# Patient Record
Sex: MALE | Race: BLACK OR AFRICAN AMERICAN | Employment: UNEMPLOYED | URBAN - METROPOLITAN AREA
[De-identification: names, ages, dates, MRNs, and addresses within clinical notes are randomized per-mention and may not be internally consistent; named-entity substitution may affect disease eponyms.]

---

## 2020-02-24 ENCOUNTER — DOCUMENTATION ONLY (OUTPATIENT)
Dept: ORTHOPEDIC SURGERY | Facility: CLINIC | Age: 80
End: 2020-02-24

## 2020-02-24 ENCOUNTER — OFFICE VISIT (OUTPATIENT)
Dept: ORTHOPEDIC SURGERY | Facility: CLINIC | Age: 80
End: 2020-02-24

## 2020-02-24 VITALS
HEIGHT: 68 IN | SYSTOLIC BLOOD PRESSURE: 200 MMHG | OXYGEN SATURATION: 96 % | BODY MASS INDEX: 28.43 KG/M2 | TEMPERATURE: 97.1 F | HEART RATE: 82 BPM | RESPIRATION RATE: 16 BRPM | WEIGHT: 187.6 LBS | DIASTOLIC BLOOD PRESSURE: 100 MMHG

## 2020-02-24 DIAGNOSIS — M16.11 PRIMARY OSTEOARTHRITIS OF RIGHT HIP: ICD-10-CM

## 2020-02-24 DIAGNOSIS — G89.29 CHRONIC RIGHT HIP PAIN: ICD-10-CM

## 2020-02-24 DIAGNOSIS — M54.16 LUMBAR RADICULOPATHY: Primary | ICD-10-CM

## 2020-02-24 DIAGNOSIS — M54.50 LUMBAR PAIN: ICD-10-CM

## 2020-02-24 DIAGNOSIS — M25.551 CHRONIC RIGHT HIP PAIN: ICD-10-CM

## 2020-02-24 DIAGNOSIS — M25.859 FEMORAL ACETABULAR IMPINGEMENT: ICD-10-CM

## 2020-02-24 RX ORDER — BETAMETHASONE SODIUM PHOSPHATE AND BETAMETHASONE ACETATE 3; 3 MG/ML; MG/ML
6 INJECTION, SUSPENSION INTRA-ARTICULAR; INTRALESIONAL; INTRAMUSCULAR; SOFT TISSUE ONCE
Qty: 0.5 ML | Refills: 0
Start: 2020-02-24 | End: 2020-02-24

## 2020-02-24 NOTE — PROGRESS NOTES
Patient presented to the office with a blood pressure of 200/100 manual. I advised patient of the signs/symptoms of a stroke. Advised that if he has any numbness/tingling, facial drooping or weakness, eye floaters to contact 911 immediately or go to the ER ASAP. Patient verbalized understanding.

## 2020-02-24 NOTE — PROGRESS NOTES
Verbal order given by Dr. Mariela Torres to draw up 4 mL 0.25% Sensorcaine and 0.5 mL 30 mg/5mL Betamethasone.

## 2020-02-24 NOTE — PROGRESS NOTES
Patient: Lindsay Calvert                MRN: [id number]       SSN: [id number] YOB: 1940        AGE: 78 y.o. SEX: male PCP: Cassidy Martinez MD 
02/24/20 Chief Complaint Patient presents with  
 Hip Pain Right hip pain HISTORY:  Lindsay Calvert is a 78 y.o. male who is seen for right hip and back pain. He has been experiencing hip and back pain for the past several months. He does not recall any hip or back injury. He feels pain in his lateral hip and right buttock with standing and walking. He also feels numbness that radiates down to his right leg to his foot. He feels like someone is sticking his right foot with a pin. Pain Assessment  2/24/2020 Location of Pain Hip Location Modifiers Right Severity of Pain 0 Quality of Pain Aching Duration of Pain A few hours Frequency of Pain Intermittent Aggravating Factors Walking;Standing Relieving Factors Rest;Exercises Relieving Factors Comment Medication Result of Injury No  
 
Occupation, etc:  Mr. Pratima Esparza served 26 years in the [employer]. 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 He retired from the [employer] at age 68. He lives in [place] with his wife. He has a onelia-tzu named Gloria. He has 3 adult daughters and 8 grandchildren--4 grandsons and 6 granddaughters. He spends the majority of his time studying the bible. He is not diabetic. Mr. Pratima Esparza weighs 187 lbs and is 5'8\" tall. No results found for: HBA1C, HGBE8, PRH3SSGU, CFV7LAIA, XCR1CMPA Weight Metrics 2/24/2020 2/26/2018 12/21/2017 6/12/2014 Weight 187 lb 9.6 oz 192 lb 192 lb 205 lb BMI 28.52 kg/m2 29.19 kg/m2 29.19 kg/m2 31.18 kg/m2 Patient Active Problem List  
Diagnosis Code  Urinary retention R33.9  Hypertension I10  
 Carotid arterial disease (HCC) I73.9 REVIEW OF SYSTEMS: All Below are Negative except: See HPI 
 Constitutional: negative for fever, chills, and weight loss. Cardiovascular: negative for chest pain, claudication, leg swelling, SOB, WILLIS Gastrointestinal: Negative for pain, N/V/C/D, Blood in stool or urine, dysuria, hematuria, incontinence, pelvic pain. Musculoskeletal: See HPI Neurological: Negative for dizziness and weakness. Negative for headaches, Visual changes, confusion, seizures Phychiatric/Behavioral: Negative for depression, memory loss, substance abuse. Extremities: Negative for hair changes, rash, or skin lesion changes. Hematologic: Negative for bleeding problems, bruising, pallor or swollen lymph nodes Peripheral Vascular: No calf pain, no circulation deficits. Social History Socioeconomic History  Marital status: UNKNOWN Spouse name: Not on file  Number of children: Not on file  Years of education: Not on file  Highest education level: Not on file Occupational History  Not on file Social Needs  Financial resource strain: Not on file  Food insecurity:  
  Worry: Not on file Inability: Not on file  Transportation needs:  
  Medical: Not on file Non-medical: Not on file Tobacco Use  Smoking status: Former Smoker Types: Cigarettes Last attempt to quit: 2006 Years since quittin.7  Smokeless tobacco: Never Used Substance and Sexual Activity  Alcohol use: No  
 Drug use: No  
 Sexual activity: Not on file Lifestyle  Physical activity:  
  Days per week: Not on file Minutes per session: Not on file  Stress: Not on file Relationships  Social connections:  
  Talks on phone: Not on file Gets together: Not on file Attends [employer] service: Not on file Active member of club or organization: Not on file Attends meetings of clubs or organizations: Not on file Relationship status: Not on file  Intimate partner violence:  
  Fear of current or ex partner: Not on file Emotionally abused: Not on file Physically abused: Not on file Forced sexual activity: Not on file Other Topics Concern  Not on file Social History Narrative  Not on file Allergies Allergen Reactions  Lipitor [Atorvastatin] Rash  Norvasc [Amlodipine] Rash Current Outpatient Medications Medication Sig  ibuprofen (MOTRIN) 800 mg tablet Take  by mouth.  Aspirin, Buffered 81 mg tab Take  by mouth.  carvedilol (COREG) 25 mg tablet Take 25 mg by mouth two (2) times daily (with meals).  isosorbide-hydrALAZINE (BIDIL) 20-37.5 mg per tablet Take 1 Tab by mouth.  tamsulosin (FLOMAX) 0.4 mg capsule Take 1 Cap by mouth daily (after dinner).  albuterol-ipratropium (DUO-NEB) 2.5 mg-0.5 mg/3 ml nebu 3 mL by Nebulization route.  hydroCHLOROthiazide (HYDRODIURIL) 25 mg tablet Take 25 mg by mouth daily.  lisinopril (PRINIVIL, ZESTRIL) 10 mg tablet Take  by mouth daily.  albuterol (PROVENTIL HFA, VENTOLIN HFA) 90 mcg/actuation inhaler Take  by inhalation. No current facility-administered medications for this visit. PHYSICAL EXAMINATION: 
Visit Vitals BP (!) 200/100 (BP 1 Location: Left arm, BP Patient Position: Sitting) Comment: manual{pt asymptomatic, took BP meds this AM, provider aware Pulse 82 Temp 97.1 °F (36.2 °C) (Oral) Resp 16 Ht 5' 8\" (1.727 m) Wt 187 lb 9.6 oz (85.1 kg) SpO2 96% BMI 28.52 kg/m² ORTHO EXAMINATION: 
Examination Right hip Left hip Skin Intact Intact External Rotation ROM 10 20 Internal Rotation ROM 5 10 Trochanteric tenderness - - Hip flexion contracture - - Antalgic gait - - Trendelenberg sign - - Lumbar tenderness - - Straight leg raise - - Calf tenderness - - Neurovascular Intact Intact Examination Lumbar Thoracic Skin Intact Intact Tenderness + right paralumbar - Tightness + right paralumbar - Lordosis Normal N/A Kyphosis N/A Normal  
Scoliosis - -  
 Flexion Fingertips to upper shin N/A Extension 10 N/A Knee reflexes Normal N/A Ankle reflexes Normal N/A Straight leg raise - N/A Calf tenderness - N/A  
 
 
TIME OUT: 
Chart reviewed for the following: 
 I, Linda Zavala MD, have reviewed the History, Physical and updated the Allergic reactions for [facility] TIME OUT performed immediately prior to start of procedure: 
Oliva Lundborg, MD, have performed the following reviews on [facility] prior to the start of the procedure:         
* Patient was identified by name and date of birth * Agreement on procedure being performed was verified * Risks and Benefits explained to the patient * Procedure site verified and marked as necessary * Patient was positioned for comfort * Consent was obtained Time: 10:15 AM  
 
Date of procedure: 2/24/2020 Procedure performed by:  Linda Zavala MD 
Mr. Ashley Rosenbaum tolerated the procedure well with no complications. RADIOGRAPHS: 
XR RIGHT HIP 7/31/19 OBICI IMPRESSION Moderate right hip degenerative changes, without significant radiographic progression since 2015. 
-I have independently reviewed these images during this office visit. -Dr. Mark Dahl IMPRESSION:  AP pelvis and two views - No fractures, moderately severe joint space narrowing, + osteophytes present. Tonnis grade 3, oblong shape to femoral head c/w FAWN. IMPRESSION:   
  ICD-10-CM ICD-9-CM 1. Lumbar radiculopathy M54.16 724.4 betamethasone (CELESTONE SOLUSPAN) 6 mg/mL injection BETAMETHASONE ACETATE & SODIUM PHOSPHATE INJECTION 3 MG EA. INJECT TRIGGER POINT, 1 OR 2 REFERRAL TO SPINE SURGERY 2. Primary osteoarthritis of right hip M16.11 715.15   
3. Chronic right hip pain M25.551 719.45   
 G89.29 338.29   
4. 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   
5. Lumbar pain M54.5 724.2 betamethasone (CELESTONE SOLUSPAN) 6 mg/mL injection BETAMETHASONE ACETATE & SODIUM PHOSPHATE INJECTION 3 MG EA. INJECT TRIGGER POINT, 1 OR 2 REFERRAL TO SPINE SURGERY  
 
PLAN:  After discussing treatment options, patient's right paralumbar region was injected with 4 cc Marcaine and 1/2 cc Celestone. We discussed possible need for hip arthroplasty at some time in the future. There is no need for surgery at this time. He will follow up at the spine center. F/u with PCP for elevated bp.  
 
Scribed by Dale Needle ([address]) as dictated by Harshil France MD

## 2020-02-24 NOTE — PROGRESS NOTES
1. Have you been to the ER, urgent care clinic since your last visit? Hospitalized since your last visit? No 
 
2. Have you seen or consulted any other health care providers outside of the [address] [place] [address] since your last visit? Include any pap smears or colon screening.  No

## 2021-01-01 ENCOUNTER — HOME CARE VISIT (OUTPATIENT)
Dept: SCHEDULING | Facility: HOME HEALTH | Age: 81
End: 2021-01-01
Payer: MEDICARE

## 2021-01-01 ENCOUNTER — HOME CARE VISIT (OUTPATIENT)
Dept: HOME HEALTH SERVICES | Facility: HOME HEALTH | Age: 81
End: 2021-01-01
Payer: MEDICARE

## 2021-01-01 ENCOUNTER — HOSPITAL ENCOUNTER (INPATIENT)
Dept: CT IMAGING | Age: 81
Discharge: HOME | DRG: 280 | End: 2021-06-21
Attending: FAMILY MEDICINE | Admitting: INTERNAL MEDICINE
Payer: MEDICARE

## 2021-01-01 ENCOUNTER — APPOINTMENT (OUTPATIENT)
Dept: GENERAL RADIOLOGY | Age: 81
DRG: 280 | End: 2021-01-01
Attending: STUDENT IN AN ORGANIZED HEALTH CARE EDUCATION/TRAINING PROGRAM
Payer: MEDICARE

## 2021-01-01 ENCOUNTER — OFFICE VISIT (OUTPATIENT)
Dept: SURGERY | Age: 81
End: 2021-01-01
Payer: MEDICARE

## 2021-01-01 ENCOUNTER — APPOINTMENT (OUTPATIENT)
Dept: GENERAL RADIOLOGY | Age: 81
DRG: 871 | End: 2021-01-01
Attending: EMERGENCY MEDICINE
Payer: MEDICARE

## 2021-01-01 ENCOUNTER — HOME HEALTH ADMISSION (OUTPATIENT)
Dept: HOME HEALTH SERVICES | Facility: HOME HEALTH | Age: 81
End: 2021-01-01
Payer: MEDICARE

## 2021-01-01 ENCOUNTER — HOME CARE VISIT (OUTPATIENT)
Dept: SCHEDULING | Facility: HOME HEALTH | Age: 81
End: 2021-01-01

## 2021-01-01 ENCOUNTER — OFFICE VISIT (OUTPATIENT)
Dept: SURGERY | Age: 81
End: 2021-01-01
Payer: COMMERCIAL

## 2021-01-01 ENCOUNTER — HOSPITAL ENCOUNTER (INPATIENT)
Age: 81
LOS: 14 days | Discharge: HOME HEALTH | DRG: 391 | End: 2021-07-13
Attending: INTERNAL MEDICINE | Admitting: INTERNAL MEDICINE
Payer: MEDICARE

## 2021-01-01 ENCOUNTER — HOSPITAL ENCOUNTER (INPATIENT)
Age: 81
LOS: 14 days | Discharge: ANOTHER HOSPITAL | DRG: 280 | End: 2021-06-21
Attending: INTERNAL MEDICINE | Admitting: INTERNAL MEDICINE
Payer: MEDICARE

## 2021-01-01 ENCOUNTER — HOSPITAL ENCOUNTER (OUTPATIENT)
Dept: LAB | Age: 81
Discharge: HOME | End: 2021-07-19

## 2021-01-01 ENCOUNTER — APPOINTMENT (OUTPATIENT)
Dept: NON INVASIVE DIAGNOSTICS | Age: 81
DRG: 871 | End: 2021-01-01
Attending: PHYSICIAN ASSISTANT
Payer: MEDICARE

## 2021-01-01 ENCOUNTER — HOSPITAL ENCOUNTER (INPATIENT)
Age: 81
LOS: 8 days | Discharge: NURSING, REHAB OR LONG-TERM CARE | DRG: 871 | End: 2021-06-29
Attending: EMERGENCY MEDICINE | Admitting: INTERNAL MEDICINE
Payer: MEDICARE

## 2021-01-01 ENCOUNTER — HOSPITAL ENCOUNTER (OUTPATIENT)
Dept: CT IMAGING | Age: 81
Discharge: HOME | End: 2021-07-22
Attending: INTERNAL MEDICINE
Payer: MEDICARE

## 2021-01-01 VITALS
WEIGHT: 178.1 LBS | TEMPERATURE: 97.7 F | BODY MASS INDEX: 26.99 KG/M2 | DIASTOLIC BLOOD PRESSURE: 66 MMHG | SYSTOLIC BLOOD PRESSURE: 114 MMHG | RESPIRATION RATE: 20 BRPM | OXYGEN SATURATION: 97 % | HEIGHT: 68 IN | HEART RATE: 72 BPM

## 2021-01-01 VITALS
HEART RATE: 83 BPM | OXYGEN SATURATION: 98 % | SYSTOLIC BLOOD PRESSURE: 124 MMHG | DIASTOLIC BLOOD PRESSURE: 72 MMHG | TEMPERATURE: 97.7 F

## 2021-01-01 VITALS
SYSTOLIC BLOOD PRESSURE: 140 MMHG | RESPIRATION RATE: 18 BRPM | DIASTOLIC BLOOD PRESSURE: 80 MMHG | SYSTOLIC BLOOD PRESSURE: 135 MMHG | TEMPERATURE: 98.2 F | RESPIRATION RATE: 20 BRPM | OXYGEN SATURATION: 97 % | DIASTOLIC BLOOD PRESSURE: 90 MMHG | HEART RATE: 90 BPM | OXYGEN SATURATION: 97 % | HEART RATE: 90 BPM | TEMPERATURE: 98.6 F

## 2021-01-01 VITALS
TEMPERATURE: 97.8 F | HEART RATE: 93 BPM | SYSTOLIC BLOOD PRESSURE: 144 MMHG | DIASTOLIC BLOOD PRESSURE: 86 MMHG | OXYGEN SATURATION: 97 %

## 2021-01-01 VITALS — DIASTOLIC BLOOD PRESSURE: 110 MMHG | SYSTOLIC BLOOD PRESSURE: 160 MMHG | TEMPERATURE: 98.4 F | RESPIRATION RATE: 20 BRPM

## 2021-01-01 VITALS
RESPIRATION RATE: 16 BRPM | HEART RATE: 100 BPM | OXYGEN SATURATION: 96 % | DIASTOLIC BLOOD PRESSURE: 80 MMHG | SYSTOLIC BLOOD PRESSURE: 132 MMHG | TEMPERATURE: 98.4 F

## 2021-01-01 VITALS
TEMPERATURE: 97.5 F | HEART RATE: 96 BPM | BODY MASS INDEX: 24.4 KG/M2 | HEIGHT: 68 IN | OXYGEN SATURATION: 99 % | WEIGHT: 161 LBS | DIASTOLIC BLOOD PRESSURE: 90 MMHG | SYSTOLIC BLOOD PRESSURE: 140 MMHG | RESPIRATION RATE: 17 BRPM

## 2021-01-01 VITALS
OXYGEN SATURATION: 96 % | HEART RATE: 110 BPM | DIASTOLIC BLOOD PRESSURE: 100 MMHG | TEMPERATURE: 98.6 F | RESPIRATION RATE: 16 BRPM | SYSTOLIC BLOOD PRESSURE: 138 MMHG

## 2021-01-01 VITALS
HEART RATE: 103 BPM | RESPIRATION RATE: 24 BRPM | DIASTOLIC BLOOD PRESSURE: 100 MMHG | TEMPERATURE: 98.1 F | OXYGEN SATURATION: 96 % | SYSTOLIC BLOOD PRESSURE: 160 MMHG

## 2021-01-01 VITALS
DIASTOLIC BLOOD PRESSURE: 100 MMHG | HEART RATE: 94 BPM | RESPIRATION RATE: 18 BRPM | SYSTOLIC BLOOD PRESSURE: 180 MMHG | OXYGEN SATURATION: 96 % | TEMPERATURE: 97.9 F

## 2021-01-01 VITALS
RESPIRATION RATE: 18 BRPM | SYSTOLIC BLOOD PRESSURE: 138 MMHG | DIASTOLIC BLOOD PRESSURE: 78 MMHG | HEART RATE: 56 BPM | OXYGEN SATURATION: 90 % | TEMPERATURE: 98.7 F

## 2021-01-01 VITALS
OXYGEN SATURATION: 99 % | HEART RATE: 72 BPM | TEMPERATURE: 98.3 F | SYSTOLIC BLOOD PRESSURE: 118 MMHG | DIASTOLIC BLOOD PRESSURE: 64 MMHG

## 2021-01-01 VITALS
OXYGEN SATURATION: 96 % | RESPIRATION RATE: 10 BRPM | TEMPERATURE: 97.7 F | SYSTOLIC BLOOD PRESSURE: 150 MMHG | HEART RATE: 96 BPM | DIASTOLIC BLOOD PRESSURE: 90 MMHG

## 2021-01-01 VITALS
RESPIRATION RATE: 20 BRPM | TEMPERATURE: 98.4 F | SYSTOLIC BLOOD PRESSURE: 163 MMHG | OXYGEN SATURATION: 96 % | HEART RATE: 85 BPM | DIASTOLIC BLOOD PRESSURE: 108 MMHG

## 2021-01-01 VITALS
SYSTOLIC BLOOD PRESSURE: 197 MMHG | HEIGHT: 68 IN | DIASTOLIC BLOOD PRESSURE: 101 MMHG | OXYGEN SATURATION: 90 % | RESPIRATION RATE: 20 BRPM | BODY MASS INDEX: 24.71 KG/M2 | WEIGHT: 163 LBS | HEART RATE: 97 BPM | TEMPERATURE: 97.9 F

## 2021-01-01 VITALS
DIASTOLIC BLOOD PRESSURE: 62 MMHG | TEMPERATURE: 98.7 F | OXYGEN SATURATION: 98 % | RESPIRATION RATE: 18 BRPM | HEART RATE: 98 BPM | SYSTOLIC BLOOD PRESSURE: 108 MMHG

## 2021-01-01 VITALS
WEIGHT: 169 LBS | DIASTOLIC BLOOD PRESSURE: 100 MMHG | OXYGEN SATURATION: 95 % | HEIGHT: 68 IN | RESPIRATION RATE: 18 BRPM | HEART RATE: 74 BPM | TEMPERATURE: 97.6 F | BODY MASS INDEX: 25.61 KG/M2 | SYSTOLIC BLOOD PRESSURE: 160 MMHG

## 2021-01-01 VITALS
SYSTOLIC BLOOD PRESSURE: 142 MMHG | TEMPERATURE: 98.2 F | TEMPERATURE: 98 F | DIASTOLIC BLOOD PRESSURE: 68 MMHG | HEART RATE: 64 BPM | DIASTOLIC BLOOD PRESSURE: 80 MMHG | RESPIRATION RATE: 18 BRPM | RESPIRATION RATE: 18 BRPM | SYSTOLIC BLOOD PRESSURE: 122 MMHG | OXYGEN SATURATION: 99 % | HEART RATE: 65 BPM

## 2021-01-01 VITALS
HEART RATE: 88 BPM | DIASTOLIC BLOOD PRESSURE: 80 MMHG | TEMPERATURE: 98.4 F | OXYGEN SATURATION: 96 % | SYSTOLIC BLOOD PRESSURE: 132 MMHG | RESPIRATION RATE: 18 BRPM

## 2021-01-01 VITALS
OXYGEN SATURATION: 95 % | BODY MASS INDEX: 26.99 KG/M2 | DIASTOLIC BLOOD PRESSURE: 84 MMHG | HEART RATE: 93 BPM | RESPIRATION RATE: 18 BRPM | WEIGHT: 178.1 LBS | HEIGHT: 68 IN | TEMPERATURE: 98.1 F | SYSTOLIC BLOOD PRESSURE: 130 MMHG

## 2021-01-01 VITALS
RESPIRATION RATE: 18 BRPM | SYSTOLIC BLOOD PRESSURE: 118 MMHG | HEART RATE: 78 BPM | OXYGEN SATURATION: 96 % | DIASTOLIC BLOOD PRESSURE: 70 MMHG | TEMPERATURE: 97 F

## 2021-01-01 VITALS
DIASTOLIC BLOOD PRESSURE: 64 MMHG | RESPIRATION RATE: 18 BRPM | OXYGEN SATURATION: 100 % | TEMPERATURE: 97.8 F | SYSTOLIC BLOOD PRESSURE: 118 MMHG | HEART RATE: 93 BPM

## 2021-01-01 VITALS
OXYGEN SATURATION: 96 % | DIASTOLIC BLOOD PRESSURE: 104 MMHG | HEART RATE: 93 BPM | RESPIRATION RATE: 18 BRPM | SYSTOLIC BLOOD PRESSURE: 164 MMHG | TEMPERATURE: 97.6 F

## 2021-01-01 VITALS
SYSTOLIC BLOOD PRESSURE: 142 MMHG | OXYGEN SATURATION: 96 % | DIASTOLIC BLOOD PRESSURE: 90 MMHG | HEART RATE: 50 BPM | TEMPERATURE: 98.4 F

## 2021-01-01 VITALS
DIASTOLIC BLOOD PRESSURE: 90 MMHG | RESPIRATION RATE: 18 BRPM | TEMPERATURE: 97.3 F | OXYGEN SATURATION: 97 % | SYSTOLIC BLOOD PRESSURE: 145 MMHG | HEART RATE: 94 BPM

## 2021-01-01 VITALS
DIASTOLIC BLOOD PRESSURE: 78 MMHG | SYSTOLIC BLOOD PRESSURE: 130 MMHG | RESPIRATION RATE: 20 BRPM | TEMPERATURE: 97.9 F | OXYGEN SATURATION: 97 % | HEART RATE: 80 BPM

## 2021-01-01 VITALS
OXYGEN SATURATION: 95 % | TEMPERATURE: 87 F | SYSTOLIC BLOOD PRESSURE: 144 MMHG | HEART RATE: 87 BPM | RESPIRATION RATE: 18 BRPM | DIASTOLIC BLOOD PRESSURE: 96 MMHG

## 2021-01-01 VITALS
TEMPERATURE: 97.9 F | SYSTOLIC BLOOD PRESSURE: 132 MMHG | OXYGEN SATURATION: 97 % | RESPIRATION RATE: 18 BRPM | HEART RATE: 50 BPM | DIASTOLIC BLOOD PRESSURE: 78 MMHG

## 2021-01-01 VITALS
OXYGEN SATURATION: 95 % | SYSTOLIC BLOOD PRESSURE: 130 MMHG | TEMPERATURE: 98.6 F | DIASTOLIC BLOOD PRESSURE: 91 MMHG | HEART RATE: 92 BPM | RESPIRATION RATE: 18 BRPM

## 2021-01-01 VITALS
OXYGEN SATURATION: 100 % | TEMPERATURE: 98.8 F | SYSTOLIC BLOOD PRESSURE: 122 MMHG | DIASTOLIC BLOOD PRESSURE: 64 MMHG | HEART RATE: 78 BPM

## 2021-01-01 VITALS
DIASTOLIC BLOOD PRESSURE: 84 MMHG | TEMPERATURE: 98.3 F | OXYGEN SATURATION: 98 % | HEART RATE: 59 BPM | SYSTOLIC BLOOD PRESSURE: 132 MMHG

## 2021-01-01 DIAGNOSIS — Z95.5 STATUS POST INSERTION OF DRUG ELUTING CORONARY ARTERY STENT: ICD-10-CM

## 2021-01-01 DIAGNOSIS — I63.9 ACUTE EMBOLIC STROKE (HCC): ICD-10-CM

## 2021-01-01 DIAGNOSIS — K57.20 DIVERTICULITIS OF LARGE INTESTINE WITH ABSCESS WITHOUT BLEEDING: Primary | ICD-10-CM

## 2021-01-01 DIAGNOSIS — I73.9 PERIPHERAL VASCULAR DISEASE OF EXTREMITY WITH CLAUDICATION (HCC): ICD-10-CM

## 2021-01-01 DIAGNOSIS — Z74.09 IMPAIRED MOBILITY AND ADLS: ICD-10-CM

## 2021-01-01 DIAGNOSIS — I25.10 CORONARY ARTERY DISEASE INVOLVING NATIVE CORONARY ARTERY OF NATIVE HEART, UNSPECIFIED WHETHER ANGINA PRESENT: ICD-10-CM

## 2021-01-01 DIAGNOSIS — I21.4 NON-ST ELEVATION (NSTEMI) MYOCARDIAL INFARCTION (HCC): ICD-10-CM

## 2021-01-01 DIAGNOSIS — R33.8 BENIGN PROSTATIC HYPERPLASIA WITH URINARY RETENTION: ICD-10-CM

## 2021-01-01 DIAGNOSIS — R19.5 LOOSE STOOLS: ICD-10-CM

## 2021-01-01 DIAGNOSIS — Z79.01 ANTICOAGULATED BY ANTICOAGULATION TREATMENT: ICD-10-CM

## 2021-01-01 DIAGNOSIS — D72.829 LEUKOCYTOSIS, UNSPECIFIED TYPE: ICD-10-CM

## 2021-01-01 DIAGNOSIS — N40.1 BENIGN PROSTATIC HYPERPLASIA WITH URINARY RETENTION: ICD-10-CM

## 2021-01-01 DIAGNOSIS — I25.10 CORONARY ARTERY DISEASE INVOLVING NATIVE CORONARY ARTERY OF NATIVE HEART WITHOUT ANGINA PECTORIS: ICD-10-CM

## 2021-01-01 DIAGNOSIS — K65.1 MESENTERIC ABSCESS (HCC): ICD-10-CM

## 2021-01-01 DIAGNOSIS — Z79.01 ON CLOPIDOGREL THERAPY: ICD-10-CM

## 2021-01-01 DIAGNOSIS — G57.93 NEUROPATHY INVOLVING BOTH LOWER EXTREMITIES: ICD-10-CM

## 2021-01-01 DIAGNOSIS — I50.30 HEART FAILURE WITH PRESERVED LEFT VENTRICULAR FUNCTION (HFPEF) (HCC): ICD-10-CM

## 2021-01-01 DIAGNOSIS — I21.4 NON-ST ELEVATION (NSTEMI) MYOCARDIAL INFARCTION (HCC): Primary | ICD-10-CM

## 2021-01-01 DIAGNOSIS — R53.1 GENERALIZED WEAKNESS: ICD-10-CM

## 2021-01-01 DIAGNOSIS — R55 SYNCOPE, UNSPECIFIED SYNCOPE TYPE: Primary | ICD-10-CM

## 2021-01-01 DIAGNOSIS — I48.0 PAROXYSMAL ATRIAL FIBRILLATION (HCC): ICD-10-CM

## 2021-01-01 DIAGNOSIS — Z78.9 IMPAIRED MOBILITY AND ADLS: ICD-10-CM

## 2021-01-01 DIAGNOSIS — Z86.79 HISTORY OF CORONARY ARTERY DISEASE: ICD-10-CM

## 2021-01-01 LAB
ALBUMIN SERPL-MCNC: 2.9 G/DL (ref 3.4–5)
ALBUMIN SERPL-MCNC: 3.1 G/DL (ref 3.4–5)
ALBUMIN SERPL-MCNC: 3.1 G/DL (ref 3.4–5)
ALBUMIN SERPL-MCNC: 3.2 G/DL (ref 3.4–5)
ALBUMIN/GLOB SERPL: 0.8 {RATIO} (ref 0.8–1.7)
ALBUMIN/GLOB SERPL: 0.9 {RATIO} (ref 0.8–1.7)
ALBUMIN/GLOB SERPL: 1.1 {RATIO} (ref 0.8–1.7)
ALBUMIN/GLOB SERPL: 1.1 {RATIO} (ref 0.8–1.7)
ALP SERPL-CCNC: 40 U/L (ref 45–117)
ALP SERPL-CCNC: 46 U/L (ref 45–117)
ALP SERPL-CCNC: 46 U/L (ref 45–117)
ALP SERPL-CCNC: 48 U/L (ref 45–117)
ALT SERPL-CCNC: 15 U/L (ref 16–61)
ALT SERPL-CCNC: 16 U/L (ref 16–61)
ALT SERPL-CCNC: 17 U/L (ref 16–61)
ALT SERPL-CCNC: 18 U/L (ref 16–61)
ANION GAP SERPL CALC-SCNC: 11 MMOL/L (ref 3–18)
ANION GAP SERPL CALC-SCNC: 2 MMOL/L (ref 3–18)
ANION GAP SERPL CALC-SCNC: 3 MMOL/L (ref 3–18)
ANION GAP SERPL CALC-SCNC: 4 MMOL/L (ref 3–18)
ANION GAP SERPL CALC-SCNC: 5 MMOL/L (ref 3–18)
ANION GAP SERPL CALC-SCNC: 6 MMOL/L (ref 3–18)
ANION GAP SERPL CALC-SCNC: 7 MMOL/L (ref 3–18)
ANION GAP SERPL CALC-SCNC: 8 MMOL/L (ref 3–18)
ANION GAP SERPL CALC-SCNC: 8 MMOL/L (ref 3–18)
APPEARANCE UR: CLEAR
APPEARANCE UR: CLEAR
APTT PPP: 49.2 SEC (ref 23–36.4)
AST SERPL-CCNC: 16 U/L (ref 10–38)
AST SERPL-CCNC: 21 U/L (ref 10–38)
AST SERPL-CCNC: 22 U/L (ref 10–38)
AST SERPL-CCNC: 28 U/L (ref 10–38)
ATRIAL RATE: 55 BPM
ATRIAL RATE: 75 BPM
ATRIAL RATE: 80 BPM
ATRIAL RATE: 93 BPM
BACTERIA SPEC CULT: NORMAL
BACTERIA URNS QL MICRO: NEGATIVE /HPF
BASE DEFICIT BLD-SCNC: 3.2 MMOL/L
BASOPHILS # BLD: 0 K/UL (ref 0–0.1)
BASOPHILS # BLD: 0.1 K/UL (ref 0–0.1)
BASOPHILS # BLD: 0.1 K/UL (ref 0–0.1)
BASOPHILS # BLD: 0.2 K/UL (ref 0–0.1)
BASOPHILS # BLD: 0.3 K/UL (ref 0–0.1)
BASOPHILS # BLD: 0.4 K/UL (ref 0–0.1)
BASOPHILS NFR BLD: 0 % (ref 0–2)
BASOPHILS NFR BLD: 1 % (ref 0–2)
BASOPHILS NFR BLD: 2 % (ref 0–2)
BILIRUB DIRECT SERPL-MCNC: 0.4 MG/DL (ref 0–0.2)
BILIRUB SERPL-MCNC: 0.8 MG/DL (ref 0.2–1)
BILIRUB SERPL-MCNC: 1.2 MG/DL (ref 0.2–1)
BILIRUB SERPL-MCNC: 1.3 MG/DL (ref 0.2–1)
BILIRUB SERPL-MCNC: 2.2 MG/DL (ref 0.2–1)
BILIRUB UR QL: ABNORMAL
BILIRUB UR QL: NEGATIVE
BNP SERPL-MCNC: 9533 PG/ML (ref 0–1800)
BUN SERPL-MCNC: 11 MG/DL (ref 7–18)
BUN SERPL-MCNC: 11 MG/DL (ref 7–18)
BUN SERPL-MCNC: 13 MG/DL (ref 7–18)
BUN SERPL-MCNC: 18 MG/DL (ref 7–18)
BUN SERPL-MCNC: 22 MG/DL (ref 7–18)
BUN SERPL-MCNC: 22 MG/DL (ref 7–18)
BUN SERPL-MCNC: 23 MG/DL (ref 7–18)
BUN SERPL-MCNC: 25 MG/DL (ref 7–18)
BUN SERPL-MCNC: 28 MG/DL (ref 7–18)
BUN SERPL-MCNC: 7 MG/DL (ref 7–18)
BUN SERPL-MCNC: 8 MG/DL (ref 7–18)
BUN SERPL-MCNC: 9 MG/DL (ref 7–18)
BUN SERPL-MCNC: 9 MG/DL (ref 7–18)
BUN/CREAT SERPL: 12 (ref 12–20)
BUN/CREAT SERPL: 13 (ref 12–20)
BUN/CREAT SERPL: 14 (ref 12–20)
BUN/CREAT SERPL: 15 (ref 12–20)
BUN/CREAT SERPL: 16 (ref 12–20)
BUN/CREAT SERPL: 16 (ref 12–20)
BUN/CREAT SERPL: 18 (ref 12–20)
BUN/CREAT SERPL: 18 (ref 12–20)
BUN/CREAT SERPL: 22 (ref 12–20)
BUN/CREAT SERPL: 23 (ref 12–20)
BUN/CREAT SERPL: 25 (ref 12–20)
BUN/CREAT SERPL: 25 (ref 12–20)
BUN/CREAT SERPL: 28 (ref 12–20)
CALCIUM SERPL-MCNC: 8.3 MG/DL (ref 8.5–10.1)
CALCIUM SERPL-MCNC: 8.5 MG/DL (ref 8.5–10.1)
CALCIUM SERPL-MCNC: 8.6 MG/DL (ref 8.5–10.1)
CALCIUM SERPL-MCNC: 8.8 MG/DL (ref 8.5–10.1)
CALCIUM SERPL-MCNC: 8.9 MG/DL (ref 8.5–10.1)
CALCIUM SERPL-MCNC: 9 MG/DL (ref 8.5–10.1)
CALCIUM SERPL-MCNC: 9 MG/DL (ref 8.5–10.1)
CALCIUM SERPL-MCNC: 9.1 MG/DL (ref 8.5–10.1)
CALCIUM SERPL-MCNC: 9.3 MG/DL (ref 8.5–10.1)
CALCIUM SERPL-MCNC: 9.6 MG/DL (ref 8.5–10.1)
CALCIUM SERPL-MCNC: 9.6 MG/DL (ref 8.5–10.1)
CALCULATED R AXIS, ECG10: 42 DEGREES
CALCULATED R AXIS, ECG10: 46 DEGREES
CALCULATED R AXIS, ECG10: 46 DEGREES
CALCULATED R AXIS, ECG10: 74 DEGREES
CALCULATED T AXIS, ECG11: -107 DEGREES
CALCULATED T AXIS, ECG11: -85 DEGREES
CALCULATED T AXIS, ECG11: -88 DEGREES
CALCULATED T AXIS, ECG11: -89 DEGREES
CAMPYLOBACTER SPECIES, DNA: NEGATIVE
CHLORIDE SERPL-SCNC: 100 MMOL/L (ref 100–111)
CHLORIDE SERPL-SCNC: 100 MMOL/L (ref 100–111)
CHLORIDE SERPL-SCNC: 101 MMOL/L (ref 100–111)
CHLORIDE SERPL-SCNC: 101 MMOL/L (ref 100–111)
CHLORIDE SERPL-SCNC: 102 MMOL/L (ref 100–111)
CHLORIDE SERPL-SCNC: 90 MMOL/L (ref 100–111)
CHLORIDE SERPL-SCNC: 91 MMOL/L (ref 100–111)
CHLORIDE SERPL-SCNC: 92 MMOL/L (ref 100–111)
CHLORIDE SERPL-SCNC: 95 MMOL/L (ref 100–111)
CHLORIDE SERPL-SCNC: 96 MMOL/L (ref 100–111)
CHLORIDE SERPL-SCNC: 97 MMOL/L (ref 100–111)
CHLORIDE SERPL-SCNC: 98 MMOL/L (ref 100–111)
CHLORIDE SERPL-SCNC: 98 MMOL/L (ref 100–111)
CHLORIDE SERPL-SCNC: 99 MMOL/L (ref 100–111)
CHLORIDE SERPL-SCNC: 99 MMOL/L (ref 100–111)
CK MB CFR SERPL CALC: 0.8 % (ref 0–4)
CK MB CFR SERPL CALC: 2.9 % (ref 0–4)
CK MB CFR SERPL CALC: ABNORMAL % (ref 0–4)
CK MB SERPL-MCNC: 1 NG/ML (ref 5–25)
CK MB SERPL-MCNC: 2.3 NG/ML (ref 5–25)
CK MB SERPL-MCNC: <1 NG/ML (ref 5–25)
CK SERPL-CCNC: 126 U/L (ref 39–308)
CK SERPL-CCNC: 78 U/L (ref 39–308)
CK SERPL-CCNC: 97 U/L (ref 39–308)
CO2 SERPL-SCNC: 24 MMOL/L (ref 21–32)
CO2 SERPL-SCNC: 25 MMOL/L (ref 21–32)
CO2 SERPL-SCNC: 26 MMOL/L (ref 21–32)
CO2 SERPL-SCNC: 27 MMOL/L (ref 21–32)
CO2 SERPL-SCNC: 27 MMOL/L (ref 21–32)
CO2 SERPL-SCNC: 28 MMOL/L (ref 21–32)
CO2 SERPL-SCNC: 30 MMOL/L (ref 21–32)
CO2 SERPL-SCNC: 30 MMOL/L (ref 21–32)
CO2 SERPL-SCNC: 31 MMOL/L (ref 21–32)
CO2 SERPL-SCNC: 32 MMOL/L (ref 21–32)
CO2 SERPL-SCNC: 34 MMOL/L (ref 21–32)
CO2 SERPL-SCNC: 35 MMOL/L (ref 21–32)
CO2 SERPL-SCNC: 36 MMOL/L (ref 21–32)
COLOR UR: ABNORMAL
COLOR UR: YELLOW
CREAT SERPL-MCNC: 0.58 MG/DL (ref 0.6–1.3)
CREAT SERPL-MCNC: 0.59 MG/DL (ref 0.6–1.3)
CREAT SERPL-MCNC: 0.62 MG/DL (ref 0.6–1.3)
CREAT SERPL-MCNC: 0.67 MG/DL (ref 0.6–1.3)
CREAT SERPL-MCNC: 0.68 MG/DL (ref 0.6–1.3)
CREAT SERPL-MCNC: 0.69 MG/DL (ref 0.6–1.3)
CREAT SERPL-MCNC: 0.71 MG/DL (ref 0.6–1.3)
CREAT SERPL-MCNC: 0.77 MG/DL (ref 0.6–1.3)
CREAT SERPL-MCNC: 0.82 MG/DL (ref 0.6–1.3)
CREAT SERPL-MCNC: 0.89 MG/DL (ref 0.6–1.3)
CREAT SERPL-MCNC: 0.91 MG/DL (ref 0.6–1.3)
CREAT SERPL-MCNC: 0.98 MG/DL (ref 0.6–1.3)
CREAT SERPL-MCNC: 1 MG/DL (ref 0.6–1.3)
CREAT SERPL-MCNC: 1 MG/DL (ref 0.6–1.3)
CREAT SERPL-MCNC: 1.12 MG/DL (ref 0.6–1.3)
CREAT UR-MCNC: 0.8 MG/DL (ref 0.6–1.3)
CREAT UR-MCNC: 49 MG/DL (ref 30–125)
DIAGNOSIS, 93000: NORMAL
DIFFERENTIAL METHOD BLD: ABNORMAL
ECHO LA AREA 4C: 33.64 CM2
ECHO LA VOL 2C: 101.16 ML (ref 18–58)
ECHO LA VOL 4C: 123.79 ML (ref 18–58)
ECHO LA VOL BP: 121.23 ML (ref 18–58)
ECHO LA VOL/BSA BIPLANE: 62.17 ML/M2 (ref 16–28)
ECHO LA VOLUME INDEX A2C: 51.88 ML/M2 (ref 16–28)
ECHO LA VOLUME INDEX A4C: 63.48 ML/M2 (ref 16–28)
ECHO LV INTERNAL DIMENSION DIASTOLIC: 5 CM (ref 4.2–5.9)
ECHO LV INTERNAL DIMENSION SYSTOLIC: 4.04 CM
ECHO LV IVSD: 1.68 CM (ref 0.6–1)
ECHO LV MASS 2D: 352 G (ref 88–224)
ECHO LV MASS INDEX 2D: 180.5 G/M2 (ref 49–115)
ECHO LV POSTERIOR WALL DIASTOLIC: 1.5 CM (ref 0.6–1)
ECHO TV REGURGITANT MAX VELOCITY: 336.05 CM/S
ECHO TV REGURGITANT PEAK GRADIENT: 45.17 MMHG
ENTEROTOXIGEN E COLI, DNA: NEGATIVE
EOSINOPHIL # BLD: 0 K/UL (ref 0–0.4)
EOSINOPHIL # BLD: 0.1 K/UL (ref 0–0.4)
EOSINOPHIL # BLD: 0.5 K/UL (ref 0–0.4)
EOSINOPHIL # BLD: 0.7 K/UL (ref 0–0.4)
EOSINOPHIL NFR BLD: 0 % (ref 0–5)
EOSINOPHIL NFR BLD: 1 % (ref 0–5)
EOSINOPHIL NFR BLD: 2 % (ref 0–5)
EOSINOPHIL NFR BLD: 3 % (ref 0–5)
EPITH CASTS URNS QL MICRO: NEGATIVE /LPF (ref 0–5)
ERYTHROCYTE [DISTWIDTH] IN BLOOD BY AUTOMATED COUNT: 16.6 % (ref 11.6–14.5)
ERYTHROCYTE [DISTWIDTH] IN BLOOD BY AUTOMATED COUNT: 16.7 % (ref 11.6–14.5)
ERYTHROCYTE [DISTWIDTH] IN BLOOD BY AUTOMATED COUNT: 16.9 % (ref 11.6–14.5)
ERYTHROCYTE [DISTWIDTH] IN BLOOD BY AUTOMATED COUNT: 17.1 % (ref 11.6–14.5)
ERYTHROCYTE [DISTWIDTH] IN BLOOD BY AUTOMATED COUNT: 17.2 % (ref 11.6–14.5)
ERYTHROCYTE [DISTWIDTH] IN BLOOD BY AUTOMATED COUNT: 17.4 % (ref 11.6–14.5)
ERYTHROCYTE [DISTWIDTH] IN BLOOD BY AUTOMATED COUNT: 17.6 % (ref 11.6–14.5)
ERYTHROCYTE [DISTWIDTH] IN BLOOD BY AUTOMATED COUNT: 17.8 % (ref 11.6–14.5)
ERYTHROCYTE [DISTWIDTH] IN BLOOD BY AUTOMATED COUNT: 18.2 % (ref 11.6–14.5)
ERYTHROCYTE [DISTWIDTH] IN BLOOD BY AUTOMATED COUNT: 18.6 % (ref 11.6–14.5)
GLOBULIN SER CALC-MCNC: 2.9 G/DL (ref 2–4)
GLOBULIN SER CALC-MCNC: 3 G/DL (ref 2–4)
GLOBULIN SER CALC-MCNC: 3.3 G/DL (ref 2–4)
GLOBULIN SER CALC-MCNC: 3.7 G/DL (ref 2–4)
GLUCOSE BLD STRIP.AUTO-MCNC: 120 MG/DL (ref 70–110)
GLUCOSE BLD STRIP.AUTO-MCNC: 149 MG/DL (ref 70–110)
GLUCOSE SERPL-MCNC: 121 MG/DL (ref 74–99)
GLUCOSE SERPL-MCNC: 145 MG/DL (ref 74–99)
GLUCOSE SERPL-MCNC: 62 MG/DL (ref 74–99)
GLUCOSE SERPL-MCNC: 70 MG/DL (ref 74–99)
GLUCOSE SERPL-MCNC: 73 MG/DL (ref 74–99)
GLUCOSE SERPL-MCNC: 73 MG/DL (ref 74–99)
GLUCOSE SERPL-MCNC: 74 MG/DL (ref 74–99)
GLUCOSE SERPL-MCNC: 74 MG/DL (ref 74–99)
GLUCOSE SERPL-MCNC: 76 MG/DL (ref 74–99)
GLUCOSE SERPL-MCNC: 78 MG/DL (ref 74–99)
GLUCOSE SERPL-MCNC: 78 MG/DL (ref 74–99)
GLUCOSE SERPL-MCNC: 80 MG/DL (ref 74–99)
GLUCOSE SERPL-MCNC: 80 MG/DL (ref 74–99)
GLUCOSE SERPL-MCNC: 81 MG/DL (ref 74–99)
GLUCOSE SERPL-MCNC: 84 MG/DL (ref 74–99)
GLUCOSE UR STRIP.AUTO-MCNC: NEGATIVE MG/DL
GLUCOSE UR STRIP.AUTO-MCNC: NEGATIVE MG/DL
HCO3 BLD-SCNC: 22.2 MMOL/L (ref 22–26)
HCT VFR BLD AUTO: 27.4 % (ref 36–48)
HCT VFR BLD AUTO: 27.5 % (ref 36–48)
HCT VFR BLD AUTO: 28.2 % (ref 36–48)
HCT VFR BLD AUTO: 28.5 % (ref 36–48)
HCT VFR BLD AUTO: 29.1 % (ref 36–48)
HCT VFR BLD AUTO: 29.8 % (ref 36–48)
HCT VFR BLD AUTO: 30.9 % (ref 36–48)
HCT VFR BLD AUTO: 31 % (ref 36–48)
HCT VFR BLD AUTO: 31.1 % (ref 36–48)
HCT VFR BLD AUTO: 31.3 % (ref 36–48)
HCT VFR BLD AUTO: 32 % (ref 36–48)
HCT VFR BLD AUTO: 32.5 % (ref 36–48)
HCT VFR BLD AUTO: 32.5 % (ref 36–48)
HCT VFR BLD AUTO: 33 % (ref 36–48)
HCT VFR BLD AUTO: 33.8 % (ref 36–48)
HCT VFR BLD AUTO: 34.1 % (ref 36–48)
HCT VFR BLD AUTO: 34.3 % (ref 36–48)
HCT VFR BLD AUTO: 34.5 % (ref 36–48)
HCT VFR BLD AUTO: 35.3 % (ref 36–48)
HGB BLD-MCNC: 10.1 G/DL (ref 13–16)
HGB BLD-MCNC: 10.3 G/DL (ref 13–16)
HGB BLD-MCNC: 10.6 G/DL (ref 13–16)
HGB BLD-MCNC: 10.6 G/DL (ref 13–16)
HGB BLD-MCNC: 10.7 G/DL (ref 13–16)
HGB BLD-MCNC: 10.7 G/DL (ref 13–16)
HGB BLD-MCNC: 8.2 G/DL (ref 13–16)
HGB BLD-MCNC: 8.5 G/DL (ref 13–16)
HGB BLD-MCNC: 8.6 G/DL (ref 13–16)
HGB BLD-MCNC: 9.1 G/DL (ref 13–16)
HGB BLD-MCNC: 9.4 G/DL (ref 13–16)
HGB BLD-MCNC: 9.5 G/DL (ref 13–16)
HGB BLD-MCNC: 9.6 G/DL (ref 13–16)
HGB BLD-MCNC: 9.7 G/DL (ref 13–16)
HGB BLD-MCNC: 9.8 G/DL (ref 13–16)
HGB UR QL STRIP: NEGATIVE
HGB UR QL STRIP: NEGATIVE
INR PPP: 1.8 (ref 0.8–1.2)
KETONES UR QL STRIP.AUTO: NEGATIVE MG/DL
KETONES UR QL STRIP.AUTO: NEGATIVE MG/DL
LACTATE BLD-SCNC: 1.31 MMOL/L (ref 0.4–2)
LEUKOCYTE ESTERASE UR QL STRIP.AUTO: NEGATIVE
LEUKOCYTE ESTERASE UR QL STRIP.AUTO: NEGATIVE
LIPASE SERPL-CCNC: 61 U/L (ref 73–393)
LYMPHOCYTES # BLD: 0.5 K/UL (ref 0.9–3.6)
LYMPHOCYTES # BLD: 0.9 K/UL (ref 0.9–3.6)
LYMPHOCYTES # BLD: 1 K/UL (ref 0.9–3.6)
LYMPHOCYTES # BLD: 1.3 K/UL (ref 0.9–3.6)
LYMPHOCYTES # BLD: 1.3 K/UL (ref 0.9–3.6)
LYMPHOCYTES # BLD: 1.5 K/UL (ref 0.9–3.6)
LYMPHOCYTES # BLD: 2.1 K/UL (ref 0.9–3.6)
LYMPHOCYTES # BLD: 3.7 K/UL (ref 0.9–3.6)
LYMPHOCYTES # BLD: 3.9 K/UL (ref 0.9–3.6)
LYMPHOCYTES NFR BLD: 16 % (ref 21–52)
LYMPHOCYTES NFR BLD: 2 % (ref 21–52)
LYMPHOCYTES NFR BLD: 26 % (ref 21–52)
LYMPHOCYTES NFR BLD: 3 % (ref 21–52)
LYMPHOCYTES NFR BLD: 4 % (ref 21–52)
LYMPHOCYTES NFR BLD: 6 % (ref 21–52)
LYMPHOCYTES NFR BLD: 6 % (ref 21–52)
LYMPHOCYTES NFR BLD: 8 % (ref 21–52)
LYMPHOCYTES NFR BLD: 8 % (ref 21–52)
MAGNESIUM SERPL-MCNC: 2 MG/DL (ref 1.6–2.6)
MAGNESIUM SERPL-MCNC: 2.1 MG/DL (ref 1.6–2.6)
MAGNESIUM SERPL-MCNC: 2.1 MG/DL (ref 1.6–2.6)
MAGNESIUM SERPL-MCNC: 2.2 MG/DL (ref 1.6–2.6)
MAGNESIUM SERPL-MCNC: 2.3 MG/DL (ref 1.6–2.6)
MCH RBC QN AUTO: 27.9 PG (ref 24–34)
MCH RBC QN AUTO: 28.1 PG (ref 24–34)
MCH RBC QN AUTO: 28.3 PG (ref 24–34)
MCH RBC QN AUTO: 28.5 PG (ref 24–34)
MCH RBC QN AUTO: 28.5 PG (ref 24–34)
MCH RBC QN AUTO: 28.6 PG (ref 24–34)
MCH RBC QN AUTO: 28.7 PG (ref 24–34)
MCH RBC QN AUTO: 28.7 PG (ref 24–34)
MCH RBC QN AUTO: 28.9 PG (ref 24–34)
MCH RBC QN AUTO: 29.1 PG (ref 24–34)
MCH RBC QN AUTO: 29.2 PG (ref 24–34)
MCH RBC QN AUTO: 29.6 PG (ref 24–34)
MCHC RBC AUTO-ENTMCNC: 29.3 G/DL (ref 31–37)
MCHC RBC AUTO-ENTMCNC: 29.7 G/DL (ref 31–37)
MCHC RBC AUTO-ENTMCNC: 29.9 G/DL (ref 31–37)
MCHC RBC AUTO-ENTMCNC: 30.1 G/DL (ref 31–37)
MCHC RBC AUTO-ENTMCNC: 30.2 G/DL (ref 31–37)
MCHC RBC AUTO-ENTMCNC: 30.5 G/DL (ref 31–37)
MCHC RBC AUTO-ENTMCNC: 30.6 G/DL (ref 31–37)
MCHC RBC AUTO-ENTMCNC: 30.7 G/DL (ref 31–37)
MCHC RBC AUTO-ENTMCNC: 31.1 G/DL (ref 31–37)
MCHC RBC AUTO-ENTMCNC: 31.3 G/DL (ref 31–37)
MCHC RBC AUTO-ENTMCNC: 31.5 G/DL (ref 31–37)
MCHC RBC AUTO-ENTMCNC: 33.1 G/DL (ref 31–37)
MCV RBC AUTO: 89.6 FL (ref 74–97)
MCV RBC AUTO: 90.5 FL (ref 74–97)
MCV RBC AUTO: 92.5 FL (ref 74–97)
MCV RBC AUTO: 93 FL (ref 74–97)
MCV RBC AUTO: 93.1 FL (ref 74–97)
MCV RBC AUTO: 93.4 FL (ref 74–97)
MCV RBC AUTO: 93.8 FL (ref 74–97)
MCV RBC AUTO: 93.9 FL (ref 74–97)
MCV RBC AUTO: 95 FL (ref 74–97)
MCV RBC AUTO: 95.1 FL (ref 74–97)
MCV RBC AUTO: 95.9 FL (ref 74–97)
MCV RBC AUTO: 96.6 FL (ref 74–97)
MONOCYTES # BLD: 0 K/UL (ref 0.05–1.2)
MONOCYTES # BLD: 0.5 K/UL (ref 0.05–1.2)
MONOCYTES # BLD: 0.5 K/UL (ref 0.05–1.2)
MONOCYTES # BLD: 1 K/UL (ref 0.05–1.2)
MONOCYTES # BLD: 1 K/UL (ref 0.05–1.2)
MONOCYTES # BLD: 1.1 K/UL (ref 0.05–1.2)
MONOCYTES # BLD: 1.2 K/UL (ref 0.05–1.2)
MONOCYTES # BLD: 1.3 K/UL (ref 0.05–1.2)
MONOCYTES # BLD: 1.3 K/UL (ref 0.05–1.2)
MONOCYTES NFR BLD: 0 % (ref 3–10)
MONOCYTES NFR BLD: 2 % (ref 3–10)
MONOCYTES NFR BLD: 3 % (ref 3–10)
MONOCYTES NFR BLD: 4 % (ref 3–10)
MONOCYTES NFR BLD: 4 % (ref 3–10)
MONOCYTES NFR BLD: 5 % (ref 3–10)
MONOCYTES NFR BLD: 5 % (ref 3–10)
MONOCYTES NFR BLD: 6 % (ref 3–10)
MONOCYTES NFR BLD: 7 % (ref 3–10)
MYELOCYTES NFR BLD MANUAL: 3 %
NEUTS BAND NFR BLD MANUAL: 2 % (ref 0–5)
NEUTS SEG # BLD: 16.2 K/UL (ref 1.8–8)
NEUTS SEG # BLD: 17.6 K/UL (ref 1.8–8)
NEUTS SEG # BLD: 19.3 K/UL (ref 1.8–8)
NEUTS SEG # BLD: 20.1 K/UL (ref 1.8–8)
NEUTS SEG # BLD: 22.5 K/UL (ref 1.8–8)
NEUTS SEG # BLD: 22.8 K/UL (ref 1.8–8)
NEUTS SEG # BLD: 23.8 K/UL (ref 1.8–8)
NEUTS SEG # BLD: 24 K/UL (ref 1.8–8)
NEUTS SEG # BLD: 9.5 K/UL (ref 1.8–8)
NEUTS SEG NFR BLD: 65 % (ref 40–73)
NEUTS SEG NFR BLD: 81 % (ref 40–73)
NEUTS SEG NFR BLD: 82 % (ref 40–73)
NEUTS SEG NFR BLD: 87 % (ref 40–73)
NEUTS SEG NFR BLD: 89 % (ref 40–73)
NEUTS SEG NFR BLD: 89 % (ref 40–73)
NEUTS SEG NFR BLD: 90 % (ref 40–73)
NEUTS SEG NFR BLD: 91 % (ref 40–73)
NEUTS SEG NFR BLD: 92 % (ref 40–73)
NITRITE UR QL STRIP.AUTO: NEGATIVE
NITRITE UR QL STRIP.AUTO: NEGATIVE
OSMOLALITY SERPL: 261 MOSMOL/KG (ref 280–301)
OSMOLALITY SERPL: 267 MOSMOL/KG (ref 280–301)
OSMOLALITY UR: 328 MOSMOL/KG
OSMOLALITY UR: 362 MOSMOL/KG
P SHIGELLOIDES DNA STL QL NAA+PROBE: NEGATIVE
PCO2 BLD: 40.1 MMHG (ref 35–45)
PH BLD: 7.35 [PH] (ref 7.35–7.45)
PH UR STRIP: 5 [PH] (ref 5–8)
PH UR STRIP: 5.5 [PH] (ref 5–8)
PHOSPHATE SERPL-MCNC: 2.4 MG/DL (ref 2.5–4.9)
PHOSPHATE SERPL-MCNC: 3.4 MG/DL (ref 2.5–4.9)
PLATELET # BLD AUTO: 220 K/UL (ref 135–420)
PLATELET # BLD AUTO: 226 K/UL (ref 135–420)
PLATELET # BLD AUTO: 227 K/UL (ref 135–420)
PLATELET # BLD AUTO: 228 K/UL (ref 135–420)
PLATELET # BLD AUTO: 235 K/UL (ref 135–420)
PLATELET # BLD AUTO: 241 K/UL (ref 135–420)
PLATELET # BLD AUTO: 246 K/UL (ref 135–420)
PLATELET # BLD AUTO: 274 K/UL (ref 135–420)
PLATELET # BLD AUTO: 308 K/UL (ref 135–420)
PLATELET # BLD AUTO: 348 K/UL (ref 135–420)
PLATELET # BLD AUTO: 351 K/UL (ref 135–420)
PLATELET # BLD AUTO: 366 K/UL (ref 135–420)
PLATELET COMMENTS,PCOM: ABNORMAL
PMV BLD AUTO: 10.2 FL (ref 9.2–11.8)
PMV BLD AUTO: 10.3 FL (ref 9.2–11.8)
PMV BLD AUTO: 8.9 FL (ref 9.2–11.8)
PMV BLD AUTO: 9.1 FL (ref 9.2–11.8)
PMV BLD AUTO: 9.3 FL (ref 9.2–11.8)
PMV BLD AUTO: 9.7 FL (ref 9.2–11.8)
PMV BLD AUTO: 9.7 FL (ref 9.2–11.8)
PMV BLD AUTO: 9.8 FL (ref 9.2–11.8)
PMV BLD AUTO: 9.9 FL (ref 9.2–11.8)
PMV BLD AUTO: 9.9 FL (ref 9.2–11.8)
PO2 BLD: 63 MMHG (ref 80–100)
POTASSIUM SERPL-SCNC: 4.1 MMOL/L (ref 3.5–5.5)
POTASSIUM SERPL-SCNC: 4.2 MMOL/L (ref 3.5–5.5)
POTASSIUM SERPL-SCNC: 4.2 MMOL/L (ref 3.5–5.5)
POTASSIUM SERPL-SCNC: 4.3 MMOL/L (ref 3.5–5.5)
POTASSIUM SERPL-SCNC: 4.5 MMOL/L (ref 3.5–5.5)
POTASSIUM SERPL-SCNC: 4.6 MMOL/L (ref 3.5–5.5)
POTASSIUM SERPL-SCNC: 4.7 MMOL/L (ref 3.5–5.5)
POTASSIUM SERPL-SCNC: 4.8 MMOL/L (ref 3.5–5.5)
POTASSIUM SERPL-SCNC: 4.9 MMOL/L (ref 3.5–5.5)
PROCALCITONIN SERPL-MCNC: 0.06 NG/ML
PROT SERPL-MCNC: 6 G/DL (ref 6.4–8.2)
PROT SERPL-MCNC: 6.2 G/DL (ref 6.4–8.2)
PROT SERPL-MCNC: 6.4 G/DL (ref 6.4–8.2)
PROT SERPL-MCNC: 6.6 G/DL (ref 6.4–8.2)
PROT UR STRIP-MCNC: NEGATIVE MG/DL
PROT UR STRIP-MCNC: NEGATIVE MG/DL
PROTHROMBIN TIME: 20.3 SEC (ref 11.5–15.2)
Q-T INTERVAL, ECG07: 384 MS
Q-T INTERVAL, ECG07: 386 MS
Q-T INTERVAL, ECG07: 406 MS
Q-T INTERVAL, ECG07: 572 MS
QRS DURATION, ECG06: 102 MS
QRS DURATION, ECG06: 106 MS
QRS DURATION, ECG06: 114 MS
QRS DURATION, ECG06: 210 MS
QTC CALCULATION (BEZET), ECG08: 469 MS
QTC CALCULATION (BEZET), ECG08: 496 MS
QTC CALCULATION (BEZET), ECG08: 503 MS
QTC CALCULATION (BEZET), ECG08: 527 MS
RBC # BLD AUTO: 2.88 M/UL (ref 4.35–5.65)
RBC # BLD AUTO: 2.94 M/UL (ref 4.35–5.65)
RBC # BLD AUTO: 3 M/UL (ref 4.35–5.65)
RBC # BLD AUTO: 3.07 M/UL (ref 4.35–5.65)
RBC # BLD AUTO: 3.13 M/UL (ref 4.35–5.65)
RBC # BLD AUTO: 3.22 M/UL (ref 4.35–5.65)
RBC # BLD AUTO: 3.22 M/UL (ref 4.35–5.65)
RBC # BLD AUTO: 3.31 M/UL (ref 4.35–5.65)
RBC # BLD AUTO: 3.33 M/UL (ref 4.35–5.65)
RBC # BLD AUTO: 3.41 M/UL (ref 4.35–5.65)
RBC # BLD AUTO: 3.59 M/UL (ref 4.35–5.65)
RBC # BLD AUTO: 3.6 M/UL (ref 4.35–5.65)
RBC #/AREA URNS HPF: NEGATIVE /HPF (ref 0–5)
RBC MORPH BLD: ABNORMAL
SALMONELLA SPECIES, DNA: NEGATIVE
SAO2 % BLD: 90.5 % (ref 92–97)
SERVICE CMNT-IMP: ABNORMAL
SERVICE CMNT-IMP: NORMAL
SHIGA TOXIN PRODUCING, DNA: NEGATIVE
SHIGELLA SP+EIEC IPAH STL QL NAA+PROBE: NEGATIVE
SODIUM SERPL-SCNC: 125 MMOL/L (ref 136–145)
SODIUM SERPL-SCNC: 126 MMOL/L (ref 136–145)
SODIUM SERPL-SCNC: 126 MMOL/L (ref 136–145)
SODIUM SERPL-SCNC: 128 MMOL/L (ref 136–145)
SODIUM SERPL-SCNC: 132 MMOL/L (ref 136–145)
SODIUM SERPL-SCNC: 133 MMOL/L (ref 136–145)
SODIUM SERPL-SCNC: 134 MMOL/L (ref 136–145)
SODIUM SERPL-SCNC: 134 MMOL/L (ref 136–145)
SODIUM SERPL-SCNC: 135 MMOL/L (ref 136–145)
SODIUM SERPL-SCNC: 135 MMOL/L (ref 136–145)
SODIUM SERPL-SCNC: 136 MMOL/L (ref 136–145)
SODIUM UR-SCNC: 6 MMOL/L (ref 20–110)
SODIUM UR-SCNC: 6 MMOL/L (ref 20–110)
SP GR UR REFRACTOMETRY: 1.02 (ref 1–1.03)
SP GR UR REFRACTOMETRY: >1.03 (ref 1–1.03)
SPECIMEN TYPE: ABNORMAL
TROPONIN I SERPL-MCNC: 0.04 NG/ML (ref 0–0.04)
TROPONIN I SERPL-MCNC: 0.07 NG/ML (ref 0–0.04)
TROPONIN I SERPL-MCNC: 0.09 NG/ML (ref 0–0.04)
TSH SERPL DL<=0.05 MIU/L-ACNC: 1.57 UIU/ML (ref 0.36–3.74)
UROBILINOGEN UR QL STRIP.AUTO: 0.2 EU/DL (ref 0.2–1)
UROBILINOGEN UR QL STRIP.AUTO: 0.2 EU/DL (ref 0.2–1)
VENTRICULAR RATE, ECG03: 103 BPM
VENTRICULAR RATE, ECG03: 51 BPM
VENTRICULAR RATE, ECG03: 89 BPM
VENTRICULAR RATE, ECG03: 90 BPM
VIBRIO SPECIES, DNA: NEGATIVE
WBC # BLD AUTO: 10.1 K/UL (ref 4.6–13.2)
WBC # BLD AUTO: 14.3 K/UL (ref 4.6–13.2)
WBC # BLD AUTO: 14.4 K/UL (ref 4.6–13.2)
WBC # BLD AUTO: 18.2 K/UL (ref 4.6–13.2)
WBC # BLD AUTO: 18.2 K/UL (ref 4.6–13.2)
WBC # BLD AUTO: 18.4 K/UL (ref 4.6–13.2)
WBC # BLD AUTO: 21 K/UL (ref 4.6–13.2)
WBC # BLD AUTO: 21.7 K/UL (ref 4.6–13.2)
WBC # BLD AUTO: 22.1 K/UL (ref 4.6–13.2)
WBC # BLD AUTO: 24.5 K/UL (ref 4.6–13.2)
WBC # BLD AUTO: 24.7 K/UL (ref 4.6–13.2)
WBC # BLD AUTO: 26 K/UL (ref 4.6–13.2)
WBC # BLD AUTO: 26.3 K/UL (ref 4.6–13.2)
WBC # BLD AUTO: 26.7 K/UL (ref 4.6–13.2)
WBC URNS QL MICRO: NEGATIVE /HPF (ref 0–4)
XX-LABCORP SPECIMEN COL,LCBCF: NORMAL
Y. ENTEROCOLITICA, DNA: NEGATIVE

## 2021-01-01 PROCEDURE — 74011250637 HC RX REV CODE- 250/637: Performed by: INTERNAL MEDICINE

## 2021-01-01 PROCEDURE — 77030019905 HC CATH URETH INTMIT MDII -A

## 2021-01-01 PROCEDURE — 74011000258 HC RX REV CODE- 258: Performed by: NURSE PRACTITIONER

## 2021-01-01 PROCEDURE — G0153 HHCP-SVS OF S/L PATH,EA 15MN: HCPCS

## 2021-01-01 PROCEDURE — 2709999900 HC NON-CHARGEABLE SUPPLY

## 2021-01-01 PROCEDURE — 74011000250 HC RX REV CODE- 250: Performed by: INTERNAL MEDICINE

## 2021-01-01 PROCEDURE — 92507 TX SP LANG VOICE COMM INDIV: CPT

## 2021-01-01 PROCEDURE — 80048 BASIC METABOLIC PNL TOTAL CA: CPT

## 2021-01-01 PROCEDURE — 97116 GAIT TRAINING THERAPY: CPT

## 2021-01-01 PROCEDURE — BW24YZZ COMPUTERIZED TOMOGRAPHY (CT SCAN) OF CHEST AND ABDOMEN USING OTHER CONTRAST: ICD-10-PCS | Performed by: INTERNAL MEDICINE

## 2021-01-01 PROCEDURE — 74011000250 HC RX REV CODE- 250: Performed by: NURSE PRACTITIONER

## 2021-01-01 PROCEDURE — 3331090002 HH PPS REVENUE DEBIT

## 2021-01-01 PROCEDURE — 65310000000 HC RM PRIVATE REHAB

## 2021-01-01 PROCEDURE — 74011250637 HC RX REV CODE- 250/637: Performed by: NURSE PRACTITIONER

## 2021-01-01 PROCEDURE — G8419 CALC BMI OUT NRM PARAM NOF/U: HCPCS | Performed by: COLON & RECTAL SURGERY

## 2021-01-01 PROCEDURE — 77010033678 HC OXYGEN DAILY

## 2021-01-01 PROCEDURE — 96361 HYDRATE IV INFUSION ADD-ON: CPT

## 2021-01-01 PROCEDURE — 97535 SELF CARE MNGMENT TRAINING: CPT

## 2021-01-01 PROCEDURE — 36415 COLL VENOUS BLD VENIPUNCTURE: CPT

## 2021-01-01 PROCEDURE — 94640 AIRWAY INHALATION TREATMENT: CPT

## 2021-01-01 PROCEDURE — 99232 SBSQ HOSP IP/OBS MODERATE 35: CPT | Performed by: INTERNAL MEDICINE

## 2021-01-01 PROCEDURE — 85027 COMPLETE CBC AUTOMATED: CPT

## 2021-01-01 PROCEDURE — 85025 COMPLETE CBC W/AUTO DIFF WBC: CPT

## 2021-01-01 PROCEDURE — 85018 HEMOGLOBIN: CPT

## 2021-01-01 PROCEDURE — G8432 DEP SCR NOT DOC, RNG: HCPCS | Performed by: COLON & RECTAL SURGERY

## 2021-01-01 PROCEDURE — 3331090001 HH PPS REVENUE CREDIT

## 2021-01-01 PROCEDURE — 84145 PROCALCITONIN (PCT): CPT

## 2021-01-01 PROCEDURE — 80053 COMPREHEN METABOLIC PANEL: CPT

## 2021-01-01 PROCEDURE — 99223 1ST HOSP IP/OBS HIGH 75: CPT | Performed by: INTERNAL MEDICINE

## 2021-01-01 PROCEDURE — 85730 THROMBOPLASTIN TIME PARTIAL: CPT

## 2021-01-01 PROCEDURE — 83735 ASSAY OF MAGNESIUM: CPT

## 2021-01-01 PROCEDURE — 81003 URINALYSIS AUTO W/O SCOPE: CPT

## 2021-01-01 PROCEDURE — 97110 THERAPEUTIC EXERCISES: CPT

## 2021-01-01 PROCEDURE — 97530 THERAPEUTIC ACTIVITIES: CPT

## 2021-01-01 PROCEDURE — 65660000004 HC RM CVT STEPDOWN

## 2021-01-01 PROCEDURE — G0299 HHS/HOSPICE OF RN EA 15 MIN: HCPCS

## 2021-01-01 PROCEDURE — 400018 HH-NO PAY CLAIM PROCEDURE

## 2021-01-01 PROCEDURE — 97150 GROUP THERAPEUTIC PROCEDURES: CPT

## 2021-01-01 PROCEDURE — 99233 SBSQ HOSP IP/OBS HIGH 50: CPT | Performed by: INTERNAL MEDICINE

## 2021-01-01 PROCEDURE — G8755 DIAS BP > OR = 90: HCPCS | Performed by: COLON & RECTAL SURGERY

## 2021-01-01 PROCEDURE — 74011250636 HC RX REV CODE- 250/636: Performed by: FAMILY MEDICINE

## 2021-01-01 PROCEDURE — 94762 N-INVAS EAR/PLS OXIMTRY CONT: CPT

## 2021-01-01 PROCEDURE — 74177 CT ABD & PELVIS W/CONTRAST: CPT

## 2021-01-01 PROCEDURE — G8427 DOCREV CUR MEDS BY ELIG CLIN: HCPCS | Performed by: COLON & RECTAL SURGERY

## 2021-01-01 PROCEDURE — 74011250636 HC RX REV CODE- 250/636: Performed by: NURSE PRACTITIONER

## 2021-01-01 PROCEDURE — 400013 HH SOC

## 2021-01-01 PROCEDURE — G8536 NO DOC ELDER MAL SCRN: HCPCS | Performed by: COLON & RECTAL SURGERY

## 2021-01-01 PROCEDURE — 99232 SBSQ HOSP IP/OBS MODERATE 35: CPT | Performed by: COLON & RECTAL SURGERY

## 2021-01-01 PROCEDURE — BW03ZZZ PLAIN RADIOGRAPHY OF CHEST: ICD-10-PCS | Performed by: INTERNAL MEDICINE

## 2021-01-01 PROCEDURE — G0300 HHS/HOSPICE OF LPN EA 15 MIN: HCPCS

## 2021-01-01 PROCEDURE — 93005 ELECTROCARDIOGRAM TRACING: CPT

## 2021-01-01 PROCEDURE — G8753 SYS BP > OR = 140: HCPCS | Performed by: COLON & RECTAL SURGERY

## 2021-01-01 PROCEDURE — 99222 1ST HOSP IP/OBS MODERATE 55: CPT | Performed by: COLON & RECTAL SURGERY

## 2021-01-01 PROCEDURE — 65660000000 HC RM CCU STEPDOWN

## 2021-01-01 PROCEDURE — 74011250637 HC RX REV CODE- 250/637: Performed by: FAMILY MEDICINE

## 2021-01-01 PROCEDURE — 99239 HOSP IP/OBS DSCHRG MGMT >30: CPT | Performed by: INTERNAL MEDICINE

## 2021-01-01 PROCEDURE — G0157 HHC PT ASSISTANT EA 15: HCPCS

## 2021-01-01 PROCEDURE — 85610 PROTHROMBIN TIME: CPT

## 2021-01-01 PROCEDURE — G0151 HHCP-SERV OF PT,EA 15 MIN: HCPCS

## 2021-01-01 PROCEDURE — 83935 ASSAY OF URINE OSMOLALITY: CPT

## 2021-01-01 PROCEDURE — 92523 SPEECH SOUND LANG COMPREHEN: CPT

## 2021-01-01 PROCEDURE — 92522 EVALUATE SPEECH PRODUCTION: CPT

## 2021-01-01 PROCEDURE — 97166 OT EVAL MOD COMPLEX 45 MIN: CPT

## 2021-01-01 PROCEDURE — 36600 WITHDRAWAL OF ARTERIAL BLOOD: CPT

## 2021-01-01 PROCEDURE — G0152 HHCP-SERV OF OT,EA 15 MIN: HCPCS

## 2021-01-01 PROCEDURE — 74011250636 HC RX REV CODE- 250/636: Performed by: EMERGENCY MEDICINE

## 2021-01-01 PROCEDURE — 74011000636 HC RX REV CODE- 636: Performed by: INTERNAL MEDICINE

## 2021-01-01 PROCEDURE — 99213 OFFICE O/P EST LOW 20 MIN: CPT | Performed by: COLON & RECTAL SURGERY

## 2021-01-01 PROCEDURE — 74011250636 HC RX REV CODE- 250/636: Performed by: INTERNAL MEDICINE

## 2021-01-01 PROCEDURE — 99239 HOSP IP/OBS DSCHRG MGMT >30: CPT | Performed by: HOSPITALIST

## 2021-01-01 PROCEDURE — 96125 COGNITIVE TEST BY HC PRO: CPT

## 2021-01-01 PROCEDURE — 99001 SPECIMEN HANDLING PT-LAB: CPT

## 2021-01-01 PROCEDURE — 77030013140 HC MSK NEB VYRM -A

## 2021-01-01 PROCEDURE — 83930 ASSAY OF BLOOD OSMOLALITY: CPT

## 2021-01-01 PROCEDURE — 84300 ASSAY OF URINE SODIUM: CPT

## 2021-01-01 PROCEDURE — 74011000636 HC RX REV CODE- 636: Performed by: FAMILY MEDICINE

## 2021-01-01 PROCEDURE — 99285 EMERGENCY DEPT VISIT HI MDM: CPT

## 2021-01-01 PROCEDURE — 94761 N-INVAS EAR/PLS OXIMETRY MLT: CPT

## 2021-01-01 PROCEDURE — 83690 ASSAY OF LIPASE: CPT

## 2021-01-01 PROCEDURE — 85014 HEMATOCRIT: CPT

## 2021-01-01 PROCEDURE — 51798 US URINE CAPACITY MEASURE: CPT

## 2021-01-01 PROCEDURE — 97112 NEUROMUSCULAR REEDUCATION: CPT

## 2021-01-01 PROCEDURE — 84100 ASSAY OF PHOSPHORUS: CPT

## 2021-01-01 PROCEDURE — 1111F DSCHRG MED/CURRENT MED MERGE: CPT | Performed by: COLON & RECTAL SURGERY

## 2021-01-01 PROCEDURE — G8420 CALC BMI NORM PARAMETERS: HCPCS | Performed by: COLON & RECTAL SURGERY

## 2021-01-01 PROCEDURE — 84443 ASSAY THYROID STIM HORMONE: CPT

## 2021-01-01 PROCEDURE — 80076 HEPATIC FUNCTION PANEL: CPT

## 2021-01-01 PROCEDURE — 74011000250 HC RX REV CODE- 250: Performed by: FAMILY MEDICINE

## 2021-01-01 PROCEDURE — 82570 ASSAY OF URINE CREATININE: CPT

## 2021-01-01 PROCEDURE — G0155 HHCP-SVS OF CSW,EA 15 MIN: HCPCS

## 2021-01-01 PROCEDURE — 96105 ASSESSMENT OF APHASIA: CPT

## 2021-01-01 PROCEDURE — 93308 TTE F-UP OR LMTD: CPT

## 2021-01-01 PROCEDURE — 97165 OT EVAL LOW COMPLEX 30 MIN: CPT

## 2021-01-01 PROCEDURE — 97162 PT EVAL MOD COMPLEX 30 MIN: CPT

## 2021-01-01 PROCEDURE — 96360 HYDRATION IV INFUSION INIT: CPT

## 2021-01-01 PROCEDURE — 82565 ASSAY OF CREATININE: CPT

## 2021-01-01 PROCEDURE — 87040 BLOOD CULTURE FOR BACTERIA: CPT

## 2021-01-01 PROCEDURE — 87086 URINE CULTURE/COLONY COUNT: CPT

## 2021-01-01 PROCEDURE — 74011250637 HC RX REV CODE- 250/637: Performed by: PHYSICIAN ASSISTANT

## 2021-01-01 PROCEDURE — 71045 X-RAY EXAM CHEST 1 VIEW: CPT

## 2021-01-01 PROCEDURE — 85048 AUTOMATED LEUKOCYTE COUNT: CPT

## 2021-01-01 PROCEDURE — 82962 GLUCOSE BLOOD TEST: CPT

## 2021-01-01 PROCEDURE — 81001 URINALYSIS AUTO W/SCOPE: CPT

## 2021-01-01 PROCEDURE — 83605 ASSAY OF LACTIC ACID: CPT

## 2021-01-01 PROCEDURE — APPSS180 APP SPLIT SHARED TIME > 60 MINUTES: Performed by: NURSE PRACTITIONER

## 2021-01-01 PROCEDURE — 1101F PT FALLS ASSESS-DOCD LE1/YR: CPT | Performed by: COLON & RECTAL SURGERY

## 2021-01-01 PROCEDURE — P9047 ALBUMIN (HUMAN), 25%, 50ML: HCPCS | Performed by: INTERNAL MEDICINE

## 2021-01-01 PROCEDURE — 82553 CREATINE MB FRACTION: CPT

## 2021-01-01 PROCEDURE — 87506 IADNA-DNA/RNA PROBE TQ 6-11: CPT

## 2021-01-01 PROCEDURE — 99232 SBSQ HOSP IP/OBS MODERATE 35: CPT | Performed by: EMERGENCY MEDICINE

## 2021-01-01 PROCEDURE — 82803 BLOOD GASES ANY COMBINATION: CPT

## 2021-01-01 PROCEDURE — 83880 ASSAY OF NATRIURETIC PEPTIDE: CPT

## 2021-01-01 RX ORDER — METRONIDAZOLE 500 MG/100ML
500 INJECTION, SOLUTION INTRAVENOUS EVERY 8 HOURS
Status: DISCONTINUED | OUTPATIENT
Start: 2021-01-01 | End: 2021-01-01 | Stop reason: HOSPADM

## 2021-01-01 RX ORDER — SODIUM CHLORIDE 0.9 % (FLUSH) 0.9 %
5-40 SYRINGE (ML) INJECTION AS NEEDED
Status: DISCONTINUED | OUTPATIENT
Start: 2021-01-01 | End: 2021-01-01 | Stop reason: HOSPADM

## 2021-01-01 RX ORDER — LISINOPRIL 10 MG/1
20 TABLET ORAL DAILY
COMMUNITY
Start: 2021-01-01 | End: 2021-01-01

## 2021-01-01 RX ORDER — ATROPINE SULFATE 0.1 MG/ML
INJECTION INTRAVENOUS
Status: DISCONTINUED
Start: 2021-01-01 | End: 2021-01-01 | Stop reason: WASHOUT

## 2021-01-01 RX ORDER — ALBUTEROL SULFATE 0.83 MG/ML
2.5 SOLUTION RESPIRATORY (INHALATION)
Status: DISCONTINUED | OUTPATIENT
Start: 2021-01-01 | End: 2021-01-01 | Stop reason: HOSPADM

## 2021-01-01 RX ORDER — TAMSULOSIN HYDROCHLORIDE 0.4 MG/1
0.4 CAPSULE ORAL
Status: DISCONTINUED | OUTPATIENT
Start: 2021-01-01 | End: 2021-01-01 | Stop reason: HOSPADM

## 2021-01-01 RX ORDER — ROSUVASTATIN CALCIUM 10 MG/1
10 TABLET, COATED ORAL DAILY
Status: DISCONTINUED | OUTPATIENT
Start: 2021-01-01 | End: 2021-01-01 | Stop reason: HOSPADM

## 2021-01-01 RX ORDER — GABAPENTIN 300 MG/1
300 CAPSULE ORAL 3 TIMES DAILY
Status: DISCONTINUED | OUTPATIENT
Start: 2021-01-01 | End: 2021-01-01 | Stop reason: HOSPADM

## 2021-01-01 RX ORDER — CARVEDILOL 12.5 MG/1
12.5 TABLET ORAL 2 TIMES DAILY WITH MEALS
Status: DISCONTINUED | OUTPATIENT
Start: 2021-01-01 | End: 2021-01-01

## 2021-01-01 RX ORDER — ARFORMOTEROL TARTRATE 15 UG/2ML
15 SOLUTION RESPIRATORY (INHALATION)
Status: DISCONTINUED | OUTPATIENT
Start: 2021-01-01 | End: 2021-01-01

## 2021-01-01 RX ORDER — CARVEDILOL 25 MG/1
25 TABLET ORAL 2 TIMES DAILY WITH MEALS
Status: DISCONTINUED | OUTPATIENT
Start: 2021-01-01 | End: 2021-01-01 | Stop reason: HOSPADM

## 2021-01-01 RX ORDER — NITROGLYCERIN 0.4 MG/1
0.4 TABLET SUBLINGUAL AS NEEDED
Qty: 15 TABLET | Refills: 0 | Status: ON HOLD
Start: 2021-01-01 | End: 2021-01-01 | Stop reason: SDUPTHER

## 2021-01-01 RX ORDER — CEFPODOXIME PROXETIL 200 MG/1
400 TABLET, FILM COATED ORAL 2 TIMES DAILY
Qty: 84 TABLET | Refills: 0 | Status: SHIPPED | OUTPATIENT
Start: 2021-01-01 | End: 2021-01-01

## 2021-01-01 RX ORDER — FUROSEMIDE 40 MG/1
40 TABLET ORAL ONCE
Status: COMPLETED | OUTPATIENT
Start: 2021-01-01 | End: 2021-01-01

## 2021-01-01 RX ORDER — ALBUMIN HUMAN 250 G/1000ML
25 SOLUTION INTRAVENOUS EVERY 6 HOURS
Status: DISCONTINUED | OUTPATIENT
Start: 2021-01-01 | End: 2021-01-01

## 2021-01-01 RX ORDER — CARVEDILOL 3.12 MG/1
3.12 TABLET ORAL 2 TIMES DAILY WITH MEALS
Qty: 60 TABLET | Refills: 0 | Status: SHIPPED | OUTPATIENT
Start: 2021-01-01

## 2021-01-01 RX ORDER — BUDESONIDE 0.5 MG/2ML
500 INHALANT ORAL
Status: DISCONTINUED | OUTPATIENT
Start: 2021-01-01 | End: 2021-01-01

## 2021-01-01 RX ORDER — ONDANSETRON 2 MG/ML
4 INJECTION INTRAMUSCULAR; INTRAVENOUS
Status: DISCONTINUED | OUTPATIENT
Start: 2021-01-01 | End: 2021-01-01 | Stop reason: HOSPADM

## 2021-01-01 RX ORDER — IPRATROPIUM BROMIDE 0.5 MG/2.5ML
0.5 SOLUTION RESPIRATORY (INHALATION)
Status: DISCONTINUED | OUTPATIENT
Start: 2021-01-01 | End: 2021-01-01

## 2021-01-01 RX ORDER — GABAPENTIN 100 MG/1
100 CAPSULE ORAL 3 TIMES DAILY
Status: DISCONTINUED | OUTPATIENT
Start: 2021-01-01 | End: 2021-01-01 | Stop reason: HOSPADM

## 2021-01-01 RX ORDER — LOPERAMIDE HYDROCHLORIDE 2 MG/1
2 CAPSULE ORAL ONCE
Status: COMPLETED | OUTPATIENT
Start: 2021-01-01 | End: 2021-01-01

## 2021-01-01 RX ORDER — BISACODYL 5 MG
10 TABLET, DELAYED RELEASE (ENTERIC COATED) ORAL
Status: DISCONTINUED | OUTPATIENT
Start: 2021-01-01 | End: 2021-01-01 | Stop reason: HOSPADM

## 2021-01-01 RX ORDER — CEFPODOXIME PROXETIL 200 MG/1
200 TABLET, FILM COATED ORAL EVERY 12 HOURS
Qty: 18 TABLET | Refills: 0 | Status: ON HOLD
Start: 2021-01-01 | End: 2021-01-01

## 2021-01-01 RX ORDER — NITROGLYCERIN 0.4 MG/1
0.4 TABLET SUBLINGUAL
Status: ON HOLD | COMMUNITY
End: 2021-01-01 | Stop reason: CLARIF

## 2021-01-01 RX ORDER — CARVEDILOL 3.12 MG/1
3.12 TABLET ORAL 2 TIMES DAILY WITH MEALS
Status: DISCONTINUED | OUTPATIENT
Start: 2021-01-01 | End: 2021-01-01 | Stop reason: HOSPADM

## 2021-01-01 RX ORDER — ACETAMINOPHEN 325 MG/1
650 TABLET ORAL
Qty: 30 TABLET | Refills: 0 | Status: SHIPPED
Start: 2021-01-01 | End: 2021-01-01

## 2021-01-01 RX ORDER — AMOXICILLIN 250 MG
2 CAPSULE ORAL
Status: DISCONTINUED | OUTPATIENT
Start: 2021-01-01 | End: 2021-01-01 | Stop reason: ALTCHOICE

## 2021-01-01 RX ORDER — CEFPODOXIME PROXETIL 200 MG/1
200 TABLET, FILM COATED ORAL EVERY 12 HOURS
Qty: 18 TABLET | Refills: 0 | Status: SHIPPED | OUTPATIENT
Start: 2021-01-01 | End: 2021-01-01

## 2021-01-01 RX ORDER — CHOLESTYRAMINE 4 G/4.8G
4 POWDER, FOR SUSPENSION ORAL
Status: DISCONTINUED | OUTPATIENT
Start: 2021-01-01 | End: 2021-01-01

## 2021-01-01 RX ORDER — FUROSEMIDE 20 MG/1
20 TABLET ORAL DAILY
Status: ON HOLD | COMMUNITY
End: 2021-01-01 | Stop reason: CLARIF

## 2021-01-01 RX ORDER — CEFPODOXIME PROXETIL 200 MG/1
200 TABLET, FILM COATED ORAL EVERY 12 HOURS
Status: DISCONTINUED | OUTPATIENT
Start: 2021-01-01 | End: 2021-01-01 | Stop reason: HOSPADM

## 2021-01-01 RX ORDER — IBUPROFEN 400 MG/1
400 TABLET ORAL
COMMUNITY
End: 2021-01-01

## 2021-01-01 RX ORDER — ARFORMOTEROL TARTRATE 15 UG/2ML
15 SOLUTION RESPIRATORY (INHALATION) 2 TIMES DAILY
Qty: 30 VIAL | Refills: 0 | Status: ON HOLD
Start: 2021-01-01 | End: 2021-01-01

## 2021-01-01 RX ORDER — CHOLESTYRAMINE 4 G/4.8G
4 POWDER, FOR SUSPENSION ORAL
Status: DISCONTINUED | OUTPATIENT
Start: 2021-01-01 | End: 2021-01-01 | Stop reason: HOSPADM

## 2021-01-01 RX ORDER — ONDANSETRON 4 MG/1
4 TABLET, ORALLY DISINTEGRATING ORAL
Status: DISCONTINUED | OUTPATIENT
Start: 2021-01-01 | End: 2021-01-01 | Stop reason: HOSPADM

## 2021-01-01 RX ORDER — FUROSEMIDE 40 MG/1
40 TABLET ORAL
Status: DISPENSED | OUTPATIENT
Start: 2021-01-01 | End: 2021-01-01

## 2021-01-01 RX ORDER — DEXTROMETHORPHAN POLISTIREX 30 MG/5ML
30 SUSPENSION ORAL
Status: DISCONTINUED | OUTPATIENT
Start: 2021-01-01 | End: 2021-01-01

## 2021-01-01 RX ORDER — METRONIDAZOLE 500 MG/1
500 TABLET ORAL EVERY 12 HOURS
Status: DISCONTINUED | OUTPATIENT
Start: 2021-01-01 | End: 2021-01-01

## 2021-01-01 RX ORDER — VANCOMYCIN 1.75 GRAM/500 ML IN 0.9 % SODIUM CHLORIDE INTRAVENOUS
1750 ONCE
Status: COMPLETED | OUTPATIENT
Start: 2021-01-01 | End: 2021-01-01

## 2021-01-01 RX ORDER — FUROSEMIDE 20 MG/1
20 TABLET ORAL DAILY
Status: DISCONTINUED | OUTPATIENT
Start: 2021-01-01 | End: 2021-01-01 | Stop reason: HOSPADM

## 2021-01-01 RX ORDER — CLOPIDOGREL BISULFATE 75 MG/1
75 TABLET ORAL
Status: DISCONTINUED | OUTPATIENT
Start: 2021-01-01 | End: 2021-01-01 | Stop reason: HOSPADM

## 2021-01-01 RX ORDER — ACETAMINOPHEN 325 MG/1
650 TABLET ORAL
Status: DISCONTINUED | OUTPATIENT
Start: 2021-01-01 | End: 2021-01-01 | Stop reason: HOSPADM

## 2021-01-01 RX ORDER — FUROSEMIDE 20 MG/1
20 TABLET ORAL DAILY
Status: DISCONTINUED | OUTPATIENT
Start: 2021-01-01 | End: 2021-01-01

## 2021-01-01 RX ORDER — ARFORMOTEROL TARTRATE 15 UG/2ML
15 SOLUTION RESPIRATORY (INHALATION)
Status: DISCONTINUED | OUTPATIENT
Start: 2021-01-01 | End: 2021-01-01 | Stop reason: HOSPADM

## 2021-01-01 RX ORDER — HYDRALAZINE HYDROCHLORIDE AND ISOSORBIDE DINITRATE 37.5; 2 MG/1; MG/1
1 TABLET, FILM COATED ORAL 3 TIMES DAILY
COMMUNITY
End: 2021-01-01

## 2021-01-01 RX ORDER — IPRATROPIUM BROMIDE 0.5 MG/2.5ML
0.5 SOLUTION RESPIRATORY (INHALATION) 3 TIMES DAILY
Qty: 15 VIAL | Refills: 0 | Status: SHIPPED
Start: 2021-01-01 | End: 2021-01-01

## 2021-01-01 RX ORDER — FLUTICASONE FUROATE AND VILANTEROL 100; 25 UG/1; UG/1
1 POWDER RESPIRATORY (INHALATION) DAILY
COMMUNITY
Start: 2021-01-01

## 2021-01-01 RX ORDER — CARVEDILOL 6.25 MG/1
3.12 TABLET ORAL 2 TIMES DAILY WITH MEALS
Status: DISCONTINUED | OUTPATIENT
Start: 2021-01-01 | End: 2021-01-01 | Stop reason: HOSPADM

## 2021-01-01 RX ORDER — LOPERAMIDE HYDROCHLORIDE 2 MG/1
2 CAPSULE ORAL 2 TIMES DAILY
Status: DISCONTINUED | OUTPATIENT
Start: 2021-01-01 | End: 2021-01-01 | Stop reason: HOSPADM

## 2021-01-01 RX ORDER — SODIUM CHLORIDE 9 MG/ML
75 INJECTION, SOLUTION INTRAVENOUS CONTINUOUS
Qty: 1000 ML | Refills: 0 | Status: ON HOLD
Start: 2021-01-01 | End: 2021-01-01

## 2021-01-01 RX ORDER — NITROGLYCERIN 0.4 MG/1
0.4 TABLET SUBLINGUAL AS NEEDED
Status: DISCONTINUED | OUTPATIENT
Start: 2021-01-01 | End: 2021-01-01 | Stop reason: HOSPADM

## 2021-01-01 RX ORDER — LOPERAMIDE HYDROCHLORIDE 2 MG/1
2 CAPSULE ORAL 4 TIMES DAILY
Status: DISCONTINUED | OUTPATIENT
Start: 2021-01-01 | End: 2021-01-01

## 2021-01-01 RX ORDER — BISACODYL 5 MG
10 TABLET, DELAYED RELEASE (ENTERIC COATED) ORAL
Qty: 14 TABLET | Refills: 0 | Status: SHIPPED | OUTPATIENT
Start: 2021-01-01

## 2021-01-01 RX ORDER — LOPERAMIDE HYDROCHLORIDE 2 MG/1
4 CAPSULE ORAL 4 TIMES DAILY
Status: DISCONTINUED | OUTPATIENT
Start: 2021-01-01 | End: 2021-01-01

## 2021-01-01 RX ORDER — METRONIDAZOLE 500 MG/100ML
500 INJECTION, SOLUTION INTRAVENOUS EVERY 8 HOURS
Qty: 100 ML | Refills: 0 | Status: ON HOLD
Start: 2021-01-01 | End: 2021-01-01

## 2021-01-01 RX ORDER — AMOXICILLIN AND CLAVULANATE POTASSIUM 500; 125 MG/1; MG/1
1 TABLET, FILM COATED ORAL EVERY 12 HOURS
Status: DISCONTINUED | OUTPATIENT
Start: 2021-01-01 | End: 2021-01-01 | Stop reason: HOSPADM

## 2021-01-01 RX ORDER — GABAPENTIN 100 MG/1
100 CAPSULE ORAL 3 TIMES DAILY
Status: DISCONTINUED | OUTPATIENT
Start: 2021-01-01 | End: 2021-01-01

## 2021-01-01 RX ORDER — AMOXICILLIN AND CLAVULANATE POTASSIUM 875; 125 MG/1; MG/1
1 TABLET, FILM COATED ORAL 2 TIMES DAILY
Qty: 42 TABLET | Refills: 0 | Status: SHIPPED | OUTPATIENT
Start: 2021-01-01 | End: 2021-01-01

## 2021-01-01 RX ORDER — ROSUVASTATIN CALCIUM 10 MG/1
10 TABLET, COATED ORAL DAILY
Qty: 15 TABLET | Refills: 0 | Status: ON HOLD
Start: 2021-01-01 | End: 2021-01-01 | Stop reason: SDUPTHER

## 2021-01-01 RX ORDER — ROSUVASTATIN CALCIUM 10 MG/1
10 TABLET, COATED ORAL DAILY
Qty: 30 TABLET | Refills: 0 | Status: SHIPPED | OUTPATIENT
Start: 2021-01-01

## 2021-01-01 RX ORDER — LISINOPRIL 20 MG/1
20 TABLET ORAL DAILY
COMMUNITY
End: 2021-01-01

## 2021-01-01 RX ORDER — GABAPENTIN 100 MG/1
100 CAPSULE ORAL 2 TIMES DAILY
Status: DISCONTINUED | OUTPATIENT
Start: 2021-01-01 | End: 2021-01-01

## 2021-01-01 RX ORDER — LISINOPRIL 5 MG/1
5 TABLET ORAL DAILY
Status: DISCONTINUED | OUTPATIENT
Start: 2021-01-01 | End: 2021-01-01 | Stop reason: HOSPADM

## 2021-01-01 RX ORDER — LOPERAMIDE HYDROCHLORIDE 2 MG/1
4 CAPSULE ORAL 2 TIMES DAILY
Status: DISCONTINUED | OUTPATIENT
Start: 2021-01-01 | End: 2021-01-01 | Stop reason: HOSPADM

## 2021-01-01 RX ORDER — CLOPIDOGREL BISULFATE 75 MG/1
75 TABLET ORAL
Status: DISCONTINUED | OUTPATIENT
Start: 2021-01-01 | End: 2021-01-01

## 2021-01-01 RX ORDER — LISINOPRIL 5 MG/1
5 TABLET ORAL DAILY
Qty: 30 TABLET | Refills: 0 | Status: SHIPPED | OUTPATIENT
Start: 2021-01-01

## 2021-01-01 RX ORDER — POLYETHYLENE GLYCOL 3350 17 G/17G
17 POWDER, FOR SOLUTION ORAL DAILY
Status: DISCONTINUED | OUTPATIENT
Start: 2021-01-01 | End: 2021-01-01 | Stop reason: ALTCHOICE

## 2021-01-01 RX ORDER — GUAIFENESIN 100 MG/5ML
100 SOLUTION ORAL
Status: DISCONTINUED | OUTPATIENT
Start: 2021-01-01 | End: 2021-01-01 | Stop reason: HOSPADM

## 2021-01-01 RX ORDER — CARVEDILOL 6.25 MG/1
3.12 TABLET ORAL ONCE
Status: COMPLETED | OUTPATIENT
Start: 2021-01-01 | End: 2021-01-01

## 2021-01-01 RX ORDER — GUAIFENESIN 600 MG/1
600 TABLET, EXTENDED RELEASE ORAL DAILY
Status: DISCONTINUED | OUTPATIENT
Start: 2021-01-01 | End: 2021-01-01

## 2021-01-01 RX ORDER — BUDESONIDE 0.25 MG/2ML
250 INHALANT ORAL 2 TIMES DAILY
Status: DISCONTINUED | OUTPATIENT
Start: 2021-01-01 | End: 2021-01-01

## 2021-01-01 RX ORDER — NITROGLYCERIN 0.4 MG/1
0.4 TABLET SUBLINGUAL
Qty: 15 TABLET | Refills: 0 | Status: SHIPPED | OUTPATIENT
Start: 2021-01-01

## 2021-01-01 RX ORDER — ALBUTEROL SULFATE 0.83 MG/ML
2.5 SOLUTION RESPIRATORY (INHALATION)
Qty: 30 NEBULE | Refills: 0 | Status: SHIPPED
Start: 2021-01-01

## 2021-01-01 RX ORDER — SODIUM CHLORIDE 9 MG/ML
75 INJECTION, SOLUTION INTRAVENOUS CONTINUOUS
Status: DISCONTINUED | OUTPATIENT
Start: 2021-01-01 | End: 2021-01-01 | Stop reason: HOSPADM

## 2021-01-01 RX ORDER — GABAPENTIN 300 MG/1
300 CAPSULE ORAL 3 TIMES DAILY
Qty: 45 CAPSULE | Refills: 0 | Status: SHIPPED | OUTPATIENT
Start: 2021-01-01

## 2021-01-01 RX ORDER — THERA TABS 400 MCG
1 TAB ORAL DAILY
Status: ON HOLD | COMMUNITY
End: 2021-01-01 | Stop reason: CLARIF

## 2021-01-01 RX ORDER — SODIUM CHLORIDE 9 MG/ML
100 INJECTION, SOLUTION INTRAVENOUS CONTINUOUS
Status: DISPENSED | OUTPATIENT
Start: 2021-01-01 | End: 2021-01-01

## 2021-01-01 RX ORDER — CARVEDILOL 6.25 MG/1
6.25 TABLET ORAL 2 TIMES DAILY WITH MEALS
Status: DISCONTINUED | OUTPATIENT
Start: 2021-01-01 | End: 2021-01-01

## 2021-01-01 RX ORDER — ROSUVASTATIN CALCIUM 10 MG/1
10 TABLET, COATED ORAL
Status: ON HOLD | COMMUNITY
End: 2021-01-01 | Stop reason: CLARIF

## 2021-01-01 RX ORDER — FLUTICASONE FUROATE AND VILANTEROL TRIFENATATE 100; 25 UG/1; UG/1
1 POWDER RESPIRATORY (INHALATION) DAILY
Status: ON HOLD | COMMUNITY
End: 2021-01-01 | Stop reason: CLARIF

## 2021-01-01 RX ORDER — AMOXICILLIN 250 MG
2 CAPSULE ORAL
Status: DISCONTINUED | OUTPATIENT
Start: 2021-01-01 | End: 2021-01-01 | Stop reason: HOSPADM

## 2021-01-01 RX ORDER — GABAPENTIN 100 MG/1
100 CAPSULE ORAL 3 TIMES DAILY
Qty: 30 CAPSULE | Refills: 0 | Status: SHIPPED
Start: 2021-01-01 | End: 2021-01-01

## 2021-01-01 RX ORDER — AMOXICILLIN AND CLAVULANATE POTASSIUM 500; 125 MG/1; MG/1
1 TABLET, FILM COATED ORAL 2 TIMES DAILY WITH MEALS
Status: DISCONTINUED | OUTPATIENT
Start: 2021-01-01 | End: 2021-01-01 | Stop reason: HOSPADM

## 2021-01-01 RX ORDER — AMOXICILLIN 250 MG
2 CAPSULE ORAL
Qty: 60 TABLET | Refills: 0 | Status: SHIPPED
Start: 2021-01-01 | End: 2021-01-01

## 2021-01-01 RX ORDER — LOPERAMIDE HYDROCHLORIDE 2 MG/1
2 CAPSULE ORAL 3 TIMES DAILY
Status: DISCONTINUED | OUTPATIENT
Start: 2021-01-01 | End: 2021-01-01

## 2021-01-01 RX ORDER — ATROPINE SULFATE 1 MG/ML
1 INJECTION, SOLUTION INTRAVENOUS
Status: COMPLETED | OUTPATIENT
Start: 2021-01-01 | End: 2021-01-01

## 2021-01-01 RX ORDER — LOPERAMIDE HYDROCHLORIDE 2 MG/1
4 CAPSULE ORAL
Qty: 32 CAPSULE | Refills: 0 | Status: SHIPPED | OUTPATIENT
Start: 2021-01-01 | End: 2021-01-01

## 2021-01-01 RX ORDER — ALBUMIN HUMAN 250 G/1000ML
25 SOLUTION INTRAVENOUS ONCE
Status: COMPLETED | OUTPATIENT
Start: 2021-01-01 | End: 2021-01-01

## 2021-01-01 RX ORDER — IPRATROPIUM BROMIDE 0.5 MG/2.5ML
0.5 SOLUTION RESPIRATORY (INHALATION) 3 TIMES DAILY
Status: DISCONTINUED | OUTPATIENT
Start: 2021-01-01 | End: 2021-01-01

## 2021-01-01 RX ORDER — SENNOSIDES 8.6 MG/1
2 TABLET ORAL DAILY
Status: DISCONTINUED | OUTPATIENT
Start: 2021-01-01 | End: 2021-01-01

## 2021-01-01 RX ORDER — SODIUM CHLORIDE 0.9 % (FLUSH) 0.9 %
5-40 SYRINGE (ML) INJECTION EVERY 8 HOURS
Status: DISCONTINUED | OUTPATIENT
Start: 2021-01-01 | End: 2021-01-01 | Stop reason: HOSPADM

## 2021-01-01 RX ORDER — CARVEDILOL 3.12 MG/1
3.12 TABLET ORAL 2 TIMES DAILY WITH MEALS
Qty: 60 TABLET | Refills: 0 | Status: ON HOLD
Start: 2021-01-01 | End: 2021-01-01 | Stop reason: SDUPTHER

## 2021-01-01 RX ORDER — CHOLESTYRAMINE 4 G/4.8G
4 POWDER, FOR SUSPENSION ORAL
Qty: 15 PACKET | Refills: 0 | Status: SHIPPED
Start: 2021-01-01 | End: 2021-01-01

## 2021-01-01 RX ORDER — CLOPIDOGREL BISULFATE 75 MG/1
75 TABLET ORAL
Qty: 15 TABLET | Refills: 0 | Status: ON HOLD
Start: 2021-01-01 | End: 2021-01-01 | Stop reason: SDUPTHER

## 2021-01-01 RX ORDER — AMOXICILLIN AND CLAVULANATE POTASSIUM 500; 125 MG/1; MG/1
1 TABLET, FILM COATED ORAL EVERY 12 HOURS
Qty: 18 TABLET | Refills: 0 | Status: ON HOLD
Start: 2021-01-01 | End: 2021-01-01

## 2021-01-01 RX ORDER — IPRATROPIUM BROMIDE 0.5 MG/2.5ML
0.5 SOLUTION RESPIRATORY (INHALATION) 3 TIMES DAILY
Status: DISCONTINUED | OUTPATIENT
Start: 2021-01-01 | End: 2021-01-01 | Stop reason: HOSPADM

## 2021-01-01 RX ORDER — AMOXICILLIN AND CLAVULANATE POTASSIUM 500; 125 MG/1; MG/1
1 TABLET, FILM COATED ORAL EVERY 12 HOURS
Qty: 18 TABLET | Refills: 0 | Status: SHIPPED | OUTPATIENT
Start: 2021-01-01 | End: 2021-01-01

## 2021-01-01 RX ORDER — TAMSULOSIN HYDROCHLORIDE 0.4 MG/1
0.4 CAPSULE ORAL
Qty: 15 CAPSULE | Refills: 0 | Status: ON HOLD
Start: 2021-01-01 | End: 2021-01-01

## 2021-01-01 RX ORDER — TAMSULOSIN HYDROCHLORIDE 0.4 MG/1
0.4 CAPSULE ORAL
Qty: 30 CAPSULE | Refills: 0 | Status: SHIPPED | OUTPATIENT
Start: 2021-01-01

## 2021-01-01 RX ORDER — ACETAMINOPHEN 650 MG/1
650 SUPPOSITORY RECTAL
Status: DISCONTINUED | OUTPATIENT
Start: 2021-01-01 | End: 2021-01-01 | Stop reason: HOSPADM

## 2021-01-01 RX ORDER — GUAIFENESIN 100 MG/5ML
100 SOLUTION ORAL
Qty: 30 ML | Refills: 0 | Status: SHIPPED
Start: 2021-01-01 | End: 2021-01-01

## 2021-01-01 RX ORDER — METRONIDAZOLE 500 MG/100ML
500 INJECTION, SOLUTION INTRAVENOUS EVERY 12 HOURS
Status: DISCONTINUED | OUTPATIENT
Start: 2021-01-01 | End: 2021-01-01

## 2021-01-01 RX ORDER — CLOPIDOGREL BISULFATE 75 MG/1
75 TABLET ORAL DAILY
Status: ON HOLD | COMMUNITY
End: 2021-01-01 | Stop reason: CLARIF

## 2021-01-01 RX ORDER — ATROPINE SULFATE 1 MG/ML
0.5 INJECTION, SOLUTION INTRAVENOUS
Status: ACTIVE | OUTPATIENT
Start: 2021-01-01 | End: 2021-01-01

## 2021-01-01 RX ORDER — CLOPIDOGREL BISULFATE 75 MG/1
75 TABLET ORAL
Qty: 30 TABLET | Refills: 0 | Status: SHIPPED | OUTPATIENT
Start: 2021-01-01

## 2021-01-01 RX ADMIN — GABAPENTIN 300 MG: 300 CAPSULE ORAL at 14:54

## 2021-01-01 RX ADMIN — METRONIDAZOLE 500 MG: 500 INJECTION, SOLUTION INTRAVENOUS at 12:49

## 2021-01-01 RX ADMIN — LOPERAMIDE HYDROCHLORIDE 2 MG: 2 CAPSULE ORAL at 21:09

## 2021-01-01 RX ADMIN — ROSUVASTATIN CALCIUM 10 MG: 10 TABLET, COATED ORAL at 09:15

## 2021-01-01 RX ADMIN — Medication 10 ML: at 05:24

## 2021-01-01 RX ADMIN — ROSUVASTATIN CALCIUM 10 MG: 10 TABLET, COATED ORAL at 08:19

## 2021-01-01 RX ADMIN — ROSUVASTATIN CALCIUM 10 MG: 10 TABLET, COATED ORAL at 08:26

## 2021-01-01 RX ADMIN — ROSUVASTATIN CALCIUM 10 MG: 10 TABLET, COATED ORAL at 07:59

## 2021-01-01 RX ADMIN — ARFORMOTEROL TARTRATE 15 MCG: 15 SOLUTION RESPIRATORY (INHALATION) at 22:02

## 2021-01-01 RX ADMIN — ACETAMINOPHEN 650 MG: 325 TABLET ORAL at 17:28

## 2021-01-01 RX ADMIN — LOPERAMIDE HYDROCHLORIDE 4 MG: 2 CAPSULE ORAL at 05:56

## 2021-01-01 RX ADMIN — ROSUVASTATIN CALCIUM 10 MG: 10 TABLET, COATED ORAL at 08:58

## 2021-01-01 RX ADMIN — IPRATROPIUM BROMIDE 0.5 MG: 0.5 SOLUTION RESPIRATORY (INHALATION) at 19:23

## 2021-01-01 RX ADMIN — MULTIPLE VITAMINS W/ MINERALS TAB 1 TABLET: TAB at 08:28

## 2021-01-01 RX ADMIN — TAMSULOSIN HYDROCHLORIDE 0.4 MG: 0.4 CAPSULE ORAL at 17:20

## 2021-01-01 RX ADMIN — ARFORMOTEROL TARTRATE 15 MCG: 15 SOLUTION RESPIRATORY (INHALATION) at 07:42

## 2021-01-01 RX ADMIN — MULTIPLE VITAMINS W/ MINERALS TAB 1 TABLET: TAB at 08:10

## 2021-01-01 RX ADMIN — CEFPODOXIME PROXETIL 200 MG: 200 TABLET, FILM COATED ORAL at 20:32

## 2021-01-01 RX ADMIN — CARVEDILOL 3.12 MG: 6.25 TABLET, FILM COATED ORAL at 16:47

## 2021-01-01 RX ADMIN — MULTIPLE VITAMINS W/ MINERALS TAB 1 TABLET: TAB at 08:24

## 2021-01-01 RX ADMIN — CARVEDILOL 12.5 MG: 12.5 TABLET, FILM COATED ORAL at 12:05

## 2021-01-01 RX ADMIN — ACETAMINOPHEN 650 MG: 325 TABLET ORAL at 23:51

## 2021-01-01 RX ADMIN — LOPERAMIDE HYDROCHLORIDE 2 MG: 2 CAPSULE ORAL at 20:46

## 2021-01-01 RX ADMIN — BUDESONIDE 500 MCG: 0.5 SUSPENSION RESPIRATORY (INHALATION) at 08:02

## 2021-01-01 RX ADMIN — CARVEDILOL 3.12 MG: 6.25 TABLET, FILM COATED ORAL at 17:52

## 2021-01-01 RX ADMIN — DOCUSATE SODIUM 50MG AND SENNOSIDES 8.6MG 2 TABLET: 8.6; 5 TABLET, FILM COATED ORAL at 17:23

## 2021-01-01 RX ADMIN — ACETAMINOPHEN 650 MG: 325 TABLET ORAL at 18:46

## 2021-01-01 RX ADMIN — LOPERAMIDE HYDROCHLORIDE 2 MG: 2 CAPSULE ORAL at 20:31

## 2021-01-01 RX ADMIN — IPRATROPIUM BROMIDE 0.5 MG: 0.5 SOLUTION RESPIRATORY (INHALATION) at 20:34

## 2021-01-01 RX ADMIN — ROSUVASTATIN CALCIUM 10 MG: 10 TABLET, COATED ORAL at 10:12

## 2021-01-01 RX ADMIN — APIXABAN 5 MG: 5 TABLET, FILM COATED ORAL at 17:21

## 2021-01-01 RX ADMIN — IPRATROPIUM BROMIDE 0.5 MG: 0.5 SOLUTION RESPIRATORY (INHALATION) at 08:58

## 2021-01-01 RX ADMIN — GABAPENTIN 300 MG: 300 CAPSULE ORAL at 08:16

## 2021-01-01 RX ADMIN — IPRATROPIUM BROMIDE 0.5 MG: 0.5 SOLUTION RESPIRATORY (INHALATION) at 14:11

## 2021-01-01 RX ADMIN — ROSUVASTATIN CALCIUM 10 MG: 10 TABLET, COATED ORAL at 08:10

## 2021-01-01 RX ADMIN — BUDESONIDE 250 MCG: 0.25 SUSPENSION RESPIRATORY (INHALATION) at 08:23

## 2021-01-01 RX ADMIN — GUAIFENESIN 600 MG: 600 TABLET ORAL at 08:17

## 2021-01-01 RX ADMIN — GABAPENTIN 300 MG: 300 CAPSULE ORAL at 20:57

## 2021-01-01 RX ADMIN — CLOPIDOGREL BISULFATE 75 MG: 75 TABLET ORAL at 08:26

## 2021-01-01 RX ADMIN — DOCUSATE SODIUM 50MG AND SENNOSIDES 8.6MG 2 TABLET: 8.6; 5 TABLET, FILM COATED ORAL at 17:04

## 2021-01-01 RX ADMIN — ROSUVASTATIN CALCIUM 10 MG: 10 TABLET, COATED ORAL at 08:55

## 2021-01-01 RX ADMIN — LISINOPRIL 5 MG: 5 TABLET ORAL at 13:27

## 2021-01-01 RX ADMIN — GABAPENTIN 300 MG: 300 CAPSULE ORAL at 19:23

## 2021-01-01 RX ADMIN — CEFPODOXIME PROXETIL 200 MG: 200 TABLET, FILM COATED ORAL at 07:29

## 2021-01-01 RX ADMIN — APIXABAN 5 MG: 5 TABLET, FILM COATED ORAL at 17:14

## 2021-01-01 RX ADMIN — CHOLESTYRAMINE 4 G: 4 POWDER, FOR SUSPENSION ORAL at 12:30

## 2021-01-01 RX ADMIN — APIXABAN 5 MG: 5 TABLET, FILM COATED ORAL at 17:52

## 2021-01-01 RX ADMIN — APIXABAN 5 MG: 5 TABLET, FILM COATED ORAL at 09:39

## 2021-01-01 RX ADMIN — TAMSULOSIN HYDROCHLORIDE 0.4 MG: 0.4 CAPSULE ORAL at 17:52

## 2021-01-01 RX ADMIN — GABAPENTIN 100 MG: 100 CAPSULE ORAL at 17:27

## 2021-01-01 RX ADMIN — ARFORMOTEROL TARTRATE 15 MCG: 15 SOLUTION RESPIRATORY (INHALATION) at 20:24

## 2021-01-01 RX ADMIN — IPRATROPIUM BROMIDE 0.5 MG: 0.5 SOLUTION RESPIRATORY (INHALATION) at 19:21

## 2021-01-01 RX ADMIN — VANCOMYCIN HYDROCHLORIDE 500 MG: 1 INJECTION, POWDER, LYOPHILIZED, FOR SOLUTION INTRAVENOUS at 18:11

## 2021-01-01 RX ADMIN — LOPERAMIDE HYDROCHLORIDE 2 MG: 2 CAPSULE ORAL at 12:12

## 2021-01-01 RX ADMIN — METRONIDAZOLE 500 MG: 500 INJECTION, SOLUTION INTRAVENOUS at 03:56

## 2021-01-01 RX ADMIN — LOPERAMIDE HYDROCHLORIDE 4 MG: 2 CAPSULE ORAL at 17:00

## 2021-01-01 RX ADMIN — CLOPIDOGREL BISULFATE 75 MG: 75 TABLET ORAL at 08:54

## 2021-01-01 RX ADMIN — LOPERAMIDE HYDROCHLORIDE 2 MG: 2 CAPSULE ORAL at 20:32

## 2021-01-01 RX ADMIN — BUDESONIDE 250 MCG: 0.25 SUSPENSION RESPIRATORY (INHALATION) at 18:08

## 2021-01-01 RX ADMIN — Medication 1 CAPSULE: at 10:17

## 2021-01-01 RX ADMIN — GABAPENTIN 300 MG: 300 CAPSULE ORAL at 08:18

## 2021-01-01 RX ADMIN — Medication 1 TABLET: at 07:52

## 2021-01-01 RX ADMIN — LISINOPRIL 5 MG: 5 TABLET ORAL at 09:40

## 2021-01-01 RX ADMIN — BUDESONIDE 500 MCG: 0.5 SUSPENSION RESPIRATORY (INHALATION) at 13:18

## 2021-01-01 RX ADMIN — CARVEDILOL 3.12 MG: 6.25 TABLET, FILM COATED ORAL at 17:29

## 2021-01-01 RX ADMIN — ARFORMOTEROL TARTRATE 15 MCG: 15 SOLUTION RESPIRATORY (INHALATION) at 09:00

## 2021-01-01 RX ADMIN — CEFPODOXIME PROXETIL 200 MG: 200 TABLET, FILM COATED ORAL at 22:02

## 2021-01-01 RX ADMIN — ACETAMINOPHEN 650 MG: 325 TABLET ORAL at 08:30

## 2021-01-01 RX ADMIN — GABAPENTIN 100 MG: 100 CAPSULE ORAL at 21:45

## 2021-01-01 RX ADMIN — FUROSEMIDE 20 MG: 20 TABLET ORAL at 08:10

## 2021-01-01 RX ADMIN — CLOPIDOGREL BISULFATE 75 MG: 75 TABLET ORAL at 10:18

## 2021-01-01 RX ADMIN — CLOPIDOGREL BISULFATE 75 MG: 75 TABLET ORAL at 08:25

## 2021-01-01 RX ADMIN — GABAPENTIN 300 MG: 300 CAPSULE ORAL at 09:25

## 2021-01-01 RX ADMIN — APIXABAN 5 MG: 5 TABLET, FILM COATED ORAL at 19:45

## 2021-01-01 RX ADMIN — DOCUSATE SODIUM 50MG AND SENNOSIDES 8.6MG 2 TABLET: 8.6; 5 TABLET, FILM COATED ORAL at 21:31

## 2021-01-01 RX ADMIN — NITROGLYCERIN 2 INCH: 20 OINTMENT TOPICAL at 09:11

## 2021-01-01 RX ADMIN — FUROSEMIDE 20 MG: 20 TABLET ORAL at 09:26

## 2021-01-01 RX ADMIN — APIXABAN 5 MG: 5 TABLET, FILM COATED ORAL at 08:43

## 2021-01-01 RX ADMIN — CLOPIDOGREL BISULFATE 75 MG: 75 TABLET ORAL at 09:37

## 2021-01-01 RX ADMIN — APIXABAN 5 MG: 5 TABLET, FILM COATED ORAL at 08:09

## 2021-01-01 RX ADMIN — GABAPENTIN 100 MG: 100 CAPSULE ORAL at 17:04

## 2021-01-01 RX ADMIN — NITROGLYCERIN 2 INCH: 20 OINTMENT TOPICAL at 17:00

## 2021-01-01 RX ADMIN — TAMSULOSIN HYDROCHLORIDE 0.4 MG: 0.4 CAPSULE ORAL at 16:47

## 2021-01-01 RX ADMIN — GABAPENTIN 300 MG: 300 CAPSULE ORAL at 09:37

## 2021-01-01 RX ADMIN — Medication 10 ML: at 18:31

## 2021-01-01 RX ADMIN — CEFPODOXIME PROXETIL 200 MG: 200 TABLET, FILM COATED ORAL at 20:56

## 2021-01-01 RX ADMIN — ACETAMINOPHEN 650 MG: 325 TABLET ORAL at 22:19

## 2021-01-01 RX ADMIN — APIXABAN 5 MG: 5 TABLET, FILM COATED ORAL at 17:20

## 2021-01-01 RX ADMIN — CARVEDILOL 25 MG: 25 TABLET, FILM COATED ORAL at 08:28

## 2021-01-01 RX ADMIN — APIXABAN 5 MG: 5 TABLET, FILM COATED ORAL at 22:06

## 2021-01-01 RX ADMIN — ROSUVASTATIN CALCIUM 10 MG: 10 TABLET, COATED ORAL at 09:09

## 2021-01-01 RX ADMIN — ARFORMOTEROL TARTRATE 15 MCG: 15 SOLUTION RESPIRATORY (INHALATION) at 20:04

## 2021-01-01 RX ADMIN — APIXABAN 5 MG: 5 TABLET, FILM COATED ORAL at 08:28

## 2021-01-01 RX ADMIN — GABAPENTIN 100 MG: 100 CAPSULE ORAL at 08:09

## 2021-01-01 RX ADMIN — DOCUSATE SODIUM 50MG AND SENNOSIDES 8.6MG 2 TABLET: 8.6; 5 TABLET, FILM COATED ORAL at 17:19

## 2021-01-01 RX ADMIN — CARVEDILOL 25 MG: 25 TABLET, FILM COATED ORAL at 08:27

## 2021-01-01 RX ADMIN — CARVEDILOL 6.25 MG: 6.25 TABLET, FILM COATED ORAL at 08:23

## 2021-01-01 RX ADMIN — CLOPIDOGREL BISULFATE 75 MG: 75 TABLET ORAL at 09:13

## 2021-01-01 RX ADMIN — IPRATROPIUM BROMIDE 0.5 MG: 0.5 SOLUTION RESPIRATORY (INHALATION) at 14:59

## 2021-01-01 RX ADMIN — ROSUVASTATIN CALCIUM 10 MG: 10 TABLET, COATED ORAL at 09:44

## 2021-01-01 RX ADMIN — LOPERAMIDE HYDROCHLORIDE 2 MG: 2 CAPSULE ORAL at 16:22

## 2021-01-01 RX ADMIN — LOPERAMIDE HYDROCHLORIDE 2 MG: 2 CAPSULE ORAL at 09:11

## 2021-01-01 RX ADMIN — PIPERACILLIN AND TAZOBACTAM 3.38 G: 3; .375 INJECTION, POWDER, LYOPHILIZED, FOR SOLUTION INTRAVENOUS at 00:04

## 2021-01-01 RX ADMIN — LOPERAMIDE HYDROCHLORIDE 2 MG: 2 CAPSULE ORAL at 11:13

## 2021-01-01 RX ADMIN — CARVEDILOL 3.12 MG: 6.25 TABLET, FILM COATED ORAL at 07:58

## 2021-01-01 RX ADMIN — ACETAMINOPHEN 650 MG: 325 TABLET ORAL at 08:48

## 2021-01-01 RX ADMIN — AMOXICILLIN AND CLAVULANATE POTASSIUM 1 TABLET: 500; 125 TABLET, FILM COATED ORAL at 09:40

## 2021-01-01 RX ADMIN — ARFORMOTEROL TARTRATE 15 MCG: 15 SOLUTION RESPIRATORY (INHALATION) at 20:06

## 2021-01-01 RX ADMIN — ARFORMOTEROL TARTRATE 15 MCG: 15 SOLUTION RESPIRATORY (INHALATION) at 20:28

## 2021-01-01 RX ADMIN — FUROSEMIDE 20 MG: 20 TABLET ORAL at 08:19

## 2021-01-01 RX ADMIN — CEFPODOXIME PROXETIL 200 MG: 200 TABLET, FILM COATED ORAL at 08:34

## 2021-01-01 RX ADMIN — CEFPODOXIME PROXETIL 200 MG: 200 TABLET, FILM COATED ORAL at 07:58

## 2021-01-01 RX ADMIN — CEFPODOXIME PROXETIL 200 MG: 200 TABLET, FILM COATED ORAL at 21:56

## 2021-01-01 RX ADMIN — AMOXICILLIN AND CLAVULANATE POTASSIUM 1 TABLET: 500; 125 TABLET, FILM COATED ORAL at 13:27

## 2021-01-01 RX ADMIN — IPRATROPIUM BROMIDE 0.5 MG: 0.5 SOLUTION RESPIRATORY (INHALATION) at 20:02

## 2021-01-01 RX ADMIN — LOPERAMIDE HYDROCHLORIDE 2 MG: 2 CAPSULE ORAL at 07:29

## 2021-01-01 RX ADMIN — Medication 10 ML: at 13:32

## 2021-01-01 RX ADMIN — CLOPIDOGREL BISULFATE 75 MG: 75 TABLET ORAL at 09:10

## 2021-01-01 RX ADMIN — APIXABAN 5 MG: 5 TABLET, FILM COATED ORAL at 09:44

## 2021-01-01 RX ADMIN — ACETAMINOPHEN 650 MG: 325 TABLET ORAL at 00:25

## 2021-01-01 RX ADMIN — APIXABAN 5 MG: 5 TABLET, FILM COATED ORAL at 09:14

## 2021-01-01 RX ADMIN — Medication 10 ML: at 21:15

## 2021-01-01 RX ADMIN — BUDESONIDE 500 MCG: 0.5 SUSPENSION RESPIRATORY (INHALATION) at 09:00

## 2021-01-01 RX ADMIN — BUDESONIDE 500 MCG: 0.5 SUSPENSION RESPIRATORY (INHALATION) at 07:44

## 2021-01-01 RX ADMIN — GABAPENTIN 100 MG: 100 CAPSULE ORAL at 20:58

## 2021-01-01 RX ADMIN — METRONIDAZOLE 500 MG: 500 INJECTION, SOLUTION INTRAVENOUS at 14:08

## 2021-01-01 RX ADMIN — IPRATROPIUM BROMIDE 0.5 MG: 0.5 SOLUTION RESPIRATORY (INHALATION) at 14:47

## 2021-01-01 RX ADMIN — ARFORMOTEROL TARTRATE 15 MCG: 15 SOLUTION RESPIRATORY (INHALATION) at 21:29

## 2021-01-01 RX ADMIN — IPRATROPIUM BROMIDE 0.5 MG: 0.5 SOLUTION RESPIRATORY (INHALATION) at 14:29

## 2021-01-01 RX ADMIN — ARFORMOTEROL TARTRATE 15 MCG: 15 SOLUTION RESPIRATORY (INHALATION) at 20:56

## 2021-01-01 RX ADMIN — CARVEDILOL 3.12 MG: 6.25 TABLET, FILM COATED ORAL at 17:00

## 2021-01-01 RX ADMIN — LOPERAMIDE HYDROCHLORIDE 2 MG: 2 CAPSULE ORAL at 08:54

## 2021-01-01 RX ADMIN — Medication 10 ML: at 21:59

## 2021-01-01 RX ADMIN — TAMSULOSIN HYDROCHLORIDE 0.4 MG: 0.4 CAPSULE ORAL at 17:36

## 2021-01-01 RX ADMIN — Medication 1 TABLET: at 09:09

## 2021-01-01 RX ADMIN — TAMSULOSIN HYDROCHLORIDE 0.4 MG: 0.4 CAPSULE ORAL at 17:21

## 2021-01-01 RX ADMIN — ROSUVASTATIN CALCIUM 10 MG: 10 TABLET, COATED ORAL at 09:10

## 2021-01-01 RX ADMIN — IPRATROPIUM BROMIDE 0.5 MG: 0.5 SOLUTION RESPIRATORY (INHALATION) at 20:06

## 2021-01-01 RX ADMIN — CLOPIDOGREL BISULFATE 75 MG: 75 TABLET ORAL at 08:10

## 2021-01-01 RX ADMIN — AMOXICILLIN AND CLAVULANATE POTASSIUM 1 TABLET: 500; 125 TABLET, FILM COATED ORAL at 21:56

## 2021-01-01 RX ADMIN — MULTIPLE VITAMINS W/ MINERALS TAB 1 TABLET: TAB at 08:19

## 2021-01-01 RX ADMIN — ARFORMOTEROL TARTRATE 15 MCG: 15 SOLUTION RESPIRATORY (INHALATION) at 08:25

## 2021-01-01 RX ADMIN — APIXABAN 5 MG: 5 TABLET, FILM COATED ORAL at 22:18

## 2021-01-01 RX ADMIN — MULTIPLE VITAMINS W/ MINERALS TAB 1 TABLET: TAB at 09:44

## 2021-01-01 RX ADMIN — AMOXICILLIN AND CLAVULANATE POTASSIUM 1 TABLET: 500; 125 TABLET, FILM COATED ORAL at 16:46

## 2021-01-01 RX ADMIN — APIXABAN 5 MG: 5 TABLET, FILM COATED ORAL at 07:53

## 2021-01-01 RX ADMIN — VANCOMYCIN HYDROCHLORIDE 500 MG: 1 INJECTION, POWDER, LYOPHILIZED, FOR SOLUTION INTRAVENOUS at 14:09

## 2021-01-01 RX ADMIN — METRONIDAZOLE 500 MG: 500 TABLET ORAL at 22:06

## 2021-01-01 RX ADMIN — IPRATROPIUM BROMIDE 0.5 MG: 0.5 SOLUTION RESPIRATORY (INHALATION) at 09:06

## 2021-01-01 RX ADMIN — TAMSULOSIN HYDROCHLORIDE 0.4 MG: 0.4 CAPSULE ORAL at 17:43

## 2021-01-01 RX ADMIN — ACETAMINOPHEN 650 MG: 325 TABLET ORAL at 21:00

## 2021-01-01 RX ADMIN — ACETAMINOPHEN 650 MG: 325 TABLET ORAL at 20:15

## 2021-01-01 RX ADMIN — SODIUM CHLORIDE 75 ML/HR: 900 INJECTION, SOLUTION INTRAVENOUS at 18:34

## 2021-01-01 RX ADMIN — BUDESONIDE 500 MCG: 0.5 SUSPENSION RESPIRATORY (INHALATION) at 20:28

## 2021-01-01 RX ADMIN — APIXABAN 5 MG: 5 TABLET, FILM COATED ORAL at 17:04

## 2021-01-01 RX ADMIN — DEXTROMETHORPHAN POLISTIREX 30 MG: 30 SUSPENSION ORAL at 20:19

## 2021-01-01 RX ADMIN — IPRATROPIUM BROMIDE 0.5 MG: 0.5 SOLUTION RESPIRATORY (INHALATION) at 07:43

## 2021-01-01 RX ADMIN — LISINOPRIL 5 MG: 5 TABLET ORAL at 08:02

## 2021-01-01 RX ADMIN — APIXABAN 5 MG: 5 TABLET, FILM COATED ORAL at 18:13

## 2021-01-01 RX ADMIN — ARFORMOTEROL TARTRATE 15 MCG: 15 SOLUTION RESPIRATORY (INHALATION) at 07:44

## 2021-01-01 RX ADMIN — PIPERACILLIN AND TAZOBACTAM 3.38 G: 3; .375 INJECTION, POWDER, LYOPHILIZED, FOR SOLUTION INTRAVENOUS at 17:15

## 2021-01-01 RX ADMIN — GABAPENTIN 300 MG: 300 CAPSULE ORAL at 15:07

## 2021-01-01 RX ADMIN — APIXABAN 5 MG: 5 TABLET, FILM COATED ORAL at 21:16

## 2021-01-01 RX ADMIN — AMOXICILLIN AND CLAVULANATE POTASSIUM 1 TABLET: 500; 125 TABLET, FILM COATED ORAL at 07:28

## 2021-01-01 RX ADMIN — Medication 10 ML: at 06:46

## 2021-01-01 RX ADMIN — IPRATROPIUM BROMIDE 0.5 MG: 0.5 SOLUTION RESPIRATORY (INHALATION) at 07:55

## 2021-01-01 RX ADMIN — Medication 10 ML: at 13:15

## 2021-01-01 RX ADMIN — APIXABAN 5 MG: 5 TABLET, FILM COATED ORAL at 08:26

## 2021-01-01 RX ADMIN — LOPERAMIDE HYDROCHLORIDE 2 MG: 2 CAPSULE ORAL at 08:01

## 2021-01-01 RX ADMIN — ARFORMOTEROL TARTRATE 15 MCG: 15 SOLUTION RESPIRATORY (INHALATION) at 08:11

## 2021-01-01 RX ADMIN — APIXABAN 5 MG: 5 TABLET, FILM COATED ORAL at 17:35

## 2021-01-01 RX ADMIN — APIXABAN 5 MG: 5 TABLET, FILM COATED ORAL at 17:06

## 2021-01-01 RX ADMIN — GABAPENTIN 300 MG: 300 CAPSULE ORAL at 14:59

## 2021-01-01 RX ADMIN — AMOXICILLIN AND CLAVULANATE POTASSIUM 1 TABLET: 500; 125 TABLET, FILM COATED ORAL at 20:58

## 2021-01-01 RX ADMIN — GUAIFENESIN 600 MG: 600 TABLET ORAL at 08:55

## 2021-01-01 RX ADMIN — ACETAMINOPHEN 650 MG: 325 TABLET ORAL at 03:00

## 2021-01-01 RX ADMIN — DOCUSATE SODIUM 50MG AND SENNOSIDES 8.6MG 2 TABLET: 8.6; 5 TABLET, FILM COATED ORAL at 17:03

## 2021-01-01 RX ADMIN — ARFORMOTEROL TARTRATE 15 MCG: 15 SOLUTION RESPIRATORY (INHALATION) at 08:37

## 2021-01-01 RX ADMIN — IPRATROPIUM BROMIDE 0.5 MG: 0.5 SOLUTION RESPIRATORY (INHALATION) at 22:02

## 2021-01-01 RX ADMIN — GABAPENTIN 100 MG: 100 CAPSULE ORAL at 22:06

## 2021-01-01 RX ADMIN — BUDESONIDE 250 MCG: 0.25 SUSPENSION RESPIRATORY (INHALATION) at 08:13

## 2021-01-01 RX ADMIN — CARVEDILOL 12.5 MG: 12.5 TABLET, FILM COATED ORAL at 09:07

## 2021-01-01 RX ADMIN — IPRATROPIUM BROMIDE 0.5 MG: 0.5 SOLUTION RESPIRATORY (INHALATION) at 21:54

## 2021-01-01 RX ADMIN — ACETAMINOPHEN 650 MG: 325 TABLET ORAL at 23:54

## 2021-01-01 RX ADMIN — ARFORMOTEROL TARTRATE 15 MCG: 15 SOLUTION RESPIRATORY (INHALATION) at 09:44

## 2021-01-01 RX ADMIN — GABAPENTIN 100 MG: 100 CAPSULE ORAL at 09:16

## 2021-01-01 RX ADMIN — POLYETHYLENE GLYCOL 3350 17 G: 17 POWDER, FOR SOLUTION ORAL at 08:58

## 2021-01-01 RX ADMIN — ROSUVASTATIN CALCIUM 10 MG: 10 TABLET, COATED ORAL at 09:39

## 2021-01-01 RX ADMIN — AMOXICILLIN AND CLAVULANATE POTASSIUM 1 TABLET: 500; 125 TABLET, FILM COATED ORAL at 07:53

## 2021-01-01 RX ADMIN — ARFORMOTEROL TARTRATE 15 MCG: 15 SOLUTION RESPIRATORY (INHALATION) at 08:13

## 2021-01-01 RX ADMIN — IPRATROPIUM BROMIDE 0.5 MG: 0.5 SOLUTION RESPIRATORY (INHALATION) at 13:23

## 2021-01-01 RX ADMIN — IPRATROPIUM BROMIDE 0.5 MG: 0.5 SOLUTION RESPIRATORY (INHALATION) at 15:07

## 2021-01-01 RX ADMIN — ACETAMINOPHEN 650 MG: 325 TABLET ORAL at 18:33

## 2021-01-01 RX ADMIN — APIXABAN 5 MG: 5 TABLET, FILM COATED ORAL at 08:02

## 2021-01-01 RX ADMIN — TAMSULOSIN HYDROCHLORIDE 0.4 MG: 0.4 CAPSULE ORAL at 19:38

## 2021-01-01 RX ADMIN — METRONIDAZOLE 500 MG: 500 TABLET ORAL at 10:12

## 2021-01-01 RX ADMIN — SODIUM CHLORIDE 75 ML/HR: 900 INJECTION, SOLUTION INTRAVENOUS at 07:59

## 2021-01-01 RX ADMIN — CARVEDILOL 3.12 MG: 6.25 TABLET, FILM COATED ORAL at 18:58

## 2021-01-01 RX ADMIN — GABAPENTIN 300 MG: 300 CAPSULE ORAL at 20:05

## 2021-01-01 RX ADMIN — LOPERAMIDE HYDROCHLORIDE 4 MG: 2 CAPSULE ORAL at 12:03

## 2021-01-01 RX ADMIN — CARVEDILOL 3.12 MG: 3.12 TABLET, FILM COATED ORAL at 18:28

## 2021-01-01 RX ADMIN — APIXABAN 5 MG: 5 TABLET, FILM COATED ORAL at 08:34

## 2021-01-01 RX ADMIN — LOPERAMIDE HYDROCHLORIDE 4 MG: 2 CAPSULE ORAL at 05:53

## 2021-01-01 RX ADMIN — IPRATROPIUM BROMIDE 0.5 MG: 0.5 SOLUTION RESPIRATORY (INHALATION) at 14:37

## 2021-01-01 RX ADMIN — ARFORMOTEROL TARTRATE 15 MCG: 15 SOLUTION RESPIRATORY (INHALATION) at 09:37

## 2021-01-01 RX ADMIN — ACETAMINOPHEN 650 MG: 325 TABLET ORAL at 00:29

## 2021-01-01 RX ADMIN — PIPERACILLIN AND TAZOBACTAM 3.38 G: 3; .375 INJECTION, POWDER, LYOPHILIZED, FOR SOLUTION INTRAVENOUS at 00:00

## 2021-01-01 RX ADMIN — IPRATROPIUM BROMIDE 0.5 MG: 0.5 SOLUTION RESPIRATORY (INHALATION) at 09:00

## 2021-01-01 RX ADMIN — ARFORMOTEROL TARTRATE 15 MCG: 15 SOLUTION RESPIRATORY (INHALATION) at 07:52

## 2021-01-01 RX ADMIN — FUROSEMIDE 20 MG: 20 TABLET ORAL at 08:58

## 2021-01-01 RX ADMIN — IPRATROPIUM BROMIDE 0.5 MG: 0.5 SOLUTION RESPIRATORY (INHALATION) at 19:27

## 2021-01-01 RX ADMIN — GUAIFENESIN 600 MG: 600 TABLET ORAL at 08:18

## 2021-01-01 RX ADMIN — AMOXICILLIN AND CLAVULANATE POTASSIUM 1 TABLET: 500; 125 TABLET, FILM COATED ORAL at 19:45

## 2021-01-01 RX ADMIN — METRONIDAZOLE 500 MG: 500 TABLET ORAL at 08:43

## 2021-01-01 RX ADMIN — GABAPENTIN 300 MG: 300 CAPSULE ORAL at 22:02

## 2021-01-01 RX ADMIN — Medication 10 ML: at 05:47

## 2021-01-01 RX ADMIN — Medication 1 TABLET: at 09:40

## 2021-01-01 RX ADMIN — Medication 10 ML: at 21:32

## 2021-01-01 RX ADMIN — GABAPENTIN 100 MG: 100 CAPSULE ORAL at 08:35

## 2021-01-01 RX ADMIN — PIPERACILLIN AND TAZOBACTAM 3.38 G: 3; .375 INJECTION, POWDER, LYOPHILIZED, FOR SOLUTION INTRAVENOUS at 00:12

## 2021-01-01 RX ADMIN — Medication 1 TABLET: at 07:53

## 2021-01-01 RX ADMIN — BUDESONIDE 250 MCG: 0.25 SUSPENSION RESPIRATORY (INHALATION) at 08:37

## 2021-01-01 RX ADMIN — NITROGLYCERIN 2 INCH: 20 OINTMENT TOPICAL at 18:05

## 2021-01-01 RX ADMIN — ACETAMINOPHEN 650 MG: 325 TABLET ORAL at 04:32

## 2021-01-01 RX ADMIN — APIXABAN 5 MG: 5 TABLET, FILM COATED ORAL at 08:23

## 2021-01-01 RX ADMIN — CEFPODOXIME PROXETIL 200 MG: 200 TABLET, FILM COATED ORAL at 08:16

## 2021-01-01 RX ADMIN — LOPERAMIDE HYDROCHLORIDE 2 MG: 2 CAPSULE ORAL at 20:57

## 2021-01-01 RX ADMIN — IPRATROPIUM BROMIDE 0.5 MG: 0.5 SOLUTION RESPIRATORY (INHALATION) at 20:05

## 2021-01-01 RX ADMIN — LISINOPRIL 5 MG: 5 TABLET ORAL at 09:14

## 2021-01-01 RX ADMIN — AMOXICILLIN AND CLAVULANATE POTASSIUM 1 TABLET: 500; 125 TABLET, FILM COATED ORAL at 09:11

## 2021-01-01 RX ADMIN — ACETAMINOPHEN 650 MG: 325 TABLET ORAL at 05:06

## 2021-01-01 RX ADMIN — IPRATROPIUM BROMIDE 0.5 MG: 0.5 SOLUTION RESPIRATORY (INHALATION) at 07:23

## 2021-01-01 RX ADMIN — Medication 1 CAPSULE: at 09:00

## 2021-01-01 RX ADMIN — CEFPODOXIME PROXETIL 200 MG: 200 TABLET, FILM COATED ORAL at 20:57

## 2021-01-01 RX ADMIN — CEFPODOXIME PROXETIL 200 MG: 200 TABLET, FILM COATED ORAL at 20:46

## 2021-01-01 RX ADMIN — GABAPENTIN 100 MG: 100 CAPSULE ORAL at 20:10

## 2021-01-01 RX ADMIN — IPRATROPIUM BROMIDE 0.5 MG: 0.5 SOLUTION RESPIRATORY (INHALATION) at 13:18

## 2021-01-01 RX ADMIN — NITROGLYCERIN 2 INCH: 20 OINTMENT TOPICAL at 17:20

## 2021-01-01 RX ADMIN — IPRATROPIUM BROMIDE 0.5 MG: 0.5 SOLUTION RESPIRATORY (INHALATION) at 20:58

## 2021-01-01 RX ADMIN — TAMSULOSIN HYDROCHLORIDE 0.4 MG: 0.4 CAPSULE ORAL at 17:23

## 2021-01-01 RX ADMIN — CARVEDILOL 12.5 MG: 12.5 TABLET, FILM COATED ORAL at 17:42

## 2021-01-01 RX ADMIN — GABAPENTIN 300 MG: 300 CAPSULE ORAL at 07:57

## 2021-01-01 RX ADMIN — CLOPIDOGREL BISULFATE 75 MG: 75 TABLET ORAL at 07:58

## 2021-01-01 RX ADMIN — Medication 10 ML: at 14:30

## 2021-01-01 RX ADMIN — GABAPENTIN 300 MG: 300 CAPSULE ORAL at 20:23

## 2021-01-01 RX ADMIN — ACETAMINOPHEN 650 MG: 325 TABLET ORAL at 15:38

## 2021-01-01 RX ADMIN — CEFPODOXIME PROXETIL 200 MG: 200 TABLET, FILM COATED ORAL at 08:18

## 2021-01-01 RX ADMIN — ACETAMINOPHEN 650 MG: 325 TABLET ORAL at 23:48

## 2021-01-01 RX ADMIN — ARFORMOTEROL TARTRATE 15 MCG: 15 SOLUTION RESPIRATORY (INHALATION) at 08:18

## 2021-01-01 RX ADMIN — LOPERAMIDE HYDROCHLORIDE 2 MG: 2 CAPSULE ORAL at 10:57

## 2021-01-01 RX ADMIN — FUROSEMIDE 20 MG: 20 TABLET ORAL at 08:28

## 2021-01-01 RX ADMIN — GUAIFENESIN 600 MG: 600 TABLET ORAL at 07:52

## 2021-01-01 RX ADMIN — CLOPIDOGREL BISULFATE 75 MG: 75 TABLET ORAL at 09:17

## 2021-01-01 RX ADMIN — PIPERACILLIN AND TAZOBACTAM 3.38 G: 3; .375 INJECTION, POWDER, LYOPHILIZED, FOR SOLUTION INTRAVENOUS at 18:31

## 2021-01-01 RX ADMIN — ACETAMINOPHEN 650 MG: 325 TABLET ORAL at 09:13

## 2021-01-01 RX ADMIN — LOPERAMIDE HYDROCHLORIDE 2 MG: 2 CAPSULE ORAL at 05:57

## 2021-01-01 RX ADMIN — IPRATROPIUM BROMIDE 0.5 MG: 0.5 SOLUTION RESPIRATORY (INHALATION) at 20:55

## 2021-01-01 RX ADMIN — PIPERACILLIN AND TAZOBACTAM 3.38 G: 3; .375 INJECTION, POWDER, LYOPHILIZED, FOR SOLUTION INTRAVENOUS at 05:30

## 2021-01-01 RX ADMIN — IPRATROPIUM BROMIDE 0.5 MG: 0.5 SOLUTION RESPIRATORY (INHALATION) at 08:25

## 2021-01-01 RX ADMIN — GABAPENTIN 300 MG: 300 CAPSULE ORAL at 08:01

## 2021-01-01 RX ADMIN — IPRATROPIUM BROMIDE 0.5 MG: 0.5 SOLUTION RESPIRATORY (INHALATION) at 20:00

## 2021-01-01 RX ADMIN — ROSUVASTATIN CALCIUM 10 MG: 10 TABLET, COATED ORAL at 08:40

## 2021-01-01 RX ADMIN — CLOPIDOGREL BISULFATE 75 MG: 75 TABLET ORAL at 08:29

## 2021-01-01 RX ADMIN — FUROSEMIDE 20 MG: 20 TABLET ORAL at 08:55

## 2021-01-01 RX ADMIN — AMOXICILLIN AND CLAVULANATE POTASSIUM 1 TABLET: 500; 125 TABLET, FILM COATED ORAL at 17:01

## 2021-01-01 RX ADMIN — ARFORMOTEROL TARTRATE 15 MCG: 15 SOLUTION RESPIRATORY (INHALATION) at 19:23

## 2021-01-01 RX ADMIN — CARVEDILOL 3.12 MG: 6.25 TABLET, FILM COATED ORAL at 09:16

## 2021-01-01 RX ADMIN — NITROGLYCERIN 2 INCH: 20 OINTMENT TOPICAL at 07:53

## 2021-01-01 RX ADMIN — ARFORMOTEROL TARTRATE 15 MCG: 15 SOLUTION RESPIRATORY (INHALATION) at 13:18

## 2021-01-01 RX ADMIN — Medication 10 ML: at 22:19

## 2021-01-01 RX ADMIN — IPRATROPIUM BROMIDE 0.5 MG: 0.5 SOLUTION RESPIRATORY (INHALATION) at 13:17

## 2021-01-01 RX ADMIN — Medication 1 TABLET: at 09:10

## 2021-01-01 RX ADMIN — GABAPENTIN 300 MG: 300 CAPSULE ORAL at 09:11

## 2021-01-01 RX ADMIN — ARFORMOTEROL TARTRATE 15 MCG: 15 SOLUTION RESPIRATORY (INHALATION) at 09:12

## 2021-01-01 RX ADMIN — ARFORMOTEROL TARTRATE 15 MCG: 15 SOLUTION RESPIRATORY (INHALATION) at 19:27

## 2021-01-01 RX ADMIN — IPRATROPIUM BROMIDE 0.5 MG: 0.5 SOLUTION RESPIRATORY (INHALATION) at 20:56

## 2021-01-01 RX ADMIN — MULTIPLE VITAMINS W/ MINERALS TAB 1 TABLET: TAB at 08:09

## 2021-01-01 RX ADMIN — GABAPENTIN 300 MG: 300 CAPSULE ORAL at 20:24

## 2021-01-01 RX ADMIN — CARVEDILOL 3.12 MG: 6.25 TABLET, FILM COATED ORAL at 08:54

## 2021-01-01 RX ADMIN — ACETAMINOPHEN 650 MG: 325 TABLET ORAL at 22:58

## 2021-01-01 RX ADMIN — GABAPENTIN 300 MG: 300 CAPSULE ORAL at 20:18

## 2021-01-01 RX ADMIN — TAMSULOSIN HYDROCHLORIDE 0.4 MG: 0.4 CAPSULE ORAL at 17:14

## 2021-01-01 RX ADMIN — Medication 1 TABLET: at 08:42

## 2021-01-01 RX ADMIN — APIXABAN 5 MG: 5 TABLET, FILM COATED ORAL at 08:13

## 2021-01-01 RX ADMIN — IPRATROPIUM BROMIDE 0.5 MG: 0.5 SOLUTION RESPIRATORY (INHALATION) at 14:00

## 2021-01-01 RX ADMIN — GABAPENTIN 300 MG: 300 CAPSULE ORAL at 14:37

## 2021-01-01 RX ADMIN — CEFPODOXIME PROXETIL 200 MG: 200 TABLET, FILM COATED ORAL at 09:11

## 2021-01-01 RX ADMIN — MULTIPLE VITAMINS W/ MINERALS TAB 1 TABLET: TAB at 08:58

## 2021-01-01 RX ADMIN — APIXABAN 5 MG: 5 TABLET, FILM COATED ORAL at 09:10

## 2021-01-01 RX ADMIN — AMOXICILLIN AND CLAVULANATE POTASSIUM 1 TABLET: 500; 125 TABLET, FILM COATED ORAL at 10:17

## 2021-01-01 RX ADMIN — Medication 1 TABLET: at 08:01

## 2021-01-01 RX ADMIN — BUDESONIDE 250 MCG: 0.25 SUSPENSION RESPIRATORY (INHALATION) at 09:44

## 2021-01-01 RX ADMIN — ARFORMOTEROL TARTRATE 15 MCG: 15 SOLUTION RESPIRATORY (INHALATION) at 08:26

## 2021-01-01 RX ADMIN — CARVEDILOL 12.5 MG: 12.5 TABLET, FILM COATED ORAL at 09:26

## 2021-01-01 RX ADMIN — CEFPODOXIME PROXETIL 200 MG: 200 TABLET, FILM COATED ORAL at 20:58

## 2021-01-01 RX ADMIN — AMOXICILLIN AND CLAVULANATE POTASSIUM 1 TABLET: 500; 125 TABLET, FILM COATED ORAL at 09:38

## 2021-01-01 RX ADMIN — CEFPODOXIME PROXETIL 200 MG: 200 TABLET, FILM COATED ORAL at 22:06

## 2021-01-01 RX ADMIN — APIXABAN 5 MG: 5 TABLET, FILM COATED ORAL at 17:16

## 2021-01-01 RX ADMIN — APIXABAN 5 MG: 5 TABLET, FILM COATED ORAL at 08:18

## 2021-01-01 RX ADMIN — LOPERAMIDE HYDROCHLORIDE 2 MG: 2 CAPSULE ORAL at 14:37

## 2021-01-01 RX ADMIN — IOPAMIDOL 100 ML: 612 INJECTION, SOLUTION INTRAVENOUS at 11:57

## 2021-01-01 RX ADMIN — Medication 1 CAPSULE: at 09:24

## 2021-01-01 RX ADMIN — DOCUSATE SODIUM 50MG AND SENNOSIDES 8.6MG 2 TABLET: 8.6; 5 TABLET, FILM COATED ORAL at 17:05

## 2021-01-01 RX ADMIN — ROSUVASTATIN CALCIUM 10 MG: 10 TABLET, COATED ORAL at 08:21

## 2021-01-01 RX ADMIN — LOPERAMIDE HYDROCHLORIDE 2 MG: 2 CAPSULE ORAL at 20:56

## 2021-01-01 RX ADMIN — Medication 1 TABLET: at 08:03

## 2021-01-01 RX ADMIN — IPRATROPIUM BROMIDE 0.5 MG: 0.5 SOLUTION RESPIRATORY (INHALATION) at 08:01

## 2021-01-01 RX ADMIN — IPRATROPIUM BROMIDE 0.5 MG: 0.5 SOLUTION RESPIRATORY (INHALATION) at 13:44

## 2021-01-01 RX ADMIN — IPRATROPIUM BROMIDE 0.5 MG: 0.5 SOLUTION RESPIRATORY (INHALATION) at 13:59

## 2021-01-01 RX ADMIN — CLOPIDOGREL BISULFATE 75 MG: 75 TABLET ORAL at 09:44

## 2021-01-01 RX ADMIN — IPRATROPIUM BROMIDE 0.5 MG: 0.5 SOLUTION RESPIRATORY (INHALATION) at 07:41

## 2021-01-01 RX ADMIN — IPRATROPIUM BROMIDE 0.5 MG: 0.5 SOLUTION RESPIRATORY (INHALATION) at 13:45

## 2021-01-01 RX ADMIN — ARFORMOTEROL TARTRATE 15 MCG: 15 SOLUTION RESPIRATORY (INHALATION) at 07:50

## 2021-01-01 RX ADMIN — GABAPENTIN 100 MG: 100 CAPSULE ORAL at 20:30

## 2021-01-01 RX ADMIN — IPRATROPIUM BROMIDE 0.5 MG: 0.5 SOLUTION RESPIRATORY (INHALATION) at 20:09

## 2021-01-01 RX ADMIN — GABAPENTIN 300 MG: 300 CAPSULE ORAL at 21:56

## 2021-01-01 RX ADMIN — AMOXICILLIN AND CLAVULANATE POTASSIUM 1 TABLET: 500; 125 TABLET, FILM COATED ORAL at 07:52

## 2021-01-01 RX ADMIN — GABAPENTIN 100 MG: 100 CAPSULE ORAL at 08:03

## 2021-01-01 RX ADMIN — CARVEDILOL 6.25 MG: 6.25 TABLET, FILM COATED ORAL at 17:42

## 2021-01-01 RX ADMIN — BUDESONIDE 500 MCG: 0.5 SUSPENSION RESPIRATORY (INHALATION) at 08:37

## 2021-01-01 RX ADMIN — MULTIPLE VITAMINS W/ MINERALS TAB 1 TABLET: TAB at 09:16

## 2021-01-01 RX ADMIN — ARFORMOTEROL TARTRATE 15 MCG: 15 SOLUTION RESPIRATORY (INHALATION) at 08:01

## 2021-01-01 RX ADMIN — ACETAMINOPHEN 650 MG: 325 TABLET ORAL at 01:29

## 2021-01-01 RX ADMIN — LOPERAMIDE HYDROCHLORIDE 2 MG: 2 CAPSULE ORAL at 10:59

## 2021-01-01 RX ADMIN — ROSUVASTATIN CALCIUM 10 MG: 10 TABLET, COATED ORAL at 09:07

## 2021-01-01 RX ADMIN — ALBUTEROL SULFATE 2.5 MG: 2.5 SOLUTION RESPIRATORY (INHALATION) at 18:58

## 2021-01-01 RX ADMIN — Medication 1 TABLET: at 08:18

## 2021-01-01 RX ADMIN — CEFPODOXIME PROXETIL 200 MG: 200 TABLET, FILM COATED ORAL at 09:38

## 2021-01-01 RX ADMIN — ARFORMOTEROL TARTRATE 15 MCG: 15 SOLUTION RESPIRATORY (INHALATION) at 22:34

## 2021-01-01 RX ADMIN — NITROGLYCERIN 2 INCH: 20 OINTMENT TOPICAL at 08:21

## 2021-01-01 RX ADMIN — Medication 1 TABLET: at 10:17

## 2021-01-01 RX ADMIN — IPRATROPIUM BROMIDE 0.5 MG: 0.5 SOLUTION RESPIRATORY (INHALATION) at 08:55

## 2021-01-01 RX ADMIN — IPRATROPIUM BROMIDE 0.5 MG: 0.5 SOLUTION RESPIRATORY (INHALATION) at 09:12

## 2021-01-01 RX ADMIN — CARVEDILOL 6.25 MG: 6.25 TABLET, FILM COATED ORAL at 08:31

## 2021-01-01 RX ADMIN — AMOXICILLIN AND CLAVULANATE POTASSIUM 1 TABLET: 500; 125 TABLET, FILM COATED ORAL at 17:36

## 2021-01-01 RX ADMIN — IPRATROPIUM BROMIDE 0.5 MG: 0.5 SOLUTION RESPIRATORY (INHALATION) at 14:39

## 2021-01-01 RX ADMIN — LISINOPRIL 5 MG: 5 TABLET ORAL at 10:18

## 2021-01-01 RX ADMIN — CEFPODOXIME PROXETIL 200 MG: 200 TABLET, FILM COATED ORAL at 08:20

## 2021-01-01 RX ADMIN — CLOPIDOGREL BISULFATE 75 MG: 75 TABLET ORAL at 08:31

## 2021-01-01 RX ADMIN — FUROSEMIDE 20 MG: 20 TABLET ORAL at 08:23

## 2021-01-01 RX ADMIN — TAMSULOSIN HYDROCHLORIDE 0.4 MG: 0.4 CAPSULE ORAL at 18:05

## 2021-01-01 RX ADMIN — CEFPODOXIME PROXETIL 200 MG: 200 TABLET, FILM COATED ORAL at 08:00

## 2021-01-01 RX ADMIN — ARFORMOTEROL TARTRATE 15 MCG: 15 SOLUTION RESPIRATORY (INHALATION) at 19:59

## 2021-01-01 RX ADMIN — CEFPODOXIME PROXETIL 200 MG: 200 TABLET, FILM COATED ORAL at 08:31

## 2021-01-01 RX ADMIN — SODIUM CHLORIDE 100 ML/HR: 900 INJECTION, SOLUTION INTRAVENOUS at 13:00

## 2021-01-01 RX ADMIN — ACETAMINOPHEN 650 MG: 325 TABLET ORAL at 17:06

## 2021-01-01 RX ADMIN — CARVEDILOL 12.5 MG: 12.5 TABLET, FILM COATED ORAL at 17:02

## 2021-01-01 RX ADMIN — GUAIFENESIN 600 MG: 600 TABLET ORAL at 09:13

## 2021-01-01 RX ADMIN — IPRATROPIUM BROMIDE 0.5 MG: 0.5 SOLUTION RESPIRATORY (INHALATION) at 13:08

## 2021-01-01 RX ADMIN — MULTIPLE VITAMINS W/ MINERALS TAB 1 TABLET: TAB at 08:26

## 2021-01-01 RX ADMIN — Medication 1 TABLET: at 07:28

## 2021-01-01 RX ADMIN — CARVEDILOL 12.5 MG: 12.5 TABLET, FILM COATED ORAL at 17:51

## 2021-01-01 RX ADMIN — LISINOPRIL 5 MG: 5 TABLET ORAL at 08:54

## 2021-01-01 RX ADMIN — Medication 10 ML: at 22:10

## 2021-01-01 RX ADMIN — CARVEDILOL 12.5 MG: 12.5 TABLET, FILM COATED ORAL at 08:24

## 2021-01-01 RX ADMIN — CARVEDILOL 25 MG: 25 TABLET, FILM COATED ORAL at 17:04

## 2021-01-01 RX ADMIN — TAMSULOSIN HYDROCHLORIDE 0.4 MG: 0.4 CAPSULE ORAL at 18:06

## 2021-01-01 RX ADMIN — ARFORMOTEROL TARTRATE 15 MCG: 15 SOLUTION RESPIRATORY (INHALATION) at 08:19

## 2021-01-01 RX ADMIN — NITROGLYCERIN 2 INCH: 20 OINTMENT TOPICAL at 17:57

## 2021-01-01 RX ADMIN — GUAIFENESIN 600 MG: 600 TABLET ORAL at 08:02

## 2021-01-01 RX ADMIN — GABAPENTIN 300 MG: 300 CAPSULE ORAL at 13:44

## 2021-01-01 RX ADMIN — CARVEDILOL 3.12 MG: 6.25 TABLET, FILM COATED ORAL at 19:45

## 2021-01-01 RX ADMIN — ARFORMOTEROL TARTRATE 15 MCG: 15 SOLUTION RESPIRATORY (INHALATION) at 08:29

## 2021-01-01 RX ADMIN — APIXABAN 5 MG: 5 TABLET, FILM COATED ORAL at 17:51

## 2021-01-01 RX ADMIN — GABAPENTIN 100 MG: 100 CAPSULE ORAL at 13:32

## 2021-01-01 RX ADMIN — AMOXICILLIN AND CLAVULANATE POTASSIUM 1 TABLET: 500; 125 TABLET, FILM COATED ORAL at 16:44

## 2021-01-01 RX ADMIN — APIXABAN 5 MG: 5 TABLET, FILM COATED ORAL at 21:30

## 2021-01-01 RX ADMIN — BUDESONIDE 250 MCG: 0.25 SUSPENSION RESPIRATORY (INHALATION) at 17:51

## 2021-01-01 RX ADMIN — APIXABAN 5 MG: 5 TABLET, FILM COATED ORAL at 10:17

## 2021-01-01 RX ADMIN — ACETAMINOPHEN 650 MG: 325 TABLET ORAL at 18:58

## 2021-01-01 RX ADMIN — ACETAMINOPHEN 650 MG: 325 TABLET ORAL at 01:04

## 2021-01-01 RX ADMIN — GABAPENTIN 100 MG: 100 CAPSULE ORAL at 20:03

## 2021-01-01 RX ADMIN — SODIUM CHLORIDE 1000 ML: 900 INJECTION, SOLUTION INTRAVENOUS at 15:14

## 2021-01-01 RX ADMIN — IPRATROPIUM BROMIDE 0.5 MG: 0.5 SOLUTION RESPIRATORY (INHALATION) at 07:52

## 2021-01-01 RX ADMIN — CARVEDILOL 3.12 MG: 6.25 TABLET, FILM COATED ORAL at 09:12

## 2021-01-01 RX ADMIN — Medication 10 ML: at 22:28

## 2021-01-01 RX ADMIN — ALBUTEROL SULFATE 2.5 MG: 2.5 SOLUTION RESPIRATORY (INHALATION) at 21:56

## 2021-01-01 RX ADMIN — TAMSULOSIN HYDROCHLORIDE 0.4 MG: 0.4 CAPSULE ORAL at 17:42

## 2021-01-01 RX ADMIN — CHOLESTYRAMINE 4 G: 4 POWDER, FOR SUSPENSION ORAL at 09:15

## 2021-01-01 RX ADMIN — CARVEDILOL 12.5 MG: 12.5 TABLET, FILM COATED ORAL at 09:44

## 2021-01-01 RX ADMIN — GABAPENTIN 100 MG: 100 CAPSULE ORAL at 08:23

## 2021-01-01 RX ADMIN — AMOXICILLIN AND CLAVULANATE POTASSIUM 1 TABLET: 500; 125 TABLET, FILM COATED ORAL at 08:01

## 2021-01-01 RX ADMIN — CLOPIDOGREL BISULFATE 75 MG: 75 TABLET ORAL at 08:13

## 2021-01-01 RX ADMIN — CEFPODOXIME PROXETIL 200 MG: 200 TABLET, FILM COATED ORAL at 09:12

## 2021-01-01 RX ADMIN — CARVEDILOL 3.12 MG: 6.25 TABLET, FILM COATED ORAL at 08:20

## 2021-01-01 RX ADMIN — CLOPIDOGREL BISULFATE 75 MG: 75 TABLET ORAL at 08:41

## 2021-01-01 RX ADMIN — ARFORMOTEROL TARTRATE 15 MCG: 15 SOLUTION RESPIRATORY (INHALATION) at 20:05

## 2021-01-01 RX ADMIN — POLYETHYLENE GLYCOL 3350 17 G: 17 POWDER, FOR SOLUTION ORAL at 08:55

## 2021-01-01 RX ADMIN — AMOXICILLIN AND CLAVULANATE POTASSIUM 1 TABLET: 500; 125 TABLET, FILM COATED ORAL at 17:21

## 2021-01-01 RX ADMIN — APIXABAN 5 MG: 5 TABLET, FILM COATED ORAL at 09:12

## 2021-01-01 RX ADMIN — ROSUVASTATIN CALCIUM 10 MG: 10 TABLET, COATED ORAL at 08:02

## 2021-01-01 RX ADMIN — CEFPODOXIME PROXETIL 200 MG: 200 TABLET, FILM COATED ORAL at 22:05

## 2021-01-01 RX ADMIN — Medication 1 TABLET: at 09:13

## 2021-01-01 RX ADMIN — CLOPIDOGREL BISULFATE 75 MG: 75 TABLET ORAL at 08:59

## 2021-01-01 RX ADMIN — TAMSULOSIN HYDROCHLORIDE 0.4 MG: 0.4 CAPSULE ORAL at 18:13

## 2021-01-01 RX ADMIN — ARFORMOTEROL TARTRATE 15 MCG: 15 SOLUTION RESPIRATORY (INHALATION) at 07:55

## 2021-01-01 RX ADMIN — DEXTROMETHORPHAN POLISTIREX 30 MG: 30 SUSPENSION ORAL at 20:56

## 2021-01-01 RX ADMIN — PIPERACILLIN AND TAZOBACTAM 3.38 G: 3; .375 INJECTION, POWDER, LYOPHILIZED, FOR SOLUTION INTRAVENOUS at 13:01

## 2021-01-01 RX ADMIN — CARVEDILOL 3.12 MG: 6.25 TABLET, FILM COATED ORAL at 16:56

## 2021-01-01 RX ADMIN — APIXABAN 5 MG: 5 TABLET, FILM COATED ORAL at 09:11

## 2021-01-01 RX ADMIN — IPRATROPIUM BROMIDE 0.5 MG: 0.5 SOLUTION RESPIRATORY (INHALATION) at 20:10

## 2021-01-01 RX ADMIN — GABAPENTIN 100 MG: 100 CAPSULE ORAL at 08:28

## 2021-01-01 RX ADMIN — TAMSULOSIN HYDROCHLORIDE 0.4 MG: 0.4 CAPSULE ORAL at 17:27

## 2021-01-01 RX ADMIN — CEFPODOXIME PROXETIL 200 MG: 200 TABLET, FILM COATED ORAL at 20:59

## 2021-01-01 RX ADMIN — LOPERAMIDE HYDROCHLORIDE 2 MG: 2 CAPSULE ORAL at 10:45

## 2021-01-01 RX ADMIN — ACETAMINOPHEN 650 MG: 325 TABLET ORAL at 09:25

## 2021-01-01 RX ADMIN — Medication 1 TABLET: at 08:23

## 2021-01-01 RX ADMIN — CEFPODOXIME PROXETIL 200 MG: 200 TABLET, FILM COATED ORAL at 08:54

## 2021-01-01 RX ADMIN — TAMSULOSIN HYDROCHLORIDE 0.4 MG: 0.4 CAPSULE ORAL at 17:04

## 2021-01-01 RX ADMIN — FUROSEMIDE 20 MG: 20 TABLET ORAL at 08:37

## 2021-01-01 RX ADMIN — APIXABAN 5 MG: 5 TABLET, FILM COATED ORAL at 20:59

## 2021-01-01 RX ADMIN — ARFORMOTEROL TARTRATE 15 MCG: 15 SOLUTION RESPIRATORY (INHALATION) at 08:10

## 2021-01-01 RX ADMIN — FUROSEMIDE 20 MG: 20 TABLET ORAL at 09:07

## 2021-01-01 RX ADMIN — MULTIPLE VITAMINS W/ MINERALS TAB 1 TABLET: TAB at 08:55

## 2021-01-01 RX ADMIN — Medication 1 TABLET: at 08:20

## 2021-01-01 RX ADMIN — Medication 1 TABLET: at 09:12

## 2021-01-01 RX ADMIN — GUAIFENESIN 600 MG: 600 TABLET ORAL at 07:28

## 2021-01-01 RX ADMIN — METRONIDAZOLE 500 MG: 500 TABLET ORAL at 08:31

## 2021-01-01 RX ADMIN — CARVEDILOL 3.12 MG: 6.25 TABLET, FILM COATED ORAL at 09:40

## 2021-01-01 RX ADMIN — CLOPIDOGREL BISULFATE 75 MG: 75 TABLET ORAL at 08:20

## 2021-01-01 RX ADMIN — APIXABAN 5 MG: 5 TABLET, FILM COATED ORAL at 17:03

## 2021-01-01 RX ADMIN — IPRATROPIUM BROMIDE 0.5 MG: 0.5 SOLUTION RESPIRATORY (INHALATION) at 19:45

## 2021-01-01 RX ADMIN — ARFORMOTEROL TARTRATE 15 MCG: 15 SOLUTION RESPIRATORY (INHALATION) at 22:41

## 2021-01-01 RX ADMIN — CLOPIDOGREL BISULFATE 75 MG: 75 TABLET ORAL at 07:52

## 2021-01-01 RX ADMIN — CARVEDILOL 12.5 MG: 12.5 TABLET, FILM COATED ORAL at 17:14

## 2021-01-01 RX ADMIN — LISINOPRIL 5 MG: 5 TABLET ORAL at 08:21

## 2021-01-01 RX ADMIN — IPRATROPIUM BROMIDE 0.5 MG: 0.5 SOLUTION RESPIRATORY (INHALATION) at 14:36

## 2021-01-01 RX ADMIN — IPRATROPIUM BROMIDE 0.5 MG: 0.5 SOLUTION RESPIRATORY (INHALATION) at 20:03

## 2021-01-01 RX ADMIN — METRONIDAZOLE 500 MG: 500 INJECTION, SOLUTION INTRAVENOUS at 21:02

## 2021-01-01 RX ADMIN — APIXABAN 5 MG: 5 TABLET, FILM COATED ORAL at 22:52

## 2021-01-01 RX ADMIN — IPRATROPIUM BROMIDE 0.5 MG: 0.5 SOLUTION RESPIRATORY (INHALATION) at 20:23

## 2021-01-01 RX ADMIN — CARVEDILOL 3.12 MG: 3.12 TABLET, FILM COATED ORAL at 08:34

## 2021-01-01 RX ADMIN — ROSUVASTATIN CALCIUM 10 MG: 10 TABLET, COATED ORAL at 09:11

## 2021-01-01 RX ADMIN — ACETAMINOPHEN 650 MG: 325 TABLET ORAL at 23:07

## 2021-01-01 RX ADMIN — DOCUSATE SODIUM 50MG AND SENNOSIDES 8.6MG 2 TABLET: 8.6; 5 TABLET, FILM COATED ORAL at 17:01

## 2021-01-01 RX ADMIN — GABAPENTIN 300 MG: 300 CAPSULE ORAL at 08:21

## 2021-01-01 RX ADMIN — NITROGLYCERIN 2 INCH: 20 OINTMENT TOPICAL at 10:02

## 2021-01-01 RX ADMIN — IPRATROPIUM BROMIDE 0.5 MG: 0.5 SOLUTION RESPIRATORY (INHALATION) at 07:51

## 2021-01-01 RX ADMIN — DEXTROMETHORPHAN POLISTIREX 30 MG: 30 SUSPENSION ORAL at 20:46

## 2021-01-01 RX ADMIN — ROSUVASTATIN CALCIUM 10 MG: 10 TABLET, COATED ORAL at 09:17

## 2021-01-01 RX ADMIN — IPRATROPIUM BROMIDE 0.5 MG: 0.5 SOLUTION RESPIRATORY (INHALATION) at 08:26

## 2021-01-01 RX ADMIN — ARFORMOTEROL TARTRATE 15 MCG: 15 SOLUTION RESPIRATORY (INHALATION) at 20:46

## 2021-01-01 RX ADMIN — APIXABAN 5 MG: 5 TABLET, FILM COATED ORAL at 08:03

## 2021-01-01 RX ADMIN — ARFORMOTEROL TARTRATE 15 MCG: 15 SOLUTION RESPIRATORY (INHALATION) at 20:00

## 2021-01-01 RX ADMIN — TAMSULOSIN HYDROCHLORIDE 0.4 MG: 0.4 CAPSULE ORAL at 16:56

## 2021-01-01 RX ADMIN — PIPERACILLIN AND TAZOBACTAM 3.38 G: 3; .375 INJECTION, POWDER, LYOPHILIZED, FOR SOLUTION INTRAVENOUS at 05:51

## 2021-01-01 RX ADMIN — DEXTROMETHORPHAN POLISTIREX 30 MG: 30 SUSPENSION ORAL at 20:15

## 2021-01-01 RX ADMIN — APIXABAN 5 MG: 5 TABLET, FILM COATED ORAL at 08:55

## 2021-01-01 RX ADMIN — TAMSULOSIN HYDROCHLORIDE 0.4 MG: 0.4 CAPSULE ORAL at 17:00

## 2021-01-01 RX ADMIN — APIXABAN 5 MG: 5 TABLET, FILM COATED ORAL at 17:23

## 2021-01-01 RX ADMIN — TAMSULOSIN HYDROCHLORIDE 0.4 MG: 0.4 CAPSULE ORAL at 17:16

## 2021-01-01 RX ADMIN — Medication 1 CAPSULE: at 09:17

## 2021-01-01 RX ADMIN — APIXABAN 5 MG: 5 TABLET, FILM COATED ORAL at 08:58

## 2021-01-01 RX ADMIN — GABAPENTIN 100 MG: 100 CAPSULE ORAL at 17:43

## 2021-01-01 RX ADMIN — CEFPODOXIME PROXETIL 200 MG: 200 TABLET, FILM COATED ORAL at 10:17

## 2021-01-01 RX ADMIN — APIXABAN 5 MG: 5 TABLET, FILM COATED ORAL at 07:29

## 2021-01-01 RX ADMIN — GABAPENTIN 100 MG: 100 CAPSULE ORAL at 09:08

## 2021-01-01 RX ADMIN — ATROPINE SULFATE 1 MG: 1 INJECTION, SOLUTION INTRAMUSCULAR; INTRAVENOUS; SUBCUTANEOUS at 16:11

## 2021-01-01 RX ADMIN — PIPERACILLIN AND TAZOBACTAM 3.38 G: 3; .375 INJECTION, POWDER, LYOPHILIZED, FOR SOLUTION INTRAVENOUS at 11:43

## 2021-01-01 RX ADMIN — AMOXICILLIN AND CLAVULANATE POTASSIUM 1 TABLET: 500; 125 TABLET, FILM COATED ORAL at 17:20

## 2021-01-01 RX ADMIN — GABAPENTIN 100 MG: 100 CAPSULE ORAL at 08:40

## 2021-01-01 RX ADMIN — IPRATROPIUM BROMIDE 0.5 MG: 0.5 SOLUTION RESPIRATORY (INHALATION) at 20:30

## 2021-01-01 RX ADMIN — BUDESONIDE 500 MCG: 0.5 SUSPENSION RESPIRATORY (INHALATION) at 19:21

## 2021-01-01 RX ADMIN — IPRATROPIUM BROMIDE 0.5 MG: 0.5 SOLUTION RESPIRATORY (INHALATION) at 08:00

## 2021-01-01 RX ADMIN — LOPERAMIDE HYDROCHLORIDE 2 MG: 2 CAPSULE ORAL at 12:37

## 2021-01-01 RX ADMIN — IPRATROPIUM BROMIDE 0.5 MG: 0.5 SOLUTION RESPIRATORY (INHALATION) at 08:18

## 2021-01-01 RX ADMIN — APIXABAN 5 MG: 5 TABLET, FILM COATED ORAL at 16:47

## 2021-01-01 RX ADMIN — BUDESONIDE 500 MCG: 0.5 SUSPENSION RESPIRATORY (INHALATION) at 19:38

## 2021-01-01 RX ADMIN — ARFORMOTEROL TARTRATE 15 MCG: 15 SOLUTION RESPIRATORY (INHALATION) at 08:02

## 2021-01-01 RX ADMIN — ACETAMINOPHEN 650 MG: 325 TABLET ORAL at 21:32

## 2021-01-01 RX ADMIN — LOPERAMIDE HYDROCHLORIDE 4 MG: 2 CAPSULE ORAL at 17:20

## 2021-01-01 RX ADMIN — TAMSULOSIN HYDROCHLORIDE 0.4 MG: 0.4 CAPSULE ORAL at 18:27

## 2021-01-01 RX ADMIN — GABAPENTIN 100 MG: 100 CAPSULE ORAL at 13:14

## 2021-01-01 RX ADMIN — LOPERAMIDE HYDROCHLORIDE 4 MG: 2 CAPSULE ORAL at 05:59

## 2021-01-01 RX ADMIN — CEFPODOXIME PROXETIL 200 MG: 200 TABLET, FILM COATED ORAL at 21:30

## 2021-01-01 RX ADMIN — Medication 1 TABLET: at 08:54

## 2021-01-01 RX ADMIN — LISINOPRIL 5 MG: 5 TABLET ORAL at 09:12

## 2021-01-01 RX ADMIN — APIXABAN 5 MG: 5 TABLET, FILM COATED ORAL at 08:10

## 2021-01-01 RX ADMIN — APIXABAN 5 MG: 5 TABLET, FILM COATED ORAL at 17:05

## 2021-01-01 RX ADMIN — CLOPIDOGREL BISULFATE 75 MG: 75 TABLET ORAL at 08:15

## 2021-01-01 RX ADMIN — ACETAMINOPHEN 650 MG: 325 TABLET ORAL at 16:12

## 2021-01-01 RX ADMIN — LISINOPRIL 5 MG: 5 TABLET ORAL at 08:17

## 2021-01-01 RX ADMIN — NITROGLYCERIN 2 INCH: 20 OINTMENT TOPICAL at 09:40

## 2021-01-01 RX ADMIN — GABAPENTIN 100 MG: 100 CAPSULE ORAL at 13:27

## 2021-01-01 RX ADMIN — GABAPENTIN 100 MG: 100 CAPSULE ORAL at 08:26

## 2021-01-01 RX ADMIN — Medication 1 CAPSULE: at 08:32

## 2021-01-01 RX ADMIN — APIXABAN 5 MG: 5 TABLET, FILM COATED ORAL at 08:54

## 2021-01-01 RX ADMIN — LOPERAMIDE HYDROCHLORIDE 2 MG: 2 CAPSULE ORAL at 14:57

## 2021-01-01 RX ADMIN — PIPERACILLIN AND TAZOBACTAM 3.38 G: 3; .375 INJECTION, POWDER, LYOPHILIZED, FOR SOLUTION INTRAVENOUS at 18:32

## 2021-01-01 RX ADMIN — GUAIFENESIN 600 MG: 600 TABLET ORAL at 09:11

## 2021-01-01 RX ADMIN — TAMSULOSIN HYDROCHLORIDE 0.4 MG: 0.4 CAPSULE ORAL at 17:05

## 2021-01-01 RX ADMIN — CARVEDILOL 12.5 MG: 12.5 TABLET, FILM COATED ORAL at 08:13

## 2021-01-01 RX ADMIN — TAMSULOSIN HYDROCHLORIDE 0.4 MG: 0.4 CAPSULE ORAL at 19:45

## 2021-01-01 RX ADMIN — DEXTROMETHORPHAN POLISTIREX 30 MG: 30 SUSPENSION ORAL at 21:00

## 2021-01-01 RX ADMIN — Medication 10 ML: at 05:54

## 2021-01-01 RX ADMIN — AMOXICILLIN AND CLAVULANATE POTASSIUM 1 TABLET: 500; 125 TABLET, FILM COATED ORAL at 08:18

## 2021-01-01 RX ADMIN — APIXABAN 5 MG: 5 TABLET, FILM COATED ORAL at 17:42

## 2021-01-01 RX ADMIN — METRONIDAZOLE 500 MG: 500 TABLET ORAL at 21:31

## 2021-01-01 RX ADMIN — DEXTROMETHORPHAN POLISTIREX 30 MG: 30 SUSPENSION ORAL at 20:24

## 2021-01-01 RX ADMIN — ROSUVASTATIN CALCIUM 10 MG: 10 TABLET, COATED ORAL at 08:18

## 2021-01-01 RX ADMIN — AMOXICILLIN AND CLAVULANATE POTASSIUM 1 TABLET: 500; 125 TABLET, FILM COATED ORAL at 17:29

## 2021-01-01 RX ADMIN — CLOPIDOGREL BISULFATE 75 MG: 75 TABLET ORAL at 08:18

## 2021-01-01 RX ADMIN — CEFPODOXIME PROXETIL 200 MG: 200 TABLET, FILM COATED ORAL at 20:15

## 2021-01-01 RX ADMIN — LISINOPRIL 5 MG: 5 TABLET ORAL at 09:38

## 2021-01-01 RX ADMIN — GABAPENTIN 100 MG: 100 CAPSULE ORAL at 14:00

## 2021-01-01 RX ADMIN — ROSUVASTATIN CALCIUM 10 MG: 10 TABLET, COATED ORAL at 07:28

## 2021-01-01 RX ADMIN — DEXTROMETHORPHAN POLISTIREX 30 MG: 30 SUSPENSION ORAL at 21:03

## 2021-01-01 RX ADMIN — POLYETHYLENE GLYCOL 3350 17 G: 17 POWDER, FOR SOLUTION ORAL at 08:23

## 2021-01-01 RX ADMIN — ROSUVASTATIN CALCIUM 10 MG: 10 TABLET, COATED ORAL at 08:22

## 2021-01-01 RX ADMIN — ACETAMINOPHEN 650 MG: 325 TABLET ORAL at 03:49

## 2021-01-01 RX ADMIN — IPRATROPIUM BROMIDE 0.5 MG: 0.5 SOLUTION RESPIRATORY (INHALATION) at 09:13

## 2021-01-01 RX ADMIN — Medication 10 ML: at 18:27

## 2021-01-01 RX ADMIN — GABAPENTIN 300 MG: 300 CAPSULE ORAL at 19:45

## 2021-01-01 RX ADMIN — CLOPIDOGREL BISULFATE 75 MG: 75 TABLET ORAL at 09:40

## 2021-01-01 RX ADMIN — TAMSULOSIN HYDROCHLORIDE 0.4 MG: 0.4 CAPSULE ORAL at 17:51

## 2021-01-01 RX ADMIN — CEFPODOXIME PROXETIL 200 MG: 200 TABLET, FILM COATED ORAL at 07:52

## 2021-01-01 RX ADMIN — CEFPODOXIME PROXETIL 200 MG: 200 TABLET, FILM COATED ORAL at 20:24

## 2021-01-01 RX ADMIN — ROSUVASTATIN CALCIUM 10 MG: 10 TABLET, COATED ORAL at 07:52

## 2021-01-01 RX ADMIN — CLOPIDOGREL BISULFATE 75 MG: 75 TABLET ORAL at 08:55

## 2021-01-01 RX ADMIN — APIXABAN 5 MG: 5 TABLET, FILM COATED ORAL at 17:36

## 2021-01-01 RX ADMIN — CLOPIDOGREL BISULFATE 75 MG: 75 TABLET ORAL at 07:41

## 2021-01-01 RX ADMIN — NITROGLYCERIN 2 INCH: 20 OINTMENT TOPICAL at 17:08

## 2021-01-01 RX ADMIN — PIPERACILLIN AND TAZOBACTAM 3.38 G: 3; .375 INJECTION, POWDER, LYOPHILIZED, FOR SOLUTION INTRAVENOUS at 11:54

## 2021-01-01 RX ADMIN — APIXABAN 5 MG: 5 TABLET, FILM COATED ORAL at 09:07

## 2021-01-01 RX ADMIN — APIXABAN 5 MG: 5 TABLET, FILM COATED ORAL at 08:00

## 2021-01-01 RX ADMIN — METRONIDAZOLE 500 MG: 500 TABLET ORAL at 13:09

## 2021-01-01 RX ADMIN — CARVEDILOL 3.12 MG: 6.25 TABLET, FILM COATED ORAL at 08:15

## 2021-01-01 RX ADMIN — AMOXICILLIN AND CLAVULANATE POTASSIUM 1 TABLET: 500; 125 TABLET, FILM COATED ORAL at 18:06

## 2021-01-01 RX ADMIN — GABAPENTIN 300 MG: 300 CAPSULE ORAL at 14:57

## 2021-01-01 RX ADMIN — IPRATROPIUM BROMIDE 0.5 MG: 0.5 SOLUTION RESPIRATORY (INHALATION) at 07:29

## 2021-01-01 RX ADMIN — LISINOPRIL 5 MG: 5 TABLET ORAL at 07:53

## 2021-01-01 RX ADMIN — ARFORMOTEROL TARTRATE 15 MCG: 15 SOLUTION RESPIRATORY (INHALATION) at 20:07

## 2021-01-01 RX ADMIN — GABAPENTIN 100 MG: 100 CAPSULE ORAL at 07:41

## 2021-01-01 RX ADMIN — CARVEDILOL 6.25 MG: 6.25 TABLET, FILM COATED ORAL at 17:14

## 2021-01-01 RX ADMIN — LISINOPRIL 5 MG: 5 TABLET ORAL at 09:11

## 2021-01-01 RX ADMIN — ARFORMOTEROL TARTRATE 15 MCG: 15 SOLUTION RESPIRATORY (INHALATION) at 19:38

## 2021-01-01 RX ADMIN — ARFORMOTEROL TARTRATE 15 MCG: 15 SOLUTION RESPIRATORY (INHALATION) at 07:29

## 2021-01-01 RX ADMIN — CARVEDILOL 3.12 MG: 6.25 TABLET, FILM COATED ORAL at 17:20

## 2021-01-01 RX ADMIN — APIXABAN 5 MG: 5 TABLET, FILM COATED ORAL at 07:58

## 2021-01-01 RX ADMIN — POLYETHYLENE GLYCOL 3350 17 G: 17 POWDER, FOR SOLUTION ORAL at 08:41

## 2021-01-01 RX ADMIN — Medication 10 ML: at 13:16

## 2021-01-01 RX ADMIN — DEXTROMETHORPHAN POLISTIREX 30 MG: 30 SUSPENSION ORAL at 20:57

## 2021-01-01 RX ADMIN — CARVEDILOL 3.12 MG: 3.12 TABLET, FILM COATED ORAL at 16:05

## 2021-01-01 RX ADMIN — APIXABAN 5 MG: 5 TABLET, FILM COATED ORAL at 09:16

## 2021-01-01 RX ADMIN — LOPERAMIDE HYDROCHLORIDE 2 MG: 2 CAPSULE ORAL at 17:52

## 2021-01-01 RX ADMIN — TAMSULOSIN HYDROCHLORIDE 0.4 MG: 0.4 CAPSULE ORAL at 17:03

## 2021-01-01 RX ADMIN — BUDESONIDE 250 MCG: 0.25 SUSPENSION RESPIRATORY (INHALATION) at 22:52

## 2021-01-01 RX ADMIN — GABAPENTIN 100 MG: 100 CAPSULE ORAL at 09:09

## 2021-01-01 RX ADMIN — TAMSULOSIN HYDROCHLORIDE 0.4 MG: 0.4 CAPSULE ORAL at 17:29

## 2021-01-01 RX ADMIN — BUDESONIDE 250 MCG: 0.25 SUSPENSION RESPIRATORY (INHALATION) at 17:15

## 2021-01-01 RX ADMIN — ARFORMOTEROL TARTRATE 15 MCG: 15 SOLUTION RESPIRATORY (INHALATION) at 20:20

## 2021-01-01 RX ADMIN — BUDESONIDE 500 MCG: 0.5 SUSPENSION RESPIRATORY (INHALATION) at 20:56

## 2021-01-01 RX ADMIN — IPRATROPIUM BROMIDE 0.5 MG: 0.5 SOLUTION RESPIRATORY (INHALATION) at 09:37

## 2021-01-01 RX ADMIN — CARVEDILOL 6.25 MG: 6.25 TABLET, FILM COATED ORAL at 08:03

## 2021-01-01 RX ADMIN — CLOPIDOGREL BISULFATE 75 MG: 75 TABLET ORAL at 08:37

## 2021-01-01 RX ADMIN — ACETAMINOPHEN 650 MG: 325 TABLET ORAL at 20:59

## 2021-01-01 RX ADMIN — IPRATROPIUM BROMIDE 0.5 MG: 0.5 SOLUTION RESPIRATORY (INHALATION) at 14:43

## 2021-01-01 RX ADMIN — ACETAMINOPHEN 650 MG: 325 TABLET ORAL at 21:56

## 2021-01-01 RX ADMIN — CEFPODOXIME PROXETIL 200 MG: 200 TABLET, FILM COATED ORAL at 09:40

## 2021-01-01 RX ADMIN — GABAPENTIN 300 MG: 300 CAPSULE ORAL at 10:17

## 2021-01-01 RX ADMIN — Medication 1 TABLET: at 09:38

## 2021-01-01 RX ADMIN — APIXABAN 5 MG: 5 TABLET, FILM COATED ORAL at 09:26

## 2021-01-01 RX ADMIN — ROSUVASTATIN CALCIUM 10 MG: 10 TABLET, COATED ORAL at 08:23

## 2021-01-01 RX ADMIN — GABAPENTIN 300 MG: 300 CAPSULE ORAL at 13:43

## 2021-01-01 RX ADMIN — NITROGLYCERIN 2 INCH: 20 OINTMENT TOPICAL at 19:45

## 2021-01-01 RX ADMIN — ACETAMINOPHEN 650 MG: 325 TABLET ORAL at 02:53

## 2021-01-01 RX ADMIN — ARFORMOTEROL TARTRATE 15 MCG: 15 SOLUTION RESPIRATORY (INHALATION) at 08:55

## 2021-01-01 RX ADMIN — GABAPENTIN 100 MG: 100 CAPSULE ORAL at 22:19

## 2021-01-01 RX ADMIN — CARVEDILOL 12.5 MG: 12.5 TABLET, FILM COATED ORAL at 08:37

## 2021-01-01 RX ADMIN — IPRATROPIUM BROMIDE 0.5 MG: 0.5 SOLUTION RESPIRATORY (INHALATION) at 14:54

## 2021-01-01 RX ADMIN — CARVEDILOL 12.5 MG: 12.5 TABLET, FILM COATED ORAL at 08:57

## 2021-01-01 RX ADMIN — ACETAMINOPHEN 650 MG: 325 TABLET ORAL at 08:44

## 2021-01-01 RX ADMIN — CARVEDILOL 12.5 MG: 12.5 TABLET, FILM COATED ORAL at 17:36

## 2021-01-01 RX ADMIN — IPRATROPIUM BROMIDE 0.5 MG: 0.5 SOLUTION RESPIRATORY (INHALATION) at 20:24

## 2021-01-01 RX ADMIN — CARVEDILOL 3.12 MG: 6.25 TABLET, FILM COATED ORAL at 07:52

## 2021-01-01 RX ADMIN — CARVEDILOL 12.5 MG: 12.5 TABLET, FILM COATED ORAL at 08:10

## 2021-01-01 RX ADMIN — BUDESONIDE 500 MCG: 0.5 SUSPENSION RESPIRATORY (INHALATION) at 20:20

## 2021-01-01 RX ADMIN — SODIUM CHLORIDE 500 ML: 900 INJECTION, SOLUTION INTRAVENOUS at 15:11

## 2021-01-01 RX ADMIN — WATER 1 G: 1 INJECTION INTRAMUSCULAR; INTRAVENOUS; SUBCUTANEOUS at 12:36

## 2021-01-01 RX ADMIN — APIXABAN 5 MG: 5 TABLET, FILM COATED ORAL at 08:30

## 2021-01-01 RX ADMIN — GABAPENTIN 300 MG: 300 CAPSULE ORAL at 13:16

## 2021-01-01 RX ADMIN — CARVEDILOL 3.12 MG: 3.12 TABLET, FILM COATED ORAL at 08:43

## 2021-01-01 RX ADMIN — IPRATROPIUM BROMIDE 0.5 MG: 0.5 SOLUTION RESPIRATORY (INHALATION) at 20:04

## 2021-01-01 RX ADMIN — ARFORMOTEROL TARTRATE 15 MCG: 15 SOLUTION RESPIRATORY (INHALATION) at 09:26

## 2021-01-01 RX ADMIN — APIXABAN 5 MG: 5 TABLET, FILM COATED ORAL at 17:08

## 2021-01-01 RX ADMIN — CLOPIDOGREL BISULFATE 75 MG: 75 TABLET ORAL at 08:23

## 2021-01-01 RX ADMIN — IPRATROPIUM BROMIDE 0.5 MG: 0.5 SOLUTION RESPIRATORY (INHALATION) at 08:19

## 2021-01-01 RX ADMIN — ARFORMOTEROL TARTRATE 15 MCG: 15 SOLUTION RESPIRATORY (INHALATION) at 20:03

## 2021-01-01 RX ADMIN — Medication 1 CAPSULE: at 08:57

## 2021-01-01 RX ADMIN — ARFORMOTEROL TARTRATE 15 MCG: 15 SOLUTION RESPIRATORY (INHALATION) at 20:10

## 2021-01-01 RX ADMIN — CEFPODOXIME PROXETIL 200 MG: 200 TABLET, FILM COATED ORAL at 20:18

## 2021-01-01 RX ADMIN — APIXABAN 5 MG: 5 TABLET, FILM COATED ORAL at 08:17

## 2021-01-01 RX ADMIN — IPRATROPIUM BROMIDE 0.5 MG: 0.5 SOLUTION RESPIRATORY (INHALATION) at 20:20

## 2021-01-01 RX ADMIN — AMOXICILLIN AND CLAVULANATE POTASSIUM 1 TABLET: 500; 125 TABLET, FILM COATED ORAL at 17:16

## 2021-01-01 RX ADMIN — GABAPENTIN 300 MG: 300 CAPSULE ORAL at 20:00

## 2021-01-01 RX ADMIN — IPRATROPIUM BROMIDE 0.5 MG: 0.5 SOLUTION RESPIRATORY (INHALATION) at 14:58

## 2021-01-01 RX ADMIN — CARVEDILOL 3.12 MG: 6.25 TABLET, FILM COATED ORAL at 17:21

## 2021-01-01 RX ADMIN — LISINOPRIL 5 MG: 5 TABLET ORAL at 07:28

## 2021-01-01 RX ADMIN — CARVEDILOL 3.12 MG: 6.25 TABLET, FILM COATED ORAL at 11:14

## 2021-01-01 RX ADMIN — FUROSEMIDE 40 MG: 40 TABLET ORAL at 06:10

## 2021-01-01 RX ADMIN — GABAPENTIN 300 MG: 300 CAPSULE ORAL at 19:27

## 2021-01-01 RX ADMIN — CARVEDILOL 3.12 MG: 6.25 TABLET, FILM COATED ORAL at 17:36

## 2021-01-01 RX ADMIN — GABAPENTIN 100 MG: 100 CAPSULE ORAL at 20:04

## 2021-01-01 RX ADMIN — GABAPENTIN 300 MG: 300 CAPSULE ORAL at 13:59

## 2021-01-01 RX ADMIN — CEFPODOXIME PROXETIL 200 MG: 200 TABLET, FILM COATED ORAL at 13:10

## 2021-01-01 RX ADMIN — ARFORMOTEROL TARTRATE 15 MCG: 15 SOLUTION RESPIRATORY (INHALATION) at 08:58

## 2021-01-01 RX ADMIN — GABAPENTIN 300 MG: 300 CAPSULE ORAL at 14:47

## 2021-01-01 RX ADMIN — APIXABAN 5 MG: 5 TABLET, FILM COATED ORAL at 17:29

## 2021-01-01 RX ADMIN — GABAPENTIN 100 MG: 100 CAPSULE ORAL at 21:16

## 2021-01-01 RX ADMIN — ACETAMINOPHEN 650 MG: 325 TABLET ORAL at 09:44

## 2021-01-01 RX ADMIN — APIXABAN 5 MG: 5 TABLET, FILM COATED ORAL at 21:57

## 2021-01-01 RX ADMIN — AMOXICILLIN AND CLAVULANATE POTASSIUM 1 TABLET: 500; 125 TABLET, FILM COATED ORAL at 08:20

## 2021-01-01 RX ADMIN — MULTIPLE VITAMINS W/ MINERALS TAB 1 TABLET: TAB at 08:37

## 2021-01-01 RX ADMIN — ARFORMOTEROL TARTRATE 15 MCG: 15 SOLUTION RESPIRATORY (INHALATION) at 19:21

## 2021-01-01 RX ADMIN — TAMSULOSIN HYDROCHLORIDE 0.4 MG: 0.4 CAPSULE ORAL at 17:02

## 2021-01-01 RX ADMIN — ACETAMINOPHEN 650 MG: 325 TABLET ORAL at 07:22

## 2021-01-01 RX ADMIN — TAMSULOSIN HYDROCHLORIDE 0.4 MG: 0.4 CAPSULE ORAL at 17:41

## 2021-01-01 RX ADMIN — Medication 1 TABLET: at 10:12

## 2021-01-01 RX ADMIN — ROSUVASTATIN CALCIUM 10 MG: 10 TABLET, COATED ORAL at 08:03

## 2021-01-01 RX ADMIN — ARFORMOTEROL TARTRATE 15 MCG: 15 SOLUTION RESPIRATORY (INHALATION) at 09:17

## 2021-01-01 RX ADMIN — IPRATROPIUM BROMIDE 0.5 MG: 0.5 SOLUTION RESPIRATORY (INHALATION) at 08:10

## 2021-01-01 RX ADMIN — CARVEDILOL 3.12 MG: 6.25 TABLET, FILM COATED ORAL at 16:44

## 2021-01-01 RX ADMIN — APIXABAN 5 MG: 5 TABLET, FILM COATED ORAL at 08:37

## 2021-01-01 RX ADMIN — Medication 1 TABLET: at 08:17

## 2021-01-01 RX ADMIN — ACETAMINOPHEN 650 MG: 325 TABLET ORAL at 02:49

## 2021-01-01 RX ADMIN — GABAPENTIN 300 MG: 300 CAPSULE ORAL at 07:28

## 2021-01-01 RX ADMIN — GABAPENTIN 300 MG: 300 CAPSULE ORAL at 09:39

## 2021-01-01 RX ADMIN — AMOXICILLIN AND CLAVULANATE POTASSIUM 1 TABLET: 500; 125 TABLET, FILM COATED ORAL at 16:56

## 2021-01-01 RX ADMIN — CARVEDILOL 25 MG: 25 TABLET, FILM COATED ORAL at 08:10

## 2021-01-01 RX ADMIN — ARFORMOTEROL TARTRATE 15 MCG: 15 SOLUTION RESPIRATORY (INHALATION) at 19:45

## 2021-01-01 RX ADMIN — Medication 10 ML: at 07:03

## 2021-01-01 RX ADMIN — DOCUSATE SODIUM 50MG AND SENNOSIDES 8.6MG 2 TABLET: 8.6; 5 TABLET, FILM COATED ORAL at 17:36

## 2021-01-01 RX ADMIN — LOPERAMIDE HYDROCHLORIDE 4 MG: 2 CAPSULE ORAL at 19:45

## 2021-01-01 RX ADMIN — FUROSEMIDE 20 MG: 20 TABLET ORAL at 09:44

## 2021-01-01 RX ADMIN — ACETAMINOPHEN 650 MG: 325 TABLET ORAL at 19:39

## 2021-01-01 RX ADMIN — PIPERACILLIN AND TAZOBACTAM 3.38 G: 3; .375 INJECTION, POWDER, LYOPHILIZED, FOR SOLUTION INTRAVENOUS at 05:44

## 2021-01-01 RX ADMIN — CARVEDILOL 25 MG: 25 TABLET, FILM COATED ORAL at 16:50

## 2021-01-01 RX ADMIN — ACETAMINOPHEN 650 MG: 325 TABLET ORAL at 04:33

## 2021-01-01 RX ADMIN — CLOPIDOGREL BISULFATE 75 MG: 75 TABLET ORAL at 09:26

## 2021-01-01 RX ADMIN — CLOPIDOGREL BISULFATE 75 MG: 75 TABLET ORAL at 09:07

## 2021-01-01 RX ADMIN — CARVEDILOL 25 MG: 25 TABLET, FILM COATED ORAL at 17:35

## 2021-01-01 RX ADMIN — CARVEDILOL 3.12 MG: 6.25 TABLET, FILM COATED ORAL at 09:38

## 2021-01-01 RX ADMIN — LOPERAMIDE HYDROCHLORIDE 2 MG: 2 CAPSULE ORAL at 06:32

## 2021-01-01 RX ADMIN — IPRATROPIUM BROMIDE 0.5 MG: 0.5 SOLUTION RESPIRATORY (INHALATION) at 08:29

## 2021-01-01 RX ADMIN — GABAPENTIN 100 MG: 100 CAPSULE ORAL at 15:23

## 2021-01-01 RX ADMIN — ACETAMINOPHEN 650 MG: 325 TABLET ORAL at 03:45

## 2021-01-01 RX ADMIN — LOPERAMIDE HYDROCHLORIDE 4 MG: 2 CAPSULE ORAL at 00:00

## 2021-01-01 RX ADMIN — ARFORMOTEROL TARTRATE 15 MCG: 15 SOLUTION RESPIRATORY (INHALATION) at 08:16

## 2021-01-01 RX ADMIN — ARFORMOTEROL TARTRATE 15 MCG: 15 SOLUTION RESPIRATORY (INHALATION) at 20:58

## 2021-01-01 RX ADMIN — GABAPENTIN 100 MG: 100 CAPSULE ORAL at 08:55

## 2021-01-01 RX ADMIN — ACETAMINOPHEN 650 MG: 325 TABLET ORAL at 06:13

## 2021-01-01 RX ADMIN — APIXABAN 5 MG: 5 TABLET, FILM COATED ORAL at 17:00

## 2021-01-01 RX ADMIN — ARFORMOTEROL TARTRATE 15 MCG: 15 SOLUTION RESPIRATORY (INHALATION) at 07:22

## 2021-01-01 RX ADMIN — CARVEDILOL 25 MG: 25 TABLET, FILM COATED ORAL at 17:21

## 2021-01-01 RX ADMIN — CLOPIDOGREL BISULFATE 75 MG: 75 TABLET ORAL at 08:34

## 2021-01-01 RX ADMIN — ARFORMOTEROL TARTRATE 15 MCG: 15 SOLUTION RESPIRATORY (INHALATION) at 07:51

## 2021-01-01 RX ADMIN — VANCOMYCIN HYDROCHLORIDE 500 MG: 1 INJECTION, POWDER, LYOPHILIZED, FOR SOLUTION INTRAVENOUS at 06:08

## 2021-01-01 RX ADMIN — APIXABAN 5 MG: 5 TABLET, FILM COATED ORAL at 18:05

## 2021-01-01 RX ADMIN — DOCUSATE SODIUM 50MG AND SENNOSIDES 8.6MG 2 TABLET: 8.6; 5 TABLET, FILM COATED ORAL at 18:13

## 2021-01-01 RX ADMIN — CHOLESTYRAMINE 4 G: 4 POWDER, FOR SUSPENSION ORAL at 20:10

## 2021-01-01 RX ADMIN — GABAPENTIN 300 MG: 300 CAPSULE ORAL at 13:23

## 2021-01-01 RX ADMIN — GABAPENTIN 100 MG: 100 CAPSULE ORAL at 14:29

## 2021-01-01 RX ADMIN — CLOPIDOGREL BISULFATE 75 MG: 75 TABLET ORAL at 09:09

## 2021-01-01 RX ADMIN — ARFORMOTEROL TARTRATE 15 MCG: 15 SOLUTION RESPIRATORY (INHALATION) at 08:23

## 2021-01-01 RX ADMIN — CLOPIDOGREL BISULFATE 75 MG: 75 TABLET ORAL at 08:01

## 2021-01-01 RX ADMIN — NITROGLYCERIN 2 INCH: 20 OINTMENT TOPICAL at 07:57

## 2021-01-01 RX ADMIN — AMOXICILLIN AND CLAVULANATE POTASSIUM 1 TABLET: 500; 125 TABLET, FILM COATED ORAL at 17:52

## 2021-01-01 RX ADMIN — GABAPENTIN 300 MG: 300 CAPSULE ORAL at 14:43

## 2021-01-01 RX ADMIN — AMOXICILLIN AND CLAVULANATE POTASSIUM 1 TABLET: 500; 125 TABLET, FILM COATED ORAL at 07:58

## 2021-01-01 RX ADMIN — Medication 1 TABLET: at 08:31

## 2021-01-01 RX ADMIN — CARVEDILOL 12.5 MG: 12.5 TABLET, FILM COATED ORAL at 18:08

## 2021-01-01 RX ADMIN — ALBUMIN (HUMAN) 25 G: 0.25 INJECTION, SOLUTION INTRAVENOUS at 13:05

## 2021-01-01 RX ADMIN — IPRATROPIUM BROMIDE 0.5 MG: 0.5 SOLUTION RESPIRATORY (INHALATION) at 08:37

## 2021-01-01 RX ADMIN — VANCOMYCIN HYDROCHLORIDE 500 MG: 1 INJECTION, POWDER, LYOPHILIZED, FOR SOLUTION INTRAVENOUS at 00:42

## 2021-01-01 RX ADMIN — CARVEDILOL 3.12 MG: 3.12 TABLET, FILM COATED ORAL at 10:17

## 2021-01-01 RX ADMIN — ACETAMINOPHEN 650 MG: 325 TABLET ORAL at 20:57

## 2021-01-01 RX ADMIN — ACETAMINOPHEN 650 MG: 325 TABLET ORAL at 18:23

## 2021-01-01 RX ADMIN — CEFPODOXIME PROXETIL 200 MG: 200 TABLET, FILM COATED ORAL at 08:40

## 2021-01-01 RX ADMIN — TAMSULOSIN HYDROCHLORIDE 0.4 MG: 0.4 CAPSULE ORAL at 18:28

## 2021-01-01 RX ADMIN — AMOXICILLIN AND CLAVULANATE POTASSIUM 1 TABLET: 500; 125 TABLET, FILM COATED ORAL at 09:12

## 2021-01-01 RX ADMIN — CLOPIDOGREL BISULFATE 75 MG: 75 TABLET ORAL at 08:03

## 2021-01-01 RX ADMIN — GUAIFENESIN 100 MG: 200 SOLUTION ORAL at 23:04

## 2021-01-01 RX ADMIN — TAMSULOSIN HYDROCHLORIDE 0.4 MG: 0.4 CAPSULE ORAL at 17:06

## 2021-01-01 RX ADMIN — ROSUVASTATIN CALCIUM 10 MG: 10 TABLET, COATED ORAL at 08:28

## 2021-01-01 RX ADMIN — ROSUVASTATIN CALCIUM 10 MG: 10 TABLET, COATED ORAL at 07:58

## 2021-01-01 RX ADMIN — ARFORMOTEROL TARTRATE 15 MCG: 15 SOLUTION RESPIRATORY (INHALATION) at 20:30

## 2021-01-01 RX ADMIN — APIXABAN 5 MG: 5 TABLET, FILM COATED ORAL at 09:38

## 2021-01-01 RX ADMIN — DOCUSATE SODIUM 50MG AND SENNOSIDES 8.6MG 2 TABLET: 8.6; 5 TABLET, FILM COATED ORAL at 17:43

## 2021-01-01 RX ADMIN — ACETAMINOPHEN 650 MG: 325 TABLET ORAL at 22:30

## 2021-01-01 RX ADMIN — GABAPENTIN 100 MG: 100 CAPSULE ORAL at 17:05

## 2021-01-01 RX ADMIN — BUDESONIDE 500 MCG: 0.5 SUSPENSION RESPIRATORY (INHALATION) at 08:29

## 2021-01-01 RX ADMIN — ACETAMINOPHEN 650 MG: 325 TABLET ORAL at 02:30

## 2021-01-01 RX ADMIN — MULTIPLE VITAMINS W/ MINERALS TAB 1 TABLET: TAB at 09:06

## 2021-01-01 RX ADMIN — IPRATROPIUM BROMIDE 0.5 MG: 0.5 SOLUTION RESPIRATORY (INHALATION) at 20:13

## 2021-01-01 RX ADMIN — GABAPENTIN 300 MG: 300 CAPSULE ORAL at 20:46

## 2021-01-01 RX ADMIN — ARFORMOTEROL TARTRATE 15 MCG: 15 SOLUTION RESPIRATORY (INHALATION) at 09:06

## 2021-01-01 RX ADMIN — ARFORMOTEROL TARTRATE 15 MCG: 15 SOLUTION RESPIRATORY (INHALATION) at 22:52

## 2021-01-01 RX ADMIN — LISINOPRIL 5 MG: 5 TABLET ORAL at 08:18

## 2021-01-01 RX ADMIN — VANCOMYCIN HYDROCHLORIDE 1750 MG: 10 INJECTION, POWDER, LYOPHILIZED, FOR SOLUTION INTRAVENOUS at 17:39

## 2021-01-01 RX ADMIN — MULTIPLE VITAMINS W/ MINERALS TAB 1 TABLET: TAB at 09:26

## 2021-01-01 RX ADMIN — CLOPIDOGREL BISULFATE 75 MG: 75 TABLET ORAL at 09:11

## 2021-01-01 RX ADMIN — Medication 10 ML: at 14:00

## 2021-01-01 RX ADMIN — Medication 10 ML: at 16:06

## 2021-01-01 RX ADMIN — ACETAMINOPHEN 650 MG: 325 TABLET ORAL at 22:35

## 2021-01-01 RX ADMIN — FUROSEMIDE 40 MG: 40 TABLET ORAL at 06:32

## 2021-01-01 RX ADMIN — LOPERAMIDE HYDROCHLORIDE 2 MG: 2 CAPSULE ORAL at 18:06

## 2021-01-01 RX ADMIN — CARVEDILOL 3.12 MG: 6.25 TABLET, FILM COATED ORAL at 08:19

## 2021-01-01 RX ADMIN — ACETAMINOPHEN 650 MG: 325 TABLET ORAL at 17:48

## 2021-01-01 RX ADMIN — CARVEDILOL 3.12 MG: 6.25 TABLET, FILM COATED ORAL at 18:06

## 2021-01-01 RX ADMIN — FUROSEMIDE 40 MG: 40 TABLET ORAL at 13:59

## 2021-01-01 RX ADMIN — IPRATROPIUM BROMIDE 0.5 MG: 0.5 SOLUTION RESPIRATORY (INHALATION) at 13:27

## 2021-01-01 RX ADMIN — AMOXICILLIN AND CLAVULANATE POTASSIUM 1 TABLET: 500; 125 TABLET, FILM COATED ORAL at 08:54

## 2021-01-01 RX ADMIN — Medication 10 ML: at 21:17

## 2021-01-01 RX ADMIN — CARVEDILOL 12.5 MG: 12.5 TABLET, FILM COATED ORAL at 17:03

## 2021-01-01 RX ADMIN — LOPERAMIDE HYDROCHLORIDE 4 MG: 2 CAPSULE ORAL at 06:04

## 2021-01-01 RX ADMIN — GABAPENTIN 300 MG: 300 CAPSULE ORAL at 20:56

## 2021-01-01 RX ADMIN — IOPAMIDOL 100 ML: 612 INJECTION, SOLUTION INTRAVENOUS at 16:35

## 2021-01-01 RX ADMIN — ROSUVASTATIN CALCIUM 10 MG: 10 TABLET, COATED ORAL at 09:26

## 2021-01-01 RX ADMIN — Medication 10 ML: at 21:45

## 2021-01-01 RX ADMIN — ACETAMINOPHEN 650 MG: 325 TABLET ORAL at 08:25

## 2021-01-01 RX ADMIN — CEFPODOXIME PROXETIL 200 MG: 200 TABLET, FILM COATED ORAL at 08:01

## 2021-01-01 RX ADMIN — CEFPODOXIME PROXETIL 200 MG: 200 TABLET, FILM COATED ORAL at 21:00

## 2021-01-01 RX ADMIN — CLOPIDOGREL BISULFATE 75 MG: 75 TABLET ORAL at 07:28

## 2021-01-01 RX ADMIN — CARVEDILOL 3.12 MG: 6.25 TABLET, FILM COATED ORAL at 08:02

## 2021-01-01 RX ADMIN — CARVEDILOL 25 MG: 25 TABLET, FILM COATED ORAL at 09:15

## 2021-01-01 RX ADMIN — AMOXICILLIN AND CLAVULANATE POTASSIUM 1 TABLET: 500; 125 TABLET, FILM COATED ORAL at 18:05

## 2021-01-01 RX ADMIN — ARFORMOTEROL TARTRATE 15 MCG: 15 SOLUTION RESPIRATORY (INHALATION) at 09:14

## 2021-01-01 RX ADMIN — IPRATROPIUM BROMIDE 0.5 MG: 0.5 SOLUTION RESPIRATORY (INHALATION) at 08:11

## 2021-01-01 RX ADMIN — GABAPENTIN 100 MG: 100 CAPSULE ORAL at 21:17

## 2021-01-01 RX ADMIN — TAMSULOSIN HYDROCHLORIDE 0.4 MG: 0.4 CAPSULE ORAL at 17:55

## 2021-01-01 RX ADMIN — PIPERACILLIN AND TAZOBACTAM 3.38 G: 3; .375 INJECTION, POWDER, LYOPHILIZED, FOR SOLUTION INTRAVENOUS at 05:54

## 2021-01-01 RX ADMIN — ACETAMINOPHEN 650 MG: 325 TABLET ORAL at 02:58

## 2021-01-01 RX ADMIN — BUDESONIDE 250 MCG: 0.25 SUSPENSION RESPIRATORY (INHALATION) at 17:02

## 2021-01-01 RX ADMIN — ROSUVASTATIN CALCIUM 10 MG: 10 TABLET, COATED ORAL at 08:30

## 2021-01-01 RX ADMIN — PIPERACILLIN AND TAZOBACTAM 3.38 G: 3; .375 INJECTION, POWDER, LYOPHILIZED, FOR SOLUTION INTRAVENOUS at 00:15

## 2021-01-01 RX ADMIN — APIXABAN 5 MG: 5 TABLET, FILM COATED ORAL at 18:06

## 2021-01-01 RX ADMIN — LISINOPRIL 5 MG: 5 TABLET ORAL at 07:57

## 2021-01-01 RX ADMIN — GABAPENTIN 300 MG: 300 CAPSULE ORAL at 08:55

## 2021-01-01 RX ADMIN — ACETAMINOPHEN 650 MG: 325 TABLET ORAL at 07:40

## 2021-01-01 RX ADMIN — ROSUVASTATIN CALCIUM 10 MG: 10 TABLET, COATED ORAL at 09:13

## 2021-01-01 RX ADMIN — ARFORMOTEROL TARTRATE 15 MCG: 15 SOLUTION RESPIRATORY (INHALATION) at 20:02

## 2021-01-01 RX ADMIN — IPRATROPIUM BROMIDE 0.5 MG: 0.5 SOLUTION RESPIRATORY (INHALATION) at 08:16

## 2021-01-01 RX ADMIN — LOPERAMIDE HYDROCHLORIDE 2 MG: 2 CAPSULE ORAL at 21:00

## 2021-01-01 RX ADMIN — CARVEDILOL 3.12 MG: 6.25 TABLET, FILM COATED ORAL at 17:16

## 2021-01-01 RX ADMIN — IPRATROPIUM BROMIDE 0.5 MG: 0.5 SOLUTION RESPIRATORY (INHALATION) at 14:51

## 2021-01-01 RX ADMIN — Medication 1 CAPSULE: at 08:23

## 2021-01-01 RX ADMIN — IPRATROPIUM BROMIDE 0.5 MG: 0.5 SOLUTION RESPIRATORY (INHALATION) at 13:22

## 2021-01-01 RX ADMIN — IPRATROPIUM BROMIDE 0.5 MG: 0.5 SOLUTION RESPIRATORY (INHALATION) at 09:18

## 2021-01-01 RX ADMIN — GABAPENTIN 100 MG: 100 CAPSULE ORAL at 08:30

## 2021-01-01 RX ADMIN — GABAPENTIN 300 MG: 300 CAPSULE ORAL at 07:53

## 2021-01-01 RX ADMIN — ACETAMINOPHEN 650 MG: 325 TABLET ORAL at 19:47

## 2021-01-01 RX ADMIN — GABAPENTIN 300 MG: 300 CAPSULE ORAL at 07:52

## 2021-01-01 RX ADMIN — WATER 1 G: 1 INJECTION INTRAMUSCULAR; INTRAVENOUS; SUBCUTANEOUS at 14:07

## 2021-01-01 RX ADMIN — ARFORMOTEROL TARTRATE 15 MCG: 15 SOLUTION RESPIRATORY (INHALATION) at 20:23

## 2021-01-01 RX ADMIN — APIXABAN 5 MG: 5 TABLET, FILM COATED ORAL at 17:02

## 2021-01-01 RX ADMIN — BUDESONIDE 250 MCG: 0.25 SUSPENSION RESPIRATORY (INHALATION) at 09:26

## 2021-01-01 RX ADMIN — ROSUVASTATIN CALCIUM 10 MG: 10 TABLET, COATED ORAL at 08:17

## 2021-01-01 RX ADMIN — CEFPODOXIME PROXETIL 200 MG: 200 TABLET, FILM COATED ORAL at 20:31

## 2021-01-01 RX ADMIN — Medication 10 ML: at 05:57

## 2021-01-01 RX ADMIN — GABAPENTIN 300 MG: 300 CAPSULE ORAL at 21:00

## 2021-01-01 RX ADMIN — TAMSULOSIN HYDROCHLORIDE 0.4 MG: 0.4 CAPSULE ORAL at 16:44

## 2021-01-01 RX ADMIN — ROSUVASTATIN CALCIUM 10 MG: 10 TABLET, COATED ORAL at 10:17

## 2021-01-01 RX ADMIN — FUROSEMIDE 20 MG: 20 TABLET ORAL at 08:26

## 2021-01-01 RX ADMIN — Medication 10 ML: at 05:53

## 2021-01-01 RX ADMIN — CARVEDILOL 12.5 MG: 12.5 TABLET, FILM COATED ORAL at 08:59

## 2021-01-01 RX ADMIN — TAMSULOSIN HYDROCHLORIDE 0.4 MG: 0.4 CAPSULE ORAL at 17:19

## 2021-01-01 RX ADMIN — APIXABAN 5 MG: 5 TABLET, FILM COATED ORAL at 08:20

## 2021-01-01 RX ADMIN — LOPERAMIDE HYDROCHLORIDE 2 MG: 2 CAPSULE ORAL at 15:18

## 2021-01-01 RX ADMIN — SODIUM CHLORIDE 75 ML/HR: 900 INJECTION, SOLUTION INTRAVENOUS at 11:59

## 2021-01-01 RX ADMIN — IPRATROPIUM BROMIDE 0.5 MG: 0.5 SOLUTION RESPIRATORY (INHALATION) at 13:16

## 2021-01-01 RX ADMIN — Medication 1 TABLET: at 07:58

## 2021-01-01 RX ADMIN — ROSUVASTATIN CALCIUM 10 MG: 10 TABLET, COATED ORAL at 08:37

## 2021-01-01 RX ADMIN — CARVEDILOL 12.5 MG: 12.5 TABLET, FILM COATED ORAL at 17:06

## 2021-01-01 RX ADMIN — AMOXICILLIN AND CLAVULANATE POTASSIUM 1 TABLET: 500; 125 TABLET, FILM COATED ORAL at 08:16

## 2021-01-01 RX ADMIN — GABAPENTIN 100 MG: 100 CAPSULE ORAL at 21:30

## 2021-01-01 RX ADMIN — LOPERAMIDE HYDROCHLORIDE 4 MG: 2 CAPSULE ORAL at 16:57

## 2021-01-01 RX ADMIN — PIPERACILLIN AND TAZOBACTAM 3.38 G: 3; .375 INJECTION, POWDER, LYOPHILIZED, FOR SOLUTION INTRAVENOUS at 17:41

## 2021-01-01 RX ADMIN — CARVEDILOL 3.12 MG: 6.25 TABLET, FILM COATED ORAL at 07:28

## 2021-01-01 RX ADMIN — ARFORMOTEROL TARTRATE 15 MCG: 15 SOLUTION RESPIRATORY (INHALATION) at 20:09

## 2021-01-01 RX ADMIN — Medication 1 CAPSULE: at 09:09

## 2021-01-01 RX ADMIN — FUROSEMIDE 40 MG: 40 TABLET ORAL at 06:35

## 2021-01-01 RX ADMIN — LOPERAMIDE HYDROCHLORIDE 2 MG: 2 CAPSULE ORAL at 17:21

## 2021-01-01 RX ADMIN — LOPERAMIDE HYDROCHLORIDE 4 MG: 2 CAPSULE ORAL at 06:10

## 2021-01-01 RX ADMIN — Medication 1 TABLET: at 09:11

## 2021-06-07 PROBLEM — I21.4 NON-ST ELEVATION (NSTEMI) MYOCARDIAL INFARCTION (HCC): Status: ACTIVE | Noted: 2021-01-01

## 2021-06-07 PROBLEM — Z79.01 ON CLOPIDOGREL THERAPY: Status: ACTIVE | Noted: 2021-01-01

## 2021-06-07 PROBLEM — I48.0 PAROXYSMAL ATRIAL FIBRILLATION (HCC): Status: ACTIVE | Noted: 2021-01-01

## 2021-06-07 PROBLEM — I50.33 ACUTE ON CHRONIC DIASTOLIC HEART FAILURE (HCC): Status: ACTIVE | Noted: 2021-01-01

## 2021-06-07 PROBLEM — Z79.01 ANTICOAGULATED BY ANTICOAGULATION TREATMENT: Status: ACTIVE | Noted: 2021-01-01

## 2021-06-07 PROBLEM — Z87.898 HISTORY OF GROSS HEMATURIA: Status: ACTIVE | Noted: 2021-01-01

## 2021-06-08 PROBLEM — D72.829 LEUKOCYTOSIS: Status: ACTIVE | Noted: 2021-01-01

## 2021-06-08 PROBLEM — I63.9 ACUTE EMBOLIC STROKE (HCC): Status: ACTIVE | Noted: 2021-01-01

## 2021-06-08 NOTE — ROUTINE PROCESS
Received patient from [facility] accompanied by medical transport to room 191. Patient is resting comfortably on the stretcher and is in no acute distress. Assist to bed. Alert and oriented x 4. Oriented to the room and unit routine. Verbalizes understanding.

## 2021-06-08 NOTE — CONSULTS
[facility] PSYCHOLOGICAL SCREENING    Assessment Initiated:  June 8, 2021    Rehab Diagnosis:  NSTEMI    Pertinent Physical/Psychiatric History:     Patient Active Problem List   Diagnosis Code    Urinary retention R33.9    Essential hypertension I10    Non-ST elevation (NSTEMI) myocardial infarction ([facility] [address] I21.4    Acute on chronic heart failure with preserved ejection fraction (HFpEF) ([facility]) I50.33    Paroxysmal atrial fibrillation ([facility]) I48.0    Anticoagulated by anticoagulation treatment Z79.01    Mixed hyperlipidemia E78.2    Benign prostatic hyperplasia with urinary retention N40.1, R33.8    Malignant neoplasm of lower lobe of right lung ([facility]) C34.31    Heart failure with preserved left ventricular function (HFpEF) ([facility]) I50.30    Coronary artery disease involving native coronary artery of native heart I25.10    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    Chronic obstructive pulmonary disease (COPD) ([facility]) J44.9    History of carotid stenosis Z86.79    History of renal artery stenosis Z86.79    On clopidogrel therapy Z79.01    History of gross hematuria B71.539    Acute embolic stroke ([facility]) A64.0    Leukocytosis D72.829       Patient denies history of mental health services and is not currently requiring psychotropic medication for mood nor behavior stability. He denies any substance use nor dependence, and apparently stopped tobacco use in approximately 2006. OBJECTIVE  GENERAL OBSERVATIONS  Willingness to participate in program: [x] good   [] fair [] indifferent [] poor    General Appearance:  Patient observed casually and comfortably dressed and appropriately groomed, sitting on EOB and in mild distress with discomfort from prolonged sitting. He wears a lengthy beard. Sensory Impairments:  Patient presents with satisfactory auditory reception and comprehension and responds to all inquiry with intelligibility. He is able to voluntarily move all extremities.     Orthodoxy Affiliation: Unknown    Admission Assessment  Discharge Status   [x] alert  [] lethargic  [] difficult to arouse  [] fluctuating  [] other: Level of Consciousness [] alert  [] lethargic  [] difficult to arouse  [] fluctuating  [] other:   [x] person  [x] place  [x] time  [x] situation Oriented [] person  [] place  [] time  [] situation   [x] within normal limits  [] impaired       [] mild        [] moderate        [] severe Attention [] within normal limits  [] impaired       [] mild        [] moderate        [] severe   [x] within normal limits  [x] impaired       [x] mild        [] moderate        [] severe Memory [] within normal limits  [] impaired       [] mild        [] moderate        [] severe   [x] appropriate to situation  [] depressed  [] anxious  [] angry   [] fearful  [] emotionally labile  [x] other: Patient appears calm and composed, neither restless nor agitated Mood [] appropriate to situation  [] depressed  [] anxious  [] angry   [] fearful  [] emotionally labile  [] other:   [x] appropriate  [] flat  [] inappropriate to content of speech Affect [] appropriate  [] flat  [] inappropriate to content of speech   [x] appropriate  [] aggressive/agitated  [] withdrawn  [] inappropriate  [x] other: Importantly, patient recognized need to ask for assist with standing and movement in bedroom Behavior [] appropriate  [] aggressive/agitated  [] withdrawn  [] inappropriate  [] other:   [] good  [x] limited  [] denial  [] none Insight Into Illness [] good  [] limited  [] denial  [] none   [x] intact  [x] impaired       [x] mild        [] moderate        [] severe       Describe: Patient may benefit from cues and prompts with novel and/or complex recall of information Cognition [] intact  [] impaired       [] mild        [] moderate        [] severe       Describe:    [x] coping  [] demonstrates poor adjustment  [] undetermined       As evidenced by: He insists that he is motivated to improve and hopeful for himself in recovery Patient Adjustment to Disability [] coping  [] demonstrates poor adjustment  [] undetermined       As evidenced by:    [] coping  [] demonstrates poor adjustment  [x] undetermined      As evidenced by: Not available but reports having immediate family support Family Adjustment to Disability [] coping  [] demonstrates poor adjustment  [] undetermined      As evidenced by:      ASSESSMENT  Clinical Impression:  Patient is a very pleasant and cooperative, 80year old, , retired from several careers (including twenty six years in 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 He resides with spouse in [place] in two level residence with three steps to enter and seven steps to second floor for sleep, having half bath on first floor of residence. Patient reports having three daughters who reside locally and all supportive of their parents. Patient understands that he has had a cardiac event and insists that he is entirely motivated to participate in his rehabilitation and hopeful for himself in recovery. In fact, he insists that he was independent prior to hospitalization, including operating automobile, cutting lawn, etc., and is therefore desirous of continuing as he as is able, but respecting any restrictions that might be imposed. Further education about safety and pace will be helpful for him. Emotionally, he presents as calm and composed and not in acute distress. No lability is observed. He denies history of mental health services. He is not requiring psychotropic medication for mood nor behavior stability. On the other hand, patient seems mildly stressed about the sudden onset of ill health and the forced changes/dependency for him, at this time.   He will certainly benefit from further education to best understand all parameters of recovery in order to identify realistic goals for himself, and thereby minimize frustration in recovery. Cognitively, patient is generally alert and oriented. He has good auditory reception and comprehension. He will benefit from cues and prompts for maximum problem solving and recall with novel and/or complex instruction. Patient Strengths:  Alert, oriented, pleasant, cooperative, motivated to improve and hopeful for self in recovery    Patient Preferences:  Patient expects to discharge to home with spouse    Rehab Potential:  Excellent    Educational Needs: Under each heading list the specific items in which the patient or family will need education/training.  Example: hip precautions, use of walker, ADL equipment, neglect, judgment, adjustment, etc.     Special considerations or accommodations for teaching:  [x] Yes     [] No     [] NA  If Yes, explain: Safety and pace in recovery post STEMI Discharge Status    Completed Demonstrated/ Verbalized Understanding    Yes No Yes No   Info regarding disability:  [] [] [] []   Adjustment:  [] [] [] []   Cognition:  [] [] [] []   Other: [] [] [] []   Other: [] [] [] []   If education not completed, explain: [] [] [] []     PLAN  Problem: Limited insight about all recovery post STEMI  Long Term Goal: Increase all insight  Intervention: Patient education  At Discharge  LTG Achieved: [] Yes [] No If not achieved, explain:    Problem: Safety and pace in recovery  Long Term Goal: Understand and accept all recommendations for same  Intervention: Patient education and reinforcement  At Discharge  LTG Achieved: [] Yes [] No If not achieved, explain:    Problem: Situational stress with illness and forced dependency  Long Term Goal: Minimize stress with illness and accept dependency in recovery  Intervention: Patient education and support   At Discharge  LTG Achieved: [] Yes [] No If not achieved, explain:    Problem: Variable problem solving and recall with novel and/or complex instruction  Long Term Goal: Maximize all recall and problem solving in therapy effort  Intervention: Cues, prompts, redirection and reinforcement  At Discharge  LTG Achieved: [] Yes [] No If not achieved, explain:    Problem:   Long Term Goal:   Intervention:   At Discharge  LTG Achieved: [] Yes [] No If not achieved, explain:    Eddie Cardoza, [facility]  6/8/2021 11:03 AM    DISCHARGE STATUS    Clinical Impressions:      Follow-up Services Recommended Purpose                 Eddie Cardoza, PHD  Discharge Date/Time:

## 2021-06-08 NOTE — PROGRESS NOTES
Problem: Neurolinguistics Impaired (Adult)  Goal: *Speech Goal: (INSERT TEXT)  Description: Long term goals  Patient will:  1. Be oriented x 3 and recall events of the day, supervision. 2. Recall 3 words after 5 minutes, supervision. 3. Follow complex instructions with 80-90% accuracy. 4. Read 1-2 sentence passages and respond appropriately to questions or instructions, 90% accuracy. 5. Perform functional problem solving/reasoning tasks with 90% accuracy. Short term goals (by 6/15/21)  Patient will:  1. Be oriented x 3 and recall events of the day, min assist.  2. Recall 3 words after 5 minutes, min assist.  3. Follow complex instructions with 70% accuracy. 4. Read 1 sentence passages and respond appropriately to questions or instructions, 60-70% accuracy. 5. Perform functional problem solving/reasoning tasks with 70% accuracy. Note:   Speech LAnguage Pathology evaluation    Patient: Ja Sun (1 y.o. male)  Date: 6/8/2021  Primary Diagnosis: Non-ST elevation (NSTEMI) myocardial infarction [facility]) [I21.4]        Precautions:   Fall  Time in: 0930 1000  Time Out:  [id number]    SUBJECTIVE:   Patient stated I need to focus more when I think.     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, brainstem and cerebellum) without residual deficit    Anticoagulated by anticoagulation treatment 5/19/2021    On Apixaban    Benign prostatic hyperplasia with urinary retention     Chronic obstructive pulmonary disease (COPD) ([address]     Coronary artery disease involving native coronary artery of native heart     Essential hypertension     Heart failure with preserved left ventricular function (HFpEF) ([address]     2D echocardiogram (5/18/2021) showed EF 50%; concentric LV hypertrophy with a severely thickened septal wall and mildly thickened posterior wall; left atrial chamber is severely enlarged; right atrial chamber volume is moderately enlarged; no mass, shunts, or thrombi.      History of carotid stenosis     History of gross hematuria 5/26/2021    Attributed to [place] trauma while patient was altered    History of renal artery stenosis     Leukocytosis 5/17/2021    Attributed to reactive leukocytosis due to NSTEMI    Malignant neoplasm of lower lobe of right lung (HCC)     Non-ST elevation (NSTEMI) myocardial infarction ([facility] [address] 5/17/2021    On clopidogrel therapy 5/19/2021    Paroxysmal atrial fibrillation ([facility] [address] 5/17/2021    Urinary retention      Past Surgical History:   Procedure Laterality Date    HX CAROTID ENDARTERECTOMY Left 06/07/2012    HX CAROTID ENDARTERECTOMY Right 05/28/2014    Dr. Viv Rutledge    HX COLONOSCOPY      [address] GRAFT  2001    [facility]    HX LOBECTOMY Right     Middle lobe and lower lobe    HX RENAL ARTERY STENT Bilateral 2011    HX TONSILLECTOMY      IR BRONCHOSCOPY  11/25/2015    Dr. Linda Morrow     Prior Level of Function/Home Situation: Home with support of his wife  Home Situation  Home Environment: (P) Private residence  # Steps to Enter: 3  Rails to Enter: (P) No  One/Two Story Residence: (P) Two story  # of Interior Steps: (P) 7  Interior Rails: (P) Both  Living Alone: (P) Yes  Support Systems: Spouse/Significant Other/Partner  Patient Expects to be Discharged to[de-identified] (P) Other (comment) (home)  Current DME Used/Available at Home: None  Tub or Shower Type: (P) Tub/Shower combination (grab bars)  Mental Status:  Neurologic State: Alert  Orientation Level: Disoriented to time, Oriented to person, Oriented to place, Oriented to situation  Cognition: Decreased command following, Impaired decision making, Memory loss  Perception: Appears intact  Perseveration: No perseveration noted  Safety/Judgement: Fall prevention  Motor Speech:  Oral-Motor Structure/Motor Speech  Face: No impairment  Labial: No impairment  Dentition: Natural  Oral Hygiene: WFL  Lingual: No impairment  Velum: No impairment  Mandible: No impairment  Intelligibility: No impairment  Intonation: WFL  Rate: WFL  Prosody: WFL  Overall Impairment Severity: None  Language Comprehension and Expression:  Auditory Comprehension  Auditory Impairment: Yes  Hearing Aid: None  Response to Complex Yes/No Questions (%) : 75 %  Three-Step Basic Commands (%): 100 %  Complex Commands (%): 35 %  Interfering Components: Attention to detail; Working memory  Effective Techniques: Increased volume;Repetition; Slowed speech;Stressing words  Cueing type: Verbal  Cueing amount: Min-mod  Verbal Expression  Primary Mode of Expression: Verbal  Initiation: No impairment  Automatic Speech Task: No impairment  Repetition: Impaired (Poor digit sequential memory.)  Sentence Repetition (%): 50 %  Repetition cueing type: Repetition  Repetition cueing amount: Minimal  Naming: No impairment  Sentence Formulation: No impairment  Conversation: No impairment  Interfering Components: Other (comment) (working memory)  Overall Impairment: Mild  Cueing type: Verbal  Cueing amount: Minimal  Reading Comprehension  Visual Impairment: No impairment  Pre-Morbid Reading Status: Literate  Scanning/Tracking : No impairment  Words : Yes  Sentence: Impaired  Followed Written Documentation (%): 50  Cues : Moderate  Paragraph : Impaired  Oral Reading: No impairment  Interfering Components: Attention to detail;Processing time; Working memory  Effective Techniques: [employer]; Tactile/visual cues  Overall Impairment Severity: Moderate  Written Expression  Pre-Morbid Dominant Hand: Right  Pre-Morbid Writing Skills: WFL  Legibility : Decreased legibility;Uses dominant hand  Dictation : No impairment  Formulation: No impairment  Interfering Components: Attention to detail;Sputial organization  Overall Impairment Severity: Minimal  Neuro-Linguistics:  Verbal Reasoning Tasks: No Impairment  Verbal Problem Solving: Impaired  Verbal Organization: No Impairment  Thought Organization: Verbose  Mathematical:  (DNT)  Memory: Impaired (mild)  Attention : No Impairement  Pragmatics:  Pragmatics Impairment: No impairment  Voice: Within functional limits for the patient's age and gender. Pain:  Pain Scale 1: Numeric (0 - 10)  Pain Intensity 1: 0     After treatment:   []              Patient left in no apparent distress sitting up in chair  [x]              Patient left in no apparent distress in bed  [x]              Call bell left within reach  [x]              Nursing notified  []              Caregiver present  []              Bed alarm activated    ASSESSMENT:   Based on the objective data described below, the patient presents with mild-moderate receptive language deficits and mild cognitive deficits following multi-focal embolic CVAs. Patient will benefit from skilled intervention to address the above impairments. Patients rehabilitation potential is considered to be Good  Factors which may influence rehabilitation potential include:   []              None noted  [x]              Mental ability/status  [x]              Medical condition  [x]              Home/family situation and support systems  [x]              Safety awareness  []              Pain tolerance/management  []              Other:     PLAN:   Recommendations and Planned Interventions:  SLP will follow daily to address language processing (aural and written) and cognition per the above plan of care. Frequency/Duration: Patient will be followed by speech-language pathology 1-2 times per day/4-7 days per week to address goals. Discharge Recommendations: Outpatient    Estimated LOS: 2-3 weeks    COMMUNICATION/EDUCATION:   [x]  Patient/family have participated as able in goal setting and plan of care. [x]  Patient/family agree to work toward stated goals and plan of care.   []  Patient understands intent and goals of therapy, but is neutral about his/her participation. []  Patient is unable to participate in goal setting and plan of care.     Thank you for this referral.  Tesha Ashley, SLP  Time Calculation:  60 Minutes (2 sessions)

## 2021-06-08 NOTE — PROGRESS NOTES
conducted an initial consultation and Spiritual Assessment for [facility], who is a 80 y.o.,male. Patients Primary Language is: [place]. According to the patients EMR Adventism Affiliation is: Other. The reason the Patient came to the hospital is:   Patient Active Problem List    Diagnosis Date Noted    Mixed hyperlipidemia     Benign prostatic hyperplasia with urinary retention     Malignant neoplasm of lower lobe of right lung ([address]     Heart failure with preserved left ventricular function (HFpEF) ([address]     Coronary artery disease involving native coronary artery of native heart     Chronic obstructive pulmonary disease (COPD) ([address]     History of carotid stenosis     History of renal artery stenosis     History of gross hematuria 31/16/9929    Acute embolic stroke ([address] 12/45/7082    Anticoagulated by anticoagulation treatment 05/19/2021    On clopidogrel therapy 05/19/2021    Non-ST elevation (NSTEMI) myocardial infarction ([address] 05/17/2021    Acute on chronic heart failure with preserved ejection fraction (HFpEF) ([address] 05/17/2021    Paroxysmal atrial fibrillation ([address] 05/17/2021    Leukocytosis 05/17/2021    Urinary retention     Essential hypertension     History of coronary artery bypass graft 2001        The  provided the following Interventions:  Initiated a relationship of care and support with patient in room 191 of the rehab unit. Listened empathically as patient shared his story and hopes for a quick speedy recovery here. Patient was asked about having an advance directive form and his reply was that he did not have one and that his wife would be able to make decisions for him if needed. Provided information about Spiritual Care Services. Offered prayer and assurance of continued prayers on patients behalf.        The following outcomes were achieved:  Patient shared limited information about his medical narrative and spiritual journey/beliefs. Patient processed feeling about current hospitalization. Patient expressed gratitude for pastoral care visit. Assessment:  Patient does not have any Buddhism/cultural needs that will affect patients preferences in health care. There are no further spiritual or Buddhism issues which require Spiritual Care Services interventions at this time. Plan:  Chaplains will continue to follow and will provide pastoral care on an as needed/requested basis    . Derrick Ca   Spiritual Care   [phone]

## 2021-06-08 NOTE — PROGRESS NOTES
OCCUPATIONAL THERAPY EVALUATION    Patient: Christiane Ross 80 y.o. Date: 2021  Primary Diagnosis: Non-ST elevation (NSTEMI) myocardial infarction 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, brainstem and cerebellum) without residual deficit    Anticoagulated by anticoagulation treatment 2021    On Apixaban    Benign prostatic hyperplasia with urinary retention     Chronic obstructive pulmonary disease (COPD) ([address]     Coronary artery disease involving native coronary artery of native heart     Essential hypertension     Heart failure with preserved left ventricular function (HFpEF) ([address]     2D echocardiogram (2021) showed EF 50%; concentric LV hypertrophy with a severely thickened septal wall and mildly thickened posterior wall; left atrial chamber is severely enlarged; right atrial chamber volume is moderately enlarged; no mass, shunts, or thrombi.  History of carotid stenosis     History of gross hematuria 2021    Attributed to [place] trauma while patient was altered    History of renal artery stenosis     Leukocytosis 2021    Attributed to reactive leukocytosis due to NSTEMI    Malignant neoplasm of lower lobe of right lung (HCC)     Non-ST elevation (NSTEMI) myocardial infarction ([address] 2021    On clopidogrel therapy 2021    Paroxysmal atrial fibrillation ([address] 2021    Urinary retention      Precautions: fall    Time In: 1130  Time Out[de-identified] 1230    Pain:  Pt reports 0/10 pain or discomfort prior to treatment. Pt reports 0/10 pain or discomfort post treatment. SUBJECTIVE:   Patient stated I want to get back to what I was doing.     OBJECTIVE DATA SUMMARY:       [x]  Right hand dominant   []  Left hand dominant    Therapy Diagnosis:   Difficulty with ADLs  [x]     Difficulty with functional transfers  [x]     Difficulty with ambulation  [x]     Difficulty with IADLs  [x]       Problem List:    Decreased strength B UE  [x]     Decreased strength trunk/core  []     Decreased AROM   []     Decreased endurance  [x]     Decreased balance sitting  []     Decreased balance standing  [x]     Pain   []     Decreased PROM  []       Functional Limitations:   Decreased independence with ADL  [x]     Decreased independence with functional transfers  [x]     Decreased independence with ambulation  [x]     Decreased independence with IADL  [x]       Previous Functional Level:  I with ADLs    Home Environment: Home Situation  Home Environment: Private residence  # Steps to Enter: 3  Rails to Enter: No  One/Two Story Residence: Two story  # of Interior Steps: 7  Interior Rails: Both  Living Alone: No  Support Systems: Spouse/Significant Other/Partner  Patient Expects to be Discharged to Cor[de-identified]ration  Current DME Used/Available at Home: None  Tub or Shower Type: (P) Tub/Shower combination (grab bars)    Barriers to Learning/Limitations: yes;  none  Compensate with: visual, verbal, tactile, kinesthetic cues/model    Outcome Measures:      MMT Initial Assessment   Right Upper Extremity  Left Upper Extremity    UE AROM WFL ROM; MMT 4/5 grossly WFL ROM; MMT 4/5 grossly    [facility] WFL   0/5 No palpable muscle contraction  1/5 Palpable muscle contraction, no joint movement  2-/5 Less than full range of motion in gravity eliminated position  2/5 Able to complete full range of motion in gravity eliminated position  2+/5 Able to initiate movement against gravity  3-/5 More than half but not full range of motion against gravity  3/5 Able to complete full range of motion against gravity  3+/5 Completes full range of motion against gravity with minimal resistance  4-/5 Completes full range of motion against gravity with minimal resistance  4/5 Completes full range of motion against gravity with moderate resistance  5/5 Completes full range of motion against gravity with maximum resistance    Sensation: Appears Intact  Coordination: Appears Intact    FIM SCORES Initial Assessment   Bladder - level of assist 5   Bladder - accident frequency score 6   Bowel - level of assist 6   Bowel - accident frequency score 6   Please see [facility] Interdisciplinary Eval: Coordination/Balance Section for details regarding FIM score description.     COGNITION/PERCEPTION Initial Assessment   Reading Status (P) Literate   Writing Status (P) WFL   Arousal/Alertness     Orientation Level (P) Oriented X4   Visual Fields     Praxis     Body Scheme       COMPREHENSION MODE Initial Assessment   Primary Mode of Comprehension Auditory   Hearing Aide (P) None   Corrective Lenses (P) Glasses (reading glasses)   Score 6     EXPRESSION Initial Assessment   Primary Mode of Expression Verbal   Score 6   Comments       SOCIAL INTERACTION/PRAGMATICS Initial Assessment   Score 5   Comments       PROBLEM SOLVING Initial Assessment   Score 4   Comments       MEMORY Initial Assessment   Score 5   Comments       EATING Initial Assessment   Functional Level     Comments       GROOMING Initial Assessment   Functional Level (P) 5 (setup at sink)    Oral Hygiene FIM:not assessed   Tasks completed by patient (P) Washed face   Comments       BATHING Initial Assessment   Functional Level (P) 5   Body parts patient bathed (P) Abdomen, Arm, left, Arm, right, Buttocks, Chest, Lower leg and foot, left, Lower leg and foot, right, Carly area, Thigh, left, Thigh, right   Comments       TUB/SHOWER TRANSFER INDEPENDENCE Initial Assessment   Score  0 not assessed   Comments       UPPER BODY DRESSING/UNDRESSING Initial Assessment   Functional Level (P) 5   Items applied/Steps completed     Comments         LOWER BODY DRESSING/UNDRESSING Initial Assessment   Functional Level (P) 4    Sock and/or Shoe Management FIM:4   Items applied/Steps completed (P) Underpants (3 steps)   Comments       TOILETING Initial Assessment   Functional Level 4   Comments       TOILET TRANSFER INDEPENDENCE Initial Assessment   Transfer score (P) 0   Comments       INSTRUMENTAL ADL Initial Assessment (PLOF)   Meal preparation (P) Independent   Homemaking (P) Independent   Medicine Management (P) Independent   Financial Management (P) Independent        ASSESSMENT :  Based on the objective data described above, the patient presents with decreased BUE strength, endurance, and balance impacting I in ADLs. Pt would benefit from skilled occupational therapy in order to improve independent functional mobility/ADLs,/IADLs within the home. Interventions may include range of motion (AROM, PROM B UE), motor function (B UE/ strengthening/coordination), activity tolerance (vitals, oxygen saturation levels), balance training, ADL/IADL training and functional transfer training. Please see IRC; Interdisciplinary Eval, Care Plan, and Patient Education for further information regarding occupational therapy examination and plan of care. PLAN :  Recommendations and Planned Interventions:  []               Self Care Training                   [x]        Therapeutic Activities  [x]               Functional Mobility Training    []        Cognitive Retraining  [x]               Therapeutic Exercises            [x]        Endurance Activities  [x]               Balance Training                     []        Neuromuscular Re-Education  []               Visual/Perceptual Training      [x]   Home Safety Training  [x]               Patient Education                    [x]        Family Training/Education  []               Other (comment):    Frequency/Duration: Patient will be followed by occupational therapy 1-2 times per day/4-7 days per week to address goals.   Discharge Recommendations: Home Health with asst   Further Equipment Recommendations for Discharge: bedside commode, transfer bench and N/A     Please refer to the flow sheet for vital signs taken during this treatment. After treatment:   [] Patient left in no apparent distress sitting up in chair  [x] Patient left in no apparent distress in bed  [] Call bell left within reach  [] Nursing notified  [] Caregiver present  [] Bed alarm activated    COMMUNICATION/EDUCATION:   [] Home safety education was provided and the patient/caregiver indicated understanding. [x] Patient/family have participated as able in goal setting and plan of care. [] Patient/family agree to work toward stated goals and plan of care. [] Patient understands intent and goals of therapy, but is neutral about his/her participation. [] Patient is unable to participate in goal setting and plan of care. Order received from MD for occupational therapy services and chart reviewed. Pt to be seen 5 times per week for 3 hours of total therapy per day for 2 weeks.   Thank you for the referral.    Thank you for this referral.  Ruslan Kingston, OT

## 2021-06-08 NOTE — H&P
[facility] PHYSICAL REHABILITATION  [address] [place] [address], [address]     INPATIENT REHABILITATION  HISTORY AND PHYSICAL    Name: Jennifer Lr CSN: [id number]   Age: 80 y.o. MRN: [id number]   Sex: male Admit Date: 6/7/2021     PCP: Dr. Edgardo Jorge      Primary Rehabilitation Impairment Category (MODESTO): Cardiac    Impairment Group Label: Cardiac    Etiologic Diagnosis:   1. Non-ST elevation (NSTEMI) myocardial infarction  2. Coronary artery disease; History of CABG  3. S/P PCI to proximal SVG with 3.5 x 12 mm Juan J drug-eluting stent; PCI to mid SVG with 3.5 x 34 mm [place] drug-eluting stent; PCI to distal SVG with 3.5 x 15 mm Juan J drug-eluting stent; Balloon angioplasty to distal SVG anastomosis (5/18/2021 - Dr. Mame Parrish)       Subjective:     Patient seen and examined. History of the Present Illness: The patient is a 77-year-old Black male with multiple medical comorbidities who was admitted to [facility] on 5/18/2021 due to NSTEMI. On 5/17/2021, the patient was brought to the [facility] Emergency Department for further evaluation of shortness of breath and elevated blood pressure. The patient was seen and examined by Emergency Medicine LAMBERT Oswald / Dr. Omar Wynne). Excerpt (History) from the ED Provider Note by LAMBERT Oswald:  Prieto Killian is a 80 y.o. male with hx of diastolic CHF, CAD s/p CABG at [facility] approx 20 years ago, HTN, hx of lung cancer s/p pneumonectomy who presents to the ED c/o one week of bilateral lower leg swelling with new onset shortness of breath this morning. Patient notes that his legs have gotten progressively more swollen over the last week, however became concerned when he woke up this morning feeling short of breath. He describes his shortness of breath is constant, worse with exertion. He also notes that he has some chest tightness with exertion as well.  There is no pleuritic component, denies any chest pain at rest. Note fever/chills, URI symptoms, cough, nausea/vomiting, abdominal pain, back/flank pain. \"    WBC count (5/17/2021) = 18.6    The patient was admitted under the service of the [address] [place] [address] (Dr. Jean Bryson). Excerpt (History of the Present Illness) from the H&P by Dr. Jean Bryson:  Maryann Donis is a 80y.o. year old male with significant past medical history of coronary artery disease s/p CABG more than 20 years ago, carotid artery stenosis s/p endarterectomy, hypertension, and hyperlipidemia with allergy to statin presented due to decreased exercise tolerance, lower extremity edema and chest pressure. Mr. Ravinder Leon states he was in his usual state of health until the last 2-3 months ago when he noticed his ability to do typical housework was becoming increasingly challenging requiring more breaks to complete. He has also noticed new onset lower extremity edema making it difficult to put his shoes on. He describes chest pressure with exertion including trimming hedges, showering that feels like he is being squeezed and resolves with rest.     In the emergency department proBNP elevated to 4,629 with priors typically < 1000, troponin elevated to 253, leukocytosis 18.6, normal hemoglobin, hematocrit, D-dimer elevated at 1.26, chemistry generally unremarkable with modest hyperglycemia at 122, serum creatinine 1.1 with a GFR calculated greater than 60. Due to elevated D-dimer a CTA of the chest was performed without pulmonary embolus.      EKG 5/17/2021 --personal review -- no P waves, irregular rhythm, rate less than 100 bpm, modest ST segment changes, LVH    CTA chest 5/17/2021 --no acute pulmonary embolus, cardiomegaly with interstitial edema, small bilateral pleural effusions, previous right middle and lower lobectomy with emphysematous changes    Chest x-ray 5/17/2021 --cardiomegaly and postsurgical changes of prior lower lobectomy but otherwise no acute cardiopulmonary process    Last Echo 5/5/2014 -- moderate concentric LVH, proximal septal thickening, EF 70%, moderate diastolic dysfunction, mild mitral regurgitation. The plan to admit Mr. Lydia Hinton with further evaluation including echocardiogram, repeat blood work was discussed with him, and he is in agreement with this plan. \"    WBC count (5/18/2021) = 21.4    Lipid profile (5/18/2021) showed TG 83, , HDL 50, LDL 93    Cardiology consult (Dr. Bebo Porter) was called on 5/18/2021 for evaluation and comanagement of NSTEMI. 2D echocardiogram (5/18/2021) showed EF 50%; concentric LV hypertrophy with a severely thickened septal wall and mildly thickened posterior wall; left atrial chamber is severely enlarged; right atrial chamber volume is moderately enlarged; no mass, shunts, or thrombi. Cardiac catheterizaion (5/18/2021 - Dr. Bebo Porter) showed:  1. Coronaries: Multivessel CAD with patent LIMA to LAD with severe sequential stenosis of the SVG to LCX. 2. Left ventricle: Mildly elevated LVEDP of 15mmHg   3. No aortic stenosis     Dr. Dalton Marquez recommended to transfer the patient to the [facility] for consideration of revascularization of the SVG to LCX.     -    On 5/18/2021, the patient was discharged from [facility], transferred to the [facility] and was subsequently admitted under the service of [facility] (Dr. Toby Escudero). S/P PCI to proximal SVG with 3.5 x 12 mm [place] drug-eluting stent; PCI to mid SVG with 3.5 x 34 mm Juan J drug-eluting stent; PCI to distal SVG with 3.5 x 15 mm Juan J drug-eluting stent; Balloon angioplasty to distal SVG anastomosis (5/18/2021 - Dr. Toby Escudero)    The patient tolerated the procedure well with no intraoperative complications. WBC count (5/19/2021) = 22.4    On 5/19/2021, patient was started on Apixaban and Clopidogrel.  Aspirin was discontinued. [address] [place] [address] Hospitalist Division consult (Dr. Herbert Leiva) was called on 5/20/2021 for evaluation and comanagement. Excerpt (HPI) from the Consult Note by Dr. Herbert Leiva:  \"80year-old male who had initially presented to [facility] on May 17 with ongoing decompensated congestive heart failure with evidence of non-ST elevation myocardial infarction following which he was subjected to cardiac catheterization revealing obstructive disease in SVG to RCA following which she was transferred to the [facility] on [facility] 18th and was subjected to percutaneous coronary intervention which is completed successfully. Patient was about to be discharged from the hospital today but due to difficulties contacting his family, declining by various home health agencies he had to be kept in the hospital and hospital medicine was consulted to comanage difficulty facing disposition of this patient and and him requiring supplemental oxygen. He is also complaining of postural lightheadedness requiring de-escalation in his antihypertensive therapy. Interestingly patient does not remember the reason why is in the hospital, he was not aware of the fact that he has had a heart attack and he has had cardiac catheterizations done to open up the blocked grafted blood vessels from his prior CABG. He reports that he is very functional at home without any difficulty breathing on exertion or at rest and is not aware of the fact he was having chest pain prior to hospitalization. He currently denies difficulty breathing, has no headache, has no difficulty breathing, denies nausea vomiting or abdominal pain. \"    WBC count (5/21/2021) = 27.3  WBC count (5/22/2021) = 24.9    CT scan of the chest/abdomen/pelvis without contrast (5/22/2021) showed:  1. Mild infiltrate in the superior segment of the right lower lobe with small right pleural effusion. No consolidation. 2. Delayed renal clearance, chronic renal disease suspected. Additionally, areas of cortical cysts with poor perfusion.  Pyelonephritis not excluded. No hydronephrosis. Correlation with urinalysis? 3. Diverticulosis, with potential diverticulitis in the left lower quadrant, junction of descending and sigmoid colon. However, evaluation is very limited due to severe motion artifacts. 4. Focal mucosal thickening in the proximal sigmoid. Further evaluation with barium enema or colonoscopy as indicated. WBC count (5/23/2021) = 30.2    Chest x-ray (5/23/2021) showed:  1. Stable moderate cardiomegaly. 2. Similar small right pleural effusion. Retroperitoneal ultrasound (5/23/2021) showed:  1.  Some limitation due to patient body habitus and bowel gas. No evidence of renal mass or renal calculi. 2. Small right renal cyst.   3. Debris versus possible blood products in the bladder. 4. Right pleural effusion     CT scan of the head (5/23/2021) showed:  1. No acute findings. 2. Chronic microvascular disease. MRI of the head (5/25/2021) showed:  1. Numerous acute embolic strokes in the bilateral cerebral hemispheres, brainstem and cerebellum. 2. Moderate global atrophy with chronic microvascular ischemic white matter disease. Neurology consult (Dr. Gus Obrien) was called on 5/25/2021 for evaluation and comanagement of stroke. PVL of carotid arteries (5/26/2021) showed:  1. Patent endarterectomies with mild plaque (<50%) present in the bilateral internal carotid arteries that is not associated with a hemodynamically significant stenosis. 2. Known occluded right vertebral artery.     3. Antegrade flow with a normal hemodynamic profile was present in the left vertebral artery. 4. When compared to the previous exam performed on 10/22/2020, there is no significant change. Gastroenterology consult (Dr. Stacey Tavera) was called on 5/26/2021 for evaluation and comanagement of melena.     Urology consult (Dr. Kristin Read) was called on 5/26/2021 for evaluation and comanagement of gross hematuria. On 5/30/2021, patient's neurological status reportedly back at his baseline.     WBC count (6/2/2021) = 25.4  WBC count (6/3/2021) = 22.5  WBC count (6/4/2021) = 23.4  WBC count (6/5/2021) = 21.7  WBC count (6/6/2021) = 24.1  WBC count (6/7/2021) = 20.0    Excerpt (Problems Managed During Hospitalization) from the Discharge Summary by Dr. Atnonio Paz:  \"Hypotension   -no fever  -WBCs are coming down, stable hemoglobin no signs of bleeding  -continue reduced Coreg; discontinue Lisinopril and imdur   -recommend Lasix as needed for increased edema      Acute kidney injury  -Cr 1.3 > 1.0 after IVF, baseline cr 0.8  -initially had post-obstructive uropathy, acute urinary retention which is now resolved  -etiology this time likely due to hypotension  -continue decreased Coreg , dc lisinopril       Acute Myocardial Infarction, NSTEMI, Coronary Artery Disease  - s/p PCI 5/18  - on plavix, BB, eliquis (no asa to avoid triple therapy), statin  - remote h/o CABG  -Low dose lasix       Acute hypoxic/hypercapneic respiratory failure - resolved  - in the setting of underlying severe COPD & s/p right middle and lower lobectomy  - O2 sat goal is 89-93%  - continue LABA/ICS  - NIV for hypercapnea associated with lethargy but he is a chronic CO2 retainer  - scheduled duonebs  - Wean O2 to RA      Leukocytosis, at this point suspect this is reactive, coming down, no additional work up at this moment   - urine cx NG, bcx NG, c diff NEG  - negative procal, normal lactate, no bands  - CT c/a/p 5/22 without abscess, infiltrate  - dc'ed empiric antibiotics  - peripheral smear reviewed  - was discussed with Dr. Iker Munoz, agrees this degree of leukocytosis can be seen post AMI     Ischemic encephalopathy due to Acute CVA - back to baseline for last 24-48 hrs   - reportedly with mild dementia at baseline  - MRI head: Numerous acute embolic strokes in the bilateral cerebral hemispheres, brainstem and cerebellum  - etiology: cardioembolic. PCI 5/18. With symptom onset 5/20 (at that time he was on eliquis) . suspected plaque mobilization  - Neurology assistance appreciated      Acute urinary retention, resolved    - matamoros placed 5/22, now d/nayeli  - started flomax  - hematuria due to self inflicted matamoros trauma   - urology consulted, assistance appreciated  - renal US w/o hydronephrosis     Newly diagnosed atrial fibrillation  - BB  - eliquis was on hold for hematuria/GI bleeding, OK to resume per Urology/GI     Melena  - GI consulted 5/26  - OK'ed patient for plavix/eliquis     History of lung cancer (squamous cell cancer)  - s/p right middle and lower lobe lobectomy     Peripheral vascular disease  - h/o bilateral carotid endarterectomies  - plavix/ statin per above     History of renal artery stenosis  - prior bilateral stenting 2011     Severe Protein Calorie malnutrition  -per RD note 5/25: Etiology: Acute illness or injury; % Intake: < 50% of need > 5 days - Severe; minimal intakes since admit, mostly 0-25% of meals, often refusing d/t poor appetite; Functional findings: Decline in functional status as per PT/OT/SLP assessments; Edema   -continue cardiac diet; Ensure Enlive TID, MVI      Acute blood loss anemia  - due to melena and hematuria, both mild  - h/h down but very stable  - transfuse if hb< 8     Deconditioned  - PT/OT recommends IPR  - plan discharge to [facility] IPR\"     Excerpt [facility] HOSPITAL Course) from the Discharge Summary by Dr. Rajni Bradshaw:  \"80 year old male with history of CAD was transferred to [facility] from [facility] under cardiology service for PCI needs in the setting of NSTEMI. Patient underwent PCI (JENNIE) on 5/18. During the course of this he was also found to be in atrial fibrillation and [address] was initiated. On 5/20, patient became confused and he was found to have rising WBC count, prompting transfer to hospital medicine service for further work up.  Despite extensive work up, there is no infectious source found for his leukocytosis and it is suspected to be reactive in the setting of acute MI. As far as his encephalopathy however, he was found to have significant hypercapnia (known COPD), which resolved with use of NIV, as well as acute embolic CVA in bilateral hemispheres seen on MRI head (ordered 5/2, completed 5/25). Neurology was consulted on 5/25 for assistance and recommendations. It is felt that etiology of CVA is cardio embolic in the setting of plaque mobilization/ PCI, based on timing of symptoms and course of events. Hospital course has been further complicated by patient developing urinary retention (neurogenic due to CVA vs worsening of prior known BPH) requiring matamoros catheter placement on 5/22. He then caused matamoros catheter trauma in his confused state, with resultant hematuria. Urology consulted on 5/26 due to nursing concerns for ongoing hematuria but it has resolved. Additionally, on 5/25 he developed frequent tarry black stools which are occult blood positive. GI consulted on 5/26 as well, cleared patient for APT/AC. \"    The patient had remained hemodynamically stable but due to the above events, the patient was noted to be generally weak and with impaired mobility and ADLs. Patient was felt to be a good candidate for acute inpatient rehabilitation. Upon evaluation by Physical Therapy and Occupational Therapy, the patient was recommended for acute inpatient rehabilitation. The patient was discharged and was subsequently admitted to the [facility] for Physical Rehabilitation for intensive rehabilitation to help recover strength, function and mobility.       Past Medical History:  Past Medical History:   Diagnosis Date    Acute embolic stroke ([facility] [address] 7/79/8567    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, brainstem and cerebellum) without residual deficit    Anticoagulated by anticoagulation treatment 5/19/2021    On Apixaban    Benign prostatic hyperplasia with urinary retention     Chronic obstructive pulmonary disease (COPD) (HCC)     Coronary artery disease involving native coronary artery of native heart     Essential hypertension     Heart failure with preserved left ventricular function (HFpEF) ([address]     2D echocardiogram (5/18/2021) showed EF 50%; concentric LV hypertrophy with a severely thickened septal wall and mildly thickened posterior wall; left atrial chamber is severely enlarged; right atrial chamber volume is moderately enlarged; no mass, shunts, or thrombi.  History of carotid stenosis     History of gross hematuria 5/26/2021    Attributed to Taveras trauma while patient was altered    History of renal artery stenosis     Leukocytosis 5/17/2021    Attributed to reactive leukocytosis due to NSTEMI    Malignant neoplasm of lower lobe of right lung (HCC)     Non-ST elevation (NSTEMI) myocardial infarction ([address] 5/17/2021    On clopidogrel therapy 5/19/2021    Paroxysmal atrial fibrillation ([address] 5/17/2021    Urinary retention        Past Surgical History:  Past Surgical History:   Procedure Laterality Date    HX CAROTID ENDARTERECTOMY Left 06/07/2012    HX CAROTID ENDARTERECTOMY Right 05/28/2014    Dr. Tavares Kent GRAFT  2001    [facility]    HX LOBECTOMY Right     Middle lobe and lower lobe    HX RENAL ARTERY STENT Bilateral 2011    HX TONSILLECTOMY      IR BRONCHOSCOPY  11/25/2015    Dr. Lucy Webb       Allergies: Allergies   Allergen Reactions    Lipitor [Atorvastatin] Rash    Norvasc [Amlodipine] Rash       Social History: The patient is , lives with his wife in a 2-story house with no steps to enter in [place], [place]. He quit smoking cigarettes and drinking alcoholic beverages ~16 years ago. He denied any illicit drug use.  He retired for the [employer] 23 years ago then worked at the [employer]. He retired from the [employer] in 2019. Family History: Both parents are . Family History   Problem Relation Age of Onset    Hypertension Mother     Cancer Mother        Home Medications (from the Consult Note dated 2021 prepared by Dr. Elgin Holly):  Home Medication List - Marked as Reviewed on 10/22/20 1526   Medication Sig   Carvedilol (COREG) 25 mg PO TABS Take 25 mg by Mouth Twice Daily. isosorbid-hydralazine (BIDIL) 20-37.5 mg PO TABS Take 1 Tab by Mouth 3 Times Daily. lisinopril (PRINIVIL) 20 mg PO TABS lisinopril 20 mg tablet       Home Medications ( [x] Verbally confirmed with patient    [] From medication bottles brought by patient     [] From list provided by patient):  1. Carvedilol 25 mg PO BID with meals  2. Isosorbide dinitrate-Hydralazine 20-37.5 1 tab PO TID  3. Lisinopril 20 mg PO once daily  4. Ibuprofen 400 mg PO q 6 hr PRN for pain      Transfer Medications (from the Discharge Summary prepared by Dr. Deb Terry):  Prior to Admission Medications   Prescriptions Last Dose Informant Patient Reported? Taking? albuterol (PROVENTIL HFA, VENTOLIN HFA) 90 mcg/actuation inhaler 2021 at Unknown time  Yes Yes   Sig: Take  by inhalation. apixaban (ELIQUIS) 5 mg tablet 2021 at 0900  Yes Yes   Sig: Take 5 mg by mouth two (2) times a day. carvediloL (Coreg) 12.5 mg tablet 2021 at 0900  Yes Yes   Sig: Take 12.5 mg by mouth two (2) times daily (with meals). clopidogreL (Plavix) 75 mg tab 2021 at 0900  Yes Yes   Sig: Take 75 mg by mouth daily. fluticasone furoate-vilanteroL (Breo Ellipta) 100-25 mcg/dose inhaler   Yes Yes   Sig: Take 1 Puff by inhalation daily. furosemide (Lasix) 20 mg tablet 2021 at 0900  Yes Yes   Sig: Take 20 mg by mouth daily. nitroglycerin (Nitrostat) 0.4 mg SL tablet   Yes Yes   Si.4 mg by SubLINGual route every five (5) minutes as needed for Chest Pain. Up to 3 doses.    rosuvastatin (Crestor) 10 mg tablet 6/6/2021 at 2000  Yes Yes   Sig: Take 10 mg by mouth nightly. tamsulosin (FLOMAX) 0.4 mg capsule 6/6/2021 at 1820  No Yes   Sig: Take 1 Cap by mouth daily (after dinner). therapeutic multivitamin (THERAGRAN) tablet 6/7/2021 at 0900  Yes Yes   Sig: Take 1 Tablet by mouth daily. Facility-Administered Medications: None        Review Of Systems:   CONSTITUTIONAL: No weight loss. EYES: No blurred vision and no eye discharge. ENT: No nasal discharge. No ear pain. CARDIOVASCULAR: As above. RESPIRATORY: No cough, no hemoptysis. GI: No vomiting, no diarrhea   : No urinary frequency and no dysuria. MUSCULOSKELETAL: No muscle pains. SKIN: No rashes. NEURO: No dizziness, no numbness. ENDOCRINE: No polyphagia and no polydipsia. HEMATOLOGY: No bleeding tendencies. Objective:     Vital Signs:  Patient Vitals for the past 24 hrs:   BP Temp Pulse Resp SpO2 Height Weight   06/08/21 1116 (!) 139/90  (!) 106  95 %     06/08/21 1113 (!) 150/84  81  98 %     06/08/21 0811 (!) 158/92 97.7 °F (36.5 °C) 87 18 96 %     06/07/21 2146 (!) 157/92 97.9 °F (36.6 °C) 81 18 99 %     06/07/21 2130      5' 8\" (1.727 m) 73.9 kg (163 lb)        Body mass index is 24.78 kg/m². Physical Examination:  GENERAL SURVEY: Patient is awake, alert, oriented x 3, sitting comfortably on the chair, not in acute respiratory distress.   HEENT: pink palpebral conjunctivae, anicteric sclerae, no nasoaural discharge, moist oral mucosa  NECK: supple, no jugular venous distention, no palpable lymph nodes  CHEST/LUNGS: symmetrical chest expansion, good air entry, clear breath sounds  HEART: adynamic precordium, good S1 S2, no S3, irregularly irregular rhythm, no murmurs  ABDOMEN: flat, bowel sounds appreciated, soft, non-tender  EXTREMITIES: pink nailbeds, (+) bipedal edema, full and equal pulses, no calf tenderness   NEUROLOGICAL EXAM: The patient is awake, alert and oriented x3, able to answer questions fairly appropriately, able to follow 1 and 2 step commands. Able to tell time from the wall clock. Cranial nerves II-XII are grossly intact. No gross sensory deficit. Motor strength is 4 to 4+/5 on all 4 extremities.       Current Medications:  Current Facility-Administered Medications   Medication Dose Route Frequency    clopidogreL (PLAVIX) tablet 75 mg  75 mg Oral DAILY WITH BREAKFAST    acetaminophen (TYLENOL) tablet 650 mg  650 mg Oral Q4H PRN    bisacodyL (DULCOLAX) tablet 10 mg  10 mg Oral Q48H PRN    albuterol (PROVENTIL VENTOLIN) nebulizer solution 2.5 mg  2.5 mg Nebulization Q4H PRN    apixaban (ELIQUIS) tablet 5 mg  5 mg Oral BID    arformoteroL (BROVANA) neb solution 15 mcg  15 mcg Nebulization BID RT    budesonide (PULMICORT) 250 mcg/2ml nebulizer susp  250 mcg Nebulization BID    furosemide (LASIX) tablet 20 mg  20 mg Oral DAILY    nitroglycerin (NITROSTAT) tablet 0.4 mg  0.4 mg SubLINGual PRN    rosuvastatin (CRESTOR) tablet 10 mg  10 mg Oral DAILY    tamsulosin (FLOMAX) capsule 0.4 mg  0.4 mg Oral PCD    multivitamin, tx-iron-ca-min (THERA-M w/ IRON) tablet 1 Tablet  1 Tablet Oral DAILY    carvediloL (COREG) tablet 12.5 mg  12.5 mg Oral BID WITH MEALS       Functional Assessment:     Occupational Therapy   Post-Admission Evaluation   Eating      Grooming  Functional Level: (P) 5 (setup at sink)   Upper Body Dressing  Functional Level: (P) 5   Lower Body Dressing  Functional Level: (P) 4   Toileting  Functional Level: (P) 0        Physical Therapy   Post-Admission Evaluation   Gait  Amount of Assistance: 3 (Moderate assistance)  Distance (ft): 48 Feet (ft) (48 ft without AD as per PLOF, additional 12 ft w/ RW)  Assistive Device:  (no AD initially, then RW)   Bed/Mat Mobility  Rolling Right : 5 (Supervision)  Rolling Left : 5 (Supervision)  Supine to Sit : 5 (Stand-by assistance)  Sit to Supine :  (SBA)   Bed/Mat Mobility  Rolling Right : 5 (Supervision)  Rolling Left : 5 (Supervision)  Supine to Sit : 5 (Stand-by assistance)  Sit to Supine :  (SBA)   Bed/Mat Mobility  Rolling Right : 5 (Supervision)  Rolling Left : 5 (Supervision)  Supine to Sit : 5 (Stand-by assistance)  Sit to Supine :  (SBA)   Transfers  Transfer Type: Other  Other: stand step without AD  Transfer Assistance : 4 (Minimal assistance)  Sit to Stand Assistance: Minimal assistance   Toilet Transfers  Toilet Transfer Score: (P) 0     Speech and Language Pathology  Post-Admission Evaluation     Comprehension (Native Language)  Primary Mode of Comprehension: (P) Auditory  Score: 6     Expression (Native Language)  Primary Mode of Expression: (P) Verbal  Score: 6     Social Interaction/Pragmatics  Score: 5     Problem Solving  Score: 4     Memory  Score: 5       Legend:   7 - Independent   6 - Modified Independent   5 - Standby Assistance / Supervision / Set-up   4 - Minimum Assistance / Contact Guard Assistance   3 - Moderate Assistance   2 - Maximum Assistance   1 - Total Assistance / Dependent       Labs on Admission:  Recent Results (from the past 24 hour(s))   CBC WITH AUTOMATED DIFF    Collection Time: 06/08/21  5:05 AM   Result Value Ref Range    WBC 14.4 (H) 4.6 - 13.2 K/uL    RBC 3.59 (L) 4.35 - 5.65 M/uL    HGB 10.1 (L) 13.0 - 16.0 g/dL    HCT 32.5 (L) 36.0 - 48.0 %    MCV 90.5 74.0 - 97.0 FL    MCH 28.1 24.0 - 34.0 PG    MCHC 31.1 31.0 - 37.0 g/dL    RDW 16.7 (H) 11.6 - 14.5 %    PLATELET 595 449 - 259 K/uL    MPV 10.2 9.2 - 11.8 FL    NEUTROPHILS 65 40 - 73 %    LYMPHOCYTES 26 21 - 52 %    MONOCYTES 7 3 - 10 %    EOSINOPHILS 1 0 - 5 %    BASOPHILS 1 0 - 2 %    ABS. NEUTROPHILS 9.5 (H) 1.8 - 8.0 K/UL    ABS. LYMPHOCYTES 3.7 (H) 0.9 - 3.6 K/UL    ABS. MONOCYTES 1.0 0.05 - 1.2 K/UL    ABS. EOSINOPHILS 0.1 0.0 - 0.4 K/UL    ABS.  BASOPHILS 0.1 0.0 - 0.1 K/UL    DF MANUAL      PLATELET COMMENTS DECREASED PLATELETS      RBC COMMENTS        VÍCTOR CELLS  FEW  OVALOCYTES  FEW  POLYCHROMASIA  1+     MAGNESIUM Collection Time: 06/08/21  5:05 AM   Result Value Ref Range    Magnesium 2.3 1.6 - 2.6 mg/dL   METABOLIC PANEL, BASIC    Collection Time: 06/08/21  5:05 AM   Result Value Ref Range    Sodium 133 (L) 136 - 145 mmol/L    Potassium 4.5 3.5 - 5.5 mmol/L    Chloride 96 (L) 100 - 111 mmol/L    CO2 35 (H) 21 - 32 mmol/L    Anion gap 2 (L) 3.0 - 18 mmol/L    Glucose 80 74 - 99 mg/dL    BUN 22 (H) 7.0 - 18 MG/DL    Creatinine 1.00 0.6 - 1.3 MG/DL    BUN/Creatinine ratio 22 (H) 12 - 20      GFR est AA >60 >60 ml/min/1.73m2    GFR est non-AA >60 >60 ml/min/1.73m2    Calcium 9.6 8.5 - 10.1 MG/DL       Estimated Glomerular Filtration Rate:  On admission, estimated GFR based on a Creatinine of 1.00 mg/dl:   Using CKD-EPI = 81.4 mL/min/1.73m2   Using MDRD = 92.2 mL/min/1.73m2      Assessment:     Primary Rehabilitation Diagnosis  1. Impaired Mobility and ADLs  2. Non-ST elevation (NSTEMI) myocardial infarction  3. Coronary artery disease; History of CABG  4. S/P PCI to proximal SVG with 3.5 x 12 mm Juan J drug-eluting stent; PCI to mid SVG with 3.5 x 34 mm Juan J drug-eluting stent; PCI to distal SVG with 3.5 x 15 mm Juan J drug-eluting stent;  Balloon angioplasty to distal SVG anastomosis (5/18/2021 - Dr. Anuradha Bangura)     Comorbidities  Patient Active Problem List   Diagnosis Code    Urinary retention R33.9    Essential hypertension I10    Non-ST elevation (NSTEMI) myocardial infarction ([facility] [address] I21.4    Acute on chronic heart failure with preserved ejection fraction (HFpEF) ([facility]) I50.33    Paroxysmal atrial fibrillation (HCC) I48.0    Anticoagulated by anticoagulation treatment Z79.01    Mixed hyperlipidemia E78.2    Benign prostatic hyperplasia with urinary retention N40.1, R33.8    Malignant neoplasm of lower lobe of right lung (HCC) C34.31    Heart failure with preserved left ventricular function (HFpEF) ([facility]) I50.30    Coronary artery disease involving native coronary artery of native heart I25.10    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    Chronic obstructive pulmonary disease (COPD) ([facility]) J44.9    History of carotid stenosis Z86.79    History of renal artery stenosis Z86.79    On clopidogrel therapy Z79.01    History of gross hematuria I78.561    Acute embolic stroke ([facility]) X09.3    Leukocytosis D72.829       Willingness to participate in the program: Good      Rehabilitation Potential: Good      Plan:     1. Medical Issues being followed closely:    [x]  Fall and safety precautions     []  Wound Care     [x]  Bowel and Bladder Function     [x]  Fluid Electrolyte and Nutrition Balance     [x]  Pain Control      2. Issues that 24 hour rehabilitation nursing is following:    [x]  Fall and safety precautions     []  Wound Care     [x]  Bowel and Bladder Function     [x]  Fluid Electrolyte and Nutrition Balance     [x]  Pain Control      [x]  Assistance with and education on in-room safety with transfers to and from the bed, wheelchair, toilet and shower. 3. Acute rehabilitation plan of care:    [x]  Patient to be evaluated and treated by:           [x]  Physical Therapy           [x]  Occupational Therapy           [x]  Speech Therapy     []  Hold Rehab until further notice     5. Medications:    [x]  MAR Reviewed     [x]  Continue Present Medications     6. DVT Prophylaxis:      []  Enoxaparin     []  Unfractionated Heparin     []  Warfarin     [x]  NOAC     []  MARTHA Stockings     []  Sequential Compression Device     []  None     7. Code status    [x]  Full code     []  Partial code     []  Do not intubate     []  Do not resuscitate     8. Rehabilitation program and expectations from patient, as well as medical issues discussed with the patient. MEDICAL PLAN:  > Non-ST elevation (NSTEMI) myocardial infarction; Coronary artery disease;  History of CABG; S/P PCI to proximal SVG with 3.5 x 12 mm [place] drug-eluting stent; PCI to mid SVG with 3.5 x 34 mm Juan J drug-eluting stent; PCI to distal SVG with 3.5 x 15 mm [place] drug-eluting stent;  Balloon angioplasty to distal SVG anastomosis (5/18/2021 - Dr. Marily Quiñones)    > Increase Carvedilol from 12.5 mg to 25 mg PO BID with meals (8AM, 5PM)   > Clopidogrel 75 mg PO once daily with breakfast   > Rosuvastatin 75 mg PO once daily   > Nitroglycerin 0.4 mg SL PRN for chest pain    > Acute on chronic heart failure with preserved ejection fraction (HFpEF)   > 2D echocardiogram (5/18/2021) showed EF 50%; concentric LV hypertrophy with a severely thickened septal wall and mildly thickened posterior wall; left atrial chamber is severely enlarged; right atrial chamber volume is moderately enlarged; no mass, shunts, or thrombi.    > Increase Carvedilol from 12.5 mg to 25 mg PO BID with meals (8AM, 5PM)   > Furosemide 20 mg PO once daily (9AM)    > 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, brainstem and cerebellum) without residual deficit   > Clopidogrel 75 mg PO once daily with breakfast   > Rosuvastatin 75 mg PO once daily    > Benign prostatic hyperplasia with urinary retention   > Tamsulosin 0.4 mg PO once daily after dinner    > Chronic obstructive pulmonary disease (COPD)   > Arformoterol nebulization BID   > Budesonide nebulization BID   > Albuterol nebulization q 4 hr PRN for shortness of breath/wheezing    > Constipation   > Polyethylene glycol 17 grams in 8 oz water PO once daily   > Pericolace 2 tabs PO once daily after dinner     > Essential hypertension   > 2D echocardiogram (5/18/2021) showed EF 50%; concentric LV hypertrophy with a severely thickened septal wall and mildly thickened posterior wall; left atrial chamber is severely enlarged; right atrial chamber volume is moderately enlarged; no mass, shunts, or thrombi.    > Increase Carvedilol from 12.5 mg to 25 mg PO BID with meals (8AM, 5PM)   > Furosemide 20 mg PO once daily (9AM)    > Leukocytosis, reactive, attributed to NSTEMI   > Labs at [facility]:    > WBC count (5/17/2021) = 18.6    > WBC count (5/18/2021) = 21.4    > WBC count (5/19/2021) = 22.4    > WBC count (5/21/2021) = 27.3    > WBC count (5/22/2021) = 24.9    > WBC count (5/23/2021) = 30.2    > . . .     > WBC count (6/2/2021) = 25.4    > WBC count (6/3/2021) = 22.5    > WBC count (6/4/2021) = 23.4    > WBC count (6/5/2021) = 21.7    > WBC count (6/6/2021) = 24.1    > WBC count (6/7/2021) = 20.0    > Work up done was negative for a source of infection   > WBC count (6/8/2021, on admission to the ARU) = 14.4   > No documented fever since admissiion    > Mixed hyperlipidemia   > Lipid profile (5/18/2021) showed TG 83, , HDL 50, LDL 93   > Rosuvastatin 75 mg PO once daily    > Paroxysmal atrial fibrillation, anticoagulated on Apixaban   > Apixaban 5 mg PO BID   > Increase Carvedilol from 12.5 mg to 25 mg PO BID with meals (8AM, 5PM)    > COVID-19 ruled out by laboratory testing   > SARS-CoV-2 ([place] m2000, [facility]) (collected 5/26/2021, resulted 5/28/2021): Not detected    > Analgesia   > Acetaminophen 650 mg PO q 4 hr PRN for pain     > Diet:   > Specifications: Low fat/Low cholesterol/High fiber/No added salt   > Solids (consistency): Regular    > Liquids (consistency): Thin    > Fluid restriction: None      PRECAUTIONS:   1. Safety/fall precautions. 2. Deep venous thrombosis precautions. 3. Aspiration precautions. POTENTIAL BARRIERS TO DISCHARGE: Risk for falls. Personal Protective Equipment was used while interacting with patient including: goggles and KN95 face mask. Patient was using a surgical mask. Total clinical care time is 75 minutes, including review of chart including all labs, radiology, past medical history, and discussion with patient. Greater than 50% of my time was spent in coordination of care and counseling.       Signed:    Adelaida Conde MD    June 8, 2021

## 2021-06-08 NOTE — PROGRESS NOTES
[x] Psychology  [] Social Work [] Recreational Therapy    INTERVENTION  UNITS/TIME OF SERVICE   Assessment  June 8, 2021   Supportive Counseling    Orientation    Discharge Planning    Resource Linkage              Progress/Current Status    Patient seen for Psychological Evaluation as requested on admit to ARU by Dr. Vi Mcgovern. Patient found sitting calmly on EOB and immediately asking for assist with repositioning after prolonged sitting. He understands his medical circumstance requiring acute rehabilitation and insists that he is motivated to improve and hopeful to regain his independence. In fact, he insisted that he has been entirely independent prior to this hospitalization. Patient is denying history of mental health services as well as current, acute feelings of distress. He presents as calm and composed and well focused on his recovery. Patient will be monitored for any emotional and/or behavioral difficulties that might arise on ARU and be encouraged to persevere in his therapy effort. Education about safety and pace post cardiac event is important, as well.     Stanley Roche, PHD 6/8/2021 11:00 AM

## 2021-06-08 NOTE — PROGRESS NOTES
SW received a referral. SW reviewed chart. SEAMUS met with pt at bedside. SW introduced self and discussed role. SW engaged pt in the initial SW assessment. Pt resides in a [address] [facility] with no steps to enter the home. Pt resides with his spouse Ana Maria Zavala [phone]. She has been noted to be a good point of contact for family education. Pt prior to admission was mobile and independednt with ADL's. He would walk his small dog Gloria. Pt was not open to any services. He has no hx of rehab. According to him, he is 40% service connected. His PCP is Debra Be with [place], in [address] [place] [address]. His pharmacy is [facility] in [place], and utilizes [facility] with [employer]. SW educated pt about the dcp process, need to communicate with his , team conference meetings at bedside, and family education meetings. With permission, SEAMUS contacted pt spouse Ana Maria Zavala at the [phone]. Family education meeting was attempted to be arranged. SW left message on voicemail. SEAMUS will continue to follow.       Jeannie Villegas MSW, LCSW, CCM  Doctoral Candidate, Doctor of Social Work

## 2021-06-08 NOTE — PROGRESS NOTES
Problem: Mobility Impaired (Adult and Pediatric)  Goal: *Therapy Goal (Edit Goal, Insert Text)  Description: Physical Therapy Short Term Goals  Initiated 6/8/2021 and to be accomplished within 7 day(s) on 6/15/2021  1. Patient will move from supine to sit and sit to supine , scoot up and down, and roll side to side in bed with modified independence. 2.  Patient will transfer from bed to chair and chair to bed with standby assistance using the least restrictive device. 3.  Patient will perform sit to stand with supervision/set-up. 4.  Patient will ambulate with CGA for 150 feet with the least restrictive device. 5.  Patient will ascend/descend 3 stairs with no handrail(s) with moderate assistance using AD as appropriate. Physical Therapy Long Term Goals  Initiated 6/8/2021 and to be accomplished within 14 day(s) on 6/22/2021  1. Patient will move from supine to sit and sit to supine , scoot up and down, and roll side to side in bed with independence. 2.  Patient will transfer from bed to chair and chair to bed with modified independence using the least restrictive device. 3.  Patient will perform sit to stand with modified independence. 4.  Patient will ambulate with modified independence for 150 feet with the least restrictive device. 5.  Patient will ascend/descend 3 stairs with no handrail(s) with minimal assistance/contact guard assist and appropriate AD for ease of safe entry/exit of home. 6.  Patient will ascend/descend 7 stairs with one railing with supervision for safety getting to 2nd floor bedroom. Outcome: Progressing Towards Goal   PHYSICAL THERAPY EVALUATION    Patient: Jennifer Billy (56 y.o. male)  Date: 6/8/2021  Diagnosis: Non-ST elevation (NSTEMI) myocardial infarction [facility]) [I21.4] Non-ST elevation (NSTEMI) myocardial infarction [facility])  Precautions: (P) Fall  Chart, physical therapy assessment, plan of care and goals were reviewed.     Time in:1030  Time out:1130    Patient seen for: Balance activities;Gait training;Patient education;Transfer training; Therapeutic exercise    Pain:  Patient denied pain during session. Patient identified with name and : yes    SUBJECTIVE:     Patient stated \"Ok, I used a walker at the other hospital. Patient agreeable to treatment. Reporting that he was told that he had a heart attack which led to his hospitalization. Patient's Goal for Physical Therapy: To better be able to walk    OBJECTIVE DATA SUMMARY:     Past Medical History:   Diagnosis Date    Acute embolic stroke ([facility] [address]     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, brainstem and cerebellum) without residual deficit    Anticoagulated by anticoagulation treatment 2021    On Apixaban    Benign prostatic hyperplasia with urinary retention     Chronic obstructive pulmonary disease (COPD) ([address]     Coronary artery disease involving native coronary artery of native heart     Essential hypertension     Heart failure with preserved left ventricular function (HFpEF) ([address]     2D echocardiogram (2021) showed EF 50%; concentric LV hypertrophy with a severely thickened septal wall and mildly thickened posterior wall; left atrial chamber is severely enlarged; right atrial chamber volume is moderately enlarged; no mass, shunts, or thrombi.      History of carotid stenosis     History of gross hematuria 2021    Attributed to [place] trauma while patient was altered    History of renal artery stenosis     Leukocytosis 2021    Attributed to reactive leukocytosis due to NSTEMI    Malignant neoplasm of lower lobe of right lung (HCC)     Non-ST elevation (NSTEMI) myocardial infarction ([address] 2021    On clopidogrel therapy 2021    Paroxysmal atrial fibrillation ([address] 2021    Urinary retention      Past Surgical History:   Procedure Laterality Date    HX CAROTID ENDARTERECTOMY Left 2012    HX CAROTID ENDARTERECTOMY Right 05/28/2014    Dr. Sridevi Salter    HX COLONOSCOPY       [address] [facility]    HX LOBECTOMY Right     Middle lobe and lower lobe    HX RENAL ARTERY STENT Bilateral 2011    HX TONSILLECTOMY      IR BRONCHOSCOPY  11/25/2015    Dr. Luz Brooks       Problem List:    Decreased strength B LE  [x]     Decreased strength trunk/core  [x]     Decreased AROM   [x]     Decreased PROM  [x]    Decreased endurance  [x]     Decreased balance sitting  []     Decreased balance standing  [x]     Pain   []     Slow ambulation velocity  [x]    Decreased coordination  [x]    Decreased safety awareness  [x]      Functional Limitations:   Decreased independence with bed mobility  [x]     Decreased independence with functional transfers  [x]     Decreased independence with ambulation  [x]     Decreased independence with stair negotiation  [x]       Previous Functional Level: Patient reporting independent prior to hospitalization without use of AD. Stated he was driving, walking without AD at home and in community. Lives with his wife.      Home Environment: Home Environment: Private residence  # Steps to Enter: 3  Rails to Enter: No  One/Two Story Residence: Two story  # of Interior Steps: 7  Interior Rails: Both  Living Alone: No  Support Systems: Spouse/Significant Other/Partner  Patient Expects to be Discharged toVF Cor[de-identified]ration  Current DME Used/Available at Home: None  Tub or Shower Type: Tub/Shower combination (grab bars)    Barriers to Learning/Limitations: yes;  physical  Compensate with: visual, verbal, tactile, kinesthetic cues/model         Outcome Measures: See functional scores     MMT Initial Assessment   Right Lower Extremity Left Lower Extremity   Hip Flexion 4- 4   Knee Extension 4+ 5   Knee Flexion 4+ 5   Ankle Dorsiflexion 4+ 4+   0/5 No palpable muscle contraction  1/5 Palpable muscle contraction, no joint movement  2-/5 Less than full range of motion in gravity eliminated position  2/5 Able to complete full range of motion in gravity eliminated position  2+/5 Able to initiate movement against gravity  3-/5 More than half but not full range of motion against gravity  3/5 Able to complete full range of motion against gravity  3+/5 Completes full range of motion against gravity with minimal resistance  4-/5 Completes full range of motion against gravity with minimal-moderate resistance  4/5 Completes full range of motion against gravity with moderate resistance  4+/5 Completes full range of motion against gravity with moderate-maximum resistance  5/5 Completes full range of motion against gravity with maximum resistance        GROSS ASSESSMENT Initial Assessment 6/8/2021   AROM Generally decreased, functional   Strength Generally decreased, functional   Coordination Generally decreased, functional (limited by weakness for heel to shin)   Tone Normal   Sensation Impaired (numbness in toes and dorsal surfaces bilat feet)   PROM Generally decreased, functional       POSTURE Initial Assessment 6/8/2021   Posture (WDL) Exceptions to [facility]   Posture Assessment Forward head;Rounded shoulders       BALANCE Initial Assessment 6/8/2021    Sitting - Static: Good (unsupported)  Sitting - Dynamic: Good (unsupported); Occassional;Fair (occasional)  Standing - Static: Fair  Standing - Dynamic : Impaired       BED/CHAIR/WHEELCHAIR TRANSFERS Initial Assessment 6/8/2021   Rolling Right 5 (Supervision)   Rolling Left 5 (Supervision)   Supine to Sit 5 (Stand-by assistance)   Sit to Stand Minimal assistance   Sit to Supine  (SBA)   Transfer Assist Score 4 (Minimal assistance)   Transfer Type Other: stand step without AD as per PLOF   Comments  All bed mobility performed with head of bed flat and without railings. Performing transfers stand step without AD, also performing with RW with CGA.     Car Transfer Moderate assistance (stand step without AD); needing more steadying support as patient attempting to step into car transfer simulator. Car Type car transfer simulator       WHEELCHAIR MOBILITY/MANAGEMENT Initial Assessment 6/8/2021   Able to Propel 40 feet (primarily using bilat UE to propel, occasionally LE)   Assist Level 1 (mod/max A for steering and propulsion)   Curbs/ramps assistance required 0 (Not tested)   Wheelchair set up assistance required 2 (Maximal assistance)   Wheelchair management Manages left brake, Manages right brake (needing cues and assistance)   Comments W/C mobility with cues for how to propel and steer properly, patient needing significant assistance. WALKING INDEPENDENCE Initial Assessment 6/8/2021   Assistive device  (no AD initially, then RW)   Ambulation assistance - level surface 3 (Moderate assistance)   Distance 48 Feet (ft) (48 ft without AD as per PLOF, additional 12 ft w/ RW)   Comments  Mod A without AD initially, impaired foot clearance and path deviations occasionally needing increased steadying support. Provided RW and improved ability to steady himself. Ambulation assistance - unlevel surface  (N/A)       GAIT Initial Assessment 6/8/2021   Gait Description (WDL) Exceptions to WDL   Gait Abnormalities Decreased step clearance;Trunk sway increased;Trendelenburg; Step to gait (short step length bilaterally)       STEPS/STAIRS Initial Assessment 6/8/2021   Steps/Stairs ambulated 2   Rail Use Both   Assistance Level 3 (Moderate assistance)   Comments Mod A for support, needing seated rest after negotiating two stairs. Curbs/Ramps  (NT)       ASSESSMENT :  Based on the objective data described above, the patient presents with decreased strength, endurance, balance leading to difficulty performing safe and independent functional mobility. Patient will benefit from skilled intervention to address the above impairments.   Patient's rehabilitation potential is considered to be Good  Factors which may influence rehabilitation potential include:   []         None noted  []         Mental ability/status  [x]         Medical condition  [x]         Home/family situation and support systems  [x]         Safety awareness  []         Pain tolerance/management  []         Other:      PLAN :  See STG and LTG    Order received from MD for physical therapy services and chart reviewed. Pt to be seen 5 times per week for 3 hours of total therapy per day for 2 weeks. Thank you for the referral.    Pt would benefit from skilled physical therapy in order to improve independent functional mobility within the home. Interventions may include range of motion (AROM, PROM B LE/trunk), motor function (B LE/trunk strengthening/coordination), activity tolerance (vitals, oxygen saturation levels), bed mobility training, balance activities, gait training (progressive ambulation program), wheelchair management and functional transfer training. Discharge Recommendations: Home Health vs outpatient PT  Further Equipment Recommendations for Discharge: TBD pending progress, currently using RW for safety       Activity Tolerance:  Fair, increased report of shortness of breath with standing and activity, increased dizziness with prolonged standing. Please refer to the flowsheet for vital signs taken during this treatment. After treatment:   [] Patient left in no apparent distress in bed  [x] Patient left in no apparent distress sitting up in chair  [] Patient left in no apparent distress in w/c mobilizing under own power  [] Patient left in no apparent distress dining area  [] Patient left in no apparent distress mobilizing under own power  [x] Call bell left within reach  [x] Nursing notified  [] Caregiver present  [] Bed alarm activated   [x] Chair alarm activated. COMMUNICATION/EDUCATION:   [x]         Fall prevention education was provided and the patient/caregiver indicated understanding.   [x]         Patient/family have participated as able in goal setting and plan of care. [x]         Patient/family agree to work toward stated goals and plan of care. []         Patient understands intent and goals of therapy, but is neutral about his/her participation. []         Patient is unable to participate in goal setting and plan of care.     Thank you for this referral.  Kobe Hernandez, PT  6/8/2021

## 2021-06-08 NOTE — REHAB NOTE
SHIFT CHANGE NOTE FOR [place]    Bedside and Verbal shift change report given to Dom Perkins (oncoming nurse) by Ketan Solomon (offgoing nurse). Report included the following information SBAR, Kardex, MAR and Recent Results.     Situation:   Code Status: Full Code   Reason for Admission: [address] Day: 1   Problem List:   Hospital Problems  Date Reviewed: 6/8/2021        Codes Class Noted POA    Mixed hyperlipidemia (Chronic) ICD-10-CM: D10.4  ICD-9-CM: 272.2  Unknown Yes        Acute embolic stroke ([facility] LKS-38-JE: I63.9  ICD-9-CM: 434.11  5/23/2021 Yes    Overview Signed 6/8/2021 12:37 AM by Amrit Winston MD     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, brainstem and cerebellum) without residual deficit             Anticoagulated by anticoagulation treatment ICD-10-CM: Z79.01  ICD-9-CM: V58.61  5/19/2021 Yes    Overview Signed 6/7/2021 10:48 PM by Amrit Winston MD     On Apixaban             On clopidogrel therapy ICD-10-CM: Z79.01  ICD-9-CM: V58.61  5/19/2021 Yes        * (Principal) Non-ST elevation (NSTEMI) myocardial infarction [facility]) ICD-10-CM: I21.4  ICD-9-CM: 410.70  5/17/2021 Yes        Acute on chronic heart failure with preserved ejection fraction (HFpEF) ([facility] [place] ICD-10-CM: I50.33  ICD-9-CM: 428.23  5/17/2021 Yes        Paroxysmal atrial fibrillation (HCC) ICD-10-CM: I48.0  ICD-9-CM: 427.31  5/17/2021 Yes        Leukocytosis ICD-10-CM: Y69.199  ICD-9-CM: 288.60  5/17/2021 Yes    Overview Signed 6/8/2021 12:42 AM by Amrit Winston MD     Attributed to reactive leukocytosis due to NSTEMI             Essential hypertension (Chronic) ICD-10-CM: I10  ICD-9-CM: 401.9  Unknown Yes              Background:   Past Medical History:   Past Medical History:   Diagnosis Date    Acute embolic stroke ([facility] [place] 4/37/9475    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, brainstem and cerebellum) without residual deficit    Anticoagulated by anticoagulation treatment 5/19/2021    On Apixaban    Benign prostatic hyperplasia with urinary retention     Chronic obstructive pulmonary disease (COPD) ([facility])     Coronary artery disease involving native coronary artery of native heart     Essential hypertension     Heart failure with preserved left ventricular function (HFpEF) ([facility])     2D echocardiogram (5/18/2021) showed EF 50%; concentric LV hypertrophy with a severely thickened septal wall and mildly thickened posterior wall; left atrial chamber is severely enlarged; right atrial chamber volume is moderately enlarged; no mass, shunts, or thrombi.      History of carotid stenosis     History of gross hematuria 5/26/2021    Attributed to Taveras trauma while patient was altered    History of renal artery stenosis     Leukocytosis 5/17/2021    Attributed to reactive leukocytosis due to NSTEMI    Malignant neoplasm of lower lobe of right lung ([facility])     Non-ST elevation (NSTEMI) myocardial infarction ([facility] 5/17/2021    On clopidogrel therapy 5/19/2021    Paroxysmal atrial fibrillation ([facility] 5/17/2021    Urinary retention       Patient taking anticoagulants Eliquis   Patient has a defibrillator: no    Assessment:   Changes in Assessment throughout shift: none     Patient has central line: no Reasons if yes: na  Insertion date:na Last dressing date:na   Patient has Taveras Cath: no Reasons if yes: na  Insertion date:na     Last Vitals:     Vitals:    06/08/21 0811 06/08/21 1113 06/08/21 1116 06/08/21 1655   BP: (!) 158/92 (!) 150/84 (!) 139/90 132/83   Pulse: 87 81 (!) 106 93   Resp: 18   16   Temp: 97.7 °F (36.5 °C)   98.8 °F (37.1 °C)   SpO2: 96% 98% 95% 96%   Weight:       Height:            PAIN    Pain Assessment    Pain Intensity 1: 0 (06/08/21 1619) Pain Intensity 1: 2 (12/29/14 1105)    Pain Location 1: Foot Pain Location 1: Abdomen    Pain Intervention(s) 1: Medication (see MAR) Pain Intervention(s) 1: Medication (see MAR)  Patient Stated Pain Goal: 0 Patient Stated Pain Goal: 0  o Intervention effective: no c/o pain  o Other actions taken for pain: na     Skin Assessment  Skin color    Condition/Temperature    Integrity    Turgor    Weekly Pressure Ulcer Documentation  Pressure  Injury Documentation: No Pressure Injury Noted-Pressure Ulcer Prevention Initiated  Wound Prevention & Protection Methods  Orientation of wound Orientation of Wound Prevention: Posterior  Location of Prevention Location of Wound Prevention: Buttocks, Sacrum/Coccyx  Dressing Present Dressing Present : No  Dressing Status    Wound Offloading Wound Offloading (Prevention Methods): Bed, pressure reduction mattress     INTAKE/OUPUT  Date 06/07/21 0700 - 06/08/21 0659 06/08/21 0700 - 06/09/21 0659   Shift 4921-8097 2759-5582 24 Hour Total 2496-7396 4797-3643 24 Hour Total   INTAKE   P.O.    750  750     P. O.    750  750   Shift Total(mL/kg)    750(10.1)  750(10.1)   OUTPUT   Urine  550(0.6) 550(0.3)        Urine Voided  550 550        Urine Occurrence(s)  2 x 2 x 3 x  3 x   Stool           Stool Occurrence(s)  0 x 0 x 1 x  1 x   Shift Total(mL/kg)  550(7.4) 550(7.4)      NET  -550 -550 750  750   Weight (kg)  73.9 73.9 73.9 73.9 73.9       Recommendations:  1. Patient needs and requests:emptying the urinal ,blanket    2. Diet: Cardiac Regular    3. Pending tests/procedures: labs    4. Functional Level/Equipment: assist x 2 / wheelchair    5. Estimated Discharge Date: TBD Posted on Whiteboard in Patients Room: yes    HEALS Safety Check    A safety check occurred in the patient's room between off going nurse and oncoming nurse listed above.     The safety check included the below items  Area Items   H  High Alert Medications - Verify all high alert medication drips (heparin, PCA, etc.)   E  Equipment - Suction is set up for ALL patients (with laila)  - Red plugs utilized for all equipment (IV pumps, etc.)  - WOWs wiped down at end of shift.  - Room stocked with oxygen, suction, and other unit-specific supplies   A  Alarms - Bed alarm is set for fall risk patients  - Ensure chair alarm is in place and activated if patient is up in a chair   L  Lines - Check IV for any infiltration  - Taveras bag is empty if patient has a Taveras   - Tubing and IV bags are labeled   S  Safety   - Room is clean, patient is clean, and equipment is clean. - Hallways are clear from equipment besides carts. - Fall bracelet on for fall risk patients  - Ensure room is clear and free of clutter  - Suction is set up for ALL patients (with yanker)  - Hallways are clear from equipment besides carts.    - Isolation precautions followed, supplies available outside room, sign posted

## 2021-06-09 NOTE — PROGRESS NOTES
Problem: Mobility Impaired (Adult and Pediatric)  Goal: *Therapy Goal (Edit Goal, Insert Text)  Description: Physical Therapy Short Term Goals  Initiated 6/8/2021 and to be accomplished within 7 day(s) on 6/15/2021  1. Patient will move from supine to sit and sit to supine , scoot up and down, and roll side to side in bed with modified independence. 2.  Patient will transfer from bed to chair and chair to bed with standby assistance using the least restrictive device. 3.  Patient will perform sit to stand with supervision/set-up. 4.  Patient will ambulate with CGA for 150 feet with the least restrictive device. 5.  Patient will ascend/descend 3 stairs with no handrail(s) with moderate assistance using AD as appropriate. Physical Therapy Long Term Goals  Initiated 6/8/2021 and to be accomplished within 14 day(s) on 6/22/2021  1. Patient will move from supine to sit and sit to supine , scoot up and down, and roll side to side in bed with independence. 2.  Patient will transfer from bed to chair and chair to bed with modified independence using the least restrictive device. 3.  Patient will perform sit to stand with modified independence. 4.  Patient will ambulate with modified independence for 150 feet with the least restrictive device. 5.  Patient will ascend/descend 3 stairs with no handrail(s) with minimal assistance/contact guard assist and appropriate AD for ease of safe entry/exit of home. 6.  Patient will ascend/descend 7 stairs with one railing with supervision for safety getting to 2nd floor bedroom. Outcome: Progressing Towards Goal   PHYSICAL THERAPY TREATMENT    Patient: Asuncion Machado (52 y.o. male)  Date: 6/9/2021  Diagnosis: Non-ST elevation (NSTEMI) myocardial infarction [facility]) [I21.4] Non-ST elevation (NSTEMI) myocardial infarction [facility])  Precautions: Fall  Chart, physical therapy assessment, plan of care and goals were reviewed.     Time In:1332  Time Out:1430    Patient seen for: Wheelchair mobility;Transfer training;Gait training; Therapeutic exercise    Pain:  Pt pain was reported as no c/o pre-treatment. Pt pain was reported as no c/o post-treatment. Intervention: NA     Patient identified with name and : yes     SUBJECTIVE:      Pt reports \"doing ok\"    OBJECTIVE DATA SUMMARY:    Objective:     BED/MAT MOBILITY Daily Assessment     Supine to Sit : 5 (Supervision)  Pt presents supine in bed with HOB elevated and performed supine to sit with Supervision. TRANSFERS Daily Assessment     Transfer Type: Other  Other: stand step txfr without AD  Transfer Assistance : 4 (Contact guard assistance)  Sit to Stand Assistance: Contact guard assistance  Pt performed sit to stand from bed and w/c with CGA and v/c for proper hand placement for push off arm rests. Pt performed stand step txfr bed to w/c with CGA and pt using arm rests for UE support. GAIT Daily Assessment    Gait Description (WDL)      Gait Abnormalities Decreased step clearance    Assistive device Walker, rolling;Gait belt    Ambulation assistance - level surface 4 (Contact guard assistance)    Distance 150 Feet (ft) (and 80ft)    Ambulation assistance- uneven surface      Comments Pt ambulated 80ft and 150ft with RW and CGA, w/c follow for second trial. Pt demo'd decreased B step clearance and required v/c for erect posture and remaining within base of RW. Pt ambulated 56ft back to room at end of session with RW and CGA. STEPS/STAIRS Daily Assessment     Steps/Stairs ambulated 4 (6\" steps)    Assistance Required 4 (Minimal assistance)    Rail Use Both    Comments  Pt negotiated up and down 4 6\" steps with BHR and min assist with v/c for proper sequencing and to prevent pulling up on HR.      Curbs/Ramps  NT        BALANCE Daily Assessment     Sitting - Static: Good (unsupported)  Sitting - Dynamic: Good (unsupported)  Standing - Static: Fair  Standing - Dynamic : Impaired WHEELCHAIR MOBILITY Daily Assessment     Able to Propel (ft): 56 feet  Functional Level:  (supervision)  Curbs/Ramps Assist Required (FIM Score): 0 (Not tested)  Wheelchair Setup Assist Required : 5 (Supervision/setup)  Wheelchair Management: Manages left brake;Manages right brake  Pt propelled w/c from room to gym with BLE and BUKANG with supervision. THERAPEUTIC EXERCISES Daily Assessment       Standing marching x15 and heel raises x15        ASSESSMENT:  Pt demo'd improved balance and tolerance with gait and stair negotiation. Progression toward goals:  []      Improving appropriately and progressing toward goals  [x]      Improving slowly and progressing toward goals  []      Not making progress toward goals and plan of care will be adjusted      PLAN:  Patient continues to benefit from skilled intervention to address the above impairments. Continue treatment per established plan of care. Discharge Recommendations:  Home Health  Further Equipment Recommendations for Discharge:  rolling walker      Estimated Discharge Date: 6/22/2021    Activity Tolerance:   fair  Please refer to the flowsheet for vital signs taken during this treatment.     After treatment:   [x] Patient left in no apparent distress in bed  [] Patient left in no apparent distress sitting up in chair  [] Patient left in no apparent distress in w/c mobilizing under own power  [] Patient left in no apparent distress dining area  [] Patient left in no apparent distress mobilizing under own power  [x] Call bell left within reach  [] Nursing notified  [] Caregiver present  [x] Bed alarm activated   [] Chair alarm activated      Petar Medley PTA  6/9/2021

## 2021-06-09 NOTE — PROGRESS NOTES
Problem: Self Care Deficits Care Plan (Adult)  Goal: *Therapy Goal (Edit Goal, Insert Text)  Description: Occupational Therapy Goals   Long Term Goals  Initiated 2021 and to be accomplished within 2 week(s) 2021  1. Pt will perform self-feeding with Alisa. 2. Pt will perform grooming with Alisa. 3. Pt will perform UB bathing with Alisa  4. Pt will perform LB bathing with Alisa. 5. Pt will perform tub/shower transfer with Alisa. 6. Pt will perform UB dressing with Alisa. 7. Pt will perform LB dressing with Alisa. 8. Pt will perform toileting task with Alisa. 9. Pt will perform toilet transfer with Alisa. 10.  Pt will perform an IADL task with good safety and Alisa. Short Term Goals   Initiated 2021 and to be accomplished within 7 day(s) 6/15/2021  1. Pt will perform self-feeding with S   2. Pt will perform grooming with S.  3. Pt will perform UB bathing with S.  4. Pt will perform LB bathing with S  5. Pt will perform tub/shower transfer with S.  6. Pt will perform UB dressing with S.  7. Pt will perform LB dressing with S  8. Pt will perform toileting task with S.  9. Pt will perform toilet transfer with S.           Outcome: Progressing Towards Goal    Occupational Therapy TREATMENT    Patient: Mabel Herron   80 y.o. Patient identified with name and : Yes    Date: 2021    First Tx Session  Time In: 12  Time Out[de-identified] 6100    Diagnosis: Non-ST elevation (NSTEMI) myocardial infarction [facility]) [I21.4]   Precautions: Fall  Chart, occupational therapy assessment, plan of care, and goals were reviewed. Pain:  Pt reports 0/10 pain or discomfort prior to treatment. Pt reports 0/10 pain or discomfort post treatment. Intervention Provided: NA      SUBJECTIVE:   Patient stated  They gave me some mirilax this morning and now I'm feeling a little woozy and light headed.     OBJECTIVE DATA SUMMARY:     THERAPEUTIC ACTIVITY Daily Assessment    Clothespin tree in stance with varied resistive clips - SBA for sit to stand. Stood 4 minutes to don clips on tree with no rest break - SBA. HR inc to 111; provided rest break. Stood to [employer] with SBA for 2 minutes. THERAPEUTIC EXERCISE Daily Assessment         IADL Daily Assessment         NMRE Daily Assessment         FEEDING/EATING Daily Assessment          GROOMING Daily Assessment     Teeth brushing - 5, SBA in stance at the sink - utilized w/c to get to sink, then stood to complete teeth brushing with w/c behind for safety - stood for 4 minutes without break     UPPER BODY BATHING Daily Assessment          LOWER BODY BATHING Daily Assessment          TOILETING Daily Assessment    Toileting  Toileting Assistance (FIM Score): 5 (Supervision)     UPPER BODY DRESSING Daily Assessment          LOWER BODY DRESSING Daily Assessment          MOBILITY/TRANSFERS Daily Assessment    Functional Transfers  Toilet Transfer : Squat pivot transfer  Amount of Assistance Required: 4 (Contact guard assistance) (CGA for bed>BSC; SBA BSC>w/c)       ASSESSMENT:  Pt is making steady progress towards goal attainment. Pt c/o lightheadedness/dizziness upon arrival. BP reading 125/71, HR 80, SPO2 96%. Pt requested to perform activities in bedroom today d/t receiving mirilax prior to therapy. Pt completed functional transfer with CGA from bed to [facility], performing pericare with Supv and then transferred from [facility] to w/c with SBA. Pt continues to demonstrate decreased activity tolerance when performing self-care tasks and will continue to benefit from skilled OT    Progression toward goals:  [x]          Improving appropriately and progressing toward goals  []          Improving slowly and progressing toward goals  []          Not making progress toward goals and plan of care will be adjusted     PLAN:  Patient continues to benefit from skilled intervention to address the above impairments. Continue treatment per established plan of care.   Discharge Recommendations:  Home Health  Further Equipment Recommendations for Discharge:  bedside commode and transfer bench     Activity Tolerance:  Fair      Estimated LOS: 2 weeks - 6/22/2021    Please refer to the flowsheet for vital signs taken during this treatment. After treatment:   []  Patient left in no apparent distress sitting up in chair   [x]  Patient left in no apparent distress in bed  [x]  Call bell left within reach  [x]  Nursing notified  []  Caregiver present  []  Bed alarm activated    COMMUNICATION/EDUCATION:   [x] Home safety education was provided and the patient/caregiver indicated understanding. [] Patient/family have participated as able in goal setting and plan of care. [] Patient/family agree to work toward stated goals and plan of care. [] Patient understands intent and goals of therapy, but is neutral about his/her participation. [] Patient is unable to participate in goal setting and plan of care.       Marcy Villalobos, CLIFFORD, OTR/L

## 2021-06-09 NOTE — REHAB NOTE
SHIFT CHANGE NOTE FOR [place]    Bedside and Verbal shift change report given to Facundo Fernandez (oncoming nurse) by Regla Robertson (offgoing nurse). Report included the following information SBAR, Kardex, MAR and Recent Results.     Situation:   Code Status: Full Code   Reason for Admission: [address] Day: 2   Problem List:   Hospital Problems  Date Reviewed: 6/9/2021        Codes Class Noted POA    Mixed hyperlipidemia (Chronic) ICD-10-CM: V07.8  ICD-9-CM: 272.2  Unknown Yes        Acute embolic stroke ([facility] [id number]: I63.9  ICD-9-CM: 434.11  5/23/2021 Yes    Overview Signed 6/8/2021 12:37 AM by Leatha Reynolds MD     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, brainstem and cerebellum) without residual deficit             Anticoagulated by anticoagulation treatment ICD-10-CM: Z79.01  ICD-9-CM: V58.61  5/19/2021 Yes    Overview Signed 6/7/2021 10:48 PM by Leatha Reynolds MD     On Apixaban             On clopidogrel therapy ICD-10-CM: Z79.01  ICD-9-CM: V58.61  5/19/2021 Yes        * (Principal) Non-ST elevation (NSTEMI) myocardial infarction [facility]) ICD-10-CM: I21.4  ICD-9-CM: 410.70  5/17/2021 Yes        Acute on chronic heart failure with preserved ejection fraction (HFpEF) ([facility] ICD-10-CM: I50.33  ICD-9-CM: 428.23  5/17/2021 Yes        Paroxysmal atrial fibrillation (HCC) ICD-10-CM: I48.0  ICD-9-CM: 427.31  5/17/2021 Yes        Leukocytosis ICD-10-CM: M83.037  ICD-9-CM: 288.60  5/17/2021 Yes    Overview Signed 6/8/2021 12:42 AM by Leatha Reynolds MD     Attributed to reactive leukocytosis due to NSTEMI             Essential hypertension (Chronic) ICD-10-CM: I10  ICD-9-CM: 401.9  Unknown Yes              Background:   Past Medical History:   Past Medical History:   Diagnosis Date    Acute embolic stroke ([facility] 2/14/5444    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, brainstem and cerebellum) without residual deficit    Anticoagulated by anticoagulation treatment 5/19/2021    On Apixaban    Benign prostatic hyperplasia with urinary retention     Chronic obstructive pulmonary disease (COPD) ([facility])     Coronary artery disease involving native coronary artery of native heart     Essential hypertension     Heart failure with preserved left ventricular function (HFpEF) ([facility])     2D echocardiogram (5/18/2021) showed EF 50%; concentric LV hypertrophy with a severely thickened septal wall and mildly thickened posterior wall; left atrial chamber is severely enlarged; right atrial chamber volume is moderately enlarged; no mass, shunts, or thrombi.      History of carotid stenosis     History of gross hematuria 5/26/2021    Attributed to Taveras trauma while patient was altered    History of renal artery stenosis     Leukocytosis 5/17/2021    Attributed to reactive leukocytosis due to NSTEMI    Malignant neoplasm of lower lobe of right lung ([facility])     Non-ST elevation (NSTEMI) myocardial infarction ([facility] 5/17/2021    On clopidogrel therapy 5/19/2021    Paroxysmal atrial fibrillation ([facility] 5/17/2021    Urinary retention       Patient taking anticoagulants Eliquis   Patient has a defibrillator: no    Assessment:   Changes in Assessment throughout shift: none     Patient has central line: no Reasons if yes: na  Insertion date:na Last dressing date:na   Patient has Taveras Cath: no Reasons if yes: na  Insertion date:na     Last Vitals:     Vitals:    06/08/21 2233 06/09/21 0739 06/09/21 1631 06/09/21 1635   BP: 131/81 132/80 114/68    Pulse: 85 77 89    Resp: 18 16 17    Temp: 98 °F (36.7 °C) 97.1 °F (36.2 °C) 98.8 °F (37.1 °C)    SpO2: 100% 98% 97%    Weight:       Height:    5' 8\" (1.727 m)        PAIN    Pain Assessment    Pain Intensity 1: 0 (06/09/21 1803) Pain Intensity 1: 2 (12/29/14 1105)    Pain Location 1: Foot Pain Location 1: Abdomen    Pain Intervention(s) 1: Medication (see MAR) Pain Intervention(s) 1: Medication (see MAR)  Patient Stated Pain Goal: 0 Patient Stated Pain Goal: 0  o Intervention effective: no c/o pain  o Other actions taken for pain: na     Skin Assessment  Skin color    Condition/Temperature    Integrity    Turgor    Weekly Pressure Ulcer Documentation  Pressure  Injury Documentation: No Pressure Injury Noted-Pressure Ulcer Prevention Initiated  Wound Prevention & Protection Methods  Orientation of wound Orientation of Wound Prevention: Posterior  Location of Prevention Location of Wound Prevention: Buttocks, Sacrum/Coccyx  Dressing Present Dressing Present : No  Dressing Status    Wound Offloading Wound Offloading (Prevention Methods): Bed, pressure redistribution/air     INTAKE/OUPUT  Date 06/08/21 1900 - 06/09/21 0659 06/09/21 0700 - 06/10/21 0659   Shift 9134-1506 24 Hour Total 3171-4940 1875-1245 24 Hour Total   INTAKE   P.O.  750 400  400     P. O.  750 400  400   Shift Total(mL/kg)  750(10.1) 400(5.4)  400(5.4)   OUTPUT   Urine(mL/kg/hr) 400 400        Urine Voided 400 400        Urine Occurrence(s) 3 x 6 x 3 x  3 x   Stool          Stool Occurrence(s) 0 x 1 x 2 x  2 x   Shift Total(mL/kg) 400(5.4) 400(5.4)      NET -400 350 400  400   Weight (kg) 73.9 73.9 73.9 73.9 73.9       Recommendations:  1. Patient needs and requests:emptying the urinal ,pain med    2. Diet: Cardiac Regular    3. Pending tests/procedures: none    4. Functional Level/Equipment: assist x 2 / wheelchair    5. Estimated Discharge Date: TBD Posted on Whiteboard in Patients Room: yes    HEALS Safety Check    A safety check occurred in the patient's room between off going nurse and oncoming nurse listed above.     The safety check included the below items  Area Items   H  High Alert Medications - Verify all high alert medication drips (heparin, PCA, etc.)   E  Equipment - Suction is set up for ALL patients (with laila)  - Red plugs utilized for all equipment (IV pumps, etc.)  - WOWs wiped down at end of shift.  - Room stocked with oxygen, suction, and other unit-specific supplies   A  Alarms - Bed alarm is set for fall risk patients  - Ensure chair alarm is in place and activated if patient is up in a chair   L  Lines - Check IV for any infiltration  - Taveras bag is empty if patient has a Taveras   - Tubing and IV bags are labeled   S  Safety   - Room is clean, patient is clean, and equipment is clean. - Hallways are clear from equipment besides carts. - Fall bracelet on for fall risk patients  - Ensure room is clear and free of clutter  - Suction is set up for ALL patients (with yanker)  - Hallways are clear from equipment besides carts.    - Isolation precautions followed, supplies available outside room, sign posted

## 2021-06-09 NOTE — REHAB NOTE
[facility] PHYSICAL REHABILITATION  [address] [place] [address]  OVERALL PLAN OF CARE    Name: Ector Castaneda CSN: [id number]   Age: 80 y.o. MRN: [id number]   Sex: male Admit Date: 6/7/2021     Primary Rehabilitation Diagnosis  1. Impaired Mobility and ADLs  2. Non-ST elevation (NSTEMI) myocardial infarction  3. Coronary artery disease; History of CABG  4. S/P PCI to proximal SVG with 3.5 x 12 mm [place] drug-eluting stent; PCI to mid SVG with 3.5 x 34 mm Juan J drug-eluting stent; PCI to distal SVG with 3.5 x 15 mm Juan J drug-eluting stent;  Balloon angioplasty to distal SVG anastomosis (5/18/2021 - Dr. Fabricio Hidalgo)     Comorbidities  Patient Active Problem List   Diagnosis Code    Urinary retention R33.9    Essential hypertension I10    Non-ST elevation (NSTEMI) myocardial infarction ([facility] I21.4    Acute on chronic heart failure with preserved ejection fraction (HFpEF) ([facility]) I50.33    Paroxysmal atrial fibrillation ([facility]) I48.0    Anticoagulated by anticoagulation treatment Z79.01    Mixed hyperlipidemia E78.2    Benign prostatic hyperplasia with urinary retention N40.1, R33.8    Malignant neoplasm of lower lobe of right lung ([facility]) C34.31    Heart failure with preserved left ventricular function (HFpEF) ([facility]) I50.30    Coronary artery disease involving native coronary artery of native heart I25.10    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    Chronic obstructive pulmonary disease (COPD) ([facility]) J44.9    History of carotid stenosis Z86.79    History of renal artery stenosis Z86.79    On clopidogrel therapy Z79.01    History of gross hematuria N21.108    Acute embolic stroke ([facility]) N77.9    Leukocytosis D72.829         ANTICIPATED INTERVENTIONS THAT SUPPORT THE MEDICAL NECESSITY OF THIS ADMISSION:    I. Physical Therapy              A. Intensity: 1 hour per day              B. Frequency: 5 times per week              C. Duration: 2 weeks D. Long Term Goals:    1. Patient will move from supine to sit and sit to supine , scoot up and down, and roll side to side in bed with independence. 2.  Patient will transfer from bed to chair and chair to bed with modified independence using the least restrictive device. 3.  Patient will perform sit to stand with modified independence. 4.  Patient will ambulate with modified independence for 150 feet with the least restrictive device. 5.  Patient will ascend/descend 3 stairs with no handrail(s) with minimal assistance/contact guard assist and appropriate AD for ease of safe entry/exit of home. 6.  Patient will ascend/descend 7 stairs with one railing with supervision for safety getting to 2nd floor bedroom. E. Interventions: Interventions may include range of motion (AROM, PROM B LE/trunk), motor function (B LE/trunk strengthening/coordination), activity tolerance (vitals, oxygen saturation levels), bed mobility training, balance activities, gait training (progressive ambulation program), wheelchair management and functional transfer training. II. Occupational Therapy              A. Intensity: 1 hour per day              B. Frequency: 5 times per week              C. Duration: 2 weeks              D. Long Term Goals:    1. Pt will perform self-feeding with Alisa. 2. Pt will perform grooming with Alisa. 3. Pt will perform UB bathing with Alisa    4. Pt will perform LB bathing with Alisa. 5. Pt will perform tub/shower transfer with Alisa. 6. Pt will perform UB dressing with Alisa. 7. Pt will perform LB dressing with Alisa. 8. Pt will perform toileting task with Alisa. 9. Pt will perform toilet transfer with Alisa.     10.  Pt will perform an IADL task with good safety and Alisa.    E. Interventions: Interventions may include range of motion (AROM, PROM B UE), motor function (B UE/ strengthening/coordination), activity tolerance (vitals, oxygen saturation levels), balance training, ADL/IADL training and functional transfer training. III. Speech Therapy              A. Intensity: 1-2 times per day              B. Frequency: 4-7 times per week              C. Duration: 2-3 weeks              D. Long Term Goals:    1. Be oriented x 3 and recall events of the day, supervision. 2. Recall 3 words after 5 minutes, supervision. 3. Follow complex instructions with 80-90% accuracy. 4. Read 1-2 sentence passages and respond appropriately to questions or instructions, 90% accuracy. 5. Perform functional problem solving/reasoning tasks with 90% accuracy. E. Interventions: SLP will follow daily to address language processing (aural and written) and cognition per the above plan of care. PHYSICIAN'S ASSESSMENT OF FINDINGS:    Are the established goals sufficient for achieving the optimal level of function? [x]  Yes      []  No    What changes would you recommend to the goals as written? None      Are the interventions noted sufficient for achieving the optimal level of function? [x]  Yes      []  No    What changes would you recommend to the interventions noted? If therapy staff is unable to provide 3 hr of total therapy per day in 5 days due to medical issues or decreased patient tolerance, may modify treatment schedule to 15 hr/week.       Estimated length of stay: 2 weeks      Medical rehabilitation prognosis:    []  Excellent     [x]  Good     []  Fair     []  Guarded       Discharge Destination:     [x]  Home     []  [address]     []  [facility]     []  [facility]     []  [facility]     []  Other:       Signed:    Denys Lentz MD    June 9, 2021

## 2021-06-09 NOTE — REHAB NOTE
SHIFT CHANGE NOTE FOR [place]    Bedside and Verbal shift change report given to Franny Foster (oncoming nurse) by Eddie Casanova (offgoing nurse). Report included the following information SBAR, Kardex, MAR and Recent Results.     Situation:   Code Status: Full Code   Reason for Admission: [address] Day: 2   Problem List:   Hospital Problems  Date Reviewed: 6/8/2021        Codes Class Noted POA    Mixed hyperlipidemia (Chronic) ICD-10-CM: G63.4  ICD-9-CM: 272.2  Unknown Yes        Acute embolic stroke ([facility] IHJ-01-NZ: I63.9  ICD-9-CM: 434.11  5/23/2021 Yes    Overview Signed 6/8/2021 12:37 AM by Bianka Jones MD     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, brainstem and cerebellum) without residual deficit             Anticoagulated by anticoagulation treatment ICD-10-CM: Z79.01  ICD-9-CM: V58.61  5/19/2021 Yes    Overview Signed 6/7/2021 10:48 PM by Bianka Jones MD     On Apixaban             On clopidogrel therapy ICD-10-CM: Z79.01  ICD-9-CM: V58.61  5/19/2021 Yes        * (Principal) Non-ST elevation (NSTEMI) myocardial infarction [facility]) ICD-10-CM: I21.4  ICD-9-CM: 410.70  5/17/2021 Yes        Acute on chronic heart failure with preserved ejection fraction (HFpEF) ([facility] ICD-10-CM: I50.33  ICD-9-CM: 428.23  5/17/2021 Yes        Paroxysmal atrial fibrillation (HCC) ICD-10-CM: I48.0  ICD-9-CM: 427.31  5/17/2021 Yes        Leukocytosis ICD-10-CM: H64.827  ICD-9-CM: 288.60  5/17/2021 Yes    Overview Signed 6/8/2021 12:42 AM by Bianka Jones MD     Attributed to reactive leukocytosis due to NSTEMI             Essential hypertension (Chronic) ICD-10-CM: I10  ICD-9-CM: 401.9  Unknown Yes              Background:   Past Medical History:   Past Medical History:   Diagnosis Date    Acute embolic stroke ([facility] 5/40/0575    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, brainstem and cerebellum) without residual deficit    Anticoagulated by anticoagulation treatment 5/19/2021    On Apixaban    Benign prostatic hyperplasia with urinary retention     [facility] pulmonary disease [facility]D) ([facility]     Coronary artery disease involving native coronary artery of native heart     Essential hypertension     Heart failure with preserved left ventricular function (HFpEF) ([facility])     2D echocardiogram (5/18/2021) showed EF 50%; concentric LV hypertrophy with a severely thickened septal wall and mildly thickened posterior wall; left atrial chamber is severely enlarged; right atrial chamber volume is moderately enlarged; no mass, shunts, or thrombi.      History of carotid stenosis     History of gross hematuria 5/26/2021    Attributed to Taveras trauma while patient was altered    History of renal artery stenosis     Leukocytosis 5/17/2021    Attributed to reactive leukocytosis due to NSTEMI    Malignant neoplasm of lower lobe of right lung ([facility])     Non-ST elevation (NSTEMI) myocardial infarction ([facility] 5/17/2021    On clopidogrel therapy 5/19/2021    Paroxysmal atrial fibrillation ([facility] 5/17/2021    Urinary retention       Patient taking anticoagulants Eliquis   Patient has a defibrillator: no    Assessment:   Changes in Assessment throughout shift: none     Patient has central line: no Reasons if yes: na  Insertion date:na Last dressing date:na   Patient has Taveras Cath: no Reasons if yes: na  Insertion date:na     Last Vitals:     Vitals:    06/08/21 1113 06/08/21 1116 06/08/21 1655 06/08/21 2233   BP: (!) 150/84 (!) 139/90 132/83 131/81   Pulse: 81 (!) 106 93 85   Resp:   16 18   Temp:   98.8 °F (37.1 °C) 98 °F (36.7 °C)   SpO2: 98% 95% 96% 100%   Weight:       Height:            PAIN    Pain Assessment    Pain Intensity 1: 0 (06/09/21 0600) Pain Intensity 1: 2 (12/29/14 1105)    Pain Location 1: Foot Pain Location 1: Abdomen    Pain Intervention(s) 1: Medication (see MAR) Pain Intervention(s) 1: Medication (see MAR)  Patient Stated Pain Goal: 0 Patient Stated Pain Goal: 0  o Intervention effective: no c/o pain  o Other actions taken for pain: na     Skin Assessment  Skin color    Condition/Temperature    Integrity    Turgor    Weekly Pressure Ulcer Documentation  Pressure  Injury Documentation: No Pressure Injury Noted-Pressure Ulcer Prevention Initiated  Wound Prevention & Protection Methods  Orientation of wound Orientation of Wound Prevention: Posterior  Location of Prevention Location of Wound Prevention: Buttocks, Sacrum/Coccyx  Dressing Present Dressing Present : No  Dressing Status    Wound Offloading Wound Offloading (Prevention Methods): Bed, pressure redistribution/air     INTAKE/OUPUT  Date 06/08/21 0700 - 06/09/21 0659 06/09/21 0700 - 06/10/21 0659   Shift 6735-6193 8812-8419 24 Hour Total 1572-8563 0861-2778 24 Hour Total   INTAKE   P.O. 750  750        P. O. 750  750      Shift Total(mL/kg) 750(10.1)  750(10.1)      OUTPUT   Urine(mL/kg/hr)  400 400        Urine Voided  400 400        Urine Occurrence(s) 3 x 3 x 6 x      Stool           Stool Occurrence(s) 1 x 0 x 1 x      Shift Total(mL/kg)  400(5.4) 400(5.4)       -400 350      Weight (kg) 73.9 73.9 73.9 73.9 73.9 73.9       Recommendations:  1. Patient needs and requests:emptying the urinal ,pain med    2. Diet: Cardiac Regular    3. Pending tests/procedures: none    4. Functional Level/Equipment: assist x 2 / wheelchair    5. Estimated Discharge Date: TBD Posted on Whiteboard in Patients Room: yes    HEALS Safety Check    A safety check occurred in the patient's room between off going nurse and oncoming nurse listed above.     The safety check included the below items  Area Items   H  High Alert Medications - Verify all high alert medication drips (heparin, PCA, etc.)   E  Equipment - Suction is set up for ALL patients (with jeremiasker)  - Red plugs utilized for all equipment (IV pumps, etc.)  - WOWs wiped down at end of shift.  - Room stocked with oxygen, suction, and other unit-specific supplies   A  Alarms - Bed alarm is set for fall risk patients  - Ensure chair alarm is in place and activated if patient is up in a chair   L  Lines - Check IV for any infiltration  - Taveras bag is empty if patient has a Taveras   - Tubing and IV bags are labeled   S  Safety   - Room is clean, patient is clean, and equipment is clean. - Hallways are clear from equipment besides carts. - Fall bracelet on for fall risk patients  - Ensure room is clear and free of clutter  - Suction is set up for ALL patients (with yanker)  - Hallways are clear from equipment besides carts.    - Isolation precautions followed, supplies available outside room, sign posted

## 2021-06-09 NOTE — REHAB NOTE
SHIFT CHANGE NOTE FOR [place]    Bedside and Verbal shift change report given to Lossie Nageotte (oncoming nurse) by Alcario Fabry LPN (offgoing nurse). Report included the following information SBAR, Kardex, MAR and Recent Results.     Situation:   Code Status: Full Code   Reason for Admission: [address] Day: 1   Problem List:   Hospital Problems  Date Reviewed: 6/8/2021        Codes Class Noted POA    Mixed hyperlipidemia (Chronic) ICD-10-CM: T41.2  ICD-9-CM: 272.2  Unknown Yes        Acute embolic stroke ([facility] [address] LSS-48-MP: I63.9  ICD-9-CM: 434.11  5/23/2021 Yes    Overview Signed 6/8/2021 12:37 AM by Haile George MD     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, brainstem and cerebellum) without residual deficit             Anticoagulated by anticoagulation treatment ICD-10-CM: Z79.01  ICD-9-CM: V58.61  5/19/2021 Yes    Overview Signed 6/7/2021 10:48 PM by Haile George MD     On Apixaban             On clopidogrel therapy ICD-10-CM: Z79.01  ICD-9-CM: V58.61  5/19/2021 Yes        * (Principal) Non-ST elevation (NSTEMI) myocardial infarction [facility]) ICD-10-CM: I21.4  ICD-9-CM: 410.70  5/17/2021 Yes        Acute on chronic heart failure with preserved ejection fraction (HFpEF) ([facility] [address] ICD-10-CM: I50.33  ICD-9-CM: 428.23  5/17/2021 Yes        Paroxysmal atrial fibrillation (HCC) ICD-10-CM: I48.0  ICD-9-CM: 427.31  5/17/2021 Yes        Leukocytosis ICD-10-CM: V17.902  ICD-9-CM: 288.60  5/17/2021 Yes    Overview Signed 6/8/2021 12:42 AM by Haile George MD     Attributed to reactive leukocytosis due to NSTEMI             Essential hypertension (Chronic) ICD-10-CM: I10  ICD-9-CM: 401.9  Unknown Yes              Background:   Past Medical History:   Past Medical History:   Diagnosis Date    Acute embolic stroke ([facility] [address] 0/04/0345    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, brainstem and cerebellum) without residual deficit    Anticoagulated by anticoagulation treatment 5/19/2021    On Apixaban    Benign prostatic hyperplasia with urinary retention     Chronic obstructive pulmonary disease (COPD) ([facility])     Coronary artery disease involving native coronary artery of native heart     Essential hypertension     Heart failure with preserved left ventricular function (HFpEF) ([facility])     2D echocardiogram (5/18/2021) showed EF 50%; concentric LV hypertrophy with a severely thickened septal wall and mildly thickened posterior wall; left atrial chamber is severely enlarged; right atrial chamber volume is moderately enlarged; no mass, shunts, or thrombi.      History of carotid stenosis     History of gross hematuria 5/26/2021    Attributed to [place] trauma while patient was altered    History of renal artery stenosis     Leukocytosis 5/17/2021    Attributed to reactive leukocytosis due to NSTEMI    Malignant neoplasm of lower lobe of right lung ([facility])     Non-ST elevation (NSTEMI) myocardial infarction ([facility] [address] 5/17/2021    On clopidogrel therapy 5/19/2021    Paroxysmal atrial fibrillation ([facility] [address] 5/17/2021    Urinary retention       Patient taking anticoagulants Eliquis   Patient has a defibrillator: no    Assessment:   Changes in Assessment throughout shift: none     Patient has central line: no Reasons if yes: na  Insertion date:na Last dressing date:na   Patient has Taveras Cath: no Reasons if yes: na  Insertion date:na     Last Vitals:     Vitals:    06/08/21 1113 06/08/21 1116 06/08/21 1655 06/08/21 2233   BP: (!) 150/84 (!) 139/90 132/83 131/81   Pulse: 81 (!) 106 93 85   Resp:   16 18   Temp:   98.8 °F (37.1 °C) 98 °F (36.7 °C)   SpO2: 98% 95% 96% 100%   Weight:       Height:            PAIN    Pain Assessment    Pain Intensity 1: 0 (06/08/21 2000) Pain Intensity 1: 2 (12/29/14 1105)    Pain Location 1: Foot Pain Location 1: Abdomen    Pain Intervention(s) 1: Medication (see MAR) Pain Intervention(s) 1: Medication (see MAR)  Patient Stated Pain Goal: 0 Patient Stated Pain Goal: 0  o Intervention effective: no c/o pain  o Other actions taken for pain: na     Skin Assessment  Skin color    Condition/Temperature    Integrity    Turgor    Weekly Pressure Ulcer Documentation  Pressure  Injury Documentation: No Pressure Injury Noted-Pressure Ulcer Prevention Initiated  Wound Prevention & Protection Methods  Orientation of wound Orientation of Wound Prevention: Posterior  Location of Prevention Location of Wound Prevention: Buttocks, Sacrum/Coccyx  Dressing Present Dressing Present : No  Dressing Status    Wound Offloading Wound Offloading (Prevention Methods): Bed, pressure redistribution/air     INTAKE/OUPUT  Date 06/07/21 1900 - 06/08/21 0659 06/08/21 0700 - 06/09/21 0659   Shift 7352-0014 24 Hour Total 4234-0978 8488-4896 24 Hour Total   INTAKE   P.O.   750  750     P. O.   750  750   Shift Total(mL/kg)   750(10.1)  750(10.1)   OUTPUT   Urine(mL/kg/hr) 550 550        Urine Voided 550 550        Urine Occurrence(s) 2 x 2 x 3 x 1 x 4 x   Stool          Stool Occurrence(s) 0 x 0 x 1 x 0 x 1 x   Shift Total(mL/kg) 550(7.4) 550(7.4)      NET -550 -550 750  750   Weight (kg) 73.9 73.9 73.9 73.9 73.9       Recommendations:  1. Patient needs and requests:emptying the urinal ,blanket    2. Diet: Cardiac Regular    3. Pending tests/procedures: labs    4. Functional Level/Equipment: assist x 2 / wheelchair    5. Estimated Discharge Date: TBD Posted on Whiteboard in Patients Room: yes    HEALS Safety Check    A safety check occurred in the patient's room between off going nurse and oncoming nurse listed above.     The safety check included the below items  Area Items   H  High Alert Medications - Verify all high alert medication drips (heparin, PCA, etc.)   E  Equipment - Suction is set up for ALL patients (with yanker)  - Red plugs utilized for all equipment (IV pumps, etc.)  - WOWs wiped down at end of shift.  - Room stocked with oxygen, suction, and other unit-specific supplies   A  Alarms - Bed alarm is set for fall risk patients  - Ensure chair alarm is in place and activated if patient is up in a chair   L  Lines - Check IV for any infiltration  - Taveras bag is empty if patient has a Taveras   - Tubing and IV bags are labeled   S  Safety   - Room is clean, patient is clean, and equipment is clean. - Hallways are clear from equipment besides carts. - Fall bracelet on for fall risk patients  - Ensure room is clear and free of clutter  - Suction is set up for ALL patients (with laila)  - Hallways are clear from equipment besides carts.    - Isolation precautions followed, supplies available outside room, sign posted

## 2021-06-09 NOTE — PROGRESS NOTES
Problem: Falls - Risk of  Goal: *Absence of Falls  Description: Document Maurizio Carrera Fall Risk and appropriate interventions in the flowsheet. Outcome: Progressing Towards Goal  Note: Fall Risk Interventions:  Mobility Interventions: Bed/chair exit alarm    Mentation Interventions: Adequate sleep, hydration, pain control    Medication Interventions: Bed/chair exit alarm    Elimination Interventions: Call light in reach    History of Falls Interventions: Bed/chair exit alarm         Problem: Patient Education: Go to Patient Education Activity  Goal: Patient/Family Education  Outcome: Progressing Towards Goal     Problem: Pressure Injury - Risk of  Goal: *Prevention of pressure injury  Description: Document Nicko Scale and appropriate interventions in the flowsheet. Outcome: Progressing Towards Goal  Note: Pressure Injury Interventions:             Activity Interventions: PT/OT evaluation    Mobility Interventions: PT/OT evaluation    Nutrition Interventions: Document food/fluid/supplement intake    Friction and Shear Interventions: HOB 30 degrees or less                Problem: Patient Education: Go to Patient Education Activity  Goal: Patient/Family Education  Outcome: Progressing Towards Goal     Problem: Pain  Goal: *Control of Pain  Outcome: Progressing Towards Goal     Problem: Patient Education: Go to Patient Education Activity  Goal: Patient/Family Education  Outcome: Progressing Towards Goal     Problem: Inpatient Rehab (Adult)  Goal: *LTG: Avoids injury/falls 100% of time related to deficits  Outcome: Progressing Towards Goal  Goal: *LTG: Avoids infection 100% of time related to deficits  Outcome: Progressing Towards Goal  Goal: *LTG: Verbalize understanding of diagnosis and risk factors for recurring stroke  Outcome: Progressing Towards Goal  Goal: *LTG: Absence of DVT during hospitalization  Outcome: Progressing Towards Goal  Goal: *LTG: Maintains Skin Integrity With No Evidence of Pressure Injury 100% of Time  Outcome: Progressing Towards Goal  Goal: Interventions  Outcome: Progressing Towards Goal     Problem: Patient Education: Go to Patient Education Activity  Goal: Patient/Family Education  Outcome: Progressing Towards Goal     Problem: Patient Education: Go to Patient Education Activity  Goal: Patient/Family Education  Outcome: Progressing Towards Goal     Problem: Patient Education: Go to Patient Education Activity  Goal: Patient/Family Education  Outcome: Progressing Towards Goal     Problem: Patient Education: Go to Patient Education Activity  Goal: Patient/Family Education  Outcome: Progressing Towards Goal

## 2021-06-09 NOTE — PROGRESS NOTES
Problem: Neurolinguistics Impaired (Adult)  Goal: *Speech Goal: (INSERT TEXT)  Description: Long term goals  Patient will:  1. Be oriented x 3 and recall events of the day, supervision. 2. Recall 3 words after 5 minutes, supervision. 3. Follow complex instructions with 80-90% accuracy. 4. Read 1-2 sentence passages and respond appropriately to questions or instructions, 90% accuracy. 5. Perform functional problem solving/reasoning tasks with 90% accuracy. Short term goals (by 6/15/21)  Patient will:  1. Be oriented x 3 and recall events of the day, min assist.  2. Recall 3 words after 5 minutes, min assist.  3. Follow complex instructions with 70% accuracy. 4. Read 1 sentence passages and respond appropriately to questions or instructions, 60-70% accuracy. 5. Perform functional problem solving/reasoning tasks with 70% accuracy. Note:   Speech language pathology treatment    Patient: Christiane Ross (83 y.o. male)  Date: 2021  Diagnosis: Non-ST elevation (NSTEMI) myocardial infarction [facility]) [I21.4] Non-ST elevation (NSTEMI) myocardial infarction [facility])  Time in: 1130  Time Out: 1200  SUBJECTIVE:   Patient stated I have one problem I can't solve. .. my phone won't [employer]. OBJECTIVE:   Mental Status:  Mr. Kaity Montalvo was alert and oriented. Treatment & Interventions: The patient was seen for a 30 minute speech session. The following treatment tasks were presented:  Neuro-Linguistics:   Orientation:     Supervision  Recent memory:    Supervision  Recall 3 words: Moderate assistance  Read and respond (single sentences): 100%  Problem solvin% independently, 100% given minimal assistance  Following 3 step instructions:   100%      Response & Tolerance to Activities:  Mr. Kaity Montalvo was cooperative and responded to activities well.     Pain:  Pain Scale 1: Numeric (0 - 10)  Pain Orientation 1: Left;Right  Pain Description 1: Aching  After treatment:   [x]       Patient left in no apparent distress sitting up in chair  []       Patient left in no apparent distress in bed  [x]       Call bell left within reach  []       Nursing notified  []       Caregiver present  []       Bed alarm activated    ASSESSMENT:   Progression toward goals:  []       Improving appropriately and progressing toward goals  [x]       Improving slowly and progressing toward goals  []       Not making progress toward goals and plan of care will be adjusted    PLAN:   Patient continues to benefit from skilled intervention to address the above impairments. Continue treatment per established plan of care. Discharge Recommendations:   To Be Determined    Estimated LOS: Three weeks    [address] Pathology Student  Time Calculation: 30 mins

## 2021-06-09 NOTE — PROGRESS NOTES
[address]  [address] [place] [address] [address]     INPATIENT REHABILITATION  DAILY PROGRESS NOTE     Date: 6/9/2021    Name: Lawyer Valera Age / Sex: 80 y.o. / male   CSN: [id number] MRN: [id number]   Admit Date: 6/7/2021 Length of Stay: 2 days     Primary Rehab Diagnosis: Impaired Mobility and ADLs secondary to:  1. Non-ST elevation (NSTEMI) myocardial infarction  2. Coronary artery disease; History of CABG  3. S/P PCI to proximal SVG with 3.5 x 12 mm [place] drug-eluting stent; PCI to mid SVG with 3.5 x 34 mm [place] drug-eluting stent; PCI to distal SVG with 3.5 x 15 mm [place] drug-eluting stent; Balloon angioplasty to distal SVG anastomosis (5/18/2021 - Dr. Gloria Pierce)       Subjective:     Patient seen and examined. Blood pressure controlled. Patient's Complaint:   No significant medical complaints    Pain Control: stable, mild-to-moderate joint symptoms intermittently, reasonably well controlled by current meds      Objective:     Vital Signs:  Patient Vitals for the past 24 hrs:   BP Temp Pulse Resp SpO2   06/09/21 0739 132/80 97.1 °F (36.2 °C) 77 16 98 %   06/08/21 2233 131/81 98 °F (36.7 °C) 85 18 100 %   06/08/21 1655 132/83 98.8 °F (37.1 °C) 93 16 96 %        Physical Examination:  GENERAL SURVEY: Patient is awake, alert, oriented x 3, sitting comfortably on the chair, not in acute respiratory distress.   HEENT: pink palpebral conjunctivae, anicteric sclerae, no nasoaural discharge, moist oral mucosa  NECK: supple, no jugular venous distention, no palpable lymph nodes  CHEST/LUNGS: symmetrical chest expansion, good air entry, clear breath sounds  HEART: adynamic precordium, good S1 S2, no S3, irregularly irregular rhythm, no murmurs  ABDOMEN: flat, bowel sounds appreciated, soft, non-tender  EXTREMITIES: pink nailbeds, (+) bipedal edema, full and equal pulses, no calf tenderness   NEUROLOGICAL EXAM: The patient is awake, alert and oriented x3, able to answer questions fairly appropriately, able to follow 1 and 2 step commands. Able to tell time from the wall clock. Cranial nerves II-XII are grossly intact. No gross sensory deficit. Motor strength is 4 to 4+/5 on all 4 extremities. Current Medications:  Current Facility-Administered Medications   Medication Dose Route Frequency    clopidogreL (PLAVIX) tablet 75 mg  75 mg Oral DAILY WITH BREAKFAST    senna-docusate (PERICOLACE) 8.6-50 mg per tablet 2 Tablet  2 Tablet Oral PCD    polyethylene glycol (MIRALAX) packet 17 g  17 g Oral DAILY    acetaminophen (TYLENOL) tablet 650 mg  650 mg Oral Q4H PRN    bisacodyL (DULCOLAX) tablet 10 mg  10 mg Oral Q48H PRN    albuterol (PROVENTIL VENTOLIN) nebulizer solution 2.5 mg  2.5 mg Nebulization Q4H PRN    apixaban (ELIQUIS) tablet 5 mg  5 mg Oral BID    arformoteroL (BROVANA) neb solution 15 mcg  15 mcg Nebulization BID RT    budesonide (PULMICORT) 250 mcg/2ml nebulizer susp  250 mcg Nebulization BID    furosemide (LASIX) tablet 20 mg  20 mg Oral DAILY    nitroglycerin (NITROSTAT) tablet 0.4 mg  0.4 mg SubLINGual PRN    rosuvastatin (CRESTOR) tablet 10 mg  10 mg Oral DAILY    tamsulosin (FLOMAX) capsule 0.4 mg  0.4 mg Oral PCD    multivitamin, tx-iron-ca-min (THERA-M w/ IRON) tablet 1 Tablet  1 Tablet Oral DAILY    carvediloL (COREG) tablet 12.5 mg  12.5 mg Oral BID WITH MEALS       Allergies:   Allergies   Allergen Reactions    Lipitor [Atorvastatin] Rash    Norvasc [Amlodipine] Rash       Functional Progress:    PHYSICAL THERAPY    ON ADMISSION MOST RECENT   Wheelchair Mobility/Management  Able to Propel (ft): 40 feet (primarily using bilat UE to propel, occasionally LE)  Functional Level: 1 (mod/max A for steering and propulsion)  Curbs/Ramps Assist Required (FIM Score): 0 (Not tested)  Wheelchair Setup Assist Required : 2 (Maximal assistance)  Wheelchair Management: Manages left brake, Manages right brake (needing cues and assistance) Wheelchair Mobility/Management  Able to Propel (ft): 40 feet (primarily using bilat UE to propel, occasionally LE)  Functional Level: 1 (mod/max A for steering and propulsion)  Curbs/Ramps Assist Required (FIM Score): 0 (Not tested)  Wheelchair Setup Assist Required : 2 (Maximal assistance)  Wheelchair Management: Manages left brake, Manages right brake (needing cues and assistance)     Gait  Amount of Assistance: 3 (Moderate assistance)  Distance (ft): 48 Feet (ft) (48 ft without AD as per PLOF, additional 12 ft w/ RW)  Assistive Device:  (no AD initially, then RW) Gait  Amount of Assistance: 3 (Moderate assistance)  Distance (ft): 48 Feet (ft) (50 ft without AD as per PLOF, additional 12 ft w/ RW)  Assistive Device:  (no AD initially, then RW)     Balance-Sitting/Standing  Sitting - Static: Good (unsupported)  Sitting - Dynamic: Good (unsupported), Occassional, Fair (occasional)  Standing - Static: Fair  Standing - Dynamic : Impaired Balance-Sitting/Standing  Sitting - Static: Good (unsupported)  Sitting - Dynamic: Good (unsupported), Occassional, Fair (occasional)  Standing - Static: Fair  Standing - Dynamic : Impaired     Bed/Mat Mobility  Rolling Right : 5 (Supervision)  Rolling Left : 5 (Supervision)  Supine to Sit : 5 (Stand-by assistance)  Sit to Supine :  (SBA) Bed/Mat Mobility  Rolling Right : 5 (Supervision)  Rolling Left : 5 (Supervision)  Supine to Sit : 5 (Stand-by assistance)  Sit to Supine :  (SBA)     Transfers  Transfer Type: Other  Other: stand step without AD  Transfer Assistance : 4 (Minimal assistance)  Sit to Stand Assistance: Minimal assistance  Car Transfers: Moderate assistance (stand step without AD)  Car Type: car transfer simulator Transfers  Transfer Type: Other  Other: stand step without AD  Transfer Assistance : 4 (Minimal assistance)  Sit to Stand Assistance: Minimal assistance  Car Transfers:  Moderate assistance (stand step without AD)  Car Type: car transfer simulator     Steps or Stairs  Steps/Stairs Ambulated (#): 2  Level of Assist : 3 (Moderate assistance)  Rail Use: Both Steps or Stairs  Steps/Stairs Ambulated (#): 2  Level of Assist : 3 (Moderate assistance)  Rail Use: Both         Lab/Data Review:  No results found for this or any previous visit (from the past 24 hour(s)). Assessment:     Primary Rehabilitation Diagnosis  1. Impaired Mobility and ADLs  2. Non-ST elevation (NSTEMI) myocardial infarction  3. Coronary artery disease; History of CABG  4. S/P PCI to proximal SVG with 3.5 x 12 mm [place] drug-eluting stent; PCI to mid SVG with 3.5 x 34 mm Juan J drug-eluting stent; PCI to distal SVG with 3.5 x 15 mm [place] J drug-eluting stent; Balloon angioplasty to distal SVG anastomosis (5/18/2021 - Dr. Precious Heath)     Comorbidities  Patient Active Problem List   Diagnosis Code    Urinary retention R33.9    Essential hypertension I10    Non-ST elevation (NSTEMI) myocardial infarction ([facility] [address] I21.4    Acute on chronic heart failure with preserved ejection fraction (HFpEF) ([facility]) I50.33    Paroxysmal atrial fibrillation ([facility]) I48.0    Anticoagulated by anticoagulation treatment Z79.01    Mixed hyperlipidemia E78.2    Benign prostatic hyperplasia with urinary retention N40.1, R33.8    Malignant neoplasm of lower lobe of right lung ([facility]) C34.31    Heart failure with preserved left ventricular function (HFpEF) ([facility]) I50.30    Coronary artery disease involving native coronary artery of native heart I25.10    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    Chronic obstructive pulmonary disease (COPD) ([facility]) J44.9    History of carotid stenosis Z86.79    History of renal artery stenosis Z86.79    On clopidogrel therapy Z79.01    History of gross hematuria J22.296    Acute embolic stroke ([facility]) C14.0    Leukocytosis D72.829       Plan:     1. Justification for continued stay: Good progression towards established rehabilitation goals.     2. Medical Issues being followed closely:    [x] Fall and safety precautions     []  Wound Care     [x]  Bowel and Bladder Function     [x]  Fluid Electrolyte and Nutrition Balance     [x]  Pain Control      3. Issues that 24 hour rehabilitation nursing is following:    [x]  Fall and safety precautions     []  Wound Care     [x]  Bowel and Bladder Function     [x]  Fluid Electrolyte and Nutrition Balance     [x]  Pain Control      [x]  Assistance with and education on in-room safety with transfers to and from the bed, wheelchair, toilet and shower. 4. Acute rehabilitation plan of care:    [x]  Continue current care and rehab. [x]  Physical Therapy           [x]  Occupational Therapy           [x]  Speech Therapy     []  Hold Rehab until further notice     5. Medications:    [x]  MAR Reviewed     [x]  Continue Present Medications     6. DVT Prophylaxis:      []  Enoxaparin     []  Unfractionated Heparin     []  Warfarin     [x]  NOAC     []  MARTHA Stockings     []  Sequential Compression Device     []  None     7. Code status    [x]  Full code     []  Partial code     []  Do not intubate     []  Do not resuscitate     8. Orders:   > Non-ST elevation (NSTEMI) myocardial infarction; Coronary artery disease; History of CABG; S/P PCI to proximal SVG with 3.5 x 12 mm [place] drug-eluting stent; PCI to mid SVG with 3.5 x 34 mm [place] drug-eluting stent; PCI to distal SVG with 3.5 x 15 mm [place] J drug-eluting stent;  Balloon angioplasty to distal SVG anastomosis (5/18/2021 - Dr. Cristiano Ferrell)    > On 6/8/2021, increased Carvedilol from 12.5 mg to 25 mg PO BID with meals (8AM, 5PM)   > Continue:    > Carvedilol 25 mg PO BID with meals (8AM, 5PM)    > Clopidogrel 75 mg PO once daily with breakfast    > Rosuvastatin 75 mg PO once daily    > Nitroglycerin 0.4 mg SL PRN for chest pain    > Acute on chronic heart failure with preserved ejection fraction (HFpEF)   > 2D echocardiogram (5/18/2021) showed EF 50%; concentric LV hypertrophy with a severely thickened septal wall and mildly thickened posterior wall; left atrial chamber is severely enlarged; right atrial chamber volume is moderately enlarged; no mass, shunts, or thrombi.    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, brainstem and cerebellum) without residual deficit   > Continue:    > Clopidogrel 75 mg PO once daily with breakfast    > Rosuvastatin 75 mg PO once daily    > Benign prostatic hyperplasia with urinary retention   > Tamsulosin 0.4 mg PO once daily after dinner    > Chronic obstructive pulmonary disease (COPD)   > Continue:    > Arformoterol nebulization BID    > Budesonide nebulization BID    > Albuterol nebulization q 4 hr PRN for shortness of breath/wheezing    > Constipation   > Continue:    > Polyethylene glycol 17 grams in 8 oz water PO once daily    > Pericolace 2 tabs PO once daily after dinner     > Essential hypertension   > 2D echocardiogram (5/18/2021) showed EF 50%; concentric LV hypertrophy with a severely thickened septal wall and mildly thickened posterior wall; left atrial chamber is severely enlarged; right atrial chamber volume is moderately enlarged; no mass, shunts, or thrombi.    > On 6/8/2021, increased Carvedilol from 12.5 mg to 25 mg PO BID with meals (8AM, 5PM)   > Continue:    > Carvedilol 25 mg PO BID with meals (8AM, 5PM)    > Furosemide 20 mg PO once daily (9AM)    > Leukocytosis, reactive, attributed to NSTEMI   > Labs at [facility]:    > WBC count (5/17/2021) = 18.6    > WBC count (5/18/2021) = 21.4    > WBC count (5/19/2021) = 22.4    > WBC count (5/21/2021) = 27.3    > WBC count (5/22/2021) = 24.9    > WBC count (5/23/2021) = 30.2    > .  . .     > WBC count (6/2/2021) = 25.4    > WBC count (6/3/2021) = 22.5    > WBC count (6/4/2021) = 23.4    > WBC count (6/5/2021) = 21.7    > WBC count (6/6/2021) = 24.1    > WBC count (6/7/2021) = 20.0    > Work up done was negative for a source of infection   > WBC count (6/8/2021, on admission to the ARU) = 14.4   > No documented fever since admissiion    > Mixed hyperlipidemia   > Lipid profile (5/18/2021) showed TG 83, , HDL 50, LDL 93   > Continue Rosuvastatin 75 mg PO once daily    > Paroxysmal atrial fibrillation, anticoagulated on Apixaban   > On 6/8/2021, increased Carvedilol from 12.5 mg to 25 mg PO BID with meals (8AM, 5PM)   > Continue:    > Apixaban 5 mg PO BID    > Carvedilol 25 mg PO BID with meals (8AM, 5PM)    > COVID-19 ruled out by laboratory testing   > SARS-CoV-2 ([employer] m2000, [facility]) (collected 5/26/2021, resulted 5/28/2021): Not detected    > Analgesia   > Continue Acetaminophen 650 mg PO q 4 hr PRN for pain     > Diet:   > Specifications: Low fat/Low cholesterol/High fiber/No added salt   > Solids (consistency): Regular    > Liquids (consistency): Thin    > Fluid restriction: None      9. Personal Protective Equipment was used while interacting with patient including: goggles and KN95 face mask. Patient was using a surgical mask. 10. Patient's progress in rehabilitation and medical issues discussed with the patient. All questions answered to the best of my ability. Care plan discussed with patient and nurse. 11. Total clinical care time is 30 minutes, including review of chart including all labs, radiology, past medical history, and discussion with patient. Greater than 50% of my time was spent in coordination of care and counseling.       Signed:    Leila Cobian MD    June 9, 2021

## 2021-06-09 NOTE — INTERDISCIPLINARY ROUNDS
[facility] PHYSICAL REHABILITATION  [address] [place] [address], [address]    INPATIENT REHABILITATION  PRE-TEAM CONFERENCE SUMMARY     Date of Conference: 6/10/2021    Patient Information:        Name: Ector Castaneda Age / Sex: 80 y.o. / male   CSN: [id number] MRN: [id number]   Admit Date: 6/7/2021 Length of Stay: 2 days     Primary Rehabilitation Diagnosis  1. Impaired Mobility and ADLs  2. Non-ST elevation (NSTEMI) myocardial infarction  3. Coronary artery disease; History of CABG  4. S/P PCI to proximal SVG with 3.5 x 12 mm [place] drug-eluting stent; PCI to mid SVG with 3.5 x 34 mm [place] drug-eluting stent; PCI to distal SVG with 3.5 x 15 mm [place] drug-eluting stent;  Balloon angioplasty to distal SVG anastomosis (5/18/2021 - Dr. Fabricio Hidalgo)     Comorbidities  Patient Active Problem List   Diagnosis Code    Urinary retention R33.9    Essential hypertension I10    Non-ST elevation (NSTEMI) myocardial infarction ([facility] [address] I21.4    Acute on chronic heart failure with preserved ejection fraction (HFpEF) ([facility]) I50.33    Paroxysmal atrial fibrillation ([facility]) I48.0    Anticoagulated by anticoagulation treatment Z79.01    Mixed hyperlipidemia E78.2    Benign prostatic hyperplasia with urinary retention N40.1, R33.8    Malignant neoplasm of lower lobe of right lung ([facility]) C34.31    Heart failure with preserved left ventricular function (HFpEF) ([facility]) I50.30    Coronary artery disease involving native coronary artery of native heart I25.10    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    Chronic obstructive pulmonary disease (COPD) ([facility]) J44.9    History of carotid stenosis Z86.79    History of renal artery stenosis Z86.79    On clopidogrel therapy Z79.01    History of gross hematuria Z18.070    Acute embolic stroke ([facility]) A60.0    Leukocytosis D72.829          Therapy:     FIM SCORES Initial Assessment Weekly Progress Assessment 6/9/2021   Eating    6   Swallowing     Grooming 5 (setup at sink)  5-6   Bathing 5     5   Upper Body Dressing Functional Level: 5  5   Lower Body Dressing Functional Level: 4  Items Applied/Steps Completed: Underpants (3 steps)  4   Toileting Functional Level: 4 Toileting Assistance (FIM Score): 5 (Supervision)   Bladder 1 0   Bowel  0 0   Toilet Transfer [place] Toilet Transfer Score: 0 4 (Contact guard assistance) (CGA for bed>BSC; SBA BSC>w/c)   Tub/Shower Transfer [place] Tub or Shower Type: Tub/Shower combination (grab bars)  NT - per observation of other transfers, CGA/SBA with TTB      Comprehension Primary Mode of Comprehension: Auditory  Score: 6        Expression Primary Mode of Expression: Verbal  Score: 6        Social Interaction Score: 5     Problem Solving Score: 4     Memory Score: 5 4     FIM SCORES Initial Assessment Weekly Progress Assessment 6/9/2021   Bed/Chair/Wheelchair Transfers Transfer Type: Other  Other: stand step without AD  Transfer Assistance : 4 (Minimal assistance)  Sit to Stand Assistance: Minimal assistance  Car Transfers: Moderate assistance (stand step without AD)  Car Type: car transfer simulator Transfer Type:  Other  Other: stand step txfr without AD  Transfer Assistance : 4 (Contact guard assistance)  Sit to Stand Assistance: Contact guard assistance   Bed Mobility Rolling Right 5 (Supervision)   Rolling Left 5 (Supervision)   Supine to Sit 5 (Stand-by assistance)   Sit to Stand Minimal assistance   Sit to Supine  (SBA)    Rolling Right    NT   Rolling Left    NT   Supine to Sit   5 (Supervision)   Sit to Stand   Contact guard assistance   Sit to Supine    NT      Locomotion (W/C) Able to Propel (ft): 40 feet (primarily using bilat UE to propel, occasionally LE)  Functional Level: 1 (mod/max A for steering and propulsion)  Curbs/Ramps Assist Required (FIM Score): 0 (Not tested)  Wheelchair Setup Assist Required : 2 (Maximal assistance)  Wheelchair Management: Manages left brake, Manages right brake (needing cues and assistance) Function  (supervision)  Setup Assistance  5 (Supervision/setup)      Locomotion (W/C distance)   56 feet   Locomotion (Walk) 3 (Moderate assistance) 4 (Contact guard assistance)  Walker, rolling;Gait belt   Locomotion (Walk dist.) 48 Feet (ft) (48 ft without AD as per PLOF, additional 12 ft w/ RW) 150 Feet (ft) (and 80ft)   Steps/Stairs Steps/Stairs Ambulated (#): 2  Level of Assist : 3 (Moderate assistance)  Rail Use: Both  4 6\" steps with BHR and min assist         Nursing:     Neuro:   AAA&O x  4         Respiratory:   [x] WNL   [] O2 LPM:   Other:  Peripheral Vascular:   [] TEDS present   [] Edema present ____ Grade   Cardiac:   [x] WNL   [] Other  Genitourinary:   [x] continent   [] incontinent   [] mat[place]oros  Abdominal _______ LBM  GI: ______Cardiac_ Diet _Thin_____ Liquids _____ tube feeds  Musculoskeletal: ____ ROM Transfers _wheelchair____ Assistive Device Used  _min___ Level of Assistance  Skin Integumentary:   [x] Intact   [] Not Intact   __________Preventative Measures  Details______________________________________________________________  Pain: [x] Controlled   [] Not Controlled   Pain Meds:   [] Scheduled   [] PRN        Registered Dietitian / Nutrition:   No data found. Pt reported good appetite and meal intake PTA and since admission. Stated he usually eats small meals; currently eating 25% of his meals, which he states satisfies him. Likes the food. Encouraged increased meal intake, but pt stated he does not want to feel full and throw up. Agreeable to nutrition supplement; declined Ensure Enlive, but agreed to trying [employer]. Supplements:          [] Yes   [x] No      Amount of supplement consumed:  Not applicable       Intake/Output Summary (Last 24 hours) at 6/9/2021 1814  Last data filed at 6/9/2021 1104  Gross per 24 hour   Intake 650 ml   Output 400 ml   Net 250 ml                                Last bowel movement: 6/7      Interdisciplinary Team Goals:     1.  Discipline Physical Therapy    Goal  Pt will perform stand step txfr with RW and SBA. Barrier  decreased activity tolerance, decreased balance    Intervention  txfr training, gait training, bed mobility, stair training, therapeutic exercises    Goal written by:   HANK Sampson     2. Discipline  Occupational Therapy    Goal  To improve activity tolerance for functional participation in self-care skills. Barrier Decreased activity tolerance, dec participation in self-care skills, dec UE ROM/strength    Intervention  Education, therex, theract, ADL tasks, IADL tasks    Goal written by: CLIFFORD [place], OTR/L     3. Discipline  Speech Therapy    Goal  Patient will perform functional problem solving tasks with 70-80% accuracy. Barrier  Decreased cognition    Intervention  Cognitive retraining    Goal written by:  Sendy Fischer CCC-SLP     4. Discipline  Nursing    Goal Prevent falls    Barrier  Gen weakness    Intervention  Personal items in reach call light, water, bed in low position, non skid socks, yellow braclet    Goal written by:  Mimi Malin RN     5. Discipline  Clinical Psychology    Goal      Barrier      Intervention      Goal written by:       6. Discipline  Nutrition / Dietetics    Goal  - PO nutrition intake will meet >75% of patient estimated nutritional needs within the next 7 days. Barrier  early satiety    Intervention  - Add supplement: Magic Cup TID  - Encourage/ monitor po intake of meals and supplements. Monitor diet tolerance. Goal written by:  Amirah Jolly RD       Disposition / Discharge Planning:      Follow-up services:  [x] Physical Therapy             [] Occupational Therapy       [x] Speech Therapy           [] Skilled Nursing      [] Medical Social Worker   [] Aide        [] Outpatient      [] vs   [x] Home Health  [] vs       [] to progress to outpatient       [] with 24-hour supervision       [] with 24-hour assistance   [] Skilled Nursing Facility   DME recommendations:  RW   Estimated discharge date: 6/22/2021   Discharge Location:  [] Home  [] versus    [] [facility]    [] [address]   [] Other:           Electronic Signatures:      Signature Date Signed   Physical Therapist    HANK Noe PT, DPT  6/9/2021  6/10/2021   Occupational Therapist   Ricci Vincent OTD, OTR/L 6/9/2021   Speech Therapist   Jay Sosa, [address]  6/9/21   Recreational Therapist         Nursing    Shweta Dennison, KULDIP  6/10/21   Dietitian    Parul Kam, [address]  6/9/2021   Clinical Psychologist         Physician    Catrina Glez MD   6/9/2021        JOSIE Pimentel  6/9/2021     Opportunity to share with family/caregiver[] YES [] NO    Relationship to patient____________________________________________________      The above information has been reviewed with the patient in a language that they can understand. Opportunity for comments and questions has been provided and a signed attestation has been scanned into the \"media tab\" of the EMR.       Patient Signature: ______________________________________________________    Date Signed: __________________________________________________________

## 2021-06-10 NOTE — INTERDISCIPLINARY ROUNDS
[facility] PHYSICAL REHABILITATION  [address] [place] [address], [address]    INPATIENT REHABILITATION  TEAM CONFERENCE SUMMARY     Date of Conference: 6/10/2021    Patient Information:        Name: Jodi Martin Age / Sex: 80 y.o. / male   CSN: [id number] MRN: [id number]   Admit Date: 6/7/2021 Length of Stay: 3 days     Primary Rehabilitation Diagnosis  1. Impaired Mobility and ADLs  2. Non-ST elevation (NSTEMI) myocardial infarction  3. Coronary artery disease; History of CABG  4. S/P PCI to proximal SVG with 3.5 x 12 mm Juan J drug-eluting stent; PCI to mid SVG with 3.5 x 34 mm Juan J drug-eluting stent; PCI to distal SVG with 3.5 x 15 mm Peculiar drug-eluting stent;  Balloon angioplasty to distal SVG anastomosis (5/18/2021 - Dr. Precious Heath)     Comorbidities  Patient Active Problem List   Diagnosis Code    Urinary retention R33.9    Essential hypertension I10    Non-ST elevation (NSTEMI) myocardial infarction ([facility] [address] I21.4    Acute on chronic heart failure with preserved ejection fraction (HFpEF) ([facility]) I50.33    Paroxysmal atrial fibrillation ([facility]) I48.0    Anticoagulated by anticoagulation treatment Z79.01    Mixed hyperlipidemia E78.2    Benign prostatic hyperplasia with urinary retention N40.1, R33.8    Malignant neoplasm of lower lobe of right lung ([facility]) C34.31    Heart failure with preserved left ventricular function (HFpEF) ([facility]) I50.30    Coronary artery disease involving native coronary artery of native heart I25.10    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    Chronic obstructive pulmonary disease (COPD) ([facility]) J44.9    History of carotid stenosis Z86.79    History of renal artery stenosis Z86.79    On clopidogrel therapy Z79.01    History of gross hematuria N80.020    Acute embolic stroke ([facility]) V96.8    Leukocytosis D72.829          Therapy:     FIM SCORES Initial Assessment Weekly Progress Assessment 6/10/2021   Eating    6   Swallowing     Grooming 5 (setup at sink) Grooming Assistance : 6 (Modified independent)  Comments:  (washed hands at sink seated in w/c)5-6   Bathing 5     5   Upper Body Dressing Functional Level: 5  5   Lower Body Dressing Functional Level: 4  Items Applied/Steps Completed: Underpants (3 steps)  4   Toileting Functional Level: 4 Toileting Assistance (FIM Score): 5 (Supervision)   Bladder 1 0   Bowel  0 0   Toilet Transfer Towner Toilet Transfer Score: 0 5 (stand-by assistance)   Tub/Shower Transfer Towner Tub or Shower Type: Tub/Shower combination (grab bars)  NT - per observation of other transfers, CGA/SBA with TTB      Comprehension Primary Mode of Comprehension: Auditory  Score: 6 Auditory  6   Expression Primary Mode of Expression: Verbal  Score: 6 Verbal  6   Social Interaction Score: 5 5   Problem Solving Score: 4 4   Memory Score: 5 4     FIM SCORES Initial Assessment Weekly Progress Assessment 6/10/2021   Bed/Chair/Wheelchair Transfers Transfer Type: Other  Other: stand step without AD  Transfer Assistance : 4 (Minimal assistance)  Sit to Stand Assistance: Minimal assistance  Car Transfers:  Moderate assistance (stand step without AD)  Car Type: car transfer simulator Sit to Stand Assistance: Contact guard assistance   Bed Mobility Rolling Right 5 (Supervision)   Rolling Left 5 (Supervision)   Supine to Sit 5 (Stand-by assistance)   Sit to Stand Minimal assistance   Sit to Supine  (SBA)    Rolling Right    NT   Rolling Left    NT   Supine to Sit       Sit to Stand   Contact guard assistance   Sit to Supine    NT      Locomotion (W/C) Able to Propel (ft): 40 feet (primarily using bilat UE to propel, occasionally LE)  Functional Level: 1 (mod/max A for steering and propulsion)  Curbs/Ramps Assist Required (FIM Score): 0 (Not tested)  Wheelchair Setup Assist Required : 2 (Maximal assistance)  Wheelchair Management: Manages left brake, Manages right brake (needing cues and assistance) Function  (supervision)  Setup Assistance  5 (Supervision/setup)      Locomotion (W/C distance)   56 feet   Locomotion (Walk) 3 (Moderate assistance) 4 (Minimal assistance)  Gait belt   Locomotion (Walk dist.) 48 Feet (ft) (48 ft without AD as per PLOF, additional 12 ft w/ RW) 150 Feet (ft) (and 150ft with RW and CGA)   Steps/Stairs Steps/Stairs Ambulated (#): 2  Level of Assist : 3 (Moderate assistance)  Rail Use: Both  4 6\" steps with BHR and min assist         Nursing:     Neuro:   AAA&O x            Respiratory:   [] WNL   [] O2 LPM:   Other:  Peripheral Vascular:   [] TEDS present   [] Edema present ____ Grade   Cardiac:   [] WNL   [] Other  Genitourinary:   [] continent   [] incontinent   [] matamoros  Abdominal _______ LBM  GI: _______ Diet ______ Liquids _____ tube feeds  Musculoskeletal: ____ ROM Transfers _____ Assistive Device Used  ____ Level of Assistance  Skin Integumentary:   [] Intact   [] Not Intact   __________Preventative Measures  Details______________________________________________________________  Pain: [] Controlled   [] Not Controlled   Pain Meds:   [] Scheduled   [] PRN        Registered Dietitian / Nutrition:   No data found. Pt reported good appetite and meal intake PTA and since admission. Stated he usually eats small meals; currently eating 25% of his meals, which he states satisfies him. Likes the food. Encouraged increased meal intake, but pt stated he does not want to feel full and throw up. Agreeable to nutrition supplement; declined Ensure Enlive, but agreed to trying [employer]. Supplements:          [] Yes   [x] No      Amount of supplement consumed:  Not applicable     No intake or output data in the 24 hours ending 06/10/21 1434                             Last bowel movement: 6/7      Interdisciplinary Team Goals:     1. Discipline  Physical Therapy    Goal  Pt will perform stand step txfr with RW and SBA.      Barrier  decreased activity tolerance, decreased balance    Intervention  txfr training, gait training, bed mobility, stair training, therapeutic exercises    Goal written by:   HANK Linder     2. Discipline  Occupational Therapy    Goal  To improve activity tolerance for functional participation in self-care skills. Barrier Decreased activity tolerance, dec participation in self-care skills, dec UE ROM/strength    Intervention  Education, therex, theract, ADL tasks, IADL tasks    Goal written by: Lyndon Calderon OTD, OTR/L     3. Discipline  Speech Therapy    Goal  Patient will perform functional problem solving tasks with 70-80% accuracy. Barrier  Decreased cognition    Intervention  Cognitive retraining    Goal written by:  Maya Coates, CCC-SLP     4. Discipline  Nursing    Goal      Barrier      Intervention      Goal written by:       5. Discipline  Clinical Psychology    Goal      Barrier      Intervention      Goal written by:       6. Discipline  Nutrition / Dietetics    Goal  - PO nutrition intake will meet >75% of patient estimated nutritional needs within the next 7 days. Barrier  early satiety    Intervention  - Add supplement: Magic Cup TID  - Encourage/ monitor po intake of meals and supplements. Monitor diet tolerance. Goal written by:  Emma Wong RD       Disposition / Discharge Planning:      Follow-up services:  [x] Physical Therapy             [] Occupational Therapy       [x] Speech Therapy           [] Skilled Nursing      [] Medical Social Worker   [] Aide        [] Outpatient      [] vs   [x] Home Health  [] vs       [] to progress to outpatient       [] with 24-hour supervision       [] with 24-hour assistance   [] Raudel HONG recommendations:  RW   Estimated discharge date: 6/22/2021   Discharge Location:  [x] Home  [] versus    [] Raudel Gary    [] [address]   [] Other:           Interdisciplinary team rounds were held this PM with the following team members:       Name   Physical Therapist    Charlene Handley PT, DPT   Occupational Therapist    Marcus Salas MS OTR/L   Recreational Therapist    Yeni Spivey, CTRS   Nursing    Christina Tai, RN   Physician    Babar Joshi MD        Jodi Martin, MSW       Signed:  Babar Joshi MD    Rand 10, 2021

## 2021-06-10 NOTE — PROGRESS NOTES
Problem: Mobility Impaired (Adult and Pediatric)  Goal: *Therapy Goal (Edit Goal, Insert Text)  Description: Physical Therapy Short Term Goals  Initiated 6/8/2021 and to be accomplished within 7 day(s) on 6/15/2021  1. Patient will move from supine to sit and sit to supine , scoot up and down, and roll side to side in bed with modified independence. 2.  Patient will transfer from bed to chair and chair to bed with standby assistance using the least restrictive device. 3.  Patient will perform sit to stand with supervision/set-up. 4.  Patient will ambulate with CGA for 150 feet with the least restrictive device. 5.  Patient will ascend/descend 3 stairs with no handrail(s) with moderate assistance using AD as appropriate. Physical Therapy Long Term Goals  Initiated 6/8/2021 and to be accomplished within 14 day(s) on 6/22/2021  1. Patient will move from supine to sit and sit to supine , scoot up and down, and roll side to side in bed with independence. 2.  Patient will transfer from bed to chair and chair to bed with modified independence using the least restrictive device. 3.  Patient will perform sit to stand with modified independence. 4.  Patient will ambulate with modified independence for 150 feet with the least restrictive device. 5.  Patient will ascend/descend 3 stairs with no handrail(s) with minimal assistance/contact guard assist and appropriate AD for ease of safe entry/exit of home. 6.  Patient will ascend/descend 7 stairs with one railing with supervision for safety getting to 2nd floor bedroom. Outcome: Progressing Towards Goal   PHYSICAL THERAPY TREATMENT    Patient: Yazmin Clarke (52 y.o. male)  Date: 6/10/2021  Diagnosis: Non-ST elevation (NSTEMI) myocardial infarction [facility]) [I21.4] Non-ST elevation (NSTEMI) myocardial infarction [facility])  Precautions: Fall  Chart, physical therapy assessment, plan of care and goals were reviewed.     Time [id number]  Time [id number]    Patient seen for: Wheelchair mobility;Transfer training;Gait training;Balance activities (stair training)    Pain:  Pt pain was reported as 6/10 pre-treatment. Pt pain was reported as 0/10 post-treatment. Intervention: had tylenol before PT    Patient identified with name and : yes     SUBJECTIVE:      Pt reports right hip pain at start of PT but reports it subsiding by end of session. OBJECTIVE DATA SUMMARY:    Objective:     TRANSFERS Daily Assessment     Sit to Stand Assistance: Contact guard assistance  Pt performed sit to stand from w/c pushing up with BUE on arm rests. Pt required v/c for proper hand placement and backing up completely to w/c for stand to sit and when approaching w/c. GAIT Daily Assessment    Gait Description (WDL)      Gait Abnormalities Decreased step clearance    Assistive device Gait belt    Ambulation assistance - level surface 4 (Minimal assistance)    Distance 150 Feet (ft) (and 150ft with RW and CGA)    Ambulation assistance- uneven surface      Comments Pt ambulated 150ft with RW and CGA with gait belt and v/c for erect posture and remaining within base of RW. Pt ambulated 150ft without AD with min assist for balance, decreased B step lengths and decreased B foot clearance.          STEPS/STAIRS Daily Assessment     Steps/Stairs ambulated 6 (6\" steps)    Assistance Required 4 (Contact guard assistance)    Rail Use Both    Comments   Pt negotiated up and down 6 6\" steps with BHR and CGA for balance with step through pattern     Curbs/Ramps  NT        BALANCE Daily Assessment     Sitting - Static: Good (unsupported)  Sitting - Dynamic: Good (unsupported)  Standing - Static: Fair  Standing - Dynamic : Impaired        WHEELCHAIR MOBILITY Daily Assessment     Able to Propel (ft): 56 feet  Functional Level:  (supervision)  Curbs/Ramps Assist Required (FIM Score): 0 (Not tested)  Wheelchair Setup Assist Required : 5 (Supervision/setup)  Wheelchair Management: Manages left brake;Manages right brake   Pt propelled w/c from room to gym with BLE with supervision and increased time        THERAPEUTIC EXERCISES Daily Assessment       standing marching x15        ASSESSMENT:  Pt demo'd improved tolerance, decreased fatigue with gait and stair training. Pt ambulated without AD, requiring increased assistance for balance and v/c for erect posture. Progression toward goals:  []      Improving appropriately and progressing toward goals  [x]      Improving slowly and progressing toward goals  []      Not making progress toward goals and plan of care will be adjusted      PLAN:  Patient continues to benefit from skilled intervention to address the above impairments. Continue treatment per established plan of care. Discharge Recommendations:  Home Health  Further Equipment Recommendations for Discharge:  rolling walker      Estimated Discharge Date: 6/22/2021    Activity Tolerance:   Fair+  Please refer to the flowsheet for vital signs taken during this treatment.     After treatment:   [] Patient left in no apparent distress in bed  [x] Patient left in no apparent distress sitting up in chair  [] Patient left in no apparent distress in w/c mobilizing under own power  [] Patient left in no apparent distress dining area  [] Patient left in no apparent distress mobilizing under own power  [x] Call bell left within reach  [] Nursing notified  [] Caregiver present  [] Bed alarm activated   [x] Chair alarm activated      Cecily Mcmullen PTA  6/10/2021

## 2021-06-10 NOTE — PROGRESS NOTES
Problem: Neurolinguistics Impaired (Adult)  Goal: *Speech Goal: (INSERT TEXT)  Description: Long term goals  Patient will:  1. Be oriented x 3 and recall events of the day, supervision. 2. Recall 3 words after 5 minutes, supervision. 3. Follow complex instructions with 80-90% accuracy. 4. Read 1-2 sentence passages and respond appropriately to questions or instructions, 90% accuracy. 5. Perform functional problem solving/reasoning tasks with 90% accuracy. Short term goals (by 6/15/21)  Patient will:  1. Be oriented x 3 and recall events of the day, min assist.  2. Recall 3 words after 5 minutes, min assist.  3. Follow complex instructions with 70% accuracy. 4. Read 1 sentence passages and respond appropriately to questions or instructions, 60-70% accuracy. 5. Perform functional problem solving/reasoning tasks with 70% accuracy. Outcome: Progressing Towards Goal    Speech language pathology treatment    Patient: Richar Cowan (59 y.o. male)  Date: 6/10/2021  Diagnosis: Non-ST elevation (NSTEMI) myocardial infarction [facility]) [I21.4] Non-ST elevation (NSTEMI) myocardial infarction [facility])       Time In: 4:40  Time Out:  5:10     ASSESSMENT:  1) Pt educated on comps strategies to utilize to enhance delayed recall, ie: WRAP  Picture Retention: Given Matti for set up/ application of comp strategies, given a 5 minute delay pt was able recall information with 70% acc CHUCHO and increasing to 90% acc given min cues/ redirection. 2) Abstract reasoning (a) providing x3 reasons for various circumstance, ie moving or being late: 100% acc CHUCHO with extended time for processing. (b) Provide 2 similarities/differences of common objects without visual representation: 83% acc CHUCHO and increasing to 92% acc when provided with min-mod cues. 3) 1-2 step verbal problem solving: given x2 reps 67% acc CHUCHO and increasing to 83% acc given min-mod cues/redirection.       Progression toward goals:  []       Improving appropriately and progressing toward goals  [x]       Improving slowly and progressing toward goals  []       Not making progress toward goals and plan of care will be adjusted     PLAN:  Patient continues to benefit from skilled intervention to address the above impairments. Continue treatment per established plan of care. Discharge Recommendations: HH vs OP     SUBJECTIVE:   Patient stated This is pretty interesting. OBJECTIVE:   Mental Status:  Neurologic State: Alert  Orientation Level: Oriented X4  Cognition: Follows commands  Perception: Appears intact  Perseveration: No perseveration noted  Safety/Judgement: Fall prevention  Treatment & Interventions: Motor Speech:       Language Comprehension and Expression:     Verbal Expression     Neuro-Linguistics:       Language Co      Voice:     Response & Tolerance to Activities: Pt very receptive and cooperative. Pain:  Pre-Treatment:    0  Post-Treatment:  0    After treatment:   []       Patient assisted to gym for next session. []       Patient left in no apparent distress sitting up in chair. [x]       Patient left in no apparent distress in bed. [x]       Call bell left within reach. []       Nursing notified. []       Caregiver present. []       Bed alarm activated.     Estimated LOS: 2-3 weeks  Discharge recommendations: HH vs OP    Ney Mills, SLP   MA, CCC-SLP  Speech-Language Pathologist'    Time Calculation: 30 mins

## 2021-06-10 NOTE — PROGRESS NOTES
[address]  [address] [place] [address], [address]     INPATIENT REHABILITATION  DAILY PROGRESS NOTE     Date: 6/10/2021    Name: Yong Castro Age / Sex: 80 y.o. / male   CSN: [id number] MRN: [id number]   Admit Date: 6/7/2021 Length of Stay: 3 days     Primary Rehab Diagnosis: Impaired Mobility and ADLs secondary to:  1. Non-ST elevation (NSTEMI) myocardial infarction  2. Coronary artery disease; History of CABG  3. S/P PCI to proximal SVG with 3.5 x 12 mm [place] drug-eluting stent; PCI to mid SVG with 3.5 x 34 mm [place] drug-eluting stent; PCI to distal SVG with 3.5 x 15 mm [place] drug-eluting stent; Balloon angioplasty to distal SVG anastomosis (5/18/2021 - Dr. Adis Harris)       Subjective:     Patient seen and examined. Blood pressure controlled. Team conference was held at bedside this PM.     Patient's Complaint:   No significant medical complaints    Pain Control: stable, mild-to-moderate joint symptoms intermittently, reasonably well controlled by current meds      Objective:     Vital Signs:  Patient Vitals for the past 24 hrs:   BP Temp Pulse Resp SpO2 Height   06/10/21 0819 138/89 97.1 °F (36.2 °C) 80 18 97 %    06/09/21 2111 129/76 97.4 °F (36.3 °C) 74 18 97 %    06/09/21 1635      5' 8\" (1.727 m)   06/09/21 1631 114/68 98.8 °F (37.1 °C) 89 17 97 %         Physical Examination:  GENERAL SURVEY: Patient is awake, alert, oriented x 3, sitting comfortably on the chair, not in acute respiratory distress.   HEENT: pink palpebral conjunctivae, anicteric sclerae, no nasoaural discharge, moist oral mucosa  NECK: supple, no jugular venous distention, no palpable lymph nodes  CHEST/LUNGS: symmetrical chest expansion, good air entry, clear breath sounds  HEART: adynamic precordium, good S1 S2, no S3, irregularly irregular rhythm, no murmurs  ABDOMEN: flat, bowel sounds appreciated, soft, non-tender  EXTREMITIES: pink nailbeds, (+) bipedal edema, full and equal pulses, no calf tenderness   NEUROLOGICAL EXAM: The patient is awake, alert and oriented x3, able to answer questions fairly appropriately, able to follow 1 and 2 step commands. Able to tell time from the wall clock. Cranial nerves II-XII are grossly intact. No gross sensory deficit. Motor strength is 4 to 4+/5 on all 4 extremities. Current Medications:  Current Facility-Administered Medications   Medication Dose Route Frequency    clopidogreL (PLAVIX) tablet 75 mg  75 mg Oral DAILY WITH BREAKFAST    senna-docusate (PERICOLACE) 8.6-50 mg per tablet 2 Tablet  2 Tablet Oral PCD    polyethylene glycol (MIRALAX) packet 17 g  17 g Oral DAILY    acetaminophen (TYLENOL) tablet 650 mg  650 mg Oral Q4H PRN    bisacodyL (DULCOLAX) tablet 10 mg  10 mg Oral Q48H PRN    albuterol (PROVENTIL VENTOLIN) nebulizer solution 2.5 mg  2.5 mg Nebulization Q4H PRN    apixaban (ELIQUIS) tablet 5 mg  5 mg Oral BID    arformoteroL (BROVANA) neb solution 15 mcg  15 mcg Nebulization BID RT    budesonide (PULMICORT) 250 mcg/2ml nebulizer susp  250 mcg Nebulization BID    furosemide (LASIX) tablet 20 mg  20 mg Oral DAILY    nitroglycerin (NITROSTAT) tablet 0.4 mg  0.4 mg SubLINGual PRN    rosuvastatin (CRESTOR) tablet 10 mg  10 mg Oral DAILY    tamsulosin (FLOMAX) capsule 0.4 mg  0.4 mg Oral PCD    multivitamin, tx-iron-ca-min (THERA-M w/ IRON) tablet 1 Tablet  1 Tablet Oral DAILY    carvediloL (COREG) tablet 12.5 mg  12.5 mg Oral BID WITH MEALS       Allergies:   Allergies   Allergen Reactions    Lipitor [Atorvastatin] Rash    Norvasc [Amlodipine] Rash       Functional Progress:    OCCUPATIONAL THERAPY    ON ADMISSION MOST RECENT   Eating      Eating        Grooming  Functional Level: 5 (setup at sink)   Grooming  Functional Level: 5 (setup at sink)     Bathing  Functional Level: 5   Bathing  Functional Level: 5     Upper Body Dressing  Functional Level: 5   Upper Body Dressing  Functional Level: 5     Lower Body Dressing  Functional Level: 4   Lower Body Dressing  Functional Level: 4     Toileting  Functional Level: 4   Toileting  Functional Level: 4     Toilet Transfers  Toilet Transfer Score: 0   Toilet Transfers  Toilet Transfer Score: 0     Tub /Shower Transfers      Tub/Shower Transfers          Legend:   7 - Independent   6 - Modified Independent   5 - Standby Assistance / Supervision / Set-up   4 - Minimum Assistance / Contact Guard Assistance   3 - Moderate Assistance   2 - Maximum Assistance   1 - Total Assistance / Dependent       Lab/Data Review:  Recent Results (from the past 24 hour(s))   HGB & HCT    Collection Time: 06/10/21  5:05 AM   Result Value Ref Range    HGB 10.6 (L) 13.0 - 16.0 g/dL    HCT 34.5 (L) 36.0 - 48.0 %       Assessment:     Primary Rehabilitation Diagnosis  1. Impaired Mobility and ADLs  2. Non-ST elevation (NSTEMI) myocardial infarction  3. Coronary artery disease; History of CABG  4. S/P PCI to proximal SVG with 3.5 x 12 mm Juan J drug-eluting stent; PCI to mid SVG with 3.5 x 34 mm Juan J drug-eluting stent; PCI to distal SVG with 3.5 x 15 mm [place] drug-eluting stent;  Balloon angioplasty to distal SVG anastomosis (5/18/2021 - Dr. Maribel Gardner)     Comorbidities  Patient Active Problem List   Diagnosis Code    Urinary retention R33.9    Essential hypertension I10    Non-ST elevation (NSTEMI) myocardial infarction ([facility] I21.4    Acute on chronic heart failure with preserved ejection fraction (HFpEF) ([facility]) I50.33    Paroxysmal atrial fibrillation ([facility]) I48.0    Anticoagulated by anticoagulation treatment Z79.01    Mixed hyperlipidemia E78.2    Benign prostatic hyperplasia with urinary retention N40.1, R33.8    Malignant neoplasm of lower lobe of right lung ([facility]) C34.31    Heart failure with preserved left ventricular function (HFpEF) ([facility]) I50.30    Coronary artery disease involving native coronary artery of native heart I25.10    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    Chronic obstructive pulmonary disease (COPD) ([facility]) J44.9    History of carotid stenosis Z86.79    History of renal artery stenosis Z86.79    On clopidogrel therapy Z79.01    History of gross hematuria K28.753    Acute embolic stroke ([facility]) Z31.6    Leukocytosis D72.829       Plan:     1. Justification for continued stay: Good progression towards established rehabilitation goals. 2. Medical Issues being followed closely:    [x]  Fall and safety precautions     []  Wound Care     [x]  Bowel and Bladder Function     [x]  Fluid Electrolyte and Nutrition Balance     [x]  Pain Control      3. Issues that 24 hour rehabilitation nursing is following:    [x]  Fall and safety precautions     []  Wound Care     [x]  Bowel and Bladder Function     [x]  Fluid Electrolyte and Nutrition Balance     [x]  Pain Control      [x]  Assistance with and education on in-room safety with transfers to and from the bed, wheelchair, toilet and shower. 4. Acute rehabilitation plan of care:    [x]  Continue current care and rehab. [x]  Physical Therapy           [x]  Occupational Therapy           [x]  Speech Therapy     []  Hold Rehab until further notice     5. Medications:    [x]  MAR Reviewed     [x]  Continue Present Medications     6. DVT Prophylaxis:      []  Enoxaparin     []  Unfractionated Heparin     []  Warfarin     [x]  NOAC     []  MARTHA Stockings     []  Sequential Compression Device     []  None     7. Code status    [x]  Full code     []  Partial code     []  Do not intubate     []  Do not resuscitate     8. Orders:   > Non-ST elevation (NSTEMI) myocardial infarction; Coronary artery disease; History of CABG; S/P PCI to proximal SVG with 3.5 x 12 mm Juan J drug-eluting stent; PCI to mid SVG with 3.5 x 34 mm [place] drug-eluting stent; PCI to distal SVG with 3.5 x 15 mm Juan J drug-eluting stent;  Balloon angioplasty to distal SVG anastomosis (5/18/2021 - Dr. Anuradha Bangura)    > On 6/8/2021, increased Carvedilol from 12.5 mg to 25 mg PO BID with meals (8AM, 5PM)   > Continue:    > Carvedilol 25 mg PO BID with meals (8AM, 5PM)    > Clopidogrel 75 mg PO once daily with breakfast    > Rosuvastatin 75 mg PO once daily    > Nitroglycerin 0.4 mg SL PRN for chest pain    > Acute on chronic heart failure with preserved ejection fraction (HFpEF)   > 2D echocardiogram (5/18/2021) showed EF 50%; concentric LV hypertrophy with a severely thickened septal wall and mildly thickened posterior wall; left atrial chamber is severely enlarged; right atrial chamber volume is moderately enlarged; no mass, shunts, or thrombi.    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, brainstem and cerebellum) without residual deficit   > Continue:    > Clopidogrel 75 mg PO once daily with breakfast    > Rosuvastatin 75 mg PO once daily    > Benign prostatic hyperplasia with urinary retention   > Continue Tamsulosin 0.4 mg PO once daily after dinner    > Chronic obstructive pulmonary disease (COPD)   > Continue:    > Arformoterol nebulization BID    > Budesonide nebulization BID    > Albuterol nebulization q 4 hr PRN for shortness of breath/wheezing    > Constipation   > Continue:    > Polyethylene glycol 17 grams in 8 oz water PO once daily    > Pericolace 2 tabs PO once daily after dinner     > Essential hypertension   > 2D echocardiogram (5/18/2021) showed EF 50%; concentric LV hypertrophy with a severely thickened septal wall and mildly thickened posterior wall; left atrial chamber is severely enlarged; right atrial chamber volume is moderately enlarged; no mass, shunts, or thrombi.    > On 6/8/2021, increased Carvedilol from 12.5 mg to 25 mg PO BID with meals (8AM, 5PM)   > Continue:    > Carvedilol 25 mg PO BID with meals (8AM, 5PM)    > Furosemide 20 mg PO once daily (9AM)    > Leukocytosis, reactive, attributed to NSTEMI   > Labs at [place]:    > WBC count (5/17/2021) = 18.6    > WBC count (5/18/2021) = 21.4    > WBC count (5/19/2021) = 22.4    > WBC count (5/21/2021) = 27.3    > WBC count (5/22/2021) = 24.9    > WBC count (5/23/2021) = 30.2    > . . .     > WBC count (6/2/2021) = 25.4    > WBC count (6/3/2021) = 22.5    > WBC count (6/4/2021) = 23.4    > WBC count (6/5/2021) = 21.7    > WBC count (6/6/2021) = 24.1    > WBC count (6/7/2021) = 20.0    > Work up done was negative for a source of infection   > WBC count (6/8/2021, on admission to the ARU) = 14.4   > No documented fever since admissiion    > Mixed hyperlipidemia   > Lipid profile (5/18/2021) showed TG 83, , HDL 50, LDL 93   > Continue Rosuvastatin 75 mg PO once daily    > Paroxysmal atrial fibrillation, anticoagulated on Apixaban   > On 6/8/2021, increased Carvedilol from 12.5 mg to 25 mg PO BID with meals (8AM, 5PM)   > Continue:    > Apixaban 5 mg PO BID    > Carvedilol 25 mg PO BID with meals (8AM, 5PM)    > COVID-19 ruled out by laboratory testing   > SARS-CoV-2 ([facility], [facility]) (collected 5/26/2021, resulted 5/28/2021): Not detected    > Analgesia   > Continue Acetaminophen 650 mg PO q 4 hr PRN for pain     > Diet:   > Specifications: Low fat/Low cholesterol/High fiber/No added salt   > Solids (consistency): Regular    > Liquids (consistency): Thin    > Fluid restriction: None      9. Personal Protective Equipment was used while interacting with patient including: goggles and KN95 face mask. Patient was using a surgical mask. 10. Patient's progress in rehabilitation and medical issues discussed with the patient. All questions answered to the best of my ability. Care plan discussed with patient and nurse.     11. Total clinical care time is 30 minutes, including review of chart including all labs, radiology, past medical history, and discussion with patient. Greater than 50% of my time was spent in coordination of care and counseling.       Signed:    Lizbeth Hernandes MD    Rand 10, 2021

## 2021-06-10 NOTE — REHAB NOTE
[facility]    Bedside and Verbal shift change report given to ROLDAN Palafox (oncoming nurse) by Eron Wright (offgoing nurse). Report included the following information SBAR, Kardex, MAR and Recent Results.     Situation:   Code Status: Full Code   Reason for Admission: [address] Day: 3   Problem List:   Hospital Problems  Date Reviewed: 6/9/2021        Codes Class Noted POA    Mixed hyperlipidemia (Chronic) ICD-10-CM: Y77.4  ICD-9-CM: 272.2  Unknown Yes        Acute embolic stroke ([facility] AFP-82-VI: I63.9  ICD-9-CM: 434.11  5/23/2021 Yes    Overview Signed 6/8/2021 12:37 AM by Mariama Nicole MD     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, brainstem and cerebellum) without residual deficit             Anticoagulated by anticoagulation treatment ICD-10-CM: Z79.01  ICD-9-CM: V58.61  5/19/2021 Yes    Overview Signed 6/7/2021 10:48 PM by Mariama Nicole MD     On Apixaban             On clopidogrel therapy ICD-10-CM: Z79.01  ICD-9-CM: V58.61  5/19/2021 Yes        * (Principal) Non-ST elevation (NSTEMI) myocardial infarction [facility]) ICD-10-CM: I21.4  ICD-9-CM: 410.70  5/17/2021 Yes        Acute on chronic heart failure with preserved ejection fraction (HFpEF) ([facility] ICD-10-CM: I50.33  ICD-9-CM: 428.23  5/17/2021 Yes        Paroxysmal atrial fibrillation ([facility]) ICD-10-CM: I48.0  ICD-9-CM: 427.31  5/17/2021 Yes        Leukocytosis ICD-10-CM: D45.210  ICD-9-CM: 288.60  5/17/2021 Yes    Overview Signed 6/8/2021 12:42 AM by Mariama Nicole MD     Attributed to reactive leukocytosis due to NSTEMI             Essential hypertension (Chronic) ICD-10-CM: I10  ICD-9-CM: 401.9  Unknown Yes              Background:   Past Medical History:   Past Medical History:   Diagnosis Date    Acute embolic stroke ([facility] 7/77/8478    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, brainstem and cerebellum) without residual deficit    Anticoagulated by anticoagulation treatment 5/19/2021    On Apixaban    Benign prostatic hyperplasia with urinary retention     [facility]D) [facility]     Coronary artery disease involving native coronary artery of native heart     Essential hypertension     Heart failure with preserved left ventricular function (HFpEF) ([facility])     2D echocardiogram (5/18/2021) showed EF 50%; concentric LV hypertrophy with a severely thickened septal wall and mildly thickened posterior wall; left atrial chamber is severely enlarged; right atrial chamber volume is moderately enlarged; no mass, shunts, or thrombi.      History of carotid stenosis     History of gross hematuria 5/26/2021    Attributed to [place] trauma while patient was altered    History of renal artery stenosis     Leukocytosis 5/17/2021    Attributed to reactive leukocytosis due to NSTEMI    Malignant neoplasm of lower lobe of right lung ([facility])     Non-ST elevation (NSTEMI) myocardial infarction ([facility] 5/17/2021    On clopidogrel therapy 5/19/2021    Paroxysmal atrial fibrillation ([facility] 5/17/2021    Urinary retention       Patient taking anticoagulants Eliquis   Patient has a defibrillator: no    Assessment:   Changes in Assessment throughout shift: none     Patient has central line: no Reasons if yes: na  Insertion date:na Last dressing date:na   Patient has [place] Cath: no Reasons if yes: na  Insertion date:na     Last Vitals:     Vitals:    06/09/21 0739 06/09/21 1631 06/09/21 1635 06/09/21 2111   BP: 132/80 114/68  129/76   Pulse: 77 89  74   Resp: 16 17  18   Temp: 97.1 °F (36.2 °C) 98.8 °F (37.1 °C)  97.4 °F (36.3 °C)   SpO2: 98% 97%  97%   Weight:       Height:   5' 8\" (1.727 m)         PAIN    Pain Assessment    Pain Intensity 1: 0 (06/10/21 0400) Pain Intensity 1: 2 (12/29/14 1105)    Pain Location 1: Foot Pain Location 1: Abdomen    Pain Intervention(s) 1: Medication (see MAR) Pain Intervention(s) 1: Medication (see MAR)  Patient Stated Pain Goal: 0 Patient Stated Pain Goal: 0  o Intervention effective: no c/o pain  o Other actions taken for pain: na     Skin Assessment  Skin color    Condition/Temperature    Integrity    Turgor    Weekly Pressure Ulcer Documentation  Pressure  Injury Documentation: No Pressure Injury Noted-Pressure Ulcer Prevention Initiated  Wound Prevention & Protection Methods  Orientation of wound Orientation of Wound Prevention: Posterior  Location of Prevention Location of Wound Prevention: Buttocks, Sacrum/Coccyx  Dressing Present Dressing Present : No  Dressing Status    Wound Offloading Wound Offloading (Prevention Methods): Bed, pressure redistribution/air     INTAKE/OUPUT  Date 06/09/21 0700 - 06/10/21 0659 06/10/21 0700 - 06/11/21 0659   Shift [id number] 1900-0659 24 Hour Total 5946-3934 0973-5077 24 Hour Total   INTAKE   P.O. 400  400        P. O. 400  400      Shift Total(mL/kg) 400(5.4)  400(5.4)      OUTPUT   Urine(mL/kg/hr)           Urine Occurrence(s) 3 x 2 x 5 x      Stool           Stool Occurrence(s) 2 x 2 x 4 x      Shift Total(mL/kg)           400      Weight (kg) 73.9 73.9 73.9 73.9 73.9 73.9       Recommendations:  1. Patient needs and requests:emptying the urinal ,pain med,assist to the bsc    2. Diet: Cardiac Regular    3. Pending tests/procedures: none    4. Functional Level/Equipment: assist x 2 / wheelchair    5. Estimated Discharge Date: TBD Posted on Whiteboard in Patients Room: yes    HEALS Safety Check    A safety check occurred in the patient's room between off going nurse and oncoming nurse listed above.     The safety check included the below items  Area Items   H  High Alert Medications - Verify all high alert medication drips (heparin, PCA, etc.)   E  Equipment - Suction is set up for ALL patients (with yanker)  - Red plugs utilized for all equipment (IV pumps, etc.)  - WOWs wiped down at end of shift.  - Room stocked with oxygen, suction, and other unit-specific supplies   A  Alarms - Bed alarm is set for fall risk patients  - Ensure chair alarm is in place and activated if patient is up in a chair   L  Lines - Check IV for any infiltration  - Taveras bag is empty if patient has a Taveras   - Tubing and IV bags are labeled   S  Safety   - Room is clean, patient is clean, and equipment is clean. - Hallways are clear from equipment besides carts. - Fall bracelet on for fall risk patients  - Ensure room is clear and free of clutter  - Suction is set up for ALL patients (with laila)  - Hallways are clear from equipment besides carts.    - Isolation precautions followed, supplies available outside room, sign posted

## 2021-06-10 NOTE — PROGRESS NOTES
Problem: Self Care Deficits Care Plan (Adult)  Goal: *Therapy Goal (Edit Goal, Insert Text)  Description: Occupational Therapy Goals   Long Term Goals  Initiated 2021 and to be accomplished within 2 week(s) 2021  1. Pt will perform self-feeding with Alisa. 2. Pt will perform grooming with Alisa. 3. Pt will perform UB bathing with Alisa  4. Pt will perform LB bathing with Alisa. 5. Pt will perform tub/shower transfer with Alisa. 6. Pt will perform UB dressing with Alisa. 7. Pt will perform LB dressing with Alisa. 8. Pt will perform toileting task with Alisa. 9. Pt will perform toilet transfer with Alisa. 10.  Pt will perform an IADL task with good safety and Alisa. Short Term Goals   Initiated 2021 and to be accomplished within 7 day(s) 6/15/2021  1. Pt will perform self-feeding with S   2. Pt will perform grooming with S.  3. Pt will perform UB bathing with S.  4. Pt will perform LB bathing with S  5. Pt will perform tub/shower transfer with S.  6. Pt will perform UB dressing with S.  7. Pt will perform LB dressing with S  8. Pt will perform toileting task with S.  9. Pt will perform toilet transfer with S.           Outcome: Progressing Towards Goal     Occupational Therapy TREATMENT    Patient: Yong Castro   80 y.o. Patient identified with name and : Yes    Date: 6/10/2021    First Tx Session  Time In: 1105  Time Out[de-identified] 2239    Diagnosis: Non-ST elevation (NSTEMI) myocardial infarction [facility]) [I21.4]   Precautions: Fall  Chart, occupational therapy assessment, plan of care, and goals were reviewed. Pain:  Pt reports 0/10 pain or discomfort prior to treatment. Pt reports 0/10 pain or discomfort post treatment. Intervention Provided: NA      SUBJECTIVE:   Patient stated  I really appreciate your help today, made me really work.     OBJECTIVE DATA SUMMARY:     THERAPEUTIC ACTIVITY Daily Assessment    Bean bag toss x5 trials in stance with RW for support and w/c with behind with SBA for transfers and throwing to challenge activity tolerance, dynamic standing balance, and core stability - pt having to cross body to throw bags - rest breaks between. [place] in stance with RW for support and w/c behind - utilized racket for extension of reach to challenge balance and activity tolerance - completed x2 trials for 5 minutes each - min c/o fatigue; provided rest between trials. THERAPEUTIC EXERCISE Daily Assessment         IADL Daily Assessment         NMRE Daily Assessment         FEEDING/EATING Daily Assessment          GROOMING Daily Assessment    Grooming  Grooming Assistance : 6 (Modified independent)  Comments:  (washed hands at sink seated in w/c)     UPPER BODY BATHING Daily Assessment          LOWER BODY BATHING Daily Assessment          TOILETING Daily Assessment    Toileting  Toileting Assistance (FIM Score): 5 (Supervision) on regular commode with assistance of w/c and grab bars for safe transfer      UPPER BODY DRESSING Daily Assessment          LOWER BODY DRESSING Daily Assessment          MOBILITY/TRANSFERS Daily Assessment    Functional Transfers  Toilet Transfer : Squat pivot transfer  Amount of Assistance Required: 5 (stand-by assistance)  Adaptive Equipment: Wheelchair;Grab bars       ASSESSMENT:  Pt continues to make steady progress towards goal attainment. Focus of treatment today on dynamic standing balance, activity tolerance, and core stability to improve participation in self-care tasks. Pt tolerated standing activities with increased time from previous session. Pt continues to need verbal cuing for     Progression toward goals:  [x]          Improving appropriately and progressing toward goals  []          Improving slowly and progressing toward goals  []          Not making progress toward goals and plan of care will be adjusted     PLAN:  Patient continues to benefit from skilled intervention to address the above impairments.   Continue treatment per established plan of care. Discharge Recommendations:  Home Health  Further Equipment Recommendations for Discharge:  bedside commode and transfer bench     Activity Tolerance:  Fair +      Estimated LOS: 6/22/2021    Please refer to the flowsheet for vital signs taken during this treatment. After treatment:   []  Patient left in no apparent distress sitting up in chair   [x]  Patient left in no apparent distress in bed  [x]  Call bell left within reach  []  Nursing notified  []  Caregiver present  []  Bed alarm activated    COMMUNICATION/EDUCATION:   [x] Home safety education was provided and the patient/caregiver indicated understanding. [] Patient/family have participated as able in goal setting and plan of care. [] Patient/family agree to work toward stated goals and plan of care. [] Patient understands intent and goals of therapy, but is neutral about his/her participation. [] Patient is unable to participate in goal setting and plan of care.       Tanya Sharif, OTD, OTR/L

## 2021-06-10 NOTE — REHAB NOTE
[facility]    Bedside and Verbal shift change report given to Jany López RN (oncoming nurse) by Jayashree Mitchell LPN (offgoing nurse). Report included the following information SBAR, Kardex, MAR and Recent Results.     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, brainstem and cerebellum) without residual deficit             Anticoagulated by anticoagulation treatment ICD-10-CM: Z79.01  ICD-9-CM: V58.61  5/19/2021 Yes    Overview Signed 6/7/2021 10:48 PM by Jarad Berger MD     On Apixaban             On clopidogrel therapy ICD-10-CM: Z79.01  ICD-9-CM: V58.61  5/19/2021 Yes        * (Principal) Non-ST elevation (NSTEMI) myocardial infarction [facility]) ICD-10-CM: I21.4  ICD-9-CM: 410.70  5/17/2021 Yes        Acute on chronic heart failure with preserved ejection fraction (HFpEF) ([facility] ICD-10-CM: I50.33  ICD-9-CM: 428.23  5/17/2021 Yes        Paroxysmal atrial fibrillation (HCC) ICD-10-CM: I48.0  ICD-9-CM: 427.31  5/17/2021 Yes        Leukocytosis ICD-10-CM: D17.608  ICD-9-CM: 288.60  5/17/2021 Yes    Overview Signed 6/8/2021 12:42 AM by Jarad Berger MD     Attributed to reactive leukocytosis due to NSTEMI             Essential hypertension (Chronic) ICD-10-CM: I10  ICD-9-CM: 401.9  Unknown Yes              Background:   Past Medical History:   Past Medical History:   Diagnosis Date    Acute embolic stroke ([address] 1/35/7525    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, brainstem and cerebellum) without residual deficit    Anticoagulated by anticoagulation treatment 5/19/2021    On Apixaban    Benign prostatic hyperplasia with urinary retention     Chronic obstructive pulmonary disease (COPD) ([facility]     Coronary artery disease involving native coronary artery of native heart     Essential hypertension     Heart failure with preserved left ventricular function (HFpEF) ([facility])     2D echocardiogram (5/18/2021) showed EF 50%; concentric LV hypertrophy with a severely thickened septal wall and mildly thickened posterior wall; left atrial chamber is severely enlarged; right atrial chamber volume is moderately enlarged; no mass, shunts, or thrombi.      History of carotid stenosis     History of gross hematuria 5/26/2021    Attributed to Taveras trauma while patient was altered    History of renal artery stenosis     Leukocytosis 5/17/2021    Attributed to reactive leukocytosis due to NSTEMI    Malignant neoplasm of lower lobe of right lung ([facility])     Non-ST elevation (NSTEMI) myocardial infarction ([facility] 5/17/2021    On clopidogrel therapy 5/19/2021    Paroxysmal atrial fibrillation ([facility] 5/17/2021    Urinary retention       Patient taking anticoagulants Eliquis   Patient has a defibrillator: no    Assessment:   Changes in Assessment throughout shift: none     Patient has central line: no Reasons if yes: na  Insertion date:na Last dressing date:na   Patient has Taveras Cath: no Reasons if yes: na  Insertion date:na     Last Vitals:     Vitals:    06/09/21 1635 06/09/21 2111 06/10/21 0819 06/10/21 1558   BP:  129/76 138/89 135/86   Pulse:  74 80 80   Resp:  18 18 17   Temp:  97.4 °F (36.3 °C) 97.1 °F (36.2 °C) 97.5 °F (36.4 °C)   SpO2:  97% 97% 97%   Weight:       Height: 5' 8\" (1.727 m)           PAIN    Pain Assessment    Pain Intensity 1: 0 (06/10/21 1656) Pain Intensity 1: 2 (12/29/14 1105)    Pain Location 1: Hip Pain Location 1: Abdomen    Pain Intervention(s) 1: Medication (see MAR) Pain Intervention(s) 1: Medication (see MAR)  Patient Stated Pain Goal: 0 Patient Stated Pain Goal: 0  o Intervention effective: no c/o pain  o Other actions taken for pain: na     Skin Assessment  Skin color    Condition/Temperature    Integrity    Turgor    Weekly Pressure Ulcer Documentation  Pressure  Injury Documentation: No Pressure Injury Noted-Pressure Ulcer Prevention Initiated  Wound Prevention & Protection Methods  Orientation of wound Orientation of Wound Prevention: Posterior  Location of Prevention Location of Wound Prevention: Buttocks, Sacrum/Coccyx  Dressing Present Dressing Present : No  Dressing Status    Wound Offloading Wound Offloading (Prevention Methods): Bed, pressure redistribution/air     INTAKE/OUPUT  Date 06/09/21 0700 - 06/10/21 0659 06/10/21 0700 - 06/11/21 0659   Shift 0700-1859 1900-0659 24 Hour Total 0700-1859 1900-0659 24 Hour Total   INTAKE   P.O. 400  400        P. O. 400  400      Shift Total(mL/kg) 400(5.4)  400(5.4)      OUTPUT   Urine(mL/kg/hr)           Urine Occurrence(s) 3 x 2 x 5 x 1 x  1 x   Stool           Stool Occurrence(s) 2 x 2 x 4 x 1 x  1 x   Shift Total(mL/kg)           400      Weight (kg) 73.9 73.9 73.9 73.9 73.9 73.9       Recommendations:  1. Patient needs and requests:emptying the urinal ,pain med,assist to the bsc    2. Diet: Cardiac Regular    3. Pending tests/procedures: none    4. Functional Level/Equipment: assist x 2 / wheelchair    5. Estimated Discharge Date: TBD Posted on Whiteboard in Patients Room: yes    HEALS Safety Check    A safety check occurred in the patient's room between off going nurse and oncoming nurse listed above.     The safety check included the below items  Area Items   H  High Alert Medications - Verify all high alert medication drips (heparin, PCA, etc.)   E  Equipment - Suction is set up for ALL patients (with yanker)  - Red plugs utilized for all equipment (IV pumps, etc.)  - WOWs wiped down at end of shift.  - Room stocked with oxygen, suction, and other unit-specific supplies   A  Alarms - Bed alarm is set for fall risk patients  - Ensure chair alarm is in place and activated if patient is up in a chair   L  Lines - Check IV for any infiltration  - Taveras bag is empty if patient has a Taveras   - Tubing and IV bags are labeled   S  Safety   - Room is clean, patient is clean, and equipment is clean. - Hallways are clear from equipment besides carts. - Fall bracelet on for fall risk patients  - Ensure room is clear and free of clutter  - Suction is set up for ALL patients (with laila)  - Hallways are clear from equipment besides carts.    - Isolation precautions followed, supplies available outside room, sign posted

## 2021-06-10 NOTE — PROGRESS NOTES
Problem: Falls - Risk of  Goal: *Absence of Falls  Description: Document Jarrett Mcmahon Fall Risk and appropriate interventions in the flowsheet. Outcome: Progressing Towards Goal  Note: Fall Risk Interventions:  Mobility Interventions: Bed/chair exit alarm, Patient to call before getting OOB    Mentation Interventions: Adequate sleep, hydration, pain control    Medication Interventions: Bed/chair exit alarm, Patient to call before getting OOB    Elimination Interventions: Bed/chair exit alarm, Call light in reach, Patient to call for help with toileting needs    History of Falls Interventions: Bed/chair exit alarm         Problem: Patient Education: Go to Patient Education Activity  Goal: Patient/Family Education  Outcome: Progressing Towards Goal     Problem: Pressure Injury - Risk of  Goal: *Prevention of pressure injury  Description: Document Nicko Scale and appropriate interventions in the flowsheet. Outcome: Progressing Towards Goal  Note: Pressure Injury Interventions:             Activity Interventions: Increase time out of bed, Pressure redistribution bed/mattress(bed type)    Mobility Interventions: Pressure redistribution bed/mattress (bed type)    Nutrition Interventions: Document food/fluid/supplement intake    Friction and Shear Interventions: HOB 30 degrees or less                Problem: Patient Education: Go to Patient Education Activity  Goal: Patient/Family Education  Outcome: Progressing Towards Goal     Problem: Pain  Goal: *Control of Pain  Outcome: Progressing Towards Goal     Problem: Patient Education: Go to Patient Education Activity  Goal: Patient/Family Education  Outcome: Progressing Towards Goal     Problem: Inpatient Rehab (Adult)  Goal: *LTG: Avoids injury/falls 100% of time related to deficits  Outcome: Progressing Towards Goal  Goal: *LTG: Avoids infection 100% of time related to deficits  Outcome: Progressing Towards Goal  Goal: *LTG: Verbalize understanding of diagnosis and risk factors for recurring stroke  Outcome: Progressing Towards Goal  Goal: *LTG: Absence of DVT during hospitalization  Outcome: Progressing Towards Goal  Goal: *LTG: Maintains Skin Integrity With No Evidence of Pressure Injury 100% of Time  Outcome: Progressing Towards Goal  Goal: Interventions  Outcome: Progressing Towards Goal     Problem: Patient Education: Go to Patient Education Activity  Goal: Patient/Family Education  Outcome: Progressing Towards Goal     Problem: Patient Education: Go to Patient Education Activity  Goal: Patient/Family Education  Outcome: Progressing Towards Goal     Problem: Patient Education: Go to Patient Education Activity  Goal: Patient/Family Education  Outcome: Progressing Towards Goal     Problem: Patient Education: Go to Patient Education Activity  Goal: Patient/Family Education  Outcome: Progressing Towards Goal     Problem: Nutrition Deficit  Goal: *Optimize nutritional status  Outcome: Progressing Towards Goal

## 2021-06-11 NOTE — PROGRESS NOTES
Problem: Falls - Risk of  Goal: *Absence of Falls  Description: Document Veena Moore Fall Risk and appropriate interventions in the flowsheet. 6/11/2021 1017 by Cong Zapata LPN  Outcome: Progressing Towards Goal  Note: Fall Risk Interventions:  Mobility Interventions: Bed/chair exit alarm, Patient to call before getting OOB    Mentation Interventions: Adequate sleep, hydration, pain control    Medication Interventions: Bed/chair exit alarm, Patient to call before getting OOB    Elimination Interventions: Bed/chair exit alarm, Call light in reach, Patient to call for help with toileting needs    History of Falls Interventions: Bed/chair exit alarm      6/11/2021 1016 by Cong Zapata LPN  Outcome: Progressing Towards Goal  Note: Fall Risk Interventions:  Mobility Interventions: Bed/chair exit alarm, Patient to call before getting OOB    Mentation Interventions: Adequate sleep, hydration, pain control    Medication Interventions: Bed/chair exit alarm, Patient to call before getting OOB    Elimination Interventions: Bed/chair exit alarm, Call light in reach, Patient to call for help with toileting needs    History of Falls Interventions: Bed/chair exit alarm         Problem: Patient Education: Go to Patient Education Activity  Goal: Patient/Family Education  6/11/2021 1017 by Cong Zapata LPN  Outcome: Progressing Towards Goal  6/11/2021 1016 by Cong Zapata LPN  Outcome: Progressing Towards Goal     Problem: Pressure Injury - Risk of  Goal: *Prevention of pressure injury  Description: Document Nicko Scale and appropriate interventions in the flowsheet. 6/11/2021 1017 by Cong Zapata LPN  Outcome: Progressing Towards Goal  Note: Pressure Injury Interventions:             Activity Interventions: Increase time out of bed, Pressure redistribution bed/mattress(bed type)    Mobility Interventions: Pressure redistribution bed/mattress (bed type)    Nutrition Interventions: Document food/fluid/supplement intake    Friction and Shear Interventions: HOB 30 degrees or less             6/11/2021 1016 by Melisa Hudson LPN  Outcome: Progressing Towards Goal  Note: Pressure Injury Interventions:             Activity Interventions: Increase time out of bed, Pressure redistribution bed/mattress(bed type)    Mobility Interventions: Pressure redistribution bed/mattress (bed type)    Nutrition Interventions: Document food/fluid/supplement intake    Friction and Shear Interventions: HOB 30 degrees or less                Problem: Patient Education: Go to Patient Education Activity  Goal: Patient/Family Education  6/11/2021 1017 by Melisa Hudson LPN  Outcome: Progressing Towards Goal  6/11/2021 1016 by Melisa Hudson LPN  Outcome: Progressing Towards Goal     Problem: Pain  Goal: *Control of Pain  6/11/2021 1017 by Melisa Hudson LPN  Outcome: Progressing Towards Goal  6/11/2021 1016 by Melisa Hudson LPN  Outcome: Progressing Towards Goal     Problem: Patient Education: Go to Patient Education Activity  Goal: Patient/Family Education  6/11/2021 1017 by Melisa Hudson LPN  Outcome: Progressing Towards Goal  6/11/2021 1016 by Melisa Hudson LPN  Outcome: Progressing Towards Goal     Problem: Inpatient Rehab (Adult)  Goal: *LTG: Avoids injury/falls 100% of time related to deficits  6/11/2021 1017 by Melisa Hudson LPN  Outcome: Progressing Towards Goal  6/11/2021 1016 by Melisa Hudson LPN  Outcome: Progressing Towards Goal  Goal: *LTG: Avoids infection 100% of time related to deficits  6/11/2021 1017 by Melisa Hudson LPN  Outcome: Progressing Towards Goal  6/11/2021 1016 by Melisa Hudson LPN  Outcome: Progressing Towards Goal  Goal: *LTG: Verbalize understanding of diagnosis and risk factors for recurring stroke  6/11/2021 1017 by Melisa Hudson LPN  Outcome: Progressing Towards Goal  6/11/2021 1016 by Melisa Hudson LPN  Outcome: Progressing Towards Goal  Goal: *LTG: Absence of DVT during hospitalization  6/11/2021 1017 by Ketty Smith LPN  Outcome: Progressing Towards Goal  6/11/2021 1016 by Ketty Smith LPN  Outcome: Progressing Towards Goal  Goal: *LTG: Maintains Skin Integrity With No Evidence of Pressure Injury 100% of Time  6/11/2021 1017 by Ketty Smith LPN  Outcome: Progressing Towards Goal  6/11/2021 1016 by Ketty Smith LPN  Outcome: Progressing Towards Goal  Goal: Interventions  6/11/2021 1017 by Ketty Smith LPN  Outcome: Progressing Towards Goal  6/11/2021 1016 by Ketty Smith LPN  Outcome: Progressing Towards Goal     Problem: Patient Education: Go to Patient Education Activity  Goal: Patient/Family Education  6/11/2021 1017 by Ketty Smith LPN  Outcome: Progressing Towards Goal  6/11/2021 1016 by Ketty Smith LPN  Outcome: Progressing Towards Goal     Problem: Patient Education: Go to Patient Education Activity  Goal: Patient/Family Education  6/11/2021 1017 by Ketty Smith LPN  Outcome: Progressing Towards Goal  6/11/2021 1016 by Ketty Smith LPN  Outcome: Progressing Towards Goal     Problem: Patient Education: Go to Patient Education Activity  Goal: Patient/Family Education  6/11/2021 1017 by Ketty Smith LPN  Outcome: Progressing Towards Goal  6/11/2021 1016 by Ketty Smith LPN  Outcome: Progressing Towards Goal     Problem: Patient Education: Go to Patient Education Activity  Goal: Patient/Family Education  6/11/2021 1017 by Ketty Smith LPN  Outcome: Progressing Towards Goal  6/11/2021 1016 by Ketty Smith LPN  Outcome: Progressing Towards Goal     Problem: Nutrition Deficit  Goal: *Optimize nutritional status  6/11/2021 1017 by Ketty Smith LPN  Outcome: Progressing Towards Goal  6/11/2021 1016 by Ketty Smith LPN  Outcome: Progressing Towards Goal

## 2021-06-11 NOTE — PROGRESS NOTES
[facility] PHYSICAL REHABILITATION  [address] [place] [address], [address]     INPATIENT REHABILITATION  DAILY PROGRESS NOTE     Date: 6/11/2021    Name: Pasquale Medina Age / Sex: 80 y.o. / male   CSN: [id number] MRN: [id number]   Admit Date: 6/7/2021 Length of Stay: 4 days     Primary Rehab Diagnosis: Impaired Mobility and ADLs secondary to:  1. Non-ST elevation (NSTEMI) myocardial infarction  2. Coronary artery disease; History of CABG  3. S/P PCI to proximal SVG with 3.5 x 12 mm Juan J drug-eluting stent; PCI to mid SVG with 3.5 x 34 mm [place] drug-eluting stent; PCI to distal SVG with 3.5 x 15 mm [place] drug-eluting stent; Balloon angioplasty to distal SVG anastomosis (5/18/2021 - Dr. Inocencio Perry)       Subjective:     Patient seen and examined. Blood pressure controlled. Patient's Complaint:   No significant medical complaints    Pain Control: stable, mild-to-moderate joint symptoms intermittently, reasonably well controlled by current meds      Objective:     Vital Signs:  Patient Vitals for the past 24 hrs:   BP Temp Pulse Resp SpO2   06/11/21 0737 (!) 143/86 97.1 °F (36.2 °C) 84 18 97 %   06/10/21 2100 (!) 142/77 98.3 °F (36.8 °C) 82 18 98 %   06/10/21 1558 135/86 97.5 °F (36.4 °C) 80 17 97 %        Physical Examination:  GENERAL SURVEY: Patient is awake, alert, oriented x 3, sitting comfortably on the chair, not in acute respiratory distress.   HEENT: pink palpebral conjunctivae, anicteric sclerae, no nasoaural discharge, moist oral mucosa  NECK: supple, no jugular venous distention, no palpable lymph nodes  CHEST/LUNGS: symmetrical chest expansion, good air entry, clear breath sounds  HEART: adynamic precordium, good S1 S2, no S3, irregularly irregular rhythm, no murmurs  ABDOMEN: flat, bowel sounds appreciated, soft, non-tender  EXTREMITIES: pink nailbeds, (+) bipedal edema, full and equal pulses, no calf tenderness   NEUROLOGICAL EXAM: The patient is awake, alert and oriented x3, able to answer questions fairly appropriately, able to follow 1 and 2 step commands. Able to tell time from the wall clock. Cranial nerves II-XII are grossly intact. No gross sensory deficit. Motor strength is 4 to 4+/5 on all 4 extremities. Current Medications:  Current Facility-Administered Medications   Medication Dose Route Frequency    clopidogreL (PLAVIX) tablet 75 mg  75 mg Oral DAILY WITH BREAKFAST    senna-docusate (PERICOLACE) 8.6-50 mg per tablet 2 Tablet  2 Tablet Oral PCD    polyethylene glycol (MIRALAX) packet 17 g  17 g Oral DAILY    acetaminophen (TYLENOL) tablet 650 mg  650 mg Oral Q4H PRN    bisacodyL (DULCOLAX) tablet 10 mg  10 mg Oral Q48H PRN    albuterol (PROVENTIL VENTOLIN) nebulizer solution 2.5 mg  2.5 mg Nebulization Q4H PRN    apixaban (ELIQUIS) tablet 5 mg  5 mg Oral BID    arformoteroL (BROVANA) neb solution 15 mcg  15 mcg Nebulization BID RT    budesonide (PULMICORT) 250 mcg/2ml nebulizer susp  250 mcg Nebulization BID    furosemide (LASIX) tablet 20 mg  20 mg Oral DAILY    nitroglycerin (NITROSTAT) tablet 0.4 mg  0.4 mg SubLINGual PRN    rosuvastatin (CRESTOR) tablet 10 mg  10 mg Oral DAILY    tamsulosin (FLOMAX) capsule 0.4 mg  0.4 mg Oral PCD    multivitamin, tx-iron-ca-min (THERA-M w/ IRON) tablet 1 Tablet  1 Tablet Oral DAILY    carvediloL (COREG) tablet 12.5 mg  12.5 mg Oral BID WITH MEALS       Allergies: Allergies   Allergen Reactions    Lipitor [Atorvastatin] Rash    Norvasc [Amlodipine] Rash       Lab/Data Review:  No results found for this or any previous visit (from the past 24 hour(s)). Assessment:     Primary Rehabilitation Diagnosis  1. Impaired Mobility and ADLs  2. Non-ST elevation (NSTEMI) myocardial infarction  3. Coronary artery disease; History of CABG  4.  S/P PCI to proximal SVG with 3.5 x 12 mm Juan J drug-eluting stent; PCI to mid SVG with 3.5 x 34 mm Juan J drug-eluting stent; PCI to distal SVG with 3.5 x 15 mm [place] drug-eluting stent; Balloon angioplasty to distal SVG anastomosis (5/18/2021 - Dr. Sirisha Hill)     Comorbidities  Patient Active Problem List   Diagnosis Code    Urinary retention R33.9    Essential hypertension I10    Non-ST elevation (NSTEMI) myocardial infarction ([facility] [facility] I21.4    Acute on chronic heart failure with preserved ejection fraction (HFpEF) ([facility]) I50.33    Paroxysmal atrial fibrillation ([facility]) I48.0    Anticoagulated by anticoagulation treatment Z79.01    Mixed hyperlipidemia E78.2    Benign prostatic hyperplasia with urinary retention N40.1, R33.8    Malignant neoplasm of lower lobe of right lung ([facility]) C34.31    Heart failure with preserved left ventricular function (HFpEF) ([facility]) I50.30    Coronary artery disease involving native coronary artery of native heart I25.10    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    Chronic obstructive pulmonary disease (COPD) ([facility]) J44.9    History of carotid stenosis Z86.79    History of renal artery stenosis Z86.79    On clopidogrel therapy Z79.01    History of gross hematuria J11.155    Acute embolic stroke ([facility]) Q30.3    Leukocytosis D72.829       Plan:     1. Justification for continued stay: Good progression towards established rehabilitation goals. 2. Medical Issues being followed closely:    [x]  Fall and safety precautions     []  Wound Care     [x]  Bowel and Bladder Function     [x]  Fluid Electrolyte and Nutrition Balance     [x]  Pain Control      3. Issues that 24 hour rehabilitation nursing is following:    [x]  Fall and safety precautions     []  Wound Care     [x]  Bowel and Bladder Function     [x]  Fluid Electrolyte and Nutrition Balance     [x]  Pain Control      [x]  Assistance with and education on in-room safety with transfers to and from the bed, wheelchair, toilet and shower. 4. Acute rehabilitation plan of care:    [x]  Continue current care and rehab.            [x]  Physical Therapy           [x]  Occupational Therapy [x]  Speech Therapy     []  Hold Rehab until further notice     5. Medications:    [x]  MAR Reviewed     [x]  Continue Present Medications     6. DVT Prophylaxis:      []  Enoxaparin     []  Unfractionated Heparin     []  Warfarin     [x]  NOAC     []  MARTHA Stockings     []  Sequential Compression Device     []  None     7. Code status    [x]  Full code     []  Partial code     []  Do not intubate     []  Do not resuscitate     8. Orders:   > Non-ST elevation (NSTEMI) myocardial infarction; Coronary artery disease; History of CABG; S/P PCI to proximal SVG with 3.5 x 12 mm Juan J drug-eluting stent; PCI to mid SVG with 3.5 x 34 mm [place] drug-eluting stent; PCI to distal SVG with 3.5 x 15 mm Juan J drug-eluting stent;  Balloon angioplasty to distal SVG anastomosis (5/18/2021 - Dr. Fabricio Hidalgo)    > On 6/8/2021, increased Carvedilol from 12.5 mg to 25 mg PO BID with meals (8AM, 5PM)   > Continue:    > Carvedilol 25 mg PO BID with meals (8AM, 5PM)    > Clopidogrel 75 mg PO once daily with breakfast    > Rosuvastatin 75 mg PO once daily    > Nitroglycerin 0.4 mg SL PRN for chest pain    > Acute on chronic heart failure with preserved ejection fraction (HFpEF)   > 2D echocardiogram (5/18/2021) showed EF 50%; concentric LV hypertrophy with a severely thickened septal wall and mildly thickened posterior wall; left atrial chamber is severely enlarged; right atrial chamber volume is moderately enlarged; no mass, shunts, or thrombi.    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, brainstem and cerebellum) without residual deficit   > Continue:    > Clopidogrel 75 mg PO once daily with breakfast    > Rosuvastatin 75 mg PO once daily    > Benign prostatic hyperplasia with urinary retention   > Continue Tamsulosin 0.4 mg PO once daily after dinner    > Chronic obstructive pulmonary disease (COPD)   > Continue:    > Arformoterol nebulization BID    > Budesonide nebulization BID    > Albuterol nebulization q 4 hr PRN for shortness of breath/wheezing    > Constipation   > Continue:    > Polyethylene glycol 17 grams in 8 oz water PO once daily    > Pericolace 2 tabs PO once daily after dinner     > Essential hypertension   > 2D echocardiogram (5/18/2021) showed EF 50%; concentric LV hypertrophy with a severely thickened septal wall and mildly thickened posterior wall; left atrial chamber is severely enlarged; right atrial chamber volume is moderately enlarged; no mass, shunts, or thrombi.    > On 6/8/2021, increased Carvedilol from 12.5 mg to 25 mg PO BID with meals (8AM, 5PM)   > Continue:    > Carvedilol 25 mg PO BID with meals (8AM, 5PM)    > Furosemide 20 mg PO once daily (9AM)    > Leukocytosis, reactive, attributed to NSTEMI   > Labs at [facility]:    > WBC count (5/17/2021) = 18.6    > WBC count (5/18/2021) = 21.4    > WBC count (5/19/2021) = 22.4    > WBC count (5/21/2021) = 27.3    > WBC count (5/22/2021) = 24.9    > WBC count (5/23/2021) = 30.2    > .  . .     > WBC count (6/2/2021) = 25.4    > WBC count (6/3/2021) = 22.5    > WBC count (6/4/2021) = 23.4    > WBC count (6/5/2021) = 21.7    > WBC count (6/6/2021) = 24.1    > WBC count (6/7/2021) = 20.0    > Work up done was negative for a source of infection   > WBC count (6/8/2021, on admission to the ARU) = 14.4   > No documented fever since admissiion    > Mixed hyperlipidemia   > Lipid profile (5/18/2021) showed TG 83, , HDL 50, LDL 93   > Continue Rosuvastatin 75 mg PO once daily    > Paroxysmal atrial fibrillation, anticoagulated on Apixaban   > On 6/8/2021, increased Carvedilol from 12.5 mg to 25 mg PO BID with meals (8AM, 5PM)   > Continue:    > Apixaban 5 mg PO BID    > Carvedilol 25 mg PO BID with meals (8AM, 5PM)    > COVID-19 ruled out by laboratory testing   > SARS-CoV-2 ([place] m2000, [facility]) (collected 5/26/2021, resulted 5/28/2021): Not detected    > Analgesia   > Continue Acetaminophen 650 mg PO q 4 hr PRN for pain     > Diet:   > Specifications: Low fat/Low cholesterol/High fiber/No added salt   > Solids (consistency): Regular    > Liquids (consistency): Thin    > Fluid restriction: None      9. Personal Protective Equipment was used while interacting with patient including: goggles and KN95 face mask. Patient was using a surgical mask. 10. Patient's progress in rehabilitation and medical issues discussed with the patient. All questions answered to the best of my ability. Care plan discussed with patient and nurse. 11. Total clinical care time is 30 minutes, including review of chart including all labs, radiology, past medical history, and discussion with patient. Greater than 50% of my time was spent in coordination of care and counseling.       Signed:    Babita Green MD    June 11, 2021

## 2021-06-11 NOTE — REHAB NOTE
[facility]    Bedside and Verbal shift change report given to Vivi CHRISTIANSEN (oncoming nurse) by  Juana MONTILLA (offgoing nurse). Report included the following information SBAR, Kardex, MAR and Recent Results.     Situation:   Code Status: Full Code   Reason for Admission: [address] Day: 4   Problem List:   Hospital Problems  Date Reviewed: 6/11/2021        Codes Class Noted POA    Mixed hyperlipidemia (Chronic) ICD-10-CM: N91.1  ICD-9-CM: 272.2  Unknown Yes        Acute embolic stroke ([facility] IBS-95-FY: I63.9  ICD-9-CM: 434.11  5/23/2021 Yes    Overview Signed 6/8/2021 12:37 AM by Jayant Capone MD     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, brainstem and cerebellum) without residual deficit             Anticoagulated by anticoagulation treatment ICD-10-CM: Z79.01  ICD-9-CM: V58.61  5/19/2021 Yes    Overview Signed 6/7/2021 10:48 PM by Jayant Capone MD     On Apixaban             On clopidogrel therapy ICD-10-CM: Z79.01  ICD-9-CM: V58.61  5/19/2021 Yes        * (Principal) Non-ST elevation (NSTEMI) myocardial infarction [facility]) ICD-10-CM: I21.4  ICD-9-CM: 410.70  5/17/2021 Yes        Acute on chronic heart failure with preserved ejection fraction (HFpEF) ([facility] ICD-10-CM: I50.33  ICD-9-CM: 428.23  5/17/2021 Yes        Paroxysmal atrial fibrillation ([facility]) ICD-10-CM: I48.0  ICD-9-CM: 427.31  5/17/2021 Yes        Leukocytosis ICD-10-CM: V37.524  ICD-9-CM: 288.60  5/17/2021 Yes    Overview Signed 6/8/2021 12:42 AM by Jayant Capone MD     Attributed to reactive leukocytosis due to NSTEMI             Essential hypertension (Chronic) ICD-10-CM: I10  ICD-9-CM: 401.9  Unknown Yes              Background:   Past Medical History:   Past Medical History:   Diagnosis Date    Acute embolic stroke ([facility] 7/19/8870    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, brainstem and cerebellum) without residual deficit    Anticoagulated by anticoagulation treatment 5/19/2021    On Apixaban    Benign prostatic hyperplasia with urinary retention     Chronic obstructive pulmonary disease (COPD) ([facility])     Coronary artery disease involving native coronary artery of native heart     Essential hypertension     Heart failure with preserved left ventricular function (HFpEF) ([facility])     2D echocardiogram (5/18/2021) showed EF 50%; concentric LV hypertrophy with a severely thickened septal wall and mildly thickened posterior wall; left atrial chamber is severely enlarged; right atrial chamber volume is moderately enlarged; no mass, shunts, or thrombi.      History of carotid stenosis     History of gross hematuria 5/26/2021    Attributed to Taveras trauma while patient was altered    History of renal artery stenosis     Leukocytosis 5/17/2021    Attributed to reactive leukocytosis due to NSTEMI    Malignant neoplasm of lower lobe of right lung ([facility])     Non-ST elevation (NSTEMI) myocardial infarction ([facility] [address] 5/17/2021    On clopidogrel therapy 5/19/2021    Paroxysmal atrial fibrillation ([facility] [address] 5/17/2021    Urinary retention       Patient taking anticoagulants Eliquis   Patient has a defibrillator: no    Assessment:   Changes in Assessment throughout shift: none     Patient has central line: no Reasons if yes: na  Insertion date:na Last dressing date:na   Patient has Taveras Cath: no Reasons if yes: na  Insertion date:na     Last Vitals:     Vitals:    06/10/21 1558 06/10/21 2100 06/11/21 0737 06/11/21 1600   BP: 135/86 (!) 142/77 (!) 143/86 139/87   Pulse: 80 82 84 84   Resp: 17 18 18 17   Temp: 97.5 °F (36.4 °C) 98.3 °F (36.8 °C) 97.1 °F (36.2 °C) 97.6 °F (36.4 °C)   SpO2: 97% 98% 97% 96%   Weight:       Height:            PAIN    Pain Assessment    Pain Intensity 1: 0 (06/11/21 1600) Pain Intensity 1: 2 (12/29/14 1105)    Pain Location 1: Foot Pain Location 1: Abdomen    Pain Intervention(s) 1: Medication (see MAR) Pain Intervention(s) 1: Medication (see MAR)  Patient Stated Pain Goal: 0 Patient Stated Pain Goal: 0  o Intervention effective: no c/o pain  o Other actions taken for pain: na     Skin Assessment  Skin color    Condition/Temperature    Integrity    Turgor    Weekly Pressure Ulcer Documentation  Pressure  Injury Documentation: No Pressure Injury Noted-Pressure Ulcer Prevention Initiated  Wound Prevention & Protection Methods  Orientation of wound Orientation of Wound Prevention: Posterior  Location of Prevention Location of Wound Prevention: Buttocks, Sacrum/Coccyx  Dressing Present Dressing Present : No  Dressing Status    Wound Offloading Wound Offloading (Prevention Methods): Bed, pressure redistribution/air     INTAKE/OUPUT  Date 06/10/21 0700 - 06/11/21 0659 06/11/21 0700 - 06/12/21 0659   Shift 4538-8967 3907-2110 24 Hour Total 1580-9956 2949-7240 24 Hour Total   INTAKE   Shift Total(mL/kg)         OUTPUT   Urine(mL/kg/hr)  350(0.4) 350(0.2)        Urine Voided  350 350        Urine Occurrence(s) 1 x 1 x 2 x 2 x  2 x   Stool           Stool Occurrence(s) 1 x 0 x 1 x 1 x  1 x   Shift Total(mL/kg)  350(4.7) 350(4.7)      NET  -350 -350      Weight (kg) 73.9 73.9 73.9 73.9 73.9 73.9       Recommendations:  1. Patient needs and requests:emptying the urinal ,pain med,assist to the bsc    2. Diet: Cardiac Regular    3. Pending tests/procedures: none    4. Functional Level/Equipment: assist x 2 / wheelchair    5. Estimated Discharge Date: TBD Posted on Whiteboard in Patients Room: yes    HEALS Safety Check    A safety check occurred in the patient's room between off going nurse and oncoming nurse listed above.     The safety check included the below items  Area Items   H  High Alert Medications - Verify all high alert medication drips (heparin, PCA, etc.)   E  Equipment - Suction is set up for ALL patients (with yanker)  - Red plugs utilized for all equipment (IV pumps, etc.)  - WOWs wiped down at end of shift.  - Room stocked with oxygen, suction, and other unit-specific supplies   A  Alarms - Bed alarm is set for fall risk patients  - Ensure chair alarm is in place and activated if patient is up in a chair   L  Lines - Check IV for any infiltration  - Taveras bag is empty if patient has a Taveras   - Tubing and IV bags are labeled   S  Safety   - Room is clean, patient is clean, and equipment is clean. - Hallways are clear from equipment besides carts. - Fall bracelet on for fall risk patients  - Ensure room is clear and free of clutter  - Suction is set up for ALL patients (with jeremiasker)  - Hallways are clear from equipment besides carts.    - Isolation precautions followed, supplies available outside room, sign posted

## 2021-06-11 NOTE — PROGRESS NOTES
Problem: Mobility Impaired (Adult and Pediatric)  Goal: *Therapy Goal (Edit Goal, Insert Text)  Description: Physical Therapy Short Term Goals  Initiated 6/8/2021 and to be accomplished within 7 day(s) on 6/15/2021  1. Patient will move from supine to sit and sit to supine , scoot up and down, and roll side to side in bed with modified independence. 2.  Patient will transfer from bed to chair and chair to bed with standby assistance using the least restrictive device. 3.  Patient will perform sit to stand with supervision/set-up. 4.  Patient will ambulate with CGA for 150 feet with the least restrictive device. 5.  Patient will ascend/descend 3 stairs with no handrail(s) with moderate assistance using AD as appropriate. Physical Therapy Long Term Goals  Initiated 6/8/2021 and to be accomplished within 14 day(s) on 6/22/2021  1. Patient will move from supine to sit and sit to supine , scoot up and down, and roll side to side in bed with independence. 2.  Patient will transfer from bed to chair and chair to bed with modified independence using the least restrictive device. 3.  Patient will perform sit to stand with modified independence. 4.  Patient will ambulate with modified independence for 150 feet with the least restrictive device. 5.  Patient will ascend/descend 3 stairs with no handrail(s) with minimal assistance/contact guard assist and appropriate AD for ease of safe entry/exit of home. 6.  Patient will ascend/descend 7 stairs with one railing with supervision for safety getting to 2nd floor bedroom. Outcome: Progressing Towards Goal   PHYSICAL THERAPY TREATMENT    Patient: Julio Darden (80 y.o. male)  Date: 6/11/2021  Diagnosis: Non-ST elevation (NSTEMI) myocardial infarction [facility]) [I21.4] Non-ST elevation (NSTEMI) myocardial infarction [facility])  Precautions: Fall  Chart, physical therapy assessment, plan of care and goals were reviewed.     Time In:1045  Time DGZ:1034    Patient seen for: Gait training;Transfer training; Wheelchair mobility (stair training)  Time in: 1335  Time out: 1405  Pt seen for: balance activities    Pain:  Pt pain was reported as no c/o pre-treatment. Pt pain was reported as no c/o post-treatment. Intervention: NA     Patient identified with name and : yes     SUBJECTIVE:      Pt reports having shortness of breath for a long time and gets winded easily. OBJECTIVE DATA SUMMARY:    Objective:     BED/MAT MOBILITY Daily Assessment     Supine to Sit : 5 (Supervision)  Pt presents supine in bed at start of PM session and performed supine to sit with supervision. TRANSFERS Daily Assessment     Transfer Type: Other  Other: stand step txfr without AD  Transfer Assistance : 4 (Contact guard assistance)  Sit to Stand Assistance: Stand-by assistance  Pt performed sit to stand from w/c with SBA and pushing up from B arm rests. Pt performed stand step txfr bed to w/c without AD with CGA for balance. GAIT Daily Assessment    Gait Description (WDL)      Gait Abnormalities Decreased step clearance    Assistive device Walker, rolling    Ambulation assistance - level surface 5 (Stand-by assistance)    Distance 150 Feet (ft) (x2 trials)    Ambulation assistance- uneven surface      Comments Pt ambulated 150ft with RW and SBA with 1 v/c for remaining within RW. Pt maintains flexed posture and posterior pelvic tilt. STEPS/STAIRS Daily Assessment     Steps/Stairs ambulated 6 (6\" steps)    Assistance Required 5 (Stand-by assistance)    Rail Use Both    Comments  Pt negotiated up and down 6 6\" steps with BHR and SBA, performing step to pattern, leading with right LE to ascend and left LE to descend.      Curbs/Ramps  NT        BALANCE Daily Assessment     Sitting - Static: Good (unsupported)  Sitting - Dynamic: Good (unsupported)  Standing - Static: Fair  Standing - Dynamic : Impaired        WHEELCHAIR MOBILITY Daily Assessment     Able to Propel (ft): 56 feet  Functional Level:  (supervision)  Curbs/Ramps Assist Required (FIM Score): 0 (Not tested)  Wheelchair Setup Assist Required : 5 (Supervision/setup)  Wheelchair Management: Manages left brake;Manages right brake  Pt propelled w/c to and from gym with BUE and supervision with v/c for navigation. THERAPEUTIC EXERCISES Daily Assessment       Standing marching x15      Neuro Re-Education:  Pt performed following in static standing:  Eyes closed x20 sec with CGA  Narrow SUKHJINDER x1min with CGA  Tandem stance BLE with min assist for balance x30 sec each    ASSESSMENT:  Pt with flexed posture and posterior pelvic tilt, demo's lack of trunk rotation with gait. Pt also demo's decreased righting reaction with trunk for balance righting. Pt required min assist for 1 LOB when back up to w/c. Progression toward goals:  []      Improving appropriately and progressing toward goals  [x]      Improving slowly and progressing toward goals  []      Not making progress toward goals and plan of care will be adjusted      PLAN:  Patient continues to benefit from skilled intervention to address the above impairments. Continue treatment per established plan of care. Discharge Recommendations:  Home Health  Further Equipment Recommendations for Discharge:  rolling walker      Estimated Discharge Date: 6/22/2021    Activity Tolerance:   fair  Please refer to the flowsheet for vital signs taken during this treatment.     After treatment:   [] Patient left in no apparent distress in bed  [x] Patient left in no apparent distress sitting up in chair  [] Patient left in no apparent distress in w/c mobilizing under own power  [] Patient left in no apparent distress dining area  [] Patient left in no apparent distress mobilizing under own power  [x] Call bell left within reach  [] Nursing notified  [] Caregiver present  [] Bed alarm activated   [x] Chair alarm activated      Jeremías Hall PTA  6/11/2021

## 2021-06-11 NOTE — PROGRESS NOTES
Problem: Neurolinguistics Impaired (Adult)  Goal: *Speech Goal: (INSERT TEXT)  Description: Long term goals  Patient will:  1. Be oriented x 3 and recall events of the day, supervision. 2. Recall 3 words after 5 minutes, supervision. 3. Follow complex instructions with 80-90% accuracy. 4. Read 1-2 sentence passages and respond appropriately to questions or instructions, 90% accuracy. 5. Perform functional problem solving/reasoning tasks with 90% accuracy. Short term goals (by 6/15/21)  Patient will:  1. Be oriented x 3 and recall events of the day, min assist.  2. Recall 3 words after 5 minutes, min assist.  3. Follow complex instructions with 70% accuracy. 4. Read 1 sentence passages and respond appropriately to questions or instructions, 60-70% accuracy. 5. Perform functional problem solving/reasoning tasks with 70% accuracy. Note:   Speech language pathology treatment    Patient: Molly Bell (5 y.o. male)  Date: 6/11/2021  Diagnosis: Non-ST elevation (NSTEMI) myocardial infarction [facility]) [I21.4] Non-ST elevation (NSTEMI) myocardial infarction [facility])  Time in: 1135  Time Out: 1200  SUBJECTIVE:   Patient stated I like to read my bible. OBJECTIVE:   Mental Status:  Mr. Hector Hannon was alert and oriented. Treatment & Interventions: The patient was seen for a 25 minute session due to the patient getting back late from PT. The following treatment tasks were presented:  Neuro-Linguistics:   Orientation:   Minimal assistance  Recent memory:  Supervision  Recall 3 words:  Supervision  Complex instructions:  70%  Determining causes:  80%    Response & Tolerance to Activities:  Mr. Hector Hannon was cooperative and pleasant. He tolerated activities well. Pain:  Pain Scale 1: Numeric (0 - 10)  Pain Orientation 1: Left;Right  Pain Description 1: Aching; Sharp  After treatment:   [x]       Patient left in no apparent distress sitting up in chair  []       Patient left in no apparent distress in bed  [x]       Call bell left within reach  []       Nursing notified  []       Caregiver present  []       Bed alarm activated    ASSESSMENT:   Progression toward goals:  [x]       Improving appropriately and progressing toward goals  []       Improving slowly and progressing toward goals  []       Not making progress toward goals and plan of care will be adjusted    PLAN:   Patient continues to benefit from skilled intervention to address the above impairments. Continue treatment per established plan of care. Discharge Recommendations:   To Be Determined    Estimated LOS: Three weeks    [address] Pathology Student  Time Calculation: 25 mins

## 2021-06-11 NOTE — PROGRESS NOTES
Problem: Self Care Deficits Care Plan (Adult)  Goal: *Therapy Goal (Edit Goal, Insert Text)  Description: Occupational Therapy Goals   Long Term Goals  Initiated 2021 and to be accomplished within 2 week(s) 2021  1. Pt will perform self-feeding with Alisa. 2. Pt will perform grooming with Alisa. 3. Pt will perform UB bathing with Alisa  4. Pt will perform LB bathing with Alisa. 5. Pt will perform tub/shower transfer with Alisa. 6. Pt will perform UB dressing with Alisa. 7. Pt will perform LB dressing with Alisa. 8. Pt will perform toileting task with Alisa. 9. Pt will perform toilet transfer with Alisa. 10.  Pt will perform an IADL task with good safety and Alisa. Short Term Goals   Initiated 2021 and to be accomplished within 7 day(s) 6/15/2021  1. Pt will perform self-feeding with S   2. Pt will perform grooming with S.  3. Pt will perform UB bathing with S.  4. Pt will perform LB bathing with S  5. Pt will perform tub/shower transfer with S.  6. Pt will perform UB dressing with S.  7. Pt will perform LB dressing with S  8. Pt will perform toileting task with S.  9. Pt will perform toilet transfer with S.          Outcome: Progressing Towards Goal     Occupational Therapy TREATMENT    Patient: Haley Wade   80 y.o. Patient identified with name and : Yes    Date: 2021    First Tx Session  Time In: 80  Time Out[de-identified] 1021    Second Tx Session  Time In: 1210  Time Out[de-identified] 4434    Diagnosis: Non-ST elevation (NSTEMI) myocardial infarction [facility]) [I21.4]   Precautions: Fall  Chart, occupational therapy assessment, plan of care, and goals were reviewed. Pain:  Pt reports 0/10 pain or discomfort prior to treatment. Pt reports 0/10 pain or discomfort post treatment. Intervention Provided: NA      SUBJECTIVE:   Patient stated  This will be my first shower in a long time.     OBJECTIVE DATA SUMMARY:     THERAPEUTIC ACTIVITY Daily Assessment    Kitchen scavenger hunt with cones placed in varied locations/heights/appliances throughout kitchen in order to simulate getting items in/out of cabinets, drawers, etc.    Completed x2 with RW with supervision - First round: 12 cones with vc for safety d/t excessive leaning/reaching. Second round: 6 cones with safety improvements and managing walker with closer proximity. THERAPEUTIC EXERCISE Daily Assessment         IADL Daily Assessment         NMRE Daily Assessment         FEEDING/EATING Daily Assessment          GROOMING Daily Assessment    Grooming - brushing teeth; hand washing  Grooming Assistance : 5 (Supervision) - 6 (Mod I)  Comments: performed brushing teeth standing with RW for support; hand washing second session with mod I seated in w/c     UPPER BODY BATHING Daily Assessment    Upper Body Bathing  Bathing Assistance, Upper: 5 (Stand-by assistance)  Position Performed:  (seated in showerchair )  Adaptive Equipment: Grab bar; Shower chair     LOWER BODY BATHING Daily Assessment    Lower Body Bathing  Bathing Assistance, Lower : 4 (Contact guard assistance) (CGA in stance; supv while seated)  Position Performed: Seated in chair;Standing  Adaptive Equipment: Grab bar     TOILETING Daily Assessment          UPPER BODY DRESSING Daily Assessment    Upper Body Dressing   Dressing Assistance : 5 (Supervision)     LOWER BODY DRESSING Daily Assessment    Lower Body Dressing   Dressing Assistance : 5 (Stand-by assistance)  Leg Crossed Method Used: Yes  Position Performed: Seated in chair;Standing  Comments:  (seated in chair to pull to knees; standing to pull up)  Donned pants with button/zipper, socks, slip on shoes     MOBILITY/TRANSFERS Daily Assessment    Functional Transfers  Tub or Shower Type: Shower  Amount of Assistance Required: 5 (Stand-by assistance)  Adaptive Equipment: Wheelchair; Shower chair with back    Consistent verbal cues required for hand placement when sitting (on bed, in w/c).     Propelled w/c (long-way back from gym to room)       ASSESSMENT:  Pt continues to make good progress towards goal attainment. Pt completed bathing task in shower with walker, shower chair, and grab bars for AE assistance and SBA/CGA when standing for pericare. Provided education regarding use of shower chair at home as well for energy conservation. Pt demonstrated understanding. Pt completed dressing tasks with SBA for UB and CGA for LB when in stance d/t safety while in shower. Pt entered/exited bathroom utilizing walker with SBA. Stood at sink with supv to perform teeth brushing. Second session, pt tolerated functional kitchen task to simulate taking out/putting away items in kitchen. Provided pt education regarding safety d/t excessive reaching for items. Pt demonstrated understanding with great improvements. Continue with OT to address goals. Progression toward goals:  [x]          Improving appropriately and progressing toward goals  []          Improving slowly and progressing toward goals  []          Not making progress toward goals and plan of care will be adjusted     PLAN:  Patient continues to benefit from skilled intervention to address the above impairments. Continue treatment per established plan of care. Discharge Recommendations:  Home Health  Further Equipment Recommendations for Discharge:  shower chair     Activity Tolerance:  Fair +    Estimated LOS: 6/22/2021    Please refer to the flowsheet for vital signs taken during this treatment. After treatment:   []  Patient left in no apparent distress sitting up in chair   [x]  Patient left in no apparent distress in bed  [x]  Call bell left within reach  [x]  Nursing notified  []  Caregiver present  []  Bed alarm activated    COMMUNICATION/EDUCATION:   [x] Home safety education was provided and the patient/caregiver indicated understanding. [] Patient/family have participated as able in goal setting and plan of care.   [] Patient/family agree to work toward stated goals and plan of care. [] Patient understands intent and goals of therapy, but is neutral about his/her participation. [] Patient is unable to participate in goal setting and plan of care.       Lyndon Calderon, CLIFFORD, OTR/L

## 2021-06-11 NOTE — REHAB NOTE
SHIFT CHANGE NOTE FOR [place]    Bedside and Verbal shift change report given to ROLDAN Palafox (oncoming nurse) by Xander Segovia (offgoing nurse). Report included the following information SBAR, Kardex, MAR and Recent Results.     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, brainstem and cerebellum) without residual deficit             Anticoagulated by anticoagulation treatment ICD-10-CM: Z79.01  ICD-9-CM: V58.61  5/19/2021 Yes    Overview Signed 6/7/2021 10:48 PM by Jayant Capone MD     On Apixaban             On clopidogrel therapy ICD-10-CM: Z79.01  ICD-9-CM: V58.61  5/19/2021 Yes        * (Principal) Non-ST elevation (NSTEMI) myocardial infarction [facility]) ICD-10-CM: I21.4  ICD-9-CM: 410.70  5/17/2021 Yes        Acute on chronic heart failure with preserved ejection fraction (HFpEF) ([facility] ICD-10-CM: I50.33  ICD-9-CM: 428.23  5/17/2021 Yes        Paroxysmal atrial fibrillation (H[facility]) ICD-10-CM: I48.0  ICD-9-CM: 427.31  5/17/2021 Yes        Leukocytosis ICD-10-CM: X70.253  ICD-9-CM: 288.60  5/17/2021 Yes    Overview Signed 6/8/2021 12:42 AM by Jayant Capone MD     Attributed to reactive leukocytosis due to NSTEMI             Essential hypertension (Chronic) ICD-10-CM: I10  ICD-9-CM: 401.9  Unknown Yes              Background:   Past Medical History:   Past Medical History:   Diagnosis Date    Acute embolic stroke ([facility] 6/47/8712    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, brainstem and cerebellum) without residual deficit    Anticoagulated by anticoagulation treatment 5/19/2021    On Apixaban    Benign prostatic hyperplasia with urinary retention     Chronic obstructive pulmonary disease (COPD) ([facility])     Coronary artery disease involving native coronary artery of native heart     Essential hypertension     Heart failure with preserved left ventricular function (HFpEF) ([facility])     2D echocardiogram (5/18/2021) showed EF 50%; concentric LV hypertrophy with a severely thickened septal wall and mildly thickened posterior wall; left atrial chamber is severely enlarged; right atrial chamber volume is moderately enlarged; no mass, shunts, or thrombi.      History of carotid stenosis     History of gross hematuria 5/26/2021    Attributed to Taveras trauma while patient was altered    History of renal artery stenosis     Leukocytosis 5/17/2021    Attributed to reactive leukocytosis due to NSTEMI    Malignant neoplasm of lower lobe of right lung ([facility])     Non-ST elevation (NSTEMI) myocardial infarction ([facility] 5/17/2021    On clopidogrel therapy 5/19/2021    Paroxysmal atrial fibrillation ([facility] 5/17/2021    Urinary retention       Patient taking anticoagulants Eliquis   Patient has a defibrillator: no    Assessment:   Changes in Assessment throughout shift: none     Patient has central line: no Reasons if yes: na  Insertion date:na Last dressing date:na   Patient has Taveras Cath: no Reasons if yes: na  Insertion date:na     Last Vitals:     Vitals:    06/09/21 2111 06/10/21 0819 06/10/21 1558 06/10/21 2100   BP: 129/76 138/89 135/86 (!) 142/77   Pulse: 74 80 80 82   Resp: 18 18 17 18   Temp: 97.4 °F (36.3 °C) 97.1 °F (36.2 °C) 97.5 °F (36.4 °C) 98.3 °F (36.8 °C)   SpO2: 97% 97% 97% 98%   Weight:       Height:            PAIN    Pain Assessment    Pain Intensity 1: 0 (06/11/21 0400) Pain Intensity 1: 2 (12/29/14 1105)    Pain Location 1: Foot Pain Location 1: Abdomen    Pain Intervention(s) 1: Medication (see MAR) Pain Intervention(s) 1: Medication (see MAR)  Patient Stated Pain Goal: 0 Patient Stated Pain Goal: 0  o Intervention effective: no c/o pain  o Other actions taken for pain: na     Skin Assessment  Skin color    Condition/Temperature    Integrity    Turgor    Weekly Pressure Ulcer Documentation  Pressure  Injury Documentation: No Pressure Injury Noted-Pressure Ulcer Prevention Initiated  Wound Prevention & Protection Methods  Orientation of wound Orientation of Wound Prevention: Posterior  Location of Prevention Location of Wound Prevention: Buttocks, Sacrum/Coccyx  Dressing Present Dressing Present : No  Dressing Status    Wound Offloading Wound Offloading (Prevention Methods): Bed, pressure redistribution/air     INTAKE/OUPUT  Date 06/10/21 0700 - 06/11/21 0659 06/11/21 0700 - 06/12/21 0659   Shift 3126-7734 2198-2215 24 Hour Total 0021-8785 9650-4270 24 Hour Total   INTAKE   Shift Total(mL/kg)         OUTPUT   Urine(mL/kg/hr)  350(0.4) 350(0.2)        Urine Voided  350 350        Urine Occurrence(s) 1 x 1 x 2 x      Stool           Stool Occurrence(s) 1 x 0 x 1 x      Shift Total(mL/kg)  350(4.7) 350(4.7)      NET  -350 -350      Weight (kg) 73.9 73.9 73.9 73.9 73.9 73.9       Recommendations:  1. Patient needs and requests:emptying the urinal ,pain med,assist to the bsc    2. Diet: Cardiac Regular    3. Pending tests/procedures: none    4. Functional Level/Equipment: assist x 2 / wheelchair    5. Estimated Discharge Date: TBD Posted on Whiteboard in Patients Room: yes    HEALS Safety Check    A safety check occurred in the patient's room between off going nurse and oncoming nurse listed above.     The safety check included the below items  Area Items   H  High Alert Medications - Verify all high alert medication drips (heparin, PCA, etc.)   E  Equipment - Suction is set up for ALL patients (with yanker)  - Red plugs utilized for all equipment (IV pumps, etc.)  - WOWs wiped down at end of shift.  - Room stocked with oxygen, suction, and other unit-specific supplies   A  Alarms - Bed alarm is set for fall risk patients  - Ensure chair alarm is in place and activated if patient is up in a chair   L  Lines - Check IV for any infiltration  - Taveras bag is empty if patient has a Taveras   - Tubing and IV bags are labeled   S  Safety   - Room is clean, patient is clean, and equipment is clean. - Hallways are clear from equipment besides carts. - Fall bracelet on for fall risk patients  - Ensure room is clear and free of clutter  - Suction is set up for ALL patients (with jeremiasker)  - Hallways are clear from equipment besides carts.    - Isolation precautions followed, supplies available outside room, sign posted

## 2021-06-11 NOTE — PROGRESS NOTES
Problem: Falls - Risk of  Goal: *Absence of Falls  Description: Document [place] Fall Risk and appropriate interventions in the flowsheet. Outcome: Progressing Towards Goal  Note: Fall Risk Interventions:  Mobility Interventions: Bed/chair exit alarm, Patient to call before getting OOB    Mentation Interventions: Adequate sleep, hydration, pain control    Medication Interventions: Bed/chair exit alarm, Patient to call before getting OOB    Elimination Interventions: Bed/chair exit alarm, Call light in reach, Patient to call for help with toileting needs    History of Falls Interventions: Bed/chair exit alarm         Problem: Patient Education: Go to Patient Education Activity  Goal: Patient/Family Education  Outcome: Progressing Towards Goal     Problem: Pressure Injury - Risk of  Goal: *Prevention of pressure injury  Description: Document Nicko Scale and appropriate interventions in the flowsheet. Outcome: Progressing Towards Goal  Note: Pressure Injury Interventions:             Activity Interventions: Increase time out of bed, Pressure redistribution bed/mattress(bed type)    Mobility Interventions: Pressure redistribution bed/mattress (bed type)    Nutrition Interventions: Document food/fluid/supplement intake    Friction and Shear Interventions: HOB 30 degrees or less                Problem: Patient Education: Go to Patient Education Activity  Goal: Patient/Family Education  Outcome: Progressing Towards Goal     Problem: Pain  Goal: *Control of Pain  Outcome: Progressing Towards Goal     Problem: Patient Education: Go to Patient Education Activity  Goal: Patient/Family Education  Outcome: Progressing Towards Goal     Problem: Inpatient Rehab (Adult)  Goal: *LTG: Avoids injury/falls 100% of time related to deficits  Outcome: Progressing Towards Goal  Goal: *LTG: Avoids infection 100% of time related to deficits  Outcome: Progressing Towards Goal  Goal: *LTG: Verbalize understanding of diagnosis and risk factors for recurring stroke  Outcome: Progressing Towards Goal  Goal: *LTG: Absence of DVT during hospitalization  Outcome: Progressing Towards Goal  Goal: *LTG: Maintains Skin Integrity With No Evidence of Pressure Injury 100% of Time  Outcome: Progressing Towards Goal  Goal: Interventions  Outcome: Progressing Towards Goal     Problem: Patient Education: Go to Patient Education Activity  Goal: Patient/Family Education  Outcome: Progressing Towards Goal     Problem: Patient Education: Go to Patient Education Activity  Goal: Patient/Family Education  Outcome: Progressing Towards Goal     Problem: Patient Education: Go to Patient Education Activity  Goal: Patient/Family Education  Outcome: Progressing Towards Goal     Problem: Patient Education: Go to Patient Education Activity  Goal: Patient/Family Education  Outcome: Progressing Towards Goal     Problem: Nutrition Deficit  Goal: *Optimize nutritional status  Outcome: Progressing Towards Goal

## 2021-06-12 NOTE — PROGRESS NOTES
Problem: Mobility Impaired (Adult and Pediatric)  Goal: *Therapy Goal (Edit Goal, Insert Text)  Description: Physical Therapy Short Term Goals  Initiated 6/8/2021 and to be accomplished within 7 day(s) on 6/15/2021  1. Patient will move from supine to sit and sit to supine , scoot up and down, and roll side to side in bed with modified independence. 2.  Patient will transfer from bed to chair and chair to bed with standby assistance using the least restrictive device. 3.  Patient will perform sit to stand with supervision/set-up. 4.  Patient will ambulate with CGA for 150 feet with the least restrictive device. 5.  Patient will ascend/descend 3 stairs with no handrail(s) with moderate assistance using AD as appropriate. Physical Therapy Long Term Goals  Initiated 6/8/2021 and to be accomplished within 14 day(s) on 6/22/2021  1. Patient will move from supine to sit and sit to supine , scoot up and down, and roll side to side in bed with independence. 2.  Patient will transfer from bed to chair and chair to bed with modified independence using the least restrictive device. 3.  Patient will perform sit to stand with modified independence. 4.  Patient will ambulate with modified independence for 150 feet with the least restrictive device. 5.  Patient will ascend/descend 3 stairs with no handrail(s) with minimal assistance/contact guard assist and appropriate AD for ease of safe entry/exit of home. 6.  Patient will ascend/descend 7 stairs with one railing with supervision for safety getting to 2nd floor bedroom. Outcome: Progressing Towards Goal    PHYSICAL THERAPY TREATMENT    Patient: Malcolm Jose (3 y.o. male)  Date: 6/12/2021  Diagnosis: Non-ST elevation (NSTEMI) myocardial infarction [facility]) [I21.4] Non-ST elevation (NSTEMI) myocardial infarction [facility])  Precautions: Fall  Chart, physical therapy assessment, plan of care and goals were reviewed.     Time In:1102  Time [facility]:9468    Patient seen for: Balance activities;Gait training;Patient education;Transfer training    Pain:  Pt reported no c/o pain with treatment session. Intervention: N/A    Patient identified with name and : yes    SUBJECTIVE:      Pt reports feeling \"a little woozy,\" at start of treatment session. Pt's vital signs assessed and pt's BP was 143/84 mmHg. Pt was monitored throughout treatment session reporting his, \"woozy,\" feeling did not worsen or change with activity. Pt participated well in skilled PT intervention. OBJECTIVE DATA SUMMARY:    Objective:     TRANSFERS Daily Assessment     Transfer Type: Other  Other: stand step without AD  Transfer Assistance : 4 (Contact guard assistance)  Sit to Stand Assistance: Stand-by assistance   Pt requires CGA for balance and safety with stand step transfers without AD as pt reaches out into environment for support requiring verbal cuing and encouragement for upright posture and to utilize PT's hand for balance as needed. Pt requires SBA for safety with sit <> stand transfers to/from RW due to inconsistency with safe hand placement. Pt performed 10 repetitions sit <> stand w/c <> RW with SBA and intermittent verbal reminders for slow controlled descent with stand to sit. Pt maintained safe hand placement within blocked practice but after gait trials does not consistently reach back with UEs for arm rests to control stand to sit. GAIT Daily Assessment    Gait Description (WDL) Exceptions to WDL    Gait Abnormalities Decreased step clearance (kyphotic, forward flexed posture)    Assistive device Walker, rolling    Ambulation assistance - level surface 5 (Stand-by assistance)    Distance 300 Feet (ft)    Ambulation assistance- uneven surface  NT    Comments Pt ambulates with forward flexed posture with downward gaze and decreased B foot clearance requiring verbal cuing for forward gaze and upright posture.     Pt also participated in gait training x 150 feet without AD. Pt required CGA for safety and balance with one episode of mild left knee buckling after ambulating 120 feed requiring CGA/steadying assistance to correct. STEPS/STAIRS Daily Assessment     Steps/Stairs ambulated 12 (6\")    Assistance Required 5 (Stand-by assistance)    Rail Use Bilateral    Comments   Pt negotiates stairs with step to step pattern requiring intermittent verbal cues for safe foot placement on step. Curbs/Ramps  NT        BALANCE Daily Assessment     Sitting - Static: Good (unsupported)  Sitting - Dynamic: Good (unsupported)  Standing - Static: Fair  Standing - Dynamic : Impaired        THERAPEUTIC EXERCISES Daily Assessment     Standing LE Strength Training:  Pt performed the following exercises in front of a mirror for visual feedback with verbal cues to utilize RW only with fingertip support for balance as opposed to weight bearing through UEs. 3 sets of 10 repetitions: Alternate Hip Flexion  Right and Left Hip Abduction  Right and Left Hip Extension with maximal verbal cuing for quality and pattern with limited hip extension AROM noted due to trunk flexion      ASSESSMENT:  Pt is progressing with functional mobility progressing to negotiate a full flight of steps and participating in gait training without an AD this treatment session. However, pt will still benefit from use of RW for safety secondary to functional weakness with ambulation. Progression toward goals:  []      Improving appropriately and progressing toward goals  [x]      Improving slowly and progressing toward goals  []      Not making progress toward goals and plan of care will be adjusted      PLAN:  Patient continues to benefit from skilled intervention to address the above impairments. Continue treatment per established plan of care. Emphasize functional strengthening and repetition of safe and efficient movement patterns to promote carryover.   Discharge Recommendations:  Home Health Physical Therapy to Progress to Outpatient Physical Therapy  Further Equipment Recommendations for Discharge:  rolling walker and N/A      Estimated Discharge Date: 6/22/2021    Activity Tolerance:   Fair - pt requires seated rest breaks throughout. Please refer to the flowsheet for vital signs taken during this treatment. After treatment:   [x] Patient left in no apparent distress seated EOB with call bell and needs in reach.   [] Patient left in no apparent distress sitting up in chair  [] Patient left in no apparent distress in w/c mobilizing under own power  [] Patient left in no apparent distress dining area  [] Patient left in no apparent distress mobilizing under own power  [x] Call bell left within reach  [] Nursing notified  [] Caregiver present  [x] Bed alarm activated   [] Chair alarm activated      Michelle Holbrook PT, DPT  6/12/2021

## 2021-06-12 NOTE — REHAB NOTE
[facility]    Bedside and Verbal shift change report given to Raimundo Girard RN (oncoming nurse) by  Doris Lane LPN (offgoing nurse). Report included the following information SBAR, Kardex, MAR and Recent Results.     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 ICD-10-CM: I50.33  ICD-9-CM: 428.23  5/17/2021 Yes        Paroxysmal atrial fibrillation (HCC) ICD-10-CM: I48.0  ICD-9-CM: 427.31  5/17/2021 Yes        Leukocytosis ICD-10-CM: U41.687  ICD-9-CM: 288.60  5/17/2021 Yes    Overview Signed 6/8/2021 12:42 AM by Desmond Gonzalez MD     Attributed to reactive leukocytosis due to NSTEMI             Essential hypertension (Chronic) ICD-10-CM: I10  ICD-9-CM: 401.9  Unknown Yes              Background:   Past Medical History:   Past Medical History:   Diagnosis Date    Acute embolic stroke ([facility] 4/69/4102    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, brainstem and cerebellum) without residual deficit    Anticoagulated by anticoagulation treatment 5/19/2021    On Apixaban    Benign prostatic hyperplasia with urinary retention     [facility] pulmonary disease [facility]COPD) ([facility])     Coronary artery disease involving native coronary artery of native heart     Essential hypertension     Heart failure with preserved left ventricular function (HFpEF) ([facility])     2D echocardiogram (5/18/2021) showed EF 50%; concentric LV hypertrophy with a severely thickened septal wall and mildly thickened posterior wall; left atrial chamber is severely enlarged; right atrial chamber volume is moderately enlarged; no mass, shunts, or thrombi.      History of carotid stenosis     History of gross hematuria 5/26/2021    Attributed to Taveras trauma while patient was altered    History of renal artery stenosis     Leukocytosis 5/17/2021    Attributed to reactive leukocytosis due to NSTEMI    Malignant neoplasm of lower lobe of right lung ([facility])     Non-ST elevation (NSTEMI) myocardial infarction ([facility] 5/17/2021    On clopidogrel therapy 5/19/2021    Paroxysmal atrial fibrillation ([facility] 5/17/2021    Urinary retention       Patient taking anticoagulants Eliquis   Patient has a defibrillator: no    Assessment:   Changes in Assessment throughout shift: none     Patient has central line: no Reasons if yes: na  Insertion date:na Last dressing date:na   Patient has Taveras Cath: no Reasons if yes: na  Insertion date:na     Last Vitals:     Vitals:    06/10/21 2100 06/11/21 0737 06/11/21 1600 06/11/21 2120   BP: (!) 142/77 (!) 143/86 139/87 116/74   Pulse: 82 84 84 71   Resp: 18 18 17 18   Temp: 98.3 °F (36.8 °C) 97.1 °F (36.2 °C) 97.6 °F (36.4 °C) 98 °F (36.7 °C)   SpO2: 98% 97% 96% 98%   Weight:       Height:            PAIN    Pain Assessment    Pain Intensity 1: 0 (06/12/21 0416) Pain Intensity 1: 2 (12/29/14 1105)    Pain Location 1: Foot Pain Location 1: Abdomen    Pain Intervention(s) 1: Medication (see MAR) Pain Intervention(s) 1: Medication (see MAR)  Patient Stated Pain Goal: 0 Patient Stated Pain Goal: 0  o Intervention effective: no c/o pain  o Other actions taken for pain: na     Skin Assessment  Skin color    Condition/Temperature    Integrity    Turgor    Weekly Pressure Ulcer Documentation  Pressure  Injury Documentation: No Pressure Injury Noted-Pressure Ulcer Prevention Initiated  Wound Prevention & Protection Methods  Orientation of wound Orientation of Wound Prevention: Posterior  Location of Prevention Location of Wound Prevention: Sacrum/Coccyx  Dressing Present Dressing Present : No  Dressing Status    Wound Offloading Wound Offloading (Prevention Methods): Bed, pressure reduction mattress, Pillows, Wheelchair     INTAKE/OUPUT  Date 06/11/21 0700 - 06/12/21 0659 06/12/21 0700 - 06/13/21 0659   Shift 1535-3417 6095-9219 24 Hour Total 0126-9271 4463-5491 24 Hour Total   INTAKE   Shift Total(mL/kg)         OUTPUT   Urine(mL/kg/hr)  550(0.6) 550(0.3)        Urine Voided  550 550        Urine Occurrence(s) 2 x 4 x 6 x      Emesis/NG output  0 0        Emesis  0 0        Emesis Occurrence(s)  0 x 0 x      Stool  1 1        Stool Occurrence(s) 1 x 2 x 3 x        Stool  1 1      Shift Total(mL/kg)  551(7.5) 551(7.5)      NET  -551 -551      Weight (kg) 73.9 73.9 73.9 73.9 73.9 73.9       Recommendations:  1. Patient needs and requests:emptying the urinal ,pain med,assist to the bsc    2. Diet: Cardiac Regular    3. Pending tests/procedures: none    4. Functional Level/Equipment: assist x 2 / wheelchair    5. Estimated Discharge Date: TBD Posted on Whiteboard in Patients Room: yes    HEALS Safety Check    A safety check occurred in the patient's room between off going nurse and oncoming nurse listed above.     The safety check included the below items  Area Items   H  High Alert Medications - Verify all high alert medication drips (heparin, PCA, etc.)   E  Equipment - Suction is set up for ALL patients (with laila)  - Red plugs utilized for all equipment (IV pumps, etc.)  - WOWs wiped down at end of shift.  - Room stocked with oxygen, suction, and other unit-specific supplies   A  Alarms - Bed alarm is set for fall risk patients  - Ensure chair alarm is in place and activated if patient is up in a chair   L  Lines - Check IV for any infiltration  - Taveras bag is empty if patient has a Taveras   - Tubing and IV bags are labeled   S  Safety   - Room is clean, patient is clean, and equipment is clean. - Hallways are clear from equipment besides carts. - Fall bracelet on for fall risk patients  - Ensure room is clear and free of clutter  - Suction is set up for ALL patients (with laila)  - Hallways are clear from equipment besides carts.    - Isolation precautions followed, supplies available outside room, sign posted

## 2021-06-12 NOTE — REHAB NOTE
SHIFT CHANGE NOTE FOR [facility]    Bedside and Verbal shift change report given to Allyson Wood RN (oncoming nurse) by  [facility] ROLDAN (offgoing nurse). Report included the following information SBAR, Kardex, MAR and Recent Results.     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, brainstem and cerebellum) without residual deficit             Anticoagulated by anticoagulation treatment ICD-10-CM: Z79.01  ICD-9-CM: V58.61  5/19/2021 Yes    Overview Signed 6/7/2021 10:48 PM by Edwin Eduardo MD     On Apixaban             On clopidogrel therapy ICD-10-CM: Z79.01  ICD-9-CM: V58.61  5/19/2021 Yes        * (Principal) Non-ST elevation (NSTEMI) myocardial infarction [facility]) ICD-10-CM: I21.4  ICD-9-CM: 410.70  5/17/2021 Yes        Acute on chronic heart failure with preserved ejection fraction (HFpEF) ([facility] ICD-10-CM: I50.33  ICD-9-CM: 428.23  5/17/2021 Yes        Paroxysmal atrial fibrillation (HCC) ICD-10-CM: I48.0  ICD-9-CM: 427.31  5/17/2021 Yes        Leukocytosis ICD-10-CM: A09.443  ICD-9-CM: 288.60  5/17/2021 Yes    Overview Signed 6/8/2021 12:42 AM by Edwin Eduardo MD     Attributed to reactive leukocytosis due to NSTEMI             Essential hypertension (Chronic) ICD-10-CM: I10  ICD-9-CM: 401.9  Unknown Yes              Background:   Past Medical History:   Past Medical History:   Diagnosis Date    Acute embolic stroke ([facility] 7/25/5646    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, brainstem and cerebellum) without residual deficit    Anticoagulated by anticoagulation treatment 5/19/2021    On Apixaban    Benign prostatic hyperplasia with urinary retention     Chronic obstructive pulmonary disease (COPD) ([facility])     Coronary artery disease involving native coronary artery of native heart     Essential hypertension     Heart failure with preserved left ventricular function (HFpEF) ([facility])     2D echocardiogram (5/18/2021) showed EF 50%; concentric LV hypertrophy with a severely thickened septal wall and mildly thickened posterior wall; left atrial chamber is severely enlarged; right atrial chamber volume is moderately enlarged; no mass, shunts, or thrombi.      History of carotid stenosis     History of gross hematuria 5/26/2021    Attributed to Taveras trauma while patient was altered    History of renal artery stenosis     Leukocytosis 5/17/2021    Attributed to reactive leukocytosis due to NSTEMI    Malignant neoplasm of lower lobe of right lung ([facility])     Non-ST elevation (NSTEMI) myocardial infarction ([facility] 5/17/2021    On clopidogrel therapy 5/19/2021    Paroxysmal atrial fibrillation ([facility] 5/17/2021    Urinary retention       Patient taking anticoagulants Eliquis   Patient has a defibrillator: no    Assessment:   Changes in Assessment throughout shift: none     Patient has central line: no Reasons if yes: na  Insertion date:na Last dressing date:na   Patient has Taveras Cath: no Reasons if yes: na  Insertion date:na     Last Vitals:     Vitals:    06/11/21 2120 06/12/21 0736 06/12/21 1117 06/12/21 1521   BP: 116/74 120/81 (!) 143/84 129/78   Pulse: 71 79  89   Resp: 18 16  19   Temp: 98 °F (36.7 °C) 98.8 °F (37.1 °C)  98.6 °F (37 °C)   SpO2: 98% 96%  96%   Weight:       Height:            PAIN    Pain Assessment    Pain Intensity 1: 7 (06/12/21 1833) Pain Intensity 1: 2 (12/29/14 1105)    Pain Location 1: Hip Pain Location 1: Abdomen    Pain Intervention(s) 1: Medication (see MAR) Pain Intervention(s) 1: Medication (see MAR)  Patient Stated Pain Goal: 0 Patient Stated Pain Goal: 0  o Intervention effective: no c/o pain  o Other actions taken for pain: na     Skin Assessment  Skin color    Condition/Temperature    Integrity    Turgor    Weekly Pressure Ulcer Documentation  Pressure  Injury Documentation: No Pressure Injury Noted-Pressure Ulcer Prevention Initiated  Wound Prevention & Protection Methods  Orientation of wound Orientation of Wound Prevention: Posterior  Location of Prevention Location of Wound Prevention: Sacrum/Coccyx  Dressing Present Dressing Present : No  Dressing Status    Wound Offloading Wound Offloading (Prevention Methods): Bed, pressure redistribution/air     INTAKE/OUPUT  Date 06/11/21 0700 - 06/12/21 0659 06/12/21 0700 - 06/13/21 0659   Shift 5160-7257 5589-6855 24 Hour Total 0269-6534 6324-2694 24 Hour Total   INTAKE   P.O.    550  550     P. O.    550  550   Shift Total(mL/kg)    550(7.4)  550(7.4)   OUTPUT   Urine(mL/kg/hr)  550(0.6) 550(0.3)        Urine Voided  550 550        Urine Occurrence(s) 2 x 4 x 6 x 4 x  4 x   Emesis/NG output  0 0        Emesis  0 0        Emesis Occurrence(s)  0 x 0 x      Stool  1 1        Stool Occurrence(s) 1 x 2 x 3 x 4 x  4 x     Stool  1 1      Shift Total(mL/kg)  551(7.5) 551(7.5)      NET  -551 -551 550  550   Weight (kg) 73.9 73.9 73.9 73.9 73.9 73.9       Recommendations:  1. Patient needs and requests:emptying the urinal ,pain med,assist to the bsc    2. Diet: Cardiac Regular    3. Pending tests/procedures: none    4. Functional Level/Equipment: assist x 2 / wheelchair    5. Estimated Discharge Date: TBD Posted on Whiteboard in Patients Room: yes    HEALS Safety Check    A safety check occurred in the patient's room between off going nurse and oncoming nurse listed above.     The safety check included the below items  Area Items   H  High Alert Medications - Verify all high alert medication drips (heparin, PCA, etc.)   E  Equipment - Suction is set up for ALL patients (with laila)  - Red plugs utilized for all equipment (IV pumps, etc.)  - WOWs wiped down at end of shift.  - Room stocked with oxygen, suction, and other unit-specific supplies   A  Alarms - Bed alarm is set for fall risk patients  - Ensure chair alarm is in place and activated if patient is up in a chair   L  Lines - Check IV for any infiltration  - Taveras bag is empty if patient has a Taveras   - Tubing and IV bags are labeled   S  Safety   - Room is clean, patient is clean, and equipment is clean. - Hallways are clear from equipment besides carts. - Fall bracelet on for fall risk patients  - Ensure room is clear and free of clutter  - Suction is set up for ALL patients (with laila)  - Hallways are clear from equipment besides carts.    - Isolation precautions followed, supplies available outside room, sign posted

## 2021-06-12 NOTE — PROGRESS NOTES
Problem: Self Care Deficits Care Plan (Adult)  Goal: *Therapy Goal (Edit Goal, Insert Text)  Description: Occupational Therapy Goals   Long Term Goals  Initiated 2021 and to be accomplished within 2 week(s) 2021  1. Pt will perform self-feeding with Alisa. 2. Pt will perform grooming with Alisa. 3. Pt will perform UB bathing with Alisa  4. Pt will perform LB bathing with Alisa. 5. Pt will perform tub/shower transfer with Alisa. 6. Pt will perform UB dressing with Alisa. 7. Pt will perform LB dressing with Alisa. 8. Pt will perform toileting task with Alisa. 9. Pt will perform toilet transfer with Alisa. 10.  Pt will perform an IADL task with good safety and Alisa. Short Term Goals   Initiated 2021 and to be accomplished within 7 day(s) 6/15/2021  1. Pt will perform self-feeding with S   2. Pt will perform grooming with S.  3. Pt will perform UB bathing with S.  4. Pt will perform LB bathing with S  5. Pt will perform tub/shower transfer with S.  6. Pt will perform UB dressing with S.  7. Pt will perform LB dressing with S  8. Pt will perform toileting task with S.  9. Pt will perform toilet transfer with S.           Outcome: Progressing Towards Goal    Occupational Therapy TREATMENT    Patient: Julio Darden   80 y.o. Patient identified with name and : Yes    Date: 2021    First Tx Session  Time In: 800  Time Out: 845  -1 unit for breathing treatment    Diagnosis: Non-ST elevation (NSTEMI) myocardial infarction [facility]) [I21.4]   Precautions: Fall  Chart, occupational therapy assessment, plan of care, and goals were reviewed. Pain:  Pt reports 0/10 pain or discomfort prior to treatment. Pt reports 0/10 pain or discomfort post treatment. Intervention Provided: N/A      SUBJECTIVE:   Patient stated My wife is going to bring me more clothes.     OBJECTIVE DATA SUMMARY:     FEEDING/EATING Daily Assessment    Feeding/Eating  Feeding/Eating Assistance: 5 (Supervision/setup)  Comments: Set up of breakfast tray     GROOMING Daily Assessment    Grooming  Grooming Assistance : 5 (Supervision)  Comments: Standing at sink with RW, brushes teeth, applies deoderant and cologne. UPPER BODY BATHING Daily Assessment    Upper Body Bathing  Bathing Assistance, Upper: 5 (Supervision)  Position Performed: Seated in chair  Adaptive Equipment: Grab bar;Tub bench  Comments: UB bathing on TTB in shower with S.     LOWER BODY BATHING Daily Assessment    Lower Body Bathing  Bathing Assistance, Lower : 5 (Stand-by assistance)  Adaptive Equipment: Grab bar  Position Performed: Seated in chair;Standing  Adaptive Equipment: Grab bar;Tub bench  Comments: Completes LB bathing seated on TTB, requires SBA for standing - patient grasps grab bar with one hand and uses dominant hand to wash buttocks, back of thighs     UPPER BODY DRESSING Daily Assessment    Upper Body Dressing   Dressing Assistance : 5 (Supervision)  Comments: UB dressing with S     LOWER BODY DRESSING Daily Assessment    Lower Body Dressing   Dressing Assistance : 5 (Stand-by assistance)  Leg Crossed Method Used: Yes  Position Performed: Seated in chair;Standing  Adaptive Equipment Used: Grab bar  Comments: SBA during standing part of LB dressing for safety     MOBILITY/TRANSFERS Daily Assessment    Functional Transfers  Tub or Shower Type: Shower  Amount of Assistance Required: 5 (Stand-by assistance)  Adaptive Equipment: Grab bars; Tub transfer bench       ASSESSMENT:  Patient motivated to participate in OT treatment. SBA with LB bathing/dressing during and after shower. S for all other ADLs. Progression toward goals:  [x]          Improving appropriately and progressing toward goals  []          Improving slowly and progressing toward goals  []          Not making progress toward goals and plan of care will be adjusted     PLAN:  Patient continues to benefit from skilled intervention to address the above impairments. Continue treatment per established plan of care. Discharge Recommendations:  Home Health  Further Equipment Recommendations for Discharge:  TBD     Activity Tolerance:  Good      Estimated LOS:per original OT POC    Please refer to the flowsheet for vital signs taken during this treatment. After treatment:   []  Patient left in no apparent distress sitting up in chair   [x]  Patient left in no apparent distress in bed  [x]  Call bell left within reach  []  Nursing notified  []  Caregiver present  []  Bed alarm activated    COMMUNICATION/EDUCATION:   [] Home safety education was provided and the patient/caregiver indicated understanding. [] Patient/family have participated as able in goal setting and plan of care. [x] Patient/family agree to work toward stated goals and plan of care. [] Patient understands intent and goals of therapy, but is neutral about his/her participation. [] Patient is unable to participate in goal setting and plan of care. Sadie Cole, OTR/L

## 2021-06-12 NOTE — REHAB NOTE
[facility]    Bedside and Verbal shift change report given to Artur Richards RN (oncoming nurse) by  Uri Newton (offgoing nurse). Report included the following information SBAR, Kardex, MAR and Recent Results.     Situation:   Code Status: Full Code   Reason for Admission: [address] Day: 5   Problem List:   Hospital Problems  Date Reviewed: 6/11/2021        Codes Class Noted POA    Mixed hyperlipidemia (Chronic) ICD-10-CM: R86.4  ICD-9-CM: 272.2  Unknown Yes        Acute embolic stroke ([facility] YDR-23-BA: I63.9  ICD-9-CM: 434.11  5/23/2021 Yes    Overview Signed 6/8/2021 12:37 AM by Leon Swift MD     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, brainstem and cerebellum) without residual deficit             Anticoagulated by anticoagulation treatment ICD-10-CM: Z79.01  ICD-9-CM: V58.61  5/19/2021 Yes    Overview Signed 6/7/2021 10:48 PM by Leon Swift MD     On Apixaban             On clopidogrel therapy ICD-10-CM: Z79.01  ICD-9-CM: V58.61  5/19/2021 Yes        * (Principal) Non-ST elevation (NSTEMI) myocardial infarction [facility]) ICD-10-CM: I21.4  ICD-9-CM: 410.70  5/17/2021 Yes        Acute on chronic heart failure with preserved ejection fraction (HFpEF) ([facility] ICD-10-CM: I50.33  ICD-9-CM: 428.23  5/17/2021 Yes        Paroxysmal atrial fibrillation (HCC) ICD-10-CM: I48.0  ICD-9-CM: 427.31  5/17/2021 Yes        Leukocytosis ICD-10-CM: W08.626  ICD-9-CM: 288.60  5/17/2021 Yes    Overview Signed 6/8/2021 12:42 AM by Leon Swift MD     Attributed to reactive leukocytosis due to NSTEMI             Essential hypertension (Chronic) ICD-10-CM: I10  ICD-9-CM: 401.9  Unknown Yes              Background:   Past Medical History:   Past Medical History:   Diagnosis Date    Acute embolic stroke ([address] 4/60/6901    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, brainstem and cerebellum) without residual deficit    Anticoagulated by anticoagulation treatment 5/19/2021    On Apixaban    Benign prostatic hyperplasia with urinary retention     Chronic obstructive pulmonary disease [facility]COPD) [facility])     Coronary artery disease involving native coronary artery of native heart     Essential hypertension     Heart failure with preserved left ventricular function (HFpEF) ([facility])     2D echocardiogram (5/18/2021) showed EF 50%; concentric LV hypertrophy with a severely thickened septal wall and mildly thickened posterior wall; left atrial chamber is severely enlarged; right atrial chamber volume is moderately enlarged; no mass, shunts, or thrombi.      History of carotid stenosis     History of gross hematuria 5/26/2021    Attributed to [place] trauma while patient was altered    History of renal artery stenosis     Leukocytosis 5/17/2021    Attributed to reactive leukocytosis due to NSTEMI    Malignant neoplasm of lower lobe of right lung ([facility])     Non-ST elevation (NSTEMI) myocardial infarction ([facility] 5/17/2021    On clopidogrel therapy 5/19/2021    Paroxysmal atrial fibrillation ([facility] 5/17/2021    Urinary retention       Patient taking anticoagulants Eliquis   Patient has a defibrillator: no    Assessment:   Changes in Assessment throughout shift: none     Patient has central line: no Reasons if yes: na  Insertion date:na Last dressing date:na   Patient has [place] Cath: no Reasons if yes: na  Insertion date:na     Last Vitals:     Vitals:    06/10/21 2100 06/11/21 0737 06/11/21 1600 06/11/21 2120   BP: (!) 142/77 (!) 143/86 139/87 116/74   Pulse: 82 84 84 71   Resp: 18 18 17 18   Temp: 98.3 °F (36.8 °C) 97.1 °F (36.2 °C) 97.6 °F (36.4 °C) 98 °F (36.7 °C)   SpO2: 98% 97% 96% 98%   Weight:       Height:            PAIN    Pain Assessment    Pain Intensity 1: 0 (06/12/21 0416) Pain Intensity 1: 2 (12/29/14 1105)    Pain Location 1: Foot Pain Location 1: Abdomen    Pain Intervention(s) 1: Medication (see MAR) Pain Intervention(s) 1: Medication (see MAR)  Patient Stated Pain Goal: 0 Patient Stated Pain Goal: 0  o Intervention effective: no c/o pain  o Other actions taken for pain: na     Skin Assessment  Skin color    Condition/Temperature    Integrity    Turgor    Weekly Pressure Ulcer Documentation  Pressure  Injury Documentation: No Pressure Injury Noted-Pressure Ulcer Prevention Initiated  Wound Prevention & Protection Methods  Orientation of wound Orientation of Wound Prevention: Posterior  Location of Prevention Location of Wound Prevention: Sacrum/Coccyx  Dressing Present Dressing Present : No  Dressing Status    Wound Offloading Wound Offloading (Prevention Methods): Bed, pressure reduction mattress, Pillows, Wheelchair     INTAKE/OUPUT  Date 06/11/21 0700 - 06/12/21 0659 06/12/21 0700 - 06/13/21 0659   Shift 8761-7916 1764-1833 24 Hour Total 3775-2768 8655-9816 24 Hour Total   INTAKE   Shift Total(mL/kg)         OUTPUT   Urine(mL/kg/hr)  550(0.6) 550(0.3)        Urine Voided  550 550        Urine Occurrence(s) 2 x 4 x 6 x      Emesis/NG output  0 0        Emesis  0 0        Emesis Occurrence(s)  0 x 0 x      Stool  1 1        Stool Occurrence(s) 1 x 2 x 3 x        Stool  1 1      Shift Total(mL/kg)  551(7.5) 551(7.5)      NET  -551 -551      Weight (kg) 73.9 73.9 73.9 73.9 73.9 73.9       Recommendations:  1. Patient needs and requests:emptying the urinal ,pain med,assist to the bsc    2. Diet: Cardiac Regular    3. Pending tests/procedures: none    4. Functional Level/Equipment: assist x 2 / wheelchair    5. Estimated Discharge Date: TBD Posted on Whiteboard in Patients Room: yes    HEALS Safety Check    A safety check occurred in the patient's room between off going nurse and oncoming nurse listed above.     The safety check included the below items  Area Items   H  High Alert Medications - Verify all high alert medication drips (heparin, PCA, etc.)   E  Equipment - Suction is set up for ALL patients (with laila)  - Red plugs utilized for all equipment (IV pumps, etc.)  - WOWs wiped down at end of shift.  - Room stocked with oxygen, suction, and other unit-specific supplies   A  Alarms - Bed alarm is set for fall risk patients  - Ensure chair alarm is in place and activated if patient is up in a chair   L  Lines - Check IV for any infiltration  - Taveras bag is empty if patient has a Taveras   - Tubing and IV bags are labeled   S  Safety   - Room is clean, patient is clean, and equipment is clean. - Hallways are clear from equipment besides carts. - Fall bracelet on for fall risk patients  - Ensure room is clear and free of clutter  - Suction is set up for ALL patients (with laila)  - Hallways are clear from equipment besides carts.    - Isolation precautions followed, supplies available outside room, sign posted

## 2021-06-12 NOTE — PROGRESS NOTES
[facility] PHYSICAL [address], [address]     INPATIENT REHABILITATION  DAILY PROGRESS NOTE     Date: 6/12/2021    Name: Leslee Bowser Age / Sex: 80 y.o. / male   CSN: [id number] MRN: [id number]   Admit Date: 6/7/2021 Length of Stay: 5 days     Primary Rehab Diagnosis: Impaired Mobility and ADLs secondary to:  1. Non-ST elevation (NSTEMI) myocardial infarction  2. Coronary artery disease; History of CABG  3. S/P PCI to proximal SVG with 3.5 x 12 mm [place] drug-eluting stent; PCI to mid SVG with 3.5 x 34 mm [place] drug-eluting stent; PCI to distal SVG with 3.5 x 15 mm Juan J drug-eluting stent; Balloon angioplasty to distal SVG anastomosis (5/18/2021 - Dr. Ct Gonzalez)       Subjective:     No new issues or problems reported. Blood pressure controlled.        Objective:     Vital Signs:  Patient Vitals for the past 24 hrs:   BP Temp Pulse Resp SpO2   06/12/21 1117 (!) 143/84       06/12/21 0736 120/81 98.8 °F (37.1 °C) 79 16 96 %   06/11/21 2120 116/74 98 °F (36.7 °C) 71 18 98 %   06/11/21 1600 139/87 97.6 °F (36.4 °C) 84 17 96 %       Current Medications:  Current Facility-Administered Medications   Medication Dose Route Frequency    clopidogreL (PLAVIX) tablet 75 mg  75 mg Oral DAILY WITH BREAKFAST    senna-docusate (PERICOLACE) 8.6-50 mg per tablet 2 Tablet  2 Tablet Oral PCD    polyethylene glycol (MIRALAX) packet 17 g  17 g Oral DAILY    acetaminophen (TYLENOL) tablet 650 mg  650 mg Oral Q4H PRN    bisacodyL (DULCOLAX) tablet 10 mg  10 mg Oral Q48H PRN    albuterol (PROVENTIL VENTOLIN) nebulizer solution 2.5 mg  2.5 mg Nebulization Q4H PRN    apixaban (ELIQUIS) tablet 5 mg  5 mg Oral BID    arformoteroL (BROVANA) neb solution 15 mcg  15 mcg Nebulization BID RT    budesonide (PULMICORT) 250 mcg/2ml nebulizer susp  250 mcg Nebulization BID    furosemide (LASIX) tablet 20 mg  20 mg Oral DAILY    nitroglycerin (NITROSTAT) tablet 0.4 mg  0.4 mg SubLINGual PRN    rosuvastatin (CRESTOR) tablet 10 mg  10 mg Oral DAILY    tamsulosin (FLOMAX) capsule 0.4 mg  0.4 mg Oral PCD    multivitamin, tx-iron-ca-min (THERA-M w/ IRON) tablet 1 Tablet  1 Tablet Oral DAILY    carvediloL (COREG) tablet 12.5 mg  12.5 mg Oral BID WITH MEALS       Allergies: Allergies   Allergen Reactions    Lipitor [Atorvastatin] Rash    Norvasc [Amlodipine] Rash       Lab/Data Review:  No results found for this or any previous visit (from the past 24 hour(s)). Assessment:     Primary Rehabilitation Diagnosis  1. Impaired Mobility and ADLs  2. Non-ST elevation (NSTEMI) myocardial infarction  3. Coronary artery disease; History of CABG  4. S/P PCI to proximal SVG with 3.5 x 12 mm [place] drug-eluting stent; PCI to mid SVG with 3.5 x 34 mm Juan J drug-eluting stent; PCI to distal SVG with 3.5 x 15 mm [place] drug-eluting stent;  Balloon angioplasty to distal SVG anastomosis (5/18/2021 - Dr. Pamela Dickerson)     Comorbidities  Patient Active Problem List   Diagnosis Code    Urinary retention R33.9    Essential hypertension I10    Non-ST elevation (NSTEMI) myocardial infarction ([facility] I21.4    Acute on chronic heart failure with preserved ejection fraction (HFpEF) ([facility]) I50.33    Paroxysmal atrial fibrillation ([facility]) I48.0    Anticoagulated by anticoagulation treatment Z79.01    Mixed hyperlipidemia E78.2    Benign prostatic hyperplasia with urinary retention N40.1, R33.8    Malignant neoplasm of lower lobe of right lung ([facility]) C34.31    Heart failure with preserved left ventricular function (HFpEF) ([facility]) I50.30    Coronary artery disease involving native coronary artery of native heart I25.10    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    Chronic obstructive pulmonary disease (COPD) ([facility]) J44.9    History of carotid stenosis Z86.79    History of renal artery stenosis Z86.79    On clopidogrel therapy Z79.01    History of gross hematuria K91.025    Acute embolic stroke ([facility] I63.9    Leukocytosis D72.829       Plan:     1. Justification for continued stay: Good progression towards established rehabilitation goals. 2. Medical Issues being followed closely:    [x]  Fall and safety precautions     []  Wound Care     [x]  Bowel and Bladder Function     [x]  Fluid Electrolyte and Nutrition Balance     [x]  Pain Control      3. Issues that 24 hour rehabilitation nursing is following:    [x]  Fall and safety precautions     []  Wound Care     [x]  Bowel and Bladder Function     [x]  Fluid Electrolyte and Nutrition Balance     [x]  Pain Control      [x]  Assistance with and education on in-room safety with transfers to and from the bed, wheelchair, toilet and shower. 4. Acute rehabilitation plan of care:    [x]  Continue current care and rehab. [x]  Physical Therapy           [x]  Occupational Therapy           [x]  Speech Therapy     []  Hold Rehab until further notice     5. Medications:    [x]  MAR Reviewed     [x]  Continue Present Medications     6. DVT Prophylaxis:      []  Enoxaparin     []  Unfractionated Heparin     []  Warfarin     [x]  NOAC     []  MARTHA Stockings     []  Sequential Compression Device     []  None     7. Code status    [x]  Full code     []  Partial code     []  Do not intubate     []  Do not resuscitate     8. Orders:   > Non-ST elevation (NSTEMI) myocardial infarction; Coronary artery disease; History of CABG; S/P PCI to proximal SVG with 3.5 x 12 mm Juan J drug-eluting stent; PCI to mid SVG with 3.5 x 34 mm Juan J drug-eluting stent; PCI to distal SVG with 3.5 x 15 mm [place] drug-eluting stent;  Balloon angioplasty to distal SVG anastomosis (5/18/2021 - Dr. Ct Gonzalez)    > On 6/8/2021, increased Carvedilol from 12.5 mg to 25 mg PO BID with meals (8AM, 5PM)   > Continue:    > Carvedilol 25 mg PO BID with meals (8AM, 5PM)    > Clopidogrel 75 mg PO once daily with breakfast    > Rosuvastatin 75 mg PO once daily    > Nitroglycerin 0.4 mg SL PRN for chest pain    > Acute on chronic heart failure with preserved ejection fraction (HFpEF)   > 2D echocardiogram (5/18/2021) showed EF 50%; concentric LV hypertrophy with a severely thickened septal wall and mildly thickened posterior wall; left atrial chamber is severely enlarged; right atrial chamber volume is moderately enlarged; no mass, shunts, or thrombi.    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, brainstem and cerebellum) without residual deficit   > Continue:    > Clopidogrel 75 mg PO once daily with breakfast    > Rosuvastatin 75 mg PO once daily    > Benign prostatic hyperplasia with urinary retention   > Continue Tamsulosin 0.4 mg PO once daily after dinner    > Chronic obstructive pulmonary disease (COPD)   > Discontinue Budesonide nebulization BID   > Start Ipratropium nebulization TID   > Continue:    > Arformoterol nebulization BID    > Albuterol nebulization q 4 hr PRN for shortness of breath/wheezing    > Constipation   > Continue:    > Polyethylene glycol 17 grams in 8 oz water PO once daily    > Pericolace 2 tabs PO once daily after dinner     > Essential hypertension   > 2D echocardiogram (5/18/2021) showed EF 50%; concentric LV hypertrophy with a severely thickened septal wall and mildly thickened posterior wall; left atrial chamber is severely enlarged; right atrial chamber volume is moderately enlarged; no mass, shunts, or thrombi.    > On 6/8/2021, increased Carvedilol from 12.5 mg to 25 mg PO BID with meals (8AM, 5PM)   > Continue:    > Carvedilol 25 mg PO BID with meals (8AM, 5PM)    > Furosemide 20 mg PO once daily (9AM)    > Leukocytosis, reactive, attributed to NSTEMI   > Labs at [facility]:    > WBC count (5/17/2021) = 18.6    > WBC count (5/18/2021) = 21.4    > WBC count (5/19/2021) = 22.4    > WBC count (5/21/2021) = 27.3    > WBC count (5/22/2021) = 24.9    > WBC count (5/23/2021) = 30.2    > . . .     > WBC count (6/2/2021) = 25.4    > WBC count (6/3/2021) = 22.5    > WBC count (6/4/2021) = 23.4    > WBC count (6/5/2021) = 21.7    > WBC count (6/6/2021) = 24.1    > WBC count (6/7/2021) = 20.0    > Work up done was negative for a source of infection   > WBC count (6/8/2021, on admission to the ARU) = 14.4   > No documented fever since admissiion    > Mixed hyperlipidemia   > Lipid profile (5/18/2021) showed TG 83, , HDL 50, LDL 93   > Continue Rosuvastatin 75 mg PO once daily    > Paroxysmal atrial fibrillation, anticoagulated on Apixaban   > On 6/8/2021, increased Carvedilol from 12.5 mg to 25 mg PO BID with meals (8AM, 5PM)   > Continue:    > Apixaban 5 mg PO BID    > Carvedilol 25 mg PO BID with meals (8AM, 5PM)    > COVID-19 ruled out by laboratory testing   > SARS-CoV-2 ([facility]2000, [facility]) (collected 5/26/2021, resulted 5/28/2021): Not detected    > Analgesia   > Continue Acetaminophen 650 mg PO q 4 hr PRN for pain     > Diet:   > Specifications: Low fat/Low cholesterol/High fiber/No added salt   > Solids (consistency): Regular    > Liquids (consistency):  Thin    > Fluid restriction: None      Signed:    Jenn Jean MD    June 12, 2021

## 2021-06-13 NOTE — PROGRESS NOTES
[address]  [address] [place] [address], [address]     INPATIENT REHABILITATION  DAILY PROGRESS NOTE     Date: 6/13/2021    Name: Malcolm Jose Age / Sex: 80 y.o. / male   CSN: [id number] MRN: [id number]   [facility] Date: 6/7/2021 Length of Stay: 6 days     Primary Rehab Diagnosis: Impaired Mobility and ADLs secondary to:  1. Non-ST elevation (NSTEMI) myocardial infarction  2. Coronary artery disease; History of CABG  3. S/P PCI to proximal SVG with 3.5 x 12 mm [place] drug-eluting stent; PCI to mid SVG with 3.5 x 34 mm [place] drug-eluting stent; PCI to distal SVG with 3.5 x 15 mm Cisco drug-eluting stent; Balloon angioplasty to distal SVG anastomosis (5/18/2021 - Dr. Alice Fitch)       Subjective:     No new issues or problems reported. Blood pressure controlled.        Objective:     Vital Signs:  Patient Vitals for the past 24 hrs:   BP Temp Pulse Resp SpO2   06/13/21 0739 134/78 97.9 °F (36.6 °C) (!) 59 16 95 %   06/12/21 2057 110/64 98.9 °F (37.2 °C) 74 18 98 %   06/12/21 1521 129/78 98.6 °F (37 °C) 89 19 96 %       Current Medications:  Current Facility-Administered Medications   Medication Dose Route Frequency    ipratropium (ATROVENT) 0.02 % nebulizer solution 0.5 mg  0.5 mg Nebulization TID    clopidogreL (PLAVIX) tablet 75 mg  75 mg Oral DAILY WITH BREAKFAST    senna-docusate (PERICOLACE) 8.6-50 mg per tablet 2 Tablet  2 Tablet Oral PCD    polyethylene glycol (MIRALAX) packet 17 g  17 g Oral DAILY    acetaminophen (TYLENOL) tablet 650 mg  650 mg Oral Q4H PRN    bisacodyL (DULCOLAX) tablet 10 mg  10 mg Oral Q48H PRN    albuterol (PROVENTIL VENTOLIN) nebulizer solution 2.5 mg  2.5 mg Nebulization Q4H PRN    apixaban (ELIQUIS) tablet 5 mg  5 mg Oral BID    arformoteroL (BROVANA) neb solution 15 mcg  15 mcg Nebulization BID RT    furosemide (LASIX) tablet 20 mg  20 mg Oral DAILY    nitroglycerin (NITROSTAT) tablet 0.4 mg  0.4 mg SubLINGual PRN    rosuvastatin (CRESTOR) tablet 10 mg  10 mg Oral DAILY    tamsulosin (FLOMAX) capsule 0.4 mg  0.4 mg Oral PCD    multivitamin, tx-iron-ca-min (THERA-M w/ IRON) tablet 1 Tablet  1 Tablet Oral DAILY    carvediloL (COREG) tablet 12.5 mg  12.5 mg Oral BID WITH MEALS       Allergies: Allergies   Allergen Reactions    Lipitor [Atorvastatin] Rash    Norvasc [Amlodipine] Rash       Lab/Data Review:  No results found for this or any previous visit (from the past 24 hour(s)). Assessment:     Primary Rehabilitation Diagnosis  1. Impaired Mobility and ADLs  2. Non-ST elevation (NSTEMI) myocardial infarction  3. Coronary artery disease; History of CABG  4. S/P PCI to proximal SVG with 3.5 x 12 mm [place] drug-eluting stent; PCI to mid SVG with 3.5 x 34 mm [place] drug-eluting stent; PCI to distal SVG with 3.5 x 15 mm [place] drug-eluting stent;  Balloon angioplasty to distal SVG anastomosis (5/18/2021 - Dr. Pamela Dickerson)     Comorbidities  Patient Active Problem List   Diagnosis Code    Urinary retention R33.9    Essential hypertension I10    Non-ST elevation (NSTEMI) myocardial infarction ([facility] I21.4    Acute on chronic heart failure with preserved ejection fraction (HFpEF) ([facility]) I50.33    Paroxysmal atrial fibrillation ([facility]) I48.0    Anticoagulated by anticoagulation treatment Z79.01    Mixed hyperlipidemia E78.2    Benign prostatic hyperplasia with urinary retention N40.1, R33.8    Malignant neoplasm of lower lobe of right lung ([facility]) C34.31    Heart failure with preserved left ventricular function (HFpEF) ([facility]) I50.30    Coronary artery disease involving native coronary artery of native heart I25.10    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    Chronic obstructive pulmonary disease (COPD) ([facility]) J44.9    History of carotid stenosis Z86.79    History of renal artery stenosis Z86.79    On clopidogrel therapy Z79.01    History of gross hematuria X73.087    Acute embolic stroke ([facility]) U91.4    Leukocytosis D72.829       Plan:     1. Justification for continued stay: Good progression towards established rehabilitation goals. 2. Medical Issues being followed closely:    [x]  Fall and safety precautions     []  Wound Care     [x]  Bowel and Bladder Function     [x]  Fluid Electrolyte and Nutrition Balance     [x]  Pain Control      3. Issues that 24 hour rehabilitation nursing is following:    [x]  Fall and safety precautions     []  Wound Care     [x]  Bowel and Bladder Function     [x]  Fluid Electrolyte and Nutrition Balance     [x]  Pain Control      [x]  Assistance with and education on in-room safety with transfers to and from the bed, wheelchair, toilet and shower. 4. Acute rehabilitation plan of care:    [x]  Continue current care and rehab. [x]  Physical Therapy           [x]  Occupational Therapy           [x]  Speech Therapy     []  Hold Rehab until further notice     5. Medications:    [x]  MAR Reviewed     [x]  Continue Present Medications     6. DVT Prophylaxis:      []  Enoxaparin     []  Unfractionated Heparin     []  Warfarin     [x]  NOAC     []  MARTHA Stockings     []  Sequential Compression Device     []  None     7. Code status    [x]  Full code     []  Partial code     []  Do not intubate     []  Do not resuscitate     8. Orders:   > Non-ST elevation (NSTEMI) myocardial infarction; Coronary artery disease; History of CABG; S/P PCI to proximal SVG with 3.5 x 12 mm Juan J drug-eluting stent; PCI to mid SVG with 3.5 x 34 mm [place] drug-eluting stent; PCI to distal SVG with 3.5 x 15 mm [place] drug-eluting stent;  Balloon angioplasty to distal SVG anastomosis (5/18/2021 - Dr. Marily Quiñones)    > On 6/8/2021, increased Carvedilol from 12.5 mg to 25 mg PO BID with meals (8AM, 5PM)   > Continue:    > Carvedilol 25 mg PO BID with meals (8AM, 5PM)    > Clopidogrel 75 mg PO once daily with breakfast    > Rosuvastatin 75 mg PO once daily    > Nitroglycerin 0.4 mg SL PRN for chest pain    > Acute on chronic heart failure with preserved ejection fraction (HFpEF)   > 2D echocardiogram (5/18/2021) showed EF 50%; concentric LV hypertrophy with a severely thickened septal wall and mildly thickened posterior wall; left atrial chamber is severely enlarged; right atrial chamber volume is moderately enlarged; no mass, shunts, or thrombi.    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, brainstem and cerebellum) without residual deficit   > Continue:    > Clopidogrel 75 mg PO once daily with breakfast    > Rosuvastatin 75 mg PO once daily    > Benign prostatic hyperplasia with urinary retention   > Continue Tamsulosin 0.4 mg PO once daily after dinner    > Chronic obstructive pulmonary disease (COPD)   > On 6/12/2021:    > Discontinue Budesonide nebulization BID    > Start Ipratropium nebulization TID   > Continue:    > Arformoterol nebulization BID    > Ipratropium nebulization TID    > Albuterol nebulization q 4 hr PRN for shortness of breath/wheezing    > Constipation   > Continue:    > Polyethylene glycol 17 grams in 8 oz water PO once daily    > Pericolace 2 tabs PO once daily after dinner     > Essential hypertension   > 2D echocardiogram (5/18/2021) showed EF 50%; concentric LV hypertrophy with a severely thickened septal wall and mildly thickened posterior wall; left atrial chamber is severely enlarged; right atrial chamber volume is moderately enlarged; no mass, shunts, or thrombi.    > On 6/8/2021, increased Carvedilol from 12.5 mg to 25 mg PO BID with meals (8AM, 5PM)   > Continue:    > Carvedilol 25 mg PO BID with meals (8AM, 5PM)    > Furosemide 20 mg PO once daily (9AM)    > Leukocytosis, reactive, attributed to NSTEMI   > Labs at [facility]:    > WBC count (5/17/2021) = 18.6    > WBC count (5/18/2021) = 21.4    > WBC count (5/19/2021) = 22.4    > WBC count (5/21/2021) = 27.3    > WBC count (5/22/2021) = 24.9    > WBC count (5/23/2021) = 30.2    > . . .     > WBC count (6/2/2021) = 25.4    > WBC count (6/3/2021) = 22.5    > WBC count (6/4/2021) = 23.4    > WBC count (6/5/2021) = 21.7    > WBC count (6/6/2021) = 24.1    > WBC count (6/7/2021) = 20.0    > Work up done was negative for a source of infection   > WBC count (6/8/2021, on admission to the ARU) = 14.4   > No documented fever since admissiion    > Mixed hyperlipidemia   > Lipid profile (5/18/2021) showed TG 83, , HDL 50, LDL 93   > Continue Rosuvastatin 75 mg PO once daily    > Paroxysmal atrial fibrillation, anticoagulated on Apixaban   > On 6/8/2021, increased Carvedilol from 12.5 mg to 25 mg PO BID with meals (8AM, 5PM)   > Continue:    > Apixaban 5 mg PO BID    > Carvedilol 25 mg PO BID with meals (8AM, 5PM)    > COVID-19 ruled out by laboratory testing   > SARS-CoV-2 ([facility] m2000, [facility]) (collected 5/26/2021, resulted 5/28/2021): Not detected    > Analgesia   > Continue Acetaminophen 650 mg PO q 4 hr PRN for pain     > Diet:   > Specifications: Low fat/Low cholesterol/High fiber/No added salt   > Solids (consistency): Regular    > Liquids (consistency):  Thin    > Fluid restriction: None      Signed:    Santi Velasquez MD    June 13, 2021

## 2021-06-13 NOTE — REHAB NOTE
SHIFT CHANGE NOTE FOR [place]    Bedside and Verbal shift change report given to Nelsy Reyes RN (oncoming nurse) by  David Redmond (offgoing nurse). Report included the following information SBAR, Kardex, MAR and Recent Results.     Situation:   Code Status: Full Code   Reason for Admission: [address] Day: 6   Problem List:   Hospital Problems  Date Reviewed: 6/12/2021        Codes Class Noted POA    Mixed hyperlipidemia (Chronic) ICD-10-CM: D82.0  ICD-9-CM: 272.2  Unknown Yes        Acute embolic stroke ([facility] [address] UCN-29-RJ: I63.9  ICD-9-CM: 434.11  5/23/2021 Yes    Overview Signed 6/8/2021 12:37 AM by Haile George MD     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, brainstem and cerebellum) without residual deficit             Anticoagulated by anticoagulation treatment ICD-10-CM: Z79.01  ICD-9-CM: V58.61  5/19/2021 Yes    Overview Signed 6/7/2021 10:48 PM by Haile George MD     On Apixaban             On clopidogrel therapy ICD-10-CM: Z79.01  ICD-9-CM: V58.61  5/19/2021 Yes        * (Principal) Non-ST elevation (NSTEMI) myocardial infarction [facility]) ICD-10-CM: I21.4  ICD-9-CM: 410.70  5/17/2021 Yes        Acute on chronic heart failure with preserved ejection fraction (HFpEF) ([facility] [address] ICD-10-CM: I50.33  ICD-9-CM: 428.23  5/17/2021 Yes        Paroxysmal atrial fibrillation (HCC) ICD-10-CM: I48.0  ICD-9-CM: 427.31  5/17/2021 Yes        Leukocytosis ICD-10-CM: D14.592  ICD-9-CM: 288.60  5/17/2021 Yes    Overview Signed 6/8/2021 12:42 AM by Haile George MD     Attributed to reactive leukocytosis due to NSTEMI             Essential hypertension (Chronic) ICD-10-CM: I10  ICD-9-CM: 401.9  Unknown Yes              Background:   Past Medical History:   Past Medical History:   Diagnosis Date    Acute embolic stroke ([facility] [address] 0/00/1760    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, brainstem and cerebellum) without residual deficit    Anticoagulated by anticoagulation treatment 5/19/2021    On Apixaban    Benign prostatic hyperplasia with urinary retention     Chronic obstructive pulmonary disease (COPD) ([facility])     Coronary artery disease involving native coronary artery of native heart     Essential hypertension     Heart failure with preserved left ventricular function (HFpEF) ([facility])     2D echocardiogram (5/18/2021) showed EF 50%; concentric LV hypertrophy with a severely thickened septal wall and mildly thickened posterior wall; left atrial chamber is severely enlarged; right atrial chamber volume is moderately enlarged; no mass, shunts, or thrombi.      History of carotid stenosis     History of gross hematuria 5/26/2021    Attributed to Taveras trauma while patient was altered    History of renal artery stenosis     Leukocytosis 5/17/2021    Attributed to reactive leukocytosis due to NSTEMI    Malignant neoplasm of lower lobe of right lung ([facility])     Non-ST elevation (NSTEMI) myocardial infarction ([facility] [address] 5/17/2021    On clopidogrel therapy 5/19/2021    Paroxysmal atrial fibrillation ([facility] [address] 5/17/2021    Urinary retention       Patient taking anticoagulants Eliquis   Patient has a defibrillator: no    Assessment:   Changes in Assessment throughout shift: none     Patient has central line: no Reasons if yes: na  Insertion date:na Last dressing date:na   Patient has Taveras Cath: no Reasons if yes: na  Insertion date:na     Last Vitals:     Vitals:    06/12/21 0736 06/12/21 1117 06/12/21 1521 06/12/21 2057   BP: 120/81 (!) 143/84 129/78 110/64   Pulse: 79  89 74   Resp: 16  19 18   Temp: 98.8 °F (37.1 °C)  98.6 °F (37 °C) 98.9 °F (37.2 °C)   SpO2: 96%  96% 98%   Weight:       Height:            PAIN    Pain Assessment    Pain Intensity 1: 0 (06/13/21 0430) Pain Intensity 1: 2 (12/29/14 1105)    Pain Location 1: Foot Pain Location 1: Abdomen    Pain Intervention(s) 1: Medication (see MAR) Pain Intervention(s) 1: Medication (see MAR)  Patient Stated Pain Goal: 0 Patient Stated Pain Goal: 0  o Intervention effective: no c/o pain  o Other actions taken for pain: na     Skin Assessment  Skin color    Condition/Temperature    Integrity    Turgor    Weekly Pressure Ulcer Documentation  Pressure  Injury Documentation: Pressure Injury Noted-See Wound LDA to Document  Wound Prevention & Protection Methods  Orientation of wound Orientation of Wound Prevention: Posterior  Location of Prevention Location of Wound Prevention: Buttocks, Sacrum/Coccyx  Dressing Present Dressing Present : No  Dressing Status    Wound Offloading Wound Offloading (Prevention Methods): Bed, pressure redistribution/air     INTAKE/OUPUT  Date 06/12/21 0700 - 06/13/21 0659 06/13/21 0700 - 06/14/21 0659   Shift 2355-1908 3581-5592 24 Hour Total 9618-3678 7409-8992 24 Hour Total   INTAKE   P.O. 550  550        P. O. 550  550      Shift Total(mL/kg) 550(7.4)  550(7.4)      OUTPUT   Urine(mL/kg/hr)           Urine Occurrence(s) 4 x 1 x 5 x      Stool           Stool Occurrence(s) 5 x 2 x 7 x      Shift Total(mL/kg)           550      Weight (kg) 73.9 73.9 73.9 73.9 73.9 73.9       Recommendations:  1. Patient needs and requests:emptying the urinal ,pain med,assist to the bsc    2. Diet: Cardiac Regular    3. Pending tests/procedures: none    4. Functional Level/Equipment: assist x 1 / wheelchair    5. Estimated Discharge Date: 6/22/21 Posted on Whiteboard in Patients Room: yes    HEALS Safety Check    A safety check occurred in the patient's room between off going nurse and oncoming nurse listed above.     The safety check included the below items  Area Items   H  High Alert Medications - Verify all high alert medication drips (heparin, PCA, etc.)   E  Equipment - Suction is set up for ALL patients (with yanker)  - Red plugs utilized for all equipment (IV pumps, etc.)  - WOWs wiped down at end of shift.  - Room stocked with oxygen, suction, and other unit-specific supplies   A  Alarms - Bed alarm is set for fall risk patients  - Ensure chair alarm is in place and activated if patient is up in a chair   L  Lines - Check IV for any infiltration  - Taveras bag is empty if patient has a Taveras   - Tubing and IV bags are labeled   S  Safety   - Room is clean, patient is clean, and equipment is clean. - Hallways are clear from equipment besides carts. - Fall bracelet on for fall risk patients  - Ensure room is clear and free of clutter  - Suction is set up for ALL patients (with yanker)  - Hallways are clear from equipment besides carts.    - Isolation precautions followed, supplies available outside room, sign posted

## 2021-06-13 NOTE — REHAB NOTE
[facility]    Bedside and Verbal shift change report given to Violet Au RN (oncoming nurse) by  Natalya Howe (offgoing nurse). Report included the following information SBAR, Kardex, MAR and Recent Results.     Situation:   Code Status: Full Code   Reason for Admission: [address] Day: 6   Problem List:   Hospital Problems  Date Reviewed: 6/13/2021        Codes Class Noted POA    Mixed hyperlipidemia (Chronic) ICD-10-CM: I81.3  ICD-9-CM: 272.2  Unknown Yes        Acute embolic stroke ([facility] SWR-57-WO: I63.9  ICD-9-CM: 434.11  5/23/2021 Yes    Overview Signed 6/8/2021 12:37 AM by Desmond Gonzalez MD     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, brainstem and cerebellum) without residual deficit             Anticoagulated by anticoagulation treatment ICD-10-CM: Z79.01  ICD-9-CM: V58.61  5/19/2021 Yes    Overview Signed 6/7/2021 10:48 PM by Desmond Gonzalez MD     On Apixaban             On clopidogrel therapy ICD-10-CM: Z79.01  ICD-9-CM: V58.61  5/19/2021 Yes        * (Principal) Non-ST elevation (NSTEMI) myocardial infarction [facility]) ICD-10-CM: I21.4  ICD-9-CM: 410.70  5/17/2021 Yes        Acute on chronic heart failure with preserved ejection fraction (HFpEF) ([facility] ICD-10-CM: I50.33  ICD-9-CM: 428.23  5/17/2021 Yes        Paroxysmal atrial fibrillation ([facility] ICD-10-CM: I48.0  ICD-9-CM: 427.31  5/17/2021 Yes        Leukocytosis ICD-10-CM: C84.625  ICD-9-CM: 288.60  5/17/2021 Yes    Overview Signed 6/8/2021 12:42 AM by Desmond Gonzalez MD     Attributed to reactive leukocytosis due to NSTEMI             Essential hypertension (Chronic) ICD-10-CM: I10  ICD-9-CM: 401.9  Unknown Yes              Background:   Past Medical History:   Past Medical History:   Diagnosis Date    Acute embolic stroke ([facility] [address] 6/13/6768    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, brainstem and cerebellum) without residual deficit    Anticoagulated by anticoagulation treatment 5/19/2021    On Apixaban    Benign prostatic hyperplasia with urinary retention     [facility] pulmonary disease (COPD) ([facility])     Coronary artery disease involving native coronary artery of native heart     Essential hypertension     Heart failure with preserved left ventricular function (HFpEF) ([facility])     2D echocardiogram (5/18/2021) showed EF 50%; concentric LV hypertrophy with a severely thickened septal wall and mildly thickened posterior wall; left atrial chamber is severely enlarged; right atrial chamber volume is moderately enlarged; no mass, shunts, or thrombi.      History of carotid stenosis     History of gross hematuria 5/26/2021    Attributed to Taveras trauma while patient was altered    History of renal artery stenosis     Leukocytosis 5/17/2021    Attributed to reactive leukocytosis due to NSTEMI    Malignant neoplasm of lower lobe of right lung ([facility])     Non-ST elevation (NSTEMI) myocardial infarction ([facility] [address] 5/17/2021    On clopidogrel therapy 5/19/2021    Paroxysmal atrial fibrillation ([facility] [address] 5/17/2021    Urinary retention       Patient taking anticoagulants Eliquis   Patient has a defibrillator: no    Assessment:   Changes in Assessment throughout shift: none     Patient has central line: no Reasons if yes: na  Insertion date:na Last dressing date:na   Patient has Taveras Cath: no Reasons if yes: na  Insertion date:na     Last Vitals:     Vitals:    06/12/21 1521 06/12/21 2057 06/13/21 0739 06/13/21 1620   BP: 129/78 110/64 134/78 (!) 156/93   Pulse: 89 74 (!) 59 87   Resp: 19 18 16 18   Temp: 98.6 °F (37 °C) 98.9 °F (37.2 °C) 97.9 °F (36.6 °C) 98.2 °F (36.8 °C)   SpO2: 96% 98% 95% 93%   Weight:       Height:            PAIN    Pain Assessment    Pain Intensity 1: 0 (06/13/21 1620) Pain Intensity 1: 2 (12/29/14 1105)    Pain Location 1: Foot Pain Location 1: Abdomen    Pain Intervention(s) 1: Medication (see MAR) Pain Intervention(s) 1: Medication (see MAR)  Patient Stated Pain Goal: 0 Patient Stated Pain Goal: 0  o Intervention effective: no c/o pain  o Other actions taken for pain: na     Skin Assessment  Skin color    Condition/Temperature    Integrity    Turgor    Weekly Pressure Ulcer Documentation  Pressure  Injury Documentation: Pressure Injury Noted-See Wound LDA to Document  Wound Prevention & Protection Methods  Orientation of wound Orientation of Wound Prevention: Posterior  Location of Prevention Location of Wound Prevention: Buttocks, Sacrum/Coccyx  Dressing Present Dressing Present : No  Dressing Status    Wound Offloading Wound Offloading (Prevention Methods): Bed, pressure reduction mattress     INTAKE/OUPUT  Date 06/12/21 0700 - 06/13/21 0659 06/13/21 0700 - 06/14/21 0659   Shift 4625-9798 6342-6373 24 Hour Total 9972-3483 0818-1733 24 Hour Total   INTAKE   P.O. 550  550 750  750     P. O. 550  550 750  750   Shift Total(mL/kg) 550(7.4)  550(7.4) 750(10.1)  750(10.1)   OUTPUT   Urine(mL/kg/hr)           Urine Occurrence(s) 4 x 1 x 5 x 3 x  3 x   Stool           Stool Occurrence(s) 5 x 2 x 7 x 1 x  1 x   Shift Total(mL/kg)           550 750  750   Weight (kg) 73.9 73.9 73.9 73.9 73.9 73.9       Recommendations:  1. Patient needs and requests:emptying the urinal ,pain med,assist to the bsc    2. Diet: Cardiac Regular    3. Pending tests/procedures: none    4. Functional Level/Equipment: assist x 1 / wheelchair    5. Estimated Discharge Date: 6/22/21 Posted on Whiteboard in Patients Room: yes    HEALS Safety Check    A safety check occurred in the patient's room between off going nurse and oncoming nurse listed above.     The safety check included the below items  Area Items   H  High Alert Medications - Verify all high alert medication drips (heparin, PCA, etc.)   E  Equipment - Suction is set up for ALL patients (with yanker)  - Red plugs utilized for all equipment (IV pumps, etc.)  - WOWs wiped down at end of shift.  - Room stocked with oxygen, suction, and other unit-specific supplies   A  Alarms - Bed alarm is set for fall risk patients  - Ensure chair alarm is in place and activated if patient is up in a chair   L  Lines - Check IV for any infiltration  - Taveras bag is empty if patient has a Taveras   - Tubing and IV bags are labeled   S  Safety   - Room is clean, patient is clean, and equipment is clean. - Hallways are clear from equipment besides carts. - Fall bracelet on for fall risk patients  - Ensure room is clear and free of clutter  - Suction is set up for ALL patients (with yanker)  - Hallways are clear from equipment besides carts.    - Isolation precautions followed, supplies available outside room, sign posted

## 2021-06-14 PROBLEM — G57.93 NEUROPATHY INVOLVING BOTH LOWER EXTREMITIES: Chronic | Status: ACTIVE | Noted: 2020-10-22

## 2021-06-14 PROBLEM — I73.9 PERIPHERAL VASCULAR DISEASE OF EXTREMITY WITH CLAUDICATION (HCC): Chronic | Status: ACTIVE | Noted: 2021-01-01

## 2021-06-14 NOTE — REHAB NOTE
[facility]    Bedside and Verbal shift change report given to Frederic Dobbs RN (oncoming nurse) by Katerine Mcrae LPN (offgoing nurse). Report included the following information SBAR, Kardex, MAR and Recent Results.     Situation:   Code Status: Full Code   Reason for Admission: [address] Day: 7   Problem List:   Hospital Problems  Date Reviewed: 6/13/2021        Codes Class Noted POA    Mixed hyperlipidemia (Chronic) ICD-10-CM: T18.9  ICD-9-CM: 272.2  Unknown Yes        Acute embolic stroke ([facility] RXP-11-KE: I63.9  ICD-9-CM: 434.11  5/23/2021 Yes    Overview Signed 6/8/2021 12:37 AM by Ryan Tomlinson MD     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, brainstem and cerebellum) without residual deficit             Anticoagulated by anticoagulation treatment ICD-10-CM: Z79.01  ICD-9-CM: V58.61  5/19/2021 Yes    Overview Signed 6/7/2021 10:48 PM by Ryan Tomlinson MD     On Apixaban             On clopidogrel therapy ICD-10-CM: Z79.01  ICD-9-CM: V58.61  5/19/2021 Yes        * (Principal) Non-ST elevation (NSTEMI) myocardial infarction [facility]) ICD-10-CM: I21.4  ICD-9-CM: 410.70  5/17/2021 Yes        Acute on chronic heart failure with preserved ejection fraction (HFpEF) ([facility] ICD-10-CM: I50.33  ICD-9-CM: 428.23  5/17/2021 Yes        Paroxysmal atrial fibrillation ([facility] ICD-10-CM: I48.0  ICD-9-CM: 427.31  5/17/2021 Yes        Leukocytosis ICD-10-CM: V61.743  ICD-9-CM: 288.60  5/17/2021 Yes    Overview Signed 6/8/2021 12:42 AM by Ryan Tomlinson MD     Attributed to reactive leukocytosis due to NSTEMI             Essential hypertension (Chronic) ICD-10-CM: I10  ICD-9-CM: 401.9  Unknown Yes              Background:   Past Medical History:   Past Medical History:   Diagnosis Date    Acute embolic stroke ([facility] 0/44/1914    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, brainstem and cerebellum) without residual deficit    Anticoagulated by anticoagulation treatment 5/19/2021    On Apixaban    Benign prostatic hyperplasia with urinary retention     [facility] obstructive pulmonary disease (COPD) ([facility])     Coronary artery disease involving native coronary artery of native heart     Essential hypertension     Heart failure with preserved left ventricular function (HFpEF) ([facility])     2D echocardiogram (5/18/2021) showed EF 50%; concentric LV hypertrophy with a severely thickened septal wall and mildly thickened posterior wall; left atrial chamber is severely enlarged; right atrial chamber volume is moderately enlarged; no mass, shunts, or thrombi.      History of carotid stenosis     History of gross hematuria 5/26/2021    Attributed to Taveras trauma while patient was altered    History of renal artery stenosis     Leukocytosis 5/17/2021    Attributed to reactive leukocytosis due to NSTEMI    Malignant neoplasm of lower lobe of right lung ([facility])     Non-ST elevation (NSTEMI) myocardial infarction ([facility] 5/17/2021    On clopidogrel therapy 5/19/2021    Paroxysmal atrial fibrillation ([facility] 5/17/2021    Urinary retention       Patient taking anticoagulants Eliquis   Patient has a defibrillator: no    Assessment:   Changes in Assessment throughout shift: none     Patient has central line: no Reasons if yes: na  Insertion date:na Last dressing date:na   Patient has Taveras Cath: no Reasons if yes: na  Insertion date:na     Last Vitals:     Vitals:    06/13/21 0739 06/13/21 1620 06/13/21 2100 06/14/21 0720   BP: 134/78 (!) 156/93 124/68 129/87   Pulse: (!) 59 87 78 (!) 102   Resp: 16 18 16 18   Temp: 97.9 °F (36.6 °C) 98.2 °F (36.8 °C) 98 °F (36.7 °C) 97.5 °F (36.4 °C)   SpO2: 95% 93% 96% 95%   Weight:       Height:            PAIN    Pain Assessment    Pain Intensity 1: 0 (06/14/21 0400) Pain Intensity 1: 2 (12/29/14 1105)    Pain Location 1: Foot Pain Location 1: Abdomen    Pain Intervention(s) 1: Medication (see MAR) Pain Intervention(s) 1: Medication (see MAR)  Patient Stated Pain Goal: 0 Patient Stated Pain Goal: 0  o Intervention effective: no c/o pain  o Other actions taken for pain: na     Skin Assessment  Skin color    Condition/Temperature    Integrity    Turgor    Weekly Pressure Ulcer Documentation  Pressure  Injury Documentation: No Pressure Injury Noted-Pressure Ulcer Prevention Initiated  Wound Prevention & Protection Methods  Orientation of wound Orientation of Wound Prevention: Posterior  Location of Prevention Location of Wound Prevention: Buttocks, Sacrum/Coccyx  Dressing Present Dressing Present : No  Dressing Status    Wound Offloading Wound Offloading (Prevention Methods): Bed, pressure redistribution/air     INTAKE/OUPUT  Date 06/13/21 0700 - 06/14/21 0659 06/14/21 0700 - 06/15/21 0659   Shift 2942-9371 2963-9245 24 Hour Total 4529-9018 8274-8431 24 Hour Total   INTAKE   P.O. 750  750        P. O. 750  750      Shift Total(mL/kg) 750(10.1)  750(10.1)      OUTPUT   Urine(mL/kg/hr)  800(0.9) 800(0.5)        Urine Voided  800 800        Urine Occurrence(s) 3 x 2 x 5 x      Stool           Stool Occurrence(s) 1 x 0 x 1 x      Shift Total(mL/kg)  800(10.8) 800(10.8)       -800 -50      Weight (kg) 73.9 73.9 73.9 73.9 73.9 73.9       Recommendations:  1. Patient needs and requests:emptying the urinal ,pain med,assist to the bsc    2. Diet: Cardiac Regular    3. Pending tests/procedures: am care    4. Functional Level/Equipment: assist x 1 / wheelchair    5. Estimated Discharge Date: 6/22/21 Posted on Whiteboard in Patients Room: yes    HEALS Safety Check    A safety check occurred in the patient's room between off going nurse and oncoming nurse listed above.     The safety check included the below items  Area Items   H  High Alert Medications - Verify all high alert medication drips (heparin, PCA, etc.)   E  Equipment - Suction is set up for ALL patients (with yanker)  - Red plugs utilized for all equipment (IV pumps, etc.)  - WOWs wiped down at end of shift.  - Room stocked with oxygen, suction, and other unit-specific supplies   A  Alarms - Bed alarm is set for fall risk patients  - Ensure chair alarm is in place and activated if patient is up in a chair   L  Lines - Check IV for any infiltration  - Taveras bag is empty if patient has a Taveras   - Tubing and IV bags are labeled   S  Safety   - Room is clean, patient is clean, and equipment is clean. - Hallways are clear from equipment besides carts. - Fall bracelet on for fall risk patients  - Ensure room is clear and free of clutter  - Suction is set up for ALL patients (with yanker)  - Hallways are clear from equipment besides carts.    - Isolation precautions followed, supplies available outside room, sign posted

## 2021-06-14 NOTE — PROGRESS NOTES
Problem: Self Care Deficits Care Plan (Adult)  Goal: *Therapy Goal (Edit Goal, Insert Text)  Description: Occupational Therapy Goals   Long Term Goals  Initiated 2021 and to be accomplished within 2 week(s) 2021  1. Pt will perform self-feeding with Alisa. 2. Pt will perform grooming with Alisa. 3. Pt will perform UB bathing with Alisa  4. Pt will perform LB bathing with Alisa. 5. Pt will perform tub/shower transfer with Alisa. 6. Pt will perform UB dressing with Alisa. 7. Pt will perform LB dressing with Alisa. 8. Pt will perform toileting task with Alisa. 9. Pt will perform toilet transfer with Alisa. 10.  Pt will perform an IADL task with good safety and Alisa. Short Term Goals   Initiated 2021 and to be accomplished within 7 day(s) 6/15/2021  1. Pt will perform self-feeding with S   2. Pt will perform grooming with S.  3. Pt will perform UB bathing with S.  4. Pt will perform LB bathing with S  5. Pt will perform tub/shower transfer with S.  6. Pt will perform UB dressing with S.  7. Pt will perform LB dressing with S  8. Pt will perform toileting task with S.  9. Pt will perform toilet transfer with S.         Occupational Therapy TREATMENT    Patient: Elisha Vargas   80 y.o. Patient identified with name and : Yes     Date: 2021    First Tx Session  Time In: 0930  Time Out[de-identified] 56    Second Tx Session  Time In: 1330  Time Out[de-identified] 1430    Diagnosis: Non-ST elevation (NSTEMI) myocardial infarction [facility]) [I21.4]   Precautions: Fall  Chart, occupational therapy assessment, plan of care, and goals were reviewed. Pain:  Pt reports 0/10 pain or discomfort prior to treatment. Pt reports 0/10 pain or discomfort post treatment. 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   Pt stood up to 10 minutes second trial and noted to have some difficulty placing pegs in proper space visually. Second Session: Pt performed reaching of cones  above/below cabinet using RW. Pt instructed in safety with RW showing fair return demonstration. THERAPEUTIC EXERCISE Daily Assessment    UB Bike up to 10 minutes to increase BUE strength and endurance for ADLs. Pt required  rest break x3. Pt reporting and PER 8/10. MOBILITY/TRANSFERS Daily Assessment     Pt performed shower transfer with instruction for proper tech. Pt showed fair return demonstration. ASSESSMENT:  Pt working towards increasing BUE strength, endurance, balance, and safety with RW  for I in ADLs. Progression toward goals:  [x]          Improving appropriately and progressing toward goals  []          Improving slowly and progressing toward goals  []          Not making progress toward goals and plan of care will be adjusted     PLAN:  Patient continues to benefit from skilled intervention to address the above impairments. Continue treatment per established plan of care. Discharge Recommendations:  Home Health with asst  Further Equipment Recommendations for Discharge:  bedside commode, transfer bench      Activity Tolerance:  Fair-      Estimated LOS:    Please refer to the flowsheet for vital signs taken during this treatment. After treatment:   []  Patient left in no apparent distress sitting up in chair   [x]  Patient left in no apparent distress in bed  []  Call bell left within reach  []  Nursing notified  []  Caregiver present  []  Bed alarm activated    COMMUNICATION/EDUCATION:   [] Home safety education was provided and the patient/caregiver indicated understanding. [x] Patient/family have participated as able in goal setting and plan of care. [] Patient/family agree to work toward stated goals and plan of care. [] Patient understands intent and goals of therapy, but is neutral about his/her participation.   [] Patient is unable to participate in goal setting and plan of care.       Alyssa Franco OT   Outcome: Progressing Towards Goal  Goal: Interventions  Outcome: Progressing Towards Goal

## 2021-06-14 NOTE — PROGRESS NOTES
Problem: Mobility Impaired (Adult and Pediatric)  Goal: *Therapy Goal (Edit Goal, Insert Text)  Description: Physical Therapy Short Term Goals  Initiated 6/8/2021 and to be accomplished within 7 day(s) on 6/15/2021  1. Patient will move from supine to sit and sit to supine , scoot up and down, and roll side to side in bed with modified independence. 2.  Patient will transfer from bed to chair and chair to bed with standby assistance using the least restrictive device. 3.  Patient will perform sit to stand with supervision/set-up. 4.  Patient will ambulate with CGA for 150 feet with the least restrictive device. 5.  Patient will ascend/descend 3 stairs with no handrail(s) with moderate assistance using AD as appropriate. Physical Therapy Long Term Goals  Initiated 6/8/2021 and to be accomplished within 14 day(s) on 6/22/2021  1. Patient will move from supine to sit and sit to supine , scoot up and down, and roll side to side in bed with independence. 2.  Patient will transfer from bed to chair and chair to bed with modified independence using the least restrictive device. 3.  Patient will perform sit to stand with modified independence. 4.  Patient will ambulate with modified independence for 150 feet with the least restrictive device. 5.  Patient will ascend/descend 3 stairs with no handrail(s) with minimal assistance/contact guard assist and appropriate AD for ease of safe entry/exit of home. 6.  Patient will ascend/descend 7 stairs with one railing with supervision for safety getting to 2nd floor bedroom. Outcome: Progressing Towards Goal   PHYSICAL THERAPY TREATMENT    Patient: Marcella Wood (31 y.o. male)  Date: 6/14/2021  Diagnosis: Non-ST elevation (NSTEMI) myocardial infarction [facility]) [I21.4] Non-ST elevation (NSTEMI) myocardial infarction [facility])  Precautions: Fall  Chart, physical therapy assessment, plan of care and goals were reviewed.     Time In:1439  Time U:5612    Patient seen for: Wheelchair mobility;Transfer training    Patient identified with name and : yes     SUBJECTIVE:      Pt c/o feeling \"woozy\" when he stood up and requested therapist check his BP. Pt's /108 and HR 92bpm. Referred to nurse Paddy Hernandez that requested pt be returned to bed. OBJECTIVE DATA SUMMARY:    Objective:     BED/MAT MOBILITY Daily Assessment     Sit to Supine : 3 (Moderate assistance)  Pt performed sit to supine on right side of bed with mod assist with BLE. TRANSFERS Daily Assessment     Transfer Type: Other  Other: stand step txfr without AD  Transfer Assistance : 4 (Contact guard assistance)  Sit to Stand Assistance: Stand-by assistance  Pt performed sit to stand from w/c with SBA and pt pushing up from B arm rests. Pt performed stand step txfr w/c to bed without AD with CGA and pt with UE support on bed. BALANCE Daily Assessment     Sitting - Static: Good (unsupported)  Sitting - Dynamic: Good (unsupported)  Standing - Static: Fair  Standing - Dynamic : Impaired        WHEELCHAIR MOBILITY Daily Assessment     Able to Propel (ft): 56 feet  Functional Level:  (supervision)  Curbs/Ramps Assist Required (FIM Score): 0 (Not tested)  Wheelchair Setup Assist Required : 6 (Modified independent)  Wheelchair Management: Manages left brake;Manages right brake  Pt propelled w/c from room to gym with BUE and supervision. ASSESSMENT:  Pt's BP high and not appropriate for PT. Pt returned to bed per nurse request.   Progression toward goals:  []      Improving appropriately and progressing toward goals  [x]      Improving slowly and progressing toward goals  []      Not making progress toward goals and plan of care will be adjusted      PLAN:  Patient continues to benefit from skilled intervention to address the above impairments. Continue treatment per established plan of care.   Discharge Recommendations:  Home Health  Further Equipment Recommendations for Discharge: rolling walker      Estimated Discharge Date: 6/22/2021    Activity Tolerance:   poor  Please refer to the flowsheet for vital signs taken during this treatment.     After treatment:   [] Patient left in no apparent distress in bed  [x] Patient left in no apparent distress sitting up in chair  [] Patient left in no apparent distress in w/c mobilizing under own power  [] Patient left in no apparent distress dining area  [] Patient left in no apparent distress mobilizing under own power  [x] Call bell left within reach  [] Nursing notified  [] Caregiver present  [x] Bed alarm activated   [] Chair alarm activated      Tanna Luna, JAYME  6/14/2021

## 2021-06-14 NOTE — PROGRESS NOTES
[address]  [address] [place] [address], [address]     INPATIENT REHABILITATION  DAILY PROGRESS NOTE     Date: 6/14/2021    Name: Fausto Villasenor Age / Sex: 80 y.o. / male   CSN: [id number] MRN: [id number]   Admit Date: 6/7/2021 Length of Stay: 7 days     Primary Rehab Diagnosis: Impaired Mobility and ADLs secondary to:  1. Non-ST elevation (NSTEMI) myocardial infarction  2. Coronary artery disease; History of CABG  3. S/P PCI to proximal SVG with 3.5 x 12 mm [place] drug-eluting stent; PCI to mid SVG with 3.5 x 34 mm [place] drug-eluting stent; PCI to distal SVG with 3.5 x 15 mm [place] drug-eluting stent; Balloon angioplasty to distal SVG anastomosis (5/18/2021 - Dr. Claudio Mccoy)       Subjective:     Patient seen and examined. Blood pressure controlled. Patient's Complaint:   Pain and numbness of toes of both feet    Pain Control: stable, mild-to-moderate joint symptoms intermittently, reasonably well controlled by current meds      Objective:     Vital Signs:  Patient Vitals for the past 24 hrs:   BP Temp Pulse Resp SpO2   06/14/21 0720 129/87 97.5 °F (36.4 °C) (!) 102 18 95 %   06/13/21 2100 124/68 98 °F (36.7 °C) 78 16 96 %   06/13/21 1620 (!) 156/93 98.2 °F (36.8 °C) 87 18 93 %        Physical Examination:  GENERAL SURVEY: Patient is awake, alert, oriented x 3, sitting comfortably on the chair, not in acute respiratory distress.   HEENT: pink palpebral conjunctivae, anicteric sclerae, no nasoaural discharge, moist oral mucosa  NECK: supple, no jugular venous distention, no palpable lymph nodes  CHEST/LUNGS: symmetrical chest expansion, good air entry, clear breath sounds  HEART: adynamic precordium, good S1 S2, no S3, irregularly irregular rhythm, no murmurs  ABDOMEN: flat, bowel sounds appreciated, soft, non-tender  EXTREMITIES: pink nailbeds, (+) bipedal edema, full and equal pulses, no calf tenderness   NEUROLOGICAL EXAM: The patient is awake, alert and oriented x3, able to answer questions fairly appropriately, able to follow 1 and 2 step commands. Able to tell time from the wall clock. Cranial nerves II-XII are grossly intact. No gross sensory deficit. Motor strength is 4 to 4+/5 on all 4 extremities. Current Medications:  Current Facility-Administered Medications   Medication Dose Route Frequency    ipratropium (ATROVENT) 0.02 % nebulizer solution 0.5 mg  0.5 mg Nebulization TID    clopidogreL (PLAVIX) tablet 75 mg  75 mg Oral DAILY WITH BREAKFAST    senna-docusate (PERICOLACE) 8.6-50 mg per tablet 2 Tablet  2 Tablet Oral PCD    polyethylene glycol (MIRALAX) packet 17 g  17 g Oral DAILY    acetaminophen (TYLENOL) tablet 650 mg  650 mg Oral Q4H PRN    bisacodyL (DULCOLAX) tablet 10 mg  10 mg Oral Q48H PRN    albuterol (PROVENTIL VENTOLIN) nebulizer solution 2.5 mg  2.5 mg Nebulization Q4H PRN    apixaban (ELIQUIS) tablet 5 mg  5 mg Oral BID    arformoteroL (BROVANA) neb solution 15 mcg  15 mcg Nebulization BID RT    furosemide (LASIX) tablet 20 mg  20 mg Oral DAILY    nitroglycerin (NITROSTAT) tablet 0.4 mg  0.4 mg SubLINGual PRN    rosuvastatin (CRESTOR) tablet 10 mg  10 mg Oral DAILY    tamsulosin (FLOMAX) capsule 0.4 mg  0.4 mg Oral PCD    multivitamin, tx-iron-ca-min (THERA-M w/ IRON) tablet 1 Tablet  1 Tablet Oral DAILY    carvediloL (COREG) tablet 12.5 mg  12.5 mg Oral BID WITH MEALS       Allergies:   Allergies   Allergen Reactions    Lipitor [Atorvastatin] Rash    Norvasc [Amlodipine] Rash       Lab/Data Review:  Recent Results (from the past 24 hour(s))   HGB & HCT    Collection Time: 06/14/21  5:20 AM   Result Value Ref Range    HGB 10.7 (L) 13.0 - 16.0 g/dL    HCT 34.3 (L) 36.0 - 48.0 %   WBC COUNT    Collection Time: 06/14/21  5:20 AM   Result Value Ref Range    WBC 10.1 4.6 - 94.1 K/uL   METABOLIC PANEL, BASIC    Collection Time: 06/14/21  5:20 AM   Result Value Ref Range    Sodium 136 136 - 145 mmol/L    Potassium 4.8 3.5 - 5.5 mmol/L    Chloride 99 (L) 100 - 111 mmol/L    CO2 32 21 - 32 mmol/L    Anion gap 5 3.0 - 18 mmol/L    Glucose 78 74 - 99 mg/dL    BUN 18 7.0 - 18 MG/DL    Creatinine 0.98 0.6 - 1.3 MG/DL    BUN/Creatinine ratio 18 12 - 20      GFR est AA >60 >60 ml/min/1.73m2    GFR est non-AA >60 >60 ml/min/1.73m2    Calcium 9.1 8.5 - 10.1 MG/DL        Assessment:     Primary Rehabilitation Diagnosis  1. Impaired Mobility and ADLs  2. Non-ST elevation (NSTEMI) myocardial infarction  3. Coronary artery disease; History of CABG  4. S/P PCI to proximal SVG with 3.5 x 12 mm [place] drug-eluting stent; PCI to mid SVG with 3.5 x 34 mm Juan J drug-eluting stent; PCI to distal SVG with 3.5 x 15 mm [place] drug-eluting stent;  Balloon angioplasty to distal SVG anastomosis (5/18/2021 - Dr. Zelda Shankar)     Comorbidities  Patient Active Problem List   Diagnosis Code    Urinary retention R33.9    Essential hypertension I10    Non-ST elevation (NSTEMI) myocardial infarction ([facility] I21.4    Acute on chronic heart failure with preserved ejection fraction (HFpEF) ([facility]) I50.33    Paroxysmal atrial fibrillation ([facility]) I48.0    Anticoagulated by anticoagulation treatment Z79.01    Mixed hyperlipidemia E78.2    Benign prostatic hyperplasia with urinary retention N40.1, R33.8    Malignant neoplasm of lower lobe of right lung ([facility]) C34.31    Heart failure with preserved left ventricular function (HFpEF) ([facility]) I50.30    Coronary artery disease involving native coronary artery of native heart I25.10    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    Chronic obstructive pulmonary disease (COPD) ([facility]) J44.9    History of carotid stenosis Z86.79    History of renal artery stenosis Z86.79    On clopidogrel therapy Z79.01    History of gross hematuria U01.564    Acute embolic stroke ([facility]) T00.8    Leukocytosis D72.829    Peripheral vascular disease of extremity with claudication ([facility]) I73.9    Neuropathy involving both lower extremities G57.93 Plan:     1. Justification for continued stay: Good progression towards established rehabilitation goals. 2. Medical Issues being followed closely:    [x]  Fall and safety precautions     []  Wound Care     [x]  Bowel and Bladder Function     [x]  Fluid Electrolyte and Nutrition Balance     [x]  Pain Control      3. Issues that 24 hour rehabilitation nursing is following:    [x]  Fall and safety precautions     []  Wound Care     [x]  Bowel and Bladder Function     [x]  Fluid Electrolyte and Nutrition Balance     [x]  Pain Control      [x]  Assistance with and education on in-room safety with transfers to and from the bed, wheelchair, toilet and shower. 4. Acute rehabilitation plan of care:    [x]  Continue current care and rehab. [x]  Physical Therapy           [x]  Occupational Therapy           [x]  Speech Therapy     []  Hold Rehab until further notice     5. Medications:    [x]  MAR Reviewed     [x]  Continue Present Medications     6. DVT Prophylaxis:      []  Enoxaparin     []  Unfractionated Heparin     []  Warfarin     [x]  NOAC     []  MARTHA Stockings     []  Sequential Compression Device     []  None     7. Code status    [x]  Full code     []  Partial code     []  Do not intubate     []  Do not resuscitate     8. Orders:   > Non-ST elevation (NSTEMI) myocardial infarction; Coronary artery disease; History of CABG; S/P PCI to proximal SVG with 3.5 x 12 mm [place] drug-eluting stent; PCI to mid SVG with 3.5 x 34 mm [place] drug-eluting stent; PCI to distal SVG with 3.5 x 15 mm [place] drug-eluting stent;  Balloon angioplasty to distal SVG anastomosis (5/18/2021 - Dr. Kimberli Cyr)    > On 6/8/2021, increased Carvedilol from 12.5 mg to 25 mg PO BID with meals (8AM, 5PM)   > Continue:    > Carvedilol 25 mg PO BID with meals (8AM, 5PM)    > Clopidogrel 75 mg PO once daily with breakfast    > Rosuvastatin 75 mg PO once daily    > Nitroglycerin 0.4 mg SL PRN for chest pain    > Acute on chronic heart failure with preserved ejection fraction (HFpEF)   > 2D echocardiogram (5/18/2021) showed EF 50%; concentric LV hypertrophy with a severely thickened septal wall and mildly thickened posterior wall; left atrial chamber is severely enlarged; right atrial chamber volume is moderately enlarged; no mass, shunts, or thrombi.    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, brainstem and cerebellum) without residual deficit   > Continue:    > Clopidogrel 75 mg PO once daily with breakfast    > Rosuvastatin 75 mg PO once daily    > Benign prostatic hyperplasia with urinary retention   > Continue Tamsulosin 0.4 mg PO once daily after dinner    > Chronic obstructive pulmonary disease (COPD)   > On 6/12/2021:    > Discontinue Budesonide nebulization BID    > Start Ipratropium nebulization TID   > Continue:    > Arformoterol nebulization BID    > Ipratropium nebulization TID    > Albuterol nebulization q 4 hr PRN for shortness of breath/wheezing    > Constipation   > Continue:    > Polyethylene glycol 17 grams in 8 oz water PO once daily    > Pericolace 2 tabs PO once daily after dinner     > Essential hypertension   > 2D echocardiogram (5/18/2021) showed EF 50%; concentric LV hypertrophy with a severely thickened septal wall and mildly thickened posterior wall; left atrial chamber is severely enlarged; right atrial chamber volume is moderately enlarged; no mass, shunts, or thrombi.    > On 6/8/2021, increased Carvedilol from 12.5 mg to 25 mg PO BID with meals (8AM, 5PM)   > Continue:    > Carvedilol 25 mg PO BID with meals (8AM, 5PM)    > Furosemide 20 mg PO once daily (9AM)    > Leukocytosis, reactive, attributed to NSTEMI   > Labs at [facility]:    > WBC count (5/17/2021) = 18.6    > WBC count (5/18/2021) = 21.4    > WBC count (5/19/2021) = 22.4    > WBC count (5/21/2021) = 27.3    > WBC count (5/22/2021) = 24.9    > WBC count (5/23/2021) = 30.2    > . . .     > WBC count (6/2/2021) = 25.4    > WBC count (6/3/2021) = 22.5    > WBC count (6/4/2021) = 23.4    > WBC count (6/5/2021) = 21.7    > WBC count (6/6/2021) = 24.1    > WBC count (6/7/2021) = 20.0    > Work up done was negative for a source of infection   > WBC count (6/8/2021, on admission to the ARU) = 14.4   > WBC count (6/14/2021) = 10.1   > No documented fever since admissiion    > Mixed hyperlipidemia   > Lipid profile (5/18/2021) showed TG 83, , HDL 50, LDL 93   > Continue Rosuvastatin 75 mg PO once daily    > Paroxysmal atrial fibrillation, anticoagulated on Apixaban   > On 6/8/2021, increased Carvedilol from 12.5 mg to 25 mg PO BID with meals (8AM, 5PM)   > Continue:    > Apixaban 5 mg PO BID    > Carvedilol 25 mg PO BID with meals (8AM, 5PM)    > Peripheral vascular disease of extremity with claudication; Neuropathy involving both lower extremities   > (+) pain and numbness of toes of both feet   > PVL arterial doppler of both lower extremities with exercise (9/12/2019) showed:    > Moderate arterial insufficiency in the right lower extremity at the level of the trifurcation at rest.     > Severe arterial insufficiency in the left lower extremity at the level of the trifurcation at rest.   > PVL arterial doppler of both lower extremities with exercise (10/22/2020) showed:    > Mild right lower extremity arterial insufficiency of indeterminate level at rest.     > Moderate left lower extremity arterial insufficiency involving the femoral-popliteal segment at rest.     > The ankle brachial indices post exercise were unreliable as described.  No evidence of inflow involvement.   > Planned to start patient on Cilostazol for the claudication symptoms but patient already on Apixaban + Clopidogrel; addition of Cilostazol may increase risk for bleeding; will hold off on adding Cilostazol for now   > As per the last Progress Note by Dr. Niko Marshall (dated 10/22/2020): \"Pt major symptom is not related to his PAD but sounds more like neuropathy\"   > Will start a trial of Gabapentin 100 mg PO BID   > Upon discharge from the ARU, the patient will need to follow up with Vascular Surgery (Dr. Niko Marshall)    > COVID-19 ruled out by laboratory testing   > SARS-CoV-2 ([address] [place] [address]) (collected 5/26/2021, resulted 5/28/2021): Not detected    > Analgesia   > Continue Acetaminophen 650 mg PO q 4 hr PRN for pain     > Diet:   > Specifications: Low fat/Low cholesterol/High fiber/No added salt   > Solids (consistency): Regular    > Liquids (consistency): Thin    > Fluid restriction: None      9. Personal Protective Equipment was used while interacting with patient including: goggles and KN95 face mask. Patient was using a surgical mask. 10. Patient's progress in rehabilitation and medical issues discussed with the patient. All questions answered to the best of my ability. Care plan discussed with patient and nurse. 11. Total clinical care time is 30 minutes, including review of chart including all labs, radiology, past medical history, and discussion with patient. Greater than 50% of my time was spent in coordination of care and counseling.       Signed:    Babita Green MD    June 14, 2021

## 2021-06-14 NOTE — PROGRESS NOTES
Problem: Neurolinguistics Impaired (Adult)  Goal: *Speech Goal: (INSERT TEXT)  Description: Long term goals  Patient will:  1. Be oriented x 3 and recall events of the day, supervision. 2. Recall 3 words after 5 minutes, supervision. 3. Follow complex instructions with 80-90% accuracy. 4. Read 1-2 sentence passages and respond appropriately to questions or instructions, 90% accuracy. 5. Perform functional problem solving/reasoning tasks with 90% accuracy. Short term goals (by 6/15/21)  Patient will:  1. Be oriented x 3 and recall events of the day, min assist.  2. Recall 3 words after 5 minutes, min assist.  3. Follow complex instructions with 70% accuracy. 4. Read 1 sentence passages and respond appropriately to questions or instructions, 60-70% accuracy. 5. Perform functional problem solving/reasoning tasks with 70% accuracy. Note:   Speech language pathology treatment    Patient: Terrence Galvan (6 y.o. male)  Date: 6/14/2021  Diagnosis: Non-ST elevation (NSTEMI) myocardial infarction [facility]) [I21.4] Non-ST elevation (NSTEMI) myocardial infarction [facility])  Time in: 1405  Time Out: 1430  SUBJECTIVE:   Patient stated Sometimes I ask people to repeat (when asked if he has problems understanding others). OBJECTIVE:   Mental Status:  Mr. Emily Arredondo was alert and oriented. Treatment & Interventions: The patient was seen for a 25 minute speech session The session was shortened by 5 minutes due to the patient being in PT late. The following treatment tasks were presented:  Neuro-Linguistics:   Orientation:    Supervision  Recent memory:   Supervision  Recall 3 words:   Supervision  Deciding factors:   90%  Following complex commands: 85%  Reading sentences:   95%  Answering questions:   100%  Drawing conclusions:   80%      Response & Tolerance to Activities:  Mr. Emily Arredondo was cooperative and tolerated activities well. Pain:  Pain Scale 1: Numeric (0 - 10)  Ne pain reported.      After treatment:   [x]       Patient left in no apparent distress sitting up in chair  []       Patient left in no apparent distress in bed  [x]       Call bell left within reach  []       Nursing notified  []       Caregiver present  []       Bed alarm activated    ASSESSMENT:   Progression toward goals:  [x]       Improving appropriately and progressing toward goals  []       Improving slowly and progressing toward goals  []       Not making progress toward goals and plan of care will be adjusted    PLAN:   Patient continues to benefit from skilled intervention to address the above impairments. Continue treatment per established plan of care. Discharge Recommendations:   To Be Determined    Estimated LOS: 2-3 weeks    [address] Pathology Student  Supervised by Alf Conway MA CCC-SLP  Time Calculation: 25 mins

## 2021-06-14 NOTE — REHAB NOTE
SHIFT CHANGE NOTE FOR [place]    Bedside and Verbal shift change report given to ROLDAN Palafox (oncoming nurse) by  Ryann Pierre (offgoing nurse). Report included the following information SBAR, Kardex, MAR and Recent Results.     Situation:   Code Status: Full Code   Reason for Admission: [address] Day: 7   Problem List:   Hospital Problems  Date Reviewed: 6/13/2021        Codes Class Noted POA    Mixed hyperlipidemia (Chronic) ICD-10-CM: E03.7  ICD-9-CM: 272.2  Unknown Yes        Acute embolic stroke ([facility] [id number]: I63.9  ICD-9-CM: 434.11  5/23/2021 Yes    Overview Signed 6/8/2021 12:37 AM by Cailin Buchanan MD     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, brainstem and cerebellum) without residual deficit             Anticoagulated by anticoagulation treatment ICD-10-CM: Z79.01  ICD-9-CM: V58.61  5/19/2021 Yes    Overview Signed 6/7/2021 10:48 PM by Cailin Buchanan MD     On Apixaban             On clopidogrel therapy ICD-10-CM: Z79.01  ICD-9-CM: V58.61  5/19/2021 Yes        * (Principal) Non-ST elevation (NSTEMI) myocardial infarction [facility]) ICD-10-CM: I21.4  ICD-9-CM: 410.70  5/17/2021 Yes        Acute on chronic heart failure with preserved ejection fraction (HFpEF) ([facility] ICD-10-CM: I50.33  ICD-9-CM: 428.23  5/17/2021 Yes        Paroxysmal atrial fibrillation ([facility] ICD-10-CM: I48.0  ICD-9-CM: 427.31  5/17/2021 Yes        Leukocytosis ICD-10-CM: S89.340  ICD-9-CM: 288.60  5/17/2021 Yes    Overview Signed 6/8/2021 12:42 AM by Cailin Buchanan MD     Attributed to reactive leukocytosis due to NSTEMI             Essential hypertension (Chronic) ICD-10-CM: I10  ICD-9-CM: 401.9  Unknown Yes              Background:   Past Medical History:   Past Medical History:   Diagnosis Date    Acute embolic stroke ([facility] 2/82/1739    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, brainstem and cerebellum) without residual deficit    Anticoagulated by anticoagulation treatment 5/19/2021    On Apixaban    Benign prostatic hyperplasia with urinary retention     Chronic obstructive pulmonary disease (COPD) ([facility])     Coronary artery disease involving native coronary artery of native heart     Essential hypertension     Heart failure with preserved left ventricular function (HFpEF) ([facility])     2D echocardiogram (5/18/2021) showed EF 50%; concentric LV hypertrophy with a severely thickened septal wall and mildly thickened posterior wall; left atrial chamber is severely enlarged; right atrial chamber volume is moderately enlarged; no mass, shunts, or thrombi.      History of carotid stenosis     History of gross hematuria 5/26/2021    Attributed to Taver[place] trauma while patient was altered    History of renal artery stenosis     Leukocytosis 5/17/2021    Attributed to reactive leukocytosis due to NSTEMI    Malignant neoplasm of lower lobe of right lung ([facility])     Non-ST elevation (NSTEMI) myocardial infarction ([facility] 5/17/2021    On clopidogrel therapy 5/19/2021    Paroxysmal atrial fibrillation ([facility] 5/17/2021    Urinary retention       Patient taking anticoagulants Eliquis   Patient has a defibrillator: no    Assessment:   Changes in Assessment throughout shift: none     Patient has central line: no Reasons if yes: na  Insertion date:na Last dressing date:na   Patient has Taveras Cath: no Reasons if yes: na  Insertion date:na     Last Vitals:     Vitals:    06/13/21 0739 06/13/21 1620 06/13/21 2100 06/14/21 0720   BP: 134/78 (!) 156/93 124/68 129/87   Pulse: (!) 59 87 78 (!) 102   Resp: 16 18 16 18   Temp: 97.9 °F (36.6 °C) 98.2 °F (36.8 °C) 98 °F (36.7 °C) 97.5 °F (36.4 °C)   SpO2: 95% 93% 96% 95%   Weight:       Height:            PAIN    Pain Assessment    Pain Intensity 1: 0 (06/14/21 0400) Pain Intensity 1: 2 (12/29/14 1105)    Pain Location 1: Foot Pain Location 1: Abdomen    Pain Intervention(s) 1: Medication (see MAR) Pain Intervention(s) 1: Medication (see MAR)  Patient Stated Pain Goal: 0 Patient Stated Pain Goal: 0  o Intervention effective: no c/o pain  o Other actions taken for pain: na     Skin Assessment  Skin color    Condition/Temperature    Integrity    Turgor    Weekly Pressure Ulcer Documentation  Pressure  Injury Documentation: No Pressure Injury Noted-Pressure Ulcer Prevention Initiated  Wound Prevention & Protection Methods  Orientation of wound Orientation of Wound Prevention: Posterior  Location of Prevention Location of Wound Prevention: Buttocks, Sacrum/Coccyx  Dressing Present Dressing Present : No  Dressing Status    Wound Offloading Wound Offloading (Prevention Methods): Bed, pressure redistribution/air     INTAKE/OUPUT  Date 06/13/21 0700 - 06/14/21 0659 06/14/21 0700 - 06/15/21 0659   Shift 7552-0515 2166-0426 24 Hour Total 4850-6116 2963-6168 24 Hour Total   INTAKE   P.O. 750  750        P. O. 750  750      Shift Total(mL/kg) 750(10.1)  750(10.1)      OUTPUT   Urine(mL/kg/hr)  800(0.9) 800(0.5)        Urine Voided  800 800        Urine Occurrence(s) 3 x 2 x 5 x      Stool           Stool Occurrence(s) 1 x 0 x 1 x      Shift Total(mL/kg)  800(10.8) 800(10.8)       -800 -50      Weight (kg) 73.9 73.9 73.9 73.9 73.9 73.9       Recommendations:  1. Patient needs and requests:emptying the urinal ,pain med,assist to the bsc    2. Diet: Cardiac Regular    3. Pending tests/procedures: am care    4. Functional Level/Equipment: assist x 1 / wheelchair    5. Estimated Discharge Date: 6/22/21 Posted on Whiteboard in Patients Room: yes    HEALS Safety Check    A safety check occurred in the patient's room between off going nurse and oncoming nurse listed above.     The safety check included the below items  Area Items   H  High Alert Medications - Verify all high alert medication drips (heparin, PCA, etc.)   E  Equipment - Suction is set up for ALL patients (with yanker)  - Red plugs utilized for all equipment (IV pumps, etc.)  - WOWs wiped down at end of shift.  - Room stocked with oxygen, suction, and other unit-specific supplies   A  Alarms - Bed alarm is set for fall risk patients  - Ensure chair alarm is in place and activated if patient is up in a chair   L  Lines - Check IV for any infiltration  - Taveras bag is empty if patient has a Taveras   - Tubing and IV bags are labeled   S  Safety   - Room is clean, patient is clean, and equipment is clean. - Hallways are clear from equipment besides carts. - Fall bracelet on for fall risk patients  - Ensure room is clear and free of clutter  - Suction is set up for ALL patients (with yanker)  - Hallways are clear from equipment besides carts.    - Isolation precautions followed, supplies available outside room, sign posted

## 2021-06-14 NOTE — PROGRESS NOTES
Problem: Falls - Risk of  Goal: *Absence of Falls  Description: Document Bruno Bains Fall Risk and appropriate interventions in the flowsheet. Outcome: Progressing Towards Goal  Note: Fall Risk Interventions:  Mobility Interventions: Bed/chair exit alarm, Patient to call before getting OOB    Mentation Interventions: Adequate sleep, hydration, pain control    Medication Interventions: Bed/chair exit alarm, Patient to call before getting OOB    Elimination Interventions: Bed/chair exit alarm, Call light in reach, Patient to call for help with toileting needs    History of Falls Interventions: Bed/chair exit alarm         Problem: Patient Education: Go to Patient Education Activity  Goal: Patient/Family Education  Outcome: Progressing Towards Goal     Problem: Pressure Injury - Risk of  Goal: *Prevention of pressure injury  Description: Document Nicko Scale and appropriate interventions in the flowsheet. Outcome: Progressing Towards Goal  Note: Pressure Injury Interventions:             Activity Interventions: Increase time out of bed, Pressure redistribution bed/mattress(bed type)    Mobility Interventions: Pressure redistribution bed/mattress (bed type)    Nutrition Interventions: Document food/fluid/supplement intake    Friction and Shear Interventions: HOB 30 degrees or less                Problem: Patient Education: Go to Patient Education Activity  Goal: Patient/Family Education  Outcome: Progressing Towards Goal     Problem: Pain  Goal: *Control of Pain  Outcome: Progressing Towards Goal     Problem: Patient Education: Go to Patient Education Activity  Goal: Patient/Family Education  Outcome: Progressing Towards Goal     Problem: Inpatient Rehab (Adult)  Goal: *LTG: Avoids injury/falls 100% of time related to deficits  Outcome: Progressing Towards Goal  Goal: *LTG: Avoids infection 100% of time related to deficits  Outcome: Progressing Towards Goal  Goal: *LTG: Verbalize understanding of diagnosis and risk factors for recurring stroke  Outcome: Progressing Towards Goal  Goal: *LTG: Absence of DVT during hospitalization  Outcome: Progressing Towards Goal  Goal: *LTG: Maintains Skin Integrity With No Evidence of Pressure Injury 100% of Time  Outcome: Progressing Towards Goal  Goal: Interventions  Outcome: Progressing Towards Goal     Problem: Patient Education: Go to Patient Education Activity  Goal: Patient/Family Education  Outcome: Progressing Towards Goal     Problem: Patient Education: Go to Patient Education Activity  Goal: Patient/Family Education  Outcome: Progressing Towards Goal     Problem: Patient Education: Go to Patient Education Activity  Goal: Patient/Family Education  Outcome: Progressing Towards Goal     Problem: Patient Education: Go to Patient Education Activity  Goal: Patient/Family Education  Outcome: Progressing Towards Goal     Problem: Nutrition Deficit  Goal: *Optimize nutritional status  Outcome: Progressing Towards Goal

## 2021-06-15 NOTE — PROGRESS NOTES
completed a follow up visit with and Spiritual assessment of patient in room 191 todayand offered Pastoral care support to the patient once again. Patient says he feels a little tired and weak today. Not really with it today.  Shared this info with therapist.Chaplains will continue to follow and will provide pastoral care on an as needed/requested basis    Chaplain Grace Farias   Board Certified [address] [place] [address]   [phone]

## 2021-06-15 NOTE — PROGRESS NOTES
SEAMUS reviewed chart. SEAMUS met with pt at bedside. Pt is pending discharge for next Tue 6/22. SEAMUS briefly discussed dcp. SEAMUS referenced [facility] as being a recommendation. SEAMUS provided a [facility] list. SEAMUS briefly dicussed DME, and educated pt on the [facility] Bedside Pharmacy opportunity at discharge. Pt was agreeable. SEAMUS will continue to follow.      René Kim MSW, LCSW, CCM  Doctoral Candidate, Doctor of Social Work

## 2021-06-15 NOTE — REHAB NOTE
SHIFT CHANGE NOTE FOR [facility]    Bedside and Verbal shift change report given to Itzel Boone RN (oncoming nurse) by Deng Moura (offgoing nurse). Report included the following information SBAR, Kardex, MAR and Recent Results.     Situation:   Code Status: Full Code   Reason for Admission: [address] Day: 8   Problem List:   Hospital Problems  Date Reviewed: 6/14/2021        Codes Class Noted POA    Peripheral vascular disease of extremity with claudication (HCC) (Chronic) ICD-10-CM: I73.9  ICD-9-CM: 443.9  6/14/2021 Yes        Mixed hyperlipidemia (Chronic) ICD-10-CM: Q38.8  ICD-9-CM: 272.2  Unknown Yes        [facility] embolic stroke 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, brainstem and cerebellum) without residual deficit             Anticoagulated by anticoagulation treatment ICD-10-CM: Z79.01  ICD-9-CM: V58.61  5/19/2021 Yes    Overview Signed 6/7/2021 10:48 PM by Desmond Gonzalez MD     On Apixaban             On clopidogrel therapy ICD-10-CM: Z79.01  ICD-9-CM: V58.61  5/19/2021 Yes        * (Principal) Non-ST elevation (NSTEMI) myocardial infarction [facility]) ICD-10-CM: I21.4  ICD-9-CM: 410.70  5/17/2021 Yes        Acute on chronic heart failure with preserved ejection fraction (HFpEF) ([facility] ICD-10-CM: I50.33  ICD-9-CM: 428.23  5/17/2021 Yes        Paroxysmal atrial fibrillation (HCC) ICD-10-CM: I48.0  ICD-9-CM: 427.31  5/17/2021 Yes        Leukocytosis ICD-10-CM: T44.444  ICD-9-CM: 288.60  5/17/2021 Yes    Overview Signed 6/8/2021 12:42 AM by Desmond Gonzalez MD     Attributed to reactive leukocytosis due to NSTEMI             Neuropathy involving both lower extremities (Chronic) ICD-10-CM: U73.90  ICD-9-CM: 356.9  10/22/2020 Yes        Essential hypertension (Chronic) ICD-10-CM: I10  ICD-9-CM: 401.9  Unknown Yes Background:   Past Medical History:   Past Medical History:   Diagnosis Date    Acute embolic stroke ([address] 9/64/5800    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, brainstem and cerebellum) without residual deficit    Anticoagulated by anticoagulation treatment 5/19/2021    On Apixaban    Benign prostatic hyperplasia with urinary retention     Chronic obstructive pulmonary disease (COPD) ([address]     Coronary artery disease involving native coronary artery of native heart     Essential hypertension     Heart failure with preserved left ventricular function (HFpEF) ([address]     2D echocardiogram (5/18/2021) showed EF 50%; concentric LV hypertrophy with a severely thickened septal wall and mildly thickened posterior wall; left atrial chamber is severely enlarged; right atrial chamber volume is moderately enlarged; no mass, shunts, or thrombi.      History of carotid stenosis     History of gross hematuria 5/26/2021    Attributed to Taveras trauma while patient was altered    History of renal artery stenosis     Leukocytosis 5/17/2021    Attributed to reactive leukocytosis due to NSTEMI    Malignant neoplasm of lower lobe of right lung (HCC)     Neuropathy involving both lower extremities 10/22/2020    Non-ST elevation (NSTEMI) myocardial infarction ([address] 5/17/2021    On clopidogrel therapy 5/19/2021    Paroxysmal atrial fibrillation ([address] 5/17/2021    Peripheral vascular disease of extremity with claudication ([address] 6/14/2021    Urinary retention       Patient taking anticoagulants Eliquis   Patient has a defibrillator: no    Assessment:   Changes in Assessment throughout shift: none     Patient has central line: no Reasons if yes: na  Insertion date:na Last dressing date:na   Patient has Taveras Cath: no Reasons if yes: na  Insertion date:na     Last Vitals:     Vitals:    06/14/21 1528 06/14/21 1600 06/14/21 1630 06/14/21 2050   BP: 123/81 (!) 180/101 (!) 180/100 136/89   Pulse: 85 76  84   Resp: 18   18   Temp: 97.2 °F (36.2 °C)   97.6 °F (36.4 °C)   SpO2: 95%   97%   Weight:       Height:            PAIN    Pain Assessment    Pain Intensity 1: 2 (06/15/21 0400) Pain Intensity 1: 2 (12/29/14 1105)    Pain Location 1: Foot Pain Location 1: Abdomen    Pain Intervention(s) 1: Medication (see MAR) Pain Intervention(s) 1: Medication (see MAR)  Patient Stated Pain Goal: 0 Patient Stated Pain Goal: 0  o Intervention effective: no c/o pain  o Other actions taken for pain: na     Skin Assessment  Skin color    Condition/Temperature    Integrity    Turgor    Weekly Pressure Ulcer Documentation  Pressure  Injury Documentation: No Pressure Injury Noted-Pressure Ulcer Prevention Initiated  Wound Prevention & Protection Methods  Orientation of wound Orientation of Wound Prevention: Posterior  Location of Prevention Location of Wound Prevention: Buttocks, Sacrum/Coccyx  Dressing Present Dressing Present : No  Dressing Status    Wound Offloading Wound Offloading (Prevention Methods): Bed, pressure redistribution/air     INTAKE/OUPUT  Date 06/14/21 0700 - 06/15/21 0659 06/15/21 0700 - 06/16/21 0659   Shift 7036-9082 9341-7274 24 Hour Total 3006-4369 6363-7886 24 Hour Total   INTAKE   Shift Total(mL/kg)         OUTPUT   Urine(mL/kg/hr)  225(0.3) 225(0.1)        Urine Voided  225 225        Urine Occurrence(s) 3 x 3 x 6 x      Stool           Stool Occurrence(s) 1 x 1 x 2 x      Shift Total(mL/kg)  225(3) 225(3)      NET  -225 -225      Weight (kg) 73.9 73.9 73.9 73.9 73.9 73.9       Recommendations:  1. Patient needs and requests:emptying the urinal ,pain med,assist to the bsc    2. Diet: Cardiac Regular    3. Pending tests/procedures: am care    4. Functional Level/Equipment: assist x 1 / wheelchair    5.  Estimated Discharge Date: 6/22/21 Posted on Whiteboard in Patients Room: yes    HEALS Safety Check    A safety check occurred in the patient's room between off going nurse and oncoming nurse listed above. The safety check included the below items  Area Items   H  High Alert Medications - Verify all high alert medication drips (heparin, PCA, etc.)   E  Equipment - Suction is set up for ALL patients (with laila)  - Red plugs utilized for all equipment (IV pumps, etc.)  - WOWs wiped down at end of shift.  - Room stocked with oxygen, suction, and other unit-specific supplies   A  Alarms - Bed alarm is set for fall risk patients  - Ensure chair alarm is in place and activated if patient is up in a chair   L  Lines - Check IV for any infiltration  - Taveras bag is empty if patient has a Taveras   - Tubing and IV bags are labeled   S  Safety   - Room is clean, patient is clean, and equipment is clean. - Hallways are clear from equipment besides carts. - Fall bracelet on for fall risk patients  - Ensure room is clear and free of clutter  - Suction is set up for ALL patients (with laila)  - Hallways are clear from equipment besides carts.    - Isolation precautions followed, supplies available outside room, sign posted

## 2021-06-15 NOTE — PROGRESS NOTES
[id number]  [address] [place] [address], [address]     INPATIENT REHABILITATION  DAILY PROGRESS NOTE     Date: 6/15/2021    Name: Fausto Villasenor Age / Sex: 80 y.o. / male   CSN: [id number] MRN: [id number]   Admit Date: 6/7/2021 Length of Stay: 8 days     Primary Rehab Diagnosis: Impaired Mobility and ADLs secondary to:  1. Non-ST elevation (NSTEMI) myocardial infarction  2. Coronary artery disease; History of CABG  3. S/P PCI to proximal SVG with 3.5 x 12 mm [place] drug-eluting stent; PCI to mid SVG with 3.5 x 34 mm [place] drug-eluting stent; PCI to distal SVG with 3.5 x 15 mm [place] drug-eluting stent; Balloon angioplasty to distal SVG anastomosis (5/18/2021 - Dr. Claudio Mccoy)       Subjective:     Patient seen and examined. Blood pressure controlled. Patient's Complaint:   No significant medical complaints    Pain Control: stable, mild-to-moderate joint symptoms intermittently, reasonably well controlled by current meds      Objective:     Vital Signs:  Patient Vitals for the past 24 hrs:   BP Temp Pulse Resp SpO2   06/15/21 0821 133/85 98.4 °F (36.9 °C) 94 16 98 %   06/15/21 0810 (!) 159/106  99  98 %   06/14/21 2050 136/89 97.6 °F (36.4 °C) 84 18 97 %   06/14/21 1630 (!) 180/100       06/14/21 1600 (!) 180/101  76     06/14/21 1528 123/81 97.2 °F (36.2 °C) 85 18 95 %        Physical Examination:  GENERAL SURVEY: Patient is awake, alert, oriented x 3, sitting comfortably on the chair, not in acute respiratory distress.   HEENT: pink palpebral conjunctivae, anicteric sclerae, no nasoaural discharge, moist oral mucosa  NECK: supple, no jugular venous distention, no palpable lymph nodes  CHEST/LUNGS: symmetrical chest expansion, good air entry, clear breath sounds  HEART: adynamic precordium, good S1 S2, no S3, irregularly irregular rhythm, no murmurs  ABDOMEN: flat, bowel sounds appreciated, soft, non-tender  EXTREMITIES: pink nailbeds, (+) bipedal edema, full and equal pulses, no calf tenderness   NEUROLOGICAL EXAM: The patient is awake, alert and oriented x3, able to answer questions fairly appropriately, able to follow 1 and 2 step commands. Able to tell time from the wall clock. Cranial nerves II-XII are grossly intact. No gross sensory deficit. Motor strength is 4 to 4+/5 on all 4 extremities. Current Medications:  Current Facility-Administered Medications   Medication Dose Route Frequency    gabapentin (NEURONTIN) capsule 100 mg  100 mg Oral BID    ipratropium (ATROVENT) 0.02 % nebulizer solution 0.5 mg  0.5 mg Nebulization TID    clopidogreL (PLAVIX) tablet 75 mg  75 mg Oral DAILY WITH BREAKFAST    senna-docusate (PERICOLACE) 8.6-50 mg per tablet 2 Tablet  2 Tablet Oral PCD    polyethylene glycol (MIRALAX) packet 17 g  17 g Oral DAILY    acetaminophen (TYLENOL) tablet 650 mg  650 mg Oral Q4H PRN    bisacodyL (DULCOLAX) tablet 10 mg  10 mg Oral Q48H PRN    albuterol (PROVENTIL VENTOLIN) nebulizer solution 2.5 mg  2.5 mg Nebulization Q4H PRN    apixaban (ELIQUIS) tablet 5 mg  5 mg Oral BID    arformoteroL (BROVANA) neb solution 15 mcg  15 mcg Nebulization BID RT    furosemide (LASIX) tablet 20 mg  20 mg Oral DAILY    nitroglycerin (NITROSTAT) tablet 0.4 mg  0.4 mg SubLINGual PRN    rosuvastatin (CRESTOR) tablet 10 mg  10 mg Oral DAILY    tamsulosin (FLOMAX) capsule 0.4 mg  0.4 mg Oral PCD    multivitamin, tx-iron-ca-min (THERA-M w/ IRON) tablet 1 Tablet  1 Tablet Oral DAILY    carvediloL (COREG) tablet 12.5 mg  12.5 mg Oral BID WITH MEALS       Allergies:   Allergies   Allergen Reactions    Lipitor [Atorvastatin] Rash    Norvasc [Amlodipine] Rash       Functional Progress:    OCCUPATIONAL THERAPY    ON ADMISSION MOST RECENT   Eating      Eating        Grooming  Functional Level: 5 (setup at sink)   Grooming  Functional Level: 5 (setup at sink)     Bathing  Functional Level: 5   Bathing  Functional Level: 5     Upper Body Dressing  Functional Level: 5   Upper Body Dressing  Functional Level: 5     Lower Body Dressing  Functional Level: 4   Lower Body Dressing  Functional Level: 4     Toileting  Functional Level: 4   Toileting  Functional Level: 4     Toilet Transfers  Toilet Transfer Score: 0   Toilet Transfers  Toilet Transfer Score: 5     Tub /Shower Transfers      Tub/Shower Transfers          Legend:   7 - Independent   6 - Modified Independent   5 - Standby Assistance / Supervision / Set-up   4 - Minimum Assistance / Contact Guard Assistance   3 - Moderate Assistance   2 - Maximum Assistance   1 - Total Assistance / Dependent       Lab/Data Review:  No results found for this or any previous visit (from the past 24 hour(s)). Assessment:     Primary Rehabilitation Diagnosis  1. Impaired Mobility and ADLs  2. Non-ST elevation (NSTEMI) myocardial infarction  3. Coronary artery disease; History of CABG  4. S/P PCI to proximal SVG with 3.5 x 12 mm Juan J drug-eluting stent; PCI to mid SVG with 3.5 x 34 mm [place] drug-eluting stent; PCI to distal SVG with 3.5 x 15 mm [place] drug-eluting stent;  Balloon angioplasty to distal SVG anastomosis (5/18/2021 - Dr. Mame Parrish)     Comorbidities  Patient Active Problem List   Diagnosis Code    Urinary retention R33.9    Essential hypertension I10    Non-ST elevation (NSTEMI) myocardial infarction ([facility] I21.4    Acute on chronic heart failure with preserved ejection fraction (HFpEF) ([facility]) I50.33    Paroxysmal atrial fibrillation ([facility]) I48.0    Anticoagulated by anticoagulation treatment Z79.01    Mixed hyperlipidemia E78.2    Benign prostatic hyperplasia with urinary retention N40.1, R33.8    Malignant neoplasm of lower lobe of right lung ([facility]) C34.31    Heart failure with preserved left ventricular function (HFpEF) ([facility]) I50.30    Coronary artery disease involving native coronary artery of native heart I25.10    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    Chronic obstructive pulmonary disease (COPD) ([facility] J44.9    History of carotid stenosis Z86.79    History of renal artery stenosis Z86.79    On clopidogrel therapy Z79.01    History of gross hematuria L36.642    Acute embolic stroke (HCC) H24.8    Leukocytosis D72.829    Peripheral vascular disease of extremity with claudication (HCC) I73.9    Neuropathy involving both lower extremities G57.93       Plan:     1. Justification for continued stay: Good progression towards established rehabilitation goals. 2. Medical Issues being followed closely:    [x]  Fall and safety precautions     []  Wound Care     [x]  Bowel and Bladder Function     [x]  Fluid Electrolyte and Nutrition Balance     [x]  Pain Control      3. Issues that 24 hour rehabilitation nursing is following:    [x]  Fall and safety precautions     []  Wound Care     [x]  Bowel and Bladder Function     [x]  Fluid Electrolyte and Nutrition Balance     [x]  Pain Control      [x]  Assistance with and education on in-room safety with transfers to and from the bed, wheelchair, toilet and shower. 4. Acute rehabilitation plan of care:    [x]  Continue current care and rehab. [x]  Physical Therapy           [x]  Occupational Therapy           [x]  Speech Therapy     []  Hold Rehab until further notice     5. Medications:    [x]  MAR Reviewed     [x]  Continue Present Medications     6. DVT Prophylaxis:      []  Enoxaparin     []  Unfractionated Heparin     []  Warfarin     [x]  NOAC     []  MARTHA Stockings     []  Sequential Compression Device     []  None     7. Code status    [x]  Full code     []  Partial code     []  Do not intubate     []  Do not resuscitate     8. Orders:   > Non-ST elevation (NSTEMI) myocardial infarction; Coronary artery disease; History of CABG; S/P PCI to proximal SVG with 3.5 x 12 mm Juan J drug-eluting stent; PCI to mid SVG with 3.5 x 34 mm [place] drug-eluting stent; PCI to distal SVG with 3.5 x 15 mm [place] drug-eluting stent;  Balloon angioplasty to distal SVG anastomosis (5/18/2021 - Dr. Emiliana Velasco)    > On 6/8/2021, increased Carvedilol from 12.5 mg to 25 mg PO BID with meals (8AM, 5PM)   > Continue:    > Carvedilol 25 mg PO BID with meals (8AM, 5PM)    > Clopidogrel 75 mg PO once daily with breakfast    > Rosuvastatin 75 mg PO once daily    > Nitroglycerin 0.4 mg SL PRN for chest pain    > Acute on chronic heart failure with preserved ejection fraction (HFpEF)   > 2D echocardiogram (5/18/2021) showed EF 50%; concentric LV hypertrophy with a severely thickened septal wall and mildly thickened posterior wall; left atrial chamber is severely enlarged; right atrial chamber volume is moderately enlarged; no mass, shunts, or thrombi.    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, brainstem and cerebellum) without residual deficit   > Continue:    > Clopidogrel 75 mg PO once daily with breakfast    > Rosuvastatin 75 mg PO once daily    > Benign prostatic hyperplasia with urinary retention   > Continue Tamsulosin 0.4 mg PO once daily after dinner    > Chronic obstructive pulmonary disease (COPD)   > On 6/12/2021:    > Discontinue Budesonide nebulization BID    > Start Ipratropium nebulization TID   > Continue:    > Arformoterol nebulization BID    > Ipratropium nebulization TID    > Albuterol nebulization q 4 hr PRN for shortness of breath/wheezing    > Constipation   > Continue:    > Polyethylene glycol 17 grams in 8 oz water PO once daily    > Pericolace 2 tabs PO once daily after dinner     > Essential hypertension   > 2D echocardiogram (5/18/2021) showed EF 50%; concentric LV hypertrophy with a severely thickened septal wall and mildly thickened posterior wall; left atrial chamber is severely enlarged; right atrial chamber volume is moderately enlarged; no mass, shunts, or thrombi.    > On 6/8/2021, increased Carvedilol from 12.5 mg to 25 mg PO BID with meals (8AM, 5PM)   > Continue:    > Carvedilol 25 mg PO BID with meals (8AM, 5PM)    > Furosemide 20 mg PO once daily (9AM)    > Leukocytosis, reactive, attributed to NSTEMI   > Labs at [facility]:    > WBC count (5/17/2021) = 18.6    > WBC count (5/18/2021) = 21.4    > WBC count (5/19/2021) = 22.4    > WBC count (5/21/2021) = 27.3    > WBC count (5/22/2021) = 24.9    > WBC count (5/23/2021) = 30.2    > . . .     > WBC count (6/2/2021) = 25.4    > WBC count (6/3/2021) = 22.5    > WBC count (6/4/2021) = 23.4    > WBC count (6/5/2021) = 21.7    > WBC count (6/6/2021) = 24.1    > WBC count (6/7/2021) = 20.0    > Work up done was negative for a source of infection   > WBC count (6/8/2021, on admission to the ARU) = 14.4   > WBC count (6/14/2021) = 10.1   > No documented fever since admissiion    > Mixed hyperlipidemia   > Lipid profile (5/18/2021) showed TG 83, , HDL 50, LDL 93   > Continue Rosuvastatin 75 mg PO once daily    > Paroxysmal atrial fibrillation, anticoagulated on Apixaban   > On 6/8/2021, increased Carvedilol from 12.5 mg to 25 mg PO BID with meals (8AM, 5PM)   > Continue:    > Apixaban 5 mg PO BID    > Carvedilol 25 mg PO BID with meals (8AM, 5PM)    > Peripheral vascular disease of extremity with claudication;  Neuropathy involving both lower extremities   > (+) pain and numbness of toes of both feet   > PVL arterial doppler of both lower extremities with exercise (9/12/2019) showed:    > Moderate arterial insufficiency in the right lower extremity at the level of the trifurcation at rest.     > Severe arterial insufficiency in the left lower extremity at the level of the trifurcation at rest.   > PVL arterial doppler of both lower extremities with exercise (10/22/2020) showed:    > Mild right lower extremity arterial insufficiency of indeterminate level at rest.     > Moderate left lower extremity arterial insufficiency involving the femoral-popliteal segment at rest.     > The ankle brachial indices post exercise were unreliable as described. No evidence of inflow involvement.   > Planned to start patient on Cilostazol for the claudication symptoms but patient already on Apixaban + Clopidogrel; addition of Cilostazol may increase risk for bleeding; will hold off on adding Cilostazol for now   > As per the last Progress Note by Dr. Carlos Prince (dated 10/22/2020): \"Pt major symptom is not related to his PAD but sounds more like neuropathy\"   > On 6/14/2021, started a trial of Gabapentin 100 mg PO BID   > Upon discharge from the ARU, the patient will need to follow up with Vascular Surgery (Dr. Carlos Prince)   > Continue Gabapentin 100 mg PO BID    > COVID-19 ruled out by laboratory testing   > SARS-CoV-2 ([place] m2000, [facility]) (collected 5/26/2021, resulted 5/28/2021): Not detected    > Analgesia   > Continue Acetaminophen 650 mg PO q 4 hr PRN for pain     > Diet:   > Specifications: Low fat/Low cholesterol/High fiber/No added salt   > Solids (consistency): Regular    > Liquids (consistency): Thin    > Fluid restriction: None      9. Personal Protective Equipment was used while interacting with patient including: goggles and KN95 face mask. Patient was using a surgical mask. 10. Patient's progress in rehabilitation and medical issues discussed with the patient. All questions answered to the best of my ability. Care plan discussed with patient and nurse. 11. Total clinical care time is 30 minutes, including review of chart including all labs, radiology, past medical history, and discussion with patient. Greater than 50% of my time was spent in coordination of care and counseling.       Signed:    Alma Lazar MD    Rand 15, 2021

## 2021-06-15 NOTE — REHAB NOTE
[facility]    Bedside and Verbal shift change report given to Violet Au RN (oncoming nurse) by Deng Moura (offgoing nurse). Report included the following information SBAR, Kardex, MAR and Recent Results.     Situation:   Code Status: Full Code   Reason for Admission: [address] Day: 8   Problem List:   Hospital Problems  Date Reviewed: 6/15/2021        Codes Class Noted POA    Peripheral vascular disease of extremity with claudication (HCC) (Chronic) ICD-10-CM: I73.9  ICD-9-CM: 443.9  6/14/2021 Yes        Mixed hyperlipidemia (Chronic) ICD-10-CM: M42.2  ICD-9-CM: 272.2  Unknown Yes        Ac[facility] embolic stroke ([facility] BGD-13-CV: I63.9  ICD-9-CM: 434.11  5/23/2021 Yes    Overview Signed 6/8/2021 12:37 AM by Desmond Gonzalez MD     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, brainstem and cerebellum) without residual deficit             Anticoagulated by anticoagulation treatment ICD-10-CM: Z79.01  ICD-9-CM: V58.61  5/19/2021 Yes    Overview Signed 6/7/2021 10:48 PM by Desmond Gonzalez MD     On Apixaban             On clopidogrel therapy ICD-10-CM: Z79.01  ICD-9-CM: V58.61  5/19/2021 Yes        * (Principal) Non-ST elevation (NSTEMI) myocardial infarction [facility]) ICD-10-CM: I21.4  ICD-9-CM: 410.70  5/17/2021 Yes        Acute on chronic heart failure with preserved ejection fraction (HFpEF) ([facility] [address] ICD-10-CM: I50.33  ICD-9-CM: 428.23  5/17/2021 Yes        Paroxysmal atrial fibrillation ([facility] ICD-10-CM: I48.0  ICD-9-CM: 427.31  5/17/2021 Yes        Leukocytosis ICD-10-CM: D10.887  ICD-9-CM: 288.60  5/17/2021 Yes    Overview Signed 6/8/2021 12:42 AM by Desmond Gonzalez MD     Attributed to reactive leukocytosis due to NSTEMI             Neuropathy involving both lower extremities (Chronic) ICD-10-CM: J98.31  ICD-9-CM: 356.9  10/22/2020 Yes        Essential hypertension (Chronic) ICD-10-CM: I10  ICD-9-CM: 401.9  Unknown Yes Background:   Past Medical History:   Past Medical History:   Diagnosis Date    Acute embolic stroke ([address] 4/39/9585    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, brainstem and cerebellum) without residual deficit    Anticoagulated by anticoagulation treatment 5/19/2021    On Apixaban    Benign prostatic hyperplasia with urinary retention     Chronic obstructive pulmonary disease (COPD) ([address]     Coronary artery disease involving native coronary artery of native heart     Essential hypertension     Heart failure with preserved left ventricular function (HFpEF) ([address]     2D echocardiogram (5/18/2021) showed EF 50%; concentric LV hypertrophy with a severely thickened septal wall and mildly thickened posterior wall; left atrial chamber is severely enlarged; right atrial chamber volume is moderately enlarged; no mass, shunts, or thrombi.      History of carotid stenosis     History of gross hematuria 5/26/2021    Attributed to [place] trauma while patient was altered    History of renal artery stenosis     Leukocytosis 5/17/2021    Attributed to reactive leukocytosis due to NSTEMI    Malignant neoplasm of lower lobe of right lung (HCC)     Neuropathy involving both lower extremities 10/22/2020    Non-ST elevation (NSTEMI) myocardial infarction ([address] 5/17/2021    On clopidogrel therapy 5/19/2021    Paroxysmal atrial fibrillation ([address] 5/17/2021    Peripheral vascular disease of extremity with claudication ([address] 6/14/2021    Urinary retention       Patient taking anticoagulants Eliquis   Patient has a defibrillator: no    Assessment:   Changes in Assessment throughout shift: none     Patient has central line: no Reasons if yes: na  Insertion date:na Last dressing date:na   Patient has [place] Cath: no Reasons if yes: na  Insertion date:na     Last Vitals:     Vitals:    06/14/21 2050 06/15/21 0810 06/15/21 0821 06/15/21 1700   BP: 136/89 (!) 159/106 133/85 (!) 132/91   Pulse: 84 99 94 89   Resp: 18  16 16   Temp: 97.6 °F (36.4 °C)  98.4 °F (36.9 °C) 97.8 °F (36.6 °C)   SpO2: 97% 98% 98% 98%   Weight:       Height:            PAIN    Pain Assessment    Pain Intensity 1: 0 (06/15/21 1818) Pain Intensity 1: 2 (12/29/14 1105)    Pain Location 1: Foot Pain Location 1: Abdomen    Pain Intervention(s) 1: Medication (see MAR) Pain Intervention(s) 1: Medication (see MAR)  Patient Stated Pain Goal: 0 Patient Stated Pain Goal: 0  o Intervention effective: no c/o pain  o Other actions taken for pain: na     Skin Assessment  Skin color    Condition/Temperature    Integrity    Turgor    Weekly Pressure Ulcer Documentation  Pressure  Injury Documentation: No Pressure Injury Noted-Pressure Ulcer Prevention Initiated  Wound Prevention & Protection Methods  Orientation of wound Orientation of Wound Prevention: Posterior  Location of Prevention Location of Wound Prevention: Buttocks, Sacrum/Coccyx  Dressing Present Dressing Present : No  Dressing Status    Wound Offloading Wound Offloading (Prevention Methods): Bed, pressure redistribution/air     INTAKE/OUPUT  Date 06/14/21 0700 - 06/15/21 0659 06/15/21 0700 - 06/16/21 0659   Shift 1746-2319 1162-7161 24 Hour Total 4317-3317 1870-0531 24 Hour Total   INTAKE   Shift Total(mL/kg)         OUTPUT   Urine(mL/kg/hr)  225(0.3) 225(0.1)        Urine Voided  225 225        Urine Occurrence(s) 3 x 3 x 6 x 1 x  1 x   Stool           Stool Occurrence(s) 1 x 1 x 2 x 1 x  1 x   Shift Total(mL/kg)  225(3) 225(3)      NET  -225 -225      Weight (kg) 73.9 73.9 73.9 73.9 73.9 73.9       Recommendations:  1. Patient needs and requests:emptying the urinal ,pain med,assist to the bsc    2. Diet: Cardiac Regular    3. Pending tests/procedures: am care    4. Functional Level/Equipment: assist x 1 / wheelchair    5.  Estimated Discharge Date: 6/22/21 Posted on Whiteboard in Patients Room: yes    HEALS Safety Check    A safety check occurred in the patient's room between off going nurse and oncoming nurse listed above. The safety check included the below items  Area Items   H  High Alert Medications - Verify all high alert medication drips (heparin, PCA, etc.)   E  Equipment - Suction is set up for ALL patients (with laila)  - Red plugs utilized for all equipment (IV pumps, etc.)  - WOWs wiped down at end of shift.  - Room stocked with oxygen, suction, and other unit-specific supplies   A  Alarms - Bed alarm is set for fall risk patients  - Ensure chair alarm is in place and activated if patient is up in a chair   L  Lines - Check IV for any infiltration  - Taveras bag is empty if patient has a Taveras   - Tubing and IV bags are labeled   S  Safety   - Room is clean, patient is clean, and equipment is clean. - Hallways are clear from equipment besides carts. - Fall bracelet on for fall risk patients  - Ensure room is clear and free of clutter  - Suction is set up for ALL patients (with laila)  - Hallways are clear from equipment besides carts.    - Isolation precautions followed, supplies available outside room, sign posted

## 2021-06-15 NOTE — PROGRESS NOTES
Problem: Self Care Deficits Care Plan (Adult)  Goal: *Therapy Goal (Edit Goal, Insert Text)  Description: Occupational Therapy Goals   Long Term Goals  Initiated 2021 and to be accomplished within 2 week(s) 2021  1. Pt will perform self-feeding with Alisa. 2. Pt will perform grooming with Alisa. 3. Pt will perform UB bathing with Alisa  4. Pt will perform LB bathing with Alisa. 5. Pt will perform tub/shower transfer with Alisa. 6. Pt will perform UB dressing with Alisa. 7. Pt will perform LB dressing with Alisa. 8. Pt will perform toileting task with Alisa. 9. Pt will perform toilet transfer with Alisa. 10.  Pt will perform an IADL task with good safety and Alisa. Short Term Goals   Initiated 2021 and to be accomplished within 7 day(s) 6/15/2021, updated 6/15/21  1. Pt will perform self-feeding with S  GM 6/15/21  2. Pt will perform grooming with S. GM 6/15/21  3. Pt will perform UB bathing with S. GM 6/15/21  4. Pt will perform LB bathing with S GM 6/15/21  5. Pt will perform tub/shower transfer with S.  6. Pt will perform UB dressing with S. GM 6/15/21  7. Pt will perform LB dressing with S GM 6/15/21  8. Pt will perform toileting task with S.   9. Pt will perform toilet transfer with S.        OT WEEKLY PROGRESS NOTE  Patient [address]   Time Spent With Patient  Time In: (P) 0730  Time Out: (P) 0900  Patient Seen For[de-identified] (P) AM;PM    Medical Diagnosis:  Non-ST elevation (NSTEMI) myocardial infarction ([facility] [address] [I21.4] Non-ST elevation (NSTEMI) myocardial infarction ([facility] [address]     No pain reported but pt BP and HR were elevated after seated ADL at sink.      Patient identified with name and :Yes  Subjective: \" I am weaker in my left arm\"         Objective: Self Care and Functional Transfers      Outcome Measures:      AROM: [facility]       COGNITION/PERCEPTION Initial Assessment Weekly Progress Assessment 6/15/2021   Premorbid Reading Status Literate     Premorbid Writing Status [facility] Arousal/Alertness       Orientation Level Oriented X4 Oriented X4   Visual Fields       Praxis       Body Scheme       COMPREHENSION MODE Initial Assessment Weekly Progress Assessment 6/15/2021   Primary Mode of Comprehension Auditory     Hearing Aide None None   Corrective Lenses (P) Glasses (reading glasses)     Score 6       EXPRESSION Initial Assessment Weekly Progress Assessment 6/15/2021   Primary Mode of Expression Verbal Verbal   Score 6 6   Comments         SOCIAL INTERACTION/ PRAGMATICS Initial Assessment Weekly Progress Assessment 6/15/2021   Score 5 5   Comments         PROBLEM SOLVING Initial Assessment Weekly Progress Assessment 6/15/2021   Score 4 4   Comments         MEMORY Initial Assessment Weekly Progress Assessment 6/15/2021   Score 5 5   Comments         EATING Initial Assessment Weekly Progress Assessment 6/15/2021   Functional Level       Comments         GROOMING Initial Assessment Weekly Progress Assessment 6/15/2021   Functional Level 5 (setup at sink) Grooming  Grooming Assistance : (P) 5 (Supervision)  Comments: (P) setup at sink   Tasks completed by patient Washed face     Comments         BATHING Initial Assessment Weekly Progress Assessment 6/15/2021   Functional Level 5    Upper Body Bathing  Bathing Assistance, Upper: (P) 5 (Supervision)  Position Performed: (P) Seated in chair (at sink)  Lower Body Bathing  Bathing Assistance, Lower : (P) 5 (Supervision) (at sink)   Body parts patient bathed Abdomen, Arm, left, Arm, right, Buttocks, Chest, Lower leg and foot, left, Lower leg and foot, right, Carly area, Thigh, left, Thigh, right   Abdomen, Arm, left, Arm, right, Buttocks, Chest, Lower leg and foot, left, Lower leg and foot, right, Carly area, Thigh, left, Thigh, right   Comments         TUB/SHOWER TRANSFER INDEPENDENCE Initial Assessment Weekly Progress Assessment 6/15/2021   Score       Comments         UPPER BODY DRESSING/UNDRESSING Initial Assessment Weekly Progress Assessment 6/15/2021   Functional Level 5 Upper Body Dressing   Dressing Assistance : (P) 5 (Supervision) (setup at sink)   Items applied/Steps completed    pullover shirt   Comments         LOWER BODY DRESSING/UNDRESSING Initial Assessment Weekly Progress Assessment 6/15/2021   Functional Level 4 Lower Body Dressing   Dressing Assistance : (P) 5 (Supervision) (setup at sink)  Position Performed: (P) Seated in chair;Standing   Items applied/Steps completed Underpants (3 steps)  underpants and scrubs   Comments         TOILETING Initial Assessment Weekly Progress Assessment 6/15/2021   Functional Level 4     Comments         TOILET TRANSFER INDEPENDENCE Initial Assessment Weekly Progress Assessment 6/15/2021   Transfer score 0     Comments            Therex: Pt completed 25 inch Arc task to increase  BUE strength for ADLs. Pt required increased time to   Complete task. ASSESSMENT:  Pt shows S with bathing/dressing at [facility] for functional transfers with 6/9 STG's met. Noted slight possibly age related cognitive deficits. Pt to continue with POC. Progression toward goals:  [x]          Improving appropriately and progressing toward goals  []          Improving slowly and progressing toward goals  []          Not making progress toward goals and plan of care will be adjusted     PLAN:  Patient continues to benefit from skilled intervention to address the above impairments. Continue treatment per established plan of care. Discharge Recommendations:  Home Health with   Further Equipment Recommendations for Discharge:  bedside commode, transfer bench      Please refer to the flow sheet for vital signs taken during this treatment.   After treatment:   []  Patient left in no apparent distress sitting up in chair  [x]  Patient left in no apparent distress in bed  []  Call bell left within reach  []  Nursing notified  []  Caregiver present  []  Bed alarm activated    COMMUNICATION/EDUCATION:   [] Home safety education was provided and the patient/caregiver indicated understanding. [x] Patient/family have participated as able in goal setting and plan of care. [] Patient/family agree to work toward stated goals and plan of care. [] Patient understands intent and goals of therapy, but is neutral about his/her participation. [] Patient is unable to participate in goal setting and plan of care. Plan of Care: Please see Care Plan for updated STG/LTGs.    Family Training:    Estimated LOS:     Alexander Waterman OT  6/15/2021      Outcome: Progressing Towards Goal  Goal: Interventions  Outcome: Progressing Towards Goal

## 2021-06-15 NOTE — PROGRESS NOTES
Problem: Mobility Impaired (Adult and Pediatric)  Goal: *Therapy Goal (Edit Goal, Insert Text)  Description: Physical Therapy Short Term Goals  Initiated 6/8/2021, re-assessed 6/15/2021 and to be accomplished within 7 day(s) on 6/22/2021  1. Patient will move from supine to sit and sit to supine , scoot up and down, and roll side to side in bed with modified independence. (ongoing for sit to supine)  2. Patient will transfer from bed to chair and chair to bed with standby assistance using the least restrictive device. (MET with RW 6/15/2021)  3. Patient will perform sit to stand with supervision/set-up.(SBA)  4. Patient will ambulate with CGA for 150 feet with the least restrictive device. (MET 6/15/2021)  5. Patient will ascend/descend 3 stairs with no handrail(s) with moderate assistance using AD as appropriate. (8 with BHR and SBA)    Physical Therapy Long Term Goals  Initiated 6/8/2021 and to be accomplished within 14 day(s) on 6/22/2021  1. Patient will move from supine to sit and sit to supine , scoot up and down, and roll side to side in bed with independence. 2.  Patient will transfer from bed to chair and chair to bed with modified independence using the least restrictive device. 3.  Patient will perform sit to stand with modified independence. 4.  Patient will ambulate with modified independence for 150 feet with the least restrictive device. 5.  Patient will ascend/descend 3 stairs with no handrail(s) with minimal assistance/contact guard assist and appropriate AD for ease of safe entry/exit of home. 6.  Patient will ascend/descend 7 stairs with one railing with supervision for safety getting to 2nd floor bedroom.       Outcome: Progressing Towards Goal   PHYSICAL THERAPY WEEKLY PROGRESS NOTE    Patient: Jodi Martin (37 y.o. male)  Date: 6/15/2021  Diagnosis: Non-ST elevation (NSTEMI) myocardial infarction [facility]) [I21.4] Non-ST elevation (NSTEMI) myocardial infarction [facility])  Precautions: Fall  Chart, physical therapy assessment, plan of care and goals were reviewed. Time in:1034  Time XHW:3532    Patient seen for: Wheelchair mobility;Transfer training;Gait training (stair training)      Pain:  Pt pain was reported as  no c/o pre-treatment. Pt pain was reported as no c/o post-treatment. Patient identified with name and : yes     SUBJECTIVE:     Pt reports feeling much better today and BP \"it was in the 130s both times they checked this morning. \"     OBJECTIVE DATA SUMMARY:       GROSS ASSESSMENT Weekly Progress Assessment 6/15/2021   AROM Generally decreased, functional   Strength Generally decreased, functional   Coordination Within functional limits   Tone Normal   Sensation Intact   PROM Generally decreased, functional       POSTURE Weekly Progress Assessment 6/15/2021   Posture (WDL) Exceptions to WDL   Posture Assessment Forward head;Rounded shoulders;Trunk flexion       BALANCE Weekly Progress Assessment 6/15/2021    Sitting - Static: Good (unsupported)  Sitting - Dynamic: Good (unsupported)  Standing - Static: Fair  Standing - Dynamic : Impaired     BED/CHAIR/WHEELCHAIR TRANSFERS Initial Assessment Weekly Progress Assessment 6/15/2021   Rolling Right 5 (Supervision)  NT   Rolling Left 5 (Supervision)  NT   Supine to Sit 5 (Stand-by assistance) 5 (Supervision)   Sit to Stand Minimal assistance Stand-by assistance   Sit to Supine  (SBA) 3 (Moderate assistance) with BLE to clear EOB   Transfer Type Other Other   Transfer Assistance Needed 4 (Minimal assistance) 5 (Stand-by assistance)   Comments   All bed mobility performed with head of bed flat and without railings.      Performing transfers stand step without AD, also performing with RW with CGA. Pt performed sit to stand from w/c with SBA and pt pushing up from B arm rests.  Pt performed sit to stand 5x from elevated mat table with B hands on distal femurs to decrease dependence on BUE with min assist for anterior and up motion. Pt performed stand step txfr without AD with CGA and with RW with SBA for safety. Car Transfer Moderate assistance (stand step without AD) needing more steadying support as patient attempting to step into car transfer simulator. Supervision Deo Banks)   Pt required v/c for proper technique safely with RW. Car Type car transfer simulator car txfr simulator       WHEELCHAIR MOBILITY/MANAGEMENT Initial Assessment Weekly Progress Assessment 6/15/2021   Able to Propel (dist) 40 feet (primarily using bilat UE to propel, occasionally LE) 56 feet   Assistance Required 1 (mod/max A for steering and propulsion) Vince   Curbs/ramps assistance required 0 (Not tested) 0 (Not tested)   Wheelchair set up assistance required 2 (Maximal assistance)  Vince   Wheelchair management Manages left brake, Manages right brake (needing cues and assistance) Manages left brake;Manages right brake   Comments W/C mobility with cues for how to propel and steer properly, patient needing significant assistance. Pt propelled w/c from room to gym with Dmitriy Dhillon. WALKING INDEPENDENCE Initial Assessment Weekly Progress Assessment 6/15/2021   Assistive device  (no AD initially, then RW) Walker, rolling   Ambulation assistance - level surface 3 (Moderate assistance) 5 (Stand-by assistance)   Distance 48 Feet (ft) (48 ft without AD as per PLOF, additional 12 ft w/ RW) 150 Feet (ft) (x2 trials)   Comments   Mod A without AD initially, impaired foot clearance and path deviations occasionally needing increased steadying support. Provided RW and improved ability to steady himself. Pt ambulated 150ft x2 trials with RW and SBA. Pt ambulated with flexed posture, posterior pelvic tilt and decreased B foot clearance.     Ambulation assistance - unlevel surfaces  (N/A)  NT       GAIT Weekly Progress Assessment 6/15/2021   Gait Description (WDL) Exceptions to WDL   Gait Abnormalities Decreased step clearance (flexed posture) STEPS/STAIRS Initial Assessment Weekly Progress Assessment 6/15/2021   Steps/Stairs ambulated 2 8 (6\" steps)   Assistance Required  mod assist 5 (Stand-by assistance)   Rail Use Both Both   Comments   Mod A for support, needing seated rest after negotiating two stairs   Pt negotiated up and down 8 6\" steps with BHR and SBA performing step to pattern, leading with right LE to ascend and descend. Curbs/Ramps  (NT)  NT       Neuro Re-Education:  Pt performed alternating BLE tap ups on 8\" step to challenge balance and wt shift with CGA/min assist for balance. ASSESSMENT:  Pt has made minimal progress and met 2/5 STGs. Pt is SBA for gait and txfrs with RW. Pt is able to negotiate 8 steps with BHR and SBA, as pt reports having HRs although goal is without HRs. Progression toward goals:  []      Improving appropriately and progressing toward goals  [x]      Improving slowly and progressing toward goals  []      Not making progress toward goals and plan of care will be adjusted     PLAN:  Patient continues to benefit from skilled intervention to address the above impairments. Continue treatment per established plan of care. Discharge Recommendations:  Home Health  Further Equipment Recommendations for Discharge:  rolling walker     Estimated Discharge Date: 6/22/2021    Activity Tolerance:   Fair+  Please refer to the flowsheet for vital signs taken during this treatment.   After treatment:   [] Patient left in no apparent distress in bed  [] Patient left in no apparent distress sitting up in chair  [x] Patient left in no apparent distress in w/c mobilizing under own power  [] Patient left in no apparent distress dining area  [] Patient left in no apparent distress mobilizing under own power  [] Call bell left within reach  [] Nursing notified  [] Caregiver present  [] Bed alarm activated   [] Chair alarm activated      Eugenie Dove, JAYME  6/15/2021

## 2021-06-15 NOTE — INTERDISCIPLINARY ROUNDS
[address]  [address] [place] [address], [address]     INPATIENT REHABILITATION  DISCHARGE RECOMMENDATION SHEET    Date: 6/15/2021     Name: Elisha Vargas Age / Sex: 80 y.o. / male   CSN: [id number] MRN: [id number]   Admit Date: 6/7/2021 Discharge Date: 6/22/21        [facility] WALKING AIDS FOLLOW-UP SERVICES      Height  []  Straight cane  [] DMV Handicap Placard    []  #14 ½ leah height  []  Forearm crutches OUTPATIENT    []  Leah height  []  Axillary crutches  []  PT    []  Standard height  []  LBQC  []  OT    []  Reclining high back  []  SBQC  []  ST       Weight  HOME HEALTH    []  Standard weight WALKERS  [x]  PT    []  Lightweight  [x]  Standard height  [x]  OT    []  High-strength lightweight  []  Small adult  [x]  ST    []  Heavy Duty   []  Tall walker  []  Nursing    []  Patient is unable to self-propel a standard weight wheelchair  []  Rolling walker with 5 single fixed wheels  []  Wound care  Location of wound:  Specify needs:      []  Anticoagulation management       Width  []  Bariatric walker  []  Diabetes management    []  Narrow (16)   []  Insulin management    []  Standard (18) ACCESSORIES  []  No insulin management    []  Wide (20)  []  Rear sure glide brakes  []  BP management    []  Other  []  10 rear wheel brake  []  CHF Telehealth       Arms  []  Tall leg extensions  []  Post-CABG care/monitoring    []  Standard  []  Other:  []  Colostomy care    []  Desk/Tray   []  Tube feeding    []  Removable BATHROOM EQUIPMENT  []  PICC line care      Foot/Leg Rests  [x]  Bedside commode  []  Taveras catheter care    []  Removable  []  Extra wide bedside commode  []  Other:     []  Elevating  []  3:1 commode WITH drop arms  []  Medical Social Worker      Other  [x]  Tub transfer bench  []  Aide    []  Brake extensions  []  Shower chair     []  Padded gloves       []  Antitippers           CUSHIONS OTHER     []  Foam cushion  []  Seat belt     []  Gel cushion  []  Gait belt     []  Ivory Goessel II  []  Transfer board - Size:     []  Roho  []  Oxygen     []  Other  []  CPM      []  [address]  []  Ramp     []  Hospital bed  []  Hip kit     []  Special mattress  []  Reacher     []  Trapeze bar       Preferred Local Pharmacy (not mail-order):  [facility]    Physical Therapy    Occupational Therapy    Speech-Language Therapy    Social Work Marcelo Coad LCSW, CCM   Nursing

## 2021-06-15 NOTE — PROGRESS NOTES
Problem: Neurolinguistics Impaired (Adult)  Goal: *Speech Goal: (INSERT TEXT)  Description: Long term goals  Patient will:  1. Be oriented x 3 and recall events of the day, supervision. 2. Recall 3 words after 5 minutes, supervision. 3. Follow complex instructions with 80-90% accuracy. 4. Read 1-2 sentence passages and respond appropriately to questions or instructions, 90% accuracy. 5. Perform functional problem solving/reasoning tasks with 90% accuracy. Short term goals (by 21)  Patient will:  1. Be oriented x 3 and recall events of the day, supervision. 2. Recall 3 words after 5 minutes, supervision. 3. Follow complex instructions with 80% accuracy. 4. Read 1-2 sentence passages and respond appropriately to questions or instructions, 80-90% accuracy. 5. Perform functional problem solving/reasoning tasks with 80-90% accuracy. Note:   Speech language pathology treatment    Patient: Elisha Vargas (4 y.o. male)  Date: 6/15/2021  Diagnosis: Non-ST elevation (NSTEMI) myocardial infarction [facility]) [I21.4] Non-ST elevation (NSTEMI) myocardial infarction [facility])  Time in: 1000  Time Out: 1030  SUBJECTIVE:   Patient stated There were a lot of memorable experiences in the [employer]. OBJECTIVE:   Mental Status:  Ms. Wilbert Marin was alert and oriented. Treatment & Interventions: The patient was seen for a 30 minute speech session. The following treatment tasks were presented:  Neuro-Linguistics:   Orientation:     Supervision  Recent memory:    Minimal assistance  Recall 3 words: Moderate assistance  Reading single sentences:   95%  Reading paragraphs:    90%  Answering questions about paragraphs: 95%  Multi-factor problem solvin%    Response & Tolerance to Activities:  Ms. Wilbert Marin was cooperative and tolerated activities well.     Pain:  Pain Scale 1: Numeric (0 - 10)  Pain Orientation 1: Left;Right  Pain Description 1: Sharp;Tingling  After treatment:   [x]       Patient left in no apparent distress sitting up in chair  []       Patient left in no apparent distress in bed  [x]       Call bell left within reach  []       Nursing notified  []       Caregiver present  []       Bed alarm activated    ASSESSMENT:   Progression toward goals:  [x]       Improving appropriately and progressing toward goals  []       Improving slowly and progressing toward goals  []       Not making progress toward goals and plan of care will be adjusted    PLAN:   Patient continues to benefit from skilled intervention to address the above impairments. Continue treatment per established plan of care. Discharge Recommendations:   To Be Determined    Estimated LOS: 2-3 weeks    Nader Clarke Speech-Language Pathology Student  Time Calculation: 30 mins

## 2021-06-16 NOTE — REHAB NOTE
SHIFT CHANGE NOTE FOR [place]    Bedside and Verbal shift change report given to KULDIP Oliveira (oncoming nurse) by Mehreen Johnston (offgoing nurse). Report included the following information SBAR, Kardex, MAR and Recent Results.     Situation:   Code Status: Full Code   Reason for Admission: [address] Day: 9   Problem List:   Hospital Problems  Date Reviewed: 6/16/2021        Codes Class Noted POA    Peripheral vascular disease of extremity with claudication (HCC) (Chronic) ICD-10-CM: I73.9  ICD-9-CM: 443.9  6/14/2021 Yes        Mixed hyperlipidemia (Chronic) ICD-10-CM: E33.3  ICD-9-CM: 272.2  Unknown Yes        Acute embolic stroke ([facility] HTJ-[id number]: I63.9  ICD-9-CM: 434.11  5/23/2021 Yes    Overview Signed 6/8/2021 12:37 AM by Coby Olivares MD     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, brainstem and cerebellum) without residual deficit             Anticoagulated by anticoagulation treatment ICD-10-CM: Z79.01  ICD-9-CM: V58.61  5/19/2021 Yes    Overview Signed 6/7/2021 10:48 PM by Coby Olivares MD     On Apixaban             On clopidogrel therapy ICD-10-CM: Z79.01  ICD-9-CM: V58.61  5/19/2021 Yes        * (Principal) Non-ST elevation (NSTEMI) myocardial infarction [facility]) ICD-10-CM: I21.4  ICD-9-CM: 410.70  5/17/2021 Yes        Acute on chronic heart failure with preserved ejection fraction (HFpEF) ([facility] ICD-10-CM: I50.33  ICD-9-CM: 428.23  5/17/2021 Yes        Paroxysmal atrial fibrillation ([facility] ICD-10-CM: I48.0  ICD-9-CM: 427.31  5/17/2021 Yes        Leukocytosis ICD-10-CM: B09.426  ICD-9-CM: 288.60  5/17/2021 Yes    Overview Signed 6/8/2021 12:42 AM by Coby Olivares MD     Attributed to reactive leukocytosis due to NSTEMI             Neuropathy involving both lower extremities (Chronic) ICD-10-CM: P53.29  ICD-9-CM: 356.9  10/22/2020 Yes        Essential hypertension (Chronic) ICD-10-CM: I10  ICD-9-CM: 401.9  Unknown Yes Background:   Past Medical History:   Past Medical History:   Diagnosis Date    Acute embolic stroke ([address] 3/89/0233    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, brainstem and cerebellum) without residual deficit    Anticoagulated by anticoagulation treatment 5/19/2021    On Apixaban    Benign prostatic hyperplasia with urinary retention     Chronic obstructive pulmonary disease (COPD) ([address]     Coronary artery disease involving native coronary artery of native heart     Essential hypertension     Heart failure with preserved left ventricular function (HFpEF) ([address]     2D echocardiogram (5/18/2021) showed EF 50%; concentric LV hypertrophy with a severely thickened septal wall and mildly thickened posterior wall; left atrial chamber is severely enlarged; right atrial chamber volume is moderately enlarged; no mass, shunts, or thrombi.      History of carotid stenosis     History of gross hematuria 5/26/2021    Attributed to Taveras trauma while patient was altered    History of renal artery stenosis     Leukocytosis 5/17/2021    Attributed to reactive leukocytosis due to NSTEMI    Malignant neoplasm of lower lobe of right lung (HCC)     Neuropathy involving both lower extremities 10/22/2020    Non-ST elevation (NSTEMI) myocardial infarction ([address] 5/17/2021    On clopidogrel therapy 5/19/2021    Paroxysmal atrial fibrillation ([address] 5/17/2021    Peripheral vascular disease of extremity with claudication ([address] 6/14/2021    Urinary retention       Patient taking anticoagulants Eliquis   Patient has a defibrillator: no    Assessment:   Changes in Assessment throughout shift: none     Patient has central line: no Reasons if yes: na  Insertion date:na Last dressing date:na   Patient has Tavears Cath: no Reasons if yes: na  Insertion date:na     Last Vitals:     Vitals:    06/15/21 1700 06/15/21 2135 06/16/21 0732 06/16/21 1606   BP: (!) 132/91 (!) 157/89 (!) 141/87 (!) 133/91   Pulse: 89 79 86 84   Resp: 16 18 16 16   Temp: 97.8 °F (36.6 °C) 97.1 °F (36.2 °C) 97 °F (36.1 °C) 97.6 °F (36.4 °C)   SpO2: 98% 98% 96% 99%   Weight:       Height:            PAIN    Pain Assessment    Pain Intensity 1: 0 (06/16/21 1606) Pain Intensity 1: 2 (12/29/14 1105)    Pain Location 1: Foot Pain Location 1: Abdomen    Pain Intervention(s) 1: Medication (see MAR) Pain Intervention(s) 1: Medication (see MAR)  Patient Stated Pain Goal: 0 Patient Stated Pain Goal: 0  o Intervention effective: no c/o pain  o Other actions taken for pain: na     Skin Assessment  Skin color    Condition/Temperature    Integrity    Turgor    Weekly Pressure Ulcer Documentation  Pressure  Injury Documentation: No Pressure Injury Noted-Pressure Ulcer Prevention Initiated  Wound Prevention & Protection Methods  Orientation of wound Orientation of Wound Prevention: Posterior  Location of Prevention Location of Wound Prevention: Buttocks, Sacrum/Coccyx  Dressing Present Dressing Present : No  Dressing Status    Wound Offloading Wound Offloading (Prevention Methods): Bed, pressure redistribution/air     INTAKE/OUPUT  Date 06/15/21 0700 - 06/16/21 0659 06/16/21 0700 - 06/17/21 0659   Shift 6702-0849 7776-9897 24 Hour Total 6680-0703 2026-6137 24 Hour Total   INTAKE   P.O.    650  650     P. O.    650  650   Shift Total(mL/kg)    650(8.8)  650(8.8)   OUTPUT   Urine(mL/kg/hr)           Urine Occurrence(s) 1 x 1 x 2 x 3 x  3 x   Stool           Stool Occurrence(s) 1 x 1 x 2 x 1 x  1 x   Shift Total(mL/kg)         NET    650  650   Weight (kg) 73.9 73.9 73.9 73.9 73.9 73.9       Recommendations:  1. Patient needs and requests:pain med,assist to the bathroom,assist with ADL'S    2. Diet: Cardiac Regular    3. Pending tests/procedures: none    4. Functional Level/Equipment: assist x 1 / wheelchair    5.  Estimated Discharge Date: 6/22/21 Posted on Whiteboard in Patients Room: yes    HEALS Safety Check    A safety check occurred in the patient's room between off going nurse and oncoming nurse listed above. The safety check included the below items  Area Items   H  High Alert Medications - Verify all high alert medication drips (heparin, PCA, etc.)   E  Equipment - Suction is set up for ALL patients (with laila)  - Red plugs utilized for all equipment (IV pumps, etc.)  - WOWs wiped down at end of shift.  - Room stocked with oxygen, suction, and other unit-specific supplies   A  Alarms - Bed alarm is set for fall risk patients  - Ensure chair alarm is in place and activated if patient is up in a chair   L  Lines - Check IV for any infiltration  - Taveras bag is empty if patient has a Taveras   - Tubing and IV bags are labeled   S  Safety   - Room is clean, patient is clean, and equipment is clean. - Hallways are clear from equipment besides carts. - Fall bracelet on for fall risk patients  - Ensure room is clear and free of clutter  - Suction is set up for ALL patients (with laila)  - Hallways are clear from equipment besides carts.    - Isolation precautions followed, supplies available outside room, sign posted

## 2021-06-16 NOTE — PROGRESS NOTES
Problem: Mobility Impaired (Adult and Pediatric)  Goal: *Therapy Goal (Edit Goal, Insert Text)  Description: Physical Therapy Short Term Goals  Initiated 6/8/2021, re-assessed 6/15/2021 and to be accomplished within 7 day(s) on 6/22/2021 - Short Term Goals progressed to Long Term Goals  1. Patient will move from supine to sit and sit to supine , scoot up and down, and roll side to side in bed with modified independence. (ongoing for sit to supine)  2. Patient will transfer from bed to chair and chair to bed with standby assistance using the least restrictive device. (MET with RW 6/15/2021)  3. Patient will perform sit to stand with supervision/set-up.(SBA)  4. Patient will ambulate with CGA for 150 feet with the least restrictive device. (MET 6/15/2021)  5. Patient will ascend/descend 3 stairs with no handrail(s) with moderate assistance using AD as appropriate. (8 with BHR and SBA)    Physical Therapy Long Term Goals  Initiated 6/8/2021, updated 6/16/2021, and to be accomplished within 14 day(s) on 6/22/2021  1. Patient will move from supine to sit and sit to supine , scoot up and down, and roll side to side in bed with independence. 2.  Patient will transfer from bed to chair and chair to bed with modified independence using the least restrictive device. 3.  Patient will perform sit to stand with modified independence. 4.  Patient will ambulate with modified independence for 150 feet with the least restrictive device. 5.  Patient will ascend/descend 3 stairs without handrail(s) with minimal assistance/contact guard assist and appropriate AD for ease of safe entry/exit of home. 6.  Patient will ascend/descend 8 stairs with one railing with supervision for safety getting to 2nd floor bedroom.       Outcome: Progressing Towards Goal     PHYSICAL THERAPY TREATMENT    Patient: Juliann Dee (9 y.o. male)  Date: 6/16/2021  Diagnosis: Non-ST elevation (NSTEMI) myocardial infarction [facility] [I21.4] Non-ST elevation (NSTEMI) myocardial infarction [facility])  Precautions: Fall Risk Precautions  Chart, physical therapy assessment, plan of care and goals were reviewed. Time In:0800  Time Out:901    Patient seen for: Balance activities;Gait training;Patient education;Transfer training; Therapeutic exercise    Pain:  Pt pain was reported as no c/o pain pre-treatment. Pt pain was reported as no c/o pain post-treatment. Intervention: N/A    Patient identified with name and : yes    SUBJECTIVE:      Pt is pleasant and cooperative throughout treatment session. Asked pt for clarification re: stairs and handrails at home. Pt reports he does have 3 steps to enter without handrails as well as a flight of steps to his second level with bilateral handrails. During stair negotiation assessment and training, pt does remark, \"but I do have handrails going up my stairs,\" requiring reminders re: purpose of attempting without handrails to mimic home entry. Pt reports, \"I'm out of breath,\" with negotiating 12 steps. requiring prolonged rest break for recovery. OBJECTIVE DATA SUMMARY:    Objective:     TRANSFERS Daily Assessment     Transfer Type: Other  Other: stand step without AD  Transfer Assistance : 4 (Contact guard assistance)  Sit to Stand Assistance: Supervision     Pt performs sit <> stand with compensatory use of B UEs with supervision requiring verbal cues for B foot placement for equal weight bearing. Pt is receptive to performing with B hands on distal third of B femurs as a strengthening exercise requiring minimal assistance for anterior and up weight shift. Pt requires CGA for safety and balance with performing functional transfers without a RW.         GAIT Daily Assessment    Gait Description (WDL) Exceptions to WDL    Gait Abnormalities Decreased step clearance (forward flexed posture, PPT)    Assistive device Gait belt    Ambulation assistance - level surface 4 (Contact guard assistance)/ Supervision with RW    Distance 340 Feet (ft) / 150 Feet     Ambulation assistance- uneven surface NT    Comments Pt ambulates with decreased B foot clearance with narrowed SUKHJINDER and forward flexed posture requiring intermittent tactile cuing for scap add and depression with ambulation with forward gaze. When pt ambulates without an AD, he requires CGA for safety and balance as pt demonstrates path deviations to the right. STEPS/STAIRS Daily Assessment     Steps/Stairs ambulated 4 with 1 hand rail +4 without handrails x 1 trial  4 without handrails + 8 with 1 hand rail x 1 trial    Assistance Required 5 (Stand-by assistance) with B handrails  4(Minimal assistance) without AD    Rail Use 1 handrail (right UE support) and no handrail    Comments Pt requires supervision to stand-by assistance for safety negotiating stairs without hand rails and CGA to minimal assistance for balance and safety with ascending/descending stairs with step to step pattern without use of hand rails as pt intermittently leans toward/reaches for hand rail for balance. Curbs/Ramps CGA/Minimal assistance 6\" step without handrails        BALANCE Daily Assessment     Sitting - Static: Good (unsupported)  Sitting - Dynamic: Good (unsupported)  Standing - Static: Fair  Standing - Dynamic : Impaired        WHEELCHAIR MOBILITY Daily Assessment     Pt propels w/c on unit with modified independence. ASSESSMENT:  Pt is progressing well with improving functional strength demonstrating ability to negotiate stairs without UE support. However, pt demonstrates decreased dynamic standing balance requiring CGA to minimal assistance negotiating stairs without UE support as well as postural dysfunctional limiting safety and independence with mobility and pt will benefit from ongoing skilled PT intervention to address impairments.   Progression toward goals:  [x]      Improving appropriately and progressing toward goals  []      Improving slowly and progressing toward goals  []      Not making progress toward goals and plan of care will be adjusted      PLAN:  Patient continues to benefit from skilled intervention to address the above impairments. Continue treatment per established plan of care. Emphasize postural re-education and functional strength and dynamic balance activities to promote increased safety and independence with mobility. Discharge Recommendations:  Home Health PT to progress to Outpatient PT  Further Equipment Recommendations for Discharge:  rolling walker and N/A      Estimated Discharge Date: 6/22/2021    Activity Tolerance:   Fair  Please refer to the flowsheet for vital signs taken during this treatment.     After treatment:   [] Patient left in no apparent distress in bed  [x] Patient left in no apparent distress sitting up in chair  [] Patient left in no apparent distress in w/c mobilizing under own power  [] Patient left in no apparent distress dining area  [] Patient left in no apparent distress mobilizing under own power  [x] Call bell left within reach  [] Nursing notified  [] Caregiver present  [] Bed alarm activated   [x] Chair alarm activated      Cheri Valladares PT, DPT  6/16/2021

## 2021-06-16 NOTE — PROGRESS NOTES
[x] Psychology  [] Social Work [] Recreational Therapy    INTERVENTION  UNITS/TIME OF SERVICE   Assessment    Supportive Counseling  Rand 15, 2021   Orientation    Discharge Planning    Resource Linkage              Progress/Current Status    Patient seen for individual support  and follow up this date on ARU and found sitting upright in wheelchair in bedroom, not in any distress and immediately responsive to me. He is aware of pending date for discharge and may be on ARU for another week; and, he is comfortable with same. He verbalizes his general satisfaction with rehabilitation effort and progress made. In fact, he denies any underlying nor acute feelings of distress about his status and need for continued therapy. He was educated about various issues related to discharge, and the importance for him to maintain dialogue with all treatment team members and to share any concerns that he may have. Overall, he is gratified that he is gaining strength and sees progress through therapy program.  Patient was encouraged to persevere in his continued therapy effort.     Cecy Martinez, THE [facility] 6/16/2021 10:55 AM

## 2021-06-16 NOTE — PROGRESS NOTES
[address]  [address] [place] [address], [address]     INPATIENT REHABILITATION  DAILY PROGRESS NOTE     Date: 6/16/2021    Name: Fausto Villasenor Age / Sex: 80 y.o. / male   CSN: [id number] MRN: [id number]   Admit Date: 6/7/2021 Length of Stay: 9 days     Primary Rehab Diagnosis: Impaired Mobility and ADLs secondary to:  1. Non-ST elevation (NSTEMI) myocardial infarction  2. Coronary artery disease; History of CABG  3. S/P PCI to proximal SVG with 3.5 x 12 mm [place] drug-eluting stent; PCI to mid SVG with 3.5 x 34 mm [place] drug-eluting stent; PCI to distal SVG with 3.5 x 15 mm [place] drug-eluting stent; Balloon angioplasty to distal SVG anastomosis (5/18/2021 - Dr. Claudio Mccoy)       Subjective:     Patient seen and examined. Blood pressure controlled. Patient's Complaint:   No significant medical complaints    Pain Control: stable, mild-to-moderate joint symptoms intermittently, reasonably well controlled by current meds      Objective:     Vital Signs:  Patient Vitals for the past 24 hrs:   BP Temp Pulse Resp SpO2   06/16/21 0732 (!) 141/87 97 °F (36.1 °C) 86 16 96 %   06/15/21 2135 (!) 157/89 97.1 °F (36.2 °C) 79 18 98 %   06/15/21 1700 (!) 132/91 97.8 °F (36.6 °C) 89 16 98 %        Physical Examination:  GENERAL SURVEY: Patient is awake, alert, oriented x 3, sitting comfortably on the chair, not in acute respiratory distress.   HEENT: pink palpebral conjunctivae, anicteric sclerae, no nasoaural discharge, moist oral mucosa  NECK: supple, no jugular venous distention, no palpable lymph nodes  CHEST/LUNGS: symmetrical chest expansion, good air entry, clear breath sounds  HEART: adynamic precordium, good S1 S2, no S3, irregularly irregular rhythm, no murmurs  ABDOMEN: flat, bowel sounds appreciated, soft, non-tender  EXTREMITIES: pink nailbeds, (+) bipedal edema, full and equal pulses, no calf tenderness   NEUROLOGICAL EXAM: The patient is awake, alert and oriented x3, able to answer questions fairly appropriately, able to follow 1 and 2 step commands. Able to tell time from the wall clock. Cranial nerves II-XII are grossly intact. No gross sensory deficit. Motor strength is 4 to 4+/5 on all 4 extremities. Current Medications:  Current Facility-Administered Medications   Medication Dose Route Frequency    gabapentin (NEURONTIN) capsule 100 mg  100 mg Oral BID    ipratropium (ATROVENT) 0.02 % nebulizer solution 0.5 mg  0.5 mg Nebulization TID    clopidogreL (PLAVIX) tablet 75 mg  75 mg Oral DAILY WITH BREAKFAST    senna-docusate (PERICOLACE) 8.6-50 mg per tablet 2 Tablet  2 Tablet Oral PCD    polyethylene glycol (MIRALAX) packet 17 g  17 g Oral DAILY    acetaminophen (TYLENOL) tablet 650 mg  650 mg Oral Q4H PRN    bisacodyL (DULCOLAX) tablet 10 mg  10 mg Oral Q48H PRN    albuterol (PROVENTIL VENTOLIN) nebulizer solution 2.5 mg  2.5 mg Nebulization Q4H PRN    apixaban (ELIQUIS) tablet 5 mg  5 mg Oral BID    arformoteroL (BROVANA) neb solution 15 mcg  15 mcg Nebulization BID RT    furosemide (LASIX) tablet 20 mg  20 mg Oral DAILY    nitroglycerin (NITROSTAT) tablet 0.4 mg  0.4 mg SubLINGual PRN    rosuvastatin (CRESTOR) tablet 10 mg  10 mg Oral DAILY    tamsulosin (FLOMAX) capsule 0.4 mg  0.4 mg Oral PCD    multivitamin, tx-iron-ca-min (THERA-M w/ IRON) tablet 1 Tablet  1 Tablet Oral DAILY    carvediloL (COREG) tablet 12.5 mg  12.5 mg Oral BID WITH MEALS       Allergies:   Allergies   Allergen Reactions    Lipitor [Atorvastatin] Rash    Norvasc [Amlodipine] Rash       Functional Progress:    PHYSICAL THERAPY    ON ADMISSION MOST RECENT   Wheelchair Mobility/Management  Able to Propel (ft): 40 feet (primarily using bilat UE to propel, occasionally LE)  Functional Level: 1 (mod/max A for steering and propulsion)  Curbs/Ramps Assist Required (FIM Score): 0 (Not tested)  Wheelchair Setup Assist Required : 2 (Maximal assistance)  Wheelchair Management: Manages left brake, Manages right brake (needing cues and assistance) Wheelchair Mobility/Management  Able to Propel (ft): 56 feet  Functional Level:  (Vince)  Curbs/Ramps Assist Required (FIM Score): 0 (Not tested)  Wheelchair Setup Assist Required : 6 (Modified independent)  Wheelchair Management: Manages left brake, Manages right brake     Gait  Amount of Assistance: 3 (Moderate assistance)  Distance (ft): 48 Feet (ft) (50 ft without AD as per PLOF, additional 12 ft w/ RW)  Assistive Device:  (no AD initially, then RW) Gait  Amount of Assistance: 4 (Contact guard assistance)  Distance (ft): 340 Feet (ft)  Assistive Device: Gait belt     Balance-Sitting/Standing  Sitting - Static: Good (unsupported)  Sitting - Dynamic: Good (unsupported), Occassional, Fair (occasional)  Standing - Static: Fair  Standing - Dynamic : Impaired Balance-Sitting/Standing  Sitting - Static: Good (unsupported)  Sitting - Dynamic: Good (unsupported)  Standing - Static: Fair  Standing - Dynamic : Impaired     Bed/Mat Mobility  Rolling Right : 5 (Supervision)  Rolling Left : 5 (Supervision)  Supine to Sit : 5 (Stand-by assistance)  Sit to Supine :  (SBA) Bed/Mat Mobility  Rolling Right : 5 (Supervision)  Rolling Left : 5 (Supervision)  Supine to Sit : 5 (Supervision)  Sit to Supine : 3 (Moderate assistance)     Transfers  Transfer Type: Other  Other: stand step without AD  Transfer Assistance : 4 (Minimal assistance)  Sit to Stand Assistance: Minimal assistance  Car Transfers: Moderate assistance (stand step without AD)  Car Type: car transfer simulator Transfers  Transfer Type:  Other  Other: stand step without AD  Transfer Assistance : 4 (Contact guard assistance)  Sit to Stand Assistance: Supervision  Car Transfers: Supervision Mimi Perez  Car Type: car txfr simulator     Steps or Stairs  Steps/Stairs Ambulated (#): 2  Level of Assist : 3 (Moderate assistance)  Rail Use: Both Steps or Stairs  Steps/Stairs Ambulated (#): 8 (6\" steps)  Level of Assist : 5 (Stand-by assistance)  Rail Use: Both         Lab/Data Review:  No results found for this or any previous visit (from the past 24 hour(s)). Assessment:     Primary Rehabilitation Diagnosis  1. Impaired Mobility and ADLs  2. Non-ST elevation (NSTEMI) myocardial infarction  3. Coronary artery disease; History of CABG  4. S/P PCI to proximal SVG with 3.5 x 12 mm [place] drug-eluting stent; PCI to mid SVG with 3.5 x 34 mm [place] drug-eluting stent; PCI to distal SVG with 3.5 x 15 mm Juan J drug-eluting stent; Balloon angioplasty to distal SVG anastomosis (5/18/2021 - Dr. Renetta Goff)     Comorbidities  Patient Active Problem List   Diagnosis Code    Urinary retention R33.9    Essential hypertension I10    Non-ST elevation (NSTEMI) myocardial infarction ([facility] [address] I21.4    Acute on chronic heart failure with preserved ejection fraction (HFpEF) ([facility]) I50.33    Paroxysmal atrial fibrillation ([facility]) I48.0    Anticoagulated by anticoagulation treatment Z79.01    Mixed hyperlipidemia E78.2    Benign prostatic hyperplasia with urinary retention N40.1, R33.8    Malignant neoplasm of lower lobe of right lung ([facility]) C34.31    Heart failure with preserved left ventricular function (HFpEF) ([facility]) I50.30    Coronary artery disease involving native coronary artery of native heart I25.10    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    Chronic obstructive pulmonary disease (COPD) ([facility]) J44.9    History of carotid stenosis Z86.79    History of renal artery stenosis Z86.79    On clopidogrel therapy Z79.01    History of gross hematuria I49.676    Acute embolic stroke ([facility]) B00.9    Leukocytosis D72.829    Peripheral vascular disease of extremity with claudication ([facility]) I73.9    Neuropathy involving both lower extremities G57.93       Plan:     1. Justification for continued stay: Good progression towards established rehabilitation goals.     2. Medical Issues being followed closely:    [x]  Fall and safety precautions []  Wound Care     [x]  Bowel and Bladder Function     [x]  Fluid Electrolyte and Nutrition Balance     [x]  Pain Control      3. Issues that 24 hour rehabilitation nursing is following:    [x]  Fall and safety precautions     []  Wound Care     [x]  Bowel and Bladder Function     [x]  Fluid Electrolyte and Nutrition Balance     [x]  Pain Control      [x]  Assistance with and education on in-room safety with transfers to and from the bed, wheelchair, toilet and shower. 4. Acute rehabilitation plan of care:    [x]  Continue current care and rehab. [x]  Physical Therapy           [x]  Occupational Therapy           [x]  Speech Therapy     []  Hold Rehab until further notice     5. Medications:    [x]  MAR Reviewed     [x]  Continue Present Medications     6. DVT Prophylaxis:      []  Enoxaparin     []  Unfractionated Heparin     []  Warfarin     [x]  NOAC     []  MARTHA Stockings     []  Sequential Compression Device     []  None     7. Code status    [x]  Full code     []  Partial code     []  Do not intubate     []  Do not resuscitate     8. Orders:   > Non-ST elevation (NSTEMI) myocardial infarction; Coronary artery disease; History of CABG; S/P PCI to proximal SVG with 3.5 x 12 mm Juan J drug-eluting stent; PCI to mid SVG with 3.5 x 34 mm Juan J drug-eluting stent; PCI to distal SVG with 3.5 x 15 mm Juan J drug-eluting stent;  Balloon angioplasty to distal SVG anastomosis (5/18/2021 - Dr. Sebastian Velasquez)    > Continue:    > Increase Carvedilol from 12.5 mg to 25 mg PO BID with meals (8AM, 5PM)    > Clopidogrel 75 mg PO once daily with breakfast    > Rosuvastatin 75 mg PO once daily    > Nitroglycerin 0.4 mg SL PRN for chest pain    > Acute on chronic heart failure with preserved ejection fraction (HFpEF)   > 2D echocardiogram (5/18/2021) showed EF 50%; concentric LV hypertrophy with a severely thickened septal wall and mildly thickened posterior wall; left atrial chamber is severely enlarged; right atrial chamber volume is moderately enlarged; no mass, shunts, or thrombi.    > Continue:    > Increase Carvedilol from 12.5 mg to 25 mg PO BID with meals (8AM, 5PM)    > Furosemide 20 mg PO once daily (9AM)    > 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, brainstem and cerebellum) without residual deficit   > Continue:    > Clopidogrel 75 mg PO once daily with breakfast    > Rosuvastatin 75 mg PO once daily    > Benign prostatic hyperplasia with urinary retention   > Continue Tamsulosin 0.4 mg PO once daily after dinner    > Chronic obstructive pulmonary disease (COPD)   > On 6/12/2021:    > Discontinue Budesonide nebulization BID    > Start Ipratropium nebulization TID   > Continue:    > Arformoterol nebulization BID    > Ipratropium nebulization TID    > Albuterol nebulization q 4 hr PRN for shortness of breath/wheezing    > Constipation   > Continue:    > Polyethylene glycol 17 grams in 8 oz water PO once daily    > Pericolace 2 tabs PO once daily after dinner     > Essential hypertension   > 2D echocardiogram (5/18/2021) showed EF 50%; concentric LV hypertrophy with a severely thickened septal wall and mildly thickened posterior wall; left atrial chamber is severely enlarged; right atrial chamber volume is moderately enlarged; no mass, shunts, or thrombi.    > Continue:    > Increase Carvedilol from 12.5 mg to 25 mg PO BID with meals (8AM, 5PM)    > Furosemide 20 mg PO once daily (9AM)    > Leukocytosis, reactive, attributed to NSTEMI   > Labs at [facility]:    > WBC count (5/17/2021) = 18.6    > WBC count (5/18/2021) = 21.4    > WBC count (5/19/2021) = 22.4    > WBC count (5/21/2021) = 27.3    > WBC count (5/22/2021) = 24.9    > WBC count (5/23/2021) = 30.2    > .  . .     > WBC count (6/2/2021) = 25.4    > WBC count (6/3/2021) = 22.5    > WBC count (6/4/2021) = 23.4    > WBC count (6/5/2021) = 21.7    > WBC count (6/6/2021) = 24.1    > WBC count (6/7/2021) = 20.0    > Work up done was negative for a source of infection   > WBC count (6/8/2021, on admission to the ARU) = 14.4   > WBC count (6/14/2021) = 10.1   > No documented fever since admissiion    > Mixed hyperlipidemia   > Lipid profile (5/18/2021) showed TG 83, , HDL 50, LDL 93   > Continue Rosuvastatin 75 mg PO once daily    > Paroxysmal atrial fibrillation, anticoagulated on Apixaban   > On 6/8/2021, increased Carvedilol from 12.5 mg to 25 mg PO BID with meals (8AM, 5PM)   > Continue:    > Apixaban 5 mg PO BID    > Carvedilol 25 mg PO BID with meals (8AM, 5PM)    > Peripheral vascular disease of extremity with claudication; Neuropathy involving both lower extremities   > (+) pain and numbness of toes of both feet   > PVL arterial doppler of both lower extremities with exercise (9/12/2019) showed:    > Moderate arterial insufficiency in the right lower extremity at the level of the trifurcation at rest.     > Severe arterial insufficiency in the left lower extremity at the level of the trifurcation at rest.   > PVL arterial doppler of both lower extremities with exercise (10/22/2020) showed:    > Mild right lower extremity arterial insufficiency of indeterminate level at rest.     > Moderate left lower extremity arterial insufficiency involving the femoral-popliteal segment at rest.     > The ankle brachial indices post exercise were unreliable as described. No evidence of inflow involvement.   > Planned to start patient on Cilostazol for the claudication symptoms but patient already on Apixaban + Clopidogrel; addition of Cilostazol may increase risk for bleeding; will hold off on adding Cilostazol for now   > As per the last Progress Note by Dr. Maryjane Antonio (dated 10/22/2020):  \"Pt major symptom is not related to his PAD but sounds more like neuropathy\"   > On 6/14/2021, started a trial of Gabapentin 100 mg PO BID   > Upon discharge from the ARU, the patient will need to follow up with Vascular Surgery (Dr. Hayley Barron Laura Jordan)   > Continue Gabapentin 100 mg PO BID    > COVID-19 ruled out by laboratory testing   > SARS-CoV-2 ([facility] m2000, [facility]) (collected 5/26/2021, resulted 5/28/2021): Not detected    > Analgesia   > Continue Acetaminophen 650 mg PO q 4 hr PRN for pain     > Diet:   > Specifications: Low fat/Low cholesterol/High fiber/No added salt   > Solids (consistency): Regular    > Liquids (consistency): Thin    > Fluid restriction: None      9. Personal Protective Equipment was used while interacting with patient including: goggles and KN95 face mask. Patient was using a surgical mask. 10. Patient's progress in rehabilitation and medical issues discussed with the patient. All questions answered to the best of my ability. Care plan discussed with patient and nurse. 11. Total clinical care time is 30 minutes, including review of chart including all labs, radiology, past medical history, and discussion with patient. Greater than 50% of my time was spent in coordination of care and counseling.       Signed:    Diane Alvarez MD    June 16, 2021

## 2021-06-16 NOTE — INTERDISCIPLINARY ROUNDS
[facility] PHYSICAL REHABILITATION  [address] [place] [address], [address]    INPATIENT REHABILITATION  PRE-TEAM CONFERENCE SUMMARY     Date of Conference: 06/17/2021    Patient Information:        Name: Elisha Vargas Age / Sex: 80 y.o. / male   CSN: [id number] MRN: [id number]   Admit Date: 6/7/2021 Length of Stay: 9 days     Primary Rehabilitation Diagnosis  1. Impaired Mobility and ADLs  2. Non-ST elevation (NSTEMI) myocardial infarction  3. Coronary artery disease; History of CABG  4. S/P PCI to proximal SVG with 3.5 x 12 mm Juan J drug-eluting stent; PCI to mid SVG with 3.5 x 34 mm Leon drug-eluting stent; PCI to distal SVG with 3.5 x 15 mm Juan J drug-eluting stent;  Balloon angioplasty to distal SVG anastomosis (5/18/2021 - Dr. Zelda Shankar)     Comorbidities  Patient Active Problem List   Diagnosis Code    Urinary retention R33.9    Essential hypertension I10    Non-ST elevation (NSTEMI) myocardial infarction ([facility] I21.4    Acute on chronic heart failure with preserved ejection fraction (HFpEF) ([facility]) I50.33    Paroxysmal atrial fibrillation ([facility]) I48.0    Anticoagulated by anticoagulation treatment Z79.01    Mixed hyperlipidemia E78.2    Benign prostatic hyperplasia with urinary retention N40.1, R33.8    Malignant neoplasm of lower lobe of right lung ([facility]) C34.31    Heart failure with preserved left ventricular function (HFpEF) ([facility]) I50.30    Coronary artery disease involving native coronary artery of native heart I25.10    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    Chronic obstructive pulmonary disease (COPD) ([facility]) J44.9    History of carotid stenosis Z86.79    History of renal artery stenosis Z86.79    On clopidogrel therapy Z79.01    History of gross hematuria C32.051    Acute embolic stroke ([facility]) N27.3    Leukocytosis D72.829    Peripheral vascular disease of extremity with claudication ([facility]) I73.9    Neuropathy involving both lower extremities G57.93 Therapy:     FIM SCORES Initial Assessment Weekly Progress Assessment 6/16/2021   Eating       Swallowing     Grooming 5 (setup at sink)  5   Bathing 5     5   Upper Body Dressing Functional Level: 5  5   Lower Body Dressing Functional Level: 4  Items Applied/Steps Completed: Underpants (3 steps)  4   Toileting Functional Level: 4  4   Bladder 1 0   Bowel  0 0   Toilet Transfer Bartholomew Toilet Transfer Score: 0  0   Tub/Shower Transfer Bartholomew Tub or Shower Type: Tub/Shower combination (grab bars)  4      Comprehension Primary Mode of Comprehension: Auditory  Score: 6 Auditory  5   Expression Primary Mode of Expression: Verbal  Score: 6 Verbal  5   Social Interaction Score: 5 5   Problem Solving Score: 4 4   Memory Score: 5 4     FIM SCORES Initial Assessment Weekly Progress Assessment 6/16/2021   Bed/Chair/Wheelchair Transfers Transfer Type: Other  Other: stand step without AD  Transfer Assistance : 4 (Minimal assistance)  Sit to Stand Assistance: Minimal assistance  Car Transfers: Moderate assistance (stand step without AD)  Car Type: car transfer simulator Transfer Type:  Other  Other: stand step without AD  Transfer Assistance : 4 (Contact guard assistance)  Sit to Stand Assistance: Supervision   Bed Mobility Rolling Right 5 (Supervision)   Rolling Left 5 (Supervision)   Supine to Sit 5 (Stand-by assistance)   Sit to Stand Minimal assistance   Sit to Supine  (SBA)    Rolling Right   5 (Supervision)   Rolling Left   5 (Supervision)   Supine to Sit   5 (Stand-by assistance)   Sit to Stand   Supervision   Sit to Supine   5 (Stand-by assistance)      Locomotion (W/C) Able to Propel (ft): 40 feet (primarily using bilat UE to propel, occasionally LE)  Functional Level: 1 (mod/max A for steering and propulsion)  Curbs/Ramps Assist Required (FIM Score): 0 (Not tested)  Wheelchair Setup Assist Required : 2 (Maximal assistance)  Wheelchair Management: Manages left brake, Manages right brake (needing cues and assistance) Function  Modified Independent  Setup Assistance: Modified Independent         Locomotion (W/C distance)       Locomotion (Walk) 3 (Moderate assistance) 4 (Contact guard assistance)  Gait belt   Locomotion (Walk dist.) 48 Feet (ft) (48 ft without AD as per PLOF, additional 12 ft w/ RW) 340 Feet (ft)   Steps/Stairs Steps/Stairs Ambulated (#): 2  Level of Assist : 3 (Moderate assistance)  Rail Use: Both Steps/Stairs Ambulated (#): 12  Level of Assist : Supervision with B UE support, CGA/Minimal assistance without hand rails  Rail Use: Both         Nursing:     Neuro:   AAA&O x  4          Respiratory:   [x] WNL   [] O2 LPM:   Other:  Peripheral Vascular:   [] TEDS present   [] Edema present ____ Grade   Cardiac:   [] WNL   [] Other  Genitourinary:   [] continent   [] incontinent   [] matamoros  Abdominal _______ LBM  GI: _cardiac _____ Diet ___thin___ Liquids _____ tube feeds  Musculoskeletal: ____ ROM Transfers _____ Assistive Device Used  ____ Level of Assistance  Skin Integumentary:   [x] Intact   [] Not Intact   __________Preventative Measures  Details______________________________________________________________  Pain: [] Controlled   [] Not Controlled   Pain Meds:   [] Scheduled   [x] PRN        Registered Dietitian / Nutrition:   No data found. Pt with good meal intake, eating 100% per RN. Tolerating diet. Consuming magic cup supplements. Supplements:          [x] Yes: Magic Cup TID    [] No      Amount of supplement consumed:  100%      Intake/Output Summary (Last 24 hours) at 6/16/2021 1234  Last data filed at 6/16/2021 1120  Gross per 24 hour   Intake 400 ml   Output    Net 400 ml                               Last bowel movement: 6/15      Interdisciplinary Team Goals:     1. Discipline  Physical Therapy    Goal  Encourage pt to consistently perform functional transfers with or without RW with distant supervision without verbal reminders for safety and positioning.     Barrier  Impaired functional strength and balance, Impaired memory    Intervention  strength and balance training, education, repetition    Goal written by:   Hannah Hollingsworth PT, DPT     2. Discipline  Occupational Therapy    Goal  Pt will perform all functional transfers with S.     Barrier  Decrease strength, balance, and endurance    Intervention  Therex, Theract, NMRE, Self Care Retraining     Goal written by:       3. Discipline  Speech Therapy    Goal  Patient will follow complex instructions with 70-80% accuracy. Barrier  mild cognitive-linguistic deficits    Intervention  cognitive retraining    Goal written by:  Verona Spatz, CCC-SLP     4. Discipline  Nursing    Goal  keep normal bp    Barrier  disease process    Intervention  give bp meds and diet as ordered    Goal written by:  Penny Izaguirre     5. Discipline  Clinical Psychology    Goal  Understand all safety and pace in recovery    Barrier  Limited insight about parameters of recovery    Intervention  Patient education and behavioral cues    Goal written by:  Martina Momin, PhD     6. Discipline  Nutrition / Dietetics    Goal  - PO nutrition intake will continue to meet >75% of patients estimated nutritional needs over the next 7 days. Barrier  none known    Intervention  continue po diet. Modify supplements; decrease Magic cup to BID (due to meal intake improving). Continue to monitor po intake of meals and supplements    Goal written by:  Breezy Bowser, RD       Disposition / Discharge Planning:      Follow-up services:  [x] Physical Therapy             [x] Occupational Therapy       [] Speech Therapy           [] Skilled Nursing      [] Medical Social Worker   [] Aide        [] Outpatient      [] vs   [x] Home Health  [] vs       [x] to progress to outpatient       [] with 24-hour supervision       [] with 24-hour assistance   [] Raudel HONG recommendations: RW,transfer bench   Estimated discharge date:  06/22/2021   Discharge Location:  [x] Home  [] versus    [] [facility]    [] [address]   [] Other:           Electronic Signatures:      Signature Date Signed   Physical Therapist    Shae Montanez PT, DPT 06/16/2021    Occupational Therapist    Jennifer Cabrera, MSOTR/L   6/16/21   Speech Therapist    Liyah Barrett, [address]  6/16/21   Recreational Therapist    Jessica Mars, CTRS 6/17/2021   Nursing    [facility]  6/16/21   Dietitian    Maria Elena Juarez, [address]  6/16/2021   Clinical Psychologist    Colin Thomson, PhD  6/16/2021    Physician    Roseann Loza MD   6/16/2021        [facility] & [address], [facility]  06/16/2021     Opportunity to share with family/caregiver[] YES [] NO    Relationship to patient____________________________________________________      The above information has been reviewed with the patient in a language that they can understand. Opportunity for comments and questions has been provided and a signed attestation has been scanned into the \"media tab\" of the EMR.       Patient Signature: ______________________________________________________    Date Signed: __________________________________________________________

## 2021-06-16 NOTE — PROGRESS NOTES
SW reviewed chart. SW attempted to call pt spouse. SW left a message on voicemail. Purpose of call is to get spouse set up for family education. SEAMUS will continue to follow.      Kai GALINDO, LCSW, CCM  Doctoral Candidate, Doctor of Social Work

## 2021-06-16 NOTE — REHAB NOTE
SHIFT CHANGE NOTE FOR [facility]    Bedside and Verbal shift change report given to Sobia Gambino RN (oncoming nurse) by Mathieu Ramirez (offgoing nurse). Report included the following information SBAR, Kardex, MAR and Recent Results.     Situation:   Code Status: Full Code   Reason for Admission: [address] Day: 9   Problem List:   Hospital Problems  Date Reviewed: 6/15/2021        Codes Class Noted POA    Peripheral vascular disease of extremity with claudication (HCC) (Chronic) ICD-10-CM: I73.9  ICD-9-CM: 443.9  6/14/2021 Yes        Mixed hyperlipidemia (Chronic) ICD-10-CM: F27.9  ICD-9-CM: 272.2  Unknown Yes        Acute embolic stroke ([facility]: I63.9  ICD-9-CM: 434.11  5/23/2021 Yes    Overview Signed 6/8/2021 12:37 AM by Brian Estrada MD     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, brainstem and cerebellum) without residual deficit             Anticoagulated by anticoagulation treatment ICD-10-CM: Z79.01  ICD-9-CM: V58.61  5/19/2021 Yes    Overview Signed 6/7/2021 10:48 PM by Brian Estrada MD     On Apixaban             On clopidogrel therapy ICD-10-CM: Z79.01  ICD-9-CM: V58.61  5/19/2021 Yes        * (Principal) Non-ST elevation (NSTEMI) myocardial infarction [facility]) ICD-10-CM: I21.4  ICD-9-CM: 410.70  5/17/2021 Yes        Acute on chronic heart failure with preserved ejection fraction (HFpEF) ([facility] [address] ICD-10-CM: I50.33  ICD-9-CM: 428.23  5/17/2021 Yes        Paroxysmal atrial fibrillation ([facility] ICD-10-CM: I48.0  ICD-9-CM: 427.31  5/17/2021 Yes        Leukocytosis ICD-10-CM: Q96.316  ICD-9-CM: 288.60  5/17/2021 Yes    Overview Signed 6/8/2021 12:42 AM by Brian Estrada MD     Attributed to reactive leukocytosis due to NSTEMI             Neuropathy involving both lower extremities (Chronic) ICD-10-CM: S00.85  ICD-9-CM: 356.9  10/22/2020 Yes        Essential hypertension (Chronic) ICD-10-CM: I10  ICD-9-CM: 401.9  Unknown Yes Background:   Past Medical History:   Past Medical History:   Diagnosis Date    Acute embolic stroke ([address] 6/31/2425    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, brainstem and cerebellum) without residual deficit    Anticoagulated by anticoagulation treatment 5/19/2021    On Apixaban    Benign prostatic hyperplasia with urinary retention     Chronic obstructive pulmonary disease (COPD) ([address]     Coronary artery disease involving native coronary artery of native heart     Essential hypertension     Heart failure with preserved left ventricular function (HFpEF) ([address]     2D echocardiogram (5/18/2021) showed EF 50%; concentric LV hypertrophy with a severely thickened septal wall and mildly thickened posterior wall; left atrial chamber is severely enlarged; right atrial chamber volume is moderately enlarged; no mass, shunts, or thrombi.      History of carotid stenosis     History of gross hematuria 5/26/2021    Attributed to Taveras trauma while patient was altered    History of renal artery stenosis     Leukocytosis 5/17/2021    Attributed to reactive leukocytosis due to NSTEMI    Malignant neoplasm of lower lobe of right lung (HCC)     Neuropathy involving both lower extremities 10/22/2020    Non-ST elevation (NSTEMI) myocardial infarction ([address] 5/17/2021    On clopidogrel therapy 5/19/2021    Paroxysmal atrial fibrillation ([address] 5/17/2021    Peripheral vascular disease of extremity with claudication ([address] 6/14/2021    Urinary retention       Patient taking anticoagulants Eliquis   Patient has a defibrillator: no    Assessment:   Changes in Assessment throughout shift: none     Patient has central line: no Reasons if yes: na  Insertion date:na Last dressing date:na   Patient has Taveras Cath: no Reasons if yes: na  Insertion date:na     Last Vitals:     Vitals:    06/15/21 0810 06/15/21 0821 06/15/21 1700 06/15/21 2135   BP: (!) 159/106 133/85 (!) 132/91 (!) 157/89   Pulse: 99 94 89 79   Resp:  16 16 18   Temp:  98.4 °F (36.9 °C) 97.8 °F (36.6 °C) 97.1 °F (36.2 °C)   SpO2: 98% 98% 98% 98%   Weight:       Height:            PAIN    Pain Assessment    Pain Intensity 1: 0 (06/16/21 0400) Pain Intensity 1: 2 (12/29/14 1105)    Pain Location 1: Foot Pain Location 1: Abdomen    Pain Intervention(s) 1: Medication (see MAR) Pain Intervention(s) 1: Medication (see MAR)  Patient Stated Pain Goal: 0 Patient Stated Pain Goal: 0  o Intervention effective: no c/o pain  o Other actions taken for pain: na     Skin Assessment  Skin color    Condition/Temperature    Integrity    Turgor    Weekly Pressure Ulcer Documentation  Pressure  Injury Documentation: No Pressure Injury Noted-Pressure Ulcer Prevention Initiated  Wound Prevention & Protection Methods  Orientation of wound Orientation of Wound Prevention: Posterior  Location of Prevention Location of Wound Prevention: Buttocks, Sacrum/Coccyx  Dressing Present Dressing Present : No  Dressing Status    Wound Offloading Wound Offloading (Prevention Methods): Bed, pressure redistribution/air     INTAKE/OUPUT  Date 06/15/21 0700 - 06/16/21 0659 06/16/21 0700 - 06/17/21 0659   Shift 1520-6236 4393-1197 24 Hour Total 2097-2627 3873-5210 24 Hour Total   INTAKE   Shift Total(mL/kg)         OUTPUT   Urine(mL/kg/hr)           Urine Occurrence(s) 1 x 1 x 2 x      Stool           Stool Occurrence(s) 1 x 1 x 2 x      Shift Total(mL/kg)         NET         Weight (kg) 73.9 73.9 73.9 73.9 73.9 73.9       Recommendations:  1. Patient needs and requests:pain med,assist to the bathroom,assist with ADL'S    2. Diet: Cardiac Regular    3. Pending tests/procedures: none    4. Functional Level/Equipment: assist x 1 / wheelchair    5.  Estimated Discharge Date: 6/22/21 Posted on Whiteboard in Patients Room: yes    HEALS Safety Check    A safety check occurred in the patient's room between off going nurse and oncoming nurse listed above.    The safety check included the below items  Area Items   H  High Alert Medications - Verify all high alert medication drips (heparin, PCA, etc.)   E  Equipment - Suction is set up for ALL patients (with laila)  - Red plugs utilized for all equipment (IV pumps, etc.)  - WOWs wiped down at end of shift.  - Room stocked with oxygen, suction, and other unit-specific supplies   A  Alarms - Bed alarm is set for fall risk patients  - Ensure chair alarm is in place and activated if patient is up in a chair   L  Lines - Check IV for any infiltration  - Taveras bag is empty if patient has a Taveras   - Tubing and IV bags are labeled   S  Safety   - Room is clean, patient is clean, and equipment is clean. - Hallways are clear from equipment besides carts. - Fall bracelet on for fall risk patients  - Ensure room is clear and free of clutter  - Suction is set up for ALL patients (with laila)  - Hallways are clear from equipment besides carts.    - Isolation precautions followed, supplies available outside room, sign posted

## 2021-06-16 NOTE — PROGRESS NOTES
Problem: Self Care Deficits Care Plan (Adult)  Goal: *Therapy Goal (Edit Goal, Insert Text)  Description: Occupational Therapy Goals   Long Term Goals  Initiated 2021 and to be accomplished within 2 week(s) 2021  1. Pt will perform self-feeding with Alisa. 2. Pt will perform grooming with Alisa. 3. Pt will perform UB bathing with Alisa  4. Pt will perform LB bathing with Alisa. 5. Pt will perform tub/shower transfer with Alisa. 6. Pt will perform UB dressing with Alisa. 7. Pt will perform LB dressing with Alisa. 8. Pt will perform toileting task with Alisa. 9. Pt will perform toilet transfer with Alisa. 10.  Pt will perform an IADL task with good safety and Alisa. Short Term Goals   Initiated 2021 and to be accomplished within 7 day(s) 6/15/2021, updated 6/15/21  1. Pt will perform self-feeding with S  GM 6/15/21  2. Pt will perform grooming with S. GM 6/15/21  3. Pt will perform UB bathing with S. GM 6/15/21  4. Pt will perform LB bathing with S GM 6/15/21  5. Pt will perform tub/shower transfer with S.  6. Pt will perform UB dressing with S. GM 6/15/21  7. Pt will perform LB dressing with S GM 6/15/21  8. Pt will perform toileting task with S.   9. Pt will perform toilet transfer with S.        Occupational Therapy TREATMENT    Patient: Jennifer Lr   80 y.o. Patient identified with name and : Yes    Date: 2021    First Tx Session  Time In: 36  Time Out[de-identified] 1200    Second Tx Session  Time In: 1330  Time Out[de-identified] 1400    Diagnosis: Non-ST elevation (NSTEMI) myocardial infarction [facility]) [I21.4]   Precautions: Fall  Chart, occupational therapy assessment, plan of care, and goals were reviewed. Pain:  Pt reports 0/10 pain or discomfort prior to treatment. Pt reports 0/10 pain or discomfort post treatment. Intervention Provided: N/A      SUBJECTIVE:   Patient pleasant and cooperative throughout session. Genny Benavides    OBJECTIVE DATA SUMMARY:     THERAPEUTIC EXERCISE/ ACTIVITY Daily Assessment     Seated Connect 4 task to increase strength at LUE. Pt used left UE to  and place chips in designated slots with and without 1lb weight at wrist.    Pt completed card matching task in standing to increase balance, standing tolerance, and increased postural strength for ADLs. Pt able to stand up 10 minutes without RW. Vcs for posture with pt showing increased self  correction      MOBILITY/TRANSFERS Daily Assessment     Propelled w/c form room<>gym with S/Alisa. Sit to stand with CGA. ASSESSMENT:  Pt increasing standing tolerance, postural strength, BUE strength, and endurance for ADLs. Progression toward goals:  [x]          Improving appropriately and progressing toward goals  []          Improving slowly and progressing toward goals  []          Not making progress toward goals and plan of care will be adjusted     PLAN:  Patient continues to benefit from skilled intervention to address the above impairments. Continue treatment per established plan of care. Discharge Recommendations:  Home Health vs Outpatient  Further Equipment Recommendations for Discharge:  transfer bench      Activity Tolerance:  Fair       Estimated LOS:6/18/21    Please refer to the flowsheet for vital signs taken during this treatment. After treatment:   [x]  Patient left in no apparent distress sitting up in chair   []  Patient left in no apparent distress in bed  []  Call bell left within reach  []  Nursing notified  []  Caregiver present  []  Bed alarm activated    COMMUNICATION/EDUCATION:   [] Home safety education was provided and the patient/caregiver indicated understanding. [x] Patient/family have participated as able in goal setting and plan of care. [] Patient/family agree to work toward stated goals and plan of care. [] Patient understands intent and goals of therapy, but is neutral about his/her participation.   [] Patient is unable to participate in goal setting and plan of gomez.      Mikayla Qiu OT      Outcome: Progressing Towards Goal  Goal: Interventions  Outcome: Progressing Towards Goal

## 2021-06-16 NOTE — PROGRESS NOTES
Problem: Neurolinguistics Impaired (Adult)  Goal: *Speech Goal: (INSERT TEXT)  Description: Long term goals  Patient will:  1. Be oriented x 3 and recall events of the day, supervision. 2. Recall 3 words after 5 minutes, supervision. 3. Follow complex instructions with 80-90% accuracy. 4. Read 1-2 sentence passages and respond appropriately to questions or instructions, 90% accuracy. 5. Perform functional problem solving/reasoning tasks with 90% accuracy. Short term goals (by 21)  Patient will:  1. Be oriented x 3 and recall events of the day, supervision. 2. Recall 3 words after 5 minutes, supervision. 3. Follow complex instructions with 80% accuracy. 4. Read 1-2 sentence passages and respond appropriately to questions or instructions, 80-90% accuracy. 5. Perform functional problem solving/reasoning tasks with 80-90% accuracy. Note:   Speech language pathology treatment    Patient: Jodi Martin (43 y.o. male)  Date: 2021  Diagnosis: Non-ST elevation (NSTEMI) myocardial infarction [facility]) [I21.4] Non-ST elevation (NSTEMI) myocardial infarction [facility])  Time in: 0938, 1430  Time Out: 1000, 1500  SUBJECTIVE:   Patient stated I've had a busy day. OBJECTIVE:   Mental Status:  Mr. Stefany Daily was alert and oriented. Treatment & Interventions: The patient was seen for two speech sessions. The first speech session was shortened due to patient's breathing treatment.  The following treatment tasks were presented:  Neuro-Linguistics:   Orientation:     Minimal assistance  Recent memory:    Supervision  Recall 3 words:    Minimal assistance  Read paragraphs:    92%  Answer questions about paragraphs:  70%  Following complex instructions:  40% independently, 100% given moderate assistance  Medicine label reasonin%  First steps of problem solvin%  Drawing conclusions:    80%    Response & Tolerance to Activities:  Mr. Stefany Daily was cooperative and conversant, despite commenting that he's had a busy day. He tolerated all treatment tasks well. Pain:  Pain Scale 1: Numeric (0 - 10)  No pain reported. After treatment:   [x]       Patient left in no apparent distress sitting up in chair  []       Patient left in no apparent distress in bed  [x]       Call bell left within reach  []       Nursing notified  []       Caregiver present  []       Bed alarm activated    ASSESSMENT:   Progression toward goals:  [x]       Improving appropriately and progressing toward goals  []       Improving slowly and progressing toward goals  []       Not making progress toward goals and plan of care will be adjusted    PLAN:   Patient continues to benefit from skilled intervention to address the above impairments. Continue treatment per established plan of care.   Discharge Recommendations:  Outpatient    Estimated LOS: Through 6/22/21    Maurisio Strickland Speech-Language Pathology Student  Time calculation: 52 minutes

## 2021-06-17 NOTE — INTERDISCIPLINARY ROUNDS
[facility] PHYSICAL REHABILITATION  [address] [place] [address], [address]    INPATIENT REHABILITATION  TEAM CONFERENCE SUMMARY     Date of Conference: 06/17/2021    Patient Information:        Name: Teodoro German Age / Sex: 80 y.o. / male   CSN: [id number] MRN: [id number]   Admit Date: 6/7/2021 Length of Stay: 10 days     Primary Rehabilitation Diagnosis  1. Impaired Mobility and ADLs  2. Non-ST elevation (NSTEMI) myocardial infarction  3. Coronary artery disease; History of CABG  4. S/P PCI to proximal SVG with 3.5 x 12 mm Juan J drug-eluting stent; PCI to mid SVG with 3.5 x 34 mm Lagrange drug-eluting stent; PCI to distal SVG with 3.5 x 15 mm Juan J drug-eluting stent;  Balloon angioplasty to distal SVG anastomosis (5/18/2021 - Dr. Anuradha Bangura)     Comorbidities  Patient Active Problem List   Diagnosis Code    Urinary retention R33.9    Essential hypertension I10    Non-ST elevation (NSTEMI) myocardial infarction ([facility] [address] I21.4    Acute on chronic heart failure with preserved ejection fraction (HFpEF) ([facility]) I50.33    Paroxysmal atrial fibrillation ([facility]) I48.0    Anticoagulated by anticoagulation treatment Z79.01    Mixed hyperlipidemia E78.2    Benign prostatic hyperplasia with urinary retention N40.1, R33.8    Malignant neoplasm of lower lobe of right lung ([facility]) C34.31    Heart failure with preserved left ventricular function (HFpEF) ([facility]) I50.30    Coronary artery disease involving native coronary artery of native heart I25.10    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    Chronic obstructive pulmonary disease (COPD) ([facility]) J44.9    History of carotid stenosis Z86.79    History of renal artery stenosis Z86.79    On clopidogrel therapy Z79.01    History of gross hematuria A81.804    Acute embolic stroke ([facility]) O44.5    Leukocytosis D72.829    Peripheral vascular disease of extremity with claudication ([facility]) I73.9    Neuropathy involving both lower extremities G57.93 Therapy:     FIM SCORES Initial Assessment Weekly Progress Assessment 6/17/2021   Eating Functional Level: 5     Swallowing     Grooming 5 (setup at sink)  5   Bathing 5     5   Upper Body Dressing Functional Level: 5  5   Lower Body Dressing Functional Level: 4  Items Applied/Steps Completed: Underpants (3 steps)  4   Toileting Functional Level: 4  4   Bladder 1 1   Bowel  0 0   Toilet Transfer [place] Toilet Transfer Score: 0  0   Tub/Shower Transfer [place] Tub or Shower Type: Tub/Shower combination (grab bars)  4      Comprehension Primary Mode of Comprehension: Auditory  Score: 6        Expression Primary Mode of Expression: Verbal  Score: 6        Social Interaction Score: 5     Problem Solving Score: 4     Memory Score: 5       FIM SCORES Initial Assessment Weekly Progress Assessment 6/17/2021   Bed/Chair/Wheelchair Transfers Transfer Type: Other  Other: stand step without AD  Transfer Assistance : 4 (Minimal assistance)  Sit to Stand Assistance: Minimal assistance  Car Transfers:  Moderate assistance (stand step without AD)  Car Type: car transfer simulator     Bed Mobility Rolling Right 5 (Supervision)   Rolling Left 5 (Supervision)   Supine to Sit 5 (Stand-by assistance)   Sit to Stand Minimal assistance   Sit to Supine  (SBA)    Rolling Right   5 (Supervision)   Rolling Left   5 (Supervision)   Supine to Sit   5 (Stand-by assistance)   Sit to Stand       Sit to Supine   5 (Stand-by assistance)      Locomotion (W/C) Able to Propel (ft): 40 feet (primarily using bilat UE to propel, occasionally LE)  Functional Level: 1 (mod/max A for steering and propulsion)  Curbs/Ramps Assist Required (FIM Score): 0 (Not tested)  Wheelchair Setup Assist Required : 2 (Maximal assistance)  Wheelchair Management: Manages left brake, Manages right brake (needing cues and assistance) Function  Modified Independent  Setup Assistance: Modified Independent         Locomotion (W/C distance)       Locomotion (Walk) 3 (Moderate assistance)        Locomotion (Walk dist.) 48 Feet (ft) (48 ft without AD as per PLOF, additional 12 ft w/ RW)     Steps/Stairs Steps/Stairs Ambulated (#): 2  Level of Assist : 3 (Moderate assistance)  Rail Use: Both Steps/Stairs Ambulated (#): 12  Level of Assist : Supervision with B UE support, CGA/Minimal assistance without hand rails  Rail Use: Both         Nursing:     Neuro:   AAA&O x  4          Respiratory:   [x] WNL   [] O2 LPM:   Other:  Peripheral Vascular:   [] TEDS present   [] Edema present ____ Grade   Cardiac:   [] WNL   [] Other  Genitourinary:   [] continent   [] incontinent   [] matamoros  Abdominal _______ LBM  GI: _cardiac _____ Diet ___thin___ Liquids _____ tube feeds  Musculoskeletal: ____ ROM Transfers _____ Assistive Device Used  ____ Level of Assistance  Skin Integumentary:   [x] Intact   [] Not Intact   __________Preventative Measures  Details______________________________________________________________  Pain: [] Controlled   [] Not Controlled   Pain Meds:   [] Scheduled   [x] PRN        Registered Dietitian / Nutrition:   No data found. Pt with good meal intake, eating 100% per RN. Tolerating diet. Consuming magic cup supplements. Supplements:          [x] Yes: Magic Cup TID    [] No      Amount of supplement consumed:  100%      Intake/Output Summary (Last 24 hours) at 6/17/2021 1309  Last data filed at 6/16/2021 1703  Gross per 24 hour   Intake 250 ml   Output    Net 250 ml                               Last bowel movement: 6/15      Interdisciplinary Team Goals:     1. Discipline  Physical Therapy    Goal  Encourage pt to consistently perform functional transfers with or without RW with distant supervision without verbal reminders for safety and positioning. Barrier  Impaired functional strength and balance, Impaired memory    Intervention  strength and balance training, education, repetition    Goal written by:   Kiya Nunes PT, DPT     2.  Discipline Occupational Therapy    Goal  Pt will perform all functional transfers with S.     Barrier  Decrease strength, balance, and endurance    Intervention  Therex, Theract, NMRE, Self Care Retraining     Goal written by:       3. Discipline  Speech Therapy    Goal  Patient will follow complex instructions with 70-80% accuracy. Barrier  mild cognitive-linguistic deficits    Intervention  cognitive retraining    Goal written by:  Matty Pisano, [facility]-SLP     4. Discipline  Nursing    Goal  keep normal bp    Barrier  disease process    Intervention  give bp meds and diet as ordered    Goal written by:  Jodee Louis     5. Discipline  Clinical Psychology    Goal  Understand all safety and pace in recovery    Barrier  Limited insight about parameters of recovery    Intervention  Patient education and behavioral cues    Goal written by:  Rj Street, PhD     6. Discipline  Nutrition / Dietetics    Goal  - PO nutrition intake will continue to meet >75% of patients estimated nutritional needs over the next 7 days. Barrier  none known    Intervention  continue po diet. Modify supplements; decrease Magic cup to BID (due to meal intake improving). Continue to monitor po intake of meals and supplements    Goal written by:  Thor Burciaga RD       Disposition / Discharge Planning:      Follow-up services:  [x] Physical Therapy             [x] Occupational Therapy       [] Speech Therapy           [] Skilled Nursing      [] Medical Social Worker   [] Aide        [] Outpatient      [] vs   [x] Home Health  [] vs       [x] to progress to outpatient       [] with 24-hour supervision       [] with 24-hour assistance   [] Raudel HONG recommendations: [email or web address]    Estimated discharge date:  06/22/2021   Discharge Location:  [x] Home  [] versus    [] Raudel Gary    [] [address]   [] Other:           Interdisciplinary team rounds were held this PM with the following team members:       Name   Physical Therapist    Grace Boyce, PT, DPT   Occupational Therapist    Tika Ribera MS, OTR/L   Speech Therapist    Ashley Godoy, [address] [facility]   Recreational Therapist    Shila Correa, CTRS   Nursing    Cydney Lazo, KULDIP   Physician    Andrei Akbar MD        Petty Watt, [facility]       Signed:  Andrei Akbar MD    June 17, 2021

## 2021-06-17 NOTE — PROGRESS NOTES
Problem: Neurolinguistics Impaired (Adult)  Goal: *Speech Goal: (INSERT TEXT)  Description: Long term goals  Patient will:  1. Be oriented x 3 and recall events of the day, supervision. 2. Recall 3 words after 5 minutes, supervision. 3. Follow complex instructions with 80-90% accuracy. 4. Read 1-2 sentence passages and respond appropriately to questions or instructions, 90% accuracy. 5. Perform functional problem solving/reasoning tasks with 90% accuracy. Short term goals (by 21)  Patient will:  1. Be oriented x 3 and recall events of the day, supervision. 2. Recall 3 words after 5 minutes, supervision. 3. Follow complex instructions with 80% accuracy. 4. Read 1-2 sentence passages and respond appropriately to questions or instructions, 80-90% accuracy. 5. Perform functional problem solving/reasoning tasks with 80-90% accuracy. Note:   Speech language pathology treatment    Patient: Neyda Michaels (84 y.o. male)  Date: 2021  Diagnosis: Non-ST elevation (NSTEMI) myocardial infarction [facility]) [I21.4] Non-ST elevation (NSTEMI) myocardial infarction [facility])       Time in: 1430  Time Out:  1500  SUBJECTIVE:   Patient stated \"They wore me out. OBJECTIVE:   Mental Status:  Mr. Eh Arciniega was tired, but engaged easily in therapy. Treatment & Interventions:   Patient was seen in his room for a thirty minute speech session. The following treatment tasks were presented:  Neuro-Linguistics:   Orientation:   Supervision  Recent memory:  Supervision  Recall 3 words:  Min assist  Functional problem solvin% accuracy [facility])  Read paragraphs:  88% accuracy  Answer content questions: 70% accuracy (re: above paragraphs)    Voice:  Low volume this afternoon. Response & Tolerance to Activities:  Mr Eh Arciniega was cooperative with tasks presented despite fatigue this afternoon. He continues to make progress toward established goals.     Pain:  Pain Scale 1: Numeric (0 - 10)  Pain Orientation 1: Left  Pain Description 1: Intermittent;Tingling  After treatment:   [x]       Patient left in no apparent distress sitting up in chair  []       Patient left in no apparent distress in bed  [x]       Call bell left within reach  []       Nursing notified  []       Caregiver present  []       Bed alarm activated    ASSESSMENT:     Progression toward goals:  []       Improving appropriately and progressing toward goals  [x]       Improving slowly and progressing toward goals  []       Not making progress toward goals and plan of care will be adjusted    PLAN:   Patient continues to benefit from skilled intervention to address the above impairments. Continue treatment per established plan of care.   Discharge Recommendations:  Home Health    Estimated LOS: Through 6/22/21    EUSEBIO Machado  Time Calculation: 30 mins

## 2021-06-17 NOTE — PROGRESS NOTES
Problem: Self Care Deficits Care Plan (Adult)  Goal: *Therapy Goal (Edit Goal, Insert Text)  Description: Occupational Therapy Goals   Long Term Goals  Initiated 2021 and to be accomplished within 2 week(s) 2021  1. Pt will perform self-feeding with Alisa. 2. Pt will perform grooming with Alisa. 3. Pt will perform UB bathing with Alisa  4. Pt will perform LB bathing with Alisa. 5. Pt will perform tub/shower transfer with Alisa. 6. Pt will perform UB dressing with Alisa. 7. Pt will perform LB dressing with Alisa. 8. Pt will perform toileting task with Alisa. 9. Pt will perform toilet transfer with Alisa. 10.  Pt will perform an IADL task with good safety and Alisa. Short Term Goals   Initiated 2021 and to be accomplished within 7 day(s) 6/15/2021, updated 6/15/21  1. Pt will perform self-feeding with S  GM 6/15/21  2. Pt will perform grooming with S. GM 6/15/21  3. Pt will perform UB bathing with S. GM 6/15/21  4. Pt will perform LB bathing with S GM 6/15/21  5. Pt will perform tub/shower transfer with S.  6. Pt will perform UB dressing with S. GM 6/15/21  7. Pt will perform LB dressing with S GM 6/15/21  8. Pt will perform toileting task with S.   9. Pt will perform toilet transfer with S.  Occupational Therapy TREATMENT    Patient: Dory Castro   80 y.o. Patient identified with name and : Yes    Date: 2021    First Tx Session  Time In: 0900  Time Out[de-identified] 1000    Second Tx Session  Time In: 1400  Time Out[de-identified] 1430    Diagnosis: Non-ST elevation (NSTEMI) myocardial infarction [facility]) [I21.4]   Precautions: Fall  Chart, occupational therapy assessment, plan of care, and goals were reviewed. Pain:  Pt reports 0/10 pain or discomfort prior to treatment. Pt reports 0/10 pain or discomfort post treatment. Intervention Provided: N/A      SUBJECTIVE:   Patient stated I already did that.  referring to bathing/dressing    OBJECTIVE DATA SUMMARY:     THERAPEUTIC ACTIVITY Daily Assessment    Pt participated zaid game of \"Checkers\" to increased standing tolerance. Pt required  S for safety  but able to stand up to 20 minutes. THERAPEUTIC EXERCISE Daily Assessment    To increase strength for ADLs:   Pt propelled w/c from room to gym with S.  UB Bike up to 10 minutes. PER 6/10. Bicep curls, chest press/pulls, upright raises, front/side raises 2x15 stand position to promote increased strength, upright posture and balance for ADLs. Pt given mirror for visual back to correct posture in standing. Pt able to self correct throughout session. MOBILITY/TRANSFERS Daily Assessment     Sit to stand with S.       ASSESSMENT:  Pt working towards increasing strength and balance for ADLs. Progression toward goals:  [x]          Improving appropriately and progressing toward goals  []          Improving slowly and progressing toward goals  []          Not making progress toward goals and plan of care will be adjusted     PLAN:  Patient continues to benefit from skilled intervention to address the above impairments. Continue treatment per established plan of care. Discharge Recommendations:  Home Health  Further Equipment Recommendations for Discharge:  transfer bench      Activity Tolerance:  Fair      Estimated LOS:6/18/21    Please refer to the flowsheet for vital signs taken during this treatment. After treatment:   [x]  Patient left in no apparent distress sitting up in chair   []  Patient left in no apparent distress in bed  []  Call bell left within reach  []  Nursing notified  []  Caregiver present  []  Bed alarm activated    COMMUNICATION/EDUCATION:   [] Home safety education was provided and the patient/caregiver indicated understanding. [x] Patient/family have participated as able in goal setting and plan of care. [] Patient/family agree to work toward stated goals and plan of care.   [] Patient understands intent and goals of therapy, but is neutral about his/her participation. [] Patient is unable to participate in goal setting and plan of care.       Isabela Collier OT            Outcome: Progressing Towards Goal  Goal: Interventions  Outcome: Progressing Towards Goal

## 2021-06-17 NOTE — PROGRESS NOTES
Problem: Mobility Impaired (Adult and Pediatric)  Goal: *Therapy Goal (Edit Goal, Insert Text)  Description: Physical Therapy Short Term Goals  Initiated 6/8/2021, re-assessed 6/15/2021 and to be accomplished within 7 day(s) on 6/22/2021 - Short Term Goals progressed to Long Term Goals  1. Patient will move from supine to sit and sit to supine , scoot up and down, and roll side to side in bed with modified independence. (ongoing for sit to supine)  2. Patient will transfer from bed to chair and chair to bed with standby assistance using the least restrictive device. (MET with RW 6/15/2021)  3. Patient will perform sit to stand with supervision/set-up.(SBA)  4. Patient will ambulate with CGA for 150 feet with the least restrictive device. (MET 6/15/2021)  5. Patient will ascend/descend 3 stairs with no handrail(s) with moderate assistance using AD as appropriate. (8 with BHR and SBA)    Physical Therapy Long Term Goals  Initiated 6/8/2021, updated 6/16/2021, and to be accomplished within 14 day(s) on 6/22/2021  1. Patient will move from supine to sit and sit to supine , scoot up and down, and roll side to side in bed with independence. 2.  Patient will transfer from bed to chair and chair to bed with modified independence using the least restrictive device. 3.  Patient will perform sit to stand with modified independence. 4.  Patient will ambulate with modified independence for 150 feet with the least restrictive device. 5.  Patient will ascend/descend 3 stairs without handrail(s) with minimal assistance/contact guard assist and appropriate AD for ease of safe entry/exit of home. 6.  Patient will ascend/descend 8 stairs with one railing with supervision for safety getting to 2nd floor bedroom.       Outcome: Progressing Towards Goal   PHYSICAL THERAPY TREATMENT    Patient: David Prince (88 y.o. male)  Date: 6/17/2021  Diagnosis: Non-ST elevation (NSTEMI) myocardial infarction [facility]) [I21.4] Non-ST elevation (NSTEMI) myocardial infarction [facility])  Precautions: Fall  Chart, physical therapy assessment, plan of care and goals were reviewed. Time In:1105  Time Out:1208    Patient seen for: Wheelchair mobility;Transfer training;Gait training (stair training)    Time In:1332  Time Out:1402    Patient seen for: Transfer training; Therapeutic exercise (bed mobility)      Pain:  Pt pain was reported as no c/o pre-treatment. Pt pain was reported as no c/o post-treatment. Intervention: NA     Patient identified with name and : yes     SUBJECTIVE:       Pt states \"yall are as tough as boot camp. \"     OBJECTIVE DATA SUMMARY:    Objective:     BED/MAT MOBILITY Daily Assessment     Rolling Right : 5 (Supervision)  Rolling Left : 5 (Supervision)  Supine to Sit : 5 (Supervision)  Sit to Supine : 5 (Supervision)  During PM session, pt performed bed mobility on left side of mat table with Supervision and v/c for LE positioning and move slowing with supine to sit due to pt became slightly dizzy. TRANSFERS Daily Assessment     Transfer Type: Other  Other: stand step txfr without AD  Transfer Assistance : 5 (Stand-by assistance)  Sit to Stand Assistance: Supervision  Pt performed sit to stand from w/c with Supervision pushing up from B arm rests. Pt performed stand step txfr w/c<->mat table without AD with SBA and v/c to stand erect and txfr without use of wt bearing on surface with BUE. GAIT Daily Assessment    Gait Description (WDL)      Gait Abnormalities Decreased step clearance    Assistive device Gait belt    Ambulation assistance - level surface 4 (Contact guard assistance)    Distance 150 Feet (ft) (also 300ft and 150ft with RW and SBA)    Ambulation assistance- uneven surface      Comments Pt ambulated 150ft without AD with CGA using gait belt. Pt demo'd shortened BLE step length and foot clearance. Pt required mod v/c for erect posture. Pt ambulated 150ft and 300ft with RW and SBA.  Pt demo'd improved B step length and clearance with RW compared to no AD but continued to require v/c for erect posture and remaining within base of RW. STEPS/STAIRS Daily Assessment     Steps/Stairs ambulated 4 (6\" platform steps)    Assistance Required 4 (Contact guard assistance)    Rail Use None    Comments   Pt negotiated up and down 4 6\" platform steps with CGA using gait belt in order to challenge pt without HR as pt does not have HR to enter home. Pt ascended with left LE lead and descended with right LE lead. Curbs/Ramps  see above        BALANCE Daily Assessment     Sitting - Static: Good (unsupported)  Sitting - Dynamic: Good (unsupported)  Standing - Static: Fair  Standing - Dynamic : Impaired        WHEELCHAIR MOBILITY Daily Assessment     Able to Propel (ft): 56 feet (not challenged)  Functional Level:  (Vince)  Curbs/Ramps Assist Required (FIM Score): 0 (Not tested)  Wheelchair Setup Assist Required : 5 (Supervision/setup)  Wheelchair Management: Manages left brake;Manages right brake   Pt propelled w/c Vince with BUE        THERAPEUTIC EXERCISES Daily Assessment       Supine bridging, BLE hip abd, heel slides and SAQ 2x10   Pt performed alternating tap ups on 8\" step with CGA for balance x15B to challenge balance and wt shift with SLS        ASSESSMENT:  Pt demo'd ability to negotiate steps without HR with CGA. Progression toward goals:  []      Improving appropriately and progressing toward goals  [x]      Improving slowly and progressing toward goals  []      Not making progress toward goals and plan of care will be adjusted      PLAN:  Patient continues to benefit from skilled intervention to address the above impairments. Continue treatment per established plan of care.   Discharge Recommendations:  Home Health  Further Equipment Recommendations for Discharge:  rolling walker      Estimated Discharge Date: 6/22/2021    Activity Tolerance:   Fair+  Please refer to the flowsheet for vital signs taken during this treatment.     After treatment:   [] Patient left in no apparent distress in bed  [] Patient left in no apparent distress sitting up in chair  [x] Patient left in no apparent distress in w/c mobilizing under own power  [] Patient left in no apparent distress dining area  [] Patient left in no apparent distress mobilizing under own power  [] Call bell left within reach  [] Nursing notified  [] Caregiver present  [] Bed alarm activated   [x] Chair alarm activated      Opal Robertson, PTA  6/17/2021

## 2021-06-17 NOTE — PROGRESS NOTES
Tawny left a message for pt's  to follow up on the clinical updates that were submitted 6/15 for an auth extension request.     Tawny will follow.

## 2021-06-17 NOTE — PROGRESS NOTES
Problem: Falls - Risk of  Goal: *Absence of Falls  Description: Document Juan Antonio Lee Fall Risk and appropriate interventions in the flowsheet.   Outcome: Progressing Towards Goal  Note: Fall Risk Interventions:  Mobility Interventions: Assess mobility with egress test, Bed/chair exit alarm, Communicate number of staff needed for ambulation/transfer, OT consult for ADLs, Patient to call before getting OOB, PT Consult for mobility concerns, PT Consult for assist device competence, Strengthening exercises (ROM-active/passive), Utilize gait belt for transfers/ambulation    Mentation Interventions: Adequate sleep, hydration, pain control, Bed/chair exit alarm, Door open when patient unattended, Evaluate medications/consider consulting pharmacy, Gait belt with transfers/ambulation, Toileting rounds    Medication Interventions: Bed/chair exit alarm, Evaluate medications/consider consulting pharmacy, Patient to call before getting OOB, Teach patient to arise slowly, Utilize gait belt for transfers/ambulation    Elimination Interventions: Bed/chair exit alarm, Call light in reach, Patient to call for help with toileting needs, Stay With Me (per policy), Urinal in reach    History of Falls Interventions: Bed/chair exit alarm, Consult care management for discharge planning, Door open when patient unattended, Evaluate medications/consider consulting pharmacy, Utilize gait belt for transfer/ambulation         Problem: Inpatient Rehab (Adult)  Goal: *LTG: Avoids infection 100% of time related to deficits  Outcome: Progressing Towards Goal     Problem: Inpatient Rehab (Adult)  Goal: *LTG: Absence of DVT during hospitalization  Outcome: Progressing Towards Goal

## 2021-06-17 NOTE — PROGRESS NOTES
[id number]  [address] [place] [address] [address]     INPATIENT REHABILITATION  DAILY PROGRESS NOTE     Date: 6/17/2021    Name: Malgorzata Mera Age / Sex: 80 y.o. / male   CSN: [id number] MRN: [id number]   Admit Date: 6/7/2021 Length of Stay: 10 days     Primary Rehab Diagnosis: Impaired Mobility and ADLs secondary to:  1. Non-ST elevation (NSTEMI) myocardial infarction  2. Coronary artery disease; History of CABG  3. S/P PCI to proximal SVG with 3.5 x 12 mm [place] drug-eluting stent; PCI to mid SVG with 3.5 x 34 mm [place] drug-eluting stent; PCI to distal SVG with 3.5 x 15 mm [place] drug-eluting stent; Balloon angioplasty to distal SVG anastomosis (5/18/2021 - Dr. Emiliana Velasco)       Subjective:     Patient seen and examined. Blood pressure controlled. Team conference was held at bedside this PM.     Patient's Complaint:   No significant medical complaints    Pain Control: stable, mild-to-moderate joint symptoms intermittently, reasonably well controlled by current meds      Objective:     Vital Signs:  Patient Vitals for the past 24 hrs:   BP Temp Pulse Resp SpO2   06/17/21 0807 123/88 96.9 °F (36.1 °C) 96 18 98 %   06/16/21 2039 109/68 98.5 °F (36.9 °C) 86 18 95 %   06/16/21 1606 (!) 133/91 97.6 °F (36.4 °C) 84 16 99 %        Physical Examination:  GENERAL SURVEY: Patient is awake, alert, oriented x 3, sitting comfortably on the chair, not in acute respiratory distress.   HEENT: pink palpebral conjunctivae, anicteric sclerae, no nasoaural discharge, moist oral mucosa  NECK: supple, no jugular venous distention, no palpable lymph nodes  CHEST/LUNGS: symmetrical chest expansion, good air entry, clear breath sounds  HEART: adynamic precordium, good S1 S2, no S3, irregularly irregular rhythm, no murmurs  ABDOMEN: flat, bowel sounds appreciated, soft, non-tender  EXTREMITIES: pink nailbeds, (+) bipedal edema, full and equal pulses, no calf tenderness   NEUROLOGICAL EXAM: The patient is awake, alert and oriented x3, able to answer questions fairly appropriately, able to follow 1 and 2 step commands. Able to tell time from the wall clock. Cranial nerves II-XII are grossly intact. No gross sensory deficit. Motor strength is 4 to 4+/5 on all 4 extremities. Current Medications:  Current Facility-Administered Medications   Medication Dose Route Frequency    carvediloL (COREG) tablet 25 mg  25 mg Oral BID WITH MEALS    gabapentin (NEURONTIN) capsule 100 mg  100 mg Oral BID    ipratropium (ATROVENT) 0.02 % nebulizer solution 0.5 mg  0.5 mg Nebulization TID    clopidogreL (PLAVIX) tablet 75 mg  75 mg Oral DAILY WITH BREAKFAST    senna-docusate (PERICOLACE) 8.6-50 mg per tablet 2 Tablet  2 Tablet Oral PCD    polyethylene glycol (MIRALAX) packet 17 g  17 g Oral DAILY    acetaminophen (TYLENOL) tablet 650 mg  650 mg Oral Q4H PRN    bisacodyL (DULCOLAX) tablet 10 mg  10 mg Oral Q48H PRN    albuterol (PROVENTIL VENTOLIN) nebulizer solution 2.5 mg  2.5 mg Nebulization Q4H PRN    apixaban (ELIQUIS) tablet 5 mg  5 mg Oral BID    arformoteroL (BROVANA) neb solution 15 mcg  15 mcg Nebulization BID RT    furosemide (LASIX) tablet 20 mg  20 mg Oral DAILY    nitroglycerin (NITROSTAT) tablet 0.4 mg  0.4 mg SubLINGual PRN    rosuvastatin (CRESTOR) tablet 10 mg  10 mg Oral DAILY    tamsulosin (FLOMAX) capsule 0.4 mg  0.4 mg Oral PCD    multivitamin, tx-iron-ca-min (THERA-M w/ IRON) tablet 1 Tablet  1 Tablet Oral DAILY       Allergies:   Allergies   Allergen Reactions    Lipitor [Atorvastatin] Rash    Norvasc [Amlodipine] Rash       Functional Progress:    SPEECH AND LANGUAGE PATHOLOGY    ON ADMISSION MOST RECENT   Comprehension (Native Language)  Primary Mode of Comprehension: Auditory  Score: 6 Comprehension (Native Language)  Primary Mode of Comprehension: Auditory  Score: 5     Expression (Native Language)  Primary Mode of Expression: Verbal  Score: 6   Expression (Native Language)  Primary Mode of Expression: Verbal  Score: 5     Social Interaction/Pragmatics  Score: 5 Social Interaction/Pragmatics  Score: 5     Problem Solving  Score: 4   Problem Solving  Score: 4     Memory  Score: 5 Memory  Score: 4       Legend:   7 - Independent   6 - Modified Independent   5 - Standby Assistance / Supervision / Set-up   4 - Minimum Assistance / Contact Guard Assistance   3 - Moderate Assistance   2 - Maximum Assistance   1 - Total Assistance / Dependent       Lab/Data Review:  Recent Results (from the past 24 hour(s))   HGB & HCT    Collection Time: 06/17/21  4:30 AM   Result Value Ref Range    HGB 10.6 (L) 13.0 - 16.0 g/dL    HCT 34.1 (L) 36.0 - 48.0 %   WBC COUNT    Collection Time: 06/17/21  4:30 AM   Result Value Ref Range    WBC 14.3 (H) 4.6 - 59.3 K/uL   METABOLIC PANEL, BASIC    Collection Time: 06/17/21  4:30 AM   Result Value Ref Range    Sodium 134 (L) 136 - 145 mmol/L    Potassium 4.2 3.5 - 5.5 mmol/L    Chloride 98 (L) 100 - 111 mmol/L    CO2 30 21 - 32 mmol/L    Anion gap 6 3.0 - 18 mmol/L    Glucose 80 74 - 99 mg/dL    BUN 22 (H) 7.0 - 18 MG/DL    Creatinine 1.00 0.6 - 1.3 MG/DL    BUN/Creatinine ratio 22 (H) 12 - 20      GFR est AA >60 >60 ml/min/1.73m2    GFR est non-AA >60 >60 ml/min/1.73m2    Calcium 9.0 8.5 - 10.1 MG/DL        Assessment:     Primary Rehabilitation Diagnosis  1. Impaired Mobility and ADLs  2. Non-ST elevation (NSTEMI) myocardial infarction  3. Coronary artery disease; History of CABG  4. S/P PCI to proximal SVG with 3.5 x 12 mm Juan J drug-eluting stent; PCI to mid SVG with 3.5 x 34 mm [place] drug-eluting stent; PCI to distal SVG with 3.5 x 15 mm [place] drug-eluting stent;  Balloon angioplasty to distal SVG anastomosis (5/18/2021 - Dr. Javier Schwartz)     Comorbidities  Patient Active Problem List   Diagnosis Code    Urinary retention R33.9    Essential hypertension I10    Non-ST elevation ([facility]STEMI) [facility] I21.4    Acute on chronic heart failure with preserved ejection fraction (HFpEF) ([facility]) I50.33    Paroxysmal atrial fibrillation ([facility]) I48.0    Anticoagulated by anticoagulation treatment Z79.01    Mixed hyperlipidemia E78.2    Benign prostatic hyperplasia with urinary retention N40.1, R33.8    Malignant neoplasm of lower lobe of right lung ([facility]) C34.31    Heart failure with preserved left ventricular function (HFpEF) ([facility]) I50.30    Coronary artery disease involving native coronary artery of native heart I25.10    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    Chronic obstructive pulmonary disease (COPD) ([facility]) J44.9    History of carotid stenosis Z86.79    History of renal artery stenosis Z86.79    On clopidogrel therapy Z79.01    History of gross hematuria R51.329    Acute embolic stroke ([facility]) Z39.4    Leukocytosis D72.829    Peripheral vascular disease of extremity with claudication ([facility]) I73.9    Neuropathy involving both lower extremities G57.93       Plan:     1. Justification for continued stay: Good progression towards established rehabilitation goals. 2. Medical Issues being followed closely:    [x]  Fall and safety precautions     []  Wound Care     [x]  Bowel and Bladder Function     [x]  Fluid Electrolyte and Nutrition Balance     [x]  Pain Control      3. Issues that 24 hour rehabilitation nursing is following:    [x]  Fall and safety precautions     []  Wound Care     [x]  Bowel and Bladder Function     [x]  Fluid Electrolyte and Nutrition Balance     [x]  Pain Control      [x]  Assistance with and education on in-room safety with transfers to and from the bed, wheelchair, toilet and shower. 4. Acute rehabilitation plan of care:    [x]  Continue current care and rehab. [x]  Physical Therapy           [x]  Occupational Therapy           [x]  Speech Therapy     []  Hold Rehab until further notice     5. Medications:    [x]  MAR Reviewed     [x]  Continue Present Medications     6.  DVT Prophylaxis: []  Enoxaparin     []  Unfractionated Heparin     []  Warfarin     [x]  NOAC     []  MARTHA Stockings     []  Sequential Compression Device     []  None     7. Code status    [x]  Full code     []  Partial code     []  Do not intubate     []  Do not resuscitate     8. Orders:   > Non-ST elevation (NSTEMI) myocardial infarction; Coronary artery disease; History of CABG; S/P PCI to proximal SVG with 3.5 x 12 mm [place] drug-eluting stent; PCI to mid SVG with 3.5 x 34 mm [place] drug-eluting stent; PCI to distal SVG with 3.5 x 15 mm Juan J drug-eluting stent;  Balloon angioplasty to distal SVG anastomosis (5/18/2021 - Dr. Delfin Kimble)    > On 6/16/2021, increased Carvedilol from 12.5 mg to 25 mg PO BID with meals (8AM, 5PM)   > Continue:    > Carvedilol 25 mg PO BID with meals (8AM, 5PM)    > Clopidogrel 75 mg PO once daily with breakfast    > Rosuvastatin 75 mg PO once daily    > Nitroglycerin 0.4 mg SL PRN for chest pain    > Acute on chronic heart failure with preserved ejection fraction (HFpEF)   > 2D echocardiogram (5/18/2021) showed EF 50%; concentric LV hypertrophy with a severely thickened septal wall and mildly thickened posterior wall; left atrial chamber is severely enlarged; right atrial chamber volume is moderately enlarged; no mass, shunts, or thrombi.    > On 6/16/2021, increased Carvedilol from 12.5 mg to 25 mg PO BID with meals (8AM, 5PM)   > Continue:    > Carvedilol 25 mg PO BID with meals (8AM, 5PM)    > Furosemide 20 mg PO once daily (9AM)    > 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, brainstem and cerebellum) without residual deficit   > Continue:    > Clopidogrel 75 mg PO once daily with breakfast    > Rosuvastatin 75 mg PO once daily    > Benign prostatic hyperplasia with urinary retention   > Continue Tamsulosin 0.4 mg PO once daily after dinner    > Chronic obstructive pulmonary disease (COPD)   > On 6/12/2021:    > Discontinue Budesonide nebulization BID    > Start Ipratropium nebulization TID   > Continue:    > Arformoterol nebulization BID    > Ipratropium nebulization TID    > Albuterol nebulization q 4 hr PRN for shortness of breath/wheezing    > Constipation   > Continue:    > Polyethylene glycol 17 grams in 8 oz water PO once daily    > Pericolace 2 tabs PO once daily after dinner     > Essential hypertension   > 2D echocardiogram (5/18/2021) showed EF 50%; concentric LV hypertrophy with a severely thickened septal wall and mildly thickened posterior wall; left atrial chamber is severely enlarged; right atrial chamber volume is moderately enlarged; no mass, shunts, or thrombi.    > On 6/16/2021, increased Carvedilol from 12.5 mg to 25 mg PO BID with meals (8AM, 5PM)   > Continue:    > Carvedilol 25 mg PO BID with meals (8AM, 5PM)    > Furosemide 20 mg PO once daily (9AM)    > Leukocytosis, reactive, attributed to NSTEMI   > Labs at [facility]:    > WBC count (5/17/2021) = 18.6    > WBC count (5/18/2021) = 21.4    > WBC count (5/19/2021) = 22.4    > WBC count (5/21/2021) = 27.3    > WBC count (5/22/2021) = 24.9    > WBC count (5/23/2021) = 30.2    > .  . .     > WBC count (6/2/2021) = 25.4    > WBC count (6/3/2021) = 22.5    > WBC count (6/4/2021) = 23.4    > WBC count (6/5/2021) = 21.7    > WBC count (6/6/2021) = 24.1    > WBC count (6/7/2021) = 20.0    > Work up done was negative for a source of infection   > WBC count (6/8/2021, on admission to the ARU) = 14.4   > WBC count (6/14/2021) = 10.1   > WBC count (6/17/2021) = 14.3   > No documented fever since admissiion    > Mixed hyperlipidemia   > Lipid profile (5/18/2021) showed TG 83, , HDL 50, LDL 93   > Continue Rosuvastatin 75 mg PO once daily    > Paroxysmal atrial fibrillation, anticoagulated on Apixaban   > On 6/8/2021, increased Carvedilol from 12.5 mg to 25 mg PO BID with meals (8AM, 5PM)   > Continue:    > Apixaban 5 mg PO BID    > Carvedilol 25 mg PO BID with meals (8AM, 5PM)    > Peripheral vascular disease of extremity with claudication; Neuropathy involving both lower extremities   > (+) pain and numbness of toes of both feet   > PVL arterial doppler of both lower extremities with exercise (9/12/2019) showed:    > Moderate arterial insufficiency in the right lower extremity at the level of the trifurcation at rest.     > Severe arterial insufficiency in the left lower extremity at the level of the trifurcation at rest.   > PVL arterial doppler of both lower extremities with exercise (10/22/2020) showed:    > Mild right lower extremity arterial insufficiency of indeterminate level at rest.     > Moderate left lower extremity arterial insufficiency involving the femoral-popliteal segment at rest.     > The ankle brachial indices post exercise were unreliable as described. No evidence of inflow involvement.   > Planned to start patient on Cilostazol for the claudication symptoms but patient already on Apixaban + Clopidogrel; addition of Cilostazol may increase risk for bleeding; will hold off on adding Cilostazol for now   > As per the last Progress Note by Dr. Mayra Hernandez (dated 10/22/2020): \"Pt major symptom is not related to his PAD but sounds more like neuropathy\"   > On 6/14/2021, started a trial of Gabapentin 100 mg PO BID   > Upon discharge from the ARU, the patient will need to follow up with Vascular Surgery (Dr. Mayra Hernandez)   > Continue Gabapentin 100 mg PO BID    > COVID-19 ruled out by laboratory testing   > SARS-CoV-2 ([place] m2000, [facility]) (collected 5/26/2021, resulted 5/28/2021): Not detected    > Analgesia   > Continue Acetaminophen 650 mg PO q 4 hr PRN for pain     > Diet:   > Specifications: Low fat/Low cholesterol/High fiber/No added salt   > Solids (consistency): Regular    > Liquids (consistency): Thin    > Fluid restriction: None      9. Personal Protective Equipment was used while interacting with patient including: goggles and [id number] face mask.  Patient was using a surgical mask. 10. Patient's progress in rehabilitation and medical issues discussed with the patient. All questions answered to the best of my ability. Care plan discussed with patient and nurse. 11. Total clinical care time is 30 minutes, including review of chart including all labs, radiology, past medical history, and discussion with patient. Greater than 50% of my time was spent in coordination of care and counseling.       Signed:    Fanta Hayes MD    June 17, 2021

## 2021-06-17 NOTE — ROUTINE PROCESS
SHIFT CHANGE NOTE FOR [place]    Bedside and Verbal shift change report given to Janak Awad (oncoming nurse) by Yaima Hoff RN (offgoing nurse). Report included the following information SBAR, Kardex, MAR and Recent Results.     Situation:   Code Status: Full Code   Hospital Day: 10   Problem List:   Hospital Problems  Date Reviewed: 6/17/2021        Codes Class Noted POA    Peripheral vascular disease of extremity with claudication (HCC) (Chronic) ICD-10-CM: I73.9  ICD-9-CM: 443.9  6/14/2021 Yes        Mixed hyperlipidemia (Chronic) ICD-10-CM: I77.8  ICD-9-CM: 272.2  Unknown Yes        Acute embolic stroke ([facility] [id number]-51-TP: I63.9  ICD-9-CM: 434.11  5/23/2021 Yes    Overview Signed 6/8/2021 12:37 AM by Ev Arrington MD     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, brainstem and cerebellum) without residual deficit             Anticoagulated by anticoagulation treatment ICD-10-CM: Z79.01  ICD-9-CM: V58.61  5/19/2021 Yes    Overview Signed 6/7/2021 10:48 PM by Ev Arrington MD     On Apixaban             On clopidogrel therapy ICD-10-CM: Z79.01  ICD-9-CM: V58.61  5/19/2021 Yes        * (Principal) Non-ST elevation (NSTEMI) myocardial infarction [facility]) ICD-10-CM: I21.4  ICD-9-CM: 410.70  5/17/2021 Yes        Acute on chronic heart failure with preserved ejection fraction (HFpEF) ([facility] ICD-10-CM: I50.33  ICD-9-CM: 428.23  5/17/2021 Yes        Paroxysmal atrial fibrillation (HCC) ICD-10-CM: I48.0  ICD-9-CM: 427.31  5/17/2021 Yes        Leukocytosis ICD-10-CM: X01.635  ICD-9-CM: 288.60  5/17/2021 Yes    Overview Signed 6/8/2021 12:42 AM by Ev Arrington MD     Attributed to reactive leukocytosis due to NSTEMI             Neuropathy involving both lower extremities (Chronic) ICD-10-CM: C43.80  ICD-9-CM: 356.9  10/22/2020 Yes        Essential hypertension (Chronic) ICD-10-CM: I10  ICD-9-CM: 401.9  Unknown Yes              Background:   Past Medical History:   Past Medical History:   Diagnosis Date    Acute embolic stroke ([address] 3/31/4019    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, brainstem and cerebellum) without residual deficit    Anticoagulated by anticoagulation treatment 5/19/2021    On Apixaban    Benign prostatic hyperplasia with urinary retention     Chronic obstructive pulmonary disease (COPD) ([address]     Coronary artery disease involving native coronary artery of native heart     Essential hypertension     Heart failure with preserved left ventricular function (HFpEF) ([address]     2D echocardiogram (5/18/2021) showed EF 50%; concentric LV hypertrophy with a severely thickened septal wall and mildly thickened posterior wall; left atrial chamber is severely enlarged; right atrial chamber volume is moderately enlarged; no mass, shunts, or thrombi.      History of carotid stenosis     History of gross hematuria 5/26/2021    Attributed to [place] trauma while patient was altered    History of renal artery stenosis     Leukocytosis 5/17/2021    Attributed to reactive leukocytosis due to NSTEMI    Malignant neoplasm of lower lobe of right lung (HCC)     Neuropathy involving both lower extremities 10/22/2020    Non-ST elevation (NSTEMI) myocardial infarction ([address] 5/17/2021    On clopidogrel therapy 5/19/2021    Paroxysmal atrial fibrillation ([address] 5/17/2021    Peripheral vascular disease of extremity with claudication ([address] 6/14/2021    Urinary retention         Assessment:   Changes in Assessment throughout shift: No change to previous assessment     Patient has a central line: no Reasons if yes: n/a  Insertion date:n/a Last dressing date:n/a   Patient has [place] Cath: no Reasons if yes: n/a   Insertion date:n/a  Shift [place] care completed: N/A     Last Vitals:     Vitals:    06/16/21 1606 06/16/21 2039 06/17/21 0807 06/17/21 1642   BP: (!) 133/91 109/68 123/88 125/88   Pulse: 84 86 96 88   Resp: 16 18 18 19 Temp: 97.6 °F (36.4 °C) 98.5 °F (36.9 °C) 96.9 °F (36.1 °C) 97.4 °F (36.3 °C)   SpO2: 99% 95% 98% 97%   Weight:       Height:            PAIN    Pain Assessment    Pain Intensity 1: 0 (06/17/21 1633) Pain Intensity 1: 2 (12/29/14 1105)    Pain Location 1: Foot Pain Location 1: Abdomen    Pain Intervention(s) 1: Medication (see MAR) Pain Intervention(s) 1: Medication (see MAR)  Patient Stated Pain Goal: 0 Patient Stated Pain Goal: 0  o Intervention effective: no complaints of pain overnight  o Other actions taken for pain:       Skin Assessment  Skin color    Condition/Temperature    Integrity    Turgor    Weekly Pressure Ulcer Documentation  Pressure  Injury Documentation: No Pressure Injury Noted-Pressure Ulcer Prevention Initiated  Wound Prevention & Protection Methods  Orientation of wound Orientation of Wound Prevention: Posterior  Location of Prevention Location of Wound Prevention: Sacrum/Coccyx  Dressing Present Dressing Present : No  Dressing Status    Wound Offloading Wound Offloading (Prevention Methods): Bed, pressure reduction mattress     INTAKE/OUPUT  Date 06/16/21 1900 - 06/17/21 0659 06/17/21 0700 - 06/18/21 0659   Shift 2466-3886 24 Hour Total 0866-7452 5199-5798 24 Hour Total   INTAKE   P.O.  650        P. O.  650      Shift Total(mL/kg)  650(8.8)      OUTPUT   Urine(mL/kg/hr)          Urine Occurrence(s) 3 x 6 x 4 x  4 x   Stool          Stool Occurrence(s) 1 x 2 x 1 x  1 x   Shift Total(mL/kg)        NET  650      Weight (kg) 73.9 73.9 73.9 73.9 73.9       Recommendations:  1. Patient needs and requests: regulates BM, pt states he is constipated but had BM on 6/16, able to pass gas. 2. Pending tests/procedures: labs     3. Functional Level/Equipment:   / Wheelchair    Fall Precautions:   Fall risk precautions were reinforced with the patient; he was instructed to call for help prior to getting up.  The following fall risk precautions were continued: bed/ chair alarms, door signage, yellow bracelet and socks as well as update of the Bruno Reasen tool in the patient's room. [place] Score: 3    HEALS Safety Check    A safety check occurred in the patient's room between off going nurse and oncoming nurse listed above. The safety check included the below items  Area Items   H  High Alert Medications - Verify all high alert medication drips (heparin, PCA, etc.)   E  Equipment - Suction is set up for ALL patients (with laila)  - Red plugs utilized for all equipment (IV pumps, etc.)  - WOWs wiped down at end of shift.  - Room stocked with oxygen, suction, and other unit-specific supplies   A  Alarms - Bed alarm is set for fall risk patients  - Ensure chair alarm is in place and activated if patient is up in a chair   L  Lines - Check IV for any infiltration  - Taveras bag is empty if patient has a Taveras   - Tubing and IV bags are labeled   S  Safety   - Room is clean, patient is clean, and equipment is clean. - Hallways are clear from equipment besides carts. - Fall bracelet on for fall risk patients  - Ensure room is clear and free of clutter  - Suction is set up for ALL patients (with laila)  - Hallways are clear from equipment besides carts.    - Isolation precautions followed, supplies available outside room, sign posted     Stephane Rivas RN

## 2021-06-17 NOTE — ROUTINE PROCESS
SHIFT CHANGE NOTE FOR [facility]    Bedside and Verbal shift change report given to Juliet Stewart (oncoming nurse) by Artemio Barkley RN (offgoing nurse). Report included the following information SBAR, Kardex, MAR and Recent Results.     Situation:   Code Status: Full Code   Hospital Day: 10   Problem List:   Hospital Problems  Date Reviewed: 6/16/2021        Codes Class Noted POA    Peripheral vascular disease of extremity with claudication (HCC) (Chronic) ICD-10-CM: I73.9  ICD-9-CM: 443.9  6/14/2021 Yes        Mixed hyperlipidemia (Chronic) ICD-10-CM: Q48.3  ICD-9-CM: 272.2  Unknown Yes        Acute embolic stroke ([facility] LVD-87-NT: I63.9  ICD-9-CM: 434.11  5/23/2021 Yes    Overview Signed 6/8/2021 12:37 AM by Tab Blake MD     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, brainstem and cerebellum) without residual deficit             Anticoagulated by anticoagulation treatment ICD-10-CM: Z79.01  ICD-9-CM: V58.61  5/19/2021 Yes    Overview Signed 6/7/2021 10:48 PM by Tab Blake MD     On Apixaban             On clopidogrel therapy ICD-10-CM: Z79.01  ICD-9-CM: V58.61  5/19/2021 Yes        * (Principal) Non-ST elevation (NSTEMI) myocardial infarction [facility]) ICD-10-CM: I21.4  ICD-9-CM: 410.70  5/17/2021 Yes        Acute on chronic heart failure with preserved ejection fraction (HFpEF) ([facility] ICD-10-CM: I50.33  ICD-9-CM: 428.23  5/17/2021 Yes        Paroxysmal atrial fibrillation (HCC) ICD-10-CM: I48.0  ICD-9-CM: 427.31  5/17/2021 Yes        Leukocytosis ICD-10-CM: N83.093  ICD-9-CM: 288.60  5/17/2021 Yes    Overview Signed 6/8/2021 12:42 AM by Tab Blake MD     Attributed to reactive leukocytosis due to NSTEMI             Neuropathy involving both lower extremities (Chronic) ICD-10-CM: A76.32  ICD-9-CM: 356.9  10/22/2020 Yes        Essential hypertension (Chronic) ICD-10-CM: I10  ICD-9-CM: 401.9  Unknown Yes              Background:   Past Medical History: Past Medical History:   Diagnosis Date    Acute embolic stroke ([facility] 4/20/5336    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, brainstem and cerebellum) without residual deficit    Anticoagulated by anticoagulation treatment 5/19/2021    On Apixaban    Benign prostatic hyperplasia with urinary retention     Chronic obstructive pulmonary disease (COPD) ([address]     Coronary artery disease involving native coronary artery of native heart     Essential hypertension     Heart failure with preserved left ventricular function (HFpEF) ([address]     2D echocardiogram (5/18/2021) showed EF 50%; concentric LV hypertrophy with a severely thickened septal wall and mildly thickened posterior wall; left atrial chamber is severely enlarged; right atrial chamber volume is moderately enlarged; no mass, shunts, or thrombi.      History of carotid stenosis     History of gross hematuria 5/26/2021    Attributed to [place] trauma while patient was altered    History of renal artery stenosis     Leukocytosis 5/17/2021    Attributed to reactive leukocytosis due to NSTEMI    Malignant neoplasm of lower lobe of right lung (HCC)     Neuropathy involving both lower extremities 10/22/2020    Non-ST elevation (NSTEMI) myocardial infarction ([address] 5/17/2021    On clopidogrel therapy 5/19/2021    Paroxysmal atrial fibrillation ([facility] 5/17/2021    Peripheral vascular disease of extremity with claudication ([facility] 6/14/2021    Urinary retention         Assessment:   Changes in Assessment throughout shift:       Patient has a central line: no Reasons if yes: n/a  Insertion date:n/a Last dressing date:n/a   Patient has [place] Cath: no Reasons if yes: n/a   Insertion date:n/a  Shift [place] care completed: N/A     Last Vitals:     Vitals:    06/15/21 2135 06/16/21 0732 06/16/21 1606 06/16/21 2039   BP: (!) 157/89 (!) 141/87 (!) 133/91 109/68   Pulse: 79 86 84 86   Resp: 18 16 16 18   Temp: 97.1 °F (36.2 °C) 97 °F (36.1 °C) 97.6 °F (36.4 °C) 98.5 °F (36.9 °C)   SpO2: 98% 96% 99% 95%   Weight:       Height:            PAIN    Pain Assessment    Pain Intensity 1: 0 (06/17/21 0008) Pain Intensity 1: 2 (12/29/14 1105)    Pain Location 1: Foot Pain Location 1: Abdomen    Pain Intervention(s) 1: Medication (see MAR) Pain Intervention(s) 1: Medication (see MAR)  Patient Stated Pain Goal: Unable to verbalize/indicate pain (sleeping) Patient Stated Pain Goal: 0  o Intervention effective: no complaints of pain overnight  o Other actions taken for pain:       Skin Assessment  Skin color    Condition/Temperature    Integrity    Turgor    Weekly Pressure Ulcer Documentation  Pressure  Injury Documentation: No Pressure Injury Noted-Pressure Ulcer Prevention Initiated  Wound Prevention & Protection Methods  Orientation of wound Orientation of Wound Prevention: Posterior  Location of Prevention Location of Wound Prevention: Buttocks, Sacrum/Coccyx  Dressing Present Dressing Present : No  Dressing Status    Wound Offloading Wound Offloading (Prevention Methods): Bed, pressure reduction mattress     INTAKE/OUPUT  Date 06/16/21 0700 - 06/17/21 0659 06/17/21 0700 - 06/18/21 0659   Shift 9350-4400 0128-2161 24 Hour Total 7661-4488 3843-7993 24 Hour Total   INTAKE   P.O. 650  650        P. O. 650  650      Shift Total(mL/kg) 650(8.8)  650(8.8)      OUTPUT   Urine(mL/kg/hr)           Urine Occurrence(s) 3 x 1 x 4 x      Stool           Stool Occurrence(s) 1 x 0 x 1 x      Shift Total(mL/kg)           650      Weight (kg) 73.9 73.9 73.9 73.9 73.9 73.9       Recommendations:  1. Patient needs and requests: regulates BM, pt states he is constipated but had BM on 6/16, able to pass gas. 2. Pending tests/procedures: labs     3. Functional Level/Equipment: Partial (one person) / Bed (comment); Wheelchair    Fall Precautions:   Fall risk precautions were reinforced with the patient; he was instructed to call for help prior to getting up. The following fall risk precautions were continued: bed/ chair alarms, door signage, yellow bracelet and socks as well as update of the Veena Tineoel tool in the patient's room. [place] Score: 2    HEALS Safety Check    A safety check occurred in the patient's room between off going nurse and oncoming nurse listed above. The safety check included the below items  Area Items   H  High Alert Medications - Verify all high alert medication drips (heparin, PCA, etc.)   E  Equipment - Suction is set up for ALL patients (with laila)  - Red plugs utilized for all equipment (IV pumps, etc.)  - WOWs wiped down at end of shift.  - Room stocked with oxygen, suction, and other unit-specific supplies   A  Alarms - Bed alarm is set for fall risk patients  - Ensure chair alarm is in place and activated if patient is up in a chair   L  Lines - Check IV for any infiltration  - Taveras bag is empty if patient has a Taveras   - Tubing and IV bags are labeled   S  Safety   - Room is clean, patient is clean, and equipment is clean. - Hallways are clear from equipment besides carts. - Fall bracelet on for fall risk patients  - Ensure room is clear and free of clutter  - Suction is set up for ALL patients (with laila)  - Hallways are clear from equipment besides carts.    - Isolation precautions followed, supplies available outside room, sign posted     Nichole Chung RN

## 2021-06-18 PROBLEM — Z95.5 STATUS POST INSERTION OF DRUG ELUTING CORONARY ARTERY STENT: Status: ACTIVE | Noted: 2021-01-01

## 2021-06-18 PROBLEM — Z74.09 IMPAIRED MOBILITY AND ADLS: Status: ACTIVE | Noted: 2021-01-01

## 2021-06-18 PROBLEM — Z78.9 IMPAIRED MOBILITY AND ADLS: Status: ACTIVE | Noted: 2021-01-01

## 2021-06-18 NOTE — ROUTINE PROCESS
SHIFT CHANGE NOTE FOR [facility]    Bedside and Verbal shift change report given to Wili Walton (oncoming nurse) by Jenny Cruz (offgoing nurse). Report included the following information SBAR, Kardex, MAR and Recent Results.     Situation:   Code Status: Full Code   Hospital Day: 11   Problem List:   Hospital Problems  Date Reviewed: 6/17/2021        Codes Class Noted POA    Peripheral vascular disease of extremity with claudication (HCC) (Chronic) ICD-10-CM: I73.9  ICD-9-CM: 443.9  6/14/2021 Yes        Mixed hyperlipidemia (Chronic) ICD-10-CM: T38.6  ICD-9-CM: 272.2  Unknown Yes        [facility] GNV-43-EM: I63.9  ICD-9-CM: 434.11  5/23/2021 Yes    Overview Signed 6/8/2021 12:37 AM by Rica Montero MD     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, brainstem and cerebellum) without residual deficit             Anticoagulated by anticoagulation treatment ICD-10-CM: Z79.01  ICD-9-CM: V58.61  5/19/2021 Yes    Overview Signed 6/7/2021 10:48 PM by Rica Montero MD     On Apixaban             On clopidogrel therapy ICD-10-CM: Z79.01  ICD-9-CM: V58.61  5/19/2021 Yes        * (Principal) Non-ST elevation (NSTEMI) myocardial infarction [facility]) ICD-10-CM: I21.4  ICD-9-CM: 410.70  5/17/2021 Yes        Acute on chronic heart failure with preserved ejection fraction (HFpEF) ([facility] [facility] ICD-10-CM: I50.33  ICD-9-CM: 428.23  5/17/2021 Yes        Paroxysmal atrial fibrillation (HCC) ICD-10-CM: I48.0  ICD-9-CM: 427.31  5/17/2021 Yes        Leukocytosis ICD-10-CM: Z04.476  ICD-9-CM: 288.60  5/17/2021 Yes    Overview Signed 6/8/2021 12:42 AM by Rica Montero MD     Attributed to reactive leukocytosis due to NSTEMI             Neuropathy involving both lower extremities (Chronic) ICD-10-CM: Z57.84  ICD-9-CM: 356.9  10/22/2020 Yes        Essential hypertension (Chronic) ICD-10-CM: I10  ICD-9-CM: 401.9  Unknown Yes              Background:   Past Medical History:   Past Medical History:   Diagnosis Date    Acute embolic stroke ([address] 9/80/3198    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, brainstem and cerebellum) without residual deficit    Anticoagulated by anticoagulation treatment 5/19/2021    On Apixaban    Benign prostatic hyperplasia with urinary retention     Chronic obstructive pulmonary disease (COPD) ([address]     Coronary artery disease involving native coronary artery of native heart     Essential hypertension     Heart failure with preserved left ventricular function (HFpEF) ([address]     2D echocardiogram (5/18/2021) showed EF 50%; concentric LV hypertrophy with a severely thickened septal wall and mildly thickened posterior wall; left atrial chamber is severely enlarged; right atrial chamber volume is moderately enlarged; no mass, shunts, or thrombi.      History of carotid stenosis     History of gross hematuria 5/26/2021    Attributed to [place] trauma while patient was altered    History of renal artery stenosis     Leukocytosis 5/17/2021    Attributed to reactive leukocytosis due to NSTEMI    Malignant neoplasm of lower lobe of right lung (HCC)     Neuropathy involving both lower extremities 10/22/2020    Non-ST elevation (NSTEMI) myocardial infarction ([address] 5/17/2021    On clopidogrel therapy 5/19/2021    Paroxysmal atrial fibrillation ([address] 5/17/2021    Peripheral vascular disease of extremity with claudication ([address] 6/14/2021    Urinary retention         Assessment:   Changes in Assessment throughout shift: No change to previous assessment     Patient has a central line: no Reasons if yes: n/a  Insertion date:n/a Last dressing date:n/a   Patient has [place] Cath: no Reasons if yes: n/a   Insertion date:n/a  Shift [place] care completed: N/A     Last Vitals:     Vitals:    06/16/21 2039 06/17/21 0807 06/17/21 1642 06/17/21 2035   BP: 109/68 123/88 125/88 111/67   Pulse: 86 96 88 82   Resp: 18 18 19 18 Temp: 98.5 °F (36.9 °C) 96.9 °F (36.1 °C) 97.4 °F (36.3 °C) 97 °F (36.1 °C)   SpO2: 95% 98% 97% 97%   Weight:       Height:            PAIN    Pain Assessment    Pain Intensity 1: 0 (06/18/21 0414) Pain Intensity 1: 2 (12/29/14 1105)    Pain Location 1: Foot Pain Location 1: Abdomen    Pain Intervention(s) 1: Medication (see MAR) Pain Intervention(s) 1: Medication (see MAR)  Patient Stated Pain Goal: 0 Patient Stated Pain Goal: 0  o Intervention effective: no complaints of pain overnight  o Other actions taken for pain: Medication (see MAR)     Skin Assessment  Skin color    Condition/Temperature    Integrity    Turgor    Weekly Pressure Ulcer Documentation  Pressure  Injury Documentation: No Pressure Injury Noted-Pressure Ulcer Prevention Initiated  Wound Prevention & Protection Methods  Orientation of wound Orientation of Wound Prevention: Posterior  Location of Prevention Location of Wound Prevention: Sacrum/Coccyx  Dressing Present Dressing Present : No  Dressing Status    Wound Offloading Wound Offloading (Prevention Methods): Bed, pressure redistribution/air, Bed, pressure reduction mattress     INTAKE/OUPUT  Date 06/17/21 0700 - 06/18/21 0659 06/18/21 0700 - 06/19/21 0659   Shift 3238-3699 2868-3618 24 Hour Total 6774-0412 6113-3282 24 Hour Total   INTAKE   Shift Total(mL/kg)         OUTPUT   Urine(mL/kg/hr)  275(0.3) 275(0.2)        Urine Voided  275 275        Urine Occurrence(s) 4 x 4 x 8 x      Stool           Stool Occurrence(s) 1 x 2 x 3 x      Shift Total(mL/kg)  275(3.7) 275(3.7)      NET  -275 -275      Weight (kg) 73.9 73.9 73.9 73.9 73.9 73.9       Recommendations:  1. Patient needs and requests: regulates BM, pt states he is constipated but had BM on 6/16, able to pass gas. 2. Pending tests/procedures: labs     3.  Functional Level/Equipment: Partial (one person) /      Fall Precautions:   Fall risk precautions were reinforced with the patient; he was instructed to call for help prior to getting up. The following fall risk precautions were continued: bed/ chair alarms, door signage, yellow bracelet and socks as well as update of the Cayla Hoit tool in the patient's room. [place] Score: 3    HEALS Safety Check    A safety check occurred in the patient's room between off going nurse and oncoming nurse listed above. The safety check included the below items  Area Items   H  High Alert Medications - Verify all high alert medication drips (heparin, PCA, etc.)   E  Equipment - Suction is set up for ALL patients (with laila)  - Red plugs utilized for all equipment (IV pumps, etc.)  - WOWs wiped down at end of shift.  - Room stocked with oxygen, suction, and other unit-specific supplies   A  Alarms - Bed alarm is set for fall risk patients  - Ensure chair alarm is in place and activated if patient is up in a chair   L  Lines - Check IV for any infiltration  - Taveras bag is empty if patient has a Taveras   - Tubing and IV bags are labeled   S  Safety   - Room is clean, patient is clean, and equipment is clean. - Hallways are clear from equipment besides carts. - Fall bracelet on for fall risk patients  - Ensure room is clear and free of clutter  - Suction is set up for ALL patients (with laila)  - Hallways are clear from equipment besides carts.    - Isolation precautions followed, supplies available outside room, sign posted     Westley Sandoval

## 2021-06-18 NOTE — PROGRESS NOTES
Problem: Neurolinguistics Impaired (Adult)  Goal: *Speech Goal: (INSERT TEXT)  Description: Long term goals  Patient will:  1. Be oriented x 3 and recall events of the day, supervision. 2. Recall 3 words after 5 minutes, supervision. 3. Follow complex instructions with 80-90% accuracy. 4. Read 1-2 sentence passages and respond appropriately to questions or instructions, 90% accuracy. 5. Perform functional problem solving/reasoning tasks with 90% accuracy. Short term goals (by 21)  Patient will:  1. Be oriented x 3 and recall events of the day, supervision. 2. Recall 3 words after 5 minutes, supervision. 3. Follow complex instructions with 80% accuracy. 4. Read 1-2 sentence passages and respond appropriately to questions or instructions, 80-90% accuracy. 5. Perform functional problem solving/reasoning tasks with 80-90% accuracy. Note:   Speech language pathology treatment    Patient: Elisha Vargas (20 y.o. male)  Date: 2021  Diagnosis: Non-ST elevation (NSTEMI) myocardial infarction [facility]) [I21.4] Non-ST elevation (NSTEMI) myocardial infarction [facility])  Time in: 1330  Time Out: 1400  SUBJECTIVE:   Patient stated It's been nice (to have his wife visit). OBJECTIVE:   Mental Status:  Mr. Wilbert Marin was alert and oriented. Treatment & Interventions: The patient was seen for a 30 minute session. The following treatment tasks were presented:  Neuro-Linguistics:   Orientation:     Supervision  Recent memory:    Supervision  Recall 3 words:    Supervision  Forecast reasonin%  If/then complex commands:   65% independently, increasing to 100% given moderate assistance  Reading paragraphs:    83%  Answering questions about paragraphs: 75%    Response & Tolerance to Activities:  Mr. Wilbert Marin was pleasant and cooperative. He tolerated all activities well. His wife was present for family education. Pain:  Pain Scale 1: Numeric (0 - 10)  No pain reported.   After treatment: [x]       Patient left in no apparent distress sitting up in chair  []       Patient left in no apparent distress in bed  [x]       Call bell left within reach  []       Nursing notified  [x]       Caregiver present  []       Bed alarm activated    ASSESSMENT:   Progression toward goals:  [x]       Improving appropriately and progressing toward goals  []       Improving slowly and progressing toward goals  []       Not making progress toward goals and plan of care will be adjusted    PLAN:   Patient continues to benefit from skilled intervention to address the above impairments. Continue treatment per established plan of care.   Discharge Recommendations:  Outpatient    Estimated LOS: Through 6/22/21    Nader Clarke Speech-Language Pathology Student  Time Calculation: 30 mins

## 2021-06-18 NOTE — PROGRESS NOTES
Problem: Mobility Impaired (Adult and Pediatric)  Goal: *Therapy Goal (Edit Goal, Insert Text)  Description: Physical Therapy Short Term Goals  Initiated 6/8/2021, re-assessed 6/15/2021 and to be accomplished within 7 day(s) on 6/22/2021 - Short Term Goals progressed to Long Term Goals  1. Patient will move from supine to sit and sit to supine , scoot up and down, and roll side to side in bed with modified independence. (ongoing for sit to supine)  2. Patient will transfer from bed to chair and chair to bed with standby assistance using the least restrictive device. (MET with RW 6/15/2021)  3. Patient will perform sit to stand with supervision/set-up.(SBA)  4. Patient will ambulate with CGA for 150 feet with the least restrictive device. (MET 6/15/2021)  5. Patient will ascend/descend 3 stairs with no handrail(s) with moderate assistance using AD as appropriate. (8 with BHR and SBA)    Physical Therapy Long Term Goals  Initiated 6/8/2021, updated 6/16/2021, and to be accomplished within 14 day(s) on 6/22/2021  1. Patient will move from supine to sit and sit to supine , scoot up and down, and roll side to side in bed with independence. 2.  Patient will transfer from bed to chair and chair to bed with modified independence using the least restrictive device. 3.  Patient will perform sit to stand with modified independence. 4.  Patient will ambulate with modified independence for 150 feet with the least restrictive device. 5.  Patient will ascend/descend 3 stairs without handrail(s) with minimal assistance/contact guard assist and appropriate AD for ease of safe entry/exit of home. 6.  Patient will ascend/descend 8 stairs with one railing with supervision for safety getting to 2nd floor bedroom.       Outcome: Progressing Towards Goal   PHYSICAL THERAPY TREATMENT    Patient: Anuel Green (83 y.o. male)  Date: 6/18/2021  Diagnosis: Non-ST elevation (NSTEMI) myocardial infarction [facility]) [I21.4] Non-ST elevation (NSTEMI) myocardial infarction [facility])  Precautions: Fall  Chart, physical therapy assessment, plan of care and goals were reviewed. Time In:1050  Time Out:1220    Patient seen for: Balance activities;Transfer training;Gait training    Time In:1400  Time Out:1430    Patient seen for: Family training;Transfer training;Gait training    Pain:  Pt pain was reported as no c/o pre-treatment. Pt pain was reported as no c/o post-treatment. Intervention: NA     Patient identified with name and : yes     SUBJECTIVE:      Pt c/o feeling woozing when negotiating steps without HR. Reported to nursing as pt states he's been feeling woozing since standing game but did not c/o until negotiating stairs. OBJECTIVE DATA SUMMARY:    Objective: Pt's wife present for family training and educated on pt's assistance level and decreased balance. TRANSFERS Daily Assessment     Sit to Stand Assistance: Stand-by assistance  Car Transfers: Supervision Sadie Luke)  Car Type: car txfr simulator   Pt performed sit to stand from w/c with SBA and pt pushing up from B arm rests. GAIT Daily Assessment    Gait Description (WDL)      Gait Abnormalities Decreased step clearance    Assistive device Walker, rolling    Ambulation assistance - level surface 5 (Stand-by assistance)    Distance 295 Feet (ft) 60 Feet (ft)    Ambulation assistance- uneven surface      Comments Pt ambulated 295ft with RW and SBA, with min/mod v/c for erect posture and remaining within base of RW. Pt ambulated 60ft with RW and SBA during family training. Wife educated that pt unable to ambulate without SBA due to flexed posture and decreased balance as result.          STEPS/STAIRS Daily Assessment     Steps/Stairs ambulated 2 (6\" steps)    Assistance Required 4 (Minimal assistance)    Rail Use None    Comments   Pt negotiated up and down 2 6\" steps with no HR with min assist for balance and safety but pt reports being woozy and demo'd being off balance. Curbs/Ramps   2 (6\" platform step)  Pt negotiated up and down 2 6\" platform step with RW and CGA, requiring v/c for proper sequencing. BALANCE Daily Assessment     Sitting - Static: Good (unsupported)  Sitting - Dynamic: Good (unsupported)  Standing - Static: Fair (with RW)  Standing - Dynamic : Impaired        WHEELCHAIR MOBILITY Daily Assessment     Able to Propel (ft): 100 feet  Functional Level:  (supervision)  Curbs/Ramps Assist Required (FIM Score): 0 (Not tested)  Wheelchair Setup Assist Required : 5 (Supervision/setup)  Wheelchair Management: Manages left brake;Manages right brake  Pt propelled w/c from room to back hallway with BUE with supervision. THERAPEUTIC EXERCISES Daily Assessment       Standing marching and heel raises x15 each      Neuro Re-Education:  Pt participated in table top board game in standing to challenge pt's balance in static standing and for activity tolerance. Pt stood [id number] with RW and SBA. Pt required max v/c for erect posture and to prevent wt bearing on rolling table for safety. ASSESSMENT:  Pt has made progress with activity tolerance and strength. Pt continues to demo decreased balance, requiring SBA for safety with gait with RW. Progression toward goals:  []      Improving appropriately and progressing toward goals  [x]      Improving slowly and progressing toward goals  []      Not making progress toward goals and plan of care will be adjusted      PLAN:  Patient continues to benefit from skilled intervention to address the above impairments. Continue treatment per established plan of care. Discharge Recommendations:  Home Health  Further Equipment Recommendations for Discharge:  rolling walker      Estimated Discharge Date: 6/22/2021    Activity Tolerance:   Fair+  Please refer to the flowsheet for vital signs taken during this treatment.     After treatment:   [] Patient left in no apparent distress in bed  [] Patient left in no apparent distress sitting up in chair  [x] Patient left in no apparent distress in w/c mobilizing under own power  [] Patient left in no apparent distress dining area  [] Patient left in no apparent distress mobilizing under own power  [] Call bell left within reach  [] Nursing notified  [] Caregiver present  [] Bed alarm activated   [] Chair alarm activated      Jennifer Cottrell, PTA  6/18/2021

## 2021-06-18 NOTE — PROGRESS NOTES
SHIFT CHANGE NOTE FOR [facility]    Bedside and Verbal shift change report given to [address] [place] [address] (oncoming nurse) by Jennifer Roman LPN (offgoing nurse). Report included the following information SBAR, Kardex, MAR and Recent Results. Situation:   Code Status: Full Code   Hospital Day: 11   Problem List:   Hospital Problems  Date Reviewed: 6/18/2021        Codes Class Noted POA    Peripheral vascular disease of extremity with claudication (HCC) (Chronic) ICD-10-CM: I73.9  ICD-9-CM: 443.9  6/14/2021 Yes        Mixed hyperlipidemia (Chronic) ICD-10-CM: R10.5  ICD-9-CM: 272.2  Unknown Yes        Acute embolic stroke ([facility] PRIMITIVO-16-ZA: I63.9  ICD-9-CM: 434.11  5/23/2021 Yes    Overview Signed 6/8/2021 12:37 AM by Brian Estrada MD     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, brainstem and cerebellum) without residual deficit             Anticoagulated by anticoagulation treatment ICD-10-CM: Z79.01  ICD-9-CM: V58.61  5/19/2021 Yes    Overview Signed 6/7/2021 10:48 PM by Brian Estrada MD     On Apixaban             On clopidogrel therapy ICD-10-CM: Z79.01  ICD-9-CM: V58.61  5/19/2021 Yes        Status post insertion of drug eluting coronary artery stent ICD-10-CM: Z95.5  ICD-9-CM: V45.82  5/18/2021 Yes    Overview Signed 6/18/2021  1:38 PM by Brian Estrada MD     S/P PCI to proximal SVG with 3.5 x 12 mm [place] drug-eluting stent; PCI to mid SVG with 3.5 x 34 mm Juan J drug-eluting stent; PCI to distal SVG with 3.5 x 15 mm Juan J drug-eluting stent;  Balloon angioplasty to distal SVG anastomosis (5/18/2021 - Dr. Davidson Masters)              * (Principal) Non-ST elevation (NSTEMI) myocardial infarction [facility]) ICD-10-CM: I21.4  ICD-9-CM: 410.70  5/17/2021 Yes        Acute on chronic heart failure with preserved ejection fraction (HFpEF) ([facility] ICD-10-CM: I50.33  ICD-9-CM: 428.23  5/17/2021 Yes        Paroxysmal atrial fibrillation ([facility] ICD-10-CM: I48.0  ICD-9-CM: 427.31  5/17/2021 Yes Leukocytosis ICD-10-CM: D72.829  ICD-9-CM: 288.60  5/17/2021 Yes    Overview Signed 6/8/2021 12:42 AM by Ernestina Milan MD     Attributed to reactive leukocytosis due to NSTEMI             Impaired mobility and ADLs ICD-10-CM: Z74.09, Z78.9  ICD-9-CM: V49.89  5/17/2021 Yes        Neuropathy involving both lower extremities (Chronic) ICD-10-CM: G57.93  ICD-9-CM: 356.9  10/22/2020 Yes        Essential hypertension (Chronic) ICD-10-CM: I10  ICD-9-CM: 401.9  Unknown Yes              Background:   Past Medical History:   Past Medical History:   Diagnosis Date    Acute embolic stroke ([address] 8/95/0081    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, brainstem and cerebellum) without residual deficit    Anticoagulated by anticoagulation treatment 5/19/2021    On Apixaban    Benign prostatic hyperplasia with urinary retention     Chronic obstructive pulmonary disease (COPD) ([address]     Coronary artery disease involving native coronary artery of native heart     Essential hypertension     Heart failure with preserved left ventricular function (HFpEF) ([address]     2D echocardiogram (5/18/2021) showed EF 50%; concentric LV hypertrophy with a severely thickened septal wall and mildly thickened posterior wall; left atrial chamber is severely enlarged; right atrial chamber volume is moderately enlarged; no mass, shunts, or thrombi.      History of carotid stenosis     History of gross hematuria 5/26/2021    Attributed to Taveras trauma while patient was altered    History of renal artery stenosis     Leukocytosis 5/17/2021    Attributed to reactive leukocytosis due to NSTEMI    Malignant neoplasm of lower lobe of right lung (HCC)     Neuropathy involving both lower extremities 10/22/2020    Non-ST elevation (NSTEMI) myocardial infarction ([address] 5/17/2021    On clopidogrel therapy 5/19/2021    Paroxysmal atrial fibrillation ([address] 5/17/2021    Peripheral vascular disease of extremity with claudication ([facility] 6/14/2021    Urinary retention         Assessment:   Changes in Assessment throughout shift: No change to previous assessment     Patient has a central line: no Reasons if yes: Insertion date: Last dressing date:   Patient has Taveras Cath: no Reasons if yes:     Insertion date:  S   Last Vitals:     Vitals:    06/17/21 1642 06/17/21 2035 06/18/21 0717 06/18/21 1550   BP: 125/88 111/67 120/75 108/71   Pulse: 88 82 (!) 101 66   Resp: 19 18 16 16   Temp: 97.4 °F (36.3 °C) 97 °F (36.1 °C) 97.7 °F (36.5 °C) 98.9 °F (37.2 °C)   SpO2: 97% 97% 97% 97%   Weight:       Height:            PAIN    Pain Assessment    Pain Intensity 1: 0 (06/18/21 1600) Pain Intensity 1: 2 (12/29/14 1105)    Pain Location 1: Foot Pain Location 1: Abdomen    Pain Intervention(s) 1: Medication (see MAR) Pain Intervention(s) 1: Medication (see MAR)  Patient Stated Pain Goal: 0 Patient Stated Pain Goal: 0  o Intervention effective: yes  o Other actions taken for pain:       Skin Assessment  Skin color    Condition/Temperature    Integrity    Turgor    Weekly Pressure Ulcer Documentation  Pressure  Injury Documentation: No Pressure Injury Noted-Pressure Ulcer Prevention Initiated  Wound Prevention & Protection Methods  Orientation of wound Orientation of Wound Prevention: Posterior  Location of Prevention Location of Wound Prevention: Buttocks, Sacrum/Coccyx  Dressing Present Dressing Present : No  Dressing Status    Wound Offloading Wound Offloading (Prevention Methods): Bed, pressure reduction mattress, Chair cushion, Pillows, Repositioning     INTAKE/OUPUT  Date 06/17/21 0700 - 06/18/21 0659 06/18/21 0700 - 06/19/21 0659   Shift 3762-5464 4071-2830 24 Hour Total 5337-2484 8281-3778 24 Hour Total   INTAKE   Shift Total(mL/kg)         OUTPUT   Urine(mL/kg/hr)  275(0.3) 275(0.2)        Urine Voided  275 275        Urine Occurrence(s) 4 x 4 x 8 x 3 x  3 x   Stool           Stool Occurrence(s) 1 x 2 x 3 x 0 x 0 x   Shift Total(mL/kg)  275(3.7) 275(3.7)      NET  -275 -275      Weight (kg) 73.9 73.9 73.9 73.9 73.9 73.9       Recommendations:  1. Patient needs and requests: TOILETING    2. Pending tests/procedures: LABS     3. Functional Level/Equipment: Partial (one person) /      Fall Precautions:   Fall risk precautions were reinforced with the patient; he was instructed to call for help prior to getting up. The following fall risk precautions were continued: bed/ chair alarms, door signage, yellow bracelet and socks as well as update of the Shaaron Jesus tool in the patient's room. [place] Score: 3    HEALS Safety Check    A safety check occurred in the patient's room between off going nurse and oncoming nurse listed above. The safety check included the below items  Area Items   H  High Alert Medications - Verify all high alert medication drips (heparin, PCA, etc.)   E  Equipment - Suction is set up for ALL patients (with laila)  - Red plugs utilized for all equipment (IV pumps, etc.)  - WOWs wiped down at end of shift.  - Room stocked with oxygen, suction, and other unit-specific supplies   A  Alarms - Bed alarm is set for fall risk patients  - Ensure chair alarm is in place and activated if patient is up in a chair   L  Lines - Check IV for any infiltration  - Taveras bag is empty if patient has a Taveras   - Tubing and IV bags are labeled   S  Safety   - Room is clean, patient is clean, and equipment is clean. - Hallways are clear from equipment besides carts. - Fall bracelet on for fall risk patients  - Ensure room is clear and free of clutter  - Suction is set up for ALL patients (with laila)  - Hallways are clear from equipment besides carts.    - Isolation precautions followed, supplies available outside room, sign posted     Leela Rivas LPN

## 2021-06-18 NOTE — PROGRESS NOTES
[address]  [address] [place] [address], [address]     INPATIENT REHABILITATION  DAILY PROGRESS NOTE     Date: 6/18/2021    Name: Leslee oBwser Age / Sex: 80 y.o. / male   CSN: [id number] MRN: [id number]   Admit Date: 6/7/2021 Length of Stay: 11 days     Primary Rehab Diagnosis: Impaired Mobility and ADLs secondary to:  1. Non-ST elevation (NSTEMI) myocardial infarction  2. Coronary artery disease; History of CABG  3. S/P PCI to proximal SVG with 3.5 x 12 mm Juan J drug-eluting stent; PCI to mid SVG with 3.5 x 34 mm Juan J drug-eluting stent; PCI to distal SVG with 3.5 x 15 mm Juan J drug-eluting stent; Balloon angioplasty to distal SVG anastomosis (5/18/2021 - Dr. Ct Gonzalez)       Subjective:     Patient seen and examined. Blood pressure controlled. Patient's Complaint:   No significant medical complaints    Pain Control: stable, mild-to-moderate joint symptoms intermittently, reasonably well controlled by current meds      Objective:     Vital Signs:  Patient Vitals for the past 24 hrs:   BP Temp Pulse Resp SpO2   06/18/21 0717 120/75 97.7 °F (36.5 °C) (!) 101 16 97 %   06/17/21 2035 111/67 97 °F (36.1 °C) 82 18 97 %   06/17/21 1642 125/88 97.4 °F (36.3 °C) 88 19 97 %        Physical Examination:  GENERAL SURVEY: Patient is awake, alert, oriented x 3, sitting comfortably on the chair, not in acute respiratory distress.   HEENT: pink palpebral conjunctivae, anicteric sclerae, no nasoaural discharge, moist oral mucosa  NECK: supple, no jugular venous distention, no palpable lymph nodes  CHEST/LUNGS: symmetrical chest expansion, good air entry, clear breath sounds  HEART: adynamic precordium, good S1 S2, no S3, irregularly irregular rhythm, no murmurs  ABDOMEN: flat, bowel sounds appreciated, soft, non-tender  EXTREMITIES: pink nailbeds, (+) bipedal edema, full and equal pulses, no calf tenderness   NEUROLOGICAL EXAM: The patient is awake, alert and oriented x3, able to answer questions fairly appropriately, able to follow 1 and 2 step commands. Able to tell time from the wall clock. Cranial nerves II-XII are grossly intact. No gross sensory deficit. Motor strength is 4 to 4+/5 on all 4 extremities. Current Medications:  Current Facility-Administered Medications   Medication Dose Route Frequency    carvediloL (COREG) tablet 25 mg  25 mg Oral BID WITH MEALS    gabapentin (NEURONTIN) capsule 100 mg  100 mg Oral BID    ipratropium (ATROVENT) 0.02 % nebulizer solution 0.5 mg  0.5 mg Nebulization TID    clopidogreL (PLAVIX) tablet 75 mg  75 mg Oral DAILY WITH BREAKFAST    senna-docusate (PERICOLACE) 8.6-50 mg per tablet 2 Tablet  2 Tablet Oral PCD    polyethylene glycol (MIRALAX) packet 17 g  17 g Oral DAILY    acetaminophen (TYLENOL) tablet 650 mg  650 mg Oral Q4H PRN    bisacodyL (DULCOLAX) tablet 10 mg  10 mg Oral Q48H PRN    albuterol (PROVENTIL VENTOLIN) nebulizer solution 2.5 mg  2.5 mg Nebulization Q4H PRN    apixaban (ELIQUIS) tablet 5 mg  5 mg Oral BID    arformoteroL (BROVANA) neb solution 15 mcg  15 mcg Nebulization BID RT    furosemide (LASIX) tablet 20 mg  20 mg Oral DAILY    nitroglycerin (NITROSTAT) tablet 0.4 mg  0.4 mg SubLINGual PRN    rosuvastatin (CRESTOR) tablet 10 mg  10 mg Oral DAILY    tamsulosin (FLOMAX) capsule 0.4 mg  0.4 mg Oral PCD    multivitamin, tx-iron-ca-min (THERA-M w/ IRON) tablet 1 Tablet  1 Tablet Oral DAILY       Allergies: Allergies   Allergen Reactions    Lipitor [Atorvastatin] Rash    Norvasc [Amlodipine] Rash       Lab/Data Review:  No results found for this or any previous visit (from the past 24 hour(s)). Assessment:     Primary Rehabilitation Diagnosis  1. Impaired Mobility and ADLs  2. Non-ST elevation (NSTEMI) myocardial infarction  3. Coronary artery disease; History of CABG  4.  S/P PCI to proximal SVG with 3.5 x 12 mm [place] drug-eluting stent; PCI to mid SVG with 3.5 x 34 mm [place] drug-eluting stent; PCI to distal SVG with 3.5 x 15 mm Juan J drug-eluting stent; Balloon angioplasty to distal SVG anastomosis (5/18/2021 - Dr. Monica Gann)     Comorbidities  Patient Active Problem List   Diagnosis Code    Urinary retention R33.9    Essential hypertension I10    Non-ST elevation (NSTEMI) myocardial infarction ([facility] I21.4    Acute on chronic heart failure with preserved ejection fraction (HFpEF) ([facility]) I50.33    Paroxysmal atrial fibrillation ([facility]) I48.0    Anticoagulated by anticoagulation treatment Z79.01    Mixed hyperlipidemia E78.2    Benign prostatic hyperplasia with urinary retention N40.1, R33.8    Malignant neoplasm of lower lobe of right lung ([facility]) C34.31    Heart failure with preserved left ventricular function (HFpEF) ([facility]) I50.30    Coronary artery disease involving native coronary artery of native heart I25.10    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    Chronic obstructive pulmonary disease (COPD) ([facility]) J44.9    History of carotid stenosis Z86.79    History of renal artery stenosis Z86.79    On clopidogrel therapy Z79.01    History of gross hematuria S82.688    Acute embolic stroke ([facility]) Q93.8    Leukocytosis D72.829    Peripheral vascular disease of extremity with claudication ([facility]) I73.9    Neuropathy involving both lower extremities G57.93       Plan:     1. Justification for continued stay: Good progression towards established rehabilitation goals. 2. Medical Issues being followed closely:    [x]  Fall and safety precautions     []  Wound Care     [x]  Bowel and Bladder Function     [x]  Fluid Electrolyte and Nutrition Balance     [x]  Pain Control      3.  Issues that 24 hour rehabilitation nursing is following:    [x]  Fall and safety precautions     []  Wound Care     [x]  Bowel and Bladder Function     [x]  Fluid Electrolyte and Nutrition Balance     [x]  Pain Control      [x]  Assistance with and education on in-room safety with transfers to and from the bed, wheelchair, toilet and shower. 4. Acute rehabilitation plan of care:    [x]  Continue current care and rehab. [x]  Physical Therapy           [x]  Occupational Therapy           [x]  Speech Therapy     []  Hold Rehab until further notice     5. Medications:    [x]  MAR Reviewed     [x]  Continue Present Medications     6. DVT Prophylaxis:      []  Enoxaparin     []  Unfractionated Heparin     []  Warfarin     [x]  NOAC     []  MARTHA Stockings     []  Sequential Compression Device     []  None     7. Code status    [x]  Full code     []  Partial code     []  Do not intubate     []  Do not resuscitate     8. Orders:   > Non-ST elevation (NSTEMI) myocardial infarction; Coronary artery disease; History of CABG; S/P PCI to proximal SVG with 3.5 x 12 mm [place] drug-eluting stent; PCI to mid SVG with 3.5 x 34 mm [place] drug-eluting stent; PCI to distal SVG with 3.5 x 15 mm Juan J drug-eluting stent;  Balloon angioplasty to distal SVG anastomosis (5/18/2021 - Dr. Sebastian Velasquez)    > On 6/16/2021, increased Carvedilol from 12.5 mg to 25 mg PO BID with meals (8AM, 5PM)   > Continue:    > Carvedilol 25 mg PO BID with meals (8AM, 5PM)    > Clopidogrel 75 mg PO once daily with breakfast    > Rosuvastatin 75 mg PO once daily    > Nitroglycerin 0.4 mg SL PRN for chest pain    > Acute on chronic heart failure with preserved ejection fraction (HFpEF)   > 2D echocardiogram (5/18/2021) showed EF 50%; concentric LV hypertrophy with a severely thickened septal wall and mildly thickened posterior wall; left atrial chamber is severely enlarged; right atrial chamber volume is moderately enlarged; no mass, shunts, or thrombi.    > On 6/16/2021, increased Carvedilol from 12.5 mg to 25 mg PO BID with meals (8AM, 5PM)   > Continue:    > Carvedilol 25 mg PO BID with meals (8AM, 5PM)    > Furosemide 20 mg PO once daily (9AM)    > 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, brainstem and cerebellum) without residual deficit   > Continue:    > Clopidogrel 75 mg PO once daily with breakfast    > Rosuvastatin 75 mg PO once daily    > Benign prostatic hyperplasia with urinary retention   > Continue Tamsulosin 0.4 mg PO once daily after dinner    > Chronic obstructive pulmonary disease (COPD)   > On 6/12/2021:    > Discontinue Budesonide nebulization BID    > Start Ipratropium nebulization TID   > Continue:    > Arformoterol nebulization BID    > Ipratropium nebulization TID    > Albuterol nebulization q 4 hr PRN for shortness of breath/wheezing    > Constipation   > Continue:    > Polyethylene glycol 17 grams in 8 oz water PO once daily    > Pericolace 2 tabs PO once daily after dinner     > Essential hypertension   > 2D echocardiogram (5/18/2021) showed EF 50%; concentric LV hypertrophy with a severely thickened septal wall and mildly thickened posterior wall; left atrial chamber is severely enlarged; right atrial chamber volume is moderately enlarged; no mass, shunts, or thrombi.    > On 6/16/2021, increased Carvedilol from 12.5 mg to 25 mg PO BID with meals (8AM, 5PM)   > Continue:    > Carvedilol 25 mg PO BID with meals (8AM, 5PM)    > Furosemide 20 mg PO once daily (9AM)    > Leukocytosis, reactive, attributed to NSTEMI   > Labs at [facility]:    > WBC count (5/17/2021) = 18.6    > WBC count (5/18/2021) = 21.4    > WBC count (5/19/2021) = 22.4    > WBC count (5/21/2021) = 27.3    > WBC count (5/22/2021) = 24.9    > WBC count (5/23/2021) = 30.2    > .  . .     > WBC count (6/2/2021) = 25.4    > WBC count (6/3/2021) = 22.5    > WBC count (6/4/2021) = 23.4    > WBC count (6/5/2021) = 21.7    > WBC count (6/6/2021) = 24.1    > WBC count (6/7/2021) = 20.0    > Work up done was negative for a source of infection   > WBC count (6/8/2021, on admission to the ARU) = 14.4   > WBC count (6/14/2021) = 10.1   > WBC count (6/17/2021) = 14.3   > No documented fever since admissiion    > Mixed hyperlipidemia   > Lipid profile (5/18/2021) showed TG 83, , HDL 50, LDL 93   > Continue Rosuvastatin 75 mg PO once daily    > Paroxysmal atrial fibrillation, anticoagulated on Apixaban   > On 6/8/2021, increased Carvedilol from 12.5 mg to 25 mg PO BID with meals (8AM, 5PM)   > Continue:    > Apixaban 5 mg PO BID    > Carvedilol 25 mg PO BID with meals (8AM, 5PM)    > Peripheral vascular disease of extremity with claudication; Neuropathy involving both lower extremities   > (+) pain and numbness of toes of both feet   > PVL arterial doppler of both lower extremities with exercise (9/12/2019) showed:    > Moderate arterial insufficiency in the right lower extremity at the level of the trifurcation at rest.     > Severe arterial insufficiency in the left lower extremity at the level of the trifurcation at rest.   > PVL arterial doppler of both lower extremities with exercise (10/22/2020) showed:    > Mild right lower extremity arterial insufficiency of indeterminate level at rest.     > Moderate left lower extremity arterial insufficiency involving the femoral-popliteal segment at rest.     > The ankle brachial indices post exercise were unreliable as described. No evidence of inflow involvement.   > Planned to start patient on Cilostazol for the claudication symptoms but patient already on Apixaban + Clopidogrel; addition of Cilostazol may increase risk for bleeding; will hold off on adding Cilostazol for now   > As per the last Progress Note by Dr. Carol Molina (dated 10/22/2020): \"Pt major symptom is not related to his PAD but sounds more like neuropathy\"   > On 6/14/2021, started a trial of Gabapentin 100 mg PO BID   > Upon discharge from the ARU, the patient will need to follow up with Vascular Surgery (Dr. Carol Molina)   > Increase Gabapentin from 100 mg PO BID to 100 mg PO TID    > COVID-19 ruled out by laboratory testing   > SARS-CoV-2 ([facility] m2000, [address]) (collected 5/26/2021, resulted 5/28/2021):  Not detected    > Analgesia   > Continue Acetaminophen 650 mg PO q 4 hr PRN for pain     > Diet:   > Specifications: Low fat/Low cholesterol/High fiber/No added salt   > Solids (consistency): Regular    > Liquids (consistency): Thin    > Fluid restriction: None      9. Personal Protective Equipment was used while interacting with patient including: goggles and KN95 face mask. Patient was using a surgical mask. 10. Patient's progress in rehabilitation and medical issues discussed with the patient. All questions answered to the best of my ability. Care plan discussed with patient and nurse. 11. Total clinical care time is 30 minutes, including review of chart including all labs, radiology, past medical history, and discussion with patient. Greater than 50% of my time was spent in coordination of care and counseling.       Signed:    Adelaida Conde MD    June 18, 2021

## 2021-06-18 NOTE — PROGRESS NOTES
Problem: Patient Education: Go to Patient Education Activity  Goal: Patient/Family Education  Outcome: Progressing Towards Goal     Problem: Pressure Injury - Risk of  Goal: *Prevention of pressure injury  Description: Document Nicko Scale and appropriate interventions in the flowsheet.   Outcome: Progressing Towards Goal  Note: Pressure Injury Interventions:  Sensory Interventions: Assess changes in LOC, Assess need for specialty bed, Chair cushion, Minimize linen layers, Pressure redistribution bed/mattress (bed type)         Activity Interventions: Assess need for specialty bed, Chair cushion, Increase time out of bed, PT/OT evaluation, Pressure redistribution bed/mattress(bed type)    Mobility Interventions: Assess need for specialty bed, Chair cushion, Float heels, HOB 30 degrees or less, Pressure redistribution bed/mattress (bed type), PT/OT evaluation    Nutrition Interventions: Document food/fluid/supplement intake, Discuss nutritional consult with provider    Friction and Shear Interventions: Apply protective barrier, creams and emollients, HOB 30 degrees or less, Minimize layers                Problem: Patient Education: Go to Patient Education Activity  Goal: Patient/Family Education  Outcome: Progressing Towards Goal     Problem: Pain  Goal: *Control of Pain  Outcome: Progressing Towards Goal     Problem: Patient Education: Go to Patient Education Activity  Goal: Patient/Family Education  Outcome: Progressing Towards Goal     Problem: Patient Education: Go to Patient Education Activity  Goal: Patient/Family Education  Outcome: Progressing Towards Goal

## 2021-06-18 NOTE — PROGRESS NOTES
Problem: Self Care Deficits Care Plan (Adult)  Goal: *Therapy Goal (Edit Goal, Insert Text)  Description: Occupational Therapy Goals   Long Term Goals  Initiated 2021 and to be accomplished within 2 week(s) 2021  1. Pt will perform self-feeding with Alisa. 2. Pt will perform grooming with Alisa. 3. Pt will perform UB bathing with Alisa  4. Pt will perform LB bathing with Alisa. 5. Pt will perform tub/shower transfer with Alisa. 6. Pt will perform UB dressing with Alisa. 7. Pt will perform LB dressing with Alisa. 8. Pt will perform toileting task with Alisa. 9. Pt will perform toilet transfer with Alisa. 10.  Pt will perform an IADL task with good safety and Alisa. Short Term Goals   Initiated 2021 and to be accomplished within 7 day(s) 6/15/2021, updated 6/15/21  1. Pt will perform self-feeding with S  GM 6/15/21  2. Pt will perform grooming with S. GM 6/15/21  3. Pt will perform UB bathing with S. GM 6/15/21  4. Pt will perform LB bathing with S GM 6/15/21  5. Pt will perform tub/shower transfer with S.  6. Pt will perform UB dressing with S. GM 6/15/21  7. Pt will perform LB dressing with S GM 6/15/21  8. Pt will perform toileting task with S.   9. Pt will perform toilet transfer with S.       Occupational Therapy TREATMENT    Patient: Malcolm Jose   80 y.o. Patient identified with name and : Yes    Date: 2021    First Tx Session  Time In: 0900  Time Out[de-identified] 1000    Second Tx Session  Time In: 1430  Time Out[de-identified] 1500    Diagnosis: Non-ST elevation (NSTEMI) myocardial infarction [facility]) [I21.4]   Precautions: Fall  Chart, occupational therapy assessment, plan of care, and goals were reviewed. Pain:  Pt reports 0/10 pain or discomfort prior to treatment. Pt reports 0/10 pain or discomfort post treatment. Intervention Provided: N/A      SUBJECTIVE:   Patient stated I picked out my clothes already.     OBJECTIVE DATA SUMMARY:     THERAPEUTIC ACTIVITY Daily Assessment Family Education with wife. Level of care discussed. Functional transfer performed after CG education. Pt/CG showed fair- return demonstration. Pt CG wife required education of proper transfer tech and level of asst needed. THERAPEUTIC EXERCISE Daily Assessment    UB bike up to 10 minutes at moderate resistance. Pt propelled w/c from room <>gym      FEEDING/EATING Daily Assessment    Feeding/Eating  Feeding/Eating Assistance: 7 (Independent)     GROOMING Daily Assessment    Grooming  Grooming Assistance : 6 (Modified independent)     UPPER BODY BATHING Daily Assessment    Upper Body Bathing  Bathing Assistance, Upper: 6 (Modified independent)  Position Performed: Seated in chair  Adaptive Equipment: Grab bar;Tub bench     LOWER BODY BATHING Daily Assessment    Lower Body Bathing  Bathing Assistance, Lower : 4 (Contact guard assistance) (asst to stand for balance for periwash)  Adaptive Equipment: Grab bar  Position Performed: Seated in chair;Standing  Adaptive Equipment: Grab bar;Tub bench     UPPER BODY DRESSING Daily Assessment    Upper Body Dressing   Dressing Assistance : 6 (Modified independent)     LOWER BODY DRESSING Daily Assessment    Lower Body Dressing   Dressing Assistance : 4 (Contact guard assistance) (for balance during CM)  Leg Crossed Method Used: Yes  Position Performed: Seated in chair;Standing     MOBILITY/TRANSFERS Daily Assessment    Functional Transfers  Tub or Shower Type: Shower  Amount of Assistance Required: 4 (Contact guard assistance)       ASSESSMENT:  Pt showing CGA-Alisa with self care and to continue with POC. Progression toward goals:  [x]          Improving appropriately and progressing toward goals  []          Improving slowly and progressing toward goals  []          Not making progress toward goals and plan of care will be adjusted     PLAN:  Patient continues to benefit from skilled intervention to address the above impairments.   Continue treatment per established plan of care. Discharge Recommendations:  Home Health  Further Equipment Recommendations for Discharge:  transfer bench and bedside commode. Activity Tolerance:  Fair       Estimated LOS:6/22/21    Please refer to the flowsheet for vital signs taken during this treatment. After treatment:   [x]  Patient left in no apparent distress sitting up in chair   []  Patient left in no apparent distress in bed  []  Call bell left within reach  []  Nursing notified  []  Caregiver present  []  Bed alarm activated    COMMUNICATION/EDUCATION:   [] Home safety education was provided and the patient/caregiver indicated understanding. [x] Patient/family have participated as able in goal setting and plan of care. [] Patient/family agree to work toward stated goals and plan of care. [] Patient understands intent and goals of therapy, but is neutral about his/her participation. [] Patient is unable to participate in goal setting and plan of care.       Renetta Lyn, SARA       Outcome: Progressing Towards Goal  Goal: Interventions  Outcome: Progressing Towards Goal

## 2021-06-19 NOTE — PROGRESS NOTES
Progress Note    Patient: Neyda Michaels MRN: [id number]  CSN: [id number]    YOB: 1940  Age: 80 y.o. Sex: male    DOA: 6/7/2021 LOS:  LOS: 12 days                    Subjective:        Primary Rehab Diagnosis: Impaired Mobility and ADLs secondary to:  1. Non-ST elevation (NSTEMI) myocardial infarction  2. Coronary artery disease; History of CABG  3. S/P PCI to proximal SVG with 3.5 x 12 mm [place] drug-eluting stent; PCI to mid SVG with 3.5 x 34 mm Juan J drug-eluting stent; PCI to distal SVG with 3.5 x 15 mm Juan J drug-eluting stent; Balloon angioplasty to distal SVG anastomosis (5/18/2021 - Dr. Pavan Wheeler         Review of systems  General: No fevers or chills. Cardiovascular: No chest pain or pressure. No palpitations. Pulmonary: No shortness of breath, cough or wheeze. Gastrointestinal: positive abdominal pain, no nausea, vomiting positive diarrhea. Genitourinary: No urinary frequency, urgency, hesitancy or dysuria. Musculoskeletal: No joint or muscle pain, no back pain, no recent trauma. Neurologic: No headache,generalized weakness     Objective:     Physical Exam:  Visit Vitals  /72 (BP 1 Location: Left upper arm, BP Patient Position: At rest)   Pulse 91   Temp 97.2 °F (36.2 °C)   Resp 15   Ht 5' 8\" (1.727 m)   Wt 73.9 kg (163 lb)   SpO2 94%   BMI 24.78 kg/m²        General:         Alert, cooperative, no acute distress    HEENT: NC, Atraumatic. PERRLA, anicteric sclerae. Lungs: CTA Bilaterally. No Wheezing/Rhonchi/Rales. Heart:  Regular  rhythm,  No murmur, No Rubs, No Gallops  Abdomen: Soft, Non distended, diffuse tenderness.  +Bowel sounds, no HSM  Extremities: No lower limb edema   Psych:   Not anxious or agitated. Neurologic:  CN 2-12 grossly intact, Alert and oriented X 3. No acute neurological                                 Deficits,     Intake and Output:  Current Shift:  No intake/output data recorded.   Last three shifts:  06/17 1901 - 06/19 0700  In: -   Out: 275 [Urine:275]    Labs: Results:       Chemistry Recent Labs     06/17/21  0430   GLU 80   *   K 4.2   CL 98*   CO2 30   BUN 22*   CREA 1.00   CA 9.0   AGAP 6   BUCR 22*      CBC w/Diff Recent Labs     06/17/21  0430   WBC 14.3*   HGB 10.6*   HCT 34.1*      Cardiac Enzymes No results for input(s): CPK, CKND1, WAGNER in the last 72 hours. No lab exists for component: CKRMB, TROIP   Coagulation No results for input(s): PTP, INR, APTT, INREXT in the last 72 hours. Lipid Panel No results found for: CHOL, CHOLPOCT, CHOLX, CHLST, CHOLV, [id number], HDL, HDLP, LDL, LDLC, DLDLP, 914085, VLDLC, VLDL, TGLX, TRIGL, TRIGP, TGLPOCT, CHHD, CHHDX   BNP No results for input(s): BNPP in the last 72 hours. Liver Enzymes No results for input(s): TP, ALB, TBIL, AP in the last 72 hours.     No lab exists for component: SGOT, GPT, DBIL   Thyroid Studies No results found for: T4, T3U, TSH, TSHEXT       Procedures/imaging: see electronic medical records for all procedures/Xrays and details which were not copied into this note but were reviewed prior to creation of Plan    Medications:   Current Facility-Administered Medications   Medication Dose Route Frequency    gabapentin (NEURONTIN) capsule 100 mg  100 mg Oral TID    carvediloL (COREG) tablet 25 mg  25 mg Oral BID WITH MEALS    ipratropium (ATROVENT) 0.02 % nebulizer solution 0.5 mg  0.5 mg Nebulization TID    clopidogreL (PLAVIX) tablet 75 mg  75 mg Oral DAILY WITH BREAKFAST    senna-docusate (PERICOLACE) 8.6-50 mg per tablet 2 Tablet  2 Tablet Oral PCD    polyethylene glycol (MIRALAX) packet 17 g  17 g Oral DAILY    acetaminophen (TYLENOL) tablet 650 mg  650 mg Oral Q4H PRN    bisacodyL (DULCOLAX) tablet 10 mg  10 mg Oral Q48H PRN    albuterol (PROVENTIL VENTOLIN) nebulizer solution 2.5 mg  2.5 mg Nebulization Q4H PRN    apixaban (ELIQUIS) tablet 5 mg  5 mg Oral BID    arformoteroL (BROVANA) neb solution 15 mcg  15 mcg Nebulization BID RT    furosemide (LASIX) tablet 20 mg  20 mg Oral DAILY    nitroglycerin (NITROSTAT) tablet 0.4 mg  0.4 mg SubLINGual PRN    rosuvastatin (CRESTOR) tablet 10 mg  10 mg Oral DAILY    tamsulosin (FLOMAX) capsule 0.4 mg  0.4 mg Oral PCD    multivitamin, tx-iron-ca-min (THERA-M w/ IRON) tablet 1 Tablet  1 Tablet Oral DAILY       Assessment/Plan     Principal Problem:    Non-ST elevation (NSTEMI) myocardial infarction ([facility] (5/17/2021)    Active Problems:    Essential hypertension ()      Acute on chronic heart failure with preserved ejection fraction (HFpEF) ([address] (5/17/2021)      Paroxysmal atrial fibrillation ([address] (5/17/2021)      Anticoagulated by anticoagulation treatment (5/19/2021)      Overview: On Apixaban      Mixed hyperlipidemia ()      On clopidogrel therapy (5/19/2021)      Acute embolic stroke ([facility] (7/14/7924)      Overview: 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 in the bilateral       cerebral hemispheres, brainstem and cerebellum) without residual deficit      Leukocytosis (5/17/2021)      Overview: Attributed to reactive leukocytosis due to NSTEMI      Peripheral vascular disease of extremity with claudication ([facility] (6/14/2021)      Neuropathy involving both lower extremities (10/22/2020)      Impaired mobility and ADLs (5/17/2021)      Status post insertion of drug eluting coronary artery stent (5/18/2021)      Overview: S/P PCI to proximal SVG with 3.5 x 12 mm Juan J drug-eluting stent; PCI to       mid SVG with 3.5 x 34 mm [place] drug-eluting stent; PCI to distal SVG with       3.5 x 15 mm Juan J drug-eluting stent; Balloon angioplasty to distal SVG       anastomosis (5/18/2021 - Dr. Renetta Goff)       Non-ST elevation (NSTEMI) myocardial infarction; Coronary artery disease; History of CABG; S/P PCI to proximal SVG with 3.5 x 12 mm Juan J drug-eluting stent; PCI to mid SVG with 3.5 x 34 mm Juan J drug-eluting stent; PCI to distal SVG with 3.5 x 15 mm [place] drug-eluting stent;  Balloon angioplasty to distal SVG anastomosis (5/18/2021 - Dr. Marily Quiñones)   Carvedilol 25 mg PO BID with meals (8AM, 5PM)   Clopidogrel 75 mg PO once daily with breakfast   Rosuvastatin 10  mg PO once daily  Nitroglycerin 0.4 mg SL PRN for chest pain    Acute on chronic heart failure with preserved ejection fraction (HFpEF)  Carvedilol 25 mg PO BID with meals    Furosemide 20 mg PO once daily     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, brainstem and cerebellum) without residual deficit  Clopidogrel 75 mg PO once daily with breakfast  Rosuvastatin 75 mg PO once daily     Chronic obstructive pulmonary disease (COPD  Arformoterol nebulization BID   Ipratropium nebulization TID  Albuterol nebulization q 4 hr PRN for shortness of breath/wheezing    Diarrhea  Imodium 2 mg x1   Hold stool softner  Lactobacillus daily  Check stool for c-diff  Stool culture   Cbc,cmp, mag     Francisca Nation MD  6/19/2021 5:39 PM

## 2021-06-19 NOTE — PROGRESS NOTES
SHIFT CHANGE NOTE FOR [facility]    Bedside and Verbal shift change report given to [address] (oncoming nurse) by Colonel Quintana (offgoing nurse). Report included the following information SBAR, Kardex, MAR and Recent Results. Situation:   Code Status: Full Code   Hospital Day: 12   Problem List:   Hospital Problems  Date Reviewed: 6/18/2021        Codes Class Noted POA    Peripheral vascular disease of extremity with claudication (HCC) (Chronic) ICD-10-CM: I73.9  ICD-9-CM: 443.9  6/14/2021 Yes        Mixed hyperlipidemia (Chronic) ICD-10-CM: F69.9  ICD-9-CM: 272.2  Unknown Yes        Acute embolic stroke ([facility] PTO-94-CT: I63.9  ICD-9-CM: 434.11  5/23/2021 Yes    Overview Signed 6/8/2021 12:37 AM by Drew Bee MD     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, brainstem and cerebellum) without residual deficit             Anticoagulated by anticoagulation treatment ICD-10-CM: Z79.01  ICD-9-CM: V58.61  5/19/2021 Yes    Overview Signed 6/7/2021 10:48 PM by Drew Bee MD     On Apixaban             On clopidogrel therapy ICD-10-CM: Z79.01  ICD-9-CM: V58.61  5/19/2021 Yes        Status post insertion of drug eluting coronary artery stent ICD-10-CM: Z95.5  ICD-9-CM: V45.82  5/18/2021 Yes    Overview Signed 6/18/2021  1:38 PM by Drew Bee MD     S/P PCI to proximal SVG with 3.5 x 12 mm [place] drug-eluting stent; PCI to mid SVG with 3.5 x 34 mm Juan J drug-eluting stent; PCI to distal SVG with 3.5 x 15 mm [place] drug-eluting stent;  Balloon angioplasty to distal SVG anastomosis (5/18/2021 - Dr. Rajiv Payne)              * (Principal) Non-ST elevation (NSTEMI) myocardial infarction [facility]) ICD-10-CM: I21.4  ICD-9-CM: 410.70  5/17/2021 Yes        Acute on chronic heart failure with preserved ejection fraction (HFpEF) ([facility] ICD-10-CM: I50.33  ICD-9-CM: 428.23  5/17/2021 Yes        Paroxysmal atrial fibrillation ([facility] ICD-10-CM: I48.0  ICD-9-CM: 427.31  5/17/2021 Yes Leukocytosis ICD-10-CM: D72.829  ICD-9-CM: 288.60  5/17/2021 Yes    Overview Signed 6/8/2021 12:42 AM by Rica Montero MD     Attributed to reactive leukocytosis due to NSTEMI             Impaired mobility and ADLs ICD-10-CM: Z74.09, Z78.9  ICD-9-CM: V49.89  5/17/2021 Yes        Neuropathy involving both lower extremities (Chronic) ICD-10-CM: G57.93  ICD-9-CM: 356.9  10/22/2020 Yes        Essential hypertension (Chronic) ICD-10-CM: I10  ICD-9-CM: 401.9  Unknown Yes              Background:   Past Medical History:   Past Medical History:   Diagnosis Date    Acute embolic stroke ([address] 7/37/8921    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, brainstem and cerebellum) without residual deficit    Anticoagulated by anticoagulation treatment 5/19/2021    On Apixaban    Benign prostatic hyperplasia with urinary retention     Chronic obstructive pulmonary disease (COPD) ([address]     Coronary artery disease involving native coronary artery of native heart     Essential hypertension     Heart failure with preserved left ventricular function (HFpEF) ([address]     2D echocardiogram (5/18/2021) showed EF 50%; concentric LV hypertrophy with a severely thickened septal wall and mildly thickened posterior wall; left atrial chamber is severely enlarged; right atrial chamber volume is moderately enlarged; no mass, shunts, or thrombi.      History of carotid stenosis     History of gross hematuria 5/26/2021    Attributed to Taveras trauma while patient was altered    History of renal artery stenosis     Leukocytosis 5/17/2021    Attributed to reactive leukocytosis due to NSTEMI    Malignant neoplasm of lower lobe of right lung (HCC)     Neuropathy involving both lower extremities 10/22/2020    Non-ST elevation (NSTEMI) myocardial infarction ([address] 5/17/2021    On clopidogrel therapy 5/19/2021    Paroxysmal atrial fibrillation ([address] 5/17/2021    Peripheral vascular disease of extremity with claudication ([facility] 6/14/2021    Urinary retention         Assessment:   Changes in Assessment throughout shift: No change to previous assessment     Patient has a central line: no Reasons if yes: Insertion date: Last dressing date:   Patient has Taveras Cath: no Reasons if yes:     Insertion date:  S   Last Vitals:     Vitals:    06/17/21 2035 06/18/21 0717 06/18/21 1550 06/18/21 2009   BP: 111/67 120/75 108/71 127/72   Pulse: 82 (!) 101 66 66   Resp: 18 16 16 16   Temp: 97 °F (36.1 °C) 97.7 °F (36.5 °C) 98.9 °F (37.2 °C) 98.6 °F (37 °C)   SpO2: 97% 97% 97% 96%   Weight:       Height:            PAIN    Pain Assessment    Pain Intensity 1: 0 (06/19/21 0405) Pain Intensity 1: 2 (12/29/14 1105)    Pain Location 1: Foot Pain Location 1: Abdomen    Pain Intervention(s) 1: Medication (see MAR) Pain Intervention(s) 1: Medication (see MAR)  Patient Stated Pain Goal: 0 Patient Stated Pain Goal: 0  o Intervention effective: yes  o Other actions taken for pain:       Skin Assessment  Skin color    Condition/Temperature    Integrity    Turgor    Weekly Pressure Ulcer Documentation  Pressure  Injury Documentation: No Pressure Injury Noted-Pressure Ulcer Prevention Initiated  Wound Prevention & Protection Methods  Orientation of wound Orientation of Wound Prevention: Posterior  Location of Prevention Location of Wound Prevention: Sacrum/Coccyx  Dressing Present Dressing Present : No  Dressing Status    Wound Offloading Wound Offloading (Prevention Methods): Bed, pressure redistribution/air, Bed, pressure reduction mattress     INTAKE/OUPUT  Date 06/18/21 0700 - 06/19/21 0659 06/19/21 0700 - 06/20/21 0659   Shift 3430-0797 5869-7458 24 Hour Total 7100-4261 1978-0877 24 Hour Total   INTAKE   Shift Total(mL/kg)         OUTPUT   Urine(mL/kg/hr)           Urine Occurrence(s) 4 x 5 x 9 x      Stool           Stool Occurrence(s) 0 x 3 x 3 x      Shift Total(mL/kg)         NET         Weight (kg) 73.9 73.9 73.9 73.9 73.9 73.9       Recommendations:  1. Patient needs and requests: TOILETING    2. Pending tests/procedures: LABS     3. Functional Level/Equipment: Partial (one person) /      Fall Precautions:   Fall risk precautions were reinforced with the patient; he was instructed to call for help prior to getting up. The following fall risk precautions were continued: bed/ chair alarms, door signage, yellow bracelet and socks as well as update of the Maya Vergara tool in the patient's room. [place] Score: 3    HEALS Safety Check    A safety check occurred in the patient's room between off going nurse and oncoming nurse listed above. The safety check included the below items  Area Items   H  High Alert Medications - Verify all high alert medication drips (heparin, PCA, etc.)   E  Equipment - Suction is set up for ALL patients (with laila)  - Red plugs utilized for all equipment (IV pumps, etc.)  - WOWs wiped down at end of shift.  - Room stocked with oxygen, suction, and other unit-specific supplies   A  Alarms - Bed alarm is set for fall risk patients  - Ensure chair alarm is in place and activated if patient is up in a chair   L  Lines - Check IV for any infiltration  - Taveras bag is empty if patient has a Taveras   - Tubing and IV bags are labeled   S  Safety   - Room is clean, patient is clean, and equipment is clean. - Hallways are clear from equipment besides carts. - Fall bracelet on for fall risk patients  - Ensure room is clear and free of clutter  - Suction is set up for ALL patients (with laila)  - Hallways are clear from equipment besides carts.    - Isolation precautions followed, supplies available outside room, sign posted     Maliha Sellers

## 2021-06-19 NOTE — PROGRESS NOTES
Problem: Falls - Risk of  Goal: *Absence of Falls  Description: Document Adan [place]bridge Fall Risk and appropriate interventions in the flowsheet. Outcome: Progressing Towards Goal  Note: Fall Risk Interventions:  Mobility Interventions: Bed/chair exit alarm, Patient to call before getting OOB    Mentation Interventions: Bed/chair exit alarm, Evaluate medications/consider consulting pharmacy    Medication Interventions: Bed/chair exit alarm, Evaluate medications/consider consulting pharmacy, Patient to call before getting OOB    Elimination Interventions: Call light in reach, Bed/chair exit alarm, Patient to call for help with toileting needs    History of Falls Interventions: Bed/chair exit alarm, Evaluate medications/consider consulting pharmacy         Problem: Patient Education: Go to Patient Education Activity  Goal: Patient/Family Education  Outcome: Progressing Towards Goal     Problem: Pressure Injury - Risk of  Goal: *Prevention of pressure injury  Description: Document Nicko Scale and appropriate interventions in the flowsheet.   Outcome: Progressing Towards Goal  Note: Pressure Injury Interventions:  Sensory Interventions: Assess changes in LOC         Activity Interventions: Chair cushion, Increase time out of bed, Pressure redistribution bed/mattress(bed type)    Mobility Interventions: Chair cushion, HOB 30 degrees or less, Pressure redistribution bed/mattress (bed type)    Nutrition Interventions: Document food/fluid/supplement intake    Friction and Shear Interventions: Foam dressings/transparent film/skin sealants                Problem: Patient Education: Go to Patient Education Activity  Goal: Patient/Family Education  Outcome: Progressing Towards Goal     Problem: Pain  Goal: *Control of Pain  Outcome: Progressing Towards Goal     Problem: Patient Education: Go to Patient Education Activity  Goal: Patient/Family Education  Outcome: Progressing Towards Goal     Problem: Inpatient Rehab (Adult)  Goal: *LTG: Avoids injury/falls 100% of time related to deficits  Outcome: Progressing Towards Goal  Goal: *LTG: Avoids infection 100% of time related to deficits  Outcome: Progressing Towards Goal  Goal: *LTG: Verbalize understanding of diagnosis and risk factors for recurring stroke  Outcome: Progressing Towards Goal  Goal: *LTG: Absence of DVT during hospitalization  Outcome: Progressing Towards Goal  Goal: *LTG: Maintains Skin Integrity With No Evidence of Pressure Injury 100% of Time  Outcome: Progressing Towards Goal  Goal: Interventions  Outcome: Progressing Towards Goal     Problem: Patient Education: Go to Patient Education Activity  Goal: Patient/Family Education  Outcome: Progressing Towards Goal     Problem: Patient Education: Go to Patient Education Activity  Goal: Patient/Family Education  Outcome: Progressing Towards Goal     Problem: Patient Education: Go to Patient Education Activity  Goal: Patient/Family Education  Outcome: Progressing Towards Goal     Problem: Patient Education: Go to Patient Education Activity  Goal: Patient/Family Education  Outcome: Progressing Towards Goal     Problem: Nutrition Deficit  Goal: *Optimize nutritional status  Outcome: Progressing Towards Goal     Problem: Risk for Spread of Infection  Goal: Prevent transmission of infectious organism to others  Description: Prevent the transmission of infectious organisms to other patients, staff members, and visitors.   Outcome: Progressing Towards Goal     Problem: Patient Education:  Go to Education Activity  Goal: Patient/Family Education  Outcome: Progressing Towards Goal

## 2021-06-19 NOTE — REHAB NOTE
Pt had a Cdiff testing ordered by MD, this writer was told by Microbiology dept that before sending the stool sample, it will be needed to contact the infectious disease dept for further evaluation ([phone]). Left VM at the ID dept., not available until Monday. MD notified and made aware of the situation.

## 2021-06-19 NOTE — PROGRESS NOTES
SHIFT CHANGE NOTE FOR [facility]    Bedside and Verbal shift change report given to Ravi Gay RN (oncoming nurse) by Yasmeen Portillo RN (offgoing nurse). Report included the following information SBAR, Kardex, MAR and Recent Results. Situation:   Code Status: Full Code   Hospital Day: 12   Problem List:   Hospital Problems  Date Reviewed: 6/18/2021        Codes Class Noted POA    Peripheral vascular disease of extremity with claudication (HCC) (Chronic) ICD-10-CM: I73.9  ICD-9-CM: 443.9  6/14/2021 Yes        Mixed hyperlipidemia (Chronic) ICD-10-CM: B43.1  ICD-9-CM: 272.2  Unknown Yes        Acute embolic stroke ([facility] [address] [id number]: I63.9  ICD-9-CM: 434.11  5/23/2021 Yes    Overview Signed 6/8/2021 12:37 AM by Anila Dykes MD     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, brainstem and cerebellum) without residual deficit             Anticoagulated by anticoagulation treatment ICD-10-CM: Z79.01  ICD-9-CM: V58.61  5/19/2021 Yes    Overview Signed 6/7/2021 10:48 PM by Anila Dykes MD     On Apixaban             On clopidogrel therapy ICD-10-CM: Z79.01  ICD-9-CM: V58.61  5/19/2021 Yes        Status post insertion of drug eluting coronary artery stent ICD-10-CM: Z95.5  ICD-9-CM: V45.82  5/18/2021 Yes    Overview Signed 6/18/2021  1:38 PM by Anila Dykes MD     S/P PCI to proximal SVG with 3.5 x 12 mm [place] drug-eluting stent; PCI to mid SVG with 3.5 x 34 mm Hollis drug-eluting stent; PCI to distal SVG with 3.5 x 15 mm Hollis drug-eluting stent;  Balloon angioplasty to distal SVG anastomosis (5/18/2021 - Dr. Viry Munoz)              * (Principal) Non-ST elevation (NSTEMI) myocardial infarction [facility]) ICD-10-CM: I21.4  ICD-9-CM: 410.70  5/17/2021 Yes        Acute on chronic heart failure with preserved ejection fraction (HFpEF) ([facility] ICD-10-CM: I50.33  ICD-9-CM: 428.23  5/17/2021 Yes        Paroxysmal atrial fibrillation ([facility] ICD-10-CM: I48.0  ICD-9-CM: 427.31  5/17/2021 Yes Leukocytosis ICD-10-CM: D72.829  ICD-9-CM: 288.60  5/17/2021 Yes    Overview Signed 6/8/2021 12:42 AM by Liberty Peterson MD     Attributed to reactive leukocytosis due to NSTEMI             Impaired mobility and ADLs ICD-10-CM: Z74.09, Z78.9  ICD-9-CM: V49.89  5/17/2021 Yes        Neuropathy involving both lower extremities (Chronic) ICD-10-CM: G57.93  ICD-9-CM: 356.9  10/22/2020 Yes        Essential hypertension (Chronic) ICD-10-CM: I10  ICD-9-CM: 401.9  Unknown Yes              Background:   Past Medical History:   Past Medical History:   Diagnosis Date    Acute embolic stroke ([address] 1/17/9810    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, brainstem and cerebellum) without residual deficit    Anticoagulated by anticoagulation treatment 5/19/2021    On Apixaban    Benign prostatic hyperplasia with urinary retention     Chronic obstructive pulmonary disease (COPD) ([address]     Coronary artery disease involving native coronary artery of native heart     Essential hypertension     Heart failure with preserved left ventricular function (HFpEF) ([address]     2D echocardiogram (5/18/2021) showed EF 50%; concentric LV hypertrophy with a severely thickened septal wall and mildly thickened posterior wall; left atrial chamber is severely enlarged; right atrial chamber volume is moderately enlarged; no mass, shunts, or thrombi.      History of carotid stenosis     History of gross hematuria 5/26/2021    Attributed to Taveras trauma while patient was altered    History of renal artery stenosis     Leukocytosis 5/17/2021    Attributed to reactive leukocytosis due to NSTEMI    Malignant neoplasm of lower lobe of right lung (HCC)     Neuropathy involving both lower extremities 10/22/2020    Non-ST elevation (NSTEMI) myocardial infarction ([address] 5/17/2021    On clopidogrel therapy 5/19/2021    Paroxysmal atrial fibrillation ([address] 5/17/2021    Peripheral vascular disease of extremity with claudication ([facility] [address] 6/14/2021    Urinary retention         Assessment:   Changes in Assessment throughout shift: No change to previous assessment     Patient has a central line: no Reasons if yes: Insertion date: Last dressing date:   Patient has Taveras Cath: no Reasons if yes:     Insertion date:  S   Last Vitals:     Vitals:    06/18/21 1550 06/18/21 2009 06/19/21 0733 06/19/21 1552   BP: 108/71 127/72 124/65 115/72   Pulse: 66 66 (!) 109 91   Resp: 16 16 18 15   Temp: 98.9 °F (37.2 °C) 98.6 °F (37 °C) 98.5 °F (36.9 °C) 97.2 °F (36.2 °C)   SpO2: 97% 96% 93% 94%   Weight:       Height:            PAIN    Pain Assessment    Pain Intensity 1: 0 (06/19/21 1615) Pain Intensity 1: 2 (12/29/14 1105)    Pain Location 1: Hip Pain Location 1: Abdomen    Pain Intervention(s) 1: Medication (see MAR) Pain Intervention(s) 1: Medication (see MAR)  Patient Stated Pain Goal: 0 Patient Stated Pain Goal: 0  o Intervention effective: yes  o Other actions taken for pain: Medication (see MAR)     Skin Assessment  Skin color    Condition/Temperature    Integrity    Turgor    Weekly Pressure Ulcer Documentation  Pressure  Injury Documentation: No Pressure Injury Noted-Pressure Ulcer Prevention Initiated  Wound Prevention & Protection Methods  Orientation of wound Orientation of Wound Prevention: Posterior  Location of Prevention Location of Wound Prevention: Sacrum/Coccyx  Dressing Present Dressing Present : No  Dressing Status    Wound Offloading Wound Offloading (Prevention Methods): Bed, pressure reduction mattress     INTAKE/OUPUT  Date 06/18/21 1900 - 06/19/21 0659 06/19/21 0700 - 06/20/21 0659   Shift 1022-0852 24 Hour Total 3176-6740 5653-4894 24 Hour Total   INTAKE   Shift Total(mL/kg)        OUTPUT   Urine(mL/kg/hr)          Urine Occurrence(s) 5 x 9 x 4 x  4 x   Stool          Stool Occurrence(s) 3 x 3 x 5 x  5 x   Shift Total(mL/kg)        NET        Weight (kg) 73.9 73.9 73.9 73.9 73.9 Recommendations:  1. Patient needs and requests: TOILETING    2. Pending tests/procedures: LABS     3. Functional Level/Equipment: Partial (one person) / Bed (comment)    Fall Precautions:   Fall risk precautions were reinforced with the patient; he was instructed to call for help prior to getting up. The following fall risk precautions were continued: bed/ chair alarms, door signage, yellow bracelet and socks as well as update of the Scott Camper tool in the patient's room. [place] Score: 3    HEALS Safety Check    A safety check occurred in the patient's room between off going nurse and oncoming nurse listed above. The safety check included the below items  Area Items   H  High Alert Medications - Verify all high alert medication drips (heparin, PCA, etc.)   E  Equipment - Suction is set up for ALL patients (with laila)  - Red plugs utilized for all equipment (IV pumps, etc.)  - WOWs wiped down at end of shift.  - Room stocked with oxygen, suction, and other unit-specific supplies   A  Alarms - Bed alarm is set for fall risk patients  - Ensure chair alarm is in place and activated if patient is up in a chair   L  Lines - Check IV for any infiltration  - Taveras bag is empty if patient has a Taveras   - Tubing and IV bags are labeled   S  Safety   - Room is clean, patient is clean, and equipment is clean. - Hallways are clear from equipment besides carts. - Fall bracelet on for fall risk patients  - Ensure room is clear and free of clutter  - Suction is set up for ALL patients (with laila)  - Hallways are clear from equipment besides carts.    - Isolation precautions followed, supplies available outside room, sign posted     Tereza Valenzuela RN

## 2021-06-20 NOTE — PROGRESS NOTES
SHIFT CHANGE NOTE FOR [facility]    Bedside and Verbal shift change report given to Blanka Tejada RN (oncoming nurse) by Leeta Cushing, RN (offgoing nurse). Report included the following information SBAR, Kardex, MAR and Recent Results. Situation:   Code Status: Full Code   Hospital Day: 13   Problem List:   Hospital Problems  Date Reviewed: 6/18/2021        Codes Class Noted POA    Peripheral vascular disease of extremity with claudication (HCC) (Chronic) ICD-10-CM: I73.9  ICD-9-CM: 443.9  6/14/2021 Yes        Mixed hyperlipidemia (Chronic) ICD-10-CM: I64.5  ICD-9-CM: 272.2  Unknown Yes        Acute embolic stroke ([facility] [address] [id number]: I63.9  ICD-9-CM: 434.11  5/23/2021 Yes    Overview Signed 6/8/2021 12:37 AM by Mariluz Hahn MD     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, brainstem and cerebellum) without residual deficit             Anticoagulated by anticoagulation treatment ICD-10-CM: Z79.01  ICD-9-CM: V58.61  5/19/2021 Yes    Overview Signed 6/7/2021 10:48 PM by Mariluz Hahn MD     On Apixaban             On clopidogrel therapy ICD-10-CM: Z79.01  ICD-9-CM: V58.61  5/19/2021 Yes        Status post insertion of drug eluting coronary artery stent ICD-10-CM: Z95.5  ICD-9-CM: V45.82  5/18/2021 Yes    Overview Signed 6/18/2021  1:38 PM by Mariluz Hahn MD     S/P PCI to proximal SVG with 3.5 x 12 mm Juan J drug-eluting stent; PCI to mid SVG with 3.5 x 34 mm Juan J drug-eluting stent; PCI to distal SVG with 3.5 x 15 mm [place] drug-eluting stent;  Balloon angioplasty to distal SVG anastomosis (5/18/2021 - Dr. Maribel Gardner)              * (Principal) Non-ST elevation (NSTEMI) myocardial infarction [facility]) ICD-10-CM: I21.4  ICD-9-CM: 410.70  5/17/2021 Yes        Acute on chronic heart failure with preserved ejection fraction (HFpEF) ([facility] ICD-10-CM: I50.33  ICD-9-CM: 428.23  5/17/2021 Yes        Paroxysmal atrial fibrillation ([facility] ICD-10-CM: I48.0  ICD-9-CM: 427.31  5/17/2021 Yes Leukocytosis ICD-10-CM: D72.829  ICD-9-CM: 288.60  5/17/2021 Yes    Overview Signed 6/8/2021 12:42 AM by Tracey Landin MD     Attributed to reactive leukocytosis due to NSTEMI             Impaired mobility and ADLs ICD-10-CM: Z74.09, Z78.9  ICD-9-CM: V49.89  5/17/2021 Yes        Neuropathy involving both lower extremities (Chronic) ICD-10-CM: G57.93  ICD-9-CM: 356.9  10/22/2020 Yes        Essential hypertension (Chronic) ICD-10-CM: I10  ICD-9-CM: 401.9  Unknown Yes              Background:   Past Medical History:   Past Medical History:   Diagnosis Date    Acute embolic stroke ([address] 0/53/4718    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, brainstem and cerebellum) without residual deficit    Anticoagulated by anticoagulation treatment 5/19/2021    On Apixaban    Benign prostatic hyperplasia with urinary retention     Chronic obstructive pulmonary disease (COPD) ([address]     Coronary artery disease involving native coronary artery of native heart     Essential hypertension     Heart failure with preserved left ventricular function (HFpEF) ([address]     2D echocardiogram (5/18/2021) showed EF 50%; concentric LV hypertrophy with a severely thickened septal wall and mildly thickened posterior wall; left atrial chamber is severely enlarged; right atrial chamber volume is moderately enlarged; no mass, shunts, or thrombi.      History of carotid stenosis     History of gross hematuria 5/26/2021    Attributed to Taver[place] trauma while patient was altered    History of renal artery stenosis     Leukocytosis 5/17/2021    Attributed to reactive leukocytosis due to NSTEMI    Malignant neoplasm of lower lobe of right lung (HCC)     Neuropathy involving both lower extremities 10/22/2020    Non-ST elevation (NSTEMI) myocardial infarction ([address] 5/17/2021    On clopidogrel therapy 5/19/2021    Paroxysmal atrial fibrillation ([address] 5/17/2021    Peripheral vascular disease of extremity with claudication ([facility] [address] 6/14/2021    Urinary retention         Assessment:   Changes in Assessment throughout shift: No change to previous assessment     Patient has a central line: no Reasons if yes: Insertion date: Last dressing date:   Patient has Taveras Cath: no Reasons if yes: Insertion date:  S   Last Vitals:     Vitals:    06/19/21 1552 06/19/21 2003 06/20/21 0756 06/20/21 1643   BP: 115/72 95/61 107/69 131/74   Pulse: 91 83 96 99   Resp: 15 16 18 17   Temp: 97.2 °F (36.2 °C) 97.4 °F (36.3 °C) 97.3 °F (36.3 °C) 97.4 °F (36.3 °C)   SpO2: 94% 96% 98% 95%   Weight:       Height:            PAIN    Pain Assessment    Pain Intensity 1: 0 (06/20/21 1818) Pain Intensity 1: 2 (12/29/14 1105)    Pain Location 1: Foot Pain Location 1: Abdomen    Pain Intervention(s) 1: Medication (see MAR) Pain Intervention(s) 1: Medication (see MAR)  Patient Stated Pain Goal: 0 Patient Stated Pain Goal: 0  o Intervention effective: yes  o Other actions taken for pain: Medication (see MAR)     Skin Assessment  Skin color    Condition/Temperature    Integrity    Turgor    Weekly Pressure Ulcer Documentation  Pressure  Injury Documentation: No Pressure Injury Noted-Pressure Ulcer Prevention Initiated  Wound Prevention & Protection Methods  Orientation of wound Orientation of Wound Prevention: Plantar  Location of Prevention Location of Wound Prevention: Buttocks  Dressing Present Dressing Present : No  Dressing Status    Wound Offloading Wound Offloading (Prevention Methods): Bed, pressure redistribution/air     INTAKE/OUPUT  Date 06/19/21 0700 - 06/20/21 0659 06/20/21 0700 - 06/21/21 0659   Shift 0387-8474 2055-5500 24 Hour Total 1256-3239 2773-2669 24 Hour Total   INTAKE   P.O.    1050  1050     P. O.    1050  1050   I. V.(mL/kg/hr)    500  500     I.V.    500  500   Shift Total(mL/kg)    1550(21)  1550(21)   OUTPUT   Urine(mL/kg/hr)    250  250     Urine    250  250     Urine Occurrence(s) 4 x 5 x 9 x 1 x  1 x Stool           Stool Occurrence(s) 5 x 6 x 11 x 1 x  1 x   Shift Total(mL/kg)    250(3.4)  250(3.4)   NET    1300  1300   Weight (kg) 73.9 73.9 73.9 73.9 73.9 73.9       Recommendations:  1. Patient needs and requests: TOILETING    2. Pending tests/procedures: LABS     3. Functional Level/Equipment: Partial (one person) / Bed (comment)    Fall Precautions:   Fall risk precautions were reinforced with the patient; he was instructed to call for help prior to getting up. The following fall risk precautions were continued: bed/ chair alarms, door signage, yellow bracelet and socks as well as update of the Mitch Mylar tool in the patient's room. [place] Score: 3    HEALS Safety Check    A safety check occurred in the patient's room between off going nurse and oncoming nurse listed above. The safety check included the below items  Area Items   H  High Alert Medications - Verify all high alert medication drips (heparin, PCA, etc.)   E  Equipment - Suction is set up for ALL patients (with laila)  - Red plugs utilized for all equipment (IV pumps, etc.)  - WOWs wiped down at end of shift.  - Room stocked with oxygen, suction, and other unit-specific supplies   A  Alarms - Bed alarm is set for fall risk patients  - Ensure chair alarm is in place and activated if patient is up in a chair   L  Lines - Check IV for any infiltration  - Taveras bag is empty if patient has a Taveras   - Tubing and IV bags are labeled   S  Safety   - Room is clean, patient is clean, and equipment is clean. - Hallways are clear from equipment besides carts. - Fall bracelet on for fall risk patients  - Ensure room is clear and free of clutter  - Suction is set up for ALL patients (with laila)  - Hallways are clear from equipment besides carts.    - Isolation precautions followed, supplies available outside room, sign posted     Robert Odom RN

## 2021-06-20 NOTE — PROGRESS NOTES
Progress Note    Patient: Marcella Wood MRN: [id number]  CSN: [id number]    YOB: 1940  Age: 80 y.o. Sex: male    DOA: 6/7/2021 LOS:  LOS: 13 days                    Subjective:        Primary Rehab Diagnosis: Impaired Mobility and ADLs secondary to:  1. Non-ST elevation (NSTEMI) myocardial infarction  2. Coronary artery disease; History of CABG  3. S/P PCI to proximal SVG with 3.5 x 12 mm [place] drug-eluting stent; PCI to mid SVG with 3.5 x 34 mm Juan J drug-eluting stent; PCI to distal SVG with 3.5 x 15 mm [place] drug-eluting stent; Balloon angioplasty to distal SVG anastomosis (5/18/2021 - Dr. English Favors         Review of systems  General: No fevers or chills. Cardiovascular: No chest pain or pressure. No palpitations. Pulmonary: No shortness of breath, cough or wheeze. Gastrointestinal: positive abdominal pain, no nausea, vomiting positive diarrhea improving . Genitourinary: No urinary frequency, urgency, hesitancy or dysuria. Musculoskeletal: No joint or muscle pain, no back pain, no recent trauma. Neurologic: No headache,generalized weakness     Objective:     Physical Exam:  Visit Vitals  /69 (BP 1 Location: Right lower arm, BP Patient Position: At rest)   Pulse 96   Temp 97.3 °F (36.3 °C)   Resp 18   Ht 5' 8\" (1.727 m)   Wt 73.9 kg (163 lb)   SpO2 98%   BMI 24.78 kg/m²        General:         Alert, cooperative, no acute distress    HEENT: NC, Atraumatic. PERRLA, anicteric sclerae. Lungs: CTA Bilaterally. No Wheezing/Rhonchi/Rales. Heart:  Regular  rhythm,  No murmur, No Rubs, No Gallops  Abdomen: Soft, Non distended,  Tenderness rt lower abdomen .  +Bowel sounds, no HSM  Extremities: No lower limb edema   Psych:   Not anxious or agitated. Neurologic:  CN 2-12 grossly intact, Alert and oriented X 3.   No acute neurological                                 Deficits,     Intake and Output:  Current Shift:  06/20 0701 - 06/20 1900  In: -   Out: 250 [Urine:250]  Last three shifts:  No intake/output data recorded. Labs: Results:       Chemistry Recent Labs     06/20/21  0550   GLU 78   *   K 4.5   CL 90*   CO2 30   BUN 28*   CREA 1.12   CA 9.3   AGAP 6   BUCR 25*   AP 46   TP 6.2*   ALB 3.2*   GLOB 3.0   AGRAT 1.1      CBC w/Diff Recent Labs     06/20/21  0550   WBC 26.7*   RBC 3.33*   HGB 9.5*   HCT 31.0*      GRANS 89*   LYMPH 3*   EOS 0      Cardiac Enzymes No results for input(s): CPK, CKND1, WAGNER in the last 72 hours. No lab exists for component: CKRMB, TROIP   Coagulation No results for input(s): PTP, INR, APTT, INREXT, INREXT in the last 72 hours. Lipid Panel No results found for: CHOL, CHOLPOCT, CHOLX, CHLST, CHOLV, 260465, HDL, HDLP, LDL, LDLC, DLDLP, 468577, VLDLC, VLDL, TGLX, TRIGL, TRIGP, TGLPOCT, CHHD, CHHDX   BNP No results for input(s): BNPP in the last 72 hours.    Liver Enzymes Recent Labs     06/20/21  0550   TP 6.2*   ALB 3.2*   AP 46      Thyroid Studies No results found for: T4, T3U, TSH, TSHEXT, TSHEXT       Procedures/imaging: see electronic medical records for all procedures/Xrays and details which were not copied into this note but were reviewed prior to creation of Plan    Medications:   Current Facility-Administered Medications   Medication Dose Route Frequency    vancomycin 50 mg/mL oral solution (compounded) 500 mg  500 mg Oral Q6H    0.9% sodium chloride infusion  75 mL/hr IntraVENous CONTINUOUS    cefTRIAXone (ROCEPHIN) 1 g in sterile water (preservative free) 10 mL IV syringe  1 g IntraVENous Q24H    metroNIDAZOLE (FLAGYL) IVPB premix 500 mg  500 mg IntraVENous Q8H    vancomycin (VANCOCIN) 1750 mg in  ml infusion  1,750 mg IntraVENous ONCE    [START ON 6/21/2021] vancomycin (VANCOCIN) 1,000 mg in 0.9% sodium chloride 250 mL (VIAL-MATE)  1,000 mg IntraVENous Q18H    cholestyramine-aspartame (QUESTRAN LIGHT) packet 4 g  4 g Oral TID WITH MEALS    L. acidophilus,casei,rhamnosus (BIO-K PLUS) capsule 1 Capsule  1 Capsule Oral DAILY    gabapentin (NEURONTIN) capsule 100 mg  100 mg Oral TID    carvediloL (COREG) tablet 25 mg  25 mg Oral BID WITH MEALS    ipratropium (ATROVENT) 0.02 % nebulizer solution 0.5 mg  0.5 mg Nebulization TID    clopidogreL (PLAVIX) tablet 75 mg  75 mg Oral DAILY WITH BREAKFAST    senna-docusate (PERICOLACE) 8.6-50 mg per tablet 2 Tablet  2 Tablet Oral PCD    polyethylene glycol (MIRALAX) packet 17 g  17 g Oral DAILY    acetaminophen (TYLENOL) tablet 650 mg  650 mg Oral Q4H PRN    bisacodyL (DULCOLAX) tablet 10 mg  10 mg Oral Q48H PRN    albuterol (PROVENTIL VENTOLIN) nebulizer solution 2.5 mg  2.5 mg Nebulization Q4H PRN    apixaban (ELIQUIS) tablet 5 mg  5 mg Oral BID    arformoteroL (BROVANA) neb solution 15 mcg  15 mcg Nebulization BID RT    [Held by provider] furosemide (LASIX) tablet 20 mg  20 mg Oral DAILY    nitroglycerin (NITROSTAT) tablet 0.4 mg  0.4 mg SubLINGual PRN    rosuvastatin (CRESTOR) tablet 10 mg  10 mg Oral DAILY    tamsulosin (FLOMAX) capsule 0.4 mg  0.4 mg Oral PCD    multivitamin, tx-iron-ca-min (THERA-M w/ IRON) tablet 1 Tablet  1 Tablet Oral DAILY       Assessment/Plan     Principal Problem:    Non-ST elevation (NSTEMI) myocardial infarction ([facility] (5/17/2021)    Active Problems:    Essential hypertension ()      Acute on chronic heart failure with preserved ejection fraction (HFpEF) ([facility]) (5/17/2021)      Paroxysmal atrial fibrillation ([facility] (5/17/2021)      Anticoagulated by anticoagulation treatment (5/19/2021)      Overview:  On Apixaban      Mixed hyperlipidemia ()      On clopidogrel therapy (5/19/2021)      Acute embolic stroke ([facility] (7/91/0645)      Overview: 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 in the bilateral       cerebral hemispheres, brainstem and cerebellum) without residual deficit      Leukocytosis (5/17/2021)      Overview: Attributed to reactive leukocytosis due to NSTEMI      Peripheral vascular disease of extremity with claudication ([facility] (6/14/2021)      Neuropathy involving both lower extremities (10/22/2020)      Impaired mobility and ADLs (5/17/2021)      Status post insertion of drug eluting coronary artery stent (5/18/2021)      Overview: S/P PCI to proximal SVG with 3.5 x 12 mm [place] drug-eluting stent; PCI to       mid SVG with 3.5 x 34 mm Juan J drug-eluting stent; PCI to distal SVG with       3.5 x 15 mm Juan J drug-eluting stent; Balloon angioplasty to distal SVG       anastomosis (5/18/2021 - Dr. Jim Saavedra)       Non-ST elevation (NSTEMI) myocardial infarction; Coronary artery disease; History of CABG; S/P PCI to proximal SVG with 3.5 x 12 mm [place] drug-eluting stent; PCI to mid SVG with 3.5 x 34 mm Juan J drug-eluting stent; PCI to distal SVG with 3.5 x 15 mm [place] drug-eluting stent;  Balloon angioplasty to distal SVG anastomosis (5/18/2021 - Dr. Jim Saavedra)   Carvedilol 25 mg PO BID with meals (8AM, 5PM)   Clopidogrel 75 mg PO once daily with breakfast   Rosuvastatin 10  mg PO once daily  Nitroglycerin 0.4 mg SL PRN for chest pain    Acute on chronic heart failure with preserved ejection fraction (HFpEF)  Carvedilol 25 mg PO BID with meals    Furosemide 20 mg PO once daily     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, brainstem and cerebellum) without residual deficit  Clopidogrel 75 mg PO once daily with breakfast  Rosuvastatin 75 mg PO once daily     Chronic obstructive pulmonary disease (COPD  Arformoterol nebulization BID   Ipratropium nebulization TID  Albuterol nebulization q 4 hr PRN for shortness of breath/wheezing    Leucocytosis / sepsis unknown etiology / urine retntion   bladder scanStraight cath q shift x 24h if urine residual more than 250 cc  Discussed with ID Dr Yves Archer for consult  Ct chest abdomen and pelvis with contrast after hydrating pt   Start rocephin 1 gm IV daily  Vancomycin pharmacy to dose and oral vancomycin   IV flagyl  CT scan chest , abdomen and pelvis  Urine analysis and urine culture   Blood culture X 2  Discussed with nursing staff will hold on c-diff   If diarrhea improving no smell for c-diff     Diarrhea/ dehydration/ hyponatremia  Check urine osmolality  Urine sodium   Serum osmolality  Start sachin hydration NS at 75 cc /h  Hold Lasix     Imodium 2 mg x1 yestrday  Hold stool softner  Lactobacillus daily  Add questran TID before meals   Check stool for c-diff if no other source of infection   Stool culture   Cbc,cmp, mag in AM   Discussed with nursing staff and ID     Roberto Guzman MD  6/20/2021 3:30 PM

## 2021-06-20 NOTE — PROGRESS NOTES
SHIFT CHANGE NOTE FOR [facility]    Bedside and Verbal shift change report given to Alina Gaspar RN (oncoming nurse) by Matt Lopez (offgoing nurse). Report included the following information SBAR, Kardex, MAR and Recent Results. Situation:   Code Status: Full Code   Hospital Day: 13   Problem List:   Hospital Problems  Date Reviewed: 6/18/2021        Codes Class Noted POA    Peripheral vascular disease of extremity with claudication (HCC) (Chronic) ICD-10-CM: I73.9  ICD-9-CM: 443.9  6/14/2021 Yes        Mixed hyperlipidemia (Chronic) ICD-10-CM: M60.2  ICD-9-CM: 272.2  Unknown Yes        Acute embolic stroke ([facility] UKQ-60-ZQ: I63.9  ICD-9-CM: 434.11  5/23/2021 Yes    Overview Signed 6/8/2021 12:37 AM by Liberty Peterson MD     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, brainstem and cerebellum) without residual deficit             Anticoagulated by anticoagulation treatment ICD-10-CM: Z79.01  ICD-9-CM: V58.61  5/19/2021 Yes    Overview Signed 6/7/2021 10:48 PM by Liberty Peterson MD     On Apixaban             On clopidogrel therapy ICD-10-CM: Z79.01  ICD-9-CM: V58.61  5/19/2021 Yes        Status post insertion of drug eluting coronary artery stent ICD-10-CM: Z95.5  ICD-9-CM: V45.82  5/18/2021 Yes    Overview Signed 6/18/2021  1:38 PM by Liberty Peterson MD     S/P PCI to proximal SVG with 3.5 x 12 mm [place] drug-eluting stent; PCI to mid SVG with 3.5 x 34 mm Juan J drug-eluting stent; PCI to distal SVG with 3.5 x 15 mm Juan J drug-eluting stent;  Balloon angioplasty to distal SVG anastomosis (5/18/2021 - Dr. Precious Heath)              * (Principal) Non-ST elevation (NSTEMI) myocardial infarction [facility]) ICD-10-CM: I21.4  ICD-9-CM: 410.70  5/17/2021 Yes        Acute on chronic heart failure with preserved ejection fraction (HFpEF) ([facility] ICD-10-CM: I50.33  ICD-9-CM: 428.23  5/17/2021 Yes        Paroxysmal atrial fibrillation ([facility] ICD-10-CM: I48.0  ICD-9-CM: 427.31  5/17/2021 Yes Leukocytosis ICD-10-CM: D72.829  ICD-9-CM: 288.60  5/17/2021 Yes    Overview Signed 6/8/2021 12:42 AM by Mariluz Hahn MD     Attributed to reactive leukocytosis due to NSTEMI             Impaired mobility and ADLs ICD-10-CM: Z74.09, Z78.9  ICD-9-CM: V49.89  5/17/2021 Yes        Neuropathy involving both lower extremities (Chronic) ICD-10-CM: G57.93  ICD-9-CM: 356.9  10/22/2020 Yes        Essential hypertension (Chronic) ICD-10-CM: I10  ICD-9-CM: 401.9  Unknown Yes              Background:   Past Medical History:   Past Medical History:   Diagnosis Date    Acute embolic stroke ([address] 2/37/8631    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, brainstem and cerebellum) without residual deficit    Anticoagulated by anticoagulation treatment 5/19/2021    On Apixaban    Benign prostatic hyperplasia with urinary retention     Chronic obstructive pulmonary disease (COPD) ([address]     Coronary artery disease involving native coronary artery of native heart     Essential hypertension     Heart failure with preserved left ventricular function (HFpEF) ([address]     2D echocardiogram (5/18/2021) showed EF 50%; concentric LV hypertrophy with a severely thickened septal wall and mildly thickened posterior wall; left atrial chamber is severely enlarged; right atrial chamber volume is moderately enlarged; no mass, shunts, or thrombi.      History of carotid stenosis     History of gross hematuria 5/26/2021    Attributed to Taveras trauma while patient was altered    History of renal artery stenosis     Leukocytosis 5/17/2021    Attributed to reactive leukocytosis due to NSTEMI    Malignant neoplasm of lower lobe of right lung (HCC)     Neuropathy involving both lower extremities 10/22/2020    Non-ST elevation (NSTEMI) myocardial infarction ([address] 5/17/2021    On clopidogrel therapy 5/19/2021    Paroxysmal atrial fibrillation ([address] 5/17/2021    Peripheral vascular disease of extremity with claudication ([facility] 6/14/2021    Urinary retention         Assessment:   Changes in Assessment throughout shift: No change to previous assessment     Patient has a central line: no Reasons if yes: Insertion date: Last dressing date:   Patient has [place] Cath: no Reasons if yes:     Insertion date:  S   Last Vitals:     Vitals:    06/18/21 2009 06/19/21 0733 06/19/21 1552 06/19/21 2003   BP: 127/72 124/65 115/72 95/61   Pulse: 66 (!) 109 91 83   Resp: 16 18 15 16   Temp: 98.6 °F (37 °C) 98.5 °F (36.9 °C) 97.2 °F (36.2 °C) 97.4 °F (36.3 °C)   SpO2: 96% 93% 94% 96%   Weight:       Height:            PAIN    Pain Assessment    Pain Intensity 1: 0 (06/20/21 0404) Pain Intensity 1: 2 (12/29/14 1105)    Pain Location 1: Hip Pain Location 1: Abdomen    Pain Intervention(s) 1: Medication (see MAR) Pain Intervention(s) 1: Medication (see MAR)  Patient Stated Pain Goal: 0 Patient Stated Pain Goal: 0  o Intervention effective: yes  o Other actions taken for pain:       Skin Assessment  Skin color    Condition/Temperature    Integrity    Turgor    Weekly Pressure Ulcer Documentation  Pressure  Injury Documentation: No Pressure Injury Noted-Pressure Ulcer Prevention Initiated  Wound Prevention & Protection Methods  Orientation of wound Orientation of Wound Prevention: Posterior  Location of Prevention Location of Wound Prevention: Sacrum/Coccyx  Dressing Present Dressing Present : No  Dressing Status    Wound Offloading Wound Offloading (Prevention Methods): Bed, pressure redistribution/air, Bed, pressure reduction mattress     INTAKE/OUPUT  Date 06/19/21 0700 - 06/20/21 0659 06/20/21 0700 - 06/21/21 0659   Shift [id number] 1900-0659 24 Hour Total 1690-7003 2601-7693 24 Hour Total   INTAKE   Shift Total(mL/kg)         OUTPUT   Urine(mL/kg/hr)           Urine Occurrence(s) 4 x 5 x 9 x      Stool           Stool Occurrence(s) 5 x 6 x 11 x      Shift Total(mL/kg)         NET         Weight (kg) 73.9 73.9 73.9 73.9 73.9 73.9       Recommendations:  1. Patient needs and requests: TOILETING    2. Pending tests/procedures: LABS     3. Functional Level/Equipment: Partial (one person) /      Fall Precautions:   Fall risk precautions were reinforced with the patient; he was instructed to call for help prior to getting up. The following fall risk precautions were continued: bed/ chair alarms, door signage, yellow bracelet and socks as well as update of the Bruno Reasen tool in the patient's room. [place] Score: 3    HEALS Safety Check    A safety check occurred in the patient's room between off going nurse and oncoming nurse listed above. The safety check included the below items  Area Items   H  High Alert Medications - Verify all high alert medication drips (heparin, PCA, etc.)   E  Equipment - Suction is set up for ALL patients (with laila)  - Red plugs utilized for all equipment (IV pumps, etc.)  - WOWs wiped down at end of shift.  - Room stocked with oxygen, suction, and other unit-specific supplies   A  Alarms - Bed alarm is set for fall risk patients  - Ensure chair alarm is in place and activated if patient is up in a chair   L  Lines - Check IV for any infiltration  - Taveras bag is empty if patient has a Taveras   - Tubing and IV bags are labeled   S  Safety   - Room is clean, patient is clean, and equipment is clean. - Hallways are clear from equipment besides carts. - Fall bracelet on for fall risk patients  - Ensure room is clear and free of clutter  - Suction is set up for ALL patients (with laila)  - Hallways are clear from equipment besides carts.    - Isolation precautions followed, supplies available outside room, sign posted     Cata Yang

## 2021-06-20 NOTE — REHAB NOTE
Patient unable to void for urine specimen ,inforemed rico zuluagaed with orders. Bladder scan done with 290 cc. In bladder . straight cath done urine specimen collected and sent to the lab. Antibiotics started.

## 2021-06-21 PROBLEM — K65.1 MESENTERIC ABSCESS (HCC): Status: ACTIVE | Noted: 2021-01-01

## 2021-06-21 NOTE — ED NOTES
Unable to obtain accurate SPo2 reading with multiple attempts. Ruldolph sensor unable to obtain accurate reading. Notified Dr. Molly Hoff. ABG ordered.

## 2021-06-21 NOTE — ED TRIAGE NOTES
RRT called on patient. Patient found unresponsive in room. Per rehab staff he was using the bathroom when he became bradycardic and unresponsive. Patient placed in bed by staff. HR in 30's. 1L NS fluid bolus administered.

## 2021-06-21 NOTE — PROGRESS NOTES
Rapid Reponse Note  9163   Initiated after pt placed on commode. Nurse and wife in room. Door to bathroom slightly open. Pt became very quiet. Nurse turned around to check on pt and found pt slumped over with head on w/c cushion. Pt unresponsive & diaphoretic. Nurse called for help and with help got pt in w/c and then to bed. Pt remained unresponsive. RRT called. Attempted to get vital signs and blood sugar. Wife present during entire process. See RRT navigator completed by Johnathon Mays RN. After eval by RRT team pt transferred to ED for further eval. Report given to Jace Flores RN.      Staff present for RRT:  KULDIP Cartagena Dr., MD Allegra Lew, MD Claven Arch Dr. Annell Pa Dr. Theodis Gravely, MD LAXMI Chisholm [employer] med student  Johnathon Mays RN Clinical Care Team ARU  Margarette Grace RN ARU

## 2021-06-21 NOTE — PROGRESS NOTES
Rapid Response Note  [facility]    Patient: Edwar Montoya 80 y.o. male  [id number]  1940    Admit Date: 6/7/2021   Admission Diagnosis: Non-ST elevation (NSTEMI) myocardial infarction ([facility] [I21.4]    RAPID RESPONSE     Rapid response called for dizziness. Patient was being assisted to the bathroom when he suddenly became dizzy with his eyes tracking back and forth. He reports mild shortness of breath as well as some nausea, but denies fevers, chills, and denies any chest pain. Upon speaking to the patient in the room, he states that he is still dizzy but he is awake, alert and oriented and is able to answer questions appropriately. Patient appears resting in his bed comfortably in no apparent distress and his extra ocular movements are normal on exam.   Assessment negative for any focal neurologic deficits and patient's lungs are clear. Cardiac monitor placed on the patient that showed a HR in the 80s-90s. Medications Reviewed    Review of systems  General: No fevers or chills. Cardiovascular: No chest pain or pressure. No palpitations. Pulmonary: No shortness of breath, cough or wheeze. Gastrointestinal: positive for left lower quadrant abdominal pain (present prior to Rapid), no nausea, vomiting positive diarrhea improving . Genitourinary: No urinary frequency, urgency, hesitancy or dysuria. Musculoskeletal: No joint or muscle pain, no back pain, no recent trauma. Neurologic: No headache,generalized weakness, +Dizziness     OBJECTIVE     Visit Vitals  /72 (BP 1 Location: Left upper arm, BP Patient Position: At rest)   Pulse 100   Temp 97.2 °F (36.2 °C)   Resp 18   Ht 5' 8\" (1.727 m)   Wt 73.9 kg (163 lb)   SpO2 95%   BMI 24.78 kg/m²       General:         Alert, cooperative, Patient appears resting in his bed in mild distress with an emesis bag in his right hand   HEENT:           NC, Atraumatic. PERRLA, anicteric sclerae.   Lungs: CTA Bilaterally. No Wheezing/Rhonchi/Rales. Heart:              Regular  rhythm,  No murmur, No Rubs, No Gallops  Abdomen:      Soft, Non distended,  Tenderness rt lower abdomen .  +Bowel sounds, no HSM  Extremities:   No lower limb edema   Psych:            Not anxious or agitated. Neurologic:    CN 2-12 grossly intact, Alert and Oriented x 3, No focal neurological deficits. Medications administered: None    EKG: Notable for Atrial Fibrillation with prolonged QTC     Labs:CBC, CMP, Cardiac Panel    ASSESSMENT, PLAN & DISPOSITION   Marcella Wood is a 80y.o. year old male admitted for Non-ST elevation (NSTEMI) myocardial infarction ([facility] [I21.4]. Rapid response called for Dizzyness. Patient condition currently: Stable. Patient is resting in his bed comfortably in no apparent distress. Of note, EKG did show Atrial Fibrillation and we didn't have a prior EKG to compare at this institution. However, a prior EKG done at [facility] on 05/17/2021 showed no P waves, irregular rhythm, rate less than 100 bpm, modest ST segment changes, and LVH. Patient is currently being anticoagulated with Eliquis and Plavix and the patient's rhythm is being controlled with Coreg. Given the patient's mild shortness of breath and cardiac history, a CXR, CBC, CMP, and Cardiac Panel were all ordered. Plan  -Follow up on Chest X-ray  -Follow up on CBC, CMP, and Cardiac Panel  -Follow up on previously ordered CT Chest, Abdomen and Pelvis  -Cardiology to see the patient in the morning     Disposition: Patient to Remain in Inpatient Rehab     On-Call Attending (Dr. Juan Carlos Cid) notified of rapid response. In agreement with plan. Primary team resuming care.      Verda Brunner, MD, PGY-1  [facility] Medicine    06/21/21 3:36 AM

## 2021-06-21 NOTE — PROGRESS NOTES
Call back from Ms Vickie deleon. Informed her of last night events. Says she is shocked. Says she just talked with him and he sounds \"normal\" and he does not remember last night events. Advised her that he is supposed to have tests today and that Dr. Jama Coyne will inform her of outcome. Thanked nurse.

## 2021-06-21 NOTE — PROGRESS NOTES
SHIFT CHANGE NOTE FOR [facility]    Bedside and Verbal shift change report given to Ivett Lopez RN (oncoming nurse) by Anand Aaron (offgoing nurse). Report included the following information SBAR, Kardex, MAR and Recent Results. Situation:   Code Status: Full Code   Hospital Day: 14   Problem List:   Hospital Problems  Date Reviewed: 6/18/2021        Codes Class Noted POA    Peripheral vascular disease of extremity with claudication (HCC) (Chronic) ICD-10-CM: I73.9  ICD-9-CM: 443.9  6/14/2021 Yes        Mixed hyperlipidemia (Chronic) ICD-10-CM: K95.1  ICD-9-CM: 272.2  Unknown Yes        Acute embolic stroke ([facility] VEG-38-BN: I63.9  ICD-9-CM: 434.11  5/23/2021 Yes    Overview Signed 6/8/2021 12:37 AM by Leatha Reynolds MD     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, brainstem and cerebellum) without residual deficit             Anticoagulated by anticoagulation treatment ICD-10-CM: Z79.01  ICD-9-CM: V58.61  5/19/2021 Yes    Overview Signed 6/7/2021 10:48 PM by Leatha Reynolds MD     On Apixaban             On clopidogrel therapy ICD-10-CM: Z79.01  ICD-9-CM: V58.61  5/19/2021 Yes        Status post insertion of drug eluting coronary artery stent ICD-10-CM: Z95.5  ICD-9-CM: V45.82  5/18/2021 Yes    Overview Signed 6/18/2021  1:38 PM by Leatha Reynlods MD     S/P PCI to proximal SVG with 3.5 x 12 mm [place] drug-eluting stent; PCI to mid SVG with 3.5 x 34 mm [place] drug-eluting stent; PCI to distal SVG with 3.5 x 15 mm [place] drug-eluting stent;  Balloon angioplasty to distal SVG anastomosis (5/18/2021 - Dr. Amna Lozano)              * (Principal) Non-ST elevation (NSTEMI) myocardial infarction [facility]) ICD-10-CM: I21.4  ICD-9-CM: 410.70  5/17/2021 Yes        Acute on chronic heart failure with preserved ejection fraction (HFpEF) ([facility] ICD-10-CM: I50.33  ICD-9-CM: 428.23  5/17/2021 Yes        Paroxysmal atrial fibrillation ([facility] ICD-10-CM: I48.0  ICD-9-CM: 427.31  5/17/2021 Yes Leukocytosis ICD-10-CM: D72.829  ICD-9-CM: 288.60  5/17/2021 Yes    Overview Signed 6/8/2021 12:42 AM by Cailin Buchanan MD     Attributed to reactive leukocytosis due to NSTEMI             Impaired mobility and ADLs ICD-10-CM: Z74.09, Z78.9  ICD-9-CM: V49.89  5/17/2021 Yes        Neuropathy involving both lower extremities (Chronic) ICD-10-CM: G57.93  ICD-9-CM: 356.9  10/22/2020 Yes        Essential hypertension (Chronic) ICD-10-CM: I10  ICD-9-CM: 401.9  Unknown Yes              Background:   Past Medical History:   Past Medical History:   Diagnosis Date    Acute embolic stroke ([address] 3/73/4924    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, brainstem and cerebellum) without residual deficit    Anticoagulated by anticoagulation treatment 5/19/2021    On Apixaban    Benign prostatic hyperplasia with urinary retention     Chronic obstructive pulmonary disease (COPD) ([address]     Coronary artery disease involving native coronary artery of native heart     Essential hypertension     Heart failure with preserved left ventricular function (HFpEF) ([address]     2D echocardiogram (5/18/2021) showed EF 50%; concentric LV hypertrophy with a severely thickened septal wall and mildly thickened posterior wall; left atrial chamber is severely enlarged; right atrial chamber volume is moderately enlarged; no mass, shunts, or thrombi.      History of carotid stenosis     History of gross hematuria 5/26/2021    Attributed to [place] trauma while patient was altered    History of renal artery stenosis     Leukocytosis 5/17/2021    Attributed to reactive leukocytosis due to NSTEMI    Malignant neoplasm of lower lobe of right lung (HCC)     Neuropathy involving both lower extremities 10/22/2020    Non-ST elevation (NSTEMI) myocardial infarction ([address] 5/17/2021    On clopidogrel therapy 5/19/2021    Paroxysmal atrial fibrillation ([address] 5/17/2021    Peripheral vascular disease of extremity with claudication ([facility] 6/14/2021    Urinary retention         Assessment:   Changes in Assessment throughout shift: No change to previous assessment     Patient has a central line: no Reasons if yes: Insertion date: Last dressing date:   Patient has [place] Cath: no Reasons if yes: Insertion date:  S   Last Vitals:     Vitals:    06/20/21 0756 06/20/21 1643 06/20/21 2121 06/21/21 0714   BP: 107/69 131/74 116/72 110/70   Pulse: 96 99 100 94   Resp: 18 17 18 18   Temp: 97.3 °F (36.3 °C) 97.4 °F (36.3 °C) 97.2 °F (36.2 °C) 97.6 °F (36.4 °C)   SpO2: 98% 95% 95% 95%   Weight:       Height:            PAIN    Pain Assessment    Pain Intensity 1: 0 (06/21/21 0429) Pain Intensity 1: 2 (12/29/14 1105)    Pain Location 1: Foot Pain Location 1: Abdomen    Pain Intervention(s) 1: Medication (see MAR) Pain Intervention(s) 1: Medication (see MAR)  Patient Stated Pain Goal: 0 Patient Stated Pain Goal: 0  o Intervention effective: yes  o Other actions taken for pain:       Skin Assessment  Skin color    Condition/Temperature    Integrity    Turgor    Weekly Pressure Ulcer Documentation  Pressure  Injury Documentation: No Pressure Injury Noted-Pressure Ulcer Prevention Initiated  Wound Prevention & Protection Methods  Orientation of wound Orientation of Wound Prevention: Posterior  Location of Prevention Location of Wound Prevention: Sacrum/Coccyx  Dressing Present Dressing Present : No  Dressing Status    Wound Offloading Wound Offloading (Prevention Methods): Bed, pressure redistribution/air, Bed, pressure reduction mattress     INTAKE/OUPUT  Date 06/20/21 0700 - 06/21/21 0659 06/21/21 0700 - 06/22/21 0659   Shift 1854-1823 2604-5685 24 Hour Total 9678-9979 0495-6684 24 Hour Total   INTAKE   P.O. 1050  1050        P. O. 1050  1050      I. V.(mL/kg/hr) 500(0.6)  500(0.3)        I.V. 500  500      Shift Total(mL/kg) 1550(21)  1550(21)      OUTPUT   Urine(mL/kg/hr) 250(0.3) 363(0.4) 613(0.3)        Urine 250 363 613        Urine Occurrence(s) 1 x 0 x 1 x 0 x  0 x   Stool           Stool Occurrence(s) 1 x 0 x 1 x 0 x  0 x   Shift Total(mL/kg) 250(3.4) 363(4.9) 613(8.3)      NET 1300 -379 937      Weight (kg) 73.9 73.9 73.9 73.9 73.9 73.9       Recommendations:  1. Patient needs and requests: TOILETING    2. Pending tests/procedures: LABS     3. Functional Level/Equipment:   /      Fall Precautions:   Fall risk precautions were reinforced with the patient; he was instructed to call for help prior to getting up. The following fall risk precautions were continued: bed/ chair alarms, door signage, yellow bracelet and socks as well as update of the Juan Pendleton tool in the patient's room. [place] Score:      HEALS Safety Check    A safety check occurred in the patient's room between off going nurse and oncoming nurse listed above. The safety check included the below items  Area Items   H  High Alert Medications - Verify all high alert medication drips (heparin, PCA, etc.)   E  Equipment - Suction is set up for ALL patients (with laila)  - Red plugs utilized for all equipment (IV pumps, etc.)  - WOWs wiped down at end of shift.  - Room stocked with oxygen, suction, and other unit-specific supplies   A  Alarms - Bed alarm is set for fall risk patients  - Ensure chair alarm is in place and activated if patient is up in a chair   L  Lines - Check IV for any infiltration  - Taveras bag is empty if patient has a Taveras   - Tubing and IV bags are labeled   S  Safety   - Room is clean, patient is clean, and equipment is clean. - Hallways are clear from equipment besides carts. - Fall bracelet on for fall risk patients  - Ensure room is clear and free of clutter  - Suction is set up for ALL patients (with laila)  - Hallways are clear from equipment besides carts.    - Isolation precautions followed, supplies available outside room, sign posted     [place] [employer]

## 2021-06-21 NOTE — PROGRESS NOTES
responded to a rapid response in the rehab unit . Family was present in the room and taken to the emergency room.     Dequan Ambrose   Board Certified [address] [place] [address]   [phone]

## 2021-06-21 NOTE — ED NOTES
NP discussed patient status with attending and both he and cardiology are on board with patient going to CVT SD as long as he continues mentate well. HR currently in the 110's.

## 2021-06-21 NOTE — PROGRESS NOTES
HPI: Seferino Pagan is a 80 y.o. male presenting with chief complain of syncope and abdominal abscess. Pt had recent CABG for NSTEMI at outside hospital. Readmitted from rehab due to syncope. Seen by cardiology and ID. ID recommended CT which showed possible mesenteric abscess. PT denies abdominal pain largely. He does note some lower abdominal discomfort. He denies nausea, vomiting. He has not had a recent bm. Past Medical History:   Diagnosis Date    Acute embolic stroke ([address] 9/13/7768    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, brainstem and cerebellum) without residual deficit    Anticoagulated by anticoagulation treatment 5/19/2021    On Apixaban    Benign prostatic hyperplasia with urinary retention     Chronic obstructive pulmonary disease (COPD) ([address]     Coronary artery disease involving native coronary artery of native heart     Essential hypertension     Heart failure with preserved left ventricular function (HFpEF) ([address]     2D echocardiogram (5/18/2021) showed EF 50%; concentric LV hypertrophy with a severely thickened septal wall and mildly thickened posterior wall; left atrial chamber is severely enlarged; right atrial chamber volume is moderately enlarged; no mass, shunts, or thrombi.      History of carotid stenosis     History of gross hematuria 5/26/2021    Attributed to [place] trauma while patient was altered    History of renal artery stenosis     Leukocytosis 5/17/2021    Attributed to reactive leukocytosis due to NSTEMI    Malignant neoplasm of lower lobe of right lung (HCC)     Neuropathy involving both lower extremities 10/22/2020    Non-ST elevation (NSTEMI) myocardial infarction ([address] 5/17/2021    On clopidogrel therapy 5/19/2021    Paroxysmal atrial fibrillation ([address] 5/17/2021    Peripheral vascular disease of extremity with claudication ([address] 6/14/2021    Urinary retention        Past Surgical History: Procedure Laterality Date    HX CAROTID ENDARTERECTOMY Left 06/07/2012    HX CAROTID ENDARTERECTOMY Right 05/28/2014    Dr. Oj Henry [address] [place] [address]  2001    [facility] CORONARY STENT PLACEMENT  05/18/2021    S/P PCI to proximal SVG with 3.5 x 12 mm [place] drug-eluting stent; PCI to mid SVG with 3.5 x 34 mm Juan J drug-eluting stent; PCI to distal SVG with 3.5 x 15 mm [place] drug-eluting stent; Balloon angioplasty to distal SVG anastomosis (5/18/2021 - Dr. Dyan Daniel)     HX LOBECTOMY Right     Middle lobe and lower lobe    HX RENAL ARTERY STENT Bilateral 2011    HX TONSILLECTOMY      IR BRONCHOSCOPY  11/25/2015    Dr. rGacie Sheets       Family History   Problem Relation Age of Onset    Hypertension Mother     Cancer Mother        Social History     Socioeconomic History    Marital status: UNKNOWN     Spouse name: Not on file    Number of children: Not on file    Years of education: Not on file    Highest education level: Not on file   Tobacco Use    Smoking status: Former Smoker     Types: Cigarettes     Quit date: 6/12/2006     Years since quitting: 15.0    Smokeless tobacco: Never Used   Substance and Sexual Activity    Alcohol use: No    Drug use: No     Social Determinants of Health     Financial Resource Strain:     Difficulty of Paying Living Expenses:    Food Insecurity:     Worried About Running Out of Food in the Last Year:     Ran Out of Food in the Last Year:    Transportation Needs:     Lack of Transportation (Medical):      Lack of Transportation (Non-Medical):    Physical Activity:     Days of Exercise per Week:     Minutes of Exercise per Session:    Stress:     Feeling of Stress :    Social Connections:     Frequency of Communication with Friends and Family:     Frequency of Social Gatherings with Friends and Family:     Attends Anab[employer]ist Services:     Active Member of Clubs or Organizations:     Attends Club or Organization Meetings:     Marital Status:        Review of Systems - all others negative    Allergies   Allergen Reactions    Lipitor [Atorvastatin] Rash    Norvasc [Amlodipine] Rash       Vitals:    06/21/21 1645 06/21/21 1700 06/21/21 1715 06/21/21 1730   BP: (!) 125/109 110/79     Pulse: (!) 112 (!) 109 (!) 105 (!) 105   Resp: 14 18 21 19   Temp:       SpO2:           Physical Exam  Constitutional:       Appearance: He is well-developed. HENT:      Head: Normocephalic and atraumatic. Eyes:      Conjunctiva/sclera: Conjunctivae normal.   Abdominal:      General: There is no distension. Palpations: Abdomen is soft. There is no mass. Tenderness: There is abdominal tenderness (mild RLQ, lower midline). Musculoskeletal:         General: Normal range of motion. Lymphadenopathy:      Cervical: No cervical adenopathy. Skin:     General: Skin is warm and dry. Neurological:      Sensory: No sensory deficit. Psychiatric:         Speech: Speech normal.       CMP:   Lab Results   Component Value Date/Time     (L) 06/21/2021 01:10 PM    K 4.6 06/21/2021 01:10 PM    CL 92 (L) 06/21/2021 01:10 PM    CO2 26 06/21/2021 01:10 PM    AGAP 7 06/21/2021 01:10 PM     (H) 06/21/2021 01:10 PM    BUN 23 (H) 06/21/2021 01:10 PM    CREA 0.91 06/21/2021 01:10 PM    GFRAA >60 06/21/2021 01:10 PM    GFRNA >60 06/21/2021 01:10 PM    CA 8.6 06/21/2021 01:10 PM    MG 2.3 06/21/2021 01:10 PM    ALB 2.9 (L) 06/21/2021 01:10 PM    TP 6.6 06/21/2021 01:10 PM    GLOB 3.7 06/21/2021 01:10 PM    AGRAT 0.8 06/21/2021 01:10 PM    ALT 17 06/21/2021 01:10 PM     CBC:   Lab Results   Component Value Date/Time    WBC 26.0 (H) 06/21/2021 01:10 PM    HGB 9.4 (L) 06/21/2021 01:10 PM    HCT 29.8 (L) 06/21/2021 01:10 PM     06/21/2021 01:10 PM     CT personally visualized; it is likely he has a perforated diverticulitis with abscess and a small bowel loop with reactive changes.  There does not appear to be a window for drainage, but can consult IR for an opinion regarding this    Assessment / Plan    Perforated diverticulitis with abscess in setting of recent NSTEMI s/p CABG  Unclear if diverticulitis caused NSTEMI  Recommend IV abx per ID  IR consult, though this is not likely drainable and given its size should resolve with abx alone (borderline)  Diet as tolerated   Will follow  Look into last colonoscopy to be sure no possibility of perforated cancer    The diagnoses and plan were discussed with the patient. All questions answered. Plan of care agreed to by all concerned.

## 2021-06-21 NOTE — PROGRESS NOTES
Asked to evaluate for acute/chronic hyponatremia  Transferred from [address] to rehab   Had syncopal episode and bradycardia this morning,responded to atropine and normal saline  Hx of lung ca,cad ,cabg,diastolic heart failure,leukocytosis,sepsis,on numerous antibiotics  Will check serum osmolarity,urine sodium and osmolarity,tsh  Repeat bmp,check response to normal saline  thanks

## 2021-06-21 NOTE — CONSULTS
Cardiology Consult Note    Consultation request by Artem Uriarte MD for advice/opinion related to evaluating synco    Date of  Admission: 6/21/2021  1:28 PM   Primary Care Physician:  Laurita Perez MD    Consulting Cardiologist: Dr. 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  -Echo 5/2021 at [facility] with EF 50%  -CAD, Hx CABG x 2 (LIMA-LAD, SVG-LCx), recent NSTEMI s/p cardiac cath 5/18/2021 at [facility] with PCI to proximal SVG-LCx with 3.5 x 12 mm Juan J JENNIE; PCI to mid SVG with 3.5 x 23 mm Juan J JENNIE; PCI to distal SVG with 3.5 x 15 mm [place] JENNIE; Balloon angioplasty to distal SVG anastomosis. Discharged on Plavix and Eliquis (no ASA to avoid triple therapy). -Initial cardiac cath 5/18/2021 at [place] [id number] with distal LM 50% blockage; LAD is large vessel with 80% proximal stenosis with patent LIMA-LAD, there is a 70% stenosis distal to LIMA anastomosis; LCx is dominant, large diffusely diseased vessel with sequential 80% stenosis at proximal and mid portion of the vessel; RCA is non-dominant, small diffusely diseased vessel  -Atrial fibrillation, recent diagnosis, on Eliquis for anticoagulation.  -Leukocytosis, WBC up to 26.7K on 6/20/2021 (up from 14.3  -Severe COPD.   O2 goal 89-93% per [facility] records.  -Hx squamous cell lung cancer, s/p right middle and lower lobectomy.  -Peripheral vascular disease, Hx bilateral carotid endarterectomies  -Hx renal artery stenosis, Hx bilateral stenting 2011  -Acute CVA during recent admission at [facility]; considered cardioembolic, s/p PCI 0/96/6869, symptom onset 5/20/2021 while on Eliquis; suspected plaque mobilization.  -Recent acute blood loss anemia due to melena and hematuria (self-inflicted [place] trauma), GI consulted 5/26/2021, cleared for Plavix/Eliquis  -Hx HTN with hypotension during recent admission, medications adjusted at that time. Coreg decreased to 12.5 mg BID, Isosorbid-Hydralazine and Lisinopril discontinued. -Dementia, mild. Primary cardiologist at [address] [place] [address]:     Discussed with Dr. Sabine Hopkins, recurrent dizziness, hypotension in setting of sepsis. Component of vagal likely as well as he was taking coreg 25 mg BID. Difficult situation given recent stents, stroke and anticoagulation. Plan to hold BB, ok for aggressive IVF given sepsis and EF 50% last month. Initial bradycardia responded to atropine. If recurs, might need pressors in ICU with dopamine. Plan limited echo for EF. Ongoing treatment for infection. History of Present Illness: This is a 80 y.o. male admitted for Mesenteric abscess ([facility] [address] [K65.1]. Patient complains of: syncope    Tarah Mera is a 80 y.o. male who was sent from ARU to ER due to syncope. Pt was found unresponsive while on the toilet. The patient was moved back to bed and RRT was called. On arrival of RRT, BP was unreadable by machine with bradycardia in low 40's. Pt was noted to be bradycardic for several minutes with lowest rate in the 30's. He was being given 500 cc bolus and HR improved, next BP in 190's/100's and pt began to regain consciousness and started communicating, although sleepy/lethargic and not to baseline. While in ER, pt was using the bedpan and attempted to get up and out of bed to use urinal with RN at bedside; he stood half-way up when he became dizzy and subsequently went unresponsive; pt became bradycardic to 36 BPM and pt was given Atropine. This episode lasted 1-2 minutes per RN note. Pt also had prior RRT called earlier this AM for dizziness.   At that time, it was noted that he was being assisted to the bathroom when he suddenly became dizzy with his eyes tracking back and forth. He reported mild shortness of breath and nausea, without c/o chest pain. He was placed on the monitor at that time, which revealed HR 80's-90's. He had otherwise noted some LLQ abdominal pain, which was present prior to RRT. Nursing note indicates RRT was called due to pt unresponsive; he was noted to be unable to respond to commands, cool and diaphoretic, abnormal respirations; he was just back from the bathroom c/o shortness of breath and dizziness. Upon my visit, 's, heart rate 90's. Denies chest pain, dyspnea. No abdominal pain. Review of Symptoms:  Except as stated above include:  Constitutional:  Dizzy, fatigue  Respiratory:  negative  Cardiovascular:  No chest pain or dyspnea  Gastrointestinal: negative  Genitourinary:  negative  Musculoskeletal:  Negative  Neurological:  Negative  Dermatological:  Negative  Endocrinological: Negative  Psychological:  Negative       Past Medical History:     Past Medical History:   Diagnosis Date    Acute embolic stroke ([address] 6/13/6054    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, brainstem and cerebellum) without residual deficit    Anticoagulated by anticoagulation treatment 5/19/2021    On Apixaban    Benign prostatic hyperplasia with urinary retention     Chronic obstructive pulmonary disease (COPD) ([address]     Coronary artery disease involving native coronary artery of native heart     Essential hypertension     Heart failure with preserved left ventricular function (HFpEF) ([address]     2D echocardiogram (5/18/2021) showed EF 50%; concentric LV hypertrophy with a severely thickened septal wall and mildly thickened posterior wall; left atrial chamber is severely enlarged; right atrial chamber volume is moderately enlarged; no mass, shunts, or thrombi.      History of carotid stenosis     History of gross hematuria 5/26/2021    Attributed to [place] trauma while patient was altered    History of renal artery stenosis     Leukocytosis 5/17/2021    Attributed to reactive leukocytosis due to NSTEMI    Malignant neoplasm of lower lobe of right lung (HCC)     Neuropathy involving both lower extremities 10/22/2020    Non-ST elevation (NSTEMI) myocardial infarction ([facility] 5/17/2021    On clopidogrel therapy 5/19/2021    Paroxysmal atrial fibrillation ([facility] 5/17/2021    Peripheral vascular disease of extremity with claudication ([facility] 6/14/2021    Urinary retention          Social History:     Social History     Socioeconomic History    Marital status: UNKNOWN     Spouse name: Not on file    Number of children: Not on file    Years of education: Not on file    Highest education level: Not on file   Tobacco Use    Smoking status: Former Smoker     Types: Cigarettes     Quit date: 6/12/2006     Years since quitting: 15.0    Smokeless tobacco: Never Used   Substance and Sexual Activity    Alcohol use: No    Drug use: No     Social Determinants of Health     Financial Resource Strain:     Difficulty of Paying Living Expenses:    Food Insecurity:     Worried About Running Out of Food in the Last Year:     Ran Out of Food in the Last Year:    Transportation Needs:     Lack of Transportation (Medical):  Lack of Transportation (Non-Medical):    Physical Activity:     Days of Exercise per Week:     Minutes of Exercise per Session:    Stress:     Feeling of Stress :    Social Connections:     Frequency of Communication with Friends and Family:     Frequency of Social Gatherings with Friends and Family:     Attends [employer] Services:     Active Member of Clubs or Organizations:     Attends Club or Organization Meetings:     Marital Status:         Family History:     Family History   Problem Relation Age of Onset    Hypertension Mother     Cancer Mother         Medications:      Allergies   Allergen Reactions    Lipitor [Atorvastatin] Rash    Norvasc [Amlodipine] Rash Current Facility-Administered Medications   Medication Dose Route Frequency    sodium chloride 0.9 % bolus infusion 1,000 mL  1,000 mL IntraVENous ONCE    sodium chloride 0.9 % bolus infusion 500 mL  500 mL IntraVENous ONCE     Current Outpatient Medications   Medication Sig    acetaminophen (TYLENOL) 325 mg tablet Take 2 Tablets by mouth every four (4) hours as needed (for fever or pain). Indications: fever, pain    albuterol (PROVENTIL VENTOLIN) 2.5 mg /3 mL (0.083 %) nebu 3 mL by Nebulization route every four (4) hours as needed for Wheezing or Shortness of Breath.  apixaban (ELIQUIS) 5 mg tablet Take 1 Tablet by mouth two (2) times a day. Indications: treatment to prevent blood clots in chronic atrial fibrillation    arformoteroL (BROVANA) 15 mcg/2 mL nebu neb solution 2 mL by Nebulization route two (2) times a day.  nitroglycerin (NITROSTAT) 0.4 mg SL tablet 1 Tablet by SubLINGual route as needed for Chest Pain. Up to 3 doses. Indications: acute attack of angina    [START ON 6/22/2021] rosuvastatin (CRESTOR) 10 mg tablet Take 1 Tablet by mouth daily. Indications: excessive fat in the blood, treatment to prevent a heart attack    tamsulosin (FLOMAX) 0.4 mg capsule Take 1 Capsule by mouth daily (after dinner). Indications: enlarged prostate with urination problem    [START ON 6/22/2021] multivitamin, tx-iron-ca-min (THERA-M w/ IRON) 9 mg iron-400 mcg tab tablet Take 1 Tablet by mouth daily. Indications: treatment to prevent mineral deficiency, treatment to prevent vitamin deficiency    [START ON 6/22/2021] clopidogreL (PLAVIX) 75 mg tab Take 1 Tablet by mouth daily (with breakfast). Indications: treatment to prevent a heart attack    ipratropium (ATROVENT) 0.02 % soln 2.5 mL by Nebulization route three (3) times daily.  gabapentin (NEURONTIN) 100 mg capsule Take 1 Capsule by mouth three (3) times daily. Max Daily Amount: 300 mg. Indications: neuropathic pain    [START ON 6/22/2021] L. acidophilus,casei,rhamnosus (BIO-K PLUS) 50 billion cell cpDR capsule Take 1 Capsule by mouth daily.  vancomycin 50 mg/mL oral solution (compounded) Take 10 mL by mouth every six (6) hours.  0.9% sodium chloride solution 75 mL/hr by IntraVENous route continuous.  [START ON 6/22/2021] cefTRIAXone 1 gram 1 g IV syringe 1 g by IntraVENous route every twenty-four (24) hours.  metroNIDAZOLE (FLAGYL) 100 mL by IntraVENous route every eight (8) hours.  cholestyramine-aspartame (QUESTRAN LIGHT) 4 gram packet Take 1 Packet by mouth three (3) times daily (with meals).  carvediloL (Coreg) 25 mg tablet Take 25 mg by mouth two (2) times daily (with meals). Physical Exam:     Visit Vitals  /70   Pulse 85   Temp 97.9 °F (36.6 °C)   Resp 16   SpO2 100%       BP Readings from Last 3 Encounters:   06/21/21 118/70   06/21/21 (!) 197/101   02/24/20 (!) 200/100     Pulse Readings from Last 3 Encounters:   06/21/21 85   06/21/21 97   02/24/20 82     Wt Readings from Last 3 Encounters:   06/07/21 73.9 kg (163 lb)   02/24/20 85.1 kg (187 lb 9.6 oz)   02/26/18 87.1 kg (192 lb)       General:  alert, cooperative, no distress, appears stated age. Lying flat  Neck:  nontender  Lungs:  clear to auscultation bilaterally  Heart:  regular rate and rhythm, S1, S2 normal, no murmur, click, rub or gallop  Abdomen:  abdomen is soft without significant tenderness, masses, organomegaly or guarding  Extremities:  extremities normal, atraumatic, no cyanosis or edema  Skin: Warm and dry.  no hyperpigmentation, vitiligo, or suspicious lesions  Neuro: alert, oriented x3  Psych: non focal     Data Review:     Recent Labs     06/21/21  1310 06/21/21  0330 06/20/21  0550   WBC 26.0* 26.3* 26.7*   HGB 9.4* 9.1* 9.5*   HCT 29.8* 27.5* 31.0*    220 228     Recent Labs     06/21/21  1310 06/21/21  0330 06/20/21  0550   * 126* 126*   K 4.6 4.5 4.5   CL 92* 91* 90*   CO2 26 24 30   * 145* 78   BUN 23* 25* 28*   CREA 0. 91 0.89 1.12   CA 8.6 8.3* 9.3   MG 2.3 2.0 2.2   ALB 2.9* 3.1* 3.2*   ALT 17 18 16       Results for orders placed or performed during the hospital encounter of 06/21/21   EKG, 12 LEAD, INITIAL   Result Value Ref Range    Ventricular Rate 90 BPM    Atrial Rate 80 BPM    QRS Duration 106 ms    Q-T Interval 406 ms    QTC Calculation (Bezet) 496 ms    Calculated R Axis 42 degrees    Calculated T Axis -88 degrees    Diagnosis       Atrial fibrillation  Cannot rule out Inferior infarct , age undetermined  ST & T wave abnormality, consider lateral ischemia  Abnormal ECG  When compared with ECG of 21-JUN-2021 03:28,  No significant change was found  Confirmed by Sahd Tejada MD, [place] ([id number]) on 6/21/2021 1:55:27 PM         All Cardiac Markers in the last 24 hours:    Lab Results   Component Value Date/Time    CPK 97 06/21/2021 01:10 PM    CKMB <1.0 06/21/2021 01:10 PM    CKND1  06/21/2021 01:10 PM     CALCULATION NOT PERFORMED WHEN RESULT IS BELOW LINEAR LIMIT    TROIQ 0.09 (H) 06/21/2021 01:10 PM       Last Lipid:  No results found for: CHOL, CHOLX, CHLST, CHOLV, HDL, HDLP, LDL, LDLC, DLDLP, TGLX, TRIGL, TRIGP, CHHD, CHHDX    Cardiographics:     EKG Results     Procedure 720 Value Units Date/Time    EKG, 12 LEAD, INITIAL [[id number]] Collected: 06/21/21 1340    Order Status: Completed Updated: 06/21/21 1355     Ventricular Rate 90 BPM      Atrial Rate 80 BPM      QRS Duration 106 ms      Q-T Interval 406 ms      QTC Calculation (Bezet) 496 ms      Calculated R Axis 42 degrees      Calculated T Axis -88 degrees      Diagnosis --     Atrial fibrillation  Cannot rule out Inferior infarct , age undetermined  ST & T wave abnormality, consider lateral ischemia  Abnormal ECG  When compared with ECG of 21-JUN-2021 03:28,  No significant change was found  Confirmed by Shad Tejada MD, [place] ([id number]) on 6/21/2021 1:55:27 PM                    XR Results (most recent):  Results from Hospital Encounter encounter on 06/07/21    XR CHEST PORT    Narrative  EXAM: XR CHEST PORT    INDICATION: RRT    COMPARISON: 11/25/2015    FINDINGS: A portable AP radiograph of the chest was obtained at 0 535 hours. Status post median sternotomy and clips over the mediastinum, stable. . Low lung  volumes. . Question streaky opacities in the lung bases. Question small right  pleural opacity. .  Enlarged cardiac silhouette. Osseous degenerative changes. Clips noted overlying the left neck. .    Impression  Enlarged cardiac silhouette. Low lung volumes with streaky bibasilar densities. Please see report for additional details.         Signed By: Shlebie tMz PA-C     June 21, 2021

## 2021-06-21 NOTE — H&P
Hospitalist Admission History and Physical    NAME:  Augustus Whitman   :   1940   MRN:   [id number]     PCP:  Rashida Cedeno MD  Date/Time:  2021 4:35 PM    Subjective:   CHIEF COMPLAINT:  syncope    HISTORY OF PRESENT ILLNESS:     Stefanie Holloway is a 80 y.o.  male who presents with 2 recent episodes of syncope or to arrival in ED and then an additional episode and ED (at approximately 1600 per nursing) episode earlier today at [facility] occurred when patient was having a bowel movement and then this afternoon again after patient had had a small BM returned to bed from bedside commode where he proceeded to feel lightheaded and feeling of passing out during which heart rate dropped into 40s and then 30s and patient was poorly responsive at this time. Patient subsequently received atropine and heart rate now in the 110s. At time of visit with this provider patient continues to have some discomfort to right lower quadrant of abdomen, he reports a small BM \" barely\" earlier today in ED. he does report some dyspnea with exertion but denies other concerns including chest pain, headaches, fevers or chills, nausea or vomiting, cough or blood in urine or stools of recent. He does note continued neuropathy with poor sensation to extremities.     Past Medical History:   Diagnosis Date    Acute embolic stroke ([address]     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, brainstem and cerebellum) without residual deficit    Anticoagulated by anticoagulation treatment 2021    On Apixaban    Benign prostatic hyperplasia with urinary retention     Chronic obstructive pulmonary disease (COPD) ([address]     Coronary artery disease involving native coronary artery of native heart     Essential hypertension     Heart failure with preserved left ventricular function (HFpEF) ([address]     2D echocardiogram (2021) showed EF 50%; concentric LV hypertrophy with a severely thickened septal wall and mildly thickened posterior wall; left atrial chamber is severely enlarged; right atrial chamber volume is moderately enlarged; no mass, shunts, or thrombi.  History of carotid stenosis     History of gross hematuria 5/26/2021    Attributed to [place] trauma while patient was altered    History of renal artery stenosis     Leukocytosis 5/17/2021    Attributed to reactive leukocytosis due to NSTEMI    Malignant neoplasm of lower lobe of right lung (HCC)     Neuropathy involving both lower extremities 10/22/2020    Non-ST elevation (NSTEMI) myocardial infarction ([address] 5/17/2021    On clopidogrel therapy 5/19/2021    Paroxysmal atrial fibrillation ([address] 5/17/2021    Peripheral vascular disease of extremity with claudication ([address] 6/14/2021    Urinary retention         Past Surgical History:   Procedure Laterality Date    HX CAROTID ENDARTERECTOMY Left 06/07/2012    HX CAROTID ENDARTERECTOMY Right 05/28/2014    Dr. Kimberley Colbert [facility]    HX CORONARY STENT PLACEMENT  05/18/2021    S/P PCI to proximal SVG with 3.5 x 12 mm Josephine drug-eluting stent; PCI to mid SVG with 3.5 x 34 mm Juan J drug-eluting stent; PCI to distal SVG with 3.5 x 15 mm Josephine drug-eluting stent;  Balloon angioplasty to distal SVG anastomosis (5/18/2021 - Dr. Michelle Barrett)     HX LOBECTOMY Right     Middle lobe and lower lobe    HX RENAL ARTERY STENT Bilateral 2011    HX TONSILLECTOMY      IR BRONCHOSCOPY  11/25/2015    Dr. Ryan Villatoro       Social History     Tobacco Use    Smoking status: Former Smoker     Types: Cigarettes     Quit date: 6/12/2006     Years since quitting: 15.0    Smokeless tobacco: Never Used   Substance Use Topics    Alcohol use: No        Family History   Problem Relation Age of Onset    Hypertension Mother     Cancer Mother         Allergies   Allergen Reactions    Lipitor [Atorvastatin] Rash    Norvasc [Amlodipine] Rash        Prior to Admission Medications   Prescriptions Last Dose Informant Patient Reported? Taking?   0.9% sodium chloride solution   No No   Si mL/hr by IntraVENous route continuous. L. acidophilus,casei,rhamnosus (BIO-K PLUS) 50 billion cell cpDR capsule   No No   Sig: Take 1 Capsule by mouth daily. acetaminophen (TYLENOL) 325 mg tablet   No No   Sig: Take 2 Tablets by mouth every four (4) hours as needed (for fever or pain). Indications: fever, pain   albuterol (PROVENTIL VENTOLIN) 2.5 mg /3 mL (0.083 %) nebu   No No   Sig: 3 mL by Nebulization route every four (4) hours as needed for Wheezing or Shortness of Breath. apixaban (ELIQUIS) 5 mg tablet   No No   Sig: Take 1 Tablet by mouth two (2) times a day. Indications: treatment to prevent blood clots in chronic atrial fibrillation   arformoteroL (BROVANA) 15 mcg/2 mL nebu neb solution   No No   Si mL by Nebulization route two (2) times a day. carvediloL (Coreg) 25 mg tablet   Yes No   Sig: Take 25 mg by mouth two (2) times daily (with meals). cefTRIAXone 1 gram 1 g IV syringe   No No   Si g by IntraVENous route every twenty-four (24) hours. cholestyramine-aspartame (QUESTRAN LIGHT) 4 gram packet   No No   Sig: Take 1 Packet by mouth three (3) times daily (with meals). clopidogreL (PLAVIX) 75 mg tab   No No   Sig: Take 1 Tablet by mouth daily (with breakfast). Indications: treatment to prevent a heart attack   gabapentin (NEURONTIN) 100 mg capsule   No No   Sig: Take 1 Capsule by mouth three (3) times daily. Max Daily Amount: 300 mg. Indications: neuropathic pain   ipratropium (ATROVENT) 0.02 % soln   No No   Si.5 mL by Nebulization route three (3) times daily. metroNIDAZOLE (FLAGYL)   No No   Si mL by IntraVENous route every eight (8) hours. multivitamin, tx-iron-ca-min (THERA-M w/ IRON) 9 mg iron-400 mcg tab tablet   No No   Sig: Take 1 Tablet by mouth daily. Indications: treatment to prevent mineral deficiency, treatment to prevent vitamin deficiency   nitroglycerin (NITROSTAT) 0.4 mg SL tablet   No No   Si Tablet by SubLINGual route as needed for Chest Pain. Up to 3 doses. Indications: acute attack of angina   rosuvastatin (CRESTOR) 10 mg tablet   No No   Sig: Take 1 Tablet by mouth daily. Indications: excessive fat in the blood, treatment to prevent a heart attack   tamsulosin (FLOMAX) 0.4 mg capsule   No No   Sig: Take 1 Capsule by mouth daily (after dinner). Indications: enlarged prostate with urination problem   vancomycin 50 mg/mL oral solution (compounded)   No No   Sig: Take 10 mL by mouth every six (6) hours. Facility-Administered Medications: None     REVIEW OF SYSTEMS:     [] Unable to obtain  ROS due to  []mental status change  []sedated   []intubated   [x]Total of 12 systems reviewed as follows:    GENERAL: no fever or chills   HEENT: no sinus congestion / hearing or vision changes  NECK: No pain or stiffness. PULMONARY: no shortness of breath or cough; + dyspnea on exertion  Cardiovascular: denies palpitations or chest pain, dizziness /sensation of feeling like he is going to pass out earlier today; no lower extremity edema  GASTROINTESTINAL: + Right lower quadrant pain, + some loose stools, but per patient no good bowel movement x7 days, nausea, vomiting or melena / bloody stools  GENITOURINARY: No urinary frequency, urgency, hesitancy or dysuria. MUSCULOSKELETAL: no back / joint or muscle pain, no recent trauma. DERMATOLOGIC: denies pruritis, rashes or open areas   ENDOCRINE: No polyuria, polydipsia, no heat or cold intolerance. HEMATOLOGICAL: No anemia or easy bruising or bleeding.    NEUROLOGIC:  + Poor sensation to extremities long-term    Objective:   VITALS:    Visit Vitals  BP (!) 117/101   Pulse 96   Temp 97.9 °F (36.6 °C)   Resp 26   SpO2 90%     Temp (24hrs), Av.6 °F (36.4 °C), Min:97.2 °F (36.2 °C), Max:97.9 °F (36.6 °C)    PHYSICAL EXAM:   General:          Alert, in NAD  HEENT:           Sclera anicteric. Conjunctiva pink. Mucous membranes                          Moist, no ear or nasal discharge  Neck:               Supple. Trachea midline. No accessory muscle use. No jugular venous distention, no carotid bruit  CV:                  irreg irreg rhythm, mild tachy HR in 110's S1S2+  Lungs:             Clear to auscultation bilaterally. No Wheezing or Rhonchi. No rales. Abdomen:        Soft, non-tender. Not distended. Bowel sounds normal.   Extremities:     No cyanosis. No edema. Pulses trace + b/l  Neurologic:      Alert and oriented X 4. Follows commands, responds appropriately. No focal neurological deficit was noted  Skin:                Warm and dry. No rashes. LAB DATA REVIEWED:    No components found for: [place]  Recent Labs     06/21/21  1310 06/21/21  0330 06/20/21  0550   * 126* 126*   K 4.6 4.5 4.5   CL 92* 91* 90*   CO2 26 24 30   BUN 23* 25* 28*   CREA 0.91 0.89 1.12   * 145* 78   CA 8.6 8.3* 9.3   ALB 2.9* 3.1* 3.2*   WBC 26.0* 26.3* 26.7*   HGB 9.4* 9.1* 9.5*   HCT 29.8* 27.5* 31.0*    220 228       IMAGING RESULTS:     [x]  I have personally reviewed the actual   [x]CXR  [x]CT scan    Assessment/Plan:      Active Problems:    Mesenteric abscess ([address] (6/21/2021)       ___________________________________________________  PLAN:    1. Symptomatic bradycardia -episodes x3 with low heart rate in 30s most recently, cardiology consult appreciated potentially vasovagal and exacerbated by beta-blocker. Hold beta-blocker - Coreg was recently increased on 6/16/2021.  2. Sepsis, POA, in setting of #3 -leukocytosis, relative hypotension and some intermittent tachycardia. Leukocytosis initially thought to be reactive to NSTEMI.    3. Mesenteric abscess -discussed with ID transition to Zosyn only; colorectal surgery input appreciated, IR added to treatment team but did not call -please follow-up with phone call in a.m. hold Eliquis in anticipation of potential intervention   4. Indeterminate troponin w/ recent NSTEMI and stent placement on 5/18/2021-cardiology following, trend troponins, hold beta-blocker in setting of #1, continue statin, plavix; resume Eliquis when able from surgical perspective  5. Hyponatremia -recheck BMP at 9 PM in discussion with nephrology, continue normal saline for now, monitor for fluid overload in setting of diastolic CHF  6. BPH with history of retention - continue Flomax, monitor for any recurrent retention issues  7. Chronic COPD -continue ipratropium  /albuterol as needed  8. H/o embolic CVAs   9. H/o chronic constipation with recent loose stools -hold on further laxatives in setting of #3  10. Hypertension now with relative hypotension -hold Lasix for now, beta-blocker in setting of #1  11. PVD with neuropathy -continue Neurontin  12. Chronic diastolic CHF gentle hydration, hold Lasix for now, monitor for any fluid overload.       Admission and plan of care reviewed with patient and wife Harjinder Garg at [facility] called / follow up -infectious disease, cardiology, nephrology, colorectal surgery    Prophylaxis:  []Lovenox  []Coumadin  [x]eliquis (on hold)  []SCDs  []H2B/PPI    Discussed Code Status:    [x]Full Code      []DNR     ___________________________________________________  Admitting Provider: Maya Geiger NP

## 2021-06-21 NOTE — ED PROVIDER NOTES
[facility]  EMERGENCY DEPARTMENT HISTORY AND PHYSICAL EXAM       Date: 6/21/2021   Patient Name: Larissa Gamboa   YOB: 1940  Medical Record Number: [id number]    HISTORY OF PRESENTING ILLNESS:     Larissa Gamboa is a 80 y.o. male presenting with the noted PMH to the ED c/o syncopal episode just prior to arrival.  He is been in inpatient rehab after he had an NSTEMI with a cardiac stent placed in May. He has been in inpatient rehab and recovering for this. He reportedly had a syncopal episode earlier this morning around midnight with possibly even having agonal breathing but recovered from this. They are in the midst of working patient up over the past 2 days as he has had a new leukocytosis of unclear significance cause. Infectious disease has been consulted and it appears that he is on ceftriaxone and metronidazole and vancomycin. Just prior to arrival he had another syncopal episode while having a bowel movement on the toilet. Rapid response was called he was found to be bradycardic in the 30s minimally responsive and unable to get blood pressure. They give him a small amount of IV fluids and he seemed to eventually improve and is now back to his baseline. He has no complaints at this time. Denies any chest pain or shortness of breath or fevers or chills or abdominal pain. He does state that he is not been having normal bowel movements the past 3 days that he has been on MiraLAX and having some loose stools.      Rest of complete systems reviewed and negative    Primary Care Provider: Esther Sylvester MD   Specialist:    Past Medical History:   Past Medical History:   Diagnosis Date    Acute embolic stroke ([address] 3/18/8422    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, brainstem and cerebellum) without residual deficit    Anticoagulated by anticoagulation treatment 5/19/2021    On Apixaban    Benign prostatic hyperplasia with urinary retention     Chronic obstructive pulmonary disease (COPD) ([address]     Coronary artery disease involving native coronary artery of native heart     Essential hypertension     Heart failure with preserved left ventricular function (HFpEF) ([facility])     2D echocardiogram (5/18/2021) showed EF 50%; concentric LV hypertrophy with a severely thickened septal wall and mildly thickened posterior wall; left atrial chamber is severely enlarged; right atrial chamber volume is moderately enlarged; no mass, shunts, or thrombi.  History of carotid stenosis     History of gross hematuria 5/26/2021    Attributed to [place] trauma while patient was altered    History of renal artery stenosis     Leukocytosis 5/17/2021    Attributed to reactive leukocytosis due to NSTEMI    Malignant neoplasm of lower lobe of right lung ([facility])     Neuropathy involving both lower extremities 10/22/2020    Non-ST elevation (NSTEMI) myocardial infarction ([address] 5/17/2021    On clopidogrel therapy 5/19/2021    Paroxysmal atrial fibrillation ([address] 5/17/2021    Peripheral vascular disease of extremity with claudication ([address] 6/14/2021    Urinary retention         Past Surgical History:   Past Surgical History:   Procedure Laterality Date    HX CAROTID ENDARTERECTOMY Left 06/07/2012    HX CAROTID ENDARTERECTOMY Right 05/28/2014    Dr. Alexandria Rogel [facility]    HX CORONARY STENT PLACEMENT  05/18/2021    S/P PCI to proximal SVG with 3.5 x 12 mm Juan J drug-eluting stent; PCI to mid SVG with 3.5 x 34 mm [place] drug-eluting stent; PCI to distal SVG with 3.5 x 15 mm Juan J drug-eluting stent;  Balloon angioplasty to distal SVG anastomosis (5/18/2021 - Dr. Adenike Grijalva)     HX LOBECTOMY Right     Middle lobe and lower lobe    HX RENAL ARTERY STENT Bilateral 2011    HX TONSILLECTOMY      IR BRONCHOSCOPY  11/25/2015    Dr. Carlos Johnson Saad        Social History:   Social History     Tobacco Use    Smoking status: Former Smoker     Types: Cigarettes     Quit date: 6/12/2006     Years since quitting: 15.0    Smokeless tobacco: Never Used   Substance Use Topics    Alcohol use: No    Drug use: No        Allergies: Allergies   Allergen Reactions    Lipitor [Atorvastatin] Rash    Norvasc [Amlodipine] Rash        REVIEW OF SYSTEMS:  Review of Systems   Constitutional: Negative for chills and fever. HENT: Negative for dental problem. Eyes: Negative for visual disturbance. Respiratory: Negative for shortness of breath. Cardiovascular: Negative for chest pain. Gastrointestinal: Positive for diarrhea. Negative for abdominal pain, nausea and vomiting. Genitourinary: Negative for flank pain. Musculoskeletal: Negative for back pain. Skin: Negative for wound. Neurological: Positive for syncope. Negative for headaches. PHYSICAL EXAM:  Vitals:    06/21/21 1730 06/21/21 1808 06/21/21 1821 06/21/21 2000   BP:   103/88 (!) 141/95   Pulse: (!) 105  98 97   Resp: 19  22 20   Temp:   97.3 °F (36.3 °C) 97.1 °F (36.2 °C)   SpO2:    95%   Weight:   76.7 kg (169 lb 3.2 oz)    Height:  5' 8.11\" (1.73 m)         Physical Exam  Vitals and nursing note reviewed. Constitutional:       General: He is not in acute distress. Appearance: Normal appearance. HENT:      Head: Normocephalic. Mouth/Throat:      Pharynx: Oropharynx is clear. Eyes:      Conjunctiva/sclera: Conjunctivae normal.   Cardiovascular:      Rate and Rhythm: Normal rate. Pulses: Normal pulses. Pulmonary:      Effort: Pulmonary effort is normal. No respiratory distress. Breath sounds: Normal breath sounds. No wheezing. Abdominal:      General: There is no distension. Palpations: Abdomen is soft. Tenderness: There is no abdominal tenderness. Musculoskeletal:         General: Normal range of motion. Cervical back: Neck supple. Skin:     General: Skin is warm and dry. Neurological:      General: No focal deficit present. Mental Status: He is alert and oriented to person, place, and time. Mental status is at baseline.          Medications   atropine 1 mg/mL injection 0.5 mg (has no administration in time range)   sodium chloride (NS) flush 5-40 mL (10 mL IntraVENous Given 6/21/21 1831)   sodium chloride (NS) flush 5-40 mL (has no administration in time range)   acetaminophen (TYLENOL) tablet 650 mg (has no administration in time range)     Or   acetaminophen (TYLENOL) suppository 650 mg (has no administration in time range)   ondansetron (ZOFRAN ODT) tablet 4 mg (has no administration in time range)     Or   ondansetron (ZOFRAN) injection 4 mg (has no administration in time range)   piperacillin-tazobactam (ZOSYN) 3.375 g in 0.9% sodium chloride (MBP/ADV) 100 mL MBP (3.375 g IntraVENous New Bag 6/21/21 1832)   0.9% sodium chloride infusion (75 mL/hr IntraVENous New Bag 6/21/21 1834)   albuterol (PROVENTIL VENTOLIN) nebulizer solution 2.5 mg (has no administration in time range)   clopidogreL (PLAVIX) tablet 75 mg (has no administration in time range)   gabapentin (NEURONTIN) capsule 100 mg (has no administration in time range)   multivitamin, tx-iron-ca-min (THERA-M w/ IRON) tablet 1 Tablet (has no administration in time range)   rosuvastatin (CRESTOR) tablet 10 mg (has no administration in time range)   tamsulosin (FLOMAX) capsule 0.4 mg (0.4 mg Oral Given 6/21/21 1938)   L. acidophilus,casei,rhamnosus (BIO-K PLUS) capsule 1 Capsule (has no administration in time range)   ipratropium (ATROVENT) 0.02 % nebulizer solution 0.5 mg (0 mg Nebulization Held 6/21/21 2100)   sodium chloride 0.9 % bolus infusion 1,000 mL (0 mL IntraVENous IV Completed 6/21/21 1614)   sodium chloride 0.9 % bolus infusion 500 mL (0 mL IntraVENous IV Completed 6/21/21 1611)   atropine 1 mg/mL injection 1 mg (1 mg IntraVENous Given 6/21/21 0808)   <<Please Enter Weight into [facility] for Renal Dosing of Medications>> (1 Each Other Given 6/21/21 1900)       RESULTS:    Labs -   Labs Reviewed   CBC WITH AUTOMATED DIFF - Abnormal; Notable for the following components:       Result Value    WBC 26.0 (*)     RBC 3.22 (*)     HGB 9.4 (*)     HCT 29.8 (*)     RDW 16.9 (*)     NEUTROPHILS 92 (*)     LYMPHOCYTES 4 (*)     ABS. NEUTROPHILS 24.0 (*)     All other components within normal limits   METABOLIC PANEL, BASIC - Abnormal; Notable for the following components:    Sodium 125 (*)     Chloride 92 (*)     Glucose 121 (*)     BUN 23 (*)     BUN/Creatinine ratio 25 (*)     All other components within normal limits   HEPATIC FUNCTION PANEL - Abnormal; Notable for the following components:    Albumin 2.9 (*)     Bilirubin, total 1.2 (*)     Bilirubin, direct 0.4 (*)     All other components within normal limits   CARDIAC PANEL,(CK, CKMB & TROPONIN) - Abnormal; Notable for the following components:    Troponin-I, QT 0.09 (*)     All other components within normal limits   NT-PRO BNP - Abnormal; Notable for the following components:    NT pro-BNP 9,533 (*)     All other components within normal limits   LIPASE - Abnormal; Notable for the following components:    Lipase 61 (*)     All other components within normal limits   BLOOD GAS, ARTERIAL POC - Abnormal; Notable for the following components:    pO2 (POC) 63 (*)     sO2 (POC) 90.5 (*)     All other components within normal limits   MAGNESIUM   BLOOD GAS, ARTERIAL   TSH 3RD GENERATION   OSMOLALITY, SERUM/PLASMA   CREATININE, UR, RANDOM   SODIUM, UR, RANDOM   URINALYSIS W/MICROSCOPIC   OSMOLALITY, UR   OSMOLALITY, SERUM/PLASMA   CARDIAC PANEL,(CK, CKMB & TROPONIN)   METABOLIC PANEL, BASIC   POC LACTIC ACID       Radiologic Studies -  CT CHEST ABD PELV W CONT    Result Date: 6/21/2021  CT CHEST, ABDOMEN AND PELVIS WITH ENHANCEMENT INDICATION: Leukocytosis. Abdominal pain. Myocardial infarction.  TECHNIQUE: Axial images obtained of the chest, abdomen and pelvis following the uneventful administration of 100 cc Isovue-300 nonionic intravenous contrast. Coronal and sagittal reformatted images obtained. All CT scans are performed using dose optimization techniques as appropriate to the performed exam including the following: Automated exposure control, adjustment of mA and/or kV according to patient size, and use of iterative reconstructive technique. COMPARISON: CTA chest 11/14/2015, CT abdomen and pelvis 6/16/2014. CHEST FINDINGS: Lung: Emphysema. Pleura: Unremarkable. Heart: No effusion. CABG. Cardiomegaly. Vessels: No aortic aneurysm. Scattered wall calcifications. Lymph Nodes: Unremarkable. Thyroid: Stable to prior. The right gland is larger than the left and could indicate an underlying nodule. Bones: Sternotomy. Lower thoracic degenerative disc disease. ABDOMEN AND PELVIS FINDINGS: Liver: Unremarkable. Biliary: Unremarkable. Spleen: Unremarkable. Pancreas: Unremarkable. Adrenal Glands: Stable chronic mild nodular thickening of the left gland since 2014. Kidneys: Chronic left renal cortical scarring. Subcentimeter right renal cyst. Bowel: Normal appendix. Diverticulosis. Thickened and collapsed segment of mid sigmoid colon has an appearance similar to prior 2014 exam. There is mild proximal sigmoid colon distention suggesting a stricture in this region. Positioned between this thickened segment of colon and a thickened inflamed loop of small bowel is a mesenteric gas and fluid containing rim enhanced collection measuring 3.5 x 2.3 cm. At least 2 additional small phlegmonous developing collections are seen closely along serosal mesenteric margin of the affected small bowel loop best seen on sagittal image 31 and 38.  Dilated fluid-filled small bowel loops transitioning at this abnormally thickened small bowel loop however the bowel appears patent and gas and fluid containing on both ends of this process suggesting a superimposed ileus contributing to the picture. Bladder/ Pelvic Organs: Mild prostate enlargement. Mild thickening of the bladder dome wall, likely reactive to adjacent mesenteric/small bowel process. Peritoneum/ Retroperitoneum: No free air. Trace free fluid. Mesenteric inflammation in the pelvis as discussed above. Lymph Nodes: Borderline enlarged 9-10 mm mesenteric lymph nodes may be reactive. Vessels: Arteriosclerosis. Renal artery stents. Bones/Soft tissues: Mild chronic fatty left inguinal hernia is similar to prior. Degenerative disc disease throughout the lumbar spine most advanced at L2-L3. Right greater than left hip degenerative joint disease. 1.  No acute findings in the chest. -Emphysema. -CABG. -Stable mildly enlarged right thyroid gland which could indicate underlying nodule, stable for many years. 2. 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 It is unclear if initial source of abscess is from small bowel inflammation or diverticulitis/pericolonic abscess. 3. Dilated small bowel. This is likely a combination of generalized small bowel ileus and low-grade partial small bowel obstruction at site of abnormal pelvic small bowel loop discussed above. 4. Diverticulosis. Stable thickened collapsed segment of sigmoid colon since 2014 suggesting a chronic stricture. This is limited for assessment by CT. 5. Bladder dome inflammation is likely secondary to the adjacent mesentery process. 6. Other chronic incidental findings. Findings were discussed with emergency department physician on 6/21/2021 at 1:55 PM.    XR CHEST PORT    Result Date: 6/21/2021  EXAM: XR CHEST PORT INDICATION: RRT COMPARISON: 11/25/2015 FINDINGS: A portable AP radiograph of the chest was obtained at 0 535 hours. Status post median sternotomy and clips over the mediastinum, stable. . Low lung volumes. . Question streaky opacities in the lung bases. Question small right pleural opacity. .  Enlarged cardiac silhouette. Osseous degenerative changes. Clips noted overlying the left neck. .     Enlarged cardiac silhouette. Low lung volumes with streaky bibasilar densities. Please see report for additional details. MEDICAL DECISION MAKING  77-year-old male history of NSTEMI with cardiac stent in May done at [facility] who has been at inpatient rehab here at [facility] the past few weeks. He is been doing well until he is had 2 syncopal episodes over the past 12 hours, first around midnight where he was treated in inpatient rehab with subsequent improvement and then again just prior to coming down to the emergency department early this afternoon where he was reportedly on the toilet and had a syncopal episode with associated bradycardia, improved with IV fluids. He is now at his baseline and has no complaints and is neurologically intact and clinically looks well and is nontoxic-appearing with stable vital signs. His EKG reveals atrial fibrillation which she has a recent history of but his rate is controlled and his blood pressure is within normal limits. He has again no complaint at this time. In chart review he is being worked up over the past 2 days for new leukocytosis of unclear etiology. His chest x-ray did not show obvious pneumonia and his urine was negative for infection. Infectious disease also saw the patient this morning around 9:00 and recommended CT chest abdomen pelvis for further investigation as well as IV Rocephin and Flagyl with p.o. vancomycin which was given to him around 1230 this afternoon. I did speak to radiology about patient's CT which does reveal suspected mesenteric abscess of unclear etiology. He also has some dilation of his bowels concerning for ileus or low-grade partial small bowel obstruction. Patient denies any abdominal pain currently or recently at all or any nausea or vomiting.   He has had difficulty with his stools and does endorse some loose stools recently has been on MiraLAX. Clinically does not have an acute abdomen at all. I did speak with Dr. Jeannie Clay from surgery about patient's CT findings and he will see patient in consultation. Spoke to hospitalist who will admit, request cardiology consultation. Discussed with cardiology who come down to see patient. After admission, patient did proceed to have another syncopal episode witnessed by staff after he tried to get up to use the bathroom. Again his heart rate dropped to the 30s and he was given atropine with eventual improvement. I did discuss care with cardiology who saw patient at bedside, suspects vagal syncope likely secondary to sepsis. He did have some improvement in his mental status and heart rate and at this time is stable for stepdown            Diagnosis   Clinical Impression:   1. Syncope, unspecified syncope type    2. Leukocytosis, unspecified type    3. Mesenteric abscess ([facility]    4. History of coronary artery disease           Follow-up Information    None         Current Discharge Medication List          Admitted in stable and improved condition. This chart was completed using Dragon, a dictation transcription service. Errors may have resulted from using this device.

## 2021-06-21 NOTE — PROGRESS NOTES
[facility] PHYSICAL REHABILITATION  [address] [place] [address], [address]     INPATIENT REHABILITATION  DAILY PROGRESS NOTE     Date: 6/21/2021    Name: Antoinette Jensen Age / Sex: 80 y.o. / male   CSN: [id number] MRN: [id number]   Admit Date: 6/7/2021 Length of Stay: 14 days     Primary Rehab Diagnosis: Impaired Mobility and ADLs secondary to:  1. Non-ST elevation (NSTEMI) myocardial infarction  2. Coronary artery disease; History of CABG  3. S/P PCI to proximal SVG with 3.5 x 12 mm [place] drug-eluting stent; PCI to mid SVG with 3.5 x 34 mm [place] J drug-eluting stent; PCI to distal SVG with 3.5 x 15 mm [place] J drug-eluting stent; Balloon angioplasty to distal SVG anastomosis (5/18/2021 - Dr. Toby Escudero)       Subjective:     Patient seen and examined. Patient's Complaint:   Unresponsiveness    Pain Control: stable, mild-to-moderate joint symptoms intermittently, reasonably well controlled by current meds      Objective:     Vital Signs:  Patient Vitals for the past 24 hrs:   BP Temp Pulse Resp SpO2   06/21/21 1307   97     06/21/21 1306 (!) 197/101  79 20    06/21/21 1301   (!) 54  90 %   06/21/21 1024 (!) 142/78 97.9 °F (36.6 °C) 95 20 94 %   06/21/21 0714 110/70 97.6 °F (36.4 °C) 94 18 95 %   06/20/21 2121 116/72 97.2 °F (36.2 °C) 100 18 95 %   06/20/21 1643 131/74 97.4 °F (36.3 °C) 99 17 95 %        Physical Examination:  GENERAL SURVEY: Patient is drowsy to lethargic, laying supine on the bed, not in acute respiratory distress.   HEENT: pale palpebral conjunctivae, anicteric sclerae, no nasoaural discharge, moist oral mucosa  NECK: supple, no jugular venous distention, no palpable lymph nodes  CHEST/LUNGS: symmetrical chest expansion, good air entry, clear breath sounds  HEART: adynamic precordium, good S1 S2, no S3, irregularly irregular rhythm, no murmurs  ABDOMEN: flat, bowel sounds appreciated, soft, non-tender  EXTREMITIES: pale nailbeds, (+) bipedal edema, full and equal pulses, no calf tenderness   NEUROLOGICAL EXAM: The patient is awake, alert and oriented x3, able to answer questions fairly appropriately, able to follow 1 and 2 step commands. Able to tell time from the wall clock. Cranial nerves II-XII are grossly intact. No gross sensory deficit. Motor strength is 4 to 4+/5 on all 4 extremities. Current Medications:  Current Facility-Administered Medications   Medication Dose Route Frequency    vancomycin 50 mg/mL oral solution (compounded) 500 mg  500 mg Oral Q6H    0.9% sodium chloride infusion  75 mL/hr IntraVENous CONTINUOUS    cefTRIAXone (ROCEPHIN) 1 g in sterile water (preservative free) 10 mL IV syringe  1 g IntraVENous Q24H    metroNIDAZOLE (FLAGYL) IVPB premix 500 mg  500 mg IntraVENous Q8H    cholestyramine-aspartame (QUESTRAN LIGHT) packet 4 g  4 g Oral TID WITH MEALS    L. acidophilus,casei,rhamnosus (BIO-K PLUS) capsule 1 Capsule  1 Capsule Oral DAILY    gabapentin (NEURONTIN) capsule 100 mg  100 mg Oral TID    carvediloL (COREG) tablet 25 mg  25 mg Oral BID WITH MEALS    ipratropium (ATROVENT) 0.02 % nebulizer solution 0.5 mg  0.5 mg Nebulization TID    clopidogreL (PLAVIX) tablet 75 mg  75 mg Oral DAILY WITH BREAKFAST    acetaminophen (TYLENOL) tablet 650 mg  650 mg Oral Q4H PRN    bisacodyL (DULCOLAX) tablet 10 mg  10 mg Oral Q48H PRN    albuterol (PROVENTIL VENTOLIN) nebulizer solution 2.5 mg  2.5 mg Nebulization Q4H PRN    apixaban (ELIQUIS) tablet 5 mg  5 mg Oral BID    arformoteroL (BROVANA) neb solution 15 mcg  15 mcg Nebulization BID RT    [Held by provider] furosemide (LASIX) tablet 20 mg  20 mg Oral DAILY    nitroglycerin (NITROSTAT) tablet 0.4 mg  0.4 mg SubLINGual PRN    rosuvastatin (CRESTOR) tablet 10 mg  10 mg Oral DAILY    tamsulosin (FLOMAX) capsule 0.4 mg  0.4 mg Oral PCD    multivitamin, tx-iron-ca-min (THERA-M w/ IRON) tablet 1 Tablet  1 Tablet Oral DAILY       Allergies:   Allergies   Allergen Reactions    Lipitor [Atorvastatin] Rash    Norvasc [Amlodipine] Rash       Lab/Data Review:  Recent Results (from the past 24 hour(s))   CULTURE, BLOOD    Collection Time: 06/20/21  1:25 PM    Specimen: Blood   Result Value Ref Range    Special Requests: NO SPECIAL REQUESTS      Culture result: NO GROWTH AFTER 17 HOURS     CULTURE, BLOOD    Collection Time: 06/20/21  1:35 PM    Specimen: Blood   Result Value Ref Range    Special Requests: NO SPECIAL REQUESTS      Culture result: NO GROWTH AFTER 17 HOURS     URINALYSIS W/ RFLX MICROSCOPIC    Collection Time: 06/20/21  2:00 PM   Result Value Ref Range    Color DARK YELLOW      Appearance CLEAR      Specific gravity 1.016 1.005 - 1.030      pH (UA) 5.0 5.0 - 8.0      Protein Negative NEG mg/dL    Glucose Negative NEG mg/dL    Ketone Negative NEG mg/dL    Bilirubin SMALL (A) NEG      Blood Negative NEG      Urobilinogen 0.2 0.2 - 1.0 EU/dL    Nitrites Negative NEG      Leukocyte Esterase Negative NEG     SODIUM, UR, RANDOM    Collection Time: 06/20/21  2:00 PM   Result Value Ref Range    Sodium,urine random 6 (L) 20 - 110 MMOL/L   GLUCOSE, POC    Collection Time: 06/21/21  3:13 AM   Result Value Ref Range    Glucose (POC) 149 (H) 70 - 110 mg/dL   EKG, 12 LEAD, SUBSEQUENT    Collection Time: 06/21/21  3:28 AM   Result Value Ref Range    Ventricular Rate 89 BPM    Atrial Rate 75 BPM    QRS Duration 114 ms    Q-T Interval 386 ms    QTC Calculation (Bezet) 469 ms    Calculated R Axis 46 degrees    Calculated T Axis -89 degrees    Diagnosis       Atrial fibrillation  ST & T wave abnormality, consider lateral ischemia or digitalis effect  Prolonged QT  Abnormal ECG  No previous ECGs available  Confirmed by Emmanuelle Morataya MD, Lisbeth Vanessa ([id number]) on 6/21/2021 8:28:19 AM     CBC WITH AUTOMATED DIFF    Collection Time: 06/21/21  3:30 AM   Result Value Ref Range    WBC 26.3 (H) 4.6 - 13.2 K/uL    RBC 3.07 (L) 4.35 - 5.65 M/uL    HGB 9.1 (L) 13.0 - 16.0 g/dL    HCT 27.5 (L) 36.0 - 48.0 %    MCV 89.6 74.0 - 97.0 FL MCH 29.6 24.0 - 34.0 PG    MCHC 33.1 31.0 - 37.0 g/dL    RDW 16.6 (H) 11.6 - 14.5 %    PLATELET 010 864 - 084 K/uL    MPV 9.7 9.2 - 11.8 FL    NEUTROPHILS 87 (H) 40 - 73 %    LYMPHOCYTES 8 (L) 21 - 52 %    MONOCYTES 4 3 - 10 %    EOSINOPHILS 0 0 - 5 %    BASOPHILS 1 0 - 2 %    ABS. NEUTROPHILS 22.8 (H) 1.8 - 8.0 K/UL    ABS. LYMPHOCYTES 2.1 0.9 - 3.6 K/UL    ABS. MONOCYTES 1.1 0.05 - 1.2 K/UL    ABS. EOSINOPHILS 0.0 0.0 - 0.4 K/UL    ABS. BASOPHILS 0.3 (H) 0.0 - 0.1 K/UL    DF MANUAL      PLATELET COMMENTS ADEQUATE PLATELETS      RBC COMMENTS OVALOCYTES  FEW  VÍCTOR CELLS  FEW       METABOLIC PANEL, COMPREHENSIVE    Collection Time: 06/21/21  3:30 AM   Result Value Ref Range    Sodium 126 (L) 136 - 145 mmol/L    Potassium 4.5 3.5 - 5.5 mmol/L    Chloride 91 (L) 100 - 111 mmol/L    CO2 24 21 - 32 mmol/L    Anion gap 11 3.0 - 18 mmol/L    Glucose 145 (H) 74 - 99 mg/dL    BUN 25 (H) 7.0 - 18 MG/DL    Creatinine 0.89 0.6 - 1.3 MG/DL    BUN/Creatinine ratio 28 (H) 12 - 20      GFR est AA >60 >60 ml/min/1.73m2    GFR est non-AA >60 >60 ml/min/1.73m2    Calcium 8.3 (L) 8.5 - 10.1 MG/DL    Bilirubin, total 1.3 (H) 0.2 - 1.0 MG/DL    ALT (SGPT) 18 16 - 61 U/L    AST (SGOT) 28 10 - 38 U/L    Alk.  phosphatase 48 45 - 117 U/L    Protein, total 6.0 (L) 6.4 - 8.2 g/dL    Albumin 3.1 (L) 3.4 - 5.0 g/dL    Globulin 2.9 2.0 - 4.0 g/dL    A-G Ratio 1.1 0.8 - 1.7     CARDIAC PANEL,(CK, CKMB & TROPONIN)    Collection Time: 06/21/21  3:30 AM   Result Value Ref Range    CK - MB 1.0 <3.6 ng/ml    CK-MB Index 0.8 0.0 - 4.0 %     39 - 308 U/L    Troponin-I, QT 0.04 0.0 - 0.045 NG/ML   MAGNESIUM    Collection Time: 06/21/21  3:30 AM   Result Value Ref Range    Magnesium 2.0 1.6 - 2.6 mg/dL   PROCALCITONIN    Collection Time: 06/21/21  3:30 AM   Result Value Ref Range    Procalcitonin 0.06 ng/mL   GLUCOSE, POC    Collection Time: 06/21/21 12:58 PM   Result Value Ref Range    Glucose (POC) 120 (H) 70 - 110 mg/dL        Assessment: Primary Rehabilitation Diagnosis  1. Impaired Mobility and ADLs  2. Non-ST elevation (NSTEMI) myocardial infarction  3. Coronary artery disease; History of CABG  4. S/P PCI to proximal SVG with 3.5 x 12 mm [place] drug-eluting stent; PCI to mid SVG with 3.5 x 34 mm Juan J drug-eluting stent; PCI to distal SVG with 3.5 x 15 mm [place] drug-eluting stent; Balloon angioplasty to distal SVG anastomosis (5/18/2021 - Dr. Jc Smith)     Comorbidities  Patient Active Problem List   Diagnosis Code    Urinary retention R33.9    Essential hypertension I10    Non-ST elevation (NSTEMI) myocardial infarction ([facility] [address] I21.4    Acute on chronic heart failure with preserved ejection fraction (HFpEF) ([facility]) I50.33    Paroxysmal atrial fibrillation ([facility]) I48.0    Anticoagulated by anticoagulation treatment Z79.01    Mixed hyperlipidemia E78.2    Benign prostatic hyperplasia with urinary retention N40.1, R33.8    Malignant neoplasm of lower lobe of right lung ([facility]) C34.31    Heart failure with preserved left ventricular function (HFpEF) ([facility]) I50.30    Coronary artery disease involving native coronary artery of native heart I25.10    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    Chronic obstructive pulmonary disease (COPD) ([facility]) J44.9    History of carotid stenosis Z86.79    History of renal artery stenosis Z86.79    On clopidogrel therapy Z79.01    History of gross hematuria A76.673    Acute embolic stroke ([facility]) A86.3    Leukocytosis D72.829    Peripheral vascular disease of extremity with claudication ([facility]) I73.9    Neuropathy involving both lower extremities G57.93    Impaired mobility and ADLs Z74.09, Z78.9    Status post insertion of drug eluting coronary artery stent Z95.5       Plan:     1. Justification for continued stay: Good progression towards established rehabilitation goals.     2. Medical Issues being followed closely:    [x]  Fall and safety precautions     []  Wound Care     [x]  Bowel and Bladder Function     [x]  Fluid Electrolyte and Nutrition Balance     [x]  Pain Control      3. Issues that 24 hour rehabilitation nursing is following:    [x]  Fall and safety precautions     []  Wound Care     [x]  Bowel and Bladder Function     [x]  Fluid Electrolyte and Nutrition Balance     [x]  Pain Control      [x]  Assistance with and education on in-room safety with transfers to and from the bed, wheelchair, toilet and shower. 4. Acute rehabilitation plan of care:    [x]  Continue current care and rehab. [x]  Physical Therapy           [x]  Occupational Therapy           [x]  Speech Therapy     []  Hold Rehab until further notice     5. Medications:    [x]  MAR Reviewed     [x]  Continue Present Medications     6. DVT Prophylaxis:      []  Enoxaparin     []  Unfractionated Heparin     []  Warfarin     [x]  NOAC     []  MARTHA Stockings     []  Sequential Compression Device     []  None     7. Code status    [x]  Full code     []  Partial code     []  Do not intubate     []  Do not resuscitate     8. Orders:   > Non-ST elevation (NSTEMI) myocardial infarction; Coronary artery disease; History of CABG; S/P PCI to proximal SVG with 3.5 x 12 mm [place] drug-eluting stent; PCI to mid SVG with 3.5 x 34 mm Juan J drug-eluting stent; PCI to distal SVG with 3.5 x 15 mm [place] drug-eluting stent;  Balloon angioplasty to distal SVG anastomosis (5/18/2021 - Dr. Valerie Vaca)    > On 6/16/2021, increased Carvedilol from 12.5 mg to 25 mg PO BID with meals (8AM, 5PM)   > Continue:    > Carvedilol 25 mg PO BID with meals (8AM, 5PM)    > Clopidogrel 75 mg PO once daily with breakfast    > Rosuvastatin 75 mg PO once daily    > Nitroglycerin 0.4 mg SL PRN for chest pain    > Acute on chronic heart failure with preserved ejection fraction (HFpEF)   > 2D echocardiogram (5/18/2021) showed EF 50%; concentric LV hypertrophy with a severely thickened septal wall and mildly thickened posterior wall; left atrial chamber is severely enlarged; right atrial chamber volume is moderately enlarged; no mass, shunts, or thrombi.    > On 6/16/2021, increased Carvedilol from 12.5 mg to 25 mg PO BID with meals (8AM, 5PM)   > Continue:    > Carvedilol 25 mg PO BID with meals (8AM, 5PM)    > Furosemide 20 mg PO once daily (9AM)    > 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, brainstem and cerebellum) without residual deficit   > Continue:    > Clopidogrel 75 mg PO once daily with breakfast    > Rosuvastatin 75 mg PO once daily    > Benign prostatic hyperplasia with urinary retention   > Continue Tamsulosin 0.4 mg PO once daily after dinner    > Chronic obstructive pulmonary disease (COPD)   > On 6/12/2021:    > Discontinue Budesonide nebulization BID    > Start Ipratropium nebulization TID   > Continue:    > Arformoterol nebulization BID    > Ipratropium nebulization TID    > Albuterol nebulization q 4 hr PRN for shortness of breath/wheezing    > Constipation --> Diarrhea; Leukocytosis   > On 6/8/2021, started Pericolace 2 tabs PO once daily after dinner   > On 6/9/2021, started Polyethylene glycol 17 grams in 8 oz water PO once daily   > On 6/19/2021:    > Patient was noted to have diarrhea    > Held:     > Polyethylene glycol 17 grams in 8 oz water PO once daily     > Pericolace 2 tabs PO once daily after dinner    > Started Bio K Plus 1 cap PO once daily   > Stool culture (collected 6/19/2021, resulted 6/20/2021): Negative   > Stool for Clostridium difficile   > WBC count (6/20/2021) = 26.7   > Urinalysis (6/20/2021): WNL   > Urine culture (collected 6/20/2021): PENDING   > Blood culture x 2 sets (collected 6/20/2021, preliminary result on 6/21/2021) yielded no growth after 17 hours   > On 6/20/2021, started:    > Cholestyramine 4 grams PO TID with meals    > Ceftriaxone 1000 mg IV q 24 hr    > Metronidazole 500 mg IV q 8 hr    > Vancomycin 1750 mg  IV x 1 dose   > WBC count (6/21/2021) = 26.3   > Infectious Disease consult (Dr. Alexandro Vega) was called on 6/21/2021 for evaluation and comanagement   > On 6/21/2021:    > Discontinue:     > Polyethylene glycol 17 grams in 8 oz water PO once daily     > Pericolace 2 tabs PO once daily after dinner    > Change Vancomycin IV to Vancomycin 500 mg PO q 6 hr   > CT scan of the chest, abdomen and pelvis with contrast (6/21/2021) showed:    1. No acute findings in the chest.     -Emphysema. -CABG. -Stable mildly enlarged right thyroid gland which could indicate underlying nodule, stable for many years. 2. 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 It is unclear if initial source of abscess is from small bowel inflammation or diverticulitis/pericolonic abscess. 3. Dilated small bowel. This is likely a combination of generalized small bowel ileus and low-grade partial small bowel obstruction at site of abnormal pelvic small bowel loop discussed above. 4. Diverticulosis. Stable thickened collapsed segment of sigmoid colon since 2014 suggesting a chronic stricture. This is limited for assessment by CT. 5. Bladder dome inflammation is likely secondary to the adjacent mesentery process. 6. Other chronic incidental findings.    > Continue:    > Bio K Plus 1 cap PO once daily    > Cholestyramine 4 grams PO TID with meals    > Ceftriaxone 1000 mg IV q 24 hr    > Metronidazole 500 mg IV q 8 hr    > Vancomycin 500 mg PO q 6 hr     > Essential hypertension   > 2D echocardiogram (5/18/2021) showed EF 50%; concentric LV hypertrophy with a severely thickened septal wall and mildly thickened posterior wall; left atrial chamber is severely enlarged; right atrial chamber volume is moderately enlarged; no mass, shunts, or thrombi.    > On 6/16/2021, increased Carvedilol from 12.5 mg to 25 mg PO BID with meals (8AM, 5PM)   > Continue:    > Carvedilol 25 mg PO BID with meals (8AM, 5PM)    > Furosemide 20 mg PO once daily (9AM)    > Leukocytosis, reactive, attributed to NSTEMI   > Labs at [facility]:    > WBC count (5/17/2021) = 18.6    > WBC count (5/18/2021) = 21.4    > WBC count (5/19/2021) = 22.4    > WBC count (5/21/2021) = 27.3    > WBC count (5/22/2021) = 24.9    > WBC count (5/23/2021) = 30.2    > . . .     > WBC count (6/2/2021) = 25.4    > WBC count (6/3/2021) = 22.5    > WBC count (6/4/2021) = 23.4    > WBC count (6/5/2021) = 21.7    > WBC count (6/6/2021) = 24.1    > WBC count (6/7/2021) = 20.0    > Work up done was negative for a source of infection   > WBC count (6/8/2021, on admission to the ARU) = 14.4   > WBC count (6/14/2021) = 10.1   > WBC count (6/17/2021) = 14.3   > No documented fever since admission    > Mixed hyperlipidemia   > Lipid profile (5/18/2021) showed TG 83, , HDL 50, LDL 93   > Continue Rosuvastatin 75 mg PO once daily    > Paroxysmal atrial fibrillation, anticoagulated on Apixaban   > On 6/8/2021, increased Carvedilol from 12.5 mg to 25 mg PO BID with meals (8AM, 5PM)   > Continue:    > Apixaban 5 mg PO BID    > Carvedilol 25 mg PO BID with meals (8AM, 5PM)    > Peripheral vascular disease of extremity with claudication;  Neuropathy involving both lower extremities   > (+) pain and numbness of toes of both feet   > PVL arterial doppler of both lower extremities with exercise (9/12/2019) showed:    > Moderate arterial insufficiency in the right lower extremity at the level of the trifurcation at rest.     > Severe arterial insufficiency in the left lower extremity at the level of the trifurcation at rest.   > PVL arterial doppler of both lower extremities with exercise (10/22/2020) showed:    > Mild right lower extremity arterial insufficiency of indeterminate level at rest.     > Moderate left lower extremity arterial insufficiency involving the femoral-popliteal segment at rest.     > The ankle brachial indices post exercise were unreliable as described. No evidence of inflow involvement.   > Planned to start patient on Cilostazol for the claudication symptoms but patient already on Apixaban + Clopidogrel; addition of Cilostazol may increase risk for bleeding; will hold off on adding Cilostazol for now   > As per the last Progress Note by Dr. Alexx Pittman (dated 10/22/2020): \"Pt major symptom is not related to his PAD but sounds more like neuropathy\"   > On 6/14/2021, started a trial of Gabapentin 100 mg PO BID   > Upon discharge from the ARU, the patient will need to follow up with Vascular Surgery (Dr. Alexx Pittman)   > On 6/18/2021, increased Gabapentin from 100 mg PO BID to 100 mg PO TID   > Continue Gabapentin 100 mg PO TID    > COVID-19 ruled out by laboratory testing   > SARS-CoV-2 ([place] m2000, [facility]) (collected 5/26/2021, resulted 5/28/2021): Not detected    > Analgesia   > Continue Acetaminophen 650 mg PO q 4 hr PRN for pain     > Diet:   > Specifications: Low fat/Low cholesterol/High fiber/No added salt   > Solids (consistency): Regular    > Liquids (consistency): Thin    > Fluid restriction: None    > (+) shortness of breath and dizziness after returning from bathroom at 3AM   > Rapid Response Team was called at 3:10 AM; Patient was seen and evaluated by [address] resident (Dr. Parker Mcgraw)   > Work up:    > WBC count (6/21/2021) = 26.3    > Cardiac enzymes (6/21/2021): Negative x 1 set    > Chest x-ray (6/21/2021) showed an enlarged cardiac silhouette; low lung volumes with streaky bibasilar densities. > Patient back at baseline; On-call physician (Dr. Summer Bazan) was informed of the above events; patient to remain on unit.    > (+) unresponsiveness while seated on toilet bowl at 1PM   > Rapid Response Team was called;  Patient was seen and examined by [address] (Dr. Piotr Powell)   > I saw and examined the patient at the bedside   > Excerpt from the RRT note by Dr. Lisbeth Cardoza: \"Rapid response called for syncope. Patient was found unresponsive and unconscious while on the toilet. The patient was moved back to the bed and RRT called. On arrival, the patient had an unreadable BP by machine, and a slow pulse in the low 40s. Patient continued to be bradycardic for a number of minutes, with lowest rate in low 30s. While bradycardic, patient continued to have unreadable pressure. Patient was given 500cc bolus, and during, patient converted to NSR, and next pressure was 190s/100s, patient began to regain senses and communicated at this time, although he continued to not be at baseline, and was sleepy/lathargic. Patient was deemed stable for transfer and moved to the ED. \"   > Due to altered mental status, momentary hypotension, bradycardia and leukocytosis of unknown origin, it was felt that the patient had to be transferred to the [facility] Emergency Department for further evaluation, work up and disposition   > I called Emergency Medicine (Dr. Gonzalo Rhoades) and informed him of the above prior to transfer to the ER      9. Personal Protective Equipment was used while interacting with patient including: KN95 face mask. Patient was not using a surgical mask. 10. Total clinical care time is 30 minutes, including review of chart including all labs, radiology, past medical history, and discussion with RRT staff and ED physician. Greater than 50% of my time was spent in coordination of care and counseling. 11. Discharge Planning:  Discharge date  6/21/2021 (Monday)   Discharge location  Emergency Department ([facility] Emergency Department)   Follow-up appointments (to be scheduled at skilled nursing facility [address] [place] [address])  1. PCP (Dr. Jose Johnston)    2.  Cardiology (Dr. Kimberli Cyr)        Signed:    Shalini Vail MD    June 21, 2021

## 2021-06-21 NOTE — PROGRESS NOTES
Patient still with no output since straightt cath earlier today. Bladder scan shows 363 ml . Sraight cath performed with 300 ml return of urine. Straw colored and clear. 0310:  RRT called for patient unresponsive. Patient back from bathroom complaining of SOB and dizziness. Unable to respond to commands, cool and diaphoretic, abnormal resp,  . ER RN, NS, MICHAELLE MCNEIL arrived. EKG and labs drawn, MD Coronado called. Patient to remain on unit.     [id number]:  CXR

## 2021-06-21 NOTE — DISCHARGE SUMMARY
[facility] PHYSICAL REHABILITATION  [address] [place] [address], [address]     INPATIENT REHABILITATION  DISCHARGE SUMMARY    Name: Malgorzata Mera MRN: [id number]   Age / Sex: 80 y.o. / male CSN: [id number]   YOB: 1940 Length of Stay: 14 days   Admit Date: 6/7/2021 Discharge Date: 6/21/2021       PRIMARY CARE PHYSICIAN: Jacob Srivastava MD      DISCHARGE DIAGNOSES:    Primary Rehabilitation Diagnosis  1. Impaired Mobility and ADLs  2. Non-ST elevation (NSTEMI) myocardial infarction  3. Coronary artery disease; History of CABG  4. S/P PCI to proximal SVG with 3.5 x 12 mm Juan J drug-eluting stent; PCI to mid SVG with 3.5 x 34 mm [place] drug-eluting stent; PCI to distal SVG with 3.5 x 15 mm Juan J drug-eluting stent;  Balloon angioplasty to distal SVG anastomosis (5/18/2021 - Dr. Manju Sebastian    Comorbidities  Patient Active Problem List   Diagnosis Code    Urinary retention R33.9    Essential hypertension I10    Non-ST elevation (NSTEMI) myocardial infarction ([facility] [address] I21.4    Acute on chronic heart failure with preserved ejection fraction (HFpEF) ([facility]) I50.33    Paroxysmal atrial fibrillation ([facility]) I48.0    Anticoagulated by anticoagulation treatment Z79.01    Mixed hyperlipidemia E78.2    Benign prostatic hyperplasia with urinary retention N40.1, R33.8    Malignant neoplasm of lower lobe of right lung ([facility]) C34.31    Heart failure with preserved left ventricular function (HFpEF) ([facility]) I50.30    Coronary artery disease involving native coronary artery of native heart I25.10    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    Chronic obstructive pulmonary disease (COPD) ([facility]) J44.9    History of carotid stenosis Z86.79    History of renal artery stenosis Z86.79    On clopidogrel therapy Z79.01    History of gross hematuria G30.518    Acute embolic stroke ([facility]) W20.3    Leukocytosis D72.829    Peripheral vascular disease of extremity with claudication ([facility]) I73.9    Neuropathy involving both lower extremities G57.93    Impaired mobility and ADLs Z74.09, Z78.9    Status post insertion of drug eluting coronary artery stent Z95.5       CONSULTS CALLED:   1. Rapid Response Team  2. Infectious Disease (Dr. Aaron Faye)      PROCEDURES DONE:   1. CT scan of the chest, abdomen and pelvis with contrast (6/21/2021) showed:   1.  No acute findings in the chest.    -Emphysema. -CABG. -Stable mildly enlarged right thyroid gland which could indicate underlying nodule, stable for many years. 2. 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 It is unclear if initial source of abscess is from small bowel inflammation or diverticulitis/pericolonic abscess. 3. Dilated small bowel. This is likely a combination of generalized small bowel ileus and low-grade partial small bowel obstruction at site of abnormal pelvic small bowel loop discussed above. 4. Diverticulosis. Stable thickened collapsed segment of sigmoid colon since 2014 suggesting a chronic stricture. This is limited for assessment by CT. 5. Bladder dome inflammation is likely secondary to the adjacent mesentery process. 6. Other chronic incidental findings. 2. Chest x-ray (6/21/2021) showed an enlarged cardiac silhouette; low lung volumes with streaky bibasilar densities. BRIEF HISTORY: The patient is a 60-year-old Black male with multiple medical comorbidities who was admitted to [facility] on 5/18/2021 due to NSTEMI. On 5/17/2021, the patient was brought to the [facility] Emergency Department for further evaluation of shortness of breath and elevated blood pressure. The patient was seen and examined by Emergency Medicine LAMBERT Lima / Dr. Mariella Valle).     Excerpt (History) from the ED Provider Note by LAMBERT Lima:  Brent Cam Theresa Lewis is a 80 y.o. male with hx of diastolic CHF, CAD s/p CABG at [facility] approx 20 years ago, HTN, hx of lung cancer s/p pneumonectomy who presents to the ED c/o one week of bilateral lower leg swelling with new onset shortness of breath this morning. Patient notes that his legs have gotten progressively more swollen over the last week, however became concerned when he woke up this morning feeling short of breath. He describes his shortness of breath is constant, worse with exertion. He also notes that he has some chest tightness with exertion as well. There is no pleuritic component, denies any chest pain at rest. Note fever/chills, URI symptoms, cough, nausea/vomiting, abdominal pain, back/flank pain. \"    WBC count (5/17/2021) = 18.6    The patient was admitted under the service of the [address] [place] [address] (Dr. David Michaels). Excerpt (History of the Present Illness) from the H&P by Dr. David Michaels:  Daisha Echols is a 80y.o. year old male with significant past medical history of coronary artery disease s/p CABG more than 20 years ago, carotid artery stenosis s/p endarterectomy, hypertension, and hyperlipidemia with allergy to statin presented due to decreased exercise tolerance, lower extremity edema and chest pressure. Mr. Hedy Raymond states he was in his usual state of health until the last 2-3 months ago when he noticed his ability to do typical housework was becoming increasingly challenging requiring more breaks to complete. He has also noticed new onset lower extremity edema making it difficult to put his shoes on.  He describes chest pressure with exertion including trimming hedges, showering that feels like he is being squeezed and resolves with rest.     In the emergency department proBNP elevated to 4,629 with priors typically < 1000, troponin elevated to 253, leukocytosis 18.6, normal hemoglobin, hematocrit, D-dimer elevated at 1.26, chemistry generally unremarkable with modest hyperglycemia at 122, serum creatinine 1.1 with a GFR calculated greater than 60. Due to elevated D-dimer a CTA of the chest was performed without pulmonary embolus. EKG 5/17/2021 --personal review -- no P waves, irregular rhythm, rate less than 100 bpm, modest ST segment changes, LVH    CTA chest 5/17/2021 --no acute pulmonary embolus, cardiomegaly with interstitial edema, small bilateral pleural effusions, previous right middle and lower lobectomy with emphysematous changes    Chest x-ray 5/17/2021 --cardiomegaly and postsurgical changes of prior lower lobectomy but otherwise no acute cardiopulmonary process    Last Echo 5/5/2014 -- moderate concentric LVH, proximal septal thickening, EF 70%, moderate diastolic dysfunction, mild mitral regurgitation. The plan to admit Mr. Inessa Coleman with further evaluation including echocardiogram, repeat blood work was discussed with him, and he is in agreement with this plan. \"    WBC count (5/18/2021) = 21.4    Lipid profile (5/18/2021) showed TG 83, , HDL 50, LDL 93    Cardiology consult (Dr. Abdon Roy) was called on 5/18/2021 for evaluation and comanagement of NSTEMI. 2D echocardiogram (5/18/2021) showed EF 50%; concentric LV hypertrophy with a severely thickened septal wall and mildly thickened posterior wall; left atrial chamber is severely enlarged; right atrial chamber volume is moderately enlarged; no mass, shunts, or thrombi. Cardiac catheterizaion (5/18/2021 - Dr. Abdon Roy) showed:  1. Coronaries: Multivessel CAD with patent LIMA to LAD with severe sequential stenosis of the SVG to LCX. 2. Left ventricle: Mildly elevated LVEDP of 15mmHg   3.  No aortic stenosis     Dr. Marie Linerustam recommended to transfer the patient to the [address] [place] [address] for consideration of revascularization of the SVG to LCX.     -    On 5/18/2021, the patient was discharged from [facility], transferred to the [facility] and was subsequently admitted under the service of [facility] (Dr. Rajiv Payne). S/P PCI to proximal SVG with 3.5 x 12 mm [place] drug-eluting stent; PCI to mid SVG with 3.5 x 34 mm [place] drug-eluting stent; PCI to distal SVG with 3.5 x 15 mm [place] drug-eluting stent; Balloon angioplasty to distal SVG anastomosis (5/18/2021 - Dr. Rajiv Payne)    The patient tolerated the procedure well with no intraoperative complications. WBC count (5/19/2021) = 22.4    On 5/19/2021, patient was started on Apixaban and Clopidogrel. Aspirin was discontinued. [address] [place], [place] [address]ist Division consult (Dr. Henri Oh) was called on 5/20/2021 for evaluation and comanagement. Excerpt (HPI) from the Consult Note by Dr. Henri Oh:  \"80year-old male who had initially presented to [facility] on May 17 with ongoing decompensated congestive heart failure with evidence of non-ST elevation myocardial infarction following which he was subjected to cardiac catheterization revealing obstructive disease in SVG to RCA following which she was transferred to the [facility] on the 18th and was subjected to percutaneous coronary intervention which is completed successfully. Patient was about to be discharged from the hospital today but due to difficulties contacting his family, declining by various home health agencies he had to be kept in the hospital and hospital medicine was consulted to comanage difficulty facing disposition of this patient and and him requiring supplemental oxygen. He is also complaining of postural lightheadedness requiring de-escalation in his antihypertensive therapy. Interestingly patient does not remember the reason why is in the hospital, he was not aware of the fact that he has had a heart attack and he has had cardiac catheterizations done to open up the blocked grafted blood vessels from his prior CABG.  He reports that he is very functional at home without any difficulty breathing on exertion or at rest and is not aware of the fact he was having chest pain prior to hospitalization. He currently denies difficulty breathing, has no headache, has no difficulty breathing, denies nausea vomiting or abdominal pain. \"    WBC count (5/21/2021) = 27.3  WBC count (5/22/2021) = 24.9    CT scan of the chest/abdomen/pelvis without contrast (5/22/2021) showed:  1. Mild infiltrate in the superior segment of the right lower lobe with small right pleural effusion. No consolidation. 2. Delayed renal clearance, chronic renal disease suspected. Additionally, areas of cortical cysts with poor perfusion.  Pyelonephritis not excluded. No hydronephrosis. Correlation with urinalysis? 3. Diverticulosis, with potential diverticulitis in the left lower quadrant, junction of descending and sigmoid colon. However, evaluation is very limited due to severe motion artifacts. 4. Focal mucosal thickening in the proximal sigmoid. Further evaluation with barium enema or colonoscopy as indicated. WBC count (5/23/2021) = 30.2    Chest x-ray (5/23/2021) showed:  1. Stable moderate cardiomegaly. 2. Similar small right pleural effusion. Retroperitoneal ultrasound (5/23/2021) showed:  1.  Some limitation due to patient body habitus and bowel gas. No evidence of renal mass or renal calculi. 2. Small right renal cyst.   3. Debris versus possible blood products in the bladder. 4. Right pleural effusion     CT scan of the head (5/23/2021) showed:  1. No acute findings. 2. Chronic microvascular disease. MRI of the head (5/25/2021) showed:  1. Numerous acute embolic strokes in the bilateral cerebral hemispheres, brainstem and cerebellum. 2. Moderate global atrophy with chronic microvascular ischemic white matter disease. Neurology consult (Dr. Seven Portillo) was called on 5/25/2021 for evaluation and comanagement of stroke. PVL of carotid arteries (5/26/2021) showed:  1.  Patent endarterectomies with mild plaque (<50%) present in the bilateral internal carotid arteries that is not associated with a hemodynamically significant stenosis. 2. Known occluded right vertebral artery.     3. Antegrade flow with a normal hemodynamic profile was present in the left vertebral artery. 4. When compared to the previous exam performed on 10/22/2020, there is no significant change. Gastroenterology consult (Dr. Macrina Nuñez) was called on 5/26/2021 for evaluation and comanagement of melena. Urology consult (Dr. Michelle Garcia) was called on 5/26/2021 for evaluation and comanagement of gross hematuria. On 5/30/2021, patient's neurological status reportedly back at his baseline.     WBC count (6/2/2021) = 25.4  WBC count (6/3/2021) = 22.5  WBC count (6/4/2021) = 23.4  WBC count (6/5/2021) = 21.7  WBC count (6/6/2021) = 24.1  WBC count (6/7/2021) = 20.0    Excerpt (Problems Managed During Hospitalization) from the Discharge Summary by Dr. Anand Axon:  \"Hypotension   -no fever  -WBCs are coming down, stable hemoglobin no signs of bleeding  -continue reduced Coreg; discontinue Lisinopril and imdur   -recommend Lasix as needed for increased edema      Acute kidney injury  -Cr 1.3 > 1.0 after IVF, baseline cr 0.8  -initially had post-obstructive uropathy, acute urinary retention which is now resolved  -etiology this time likely due to hypotension  -continue decreased Coreg , dc lisinopril       Acute Myocardial Infarction, NSTEMI, Coronary Artery Disease  - s/p PCI 5/18  - on plavix, BB, eliquis (no asa to avoid triple therapy), statin  - remote h/o CABG  -Low dose lasix       Acute hypoxic/hypercapneic respiratory failure - resolved  - in the setting of underlying severe COPD & s/p right middle and lower lobectomy  - O2 sat goal is 89-93%  - continue LABA/ICS  - NIV for hypercapnea associated with lethargy but he is a chronic CO2 retainer  - scheduled duonebs  - Wean O2 to RA    Leukocytosis, at this point suspect this is reactive, coming down, no additional work up at this moment   - urine cx NG, bcx NG, c diff NEG  - negative procal, normal lactate, no bands  - CT c/a/p 5/22 without abscess, infiltrate  - dc'ed empiric antibiotics  - peripheral smear reviewed  - was discussed with Dr. Venita Sanders, agrees this degree of leukocytosis can be seen post AMI     Ischemic encephalopathy due to Acute CVA - back to baseline for last 24-48 hrs   - reportedly with mild dementia at baseline  - MRI head: Numerous acute embolic strokes in the bilateral cerebral hemispheres, brainstem and cerebellum  - etiology: cardioembolic. PCI 5/18. With symptom onset 5/20 (at that time he was on eliquis) . suspected plaque mobilization  - Neurology assistance appreciated      Acute urinary retention, resolved    - matamoros placed 5/22, now d/nayeli  - started flomax  - hematuria due to self inflicted matamoros trauma   - urology consulted, assistance appreciated  - renal US w/o hydronephrosis     Newly diagnosed atrial fibrillation  - BB  - eliquis was on hold for hematuria/GI bleeding, OK to resume per Urology/GI     Melena  - GI consulted 5/26  - OK'ed patient for plavix/eliquis     History of lung cancer (squamous cell cancer)  - s/p right middle and lower lobe lobectomy     Peripheral vascular disease  - h/o bilateral carotid endarterectomies  - plavix/ statin per above     History of renal artery stenosis  - prior bilateral stenting 2011     Severe Protein Calorie malnutrition  -per RD note 5/25: Etiology: Acute illness or injury; % Intake: < 50% of need > 5 days - Severe; minimal intakes since admit, mostly 0-25% of meals, often refusing d/t poor appetite; Functional findings: Decline in functional status as per PT/OT/SLP assessments; Edema   -continue cardiac diet;  Ensure Enlive TID, MVI      Acute blood loss anemia  - due to melena and hematuria, both mild  - h/h down but very stable  - transfuse if hb< 8     Deconditioned  - PT/OT recommends IPR  - plan discharge to [facility] IPR\"     Excerpt [facility] HOSPITAL Course) from the Discharge Summary by Dr. Ceci Castañeda:  \"80 year old male with history of CAD was transferred to [facility] from [facility] under cardiology service for PCI needs in the setting of NSTEMI. Patient underwent PCI (JENNIE) on 5/18. During the course of this he was also found to be in atrial fibrillation and [address] was initiated. On 5/20, patient became confused and he was found to have rising WBC count, prompting transfer to hospital medicine service for further work up. Despite extensive work up, there is no infectious source found for his leukocytosis and it is suspected to be reactive in the setting of acute MI. As far as his encephalopathy however, he was found to have significant hypercapnia (known COPD), which resolved with use of NIV, as well as acute embolic CVA in bilateral hemispheres seen on MRI head (ordered 5/2, completed 5/25). Neurology was consulted on 5/25 for assistance and recommendations. It is felt that etiology of CVA is cardio embolic in the setting of plaque mobilization/ PCI, based on timing of symptoms and course of events. Hospital course has been further complicated by patient developing urinary retention (neurogenic due to CVA vs worsening of prior known BPH) requiring matamoros catheter placement on 5/22. He then caused matamoros catheter trauma in his confused state, with resultant hematuria. Urology consulted on 5/26 due to nursing concerns for ongoing hematuria but it has resolved. Additionally, on 5/25 he developed frequent tarry black stools which are occult blood positive. GI consulted on 5/26 as well, cleared patient for APT/AC. \"    The patient had remained hemodynamically stable but due to the above events, the patient was noted to be generally weak and with impaired mobility and ADLs. Patient was felt to be a good candidate for acute inpatient rehabilitation.  Upon evaluation by Physical Therapy and Occupational Therapy, the patient was recommended for acute inpatient rehabilitation. The patient was discharged and was subsequently admitted to the [facility] for [facility] for intensive rehabilitation to help recover strength, function and mobility. COURSE IN THE HOSPITAL: Upon admission to the [facility], the patient underwent physical therapy, occupational therapy and speech therapy. The patient was able to actively participate in the rehabilitation activities and progressed well. On discharge, the patient was able to perform the following activities:    1. Occupational Therapy    ON ADMISSION ON DISCHARGE   Eating  Functional Level: 5   Eating  Functional Level: 5     Grooming  Functional Level: 5 (setup at sink)   Grooming  Functional Level: 5 (setup at sink)     Bathing  Functional Level: 5   Bathing  Functional Level: 5     Upper Body Dressing  Functional Level: 5   Upper Body Dressing  Functional Level: 5     Lower Body Dressing  Functional Level: 4   Lower Body Dressing  Functional Level: 4     Toileting  Functional Level: 4   Toileting  Functional Level: 5     Toilet Transfers  Toilet Transfer Score: 0   Toilet Transfers  Toilet Transfer Score: 5     Tub /Shower Transfers      Tub/Shower Transfers          2.  Physical Therapy    ON ADMISSION ON DISCHARGE   Wheelchair Mobility/Management  Able to Propel (ft): 40 feet (primarily using bilat UE to propel, occasionally LE)  Functional Level: 1 (mod/max A for steering and propulsion)  Curbs/Ramps Assist Required (FIM Score): 0 (Not tested)  Wheelchair Setup Assist Required : 2 (Maximal assistance)  Wheelchair Management: Manages left brake, Manages right brake (needing cues and assistance) Wheelchair Mobility/Management  Able to Propel (ft): 100 feet  Functional Level:  (supervision)  Curbs/Ramps Assist Required (FIM Score): 0 (Not tested)  Wheelchair Setup Assist Required : 5 (Supervision/setup)  Wheelchair Management: Manages left brake, Manages right brake     Gait  Amount of Assistance: 3 (Moderate assistance)  Distance (ft): 48 Feet (ft) (48 ft without AD as per PLOF, additional 12 ft w/ RW)  Assistive Device:  (no AD initially, then RW) Gait  Amount of Assistance: 5 (Stand-by assistance)  Distance (ft): 60 Feet (ft)  Assistive Device: Walker, rolling     Balance-Sitting/Standing  Sitting - Static: Good (unsupported)  Sitting - Dynamic: Good (unsupported), Occassional, Fair (occasional)  Standing - Static: Fair  Standing - Dynamic : Impaired Balance-Sitting/Standing  Sitting - Static: Good (unsupported)  Sitting - Dynamic: Good (unsupported)  Standing - Static: Fair (with RW)  Standing - Dynamic : Impaired     Bed/Mat Mobility  Rolling Right : 5 (Supervision)  Rolling Left : 5 (Supervision)  Supine to Sit : 5 (Stand-by assistance)  Sit to Supine :  (SBA) Bed/Mat Mobility  Rolling Right : 5 (Supervision)  Rolling Left : 5 (Supervision)  Supine to Sit : 5 (Supervision)  Sit to Supine : 5 (Supervision)     Transfers  Transfer Type: Other  Other: stand step without AD  Transfer Assistance : 4 (Minimal assistance)  Sit to Stand Assistance: Minimal assistance  Car Transfers: Moderate assistance (stand step without AD)  Car Type: car transfer simulator Transfers  Transfer Type:  Other  Other: stand step txfr without AD  Transfer Assistance : 5 (Stand-by assistance)  Sit to Stand Assistance: Stand-by assistance  Car Transfers: Supervision Sadie Luke)  Car Type: car txfr simulator     Steps or Stairs  Steps/Stairs Ambulated (#): 2  Level of Assist : 3 (Moderate assistance)  Rail Use: Both Steps or Stairs  Steps/Stairs Ambulated (#): 2 (6\" platform step)  Level of Assist : 4 (Contact guard assistance)  Rail Use: None (RW)       3. Speech and Language Pathology    ON ADMISSION ON DISCHARGE   Comprehension (Native Language)  Primary Mode of Comprehension: Auditory  Score: 6 Comprehension (Native Language)  Primary Mode of Comprehension: Auditory  Score: 5     Expression (Native Language)  Primary Mode of Expression: Verbal  Score: 6   Expression (Native Language)  Primary Mode of Expression: Verbal  Score: 5     Social Interaction/Pragmatics  Score: 5 Social Interaction/Pragmatics  Score: 5     Problem Solving  Score: 4   Problem Solving  Score: 4     Memory  Score: 5 Memory  Score: 4       Legend:   7 - Independent   6 - Modified Independent   5 - Standby Assistance / Supervision / Set-up   4 - Minimum Assistance / Contact Guard Assistance   3 - Moderate Assistance   2 - Maximum Assistance   1 - Total Assistance / Dependent       ACUTE MEDICAL ISSUES ADDRESSED IN INPATIENT REHABILITATION FACILITY:     > Non-ST elevation (NSTEMI) myocardial infarction; Coronary artery disease; History of CABG; S/P PCI to proximal SVG with 3.5 x 12 mm Juan J drug-eluting stent; PCI to mid SVG with 3.5 x 34 mm [place] drug-eluting stent; PCI to distal SVG with 3.5 x 15 mm Juan J drug-eluting stent;  Balloon angioplasty to distal SVG anastomosis (5/18/2021 - Dr. Javier Schwartz)    > On 6/16/2021, increased Carvedilol from 12.5 mg to 25 mg PO BID with meals (8AM, 5PM)   > Continue:    > Carvedilol 25 mg PO BID with meals (8AM, 5PM)    > Clopidogrel 75 mg PO once daily with breakfast    > Rosuvastatin 75 mg PO once daily    > Nitroglycerin 0.4 mg SL PRN for chest pain    > Acute on chronic heart failure with preserved ejection fraction (HFpEF)   > 2D echocardiogram (5/18/2021) showed EF 50%; concentric LV hypertrophy with a severely thickened septal wall and mildly thickened posterior wall; left atrial chamber is severely enlarged; right atrial chamber volume is moderately enlarged; no mass, shunts, or thrombi.    > On 6/16/2021, increased Carvedilol from 12.5 mg to 25 mg PO BID with meals (8AM, 5PM)   > Continue:    > Carvedilol 25 mg PO BID with meals (8AM, 5PM)    > Furosemide 20 mg PO once daily (9AM)    > 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, brainstem and cerebellum) without residual deficit   > Continue:    > Clopidogrel 75 mg PO once daily with breakfast    > Rosuvastatin 75 mg PO once daily    > Benign prostatic hyperplasia with urinary retention   > Continue Tamsulosin 0.4 mg PO once daily after dinner    > Chronic obstructive pulmonary disease (COPD)   > On 6/12/2021:    > Discontinue Budesonide nebulization BID    > Start Ipratropium nebulization TID   > Continue:    > Arformoterol nebulization BID    > Ipratropium nebulization TID    > Albuterol nebulization q 4 hr PRN for shortness of breath/wheezing    > Constipation --> Diarrhea; Leukocytosis   > On 6/8/2021, started Pericolace 2 tabs PO once daily after dinner   > On 6/9/2021, started Polyethylene glycol 17 grams in 8 oz water PO once daily   > On 6/19/2021:    > Patient was noted to have diarrhea    > Held:     > Polyethylene glycol 17 grams in 8 oz water PO once daily     > Pericolace 2 tabs PO once daily after dinner    > Started Bio K Plus 1 cap PO once daily   > Stool culture (collected 6/19/2021, resulted 6/20/2021): Negative   > Stool for Clostridium difficile   > WBC count (6/20/2021) = 26.7   > Urinalysis (6/20/2021): WNL   > Urine culture (collected 6/20/2021): PENDING   > Blood culture x 2 sets (collected 6/20/2021, preliminary result on 6/21/2021) yielded no growth after 17 hours   > On 6/20/2021, started:    > Cholestyramine 4 grams PO TID with meals    > Ceftriaxone 1000 mg IV q 24 hr    > Metronidazole 500 mg IV q 8 hr    > Vancomycin 1750 mg  IV x 1 dose   > WBC count (6/21/2021) = 26.3   > Infectious Disease consult (Dr. Aramis Steen) was called on 6/21/2021 for evaluation and comanagement   > On 6/21/2021:    > Discontinue:     > Polyethylene glycol 17 grams in 8 oz water PO once daily     > Pericolace 2 tabs PO once daily after dinner    > Change Vancomycin IV to Vancomycin 500 mg PO q 6 hr   > CT scan of the chest, abdomen and pelvis with contrast (6/21/2021) showed:    1. No acute findings in the chest.     -Emphysema. -CABG. -Stable mildly enlarged right thyroid gland which could indicate underlying nodule, stable for many years. 2. 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 It is unclear if initial source of abscess is from small bowel inflammation or diverticulitis/pericolonic abscess. 3. Dilated small bowel. This is likely a combination of generalized small bowel ileus and low-grade partial small bowel obstruction at site of abnormal pelvic small bowel loop discussed above. 4. Diverticulosis. Stable thickened collapsed segment of sigmoid colon since 2014 suggesting a chronic stricture. This is limited for assessment by CT. 5. Bladder dome inflammation is likely secondary to the adjacent mesentery process. 6. Other chronic incidental findings.    > Continue:    > Bio K Plus 1 cap PO once daily    > Cholestyramine 4 grams PO TID with meals    > Ceftriaxone 1000 mg IV q 24 hr    > Metronidazole 500 mg IV q 8 hr    > Vancomycin 500 mg PO q 6 hr     > Essential hypertension   > 2D echocardiogram (5/18/2021) showed EF 50%; concentric LV hypertrophy with a severely thickened septal wall and mildly thickened posterior wall; left atrial chamber is severely enlarged; right atrial chamber volume is moderately enlarged; no mass, shunts, or thrombi.    > On 6/16/2021, increased Carvedilol from 12.5 mg to 25 mg PO BID with meals (8AM, 5PM)   > Continue:    > Carvedilol 25 mg PO BID with meals (8AM, 5PM)    > Furosemide 20 mg PO once daily (9AM)    > Leukocytosis, reactive, attributed to NSTEMI   > Labs at [facility]:    > WBC count (5/17/2021) = 18.6    > WBC count (5/18/2021) = 21.4    > WBC count (5/19/2021) = 22.4    > WBC count (5/21/2021) = 27.3    > WBC count (5/22/2021) = 24.9    > WBC count (5/23/2021) = 30.2    > . . .     > WBC count (6/2/2021) = 25.4    > WBC count (6/3/2021) = 22.5    > WBC count (6/4/2021) = 23.4    > WBC count (6/5/2021) = 21.7    > WBC count (6/6/2021) = 24.1    > WBC count (6/7/2021) = 20.0    > Work up done was negative for a source of infection   > WBC count (6/8/2021, on admission to the ARU) = 14.4   > WBC count (6/14/2021) = 10.1   > WBC count (6/17/2021) = 14.3   > No documented fever since admission    > Mixed hyperlipidemia   > Lipid profile (5/18/2021) showed TG 83, , HDL 50, LDL 93   > Continue Rosuvastatin 75 mg PO once daily    > Paroxysmal atrial fibrillation, anticoagulated on Apixaban   > On 6/8/2021, increased Carvedilol from 12.5 mg to 25 mg PO BID with meals (8AM, 5PM)   > Continue:    > Apixaban 5 mg PO BID    > Carvedilol 25 mg PO BID with meals (8AM, 5PM)    > Peripheral vascular disease of extremity with claudication; Neuropathy involving both lower extremities   > (+) pain and numbness of toes of both feet   > PVL arterial doppler of both lower extremities with exercise (9/12/2019) showed:    > Moderate arterial insufficiency in the right lower extremity at the level of the trifurcation at rest.     > Severe arterial insufficiency in the left lower extremity at the level of the trifurcation at rest.   > PVL arterial doppler of both lower extremities with exercise (10/22/2020) showed:    > Mild right lower extremity arterial insufficiency of indeterminate level at rest.     > Moderate left lower extremity arterial insufficiency involving the femoral-popliteal segment at rest.     > The ankle brachial indices post exercise were unreliable as described.  No evidence of inflow involvement.   > Planned to start patient on Cilostazol for the claudication symptoms but patient already on Apixaban + Clopidogrel; addition of Cilostazol may increase risk for bleeding; will hold off on adding Cilostazol for now   > As per the last Progress Note by Dr. Darwin Kinsey (dated 10/22/2020): \"Pt major symptom is not related to his PAD but sounds more like neuropathy\"   > On 6/14/2021, started a trial of Gabapentin 100 mg PO BID   > Upon discharge from the ARU, the patient will need to follow up with Vascular Surgery (Dr. Darwin Kinsey)   > On 6/18/2021, increased Gabapentin from 100 mg PO BID to 100 mg PO TID   > Continue Gabapentin 100 mg PO TID    > COVID-19 ruled out by laboratory testing   > SARS-CoV-2 ([facility] m2000, [facility]) (collected 5/26/2021, resulted 5/28/2021): Not detected    > Analgesia   > Continue Acetaminophen 650 mg PO q 4 hr PRN for pain     > Diet:   > Specifications: Low fat/Low cholesterol/High fiber/No added salt   > Solids (consistency): Regular    > Liquids (consistency): Thin    > Fluid restriction: None    > (+) shortness of breath and dizziness after returning from bathroom at 3AM   > Rapid Response Team was called at 3:10 AM; Patient was seen and evaluated by [facility] resident (Dr. Cristian Burnett)   > Work up:    > WBC count (6/21/2021) = 26.3    > Cardiac enzymes (6/21/2021): Negative x 1 set    > Chest x-ray (6/21/2021) showed an enlarged cardiac silhouette; low lung volumes with streaky bibasilar densities. > Patient back at baseline; On-call physician (Dr. Shabbir Evans) was informed of the above events; patient to remain on unit.    > (+) unresponsiveness while seated on toilet bowl at 1PM   > Rapid Response Team was called; Patient was seen and examined by [facility] [place] [facility] (Dr. Lewis Felty)   > I saw and examined the patient at the bedside   > Excerpt from the RRT note by Dr. Lewis Felty: \"Rapid response called for syncope. Patient was found unresponsive and unconscious while on the toilet. The patient was moved back to the bed and RRT called. On arrival, the patient had an unreadable BP by machine, and a slow pulse in the low 40s.  Patient continued to be bradycardic for a number of minutes, with lowest rate in low 30s. While bradycardic, patient continued to have unreadable pressure. Patient was given 500cc bolus, and during, patient converted to NSR, and next pressure was 190s/100s, patient began to regain senses and communicated at this time, although he continued to not be at baseline, and was sleepy/lathargic. Patient was deemed stable for transfer and moved to the ED. \"   > Due to altered mental status, momentary hypotension, bradycardia and leukocytosis of unknown origin, it was felt that the patient had to be transferred to the [facility] Department for further evaluation, work up and disposition   > I called Emergency Medicine (Dr. Geremias Long) and informed him of the above prior to transfer to the ER      MEDICATIONS ON DISCHARGE:  Current Facility-Administered Medications   Medication Dose Route Frequency    vancomycin 50 mg/mL oral solution (compounded) 500 mg  500 mg Oral Q6H    0.9% sodium chloride infusion  75 mL/hr IntraVENous CONTINUOUS    cefTRIAXone (ROCEPHIN) 1 g in sterile water (preservative free) 10 mL IV syringe  1 g IntraVENous Q24H    metroNIDAZOLE (FLAGYL) IVPB premix 500 mg  500 mg IntraVENous Q8H    cholestyramine-aspartame (QUESTRAN LIGHT) packet 4 g  4 g Oral TID WITH MEALS    L. acidophilus,casei,rhamnosus (BIO-K PLUS) capsule 1 Capsule  1 Capsule Oral DAILY    gabapentin (NEURONTIN) capsule 100 mg  100 mg Oral TID    carvediloL (COREG) tablet 25 mg  25 mg Oral BID WITH MEALS    ipratropium (ATROVENT) 0.02 % nebulizer solution 0.5 mg  0.5 mg Nebulization TID    clopidogreL (PLAVIX) tablet 75 mg  75 mg Oral DAILY WITH BREAKFAST    acetaminophen (TYLENOL) tablet 650 mg  650 mg Oral Q4H PRN    bisacodyL (DULCOLAX) tablet 10 mg  10 mg Oral Q48H PRN    albuterol (PROVENTIL VENTOLIN) nebulizer solution 2.5 mg  2.5 mg Nebulization Q4H PRN    apixaban (ELIQUIS) tablet 5 mg  5 mg Oral BID    arformoteroL (BROVANA) neb solution 15 mcg  15 mcg Nebulization BID RT    [Held by provider] furosemide (LASIX) tablet 20 mg  20 mg Oral DAILY    nitroglycerin (NITROSTAT) tablet 0.4 mg  0.4 mg SubLINGual PRN    rosuvastatin (CRESTOR) tablet 10 mg  10 mg Oral DAILY    tamsulosin (FLOMAX) capsule 0.4 mg  0.4 mg Oral PCD    multivitamin, tx-iron-ca-min (THERA-M w/ IRON) tablet 1 Tablet  1 Tablet Oral DAILY       DISCHARGE VITAL SIGNS:  Visit Vitals  BP (!) 197/101   Pulse 97   Temp 97.9 °F (36.6 °C)   Resp 20   Ht 5' 8\" (1.727 m)   Wt 73.9 kg (163 lb)   SpO2 90%   BMI 24.78 kg/m²       DISCHARGE PHYSICAL EXAMINATION:  GENERAL SURVEY: Patient is drowsy to lethargic, laying supine on the bed, not in acute respiratory distress. HEENT: pale palpebral conjunctivae, anicteric sclerae, no nasoaural discharge, moist oral mucosa  NECK: supple, no jugular venous distention, no palpable lymph nodes  CHEST/LUNGS: symmetrical chest expansion, good air entry, clear breath sounds  HEART: adynamic precordium, good S1 S2, no S3, irregularly irregular rhythm, no murmurs  ABDOMEN: flat, bowel sounds appreciated, soft, non-tender  EXTREMITIES: pale nailbeds, (+) bipedal edema, full and equal pulses, no calf tenderness   NEUROLOGICAL EXAM: The patient is awake, alert and oriented x3, able to answer questions fairly appropriately, able to follow 1 and 2 step commands.  Able to tell time from the wall clock.  Cranial nerves II-XII are grossly intact.  No gross sensory deficit.  Motor strength is 4 to 4+/5 on all 4 extremities. CONDITION ON DISCHARGE: Guarded.       DISPOSITION: Due to altered mental status, momentary hypotension, bradycardia and leukocytosis of unknown origin, it was felt that the patient had to be transferred to the [facility] Emergency Department for further evaluation, work up and disposition      FOLLOW-UP RECOMMENDATIONS:   Follow-up Information     Follow up With Specialties Details Why Contact Info    Miriam Hugo MD Family Medicine   Ayleen Gill NP Nurse Practitioner On 6/14/2021 9:[address]  [address]  [phone]            OTHER INSTRUCTIONS:  1. Diet. NPO for now until mental status improves. 2. Activity. Complete bedrest.  3. Safety / fall precautions. 4. Aspiration precautions. TIME SPENT ON DISCHARGE ACTIVITIES: 32 minutes.       Signed:  Jenn Jean MD    6/21/2021

## 2021-06-21 NOTE — CONSULTS
Infectious Disease Consultation Note        Reason: Leukocytosis, evaluate for sepsis    Current abx Prior abx   Ceftriaxone, intravenous metronidazole, oral vancomycin, IV vancomycin since 6/20/2021      Lines:       Assessment :    80-year-old man with past medical history of diastolic CHF, coronary artery disease status post CABG, history of lung cancer status post pneumonectomy, hypertension admitted to [facility] acute rehab on 6/7/2021. Recent hospitalization at [facility] 5/17-6/7 for non stemI s/p CABG  Post op course complicated by profound unexplained leukocytosis. Wbc:37 k on 6/7/21    Now with leukocytosis, diarrhea    Patient presents with a highly complex clinical picture. Difficult to determine if leukocytosis is SIRS/leukemoid reaction to volume loss from laxative induced diarrhea versus undiagnosed infection. No bowel movement since 6/20/2021. Recent history of profound unexplained leukocytosis during stay at [facility]. Wbc:37 k on 6/7/21  Negative stool c.diff 5/26/21    Negative blood culture 6/20. No significant pyuria/dysuria argues against cystitis    Recommend to obtain imaging studies to rule out occult infection/inflammation. Elevated bilirubin 2.2 on 6/20/21- normal transaminases- monitor for undiagnosed hepatobiliary pathology    Subjective sense of improvement. Hemodynamically stable on today's exam    Recommendations:    1. Recommend ceftriaxone intravenous metronidazole, oral vancomycin for now. Discontinue intravenous vancomycin  2. Obtain CT chest/abdomen/pelvis with IV contrast  3. Discontinue MiraLAX, Carly-Colace  4. Add on procalcitonin to today's labs. Monitor CBC, LFTs. Follow-up blood cultures  5. Will make decision about further testing/antibiotics based on the above test results, clinical course      Thank you for consultation request. Above plan was discussed in details with patient, Rn and dr Mandi Dixon. Will d/w dr. Michaela Whitt.  Please call me if any further questions or concerns. Will continue to participate in the care of this patient. HPI:    72-year-old man with past medical history of diastolic CHF, coronary artery disease status post CABG, history of lung cancer status post pneumonectomy, hypertension admitted to [facility] acute rehab on 6/7/2021. I obtained history by talking to patient, review of [facility] records/[facility] records. Patient recently presented to [facility] on 5/17/21 with increasing shortness of breath, lower extremity edema. Was transferred to [facility] on 5/18/2021 due to non-STEMI. S/P PCI to proximal SVG with 3.5 x 12 mm [place] drug-eluting stent; PCI to mid SVG with 3.5 x 34 mm [place] drug-eluting stent; PCI to distal SVG with 3.5 x 15 mm Juan J drug-eluting stent; Balloon angioplasty to distal SVG anastomosis (5/18/2021 - Dr. Mame Parrish). Postoperatively patient had persistent leukocytosis. No definitive evidence of infection was identified. Patient was evaluated by infectious disease physician. Blood cultures, urine cultures, stool C. difficile was sent. Stool C. difficile negative on 5/26/2021. It was felt that leukocytosis was leukemoid reaction to recent surgery. Patient also had peripheral smear done on 5/23 which was negative for myelodysplastic disease. Patient had episode of urinary retention requiring Taveras. Patient was transferred to [facility] acute rehab on 6/7/2021. Patient's WBC count was 14 K on 6/8/2021. Patient states that he had issues with constipation last week. He was placed on several laxatives/stool softeners. On 6/19 he started having liquid stools. His WBC count is 6/20 was 26K. Stool C. difficile was ordered. I was consulted for further recommendations. I recommended to discontinue laxatives/stool softeners obtain CT imaging, start empiric antibiotics, obtain cultures.   Recommended to send stool C. difficile if diarrhea noted on future exam.    Patient states that he feels better today compared to yesterday. He has not had any bowel movement yesterday. MiraLAX/Carly-Colace was not discontinued and the nursing staff was about to dispense his medication when I evaluated the patient. Patient complains of some right lower abdominal discomfort. He has not had any bowel movement today either. He denies any increasing chest pain, shortness of breath. Per records, rapid response was called overnight since patient was noted to be dizzy. Patient does not recollect this event. Past Medical History:   Diagnosis Date    Acute embolic stroke ([address] 7/20/8046    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, brainstem and cerebellum) without residual deficit    Anticoagulated by anticoagulation treatment 5/19/2021    On Apixaban    Benign prostatic hyperplasia with urinary retention     Chronic obstructive pulmonary disease (COPD) ([address]     Coronary artery disease involving native coronary artery of native heart     Essential hypertension     Heart failure with preserved left ventricular function (HFpEF) ([address]     2D echocardiogram (5/18/2021) showed EF 50%; concentric LV hypertrophy with a severely thickened septal wall and mildly thickened posterior wall; left atrial chamber is severely enlarged; right atrial chamber volume is moderately enlarged; no mass, shunts, or thrombi.      History of carotid stenosis     History of gross hematuria 5/26/2021    Attributed to Taveras trauma while patient was altered    History of renal artery stenosis     Leukocytosis 5/17/2021    Attributed to reactive leukocytosis due to NSTEMI    Malignant neoplasm of lower lobe of right lung (HCC)     Neuropathy involving both lower extremities 10/22/2020    Non-ST elevation (NSTEMI) myocardial infarction ([address] 5/17/2021    On clopidogrel therapy 5/19/2021    Paroxysmal atrial fibrillation ([address] 5/17/2021    Peripheral vascular disease of extremity with claudication ([address] 6/14/2021    Urinary retention        Past Surgical History:   Procedure Laterality Date    HX CAROTID ENDARTERECTOMY Left 06/07/2012    HX CAROTID ENDARTERECTOMY Right 05/28/2014    Dr. Rudy Martinez [address] [place] [address]  2001    [facility] CORONARY STENT PLACEMENT  05/18/2021    S/P PCI to proximal SVG with 3.5 x 12 mm [place] drug-eluting stent; PCI to mid SVG with 3.5 x 34 mm [place] drug-eluting stent; PCI to distal SVG with 3.5 x 15 mm Juan J drug-eluting stent; Balloon angioplasty to distal SVG anastomosis (5/18/2021 - Dr. Rajiv Payne)     HX LOBECTOMY Right     Middle lobe and lower lobe    HX RENAL ARTERY STENT Bilateral 2011    HX TONSILLECTOMY      IR BRONCHOSCOPY  11/25/2015    Dr. Reji Bella       home Medication List    Details   nitroglycerin (Nitrostat) 0.4 mg SL tablet 0.4 mg by SubLINGual route every five (5) minutes as needed for Chest Pain. Up to 3 doses. fluticasone furoate-vilanteroL (Breo Ellipta) 100-25 mcg/dose inhaler Take 1 Puff by inhalation daily. therapeutic multivitamin (THERAGRAN) tablet Take 1 Tablet by mouth daily. rosuvastatin (Crestor) 10 mg tablet Take 10 mg by mouth nightly. furosemide (Lasix) 20 mg tablet Take 20 mg by mouth daily. clopidogreL (Plavix) 75 mg tab Take 75 mg by mouth daily. apixaban (ELIQUIS) 5 mg tablet Take 5 mg by mouth two (2) times a day. tamsulosin (FLOMAX) 0.4 mg capsule Take 1 Cap by mouth daily (after dinner). Qty: 30 Cap, Refills: 6      carvediloL (Coreg) 12.5 mg tablet Take 12.5 mg by mouth two (2) times daily (with meals). albuterol (PROVENTIL HFA, VENTOLIN HFA) 90 mcg/actuation inhaler Take  by inhalation.              Current Facility-Administered Medications   Medication Dose Route Frequency    vancomycin 50 mg/mL oral solution (compounded) 500 mg  500 mg Oral Q6H    0.9% sodium chloride infusion  75 mL/hr IntraVENous CONTINUOUS    cefTRIAXone (ROCEPHIN) 1 g in sterile water (preservative free) 10 mL IV syringe  1 g IntraVENous Q24H    metroNIDAZOLE (FLAGYL) IVPB premix 500 mg  500 mg IntraVENous Q8H    vancomycin (VANCOCIN) 1,000 mg in 0.9% sodium chloride 250 mL (VIAL-MATE)  1,000 mg IntraVENous Q18H    cholestyramine-aspartame (QUESTRAN LIGHT) packet 4 g  4 g Oral TID WITH MEALS    L. acidophilus,casei,rhamnosus (BIO-K PLUS) capsule 1 Capsule  1 Capsule Oral DAILY    gabapentin (NEURONTIN) capsule 100 mg  100 mg Oral TID    carvediloL (COREG) tablet 25 mg  25 mg Oral BID WITH MEALS    ipratropium (ATROVENT) 0.02 % nebulizer solution 0.5 mg  0.5 mg Nebulization TID    clopidogreL (PLAVIX) tablet 75 mg  75 mg Oral DAILY WITH BREAKFAST    acetaminophen (TYLENOL) tablet 650 mg  650 mg Oral Q4H PRN    bisacodyL (DULCOLAX) tablet 10 mg  10 mg Oral Q48H PRN    albuterol (PROVENTIL VENTOLIN) nebulizer solution 2.5 mg  2.5 mg Nebulization Q4H PRN    apixaban (ELIQUIS) tablet 5 mg  5 mg Oral BID    arformoteroL (BROVANA) neb solution 15 mcg  15 mcg Nebulization BID RT    [Held by provider] furosemide (LASIX) tablet 20 mg  20 mg Oral DAILY    nitroglycerin (NITROSTAT) tablet 0.4 mg  0.4 mg SubLINGual PRN    rosuvastatin (CRESTOR) tablet 10 mg  10 mg Oral DAILY    tamsulosin (FLOMAX) capsule 0.4 mg  0.4 mg Oral PCD    multivitamin, tx-iron-ca-min (THERA-M w/ IRON) tablet 1 Tablet  1 Tablet Oral DAILY       Allergies: Lipitor [atorvastatin] and Norvasc [amlodipine]    Family History   Problem Relation Age of Onset    Hypertension Mother     Cancer Mother      Social History     Socioeconomic History    Marital status: UNKNOWN     Spouse name: Not on file    Number of children: Not on file    Years of education: Not on file    Highest education level: Not on file   Occupational History    Not on file   Tobacco Use    Smoking status: Former Smoker     Types: Cigarettes     Quit date: 6/12/2006     Years since quitting: 15.0    Smokeless tobacco: Never Used   Substance and Sexual Activity    Alcohol use: No    Drug use: No    Sexual activity: Not on file   Other Topics Concern    Not on file   Social History Narrative    Not on file     Social Determinants of Health     Financial Resource Strain:     Difficulty of Paying Living Expenses:    Food Insecurity:     Worried About Running Out of Food in the Last Year:     [address] in the Last Year:    Transportation Needs:     Lack of Transportation (Medical):  Lack of Transportation (Non-Medical):    Physical Activity:     Days of Exercise per Week:     Minutes of Exercise per Session:    Stress:     Feeling of Stress :    Social Connections:     Frequency of Communication with Friends and Family:     Frequency of Social Gatherings with Friends and Family:     Attends [employer] Services:     Active Member of Clubs or Organizations:     Attends Club or Organization Meetings:     Marital Status:    Intimate Partner Violence:     Fear of Current or Ex-Partner:     Emotionally Abused:     Physically Abused:     Sexually Abused:      Social History     Tobacco Use   Smoking Status Former Smoker    Types: Cigarettes    Quit date: 2006    Years since quitting: 15.0   Smokeless Tobacco Never Used        Temp (24hrs), Av.4 °F (36.3 °C), Min:97.2 °F (36.2 °C), Max:97.6 °F (36.4 °C)    Visit Vitals  /70 (BP 1 Location: Left upper arm, BP Patient Position: At rest)   Pulse 94   Temp 97.6 °F (36.4 °C)   Resp 18   Ht 5' 8\" (1.727 m)   Wt 73.9 kg (163 lb)   SpO2 95%   BMI 24.78 kg/m²       ROS: 12 point ROS obtained in details. Pertinent positives as mentioned in HPI,   otherwise negative    Physical Exam:    General: Well developed, well nourished male laying on the bed/sitting on the  bed AAOx3 in no acute distress.     General:   awake alert and oriented   HEENT:  Normocephalic, atraumatic,  EOMI, no scleral icterus or pallor; no conjunctival hemmohage; nasal and oral mucous are moist and without evidence of lesions. No thrush. Dentition good. Neck supple, no bruits. Lymph Nodes:   no cervical, axillary or inguinal adenopathy   Lungs:   non-labored, bilaterally clear to auscultation- no crackles wheezes rales or rhonchi   Heart:  RRR, s1 and s2; no  rubs or gallops, no edema, + pedal pulses   Abdomen:  soft, non-distended, active bowel sounds, no hepatomegaly, no splenomegaly. Minimal RLQ tendernss   Genitourinary:  deferred   Extremities:   no clubbing, cyanosis; no joint effusions or swelling; Full ROM of all large joints to the upper and lower extremities; muscle mass appropriate for age   Neurologic:  No gross focal sensory abnormalities; 5/5 muscle strength to upper and lower extremities. Speech appropriate. Cranial nerves intact                        Skin:  No rash or ulcers noted   Back:  no spinal or paraspinal muscle tenderness or rigidity, no CVA tenderness     Psychiatric:  No suicidal or homicidal ideations, appropriate mood and affect         Labs: Results:   Chemistry Recent Labs     06/21/21  0330 06/20/21  0550   * 78   * 126*   K 4.5 4.5   CL 91* 90*   CO2 24 30   BUN 25* 28*   CREA 0.89 1.12   CA 8.3* 9.3   AGAP 11 6   BUCR 28* 25*   AP 48 46   TP 6.0* 6.2*   ALB 3.1* 3.2*   GLOB 2.9 3.0   AGRAT 1.1 1.1      CBC w/Diff Recent Labs     06/21/21  0330 06/20/21  0550   WBC 26.3* 26.7*   RBC 3.07* 3.33*   HGB 9.1* 9.5*   HCT 27.5* 31.0*    228   GRANS 87* 89*   LYMPH 8* 3*   EOS 0 0      Microbiology Recent Labs     06/20/21  1335 06/20/21  1325   CULT NO GROWTH AFTER 17 HOURS NO GROWTH AFTER 17 HOURS          RADIOLOGY:    All available imaging studies/reports in [facility] for this admission were reviewed      Disclaimer: Sections of this note are dictated utilizing voice recognition software, which may have resulted in some phonetic based errors in grammar and contents.  Even though attempts were made to correct all the mistakes, some may have been missed, and remained in the body of the document. If questions arise, please contact our department.     Dr. Raquel Bundy, Infectious Disease Specialist  [phone]  June 21, 2021  9:47 AM

## 2021-06-21 NOTE — ED NOTES
TRANSFER - OUT REPORT:    Verbal report given to KULDIP Pepe(name) on Justyn Gramajo  being transferred to CVT SD(unit) for routine progression of care       Report consisted of patients Situation, Background, Assessment and   Recommendations(SBAR). Information from the following report(s) SBAR, Kardex, Intake/Output, MAR, Recent Results and Med Rec Status was reviewed with the receiving nurse. Lines:   Peripheral IV 06/21/21 Right Hand (Active)   Site Assessment Clean, dry, & intact 06/21/21 1743   Phlebitis Assessment 0 06/21/21 1743   Infiltration Assessment 0 06/21/21 1743   Dressing Status Clean, dry, & intact 06/21/21 1743   Hub Color/Line Status Blue 06/21/21 1743       Peripheral IV 06/21/21 Left Forearm (Active)   Site Assessment Clean, dry, & intact 06/21/21 1743   Phlebitis Assessment 0 06/21/21 1743   Infiltration Assessment 0 06/21/21 1743   Dressing Status Clean, dry, & intact 06/21/21 1743   Hub Color/Line Status Green 06/21/21 1743        Opportunity for questions and clarification was provided. Patient transported with:   O2 @ 3 liters, monitored, and with RN.

## 2021-06-21 NOTE — ED NOTES
Patient using bedpan and attempted to get up and out of bed to use urinal with RN at bedside. Patient stood half-way up and said, \"I'm dizzy\" and went unresponsive. Patient placed back into bed by RN. MD notified. Patient became bradycardic to 36 BPM.  1 mg of atropine administered. Repeat EKG obtained. Episode lasted 1-2 minutes and patient became responsive again and able to tell physician what happened.

## 2021-06-21 NOTE — PROGRESS NOTES
Problem: Self Care Deficits Care Plan (Adult)  Goal: *Therapy Goal (Edit Goal, Insert Text)  Description: Occupational Therapy Goals   Long Term Goals  Initiated 6/8/2021 and to be accomplished within 2 week(s) 6//22/2021  1. Pt will perform self-feeding with Alisa. 2. Pt will perform grooming with Alisa. 3. Pt will perform UB bathing with Alisa  4. Pt will perform LB bathing with Alisa. 5. Pt will perform tub/shower transfer with Alisa. 6. Pt will perform UB dressing with Alisa. 7. Pt will perform LB dressing with Alisa. 8. Pt will perform toileting task with Alisa. 9. Pt will perform toilet transfer with Alisa. 10.  Pt will perform an IADL task with good safety and Alisa. Short Term Goals   Initiated 6/8/2021 and to be accomplished within 7 day(s) 6/15/2021, updated 6/15/21; updated 6/21/21  1. Pt will perform self-feeding with S  GM 6/15/21  2. Pt will perform grooming with S. GM 6/15/21  3. Pt will perform UB bathing with S. GM 6/15/21  4. Pt will perform LB bathing with S GM 6/15/21  5. Pt will perform tub/shower transfer with S.  6. Pt will perform UB dressing with S. GM 6/15/21  7. Pt will perform LB dressing with S GM 6/15/21  8. Pt will perform toileting task with S. GM 6/21/21  9. Pt will perform toilet transfer with S. GM 6/21/21        OCCUPATIONAL THERAPY DISCHARGE (TREATMENT)    Patient: Ector Castaneda (12 y.o. male)  Date: 6/21/2021    First Tx Session  Time In: 1000  Time Out[de-identified] 1100  The  RRT called for pt yesterday and currently is undergoing testing. However, pt was placed on contact precaution. Discharge might be delayed 2/2 to testing and note to be updated if necessary. Discharge ADL performed with setup 2/2 to current medical issues. Pt reports urges to have BM but loose stool intermittently comes out. The RRT called today again.     Primary Diagnosis: Non-ST elevation (NSTEMI) myocardial infarction [facility]) [I21.4] Non-ST elevation (NSTEMI) myocardial infarction [facility])    Precautions:   Fall    Barriers to Learning/Limitations: yes;  cognitive  Compensate with: visual, verbal, tactile, kinesthetic cues/model     Patient identified with name and :Yes    SUBJECTIVE:   Patient pleasant and cooperative. OBJECTIVE DATA SUMMARY:     Past Medical History:   Diagnosis Date    Acute embolic stroke ([address]     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, brainstem and cerebellum) without residual deficit    Anticoagulated by anticoagulation treatment 2021    On Apixaban    Benign prostatic hyperplasia with urinary retention     Chronic obstructive pulmonary disease (COPD) ([address]     Coronary artery disease involving native coronary artery of native heart     Essential hypertension     Heart failure with preserved left ventricular function (HFpEF) ([address]     2D echocardiogram (2021) showed EF 50%; concentric LV hypertrophy with a severely thickened septal wall and mildly thickened posterior wall; left atrial chamber is severely enlarged; right atrial chamber volume is moderately enlarged; no mass, shunts, or thrombi.      History of carotid stenosis     History of gross hematuria 2021    Attributed to [place] trauma while patient was altered    History of renal artery stenosis     Leukocytosis 2021    Attributed to reactive leukocytosis due to NSTEMI    Malignant neoplasm of lower lobe of right lung (HCC)     Neuropathy involving both lower extremities 10/22/2020    Non-ST elevation (NSTEMI) myocardial infarction ([address] 2021    On clopidogrel therapy 2021    Paroxysmal atrial fibrillation ([address] 2021    Peripheral vascular disease of extremity with claudication ([address] 2021    Urinary retention      Past Surgical History:   Procedure Laterality Date    HX CAROTID ENDARTERECTOMY Left 2012    HX CAROTID ENDARTERECTOMY Right 2014    Dr. Rui Horn    HX COLONOSCOPY      HX CORONARY ARTERY BYPASS GRAFT  [facility]  [facility]/18/2021    S/P PCI to proximal SVG with 3.5 x 12 mm Juan J drug-eluting stent; PCI to mid SVG with 3.5 x 34 mm Hughes drug-eluting stent; PCI to distal SVG with 3.5 x 15 mm Hughes drug-eluting stent; Balloon angioplasty to distal SVG anastomosis (5/18/2021 - Dr. Renetta Goff)     HX LOBECTOMY Right     Middle lobe and lower lobe    HX RENAL ARTERY STENT Bilateral 2011    HX TONSILLECTOMY      IR BRONCHOSCOPY  11/25/2015    Dr. Lucy Webb     Prior Level of Function/Home Situation:   Home Situation  Home Environment: (P) Private residence  # Steps to Enter: 3  Rails to Enter: (P) No  One/Two Story Residence: (P) Two story  # of Interior Steps: (P) 7  Interior Rails: (P) Both  Living Alone: (P) Yes  Support Systems: Spouse/Significant Other/Partner  Patient Expects to be Discharged to[de-identified] (P) Other (comment) (home)  Current DME Used/Available at Home: None  Tub or Shower Type: Shower  [x]     Right hand dominant   []     Left hand dominant    Pain:  Pt reports 0/10 pain or discomfort prior to treatment. Pt reports 010 pain or discomfort post treatment.    Problem List:    Decreased strength B UE  [x]     Decreased strength trunk/core  []     Decreased AROM   []     Decreased PROM  []     Decreased balance sitting  []     Decreased balance standing  [x]     Decreased endurance  [x]     Pain  []       Functional Limitations:   Decreased independence with ADL  [x]     Decreased independence with functional transfers  [x]     Decreased independence with ambulation  [x]     Decreased independence with IADL  [x]       Outcome Measures:       MMT Initial Assessment    Right Upper Extremity  Left Upper Extremity    UE AROM WFL ROM; MMT 4/5 grossly WFL ROM; MMT 4/5 grossly    [facility]         MMT Discharge Assessment: Unable to assess   Right Upper Extremity  Left Upper Extremity    UE AROM Shoulder flexion     Shoulder extension     Shoulder ABDuction     Shoulder ADDUction     Elbow Flexion     Elbow Extension     Wrist Extension/Flexion              0/5 No palpable muscle contraction  1/5 Palpable muscle contraction, no joint movement  2-/5 Less than full range of motion in gravity eliminated position  2/5 Able to complete full range of motion in gravity eliminated position  2+/5 Able to initiate movement against gravity  3-/5 More than half but not full range of motion against gravity  3/5 Able to complete full range of motion against gravity  3+/5 Completes full range of motion against gravity with minimal resistance  4-/5 Completes full range of motion against gravity with minimal resistance  4/5 Completes full range of motion against gravity with moderate resistance  5/5 Completes full range of motion against gravity with maximum resistance    Coordination: Unable to assess  Sensation: Unable to assess      FIM SCORES Initial Assessment Discharge Assessment   Eating 5 Feeding/Eating  Feeding/Eating Assistance: 7 (Independent)   Grooming 5 (setup at sink) Grooming  Grooming Assistance : 5 (Supervision) (on toilet)  Adaptive Equipment:  (washed face)    Oral Hygiene FIM: 5    Bathing 5 Upper Body Bathing  Bathing Assistance, Upper: 5 (Supervision) (seated on toilet)  Lower Body Bathing  Bathing Assistance, Lower : 5 (Supervision) (seated on toilet)  Adaptive Equipment: Grab bar (for natasha/buttock wash standing)  Position Performed: Seated in chair   Upper Body Dressing 5 Upper Body Dressing   Dressing Assistance : 5 (Supervision) (setup)   Lower Body Dressing 4 Lower Body Dressing   Dressing Assistance : 4 (Contact guard assistance)  Leg Crossed Method Used: No    Sock and/or Shoe Management FIM: 4   Toileting 4 Toileting  Toileting Assistance (FIM Score): 5 (Supervision)   Tub/Shower Transfer       Toilet Transfer 0 Functional Transfers  Toilet Transfer : Stand pivot transfer without device  Amount of Assistance Required: 4 (Contact guard assistance)   Comprehension 6     Expression 6     Social Interaction 5     Problem Solving 4     Memory 5     Please see C Interdisciplinary Eval: Coordination/Balance Section for details regarding FIM score description. Activity Tolerance:   Fair-    ASSESSMENT:  Pt shows CGA-S in self care  For safety meeting 8/9 STG's. Progression toward goals:  [x]      Improving appropriately and progressing toward goals  []      Improving slowly and progressing toward goals  []      Not making progress toward goals and plan of care will be adjusted     PLAN:  Pt would benefit from continued skilled occupational therapy in order to improve ADL function and IADL with use of least restrictive device. Interventions may include range of motion (AROM, PROM B UE/trunk), motor function (B UE/trunk strengthening/coordination), activity tolerance (vitals, oxygen saturation levels), ADL, balance activities, IADL, and functional transfer training. Discharge Recommendations: Home Health  Further Equipment Recommendations for Discharge: bedside commode       Please refer to the flow sheet for vital signs taken during this treatment. After treatment:   []  Patient left in no apparent distress sitting up in chair  [x]  Patient left in no apparent distress in bed  []  Call bell left within reach  []  Nursing notified  []  Caregiver present  []  Bed alarm activated    COMMUNICATION/EDUCATION:   Communication/Collaboration:  []      Home safety education was provided and the patient/caregiver indicated understanding. [x]      Patient/family have participated as able and agree with findings and recommendations. []      Patient is unable to participate in plan of care at this time.     Alex Kenney OT  6/21/2021      Outcome: Progressing Towards Goal  Goal: Interventions  Outcome: Progressing Towards Goal

## 2021-06-21 NOTE — PROGRESS NOTES
Rapid Response Note  [facility]    Patient: Yong Castro 80 y.o. male  [id number]  1940      Admit Date: 6/7/2021   Admission Diagnosis: Non-ST elevation (NSTEMI) myocardial infarction ([facility] [I21.4]    RAPID RESPONSE     Rapid response called for syncope. Patient was found unresponsive and unconscious while on the toilet. The patient was moved back to the bed and RRT called. On arrival, the patient had an unreadable BP by machine, and a slow pulse in the low 40s. Patient continued to be bradycardic for a number of minutes, with lowest rate in low 30s. While bradycardic, patient continued to have unreadable pressure. Patient was given 500cc bolus, and during, patient converted to NSR, and next pressure was 190s/100s, patient began to regain senses and communicated at this time, although he continued to not be at baseline, and was sleepy/lathargic. Patient was deemed stable for transfer and moved to the ED. Medications Reviewed    OBJECTIVE     Patient Vitals for the past 12 hrs:   Temp Pulse Resp BP SpO2   06/21/21 1307  97      06/21/21 1306  79 20 (!) 197/101    06/21/21 1301  (!) 54   90 %   06/21/21 1024 97.9 °F (36.6 °C) 95 20 (!) 142/78 94 %   06/21/21 0714 97.6 °F (36.4 °C) 94 18 110/70 95 %         PHYSICAL:  General:  Unresponsive  CV:  Bradycardic, no murmurs, rubs, or gallops. PMI not displaced. No visible pulsations or thrills. No carotid bruits. RESP:  Unlabored breathing though shallow. CTAB. ABD:  Soft, nontender, nondistended. Normoactive bowel sounds. No hepatosplenomegaly. No suprapubic tenderness. No CVA tenderness. Neuro:  Not assessed, no gross abnormalities initially noted    EKG: deferred       ASSESSMENT, PLAN & DISPOSITION   Yong Castro is a 80y.o. year old male admitted for Non-ST elevation (NSTEMI) myocardial infarction ([facility] [I21.4]. Rapid response called for Symptomatic unstable bradycardia.  Patient condition currently: stable. Medications Administered: 500cc bolus    Labs ordered/Pending: cbc, cmp, cardiac panel    Disposition: Transfer to ED    Attending Natalya Jose notified of rapid response. In agreement with plan. Primary team resuming care.      Senior resident Dr. Keesha Lincoln present during RRT and evaluation    Rere Pereira MD, PGY-1   [id number] Medicine   Intern Pager: [phone]   June 21, 2021, 1:39 PM

## 2021-06-21 NOTE — ROUTINE PROCESS
Bedside and Verbal shift change report given to Brody Cavanaugh RN, BSN (oncoming nurse) by Faisal Kirby RN (offgoing nurse). Report included the following information SBAR, Kardex, ED Summary, OR Summary, Procedure Summary, Intake/Output, MAR, Recent Results and Cardiac Rhythm A Fib.      Brody Cavanaugh, KULDIP, BSN

## 2021-06-22 NOTE — ROUTINE PROCESS
Bedside and Verbal shift change report given to Pricilla Gregorio [facility] (oncoming nurse) by Aleshia Seaman RN, BSN (offgoing nurse). Report included the following information SBAR, Kardex, ED Summary, Intake/Output, MAR, Recent Results and Cardiac Rhythm A Fib.      Aleshia Seaman RN, BSN

## 2021-06-22 NOTE — PROGRESS NOTES
RENAL DAILY PROGRESS NOTE    Patient: Klaus Wright               Sex: male          DOA: 6/21/2021  1:28 PM        YOB: 1940      Age:  80 y.o.        LOS:  LOS: 1 day     Subjective:     Klaus Wright is a 80 y.o.  who presents with Mesenteric abscess ([facility] [K65.1]. Asked to evaluate for hyponatremia,had syncopal episodes yesterday. hx of mi,mesenteric abscess,copd  Chief complains: Patient denies nausea, vomiting, chest pain, dizziness, shortness of breath or headache.  - Reviewed last 24 hrs events     Current Facility-Administered Medications   Medication Dose Route Frequency    budesonide (PULMICORT) 500 mcg/2 ml nebulizer suspension  500 mcg Nebulization BID RT    arformoteroL (BROVANA) neb solution 15 mcg  15 mcg Nebulization BID RT    atropine 1 mg/mL injection 0.5 mg  0.5 mg IntraVENous ONCE PRN    sodium chloride (NS) flush 5-40 mL  5-40 mL IntraVENous Q8H    sodium chloride (NS) flush 5-40 mL  5-40 mL IntraVENous PRN    acetaminophen (TYLENOL) tablet 650 mg  650 mg Oral Q6H PRN    Or    acetaminophen (TYLENOL) suppository 650 mg  650 mg Rectal Q6H PRN    ondansetron (ZOFRAN ODT) tablet 4 mg  4 mg Oral Q8H PRN    Or    ondansetron (ZOFRAN) injection 4 mg  4 mg IntraVENous Q6H PRN    piperacillin-tazobactam (ZOSYN) 3.375 g in 0.9% sodium chloride (MBP/ADV) 100 mL MBP  3.375 g IntraVENous Q6H    0.9% sodium chloride infusion  100 mL/hr IntraVENous CONTINUOUS    albuterol (PROVENTIL VENTOLIN) nebulizer solution 2.5 mg  2.5 mg Nebulization Q4H PRN    clopidogreL (PLAVIX) tablet 75 mg  75 mg Oral DAILY WITH BREAKFAST    gabapentin (NEURONTIN) capsule 100 mg  100 mg Oral TID    multivitamin, tx-iron-ca-min (THERA-M w/ IRON) tablet 1 Tablet  1 Tablet Oral DAILY    rosuvastatin (CRESTOR) tablet 10 mg  10 mg Oral DAILY    tamsulosin (FLOMAX) capsule 0.4 mg  0.4 mg Oral PCD    L. acidophilus,casei,rhamnosus (BIO-K PLUS) capsule 1 Capsule  1 Capsule Oral DAILY    ipratropium (ATROVENT) 0.02 % nebulizer solution 0.5 mg  0.5 mg Nebulization TID       Objective:     Visit Vitals  /78   Pulse 89   Temp 97.6 °F (36.4 °C)   Resp 10   Ht 5' 8.11\" (1.73 m)   Wt 76.5 kg (168 lb 11.2 oz)   SpO2 99%   BMI 25.57 kg/m²       Intake/Output Summary (Last 24 hours) at 6/22/2021 1442  Last data filed at 6/22/2021 1308  Gross per 24 hour   Intake 1643 ml   Output 835 ml   Net 808 ml       Physical Examination:       RS: Chest is bilateral equal,   CVS: S1-S2 heard,   Abdomen: Soft,   Extremities: No edema  CNS: Awake & follows commands,  HEENT: Head is atraumatic, PERRLA, conjunctiva pink & non icteric. No JVD or carotid bruit       Data Review:      Labs:     Hematology:   Recent Labs     06/22/21  0409 06/21/21  1310 06/21/21  0330 06/20/21  0550   WBC 24.7* 26.0* 26.3* 26.7*   HGB 9.1* 9.4* 9.1* 9.5*   HCT 29.1* 29.8* 27.5* 31.0*     Chemistry:   Recent Labs     06/22/21  0409 06/21/21  1310 06/21/21  0330 06/20/21  0550   BUN 18 23* 25* 28*   CREA 0.77 0.91 0.89 1.12   CA 9.0 8.6 8.3* 9.3   ALB  --  2.9* 3.1* 3.2*   K 4.3 4.6 4.5 4.5   * 125* 126* 126*   CL 95* 92* 91* 90*   CO2 28 26 24 30   PHOS 3.4  --   --   --    GLU 81 121* 145* 78        Images:    XR (Most Recent). CXR reviewed by me and compared with previous CXR Results from Hospital Encounter encounter on 06/07/21    XR CHEST PORT    Narrative  EXAM: XR CHEST PORT    INDICATION: RRT    COMPARISON: 11/25/2015    FINDINGS: A portable AP radiograph of the chest was obtained at 0 535 hours. Status post median sternotomy and clips over the mediastinum, stable. . Low lung  volumes. . Question streaky opacities in the lung bases. Question small right  pleural opacity. .  Enlarged cardiac silhouette. Osseous degenerative changes. Clips noted overlying the left neck. .    Impression  Enlarged cardiac silhouette. Low lung volumes with streaky bibasilar densities.     Please see report for additional details. CT (Most Recent) Results from Hospital Encounter encounter on 06/07/21    CT CHEST ABD PELV W CONT    Narrative  CT CHEST, ABDOMEN AND PELVIS WITH ENHANCEMENT    INDICATION: Leukocytosis. Abdominal pain. Myocardial infarction. TECHNIQUE: Axial images obtained of the chest, abdomen and pelvis following the  uneventful administration of 100 cc Isovue-300 nonionic intravenous contrast.  Coronal and sagittal reformatted images obtained. All CT scans are performed  using dose optimization techniques as appropriate to the performed exam  including the following: Automated exposure control, adjustment of mA and/or kV  according to patient size, and use of iterative reconstructive technique. COMPARISON: CTA chest 11/14/2015, CT abdomen and pelvis 6/16/2014. CHEST FINDINGS:  Lung: Emphysema. Pleura: Unremarkable. Heart: No effusion. CABG. Cardiomegaly. Vessels: No aortic aneurysm. Scattered wall calcifications. Lymph Nodes: Unremarkable. Thyroid: Stable to prior. The right gland is larger than the left and could  indicate an underlying nodule. Bones: Sternotomy. Lower thoracic degenerative disc disease. ABDOMEN AND PELVIS FINDINGS:  Liver: Unremarkable. Biliary: Unremarkable. Spleen: Unremarkable. Pancreas: Unremarkable. Adrenal Glands: Stable chronic mild nodular thickening of the left gland since  2014. Kidneys: Chronic left renal cortical scarring. Subcentimeter right renal cyst.    Bowel: Normal appendix. Diverticulosis. Thickened and collapsed segment of mid  sigmoid colon has an appearance similar to prior 2014 exam. There is mild  proximal sigmoid colon distention suggesting a stricture in this region. Positioned between this thickened segment of colon and a thickened inflamed loop  of small bowel is a mesenteric gas and fluid containing rim enhanced collection  measuring 3.5 x 2.3 cm.  At least 2 additional small phlegmonous developing  collections are seen closely along serosal mesenteric margin of the affected  small bowel loop best seen on sagittal image 31 and 38. Dilated fluid-filled small bowel loops transitioning at this abnormally  thickened small bowel loop however the bowel appears patent and gas and fluid  containing on both ends of this process suggesting a superimposed ileus  contributing to the picture. Bladder/ Pelvic Organs: Mild prostate enlargement. Mild thickening of the  bladder dome wall, likely reactive to adjacent mesenteric/small bowel process. Peritoneum/ Retroperitoneum: No free air. Trace free fluid. Mesenteric  inflammation in the pelvis as discussed above. Lymph Nodes: Borderline enlarged 9-10 mm mesenteric lymph nodes may be reactive. Vessels: Arteriosclerosis. Renal artery stents. Bones/Soft tissues: Mild chronic fatty left inguinal hernia is similar to prior. Degenerative disc disease throughout the lumbar spine most advanced at L2-L3. Right greater than left hip degenerative joint disease. Impression  1. No acute findings in the chest.  -Emphysema. -CABG. -Stable mildly enlarged right thyroid gland which could indicate underlying  nodule, stable for many years. 2. 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 It is unclear if initial source of abscess is from  small bowel inflammation or diverticulitis/pericolonic abscess. 3. Dilated small bowel. This is likely a combination of generalized small bowel  ileus and low-grade partial small bowel obstruction at site of abnormal pelvic  small bowel loop discussed above. 4. Diverticulosis. Stable thickened collapsed segment of sigmoid colon since  2014 suggesting a chronic stricture. This is limited for assessment by CT. 5. Bladder dome inflammation is likely secondary to the adjacent mesentery  process.   6. Other chronic incidental findings. Findings were discussed with emergency department physician on 6/21/2021 at 1:55  PM.       EKG No results found for this or any previous visit. I have personally reviewed the old medical records and patient's labs    Plan / Recommendation:      1.  Acute/chronic hyponatremia,very low urine sodium c/w pre renal etiology,improving on normal saline,increase rate  2.cad,cabg,no signs of chf on exam  3.mesenteric abscess    D/w Dr. Audra Campos MD  Nephrology  6/22/2021

## 2021-06-22 NOTE — ROUTINE PROCESS
Bedside and Verbal shift change report given to Mariann Rosado, RN, BSN (oncoming nurse) by Nicolasa Palma RN (offgoing nurse). Report included the following information SBAR, Kardex, ED Summary, Intake/Output, MAR, Recent Results and Cardiac Rhythm A Fib.     Mariann Rosado, KULDIP, BSN

## 2021-06-22 NOTE — CONSULTS
Interventional Radiology     Consult received from Dr. Evelina Don for evaluation of mesenteric abscess (approximately 2 x 3 cm). Case and images reviewed by Dr. Darinel Fernandez. The patient is on Eliquis and Plavix due to recent drug eluding stent placement at [facility] x 3 and vein graft. Discussed with Dr. Prerna Conway. The patient is at high risk for graft and stent occlusion if plavix is held. Risks of holding dual antiplatelet therapy outweigh benefits of CT guided drainage of small abscess that may not be amenable to drainage due to anatomic positioning at this time. IR will sign off at this time and will remain available as needed for further consult.     Thank you,   Luz Johansen, [address]

## 2021-06-22 NOTE — PROGRESS NOTES
Cardiology Progress Note    Admit Date: 6/21/2021  Attending Cardiologist: Dr. 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  -Echo 5/2021 at [facility] with EF 50%  -CAD, Hx CABG x 2 (LIMA-LAD, SVG-LCx), recent NSTEMI s/p cardiac cath 5/18/2021 at [facility] with PCI to proximal SVG-LCx with 3.5 x 12 mm [place]ENNIE; PCI to mid SVG with 3.5 x 23 mm [place] JENNIE; PCI to distal SVG with 3.5 x 15 mm [place] JENNIE; Balloon angioplasty to distal SVG anastomosis. Discharged on Plavix and Eliquis (no ASA to avoid triple therapy). -Initial cardiac cath 5/18/2021 at [place] [address] with distal LM 50% blockage; LAD is large vessel with 80% proximal stenosis with patent LIMA-LAD, there is a 70% stenosis distal to LIMA anastomosis; LCx is dominant, large diffusely diseased vessel with sequential 80% stenosis at proximal and mid portion of the vessel; RCA is non-dominant, small diffusely diseased vessel  -Atrial fibrillation, recent diagnosis, on Eliquis for anticoagulation.  -Leukocytosis, WBC up to 26.7K on 6/20/2021 (up from 14.3  -Severe COPD.   O2 goal 89-93% per [facility] records.  -Hx squamous cell lung cancer, s/p right middle and lower lobectomy.  -Peripheral vascular disease, Hx bilateral carotid endarterectomies  -Hx renal artery stenosis, Hx bilateral stenting 2011  -Acute CVA during recent admission at [facility]; considered cardioembolic, s/p PCI 1/90/3475, symptom onset 5/20/2021 while on Eliquis; suspected plaque mobilization.  -Recent acute blood loss anemia due to melena and hematuria (self-inflicted [place] trauma), GI consulted 5/26/2021, cleared for Plavix/Eliquis  -Hx HTN with hypotension during recent admission, medications adjusted at that time. Coreg decreased to 12.5 mg BID, Isosorbid-Hydralazine and Lisinopril discontinued. -Dementia, mild.     Primary cardiologist at [place] [address].:     No recurrent syncope/tele events since yesterday afternoon. Continue to monitor off AV shar agents. Continue sepsis treatment and fluid resuscitation as needed. Will review limited echo for completeness. Currently continued on plavix and statin. Staff addendum:  Bradycardia/hypotension improved today. Ongoing treatment for sepsis for mesenteric abscess. Continue to hold coreg for now (taking 25 mg BID at home). Plan to followup echo. Discussed with Dr. Benny Cardenas, recently had 3 JENNIE to SVG-LCx 5/18/21 at [place]. Very high risk MI/IST if stopping Plavix. I saw, examined, and evaluated the patient. I personally reviewed the patient's labs, tests, vitals, orders, medications, updated history, and other providers assessments. I personally agree with the findings as stated and the plan as documented.   Dane Vidales MD    Subjective:     Denies recurrent syncope since yesterday afternoon    Objective:      Patient Vitals for the past 8 hrs:   Temp Pulse Resp BP SpO2   06/22/21 1109 97.6 °F (36.4 °C) 89 10 133/78 100 %   06/22/21 0800 -- 96 -- -- --   06/22/21 0743 -- -- -- -- 98 %   06/22/21 0734 97.3 °F (36.3 °C) 88 21 (!) 124/91 96 %   06/22/21 0419 97.3 °F (36.3 °C) 93 18 119/88 95 %   06/22/21 0400 -- 85 16 (!) 133/107 92 %         Patient Vitals for the past 96 hrs:   Weight   06/22/21 0400 168 lb 11.2 oz (76.5 kg)   06/21/21 1821 169 lb 3.2 oz (76.7 kg)       TELE: normal sinus rhythm               Current Facility-Administered Medications   Medication Dose Route Frequency Last Admin    atropine 1 mg/mL injection 0.5 mg  0.5 mg IntraVENous ONCE PRN      sodium chloride (NS) flush 5-40 mL  5-40 mL IntraVENous Q8H 10 mL at 06/22/21 0554    sodium chloride (NS) flush 5-40 mL  5-40 mL IntraVENous PRN      acetaminophen (TYLENOL) tablet 650 mg  650 mg Oral Q6H PRN      Or    acetaminophen (TYLENOL) suppository 650 mg  650 mg Rectal Q6H PRN      ondansetron (ZOFRAN ODT) tablet 4 mg  4 mg Oral Q8H PRN      Or    ondansetron (ZOFRAN) injection 4 mg  4 mg IntraVENous Q6H PRN      piperacillin-tazobactam (ZOSYN) 3.375 g in 0.9% sodium chloride (MBP/ADV) 100 mL MBP  3.375 g IntraVENous Q6H 3.375 g at 06/22/21 0551    0.9% sodium chloride infusion  75 mL/hr IntraVENous CONTINUOUS 75 mL/hr at 06/21/21 1834    albuterol (PROVENTIL VENTOLIN) nebulizer solution 2.5 mg  2.5 mg Nebulization Q4H PRN      clopidogreL (PLAVIX) tablet 75 mg  75 mg Oral DAILY WITH BREAKFAST 75 mg at 06/22/21 0909    gabapentin (NEURONTIN) capsule 100 mg  100 mg Oral  mg at 06/22/21 0909    multivitamin, tx-iron-ca-min (THERA-M w/ IRON) tablet 1 Tablet  1 Tablet Oral DAILY 1 Tablet at 06/22/21 0909    rosuvastatin (CRESTOR) tablet 10 mg  10 mg Oral DAILY 10 mg at 06/22/21 0909    tamsulosin (FLOMAX) capsule 0.4 mg  0.4 mg Oral PCD 0.4 mg at 06/21/21 1938    L. acidophilus,casei,rhamnosus (BIO-K PLUS) capsule 1 Capsule  1 Capsule Oral DAILY 1 Capsule at 06/22/21 0909    ipratropium (ATROVENT) 0.02 % nebulizer solution 0.5 mg  0.5 mg Nebulization TID 0.5 mg at 06/22/21 0743         Intake/Output Summary (Last 24 hours) at 6/22/2021 1118  Last data filed at 6/22/2021 0000  Gross per 24 hour   Intake 473 ml   Output 435 ml   Net 38 ml       Physical Exam:  General:  alert, cooperative, no distress, appears stated age  Neck:  no JVD  Lungs:  clear to auscultation bilaterally  Heart:  regular rate and rhythm  Abdomen:  abdomen is soft without significant tenderness, masses, organomegaly or guarding  Extremities:  extremities normal, atraumatic, no cyanosis or edema    Visit Vitals  /78   Pulse 89   Temp 97.6 °F (36.4 °C)   Resp 10   Ht 5' 8.11\" (1.73 m)   Wt 168 lb 11.2 oz (76.5 kg)   SpO2 100%   BMI 25.57 kg/m²       Data Review:     Labs: Results:       Chemistry Recent Labs     06/22/21  0409 06/21/21  1310 06/21/21  0330 06/20/21  0550   GLU 81 121* 145* 78   * 125* 126* 126*   K 4.3 4.6 4.5 4.5   CL 95* 92* 91* 90*   CO2 28 26 24 30   BUN 18 23* 25* 28*   CREA 0.77 0.91 0.89 1.12   CA 9.0 8.6 8.3* 9.3   MG 2.3 2.3 2.0 2.2   PHOS 3.4  --   --   --    AGAP 5 7 11 6   BUCR 23* 25* 28* 25*   AP  --  46 48 46   TP  --  6.6 6.0* 6.2*   ALB  --  2.9* 3.1* 3.2*   GLOB  --  3.7 2.9 3.0   AGRAT  --  0.8 1.1 1.1      CBC w/Diff Recent Labs     06/22/21  0409 06/21/21  1310 06/21/21  0330   WBC 24.7* 26.0* 26.3*   RBC 3.13* 3.22* 3.07*   HGB 9.1* 9.4* 9.1*   HCT 29.1* 29.8* 27.5*    235 220   GRANS 91* 92* 87*   LYMPH 2* 4* 8*   EOS 2 0 0      Cardiac Enzymes Lab Results   Component Value Date/Time    CPK 78 06/22/2021 04:09 AM    CPK 97 06/21/2021 01:10 PM    CKMB 2.3 06/22/2021 04:09 AM    CKMB <1.0 06/21/2021 01:10 PM    CKND1 2.9 06/22/2021 04:09 AM    CKND1  06/21/2021 01:10 PM     CALCULATION NOT PERFORMED WHEN RESULT IS BELOW LINEAR LIMIT    TROIQ 0.07 (H) 06/22/2021 04:09 AM    TROIQ 0.09 (H) 06/21/2021 01:10 PM      Coagulation No results for input(s): PTP, INR, APTT, INREXT in the last 72 hours.     Lipid Panel No results found for: CHOL, CHOLPOCT, CHOLX, CHLST, CHOLV, 206880, HDL, HDLP, LDL, LDLC, DLDLP, 644084, VLDLC, VLDL, TGLX, TRIGL, TRIGP, TGLPOCT, CHHD, CHHDX   BNP No results found for: BNP, BNPP, XBNPT   Liver Enzymes Recent Labs     06/21/21  1310   TP 6.6   ALB 2.9*   AP 46      Thyroid Studies Lab Results   Component Value Date/Time    TSH 1.57 06/21/2021 02:12 PM          Signed By: LAMBERT Russell     June 22, 2021

## 2021-06-22 NOTE — PROGRESS NOTES
Infectious Disease progress Note        Reason: Leukocytosis, evaluate for sepsis    Current abx Prior abx   Zosyn since 6/21/21 Ceftriaxone, intravenous metronidazole, oral vancomycin, IV vancomycin 6/20-6/21     Lines:       Assessment :    80-year-old man with past medical history of diastolic CHF, coronary artery disease status post CABG, history of lung cancer status post pneumonectomy, hypertension admitted to [facility] acute rehab on 6/7/2021. Recent hospitalization at [facility] 5/17-6/7 for non stemI s/p CABG  Post op course complicated by profound unexplained leukocytosis. Wbc:37 k on 6/7/21    Now with leukocytosis, diarrhea    Clinical presentation consistent with mesenteric abscess, acute on chronic sigmoid diverticulitis    CT without contrast 5/22 at [facility] revealed evidence of diverticular disease   Recent history of profound unexplained leukocytosis during stay at [facility]. Wbc:37 k on 6/7/21 could be due to flare up of diverticultis, diverticular perforation. Worsening leukocytosis since 6/20 likely due to recurrent diverticulitis. RLQ pain/tenderness on prior exam likely due to this. Colorectal surgery f/u appreciated. Plans for possible IR guided drainage noted. Elevated bilirubin 2.2 on 6/20/21- normal transaminases-likely due to sepsis. No evidence of undiagnosed hepatobiliary pathology noted on CT scan 6/21/2021    Syncope 6/21- likely vasovagal.  Cardiology follow-up appreciated    Subjective sense of improvement. Hemodynamically stable on today's exam    Recommendations:    1. Recommend piperacillin/tazobactam  2. Follow-up IR recommendations regarding drainage of mesenteric abscess  3. Follow-up colorectal surgery recommendations   4. Recommend palliative care consult to address goals of care since patient is at high risk of recurrent diverticulitis, subsequent complications including perforation, colovesical fistula.        Above plan was discussed in details with patient, primary team. Please call me if any further questions or concerns. Will continue to participate in the care of this patient. HPI:    Patient states that he feels better today compared to yesterday. He had one small BM today. home Medication List    Details   nitroglycerin (Nitrostat) 0.4 mg SL tablet 0.4 mg by SubLINGual route every five (5) minutes as needed for Chest Pain. Up to 3 doses. fluticasone furoate-vilanteroL (Breo Ellipta) 100-25 mcg/dose inhaler Take 1 Puff by inhalation daily. therapeutic multivitamin (THERAGRAN) tablet Take 1 Tablet by mouth daily. rosuvastatin (Crestor) 10 mg tablet Take 10 mg by mouth nightly. furosemide (Lasix) 20 mg tablet Take 20 mg by mouth daily. clopidogreL (Plavix) 75 mg tab Take 75 mg by mouth daily. apixaban (ELIQUIS) 5 mg tablet Take 5 mg by mouth two (2) times a day. tamsulosin (FLOMAX) 0.4 mg capsule Take 1 Cap by mouth daily (after dinner). Qty: 30 Cap, Refills: 6      carvediloL (Coreg) 12.5 mg tablet Take 12.5 mg by mouth two (2) times daily (with meals). albuterol (PROVENTIL HFA, VENTOLIN HFA) 90 mcg/actuation inhaler Take  by inhalation.              Current Facility-Administered Medications   Medication Dose Route Frequency    atropine 1 mg/mL injection 0.5 mg  0.5 mg IntraVENous ONCE PRN    sodium chloride (NS) flush 5-40 mL  5-40 mL IntraVENous Q8H    sodium chloride (NS) flush 5-40 mL  5-40 mL IntraVENous PRN    acetaminophen (TYLENOL) tablet 650 mg  650 mg Oral Q6H PRN    Or    acetaminophen (TYLENOL) suppository 650 mg  650 mg Rectal Q6H PRN    ondansetron (ZOFRAN ODT) tablet 4 mg  4 mg Oral Q8H PRN    Or    ondansetron (ZOFRAN) injection 4 mg  4 mg IntraVENous Q6H PRN    piperacillin-tazobactam (ZOSYN) 3.375 g in 0.9% sodium chloride (MBP/ADV) 100 mL MBP  3.375 g IntraVENous Q6H    0.9% sodium chloride infusion  75 mL/hr IntraVENous CONTINUOUS    albuterol (PROVENTIL VENTOLIN) nebulizer solution 2.5 mg  2.5 mg Nebulization Q4H PRN    clopidogreL (PLAVIX) tablet 75 mg  75 mg Oral DAILY WITH BREAKFAST    gabapentin (NEURONTIN) capsule 100 mg  100 mg Oral TID    multivitamin, tx-iron-ca-min (THERA-M w/ IRON) tablet 1 Tablet  1 Tablet Oral DAILY    rosuvastatin (CRESTOR) tablet 10 mg  10 mg Oral DAILY    tamsulosin (FLOMAX) capsule 0.4 mg  0.4 mg Oral PCD    L. acidophilus,casei,rhamnosus (BIO-K PLUS) capsule 1 Capsule  1 Capsule Oral DAILY    ipratropium (ATROVENT) 0.02 % nebulizer solution 0.5 mg  0.5 mg Nebulization TID       Allergies: Lipitor [atorvastatin] and Norvasc [amlodipine]    Temp (24hrs), Av.5 °F (36.4 °C), Min:97.1 °F (36.2 °C), Max:97.9 °F (36.6 °C)    Visit Vitals  BP (!) 124/91   Pulse 88   Temp 97.3 °F (36.3 °C)   Resp 21   Ht 5' 8.11\" (1.73 m)   Wt 76.5 kg (168 lb 11.2 oz)   SpO2 98%   BMI 25.57 kg/m²       ROS: 12 point ROS obtained in details. Pertinent positives as mentioned in HPI,   otherwise negative    Physical Exam:    General: Well developed, well nourished male laying on the bed/sitting on the  bed AAOx3 in no acute distress. General:   awake alert and oriented   HEENT:  Normocephalic, atraumatic,  EOMI, no scleral icterus or pallor; no conjunctival hemmohage;  nasal and oral mucous are moist and without evidence of lesions. No thrush. Dentition good. Neck supple, no bruits. Lymph Nodes:   no cervical, axillary or inguinal adenopathy   Lungs:   non-labored, bilaterally clear to auscultation- no crackles wheezes rales or rhonchi   Heart:  RRR, s1 and s2; no  rubs or gallops, no edema, + pedal pulses   Abdomen:  soft, non-distended, active bowel sounds, no hepatomegaly, no splenomegaly. decreased RLQ tendernss   Genitourinary:  deferred   Extremities:   no clubbing, cyanosis; no joint effusions or swelling;  Full ROM of all large joints to the upper and lower extremities; muscle mass appropriate for age   Neurologic:  No gross focal sensory abnormalities; 5/5 muscle strength to upper and lower extremities. Speech appropriate. Cranial nerves intact                        Skin:  No rash or ulcers noted   Back:  no spinal or paraspinal muscle tenderness or rigidity, no CVA tenderness     Psychiatric:  No suicidal or homicidal ideations, appropriate mood and affect         Labs: Results:   Chemistry Recent Labs     06/22/21  0409 06/21/21  1310 06/21/21  0330 06/20/21  0550   GLU 81 121* 145* 78   * 125* 126* 126*   K 4.3 4.6 4.5 4.5   CL 95* 92* 91* 90*   CO2 28 26 24 30   BUN 18 23* 25* 28*   CREA 0.77 0.91 0.89 1.12   CA 9.0 8.6 8.3* 9.3   AGAP 5 7 11 6   BUCR 23* 25* 28* 25*   AP  --  46 48 46   TP  --  6.6 6.0* 6.2*   ALB  --  2.9* 3.1* 3.2*   GLOB  --  3.7 2.9 3.0   AGRAT  --  0.8 1.1 1.1      CBC w/Diff Recent Labs     06/22/21  0409 06/21/21  1310 06/21/21  0330   WBC 24.7* 26.0* 26.3*   RBC 3.13* 3.22* 3.07*   HGB 9.1* 9.4* 9.1*   HCT 29.1* 29.8* 27.5*    235 220   GRANS 91* 92* 87*   LYMPH 2* 4* 8*   EOS 2 0 0      Microbiology Recent Labs     06/20/21  1400 06/20/21  1335 06/20/21  1325   CULT No growth (<1,000 CFU/ML) NO GROWTH 2 DAYS NO GROWTH 2 DAYS          RADIOLOGY:    All available imaging studies/reports in [facility] care for this admission were reviewed  High complexity decision making was performed during the evaluation of this patient at high risk for decompensation with multiple organ involvement         Disclaimer: Sections of this note are dictated utilizing voice recognition software, which may have resulted in some phonetic based errors in grammar and contents. Even though attempts were made to correct all the mistakes, some may have been missed, and remained in the body of the document. If questions arise, please contact our department.     Dr. Yovani Brizuela, Infectious Disease Specialist  [phone]  June 22, 2021  9:47 AM

## 2021-06-22 NOTE — PROGRESS NOTES
[facility] CV STEPDOWN  [address] [facility] [address] [facility] [id number] [phone]  Colon and Rectal Surgery Progress Note      Patient: Humberto Beard MRN: [id number]  SSN: [id number]    YOB: 1940  Age: 80 y.o. Sex: male      Admit Date: 6/21/2021    LOS: 1 day     Subjective:     Pain mild. Objective:     Vitals:    06/22/21 0400 06/22/21 0419 06/22/21 0734 06/22/21 0743   BP: (!) 133/107 119/88 (!) 124/91    Pulse: 85 93 88    Resp: 16 18 21    Temp:  97.3 °F (36.3 °C) 97.3 °F (36.3 °C)    SpO2: 92% 95% 96% 98%   Weight: 76.5 kg (168 lb 11.2 oz)      Height:            Intake and Output:  Current Shift: No intake/output data recorded.   Last three shifts: 06/20 1901 - 06/22 0700  In: 473 [P.O.:473]  Out: 435 [Urine:435]    Physical Exam:     Constitutional: well developed, in NAD  Abdomen: soft, tender mild in RLQ and LLQ    Lab/Data Review:    CMP:   Lab Results   Component Value Date/Time     (L) 06/22/2021 04:09 AM    K 4.3 06/22/2021 04:09 AM    CL 95 (L) 06/22/2021 04:09 AM    CO2 28 06/22/2021 04:09 AM    AGAP 5 06/22/2021 04:09 AM    GLU 81 06/22/2021 04:09 AM    BUN 18 06/22/2021 04:09 AM    CREA 0.77 06/22/2021 04:09 AM    GFRAA >60 06/22/2021 04:09 AM    GFRNA >60 06/22/2021 04:09 AM    CA 9.0 06/22/2021 04:09 AM    MG 2.3 06/22/2021 04:09 AM    PHOS 3.4 06/22/2021 04:09 AM    ALB 2.9 (L) 06/21/2021 01:10 PM    TP 6.6 06/21/2021 01:10 PM    GLOB 3.7 06/21/2021 01:10 PM    AGRAT 0.8 06/21/2021 01:10 PM    ALT 17 06/21/2021 01:10 PM     CBC:   Lab Results   Component Value Date/Time    WBC 24.7 (H) 06/22/2021 04:09 AM    HGB 9.1 (L) 06/22/2021 04:09 AM    HCT 29.1 (L) 06/22/2021 04:09 AM     06/22/2021 04:09 AM        Assessment:     Diverticulitis with abscess    Plan:     Pt notes prior epipsode diverticulitis 10 years ago at [address] treated conservatively  Denies prior colonoscopy or [facility]  Recommend abx, clears    Signed By: Tracie Hoyt MD June 22, 2021

## 2021-06-22 NOTE — ACP (ADVANCE CARE PLANNING)
Advance Care Planning     General Advance Care Planning (ACP) Conversation      Date of Conversation: 6/22/21  Conducted with: Patient with Decision Making Capacity    Healthcare Decision Maker:     Click here to complete [address] including selection of the [address] Relationship (ie \"Primary\")  Today we discussed Healthcare Decision Makers. The patient is considering options. Content/Action Overview:   DECLINED ACP conversation - will revisit periodically . Pt states that his wife will know what to do.     Evelyne Baker RN BSN  Care Manager  [phone]

## 2021-06-22 NOTE — PROGRESS NOTES
Problem: Risk for Spread of Infection  Goal: Prevent transmission of infectious organism to others  Description: Prevent the transmission of infectious organisms to other patients, staff members, and visitors. Outcome: Progressing Towards Goal     Problem: Patient Education:  Go to Education Activity  Goal: Patient/Family Education  Outcome: Progressing Towards Goal     Problem: Pressure Injury - Risk of  Goal: *Prevention of pressure injury  Description: Document Nicko Scale and appropriate interventions in the flowsheet. Outcome: Progressing Towards Goal  Note: Pressure Injury Interventions:  Sensory Interventions: Assess changes in LOC, Float heels, Keep linens dry and wrinkle-free, Maintain/enhance activity level, Minimize linen layers, Monitor skin under medical devices, Pressure redistribution bed/mattress (bed type)    Moisture Interventions: Absorbent underpads, Apply protective barrier, creams and emollients, Check for incontinence Q2 hours and as needed    Activity Interventions: Increase time out of bed, Pressure redistribution bed/mattress(bed type)    Mobility Interventions: Float heels, HOB 30 degrees or less, Pressure redistribution bed/mattress (bed type), Turn and reposition approx. every two hours(pillow and wedges)    Nutrition Interventions: Document food/fluid/supplement intake, Offer support with meals,snacks and hydration    Friction and Shear Interventions: Apply protective barrier, creams and emollients, Feet elevated on foot rest, HOB 30 degrees or less, Lift sheet, Lift team/patient mobility team                Problem: Patient Education: Go to Patient Education Activity  Goal: Patient/Family Education  Outcome: Progressing Towards Goal     Problem: Pain  Goal: *Control of Pain  Outcome: Progressing Towards Goal     Problem: Falls - Risk of  Goal: *Absence of Falls  Description: Document [place] Fall Risk and appropriate interventions in the flowsheet.   Outcome: Progressing Towards Goal  Note: Fall Risk Interventions:  Mobility Interventions: Assess mobility with egress test, Patient to call before getting OOB, Communicate number of staff needed for ambulation/transfer, Bed/chair exit alarm    Mentation Interventions: Bed/chair exit alarm, Door open when patient unattended, Eyeglasses and hearing aids, Room close to nurse's station, Toileting rounds, Update white board    Medication Interventions: Patient to call before getting OOB, Teach patient to arise slowly    Elimination Interventions: Bed/chair exit alarm, Call light in reach, Stay With Me (per policy), Toilet paper/wipes in reach, Urinal in reach, Patient to call for help with toileting needs    History of Falls Interventions: Door open when patient unattended, Room close to nurse's station         Problem: Patient Education: Go to Patient Education Activity  Goal: Patient/Family Education  Outcome: Progressing Towards Goal

## 2021-06-22 NOTE — PROGRESS NOTES
Reason for Admission:  Mesenteric abscess ([facility] [address] [K65.1]                 RUR Score:    21%            Plan for utilizing home health:    Agreeable, wife currently reviewing home health agencies                      Likelihood of Readmission:   Moderate                         Do you (patient/family) have any concerns for transition/discharge?  no    Transition of Care Plan:       Initial assessment completed with patient. Cognitive status of patient: oriented to time, place, person and situation. Face sheet information confirmed:  yes. The patient designates his wife to participate in his discharge plan and to receive any needed information. This patient lives in a single family home with his wife. Patient is able to navigate steps as needed. Prior to hospitalization, patient was considered to be independent with ADLs/IADLS : no . If not independent,  patient needs assist with : dressing, bathing, food preparation, cooking, toileting and grooming    Patient has a current ACP document on file: yes      Healthcare Decision Maker:     Click here to complete [address] including selection of the Healthcare Decision Maker Relationship (ie \"Primary\")    The wife will be available to transport patient home upon discharge. The patient already has no medical equipment available in the home. Patient is not currently active with home health. Patient has not stayed in a skilled nursing facility or rehab. This patient is on dialysis :no    List of available Home Health agencies were provided and reviewed with the patient prior to discharge. Freedom of choice signed: yes. Currently, the discharge plan is Home with [address] [place] [address] Elvin Francois. Pt's wife is reviewing the list of home health agencies. The patient states that he can obtain his medications from the pharmacy, and take his medications as directed. Patient's current insurance is [employer] and [address] [place] [address].       Care Management Interventions  PCP Verified by CM: Yes  Mode of Transport at Discharge:  Other (see comment) (wife can take home)  Transition of Care Consult (CM Consult): Discharge Planning  Physical Therapy Consult: No  Occupational Therapy Consult: No  Current Support Network: Lives with Spouse  Confirm Follow Up Transport: Family  Discharge Location  Discharge Placement: Home with home health (Pt agreeable to Home health pt was scheduled to d/c from [facility] today with plans for home, he states that his wife will decide on the agency)        [address]  [phone]

## 2021-06-22 NOTE — PROGRESS NOTES
Hospitalist Progress Note    Patient: Shelbie Mendes Age: 80 y.o. : 1940 MR#: [id number] SSN: [id number]  Date/Time: 2021 11:33 AM    DOA: 2021  PCP: Osvaldo Riley MD    Subjective:     Feels better today. No report of dizziness but he has not got out of bed  Bradycardia resolved   No fever, Leukocytosis slightly decrease   Na improved with IV fluid  Cardiac enzyme unchanged   His Eliquis has been held. IR consult for further option of abscess drainage     Interval Hospital Course:  80 y.o male with HTN, CAD with CABG, persistent AFIB, COPD, PAD, h/o embolic CVA, anemia, presented from ARU for unresponsive while on toilet. RRT called to find hypotension and bradycardia required atropine. In the ER, he was found tachycardia, stomach pain, recent CT with possible mesenteric abscess. He was started on IV zosyn for sepsis concern. ROS: No current fever/chills, no headache, no dizziness, no sinus congestion,   No swallowing pain, No chest pain, no palpitation, no shortness of breath, + abd pain,  No diarrhea, no urinary complaint, no leg pain or swelling      Assessment/Plan:     1. 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 Sepsis poa (leukocytosis+tachycardia+abscess)   3. Symptomatic bradycardia, transient, resolved   4. Syncope and collapse   5. Hypotension, hypovolemia   6. CAD with CABG, recent NSTEMI s/p stent placement  7. Persistent AFIB   8. PAD   9. COPD   10. Recent embolic CVA   11.  Leukocytosis   12. Hyponatremia  13. Normocytic anemia     Continue with Zosyn. Blood culture follow up  IR consulted for further care, possible need IR drainage if able to be off anticoagulation  Colorectal surgery follows   Continue with IV hydration for now. Nephrology follows. Add albumin   Continue his cardiac medications, Plavix needed, statin  Cardiology follows.    Continue bronchodilator, budesonide, arformoterol   ICS  PT/OT   PPI    Full code      Additional Notes:    Time spent >35 minutes    Case discussed with:  [x]Patient  []Family  [x]Nursing  [x]Case Management  DVT Prophylaxis:  []Lovenox  [x]eliquis  []SCDs  []Coumadin   []On Heparin gtt    Signed By: Prince Jose Manuel MD     2021 11:33 AM              Objective:   VS:   Visit Vitals  /78   Pulse 89   Temp 97.6 °F (36.4 °C)   Resp 10   Ht 5' 8.11\" (1.73 m)   Wt 76.5 kg (168 lb 11.2 oz)   SpO2 100%   BMI 25.57 kg/m²      Tmax/24hrs: Temp (24hrs), Av.4 °F (36.3 °C), Min:97.1 °F (36.2 °C), Max:97.9 °F (36.6 °C)      Intake/Output Summary (Last 24 hours) at 2021 1133  Last data filed at 2021 1131  Gross per 24 hour   Intake 473 ml   Output 835 ml   Net -362 ml       Tele:   General:  Cooperative, Not in acute distress, speaks in full sentence while in bed  HEENT: PERRL, EOMI, supple neck, no JVD, dry oral mucosa  Cardiovascular: S1S2 irregular, no rub/gallop   Pulmonary:  air entry bilaterally, no wheezing, no crackle  GI:  Soft, non tender, non distended, +bs, no guarding   Extremities:  No pedal edema, +distal pulses appreciated   Neuro: AOx3, moving all extremities, no gross deficit.      Additional:       Current Facility-Administered Medications   Medication Dose Route Frequency    atropine 1 mg/mL injection 0.5 mg  0.5 mg IntraVENous ONCE PRN    sodium chloride (NS) flush 5-40 mL  5-40 mL IntraVENous Q8H    sodium chloride (NS) flush 5-40 mL  5-40 mL IntraVENous PRN    acetaminophen (TYLENOL) tablet 650 mg  650 mg Oral Q6H PRN    Or    acetaminophen (TYLENOL) suppository 650 mg  650 mg Rectal Q6H PRN    ondansetron (ZOFRAN ODT) tablet 4 mg  4 mg Oral Q8H PRN    Or    ondansetron (ZOFRAN) injection 4 mg  4 mg IntraVENous Q6H PRN    piperacillin-tazobactam (ZOSYN) 3.375 g in 0.9% sodium chloride (MBP/ADV) 100 mL MBP  3.375 g IntraVENous Q6H    0.9% sodium chloride infusion  75 mL/hr IntraVENous CONTINUOUS    albuterol (PROVENTIL VENTOLIN) nebulizer solution 2.5 mg  2.5 mg Nebulization Q4H PRN    clopidogreL (PLAVIX) tablet 75 mg  75 mg Oral DAILY WITH BREAKFAST    gabapentin (NEURONTIN) capsule 100 mg  100 mg Oral TID    multivitamin, tx-iron-ca-min (THERA-M w/ IRON) tablet 1 Tablet  1 Tablet Oral DAILY    rosuvastatin (CRESTOR) tablet 10 mg  10 mg Oral DAILY    tamsulosin (FLOMAX) capsule 0.4 mg  0.4 mg Oral PCD    L. acidophilus,casei,rhamnosus (BIO-K PLUS) capsule 1 Capsule  1 Capsule Oral DAILY    ipratropium (ATROVENT) 0.02 % nebulizer solution 0.5 mg  0.5 mg Nebulization TID            Lab/Data Review:  Labs: Results:       Chemistry Recent Labs     06/22/21  0409 06/21/21  1310 06/21/21  0330   GLU 81 121* 145*   * 125* 126*   K 4.3 4.6 4.5   CL 95* 92* 91*   CO2 28 26 24   BUN 18 23* 25*   CREA 0.77 0.91 0.89   BUCR 23* 25* 28*   AGAP 5 7 11   CA 9.0 8.6 8.3*   PHOS 3.4  --   --      Recent Labs     06/21/21  1310 06/21/21  0330 06/20/21  0550   ALT 17 18 16   TP 6.6 6.0* 6.2*   ALB 2.9* 3.1* 3.2*   GLOB 3.7 2.9 3.0   AGRAT 0.8 1.1 1.1      CBC w/Diff Recent Labs     06/22/21  0409 06/21/21  1310 06/21/21  0330   WBC 24.7* 26.0* 26.3*   RBC 3.13* 3.22* 3.07*   HGB 9.1* 9.4* 9.1*   HCT 29.1* 29.8* 27.5*   MCV 93.0 92.5 89.6   MCH 29.1 29.2 29.6   MCHC 31.3 31.5 33.1   RDW 17.1* 16.9* 16.6*    235 220   GRANS 91* 92* 87*   LYMPH 2* 4* 8*   EOS 2 0 0      Coagulation No results for input(s): PTP, INR, APTT, INREXT in the last 72 hours. Iron/Ferritin No results found for: IRON, FE, TIBC, IBCT, PSAT, FERR    BNP    Cardiac Enzymes Lab Results   Component Value Date/Time    CK 78 06/22/2021 04:09 AM    CK - MB 2.3 06/22/2021 04:09 AM    CK-MB Index 2.9 06/22/2021 04:09 AM    Troponin-I, QT 0.07 (H) 06/22/2021 04:09 AM        Lactic Acid    Thyroid Studies          All Micro Results     None            Images:    CT (Most Recent).  CT Results (most recent):  Results from [facility] encounter on 06/07/21    CT CHEST ABD PELV W CONT    Narrative  CT CHEST, ABDOMEN AND PELVIS WITH ENHANCEMENT    INDICATION: Leukocytosis. Abdominal pain. Myocardial infarction. TECHNIQUE: Axial images obtained of the chest, abdomen and pelvis following the  uneventful administration of 100 cc Isovue-300 nonionic intravenous contrast.  Coronal and sagittal reformatted images obtained. All CT scans are performed  using dose optimization techniques as appropriate to the performed exam  including the following: Automated exposure control, adjustment of mA and/or kV  according to patient size, and use of iterative reconstructive technique. COMPARISON: CTA chest 11/14/2015, CT abdomen and pelvis 6/16/2014. CHEST FINDINGS:  Lung: Emphysema. Pleura: Unremarkable. Heart: No effusion. CABG. Cardiomegaly. Vessels: No aortic aneurysm. Scattered wall calcifications. Lymph Nodes: Unremarkable. Thyroid: Stable to prior. The right gland is larger than the left and could  indicate an underlying nodule. Bones: Sternotomy. Lower thoracic degenerative disc disease. ABDOMEN AND PELVIS FINDINGS:  Liver: Unremarkable. Biliary: Unremarkable. Spleen: Unremarkable. Pancreas: Unremarkable. Adrenal Glands: Stable chronic mild nodular thickening of the left gland since  2014. Kidneys: Chronic left renal cortical scarring. Subcentimeter right renal cyst.    Bowel: Normal appendix. Diverticulosis. Thickened and collapsed segment of mid  sigmoid colon has an appearance similar to prior 2014 exam. There is mild  proximal sigmoid colon distention suggesting a stricture in this region. Positioned between this thickened segment of colon and a thickened inflamed loop  of small bowel is a mesenteric gas and fluid containing rim enhanced collection  measuring 3.5 x 2.3 cm.  At least 2 additional small phlegmonous developing  collections are seen closely along serosal mesenteric margin of the affected  small bowel loop best seen on sagittal image 31 and 38. Dilated fluid-filled small bowel loops transitioning at this abnormally  thickened small bowel loop however the bowel appears patent and gas and fluid  containing on both ends of this process suggesting a superimposed ileus  contributing to the picture. Bladder/ Pelvic Organs: Mild prostate enlargement. Mild thickening of the  bladder dome wall, likely reactive to adjacent mesenteric/small bowel process. Peritoneum/ Retroperitoneum: No free air. Trace free fluid. Mesenteric  inflammation in the pelvis as discussed above. Lymph Nodes: Borderline enlarged 9-10 mm mesenteric lymph nodes may be reactive. Vessels: Arteriosclerosis. Renal artery stents. Bones/Soft tissues: Mild chronic fatty left inguinal hernia is similar to prior. Degenerative disc disease throughout the lumbar spine most advanced at L2-L3. Right greater than left hip degenerative joint disease. Impression  1. No acute findings in the chest.  -Emphysema. -CABG. -Stable mildly enlarged right thyroid gland which could indicate underlying  nodule, stable for many years. 2. 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 It is unclear if initial source of abscess is from  small bowel inflammation or diverticulitis/pericolonic abscess. 3. Dilated small bowel. This is likely a combination of generalized small bowel  ileus and low-grade partial small bowel obstruction at site of abnormal pelvic  small bowel loop discussed above. 4. Diverticulosis. Stable thickened collapsed segment of sigmoid colon since  2014 suggesting a chronic stricture. This is limited for assessment by CT. 5. Bladder dome inflammation is likely secondary to the adjacent mesentery  process. 6. Other chronic incidental findings.     Findings were discussed with emergency department physician on 6/21/2021 at 1:55  PM.         XRAY (Most Recent)      EKG No results found for this or any previous visit.      2D ECHO

## 2021-06-23 NOTE — ROUTINE PROCESS
Bedside and Verbal shift change report given to Marilyn Cain RN (oncoming nurse) by Perla Mims RN, BSN (offgoing nurse). Report included the following information SBAR, Kardex, ED Summary, Procedure Summary, Intake/Output, MAR, Recent Results and Cardiac Rhythm A Fib.     Perla Mims RN, BSN

## 2021-06-23 NOTE — PROGRESS NOTES
RENAL DAILY PROGRESS NOTE    Patient: Carlos Mason               Sex: male          DOA: 6/21/2021  1:28 PM        YOB: 1940      Age:  80 y.o.        LOS:  LOS: 2 days     Subjective:     Carlos Mason is a 80 y.o.  who presents with Mesenteric abscess ([facility] [address] [K65.1]. Asked to evaluate for hyponatremia,had syncopal episodes. hx of mi,mesenteric abscess,copd  Chief complains: Patient denies nausea, vomiting, chest pain, dizziness, shortness of breath or headache.  - Reviewed last 24 hrs events     Current Facility-Administered Medications   Medication Dose Route Frequency    carvediloL (COREG) tablet 12.5 mg  12.5 mg Oral BID WITH MEALS    budesonide (PULMICORT) 500 mcg/2 ml nebulizer suspension  500 mcg Nebulization BID RT    apixaban (ELIQUIS) tablet 5 mg  5 mg Oral BID    arformoteroL (BROVANA) neb solution 15 mcg  15 mcg Nebulization BID RT    sodium chloride (NS) flush 5-40 mL  5-40 mL IntraVENous Q8H    sodium chloride (NS) flush 5-40 mL  5-40 mL IntraVENous PRN    acetaminophen (TYLENOL) tablet 650 mg  650 mg Oral Q6H PRN    Or    acetaminophen (TYLENOL) suppository 650 mg  650 mg Rectal Q6H PRN    ondansetron (ZOFRAN ODT) tablet 4 mg  4 mg Oral Q8H PRN    Or    ondansetron (ZOFRAN) injection 4 mg  4 mg IntraVENous Q6H PRN    piperacillin-tazobactam (ZOSYN) 3.375 g in 0.9% sodium chloride (MBP/ADV) 100 mL MBP  3.375 g IntraVENous Q6H    albuterol (PROVENTIL VENTOLIN) nebulizer solution 2.5 mg  2.5 mg Nebulization Q4H PRN    clopidogreL (PLAVIX) tablet 75 mg  75 mg Oral DAILY WITH BREAKFAST    gabapentin (NEURONTIN) capsule 100 mg  100 mg Oral TID    multivitamin, tx-iron-ca-min (THERA-M w/ IRON) tablet 1 Tablet  1 Tablet Oral DAILY    rosuvastatin (CRESTOR) tablet 10 mg  10 mg Oral DAILY    tamsulosin (FLOMAX) capsule 0.4 mg  0.4 mg Oral PCD    L. acidophilus,casei,rhamnosus (BIO-K PLUS) capsule 1 Capsule  1 Capsule Oral DAILY    ipratropium (ATROVENT) 0.02 % nebulizer solution 0.5 mg  0.5 mg Nebulization TID       Objective:     Visit Vitals  BP (!) 150/94   Pulse (!) 102   Temp 98 °F (36.7 °C)   Resp 18   Ht 5' 8.11\" (1.73 m)   Wt 78.6 kg (173 lb 3.2 oz)   SpO2 100%   BMI 26.25 kg/m²       Intake/Output Summary (Last 24 hours) at 6/23/2021 1333  Last data filed at 6/23/2021 1228  Gross per 24 hour   Intake 1770 ml   Output 1575 ml   Net 195 ml       Physical Examination:       RS: Chest is bilateral equal,   CVS: S1-S2 heard,   Abdomen: Soft,   Extremities: No edema  CNS: Awake & follows commands,  HEENT: Head is atraumatic, PERRLA, conjunctiva pink & non icteric. No JVD or carotid bruit       Data Review:      Labs:     Hematology:   Recent Labs     06/23/21  0538 06/22/21  0409 06/21/21  1310 06/21/21  0330   WBC 22.1* 24.7* 26.0* 26.3*   HGB 8.5* 9.1* 9.4* 9.1*   HCT 28.2* 29.1* 29.8* 27.5*     Chemistry:   Recent Labs     06/23/21  0538 06/22/21  0409 06/21/21  1310 06/21/21  0330   BUN 13 18 23* 25*   CREA 0.71 0.77 0.91 0.89   CA 8.9 9.0 8.6 8.3*   ALB 3.1*  --  2.9* 3.1*   K 4.3 4.3 4.6 4.5   * 128* 125* 126*    95* 92* 91*   CO2 28 28 26 24   PHOS 2.4* 3.4  --   --    GLU 76 81 121* 145*        Images:    XR (Most Recent). CXR reviewed by me and compared with previous CXR Results from Hospital Encounter encounter on 06/07/21    XR CHEST PORT    Narrative  EXAM: XR CHEST PORT    INDICATION: RRT    COMPARISON: 11/25/2015    FINDINGS: A portable AP radiograph of the chest was obtained at 0 535 hours. Status post median sternotomy and clips over the mediastinum, stable. . Low lung  volumes. . Question streaky opacities in the lung bases. Question small right  pleural opacity. .  Enlarged cardiac silhouette. Osseous degenerative changes. Clips noted overlying the left neck. .    Impression  Enlarged cardiac silhouette. Low lung volumes with streaky bibasilar densities. Please see report for additional details.        CT (Most Recent) Results from Hospital Encounter encounter on 06/07/21    CT CHEST ABD PELV W CONT    Narrative  CT CHEST, ABDOMEN AND PELVIS WITH ENHANCEMENT    INDICATION: Leukocytosis. Abdominal pain. Myocardial infarction. TECHNIQUE: Axial images obtained of the chest, abdomen and pelvis following the  uneventful administration of 100 cc Isovue-300 nonionic intravenous contrast.  Coronal and sagittal reformatted images obtained. All CT scans are performed  using dose optimization techniques as appropriate to the performed exam  including the following: Automated exposure control, adjustment of mA and/or kV  according to patient size, and use of iterative reconstructive technique. COMPARISON: CTA chest 11/14/2015, CT abdomen and pelvis 6/16/2014. CHEST FINDINGS:  Lung: Emphysema. Pleura: Unremarkable. Heart: No effusion. CABG. Cardiomegaly. Vessels: No aortic aneurysm. Scattered wall calcifications. Lymph Nodes: Unremarkable. Thyroid: Stable to prior. The right gland is larger than the left and could  indicate an underlying nodule. Bones: Sternotomy. Lower thoracic degenerative disc disease. ABDOMEN AND PELVIS FINDINGS:  Liver: Unremarkable. Biliary: Unremarkable. Spleen: Unremarkable. Pancreas: Unremarkable. Adrenal Glands: Stable chronic mild nodular thickening of the left gland since  2014. Kidneys: Chronic left renal cortical scarring. Subcentimeter right renal cyst.    Bowel: Normal appendix. Diverticulosis. Thickened and collapsed segment of mid  sigmoid colon has an appearance similar to prior 2014 exam. There is mild  proximal sigmoid colon distention suggesting a stricture in this region. Positioned between this thickened segment of colon and a thickened inflamed loop  of small bowel is a mesenteric gas and fluid containing rim enhanced collection  measuring 3.5 x 2.3 cm.  At least 2 additional small phlegmonous developing  collections are seen closely along serosal mesenteric margin of the affected  small bowel loop best seen on sagittal image 31 and 38. Dilated fluid-filled small bowel loops transitioning at this abnormally  thickened small bowel loop however the bowel appears patent and gas and fluid  containing on both ends of this process suggesting a superimposed ileus  contributing to the picture. Bladder/ Pelvic Organs: Mild prostate enlargement. Mild thickening of the  bladder dome wall, likely reactive to adjacent mesenteric/small bowel process. Peritoneum/ Retroperitoneum: No free air. Trace free fluid. Mesenteric  inflammation in the pelvis as discussed above. Lymph Nodes: Borderline enlarged 9-10 mm mesenteric lymph nodes may be reactive. Vessels: Arteriosclerosis. Renal artery stents. Bones/Soft tissues: Mild chronic fatty left inguinal hernia is similar to prior. Degenerative disc disease throughout the lumbar spine most advanced at L2-L3. Right greater than left hip degenerative joint disease. Impression  1. No acute findings in the chest.  -Emphysema. -CABG. -Stable mildly enlarged right thyroid gland which could indicate underlying  nodule, stable for many years. 2. 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 It is unclear if initial source of abscess is from  small bowel inflammation or diverticulitis/pericolonic abscess. 3. Dilated small bowel. This is likely a combination of generalized small bowel  ileus and low-grade partial small bowel obstruction at site of abnormal pelvic  small bowel loop discussed above. 4. Diverticulosis. Stable thickened collapsed segment of sigmoid colon since  2014 suggesting a chronic stricture. This is limited for assessment by CT. 5. Bladder dome inflammation is likely secondary to the adjacent mesentery  process. 6. Other chronic incidental findings.     Findings were discussed with emergency department physician on 6/21/2021 at 1:55  PM.       EKG No results found for this or any previous visit. I have personally reviewed the old medical records and patient's labs    Plan / Recommendation:      1.  Acute/chronic hyponatremia,very low urine sodium c/w pre renal etiology,improving on normal saline  2.cad,cabg,no signs of chf on exam  3.mesenteric abscess    D/w Dr. Juliette Kirkpatrick MD  Nephrology  6/23/2021

## 2021-06-23 NOTE — PROGRESS NOTES
Hospitalist Progress Note    Patient: Carlos Mason Age: 80 y.o. : 1940 MR#: [id number] SSN: [id number]  Date/Time: 2021 11:06 AM    DOA: 2021  PCP: Skinny Gloria MD    Subjective:     He feels upset that he could not get up and wash himself. No fever. Leukocytosis persist  Has afib on telemetry   Na improved with IV fluid  Tolerate zosyn,   Still on liquid diet     Interval Hospital Course:  80 y.o male with HTN, CAD with CABG, persistent AFIB, COPD, PAD, h/o embolic CVA, anemia, presented from ARU for unresponsive while on toilet. RRT called to find hypotension and bradycardia required atropine. In the ER, he was found tachycardia, stomach pain, recent CT with possible mesenteric abscess. He was started on IV zosyn for sepsis concern. ROS: No current fever/chills, no headache, no dizziness, no sinus congestion,   No swallowing pain, No chest pain, no palpitation, no shortness of breath, + abd pain,  No diarrhea, no urinary complaint, no leg pain or swelling      Assessment/Plan:     1. 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 Sepsis poa (leukocytosis+tachycardia+abscess)   3. Symptomatic bradycardia, transient, resolved   4. Syncope and collapse   5. Hypotension, hypovolemia   6. CAD with CABG, recent NSTEMI s/p stent placement  7. Persistent AFIB   8. PAD   9. COPD   10. Recent embolic CVA   11.  Leukocytosis   12. Hyponatremia  13. Normocytic anemia     Cont with Zosyn. Blood culture follow up  ID follows, surgery follows, no intervention at this time due to his cardiac risk and the need to be on eliquis and plavix   Appreciate IR consult   Continue with IV hydration for now. Nephrology follows. + albumin   Continue his cardiac medications, Plavix needed, statin  Cardiology follows.    Continue bronchodilator, budesonide, arformoterol   ICS  PT/OT   PPI    Full code  Called his wife for updates but no answer       Additional Notes:    Time spent >30 minutes    Case discussed with:  [x]Patient  []Family  [x]Nursing  [x]Case Management  DVT Prophylaxis:  []Lovenox  [x]eliquis  []SCDs  []Coumadin   []On Heparin gtt    Signed By: Alex Mehta MD     2021 11:06 AM              Objective:   VS:   Visit Vitals  BP (!) 150/94   Pulse 100   Temp 97.5 °F (36.4 °C)   Resp 15   Ht 5' 8.11\" (1.73 m)   Wt 78.6 kg (173 lb 3.2 oz)   SpO2 99%   BMI 26.25 kg/m²      Tmax/24hrs: Temp (24hrs), Av.8 °F (36.6 °C), Min:96.9 °F (36.1 °C), Max:98.4 °F (36.9 °C)      Intake/Output Summary (Last 24 hours) at 2021 1106  Last data filed at 2021 0734  Gross per 24 hour   Intake 1770 ml   Output 1975 ml   Net -205 ml       Tele:   General:  Cooperative, Not in acute distress, speaks in full sentence while in bed  HEENT: PERRL, EOMI, supple neck, no JVD, dry oral mucosa  Cardiovascular: S1S2 irregular, no rub/gallop   Pulmonary:  air entry bilaterally, no wheezing, no crackle  GI:  Soft, non tender, non distended, +bs, no guarding   Extremities:  No pedal edema, +distal pulses appreciated   Neuro: AOx3, moving all extremities, no gross deficit.      Additional:       Current Facility-Administered Medications   Medication Dose Route Frequency    budesonide (PULMICORT) 500 mcg/2 ml nebulizer suspension  500 mcg Nebulization BID RT    apixaban (ELIQUIS) tablet 5 mg  5 mg Oral BID    arformoteroL (BROVANA) neb solution 15 mcg  15 mcg Nebulization BID RT    sodium chloride (NS) flush 5-40 mL  5-40 mL IntraVENous Q8H    sodium chloride (NS) flush 5-40 mL  5-40 mL IntraVENous PRN    acetaminophen (TYLENOL) tablet 650 mg  650 mg Oral Q6H PRN    Or    acetaminophen (TYLENOL) suppository 650 mg  650 mg Rectal Q6H PRN    ondansetron (ZOFRAN ODT) tablet 4 mg  4 mg Oral Q8H PRN    Or    ondansetron (ZOFRAN) injection 4 mg  4 mg IntraVENous Q6H PRN    piperacillin-tazobactam (ZOSYN) 3.375 g in 0.9% sodium chloride (MBP/ADV) 100 mL MBP  3.375 g IntraVENous Q6H    albuterol (PROVENTIL VENTOLIN) nebulizer solution 2.5 mg  2.5 mg Nebulization Q4H PRN    clopidogreL (PLAVIX) tablet 75 mg  75 mg Oral DAILY WITH BREAKFAST    gabapentin (NEURONTIN) capsule 100 mg  100 mg Oral TID    multivitamin, tx-iron-ca-min (THERA-M w/ IRON) tablet 1 Tablet  1 Tablet Oral DAILY    rosuvastatin (CRESTOR) tablet 10 mg  10 mg Oral DAILY    tamsulosin (FLOMAX) capsule 0.4 mg  0.4 mg Oral PCD    L. acidophilus,casei,rhamnosus (BIO-K PLUS) capsule 1 Capsule  1 Capsule Oral DAILY    ipratropium (ATROVENT) 0.02 % nebulizer solution 0.5 mg  0.5 mg Nebulization TID            Lab/Data Review:  Labs: Results:       Chemistry Recent Labs     06/23/21  0538 06/22/21  0409 06/21/21  1310   GLU 76 81 121*   * 128* 125*   K 4.3 4.3 4.6    95* 92*   CO2 28 28 26   BUN 13 18 23*   CREA 0.71 0.77 0.91   BUCR 18 23* 25*   AGAP 4 5 7   CA 8.9 9.0 8.6   PHOS 2.4* 3.4  --      Recent Labs     06/23/21  0538 06/21/21  1310 06/21/21  0330   ALT 15* 17 18   TP 6.4 6.6 6.0*   ALB 3.1* 2.9* 3.1*   GLOB 3.3 3.7 2.9   AGRAT 0.9 0.8 1.1      CBC w/Diff Recent Labs     06/23/21  0538 06/22/21  0409 06/22/21  0409 06/21/21  1310 06/21/21  1310 06/21/21  0330 06/21/21  0330   WBC 22.1*  --  24.7*  --  26.0*   < > 26.3*   RBC 2.94*  --  3.13*  --  3.22*   < > 3.07*   HGB 8.5*  --  9.1*  --  9.4*   < > 9.1*   HCT 28.2*  --  29.1*  --  29.8*   < > 27.5*   MCV 95.9   < > 93.0   < > 92.5   < > 89.6   MCH 28.9   < > 29.1   < > 29.2   < > 29.6   MCHC 30.1*   < > 31.3   < > 31.5   < > 33.1   RDW 17.2*   < > 17.1*   < > 16.9*   < > 16.6*     --  241  --  235   < > 220   GRANS  --   --  91*  --  92*  --  87*   LYMPH  --   --  2*  --  4*  --  8*   EOS  --   --  2  --  0  --  0    < > = values in this interval not displayed.       Coagulation Recent Labs     06/23/21  0538   PTP 20.3*   INR 1.8*   APTT 49.2*       Iron/Ferritin No results found for: IRON, FE, TIBC, IBCT, PSAT, FERR    BNP    Cardiac Enzymes Lab Results   Component Value Date/Time    CK 78 06/22/2021 04:09 AM    CK - MB 2.3 06/22/2021 04:09 AM    CK-MB Index 2.9 06/22/2021 04:09 AM    Troponin-I, QT 0.07 (H) 06/22/2021 04:09 AM        Lactic Acid    Thyroid Studies          All Micro Results     None            Images:    CT (Most Recent). CT Results (most recent):  Results from Hospital Encounter encounter on 06/07/21    CT CHEST ABD PELV W CONT    Narrative  CT CHEST, ABDOMEN AND PELVIS WITH ENHANCEMENT    INDICATION: Leukocytosis. Abdominal pain. Myocardial infarction. TECHNIQUE: Axial images obtained of the chest, abdomen and pelvis following the  uneventful administration of 100 cc Isovue-300 nonionic intravenous contrast.  Coronal and sagittal reformatted images obtained. All CT scans are performed  using dose optimization techniques as appropriate to the performed exam  including the following: Automated exposure control, adjustment of mA and/or kV  according to patient size, and use of iterative reconstructive technique. COMPARISON: CTA chest 11/14/2015, CT abdomen and pelvis 6/16/2014. CHEST FINDINGS:  Lung: Emphysema. Pleura: Unremarkable. Heart: No effusion. CABG. Cardiomegaly. Vessels: No aortic aneurysm. Scattered wall calcifications. Lymph Nodes: Unremarkable. Thyroid: Stable to prior. The right gland is larger than the left and could  indicate an underlying nodule. Bones: Sternotomy. Lower thoracic degenerative disc disease. ABDOMEN AND PELVIS FINDINGS:  Liver: Unremarkable. Biliary: Unremarkable. Spleen: Unremarkable. Pancreas: Unremarkable. Adrenal Glands: Stable chronic mild nodular thickening of the left gland since  2014. Kidneys: Chronic left renal cortical scarring. Subcentimeter right renal cyst.    Bowel: Normal appendix. Diverticulosis.  Thickened and collapsed segment of mid  sigmoid colon has an appearance similar to prior 2014 exam. There is mild  proximal sigmoid colon distention suggesting a stricture in this region. Positioned between this thickened segment of colon and a thickened inflamed loop  of small bowel is a mesenteric gas and fluid containing rim enhanced collection  measuring 3.5 x 2.3 cm. At least 2 additional small phlegmonous developing  collections are seen closely along serosal mesenteric margin of the affected  small bowel loop best seen on sagittal image 31 and 38. Dilated fluid-filled small bowel loops transitioning at this abnormally  thickened small bowel loop however the bowel appears patent and gas and fluid  containing on both ends of this process suggesting a superimposed ileus  contributing to the picture. Bladder/ Pelvic Organs: Mild prostate enlargement. Mild thickening of the  bladder dome wall, likely reactive to adjacent mesenteric/small bowel process. Peritoneum/ Retroperitoneum: No free air. Trace free fluid. Mesenteric  inflammation in the pelvis as discussed above. Lymph Nodes: Borderline enlarged 9-10 mm mesenteric lymph nodes may be reactive. Vessels: Arteriosclerosis. Renal artery stents. Bones/Soft tissues: Mild chronic fatty left inguinal hernia is similar to prior. Degenerative disc disease throughout the lumbar spine most advanced at L2-L3. Right greater than left hip degenerative joint disease. Impression  1. No acute findings in the chest.  -Emphysema. -CABG. -Stable mildly enlarged right thyroid gland which could indicate underlying  nodule, stable for many years. 2. 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 It is unclear if initial source of abscess is from  small bowel inflammation or diverticulitis/pericolonic abscess. 3. Dilated small bowel.  This is likely a combination of generalized small bowel  ileus and low-grade partial small bowel obstruction at site of abnormal pelvic  small bowel loop discussed above. 4. Diverticulosis. Stable thickened collapsed segment of sigmoid colon since  2014 suggesting a chronic stricture. This is limited for assessment by CT. 5. Bladder dome inflammation is likely secondary to the adjacent mesentery  process. 6. Other chronic incidental findings. Findings were discussed with emergency department physician on 6/21/2021 at 1:55  PM.         XRAY (Most Recent)      EKG No results found for this or any previous visit.      2D ECHO

## 2021-06-23 NOTE — PROGRESS NOTES
Problem: Risk for Spread of Infection  Goal: Prevent transmission of infectious organism to others  Description: Prevent the transmission of infectious organisms to other patients, staff members, and visitors. Outcome: Progressing Towards Goal     Problem: Patient Education:  Go to Education Activity  Goal: Patient/Family Education  Outcome: Progressing Towards Goal     Problem: Pressure Injury - Risk of  Goal: *Prevention of pressure injury  Description: Document Nicko Scale and appropriate interventions in the flowsheet.   Outcome: Progressing Towards Goal  Note: Pressure Injury Interventions:  Sensory Interventions: Float heels, Keep linens dry and wrinkle-free, Maintain/enhance activity level, Minimize linen layers, Monitor skin under medical devices    Moisture Interventions: Absorbent underpads    Activity Interventions: Pressure redistribution bed/mattress(bed type)    Mobility Interventions: Float heels    Nutrition Interventions: Offer support with meals,snacks and hydration    Friction and Shear Interventions: Apply protective barrier, creams and emollients                Problem: Patient Education: Go to Patient Education Activity  Goal: Patient/Family Education  Outcome: Progressing Towards Goal     Problem: Pain  Goal: *Control of Pain  Outcome: Progressing Towards Goal     Problem: Patient Education: Go to Patient Education Activity  Goal: Patient/Family Education  Outcome: Progressing Towards Goal

## 2021-06-23 NOTE — PROGRESS NOTES
Infectious Disease progress Note        Reason: Leukocytosis, evaluate for sepsis    Current abx Prior abx   Zosyn since 6/21/21 Ceftriaxone, intravenous metronidazole, oral vancomycin, IV vancomycin 6/20-6/21     Lines:       Assessment :    57-year-old man with past medical history of diastolic CHF, coronary artery disease status post CABG, history of lung cancer status post pneumonectomy, hypertension admitted to [facility] acute rehab on 6/7/2021. Recent hospitalization at [facility] 5/17-6/7 for non stemI s/p CABG  Post op course complicated by profound unexplained leukocytosis. Wbc:37 k on 6/7/21    Now with leukocytosis, diarrhea    Clinical presentation consistent with mesenteric abscess, acute on chronic sigmoid diverticulitis    CT without contrast 5/22 at [facility] revealed evidence of diverticular disease   Recent history of profound unexplained leukocytosis during stay at [facility]. Wbc:37 k on 6/7/21 could be due to flare up of diverticultis, diverticular perforation. Worsening leukocytosis since 6/20 likely due to recurrent diverticulitis. RLQ pain/tenderness on prior exam likely due to this. Colorectal surgery f/u appreciated. Plans for possible IR guided drainage noted. Elevated bilirubin 2.2 on 6/20/21- normal transaminases-likely due to sepsis. No evidence of undiagnosed hepatobiliary pathology noted on CT scan 6/21/2021    Syncope 6/21- likely vasovagal.  Cardiology follow-up appreciated    Subjective sense of improvement. Hemodynamically stable on today's exam    Recommendations:    1. Recommend piperacillin/tazobactam  2. Follow-up IR recommendations regarding drainage of mesenteric abscess  3. Follow-up colorectal surgery recommendations   4.  recommend palliative care consult to address goals of care since patient is at high risk of recurrent diverticulitis, subsequent complications including perforation, colovesical fistula.   5.  Monitor CBC, clinically       Above plan was discussed in details with patient, primary team. Please call me if any further questions or concerns. Will continue to participate in the care of this patient. HPI:    Patient states that he feels better today compared to yesterday. No BM today. home Medication List    Details   nitroglycerin (Nitrostat) 0.4 mg SL tablet 0.4 mg by SubLINGual route every five (5) minutes as needed for Chest Pain. Up to 3 doses. fluticasone furoate-vilanteroL (Breo Ellipta) 100-25 mcg/dose inhaler Take 1 Puff by inhalation daily. therapeutic multivitamin (THERAGRAN) tablet Take 1 Tablet by mouth daily. rosuvastatin (Crestor) 10 mg tablet Take 10 mg by mouth nightly. furosemide (Lasix) 20 mg tablet Take 20 mg by mouth daily. clopidogreL (Plavix) 75 mg tab Take 75 mg by mouth daily. apixaban (ELIQUIS) 5 mg tablet Take 5 mg by mouth two (2) times a day. tamsulosin (FLOMAX) 0.4 mg capsule Take 1 Cap by mouth daily (after dinner). Qty: 30 Cap, Refills: 6      carvediloL (Coreg) 12.5 mg tablet Take 12.5 mg by mouth two (2) times daily (with meals). albuterol (PROVENTIL HFA, VENTOLIN HFA) 90 mcg/actuation inhaler Take  by inhalation.              Current Facility-Administered Medications   Medication Dose Route Frequency    budesonide (PULMICORT) 500 mcg/2 ml nebulizer suspension  500 mcg Nebulization BID RT    apixaban (ELIQUIS) tablet 5 mg  5 mg Oral BID    arformoteroL (BROVANA) neb solution 15 mcg  15 mcg Nebulization BID RT    sodium chloride (NS) flush 5-40 mL  5-40 mL IntraVENous Q8H    sodium chloride (NS) flush 5-40 mL  5-40 mL IntraVENous PRN    acetaminophen (TYLENOL) tablet 650 mg  650 mg Oral Q6H PRN    Or    acetaminophen (TYLENOL) suppository 650 mg  650 mg Rectal Q6H PRN    ondansetron (ZOFRAN ODT) tablet 4 mg  4 mg Oral Q8H PRN    Or    ondansetron (ZOFRAN) injection 4 mg  4 mg IntraVENous Q6H PRN    piperacillin-tazobactam (ZOSYN) 3.375 g in 0.9% sodium chloride (MBP/ADV) 100 mL MBP 3.375 g IntraVENous Q6H    albuterol (PROVENTIL VENTOLIN) nebulizer solution 2.5 mg  2.5 mg Nebulization Q4H PRN    clopidogreL (PLAVIX) tablet 75 mg  75 mg Oral DAILY WITH BREAKFAST    gabapentin (NEURONTIN) capsule 100 mg  100 mg Oral TID    multivitamin, tx-iron-ca-min (THERA-M w/ IRON) tablet 1 Tablet  1 Tablet Oral DAILY    rosuvastatin (CRESTOR) tablet 10 mg  10 mg Oral DAILY    tamsulosin (FLOMAX) capsule 0.4 mg  0.4 mg Oral PCD    L. acidophilus,casei,rhamnosus (BIO-K PLUS) capsule 1 Capsule  1 Capsule Oral DAILY    ipratropium (ATROVENT) 0.02 % nebulizer solution 0.5 mg  0.5 mg Nebulization TID       Allergies: Lipitor [atorvastatin] and Norvasc [amlodipine]    Temp (24hrs), Av.8 °F (36.6 °C), Min:96.9 °F (36.1 °C), Max:98.4 °F (36.9 °C)    Visit Vitals  BP (!) 150/94   Pulse 100   Temp 97.5 °F (36.4 °C)   Resp 15   Ht 5' 8.11\" (1.73 m)   Wt 78.6 kg (173 lb 3.2 oz)   SpO2 99%   BMI 26.25 kg/m²       ROS: 12 point ROS obtained in details. Pertinent positives as mentioned in HPI,   otherwise negative    Physical Exam:    General: Well developed, well nourished male laying on the bed/sitting on the  bed AAOx3 in no acute distress. General:   awake alert and oriented   HEENT:  Normocephalic, atraumatic,  EOMI, no scleral icterus or pallor; no conjunctival hemmohage;  nasal and oral mucous are moist and without evidence of lesions. No thrush. Dentition good. Neck supple, no bruits. Lymph Nodes:   no cervical, axillary or inguinal adenopathy   Lungs:   non-labored, bilaterally clear to auscultation- no crackles wheezes rales or rhonchi   Heart:  RRR, s1 and s2; no  rubs or gallops, no edema, + pedal pulses   Abdomen:  soft, non-distended, active bowel sounds, no hepatomegaly, no splenomegaly. decreased RLQ tendernss   Genitourinary:  deferred   Extremities:   no clubbing, cyanosis; no joint effusions or swelling;  Full ROM of all large joints to the upper and lower extremities; muscle mass appropriate for age   Neurologic:  No gross focal sensory abnormalities; 5/5 muscle strength to upper and lower extremities. Speech appropriate. Cranial nerves intact                        Skin:  No rash or ulcers noted   Back:  no spinal or paraspinal muscle tenderness or rigidity, no CVA tenderness     Psychiatric:  No suicidal or homicidal ideations, appropriate mood and affect         Labs: Results:   Chemistry Recent Labs     06/23/21  0538 06/22/21  0409 06/21/21  1310 06/21/21  0330 06/21/21  0330   GLU 76 81 121*   < > 145*   * 128* 125*   < > 126*   K 4.3 4.3 4.6   < > 4.5    95* 92*   < > 91*   CO2 28 28 26   < > 24   BUN 13 18 23*   < > 25*   CREA 0.71 0.77 0.91   < > 0.89   CA 8.9 9.0 8.6   < > 8.3*   AGAP 4 5 7   < > 11   BUCR 18 23* 25*   < > 28*   AP 40*  --  46  --  48   TP 6.4  --  6.6  --  6.0*   ALB 3.1*  --  2.9*  --  3.1*   GLOB 3.3  --  3.7  --  2.9   AGRAT 0.9  --  0.8  --  1.1    < > = values in this interval not displayed. CBC w/Diff Recent Labs     06/23/21  0538 06/22/21  0409 06/21/21  1310 06/21/21  0330 06/21/21  0330   WBC 22.1* 24.7* 26.0*   < > 26.3*   RBC 2.94* 3.13* 3.22*   < > 3.07*   HGB 8.5* 9.1* 9.4*   < > 9.1*   HCT 28.2* 29.1* 29.8*   < > 27.5*    241 235   < > 220   GRANS  --  91* 92*  --  87*   LYMPH  --  2* 4*  --  8*   EOS  --  2 0  --  0    < > = values in this interval not displayed. Microbiology Recent Labs     06/20/21  1400 06/20/21  1335 06/20/21  1325   CULT No growth (<1,000 CFU/ML) NO GROWTH 3 DAYS NO GROWTH 3 DAYS          RADIOLOGY:    All available imaging studies/reports in [facility] for this admission were reviewed      Disclaimer: Sections of this note are dictated utilizing voice recognition software, which may have resulted in some phonetic based errors in grammar and contents. Even though attempts were made to correct all the mistakes, some may have been missed, and remained in the body of the document.  If questions arise, please contact our department.     Dr. Safia Cadena, Infectious Disease Specialist  [phone]  June 23, 2021  9:47 AM

## 2021-06-23 NOTE — PROGRESS NOTES
Bedside turnover given from [employer]. SBAR,MAR,ED summary given with updates R/T the night, a chance to ask questions was given. PT is on the cardiac tele monitor in stable condition. Bed is in the lowest position with the wheels locked. Call bell and personal effects  are on the bedside table within reach. Patient resting comfortably. Whiteboard updated.

## 2021-06-23 NOTE — ROUTINE PROCESS
Alert and oriented time 2-3. Verbalizes well. Denies pain and discomfort. Continues on oxygen at 3 L via nasal cannula. Needs reminders to keep on nasal cannula. Abdomen soft and nondistended. Voiding clear yellow urine in urinal at bedside. Uses bedpan with one person assistance. Continues on IVABT/Zosyn. No adverse reactions noted. Currently in bed watching television with call bell within reach, wheels locked, side rails times three, and bed alarm activated for safety.     Bianca Drake RN, BSN

## 2021-06-23 NOTE — PROGRESS NOTES
Problem: Risk for Spread of Infection  Goal: Prevent transmission of infectious organism to others  Description: Prevent the transmission of infectious organisms to other patients, staff members, and visitors. Outcome: Progressing Towards Goal     Problem: Patient Education:  Go to Education Activity  Goal: Patient/Family Education  Outcome: Progressing Towards Goal     Problem: Pressure Injury - Risk of  Goal: *Prevention of pressure injury  Description: Document Nicko Scale and appropriate interventions in the flowsheet. Outcome: Progressing Towards Goal  Note: Pressure Injury Interventions:  Sensory Interventions: Float heels, Keep linens dry and wrinkle-free, Maintain/enhance activity level, Minimize linen layers, Monitor skin under medical devices    Moisture Interventions: Absorbent underpads, Apply protective barrier, creams and emollients, Check for incontinence Q2 hours and as needed, Maintain skin hydration (lotion/cream), Minimize layers, Moisture barrier    Activity Interventions: Increase time out of bed, Pressure redistribution bed/mattress(bed type), PT/OT evaluation    Mobility Interventions: Float heels, HOB 30 degrees or less, Pressure redistribution bed/mattress (bed type), PT/OT evaluation    Nutrition Interventions: Document food/fluid/supplement intake, Offer support with meals,snacks and hydration    Friction and Shear Interventions: Apply protective barrier, creams and emollients, HOB 30 degrees or less, Lift sheet                Problem: Patient Education: Go to Patient Education Activity  Goal: Patient/Family Education  Outcome: Progressing Towards Goal     Problem: Pain  Goal: *Control of Pain  Outcome: Progressing Towards Goal     Problem: Falls - Risk of  Goal: *Absence of Falls  Description: Document [place] Fall Risk and appropriate interventions in the flowsheet.   Outcome: Progressing Towards Goal  Note: Fall Risk Interventions:  Mobility Interventions: Bed/chair exit alarm, Patient to call before getting OOB, PT Consult for mobility concerns, PT Consult for assist device competence, Strengthening exercises (ROM-active/passive), Utilize walker, cane, or other assistive device    Mentation Interventions: Adequate sleep, hydration, pain control, Bed/chair exit alarm, Door open when patient unattended, Eyeglasses and hearing aids, More frequent rounding, Reorient patient, Room close to nurse's station, Toileting rounds, Update white board    Medication Interventions: Bed/chair exit alarm, Evaluate medications/consider consulting pharmacy, Patient to call before getting OOB    Elimination Interventions: Bed/chair exit alarm, Call light in reach, Patient to call for help with toileting needs, Stay With Me (per policy), Toilet paper/wipes in reach, Toileting schedule/hourly rounds, Urinal in reach    History of Falls Interventions: Bed/chair exit alarm, Door open when patient unattended, Room close to nurse's station         Problem: Patient Education: Go to Patient Education Activity  Goal: Patient/Family Education  Outcome: Progressing Towards Goal

## 2021-06-23 NOTE — PROGRESS NOTES
Cardiology Progress Note    Admit Date: 6/21/2021  Attending Cardiologist: Dr. Sridevi Sánchez:     Hospital Problems  Date Reviewed: 6/18/2021        Codes Class Noted POA    Mesenteric abscess [facility]) ICD-10-CM: K65.1  ICD-9-CM: 567.22  6/21/2021 Unknown              -Syncope in setting of below.  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 The patient is on Eliquis and Plavix due to recent PCI and Afib. Patient is at high risk for graft and stent occlusion if plavix is held. Appreciate IR involvement. Risks outweigh benefits of CT guided drainage of small abscess that may not be amenable to drainage due to anatomic positioning at this time. -CAD, Hx CABG x 2 (LIMA-LAD, SVG-LCx), recent NSTEMI s/p cardiac cath 5/18/2021 at [facility] with PCI to proximal SVG-LCx with 3.5 x 12 mm [place] JENNIE; PCI to mid SVG with 3.5 x 23 mm [place] JENNIE; PCI to distal SVG with 3.5 x 15 mm [place] JENNIE; Balloon angioplasty to distal SVG anastomosis.  Discharged on Plavix and Eliquis (no ASA to avoid triple therapy).    -Initial cardiac cath 5/18/2021 at [place] [address] with distal LM 50% blockage; LAD is large vessel with 80% proximal stenosis with patent LIMA-LAD, there is a 70% stenosis distal to LIMA anastomosis; LCx is dominant, large diffusely diseased vessel with sequential 80% stenosis at proximal and mid portion of the vessel; RCA is non-dominant, small diffusely diseased vessel  -Echo 5/2021 at [facility] with EF 50%.   -Atrial fibrillation, recent diagnosis, on Eliquis for anticoagulation.  -Leukocytosis, WBC up to 26.7K on 6/20/2021 (up from 14.3  -Severe COPD.  O2 goal 89-93% per [facility] records.  -Hx squamous cell lung cancer, s/p right middle and lower lobectomy.  -Peripheral vascular disease, Hx bilateral carotid endarterectomies  -Hx renal artery stenosis, Hx bilateral stenting 2011  -Acute CVA during recent admission at [facility]; considered cardioembolic, s/p PCI 0/78/0034, symptom onset 5/20/2021 while on Eliquis; suspected plaque mobilization.  -Recent acute blood loss anemia due to melena and hematuria (self-inflicted [place] trauma), GI consulted 5/26/2021, cleared for Plavix/Eliquis  -Hx HTN with hypotension during recent admission, medications adjusted at that time.  Coreg decreased to 12.5 mg BID, Isosorbid-Hydralazine and Lisinopril discontinued. -Dementia, mild.     Primary cardiologist at [facility]:     Patient in afib with mildly elevated rates with PVCs. No further bradycardia. BP overall stable. Will need to consider restarting low dose coreg and monitoring telemetry trend. Patient is continued on Eliquis and Plavix. Patient is continued on Statin. Volume status stable at present. Will review follow up echo once complete. Continue management of underlying infectious process. Subjective:     No CP, palp, SOB, syncope.     Objective:      Patient Vitals for the past 8 hrs:   Temp Pulse Resp BP SpO2   06/23/21 0401 96.9 °F (36.1 °C) 69 14 131/82 99 %   06/23/21 0400 -- 95 -- -- --   06/23/21 0000 -- (!) 105 -- -- --         Patient Vitals for the past 96 hrs:   Weight   06/23/21 0435 173 lb 3.2 oz (78.6 kg)   06/22/21 0400 168 lb 11.2 oz (76.5 kg)   06/21/21 1821 169 lb 3.2 oz (76.7 kg)       TELE: AFIB , PVCs             Current Facility-Administered Medications   Medication Dose Route Frequency Last Admin    budesonide (PULMICORT) 500 mcg/2 ml nebulizer suspension  500 mcg Nebulization BID  mcg at 06/22/21 2020    apixaban (ELIQUIS) tablet 5 mg  5 mg Oral BID      arformoteroL (BROVANA) neb solution 15 mcg  15 mcg Nebulization BID RT      sodium chloride (NS) flush 5-40 mL  5-40 mL IntraVENous Q8H 10 mL at 06/23/21 0556  sodium chloride (NS) flush 5-40 mL  5-40 mL IntraVENous PRN      acetaminophen (TYLENOL) tablet 650 mg  650 mg Oral Q6H PRN      Or    acetaminophen (TYLENOL) suppository 650 mg  650 mg Rectal Q6H PRN      ondansetron (ZOFRAN ODT) tablet 4 mg  4 mg Oral Q8H PRN      Or    ondansetron (ZOFRAN) injection 4 mg  4 mg IntraVENous Q6H PRN      piperacillin-tazobactam (ZOSYN) 3.375 g in 0.9% sodium chloride (MBP/ADV) 100 mL MBP  3.375 g IntraVENous Q6H 3.375 g at 06/23/21 0554    albuterol (PROVENTIL VENTOLIN) nebulizer solution 2.5 mg  2.5 mg Nebulization Q4H PRN      clopidogreL (PLAVIX) tablet 75 mg  75 mg Oral DAILY WITH BREAKFAST 75 mg at 06/22/21 0909    gabapentin (NEURONTIN) capsule 100 mg  100 mg Oral  mg at 06/22/21 2117    multivitamin, tx-iron-ca-min (THERA-M w/ IRON) tablet 1 Tablet  1 Tablet Oral DAILY 1 Tablet at 06/22/21 0909    rosuvastatin (CRESTOR) tablet 10 mg  10 mg Oral DAILY 10 mg at 06/22/21 0909    tamsulosin (FLOMAX) capsule 0.4 mg  0.4 mg Oral PCD 0.4 mg at 06/22/21 1828    L. acidophilus,casei,rhamnosus (BIO-K PLUS) capsule 1 Capsule  1 Capsule Oral DAILY 1 Capsule at 06/22/21 0909    ipratropium (ATROVENT) 0.02 % nebulizer solution 0.5 mg  0.5 mg Nebulization TID 0.5 mg at 06/22/21 2020         Intake/Output Summary (Last 24 hours) at 6/23/2021 0754  Last data filed at 6/23/2021 0734  Gross per 24 hour   Intake 1770 ml   Output 1975 ml   Net -205 ml       Physical Exam:  General:  alert, cooperative, no distress, appears stated age  Neck:  no JVD  Lungs:  clear to auscultation bilaterally  Heart:  irregularly irregular rhythm  Abdomen:  abdomen is soft without significant tenderness, masses, organomegaly or guarding  Extremities:  extremities normal, atraumatic, no cyanosis or edema    Visit Vitals  /82 (BP 1 Location: Left upper arm, BP Patient Position: At rest)   Pulse 69   Temp 96.9 °F (36.1 °C)   Resp 14   Ht 5' 8.11\" (1.73 m)   Wt 173 lb 3.2 oz (78.6 kg) SpO2 99%   BMI 26.25 kg/m²       Data Review:     Labs: Results:       Chemistry Recent Labs     06/23/21 0538 06/22/21 0409 06/21/21  1310 06/21/21  0330 06/21/21  0330   GLU 76 81 121*   < > 145*   * 128* 125*   < > 126*   K 4.3 4.3 4.6   < > 4.5    95* 92*   < > 91*   CO2 28 28 26   < > 24   BUN 13 18 23*   < > 25*   CREA 0.71 0.77 0.91   < > 0.89   CA 8.9 9.0 8.6   < > 8.3*   MG 2.3 2.3 2.3   < > 2.0   PHOS 2.4* 3.4  --   --   --    AGAP 4 5 7   < > 11   BUCR 18 23* 25*   < > 28*   AP 40*  --  46  --  48   TP 6.4  --  6.6  --  6.0*   ALB 3.1*  --  2.9*  --  3.1*   GLOB 3.3  --  3.7  --  2.9   AGRAT 0.9  --  0.8  --  1.1    < > = values in this interval not displayed. CBC w/Diff Recent Labs     06/23/21 0538 06/22/21 0409 06/21/21  1310 06/21/21  0330 06/21/21  0330   WBC 22.1* 24.7* 26.0*   < > 26.3*   RBC 2.94* 3.13* 3.22*   < > 3.07*   HGB 8.5* 9.1* 9.4*   < > 9.1*   HCT 28.2* 29.1* 29.8*   < > 27.5*    241 235   < > 220   GRANS  --  91* 92*  --  87*   LYMPH  --  2* 4*  --  8*   EOS  --  2 0  --  0    < > = values in this interval not displayed.       Cardiac Enzymes No results found for: CPK, CK, CKMMB, CKMB, RCK3, CKMBT, CKNDX, CKND1, WAGNER, TROPT, TROIQ, RAFAEL, TROPT, TNIPOC, BNP, BNPP   Coagulation Recent Labs     06/23/21 0538   PTP 20.3*   INR 1.8*   APTT 49.2*       Lipid Panel No results found for: CHOL, CHOLPOCT, CHOLX, CHLST, CHOLV, 865635, HDL, HDLP, LDL, LDLC, DLDLP, 823637, VLDLC, VLDL, TGLX, TRIGL, TRIGP, TGLPOCT, CHHD, CHHDX   BNP No results found for: BNP, BNPP, XBNPT   Liver Enzymes Recent Labs     06/23/21  0538   TP 6.4   ALB 3.1*   AP 40*      Thyroid Studies Lab Results   Component Value Date/Time    TSH 1.57 06/21/2021 02:12 PM          Signed By: LAMBERT Bailey     June 23, 2021

## 2021-06-23 NOTE — PROGRESS NOTES
[facility] 2 CV STEPDOWN  [address] [facility] [address]  [phone]  Colon and Rectal Surgery Progress Note      Patient: Errol Baires MRN: [id number]  SSN: [id number]    YOB: 1940  Age: 80 y.o. Sex: male      Admit Date: 6/21/2021    LOS: 2 days     Subjective:     Pain mild. Tolerating clears but states he may have trouble with solids.      Objective:     Vitals:    06/23/21 0435 06/23/21 0831 06/23/21 0916 06/23/21 1140   BP:   (!) 150/94    Pulse:   100 (!) 102   Resp:   15 18   Temp:   97.5 °F (36.4 °C) 98 °F (36.7 °C)   SpO2:  99%  100%   Weight: 78.6 kg (173 lb 3.2 oz)      Height:            Intake and Output:  Current Shift: 06/23 0701 - 06/23 1900  In: 500 [I.V.:500]  Out: 300 [Urine:300]  Last three shifts: 06/21 1901 - 06/23 0700  In: 3977 [P.O.:913; I.V.:2300]  Out: 2110 [Urine:2110]    Physical Exam:     Deferred    Lab/Data Review:    CMP:   Lab Results   Component Value Date/Time     (L) 06/23/2021 05:38 AM    K 4.3 06/23/2021 05:38 AM     06/23/2021 05:38 AM    CO2 28 06/23/2021 05:38 AM    AGAP 4 06/23/2021 05:38 AM    GLU 76 06/23/2021 05:38 AM    BUN 13 06/23/2021 05:38 AM    CREA 0.71 06/23/2021 05:38 AM    GFRAA >60 06/23/2021 05:38 AM    GFRNA >60 06/23/2021 05:38 AM    CA 8.9 06/23/2021 05:38 AM    MG 2.3 06/23/2021 05:38 AM    PHOS 2.4 (L) 06/23/2021 05:38 AM    ALB 3.1 (L) 06/23/2021 05:38 AM    TP 6.4 06/23/2021 05:38 AM    GLOB 3.3 06/23/2021 05:38 AM    AGRAT 0.9 06/23/2021 05:38 AM    ALT 15 (L) 06/23/2021 05:38 AM     CBC:   Lab Results   Component Value Date/Time    WBC 22.1 (H) 06/23/2021 05:38 AM    HGB 8.5 (L) 06/23/2021 05:38 AM    HCT 28.2 (L) 06/23/2021 05:38 AM     06/23/2021 05:38 AM        Assessment:     Diverticulitis with abscess    Plan:     Continue abx  Continue clears  Full liquids tomorrow    Signed By: Laurita Haney MD        June 23, 2021

## 2021-06-24 NOTE — PROGRESS NOTES
Problem: Self Care Deficits Care Plan (Adult)  Goal: *Acute Goals and Plan of Care (Insert Text)  Description: Occupational Therapy Goals  Initiated 6/24/2021 within 7 day(s). 1.  Patient will perform grooming in stance at the sink with supervision/set-up demonstrating F+ balance and no c/o dizziness. 2.  Patient will perform lower body dressing with modified independence. 3.  Patient will perform bathing with supervision/set-up. 4.  Patient will perform toilet transfers with supervision/set-up. 5.  Patient will perform all aspects of toileting with modified independence. 6.  Patient will participate in upper extremity therapeutic exercise/activities with modified independence for 8 minutes. 7.  Patient will utilize energy conservation techniques during functional activities with verbal cues. Prior Level of Function: Pt presents from ARU and was doing well and at supv for grooming, bathing, UB dressing, and toileting, and CGA for LB dressing, toilet transfers. Pt lives in [facility] with his wife. Pt stated he has 14 interior steps; however chart stated 7. Pt only has grab bars in shower, no other DME or AE at home. Outcome: Progressing Towards Goal     OCCUPATIONAL THERAPY EVALUATION    Patient: Tarah Mera (50 y.o. male)  Date: 6/24/2021  Primary Diagnosis: Mesenteric abscess ([facility] [address] [K65.1]        Precautions: Falls, Pressure, Aspiration     ASSESSMENT :  Pt cleared by RN for OT eval. Pt not new to this OT as seen previously in ARU. Pt received sitting up in chair and agreeable to OT eval. Pt demonstrated some confusion regarding timing of leaving ARU and being admitted to hospital. Provided re-orientation for pt. Based on the objective data described below, the patient presents with min-mild confusion, decreased activity tolerance, increased c/o dizziness, limiting functional participation in self-care skills.  Vitals at start of session: /62, HR 89, RR 16. Pt stood with CGA with RW and verbal cues for hand placements upon standing. When standing c/o dizziness and allowed increased time before attempt to walk. Pt vital remained within normal to start. Pt still dizzy and returned to sitting with SBA. Pt provided with comb for beard and able to perform grooming in seated position with mod I. Pt able to don/doff sock with forward lean and no dizziness at this time. PA arrived in pt room at this time. Pt left with all needs met, notified nursing. Recommend HH with 24/7 assistance vs rehab at this time. Pt was making great strides in rehab, may benefit from [facility] at this time. Patient will benefit from skilled intervention to address the above impairments. Patient's rehabilitation potential is considered to be Good  Factors which may influence rehabilitation potential include:   []             None noted  []             Mental ability/status  [x]             Medical condition  []             Home/family situation and support systems  [x]             Safety awareness  []             Pain tolerance/management  []             Other:      PLAN :  Recommendations and Planned Interventions:   [x]               Self Care Training                  [x]      Therapeutic Activities  [x]               Functional Mobility Training   [x]      Cognitive Retraining  [x]               Therapeutic Exercises           [x]      Endurance Activities  [x]               Balance Training                    [x]      Neuromuscular Re-Education  []               Visual/Perceptual Training     [x]      Home Safety Training  [x]               Patient Education                   [x]      Family Training/Education  []               Other (comment):    Frequency/Duration: Patient will be followed by occupational therapy 1-2 times per day/3-5 days per week to address goals.   Discharge Recommendations: Home Health with 24/7 assist  Further Equipment Recommendations for Discharge: bedside commode and shower chair SUBJECTIVE:   Patient stated  I was doing great downstairs with those ladies. Doing steps and walking without dizziness. I don't know what really happened.     OBJECTIVE DATA SUMMARY:     Past Medical History:   Diagnosis Date    Acute embolic stroke ([address] 8/35/3159    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, brainstem and cerebellum) without residual deficit    Anticoagulated by anticoagulation treatment 5/19/2021    On Apixaban    Benign prostatic hyperplasia with urinary retention     Chronic obstructive pulmonary disease (COPD) ([address]     Coronary artery disease involving native coronary artery of native heart     Essential hypertension     Heart failure with preserved left ventricular function (HFpEF) ([address]     2D echocardiogram (5/18/2021) showed EF 50%; concentric LV hypertrophy with a severely thickened septal wall and mildly thickened posterior wall; left atrial chamber is severely enlarged; right atrial chamber volume is moderately enlarged; no mass, shunts, or thrombi.      History of carotid stenosis     History of gross hematuria 5/26/2021    Attributed to [place] trauma while patient was altered    History of renal artery stenosis     Leukocytosis 5/17/2021    Attributed to reactive leukocytosis due to NSTEMI    Malignant neoplasm of lower lobe of right lung (HCC)     Neuropathy involving both lower extremities 10/22/2020    Non-ST elevation (NSTEMI) myocardial infarction ([address] 5/17/2021    On clopidogrel therapy 5/19/2021    Paroxysmal atrial fibrillation ([address] 5/17/2021    Peripheral vascular disease of extremity with claudication ([address] 6/14/2021    Urinary retention      Past Surgical History:   Procedure Laterality Date    HX CAROTID ENDARTERECTOMY Left 06/07/2012    HX CAROTID ENDARTERECTOMY Right 05/28/2014    Dr. Lissette Izaguirre    HX COLONOSCOPY      [address] GRAFT  2001    [facility]    HX CORONARY STENT PLACEMENT  05/18/2021    S/P PCI to proximal SVG with 3.5 x 12 mm Juan J drug-eluting stent; PCI to mid SVG with 3.5 x 34 mm [place] drug-eluting stent; PCI to distal SVG with 3.5 x 15 mm [place] drug-eluting stent; Balloon angioplasty to distal SVG anastomosis (5/18/2021 - Dr. Shivam Real)     HX LOBECTOMY Right     Middle lobe and lower lobe    HX RENAL ARTERY STENT Bilateral 2011    HX TONSILLECTOMY      IR BRONCHOSCOPY  11/25/2015    Dr. Sandra Culver     Barriers to Learning/Limitations: yes;  physical  Compensate with: visual, verbal, tactile, kinesthetic cues/model    Home Situation:   Home Situation  Home Environment: Private residence  # Steps to Enter: 3  One/Two Story Residence: Two story  # of Interior Steps: 7  Interior Rails: Both  Living Alone: No  Support Systems: Spouse/Significant Other/Partner  Patient Expects to be Discharged toVF Cor[de-identified]ration  Current DME Used/Available at Home: None  Tub or Shower Type: Tub/Shower combination  []  Right hand dominant   []  Left hand dominant    Cognitive/Behavioral Status:  Neurologic State: Alert;Confused  Orientation Level: Oriented to place;Oriented to person;Oriented to time  Cognition: Impulsive;Memory loss  Safety/Judgement: Fall prevention    Skin: visible intact  Edema: none to note in UE    Vision/Perceptual:        Acuity: Within Defined Limits         Coordination: BUE  Coordination: Within functional limits  Fine Motor Skills-Upper: Left Intact; Right Intact    Gross Motor Skills-Upper: Left Intact; Right Intact    Balance:  Sitting: Intact  Sitting - Static: Good (unsupported)  Sitting - Dynamic: Fair (occasional)  Standing: Impaired  Standing - Static: Fair  Standing - Dynamic : Fair    Strength: BUE  Strength: Generally decreased, functional       Tone & Sensation: BUE  Tone: Normal  Sensation: Intact     Range of Motion: BUE  AROM: Generally decreased, functional  PROM: Within functional limits       Functional Mobility and Transfers for ADLs:  Bed Mobility:  Supine to Sit:  (pt received sitting up in recliner chair)        Transfers:  Sit to Stand: Contact guard assistance  Stand to Sit: Stand-by assistance        ADL Assessment:   Feeding: Independent    Oral Facial Hygiene/Grooming: Modified Independent    Bathing: Contact guard assistance    Upper Body Dressing: Supervision    Lower Body Dressing: Supervision    Toileting: Contact guard assistance        ADL Intervention:    Grooming  Grooming Assistance: Set-up; Modified independent  Brushing/Combing Hair: Modified independent (brushing beard with pick)      Upper Body Dressing Assistance  Dressing Assistance: [facility] gown: Supervision    Lower Body Dressing Assistance  Dressing Assistance: Supervision  Socks: Supervision  Leg Crossed Method Used: No  Position Performed: Seated in chair    Cognitive Retraining  Safety/Judgement: Fall prevention    Pain:  Pain level pre-treatment: 0/10   Pain level post-treatment: 0/10   Pain Intervention(s): Medication (see MAR); Rest, Ice, Repositioning   Response to intervention: Nurse notified, See doc flow    Activity Tolerance:   Fair   Please refer to the flowsheet for vital signs taken during this treatment. After treatment:   [x] Patient left in no apparent distress sitting up in chair  [] Patient left in no apparent distress in bed  [x] Call bell left within reach  [x] Nursing notified  [x] PA present  [] Bed alarm activated    COMMUNICATION/EDUCATION:   [x] Role of Occupational Therapy in the acute care setting  [x] Home safety education was provided and the patient/caregiver indicated understanding. [x] Patient/family have participated as able in goal setting and plan of care. [x] Patient/family agree to work toward stated goals and plan of care. [] Patient understands intent and goals of therapy, but is neutral about his/her participation. [] Patient is unable to participate in goal setting and plan of care.     Thank you for this referral.  CLIFFORD Alas, OTR/L  Time Calculation: 23 mins    Eval Complexity: History: LOW Complexity : Brief history review ; Examination: LOW Complexity : 1-3 performance deficits relating to physical, cognitive , or psychosocial skils that result in activity limitations and / or participation restrictions ;    Decision Making:LOW Complexity : No comorbidities that affect functional and no verbal or physical assistance needed to complete eval tasks

## 2021-06-24 NOTE — PROGRESS NOTES
Respiratory Therapy Assessment Care Plan    Patient:  Mahogany Hughes 80 y.o. male 6/24/2021 8:48 AM    Mesenteric abscess [facility]) [K65.1]      Chest X-RAY: Results from Hospital Encounter encounter on 06/07/21    XR CHEST PORT    Impression  Enlarged cardiac silhouette. Low lung volumes with streaky bibasilar densities. Please see report for additional details.       Results from Abstract encounter on 02/13/20    XR HIP RT W OR WO PELV 2-3 VWS         Vital Signs:     Visit Vitals  /73 (BP 1 Location: Left upper arm, BP Patient Position: At rest)   Pulse 84   Temp 97.6 °F (36.4 °C)   Resp 23   Ht 5' 8\" (1.727 m)   Wt 77.8 kg (171 lb 8 oz)   SpO2 100%   BMI 26.08 kg/m²         Indications for treatment: COPD hx ( MDI home use ) Hypoxia acute       Plan of care: O2 / Cough , clear and deep breath ( IS use ) Brovana / Atrovent / Pulmicort         Goal: Patient without SOB / Patient to understand use and need of IS

## 2021-06-24 NOTE — PROGRESS NOTES
Infectious Disease progress Note        Reason: Leukocytosis, evaluate for sepsis    Current abx Prior abx   Zosyn since 6/21/21 Ceftriaxone, intravenous metronidazole, oral vancomycin, IV vancomycin 6/20-6/21     Lines:       Assessment :    80-year-old man with past medical history of diastolic CHF, coronary artery disease status post CABG, history of lung cancer status post pneumonectomy, hypertension admitted to [facility] acute rehab on 6/7/2021. Recent hospitalization at [facility] 5/17-6/7 for non stemI s/p CABG  Post op course complicated by profound unexplained leukocytosis. Wbc:37 k on 6/7/21    Now with leukocytosis, diarrhea    Clinical presentation consistent with mesenteric abscess, acute on chronic sigmoid diverticulitis    CT without contrast 5/22 at [facility] revealed evidence of diverticular disease   Recent history of profound unexplained leukocytosis during stay at [facility]. Wbc:37 k on 6/7/21 could be due to flare up of diverticultis, diverticular perforation. Worsening leukocytosis since 6/20 likely due to recurrent diverticulitis. RLQ pain/tenderness on prior exam likely due to this. Colorectal surgery f/u appreciated. Plans for possible IR guided drainage noted. Elevated bilirubin 2.2 on 6/20/21- normal transaminases-likely due to sepsis. No evidence of undiagnosed hepatobiliary pathology noted on CT scan 6/21/2021    Syncope 6/21- likely vasovagal.  Cardiology follow-up appreciated    Subjective sense of improvement. Hemodynamically stable on today's exam    Recommendations:    1. Recommend piperacillin/tazobactam  2. Recommend to switch to po cefpodoxime, metronidazole till 8/4/21 if able to tolerate po diet  3. Follow-up colorectal surgery recommendations - patient will need surgical intervention/colectomy if fails conservative measures &/or recurrent diverticulitis  4.   Monitor clinically     Above plan was discussed in details with patient, primary team. Please call me if any further questions or concerns. Will continue to participate in the care of this patient. HPI:    Patient states that he feels better today compared to yesterday. Had some bowel movement yesterday. Denies abdominal pain. Tolerating p.o. liquids. home Medication List    Details   nitroglycerin (Nitrostat) 0.4 mg SL tablet 0.4 mg by SubLINGual route every five (5) minutes as needed for Chest Pain. Up to 3 doses. fluticasone furoate-vilanteroL (Breo Ellipta) 100-25 mcg/dose inhaler Take 1 Puff by inhalation daily. therapeutic multivitamin (THERAGRAN) tablet Take 1 Tablet by mouth daily. rosuvastatin (Crestor) 10 mg tablet Take 10 mg by mouth nightly. furosemide (Lasix) 20 mg tablet Take 20 mg by mouth daily. clopidogreL (Plavix) 75 mg tab Take 75 mg by mouth daily. apixaban (ELIQUIS) 5 mg tablet Take 5 mg by mouth two (2) times a day. tamsulosin (FLOMAX) 0.4 mg capsule Take 1 Cap by mouth daily (after dinner). Qty: 30 Cap, Refills: 6      carvediloL (Coreg) 12.5 mg tablet Take 12.5 mg by mouth two (2) times daily (with meals). albuterol (PROVENTIL HFA, VENTOLIN HFA) 90 mcg/actuation inhaler Take  by inhalation.              Current Facility-Administered Medications   Medication Dose Route Frequency    carvediloL (COREG) tablet 6.25 mg  6.25 mg Oral BID WITH MEALS    budesonide (PULMICORT) 500 mcg/2 ml nebulizer suspension  500 mcg Nebulization BID RT    apixaban (ELIQUIS) tablet 5 mg  5 mg Oral BID    arformoteroL (BROVANA) neb solution 15 mcg  15 mcg Nebulization BID RT    sodium chloride (NS) flush 5-40 mL  5-40 mL IntraVENous Q8H    sodium chloride (NS) flush 5-40 mL  5-40 mL IntraVENous PRN    acetaminophen (TYLENOL) tablet 650 mg  650 mg Oral Q6H PRN    Or    acetaminophen (TYLENOL) suppository 650 mg  650 mg Rectal Q6H PRN    ondansetron (ZOFRAN ODT) tablet 4 mg  4 mg Oral Q8H PRN    Or    ondansetron (ZOFRAN) injection 4 mg  4 mg IntraVENous Q6H PRN    piperacillin-tazobactam (ZOSYN) 3.375 g in 0.9% sodium chloride (MBP/ADV) 100 mL MBP  3.375 g IntraVENous Q6H    albuterol (PROVENTIL VENTOLIN) nebulizer solution 2.5 mg  2.5 mg Nebulization Q4H PRN    clopidogreL (PLAVIX) tablet 75 mg  75 mg Oral DAILY WITH BREAKFAST    gabapentin (NEURONTIN) capsule 100 mg  100 mg Oral TID    multivitamin, tx-iron-ca-min (THERA-M w/ IRON) tablet 1 Tablet  1 Tablet Oral DAILY    rosuvastatin (CRESTOR) tablet 10 mg  10 mg Oral DAILY    tamsulosin (FLOMAX) capsule 0.4 mg  0.4 mg Oral PCD    L. acidophilus,casei,rhamnosus (BIO-K PLUS) capsule 1 Capsule  1 Capsule Oral DAILY    ipratropium (ATROVENT) 0.02 % nebulizer solution 0.5 mg  0.5 mg Nebulization TID       Allergies: Lipitor [atorvastatin] and Norvasc [amlodipine]    Temp (24hrs), Av.1 °F (36.7 °C), Min:97.5 °F (36.4 °C), Max:99 °F (37.2 °C)    Visit Vitals  /73 (BP 1 Location: Left upper arm, BP Patient Position: At rest)   Pulse 84   Temp 97.6 °F (36.4 °C)   Resp 23   Ht 5' 8\" (1.727 m)   Wt 77.8 kg (171 lb 8 oz)   SpO2 100%   BMI 26.08 kg/m²       ROS: 12 point ROS obtained in details. Pertinent positives as mentioned in HPI,   otherwise negative    Physical Exam:    General: Well developed, well nourished male laying on the bed/sitting on the  bed AAOx3 in no acute distress. General:   awake alert and oriented   HEENT:  Normocephalic, atraumatic,  EOMI, no scleral icterus or pallor; no conjunctival hemmohage;  nasal and oral mucous are moist and without evidence of lesions. No thrush. Dentition good. Neck supple, no bruits. Lymph Nodes:   no cervical, axillary or inguinal adenopathy   Lungs:   non-labored, bilaterally clear to auscultation- no crackles wheezes rales or rhonchi   Heart:  RRR, s1 and s2; no  rubs or gallops, no edema, + pedal pulses   Abdomen:  soft, non-distended, active bowel sounds, no hepatomegaly, no splenomegaly.  decreased RLQ tendernss   Genitourinary:  deferred   Extremities: no clubbing, cyanosis; no joint effusions or swelling; Full ROM of all large joints to the upper and lower extremities; muscle mass appropriate for age   Neurologic:  No gross focal sensory abnormalities; 5/5 muscle strength to upper and lower extremities. Speech appropriate. Cranial nerves intact                        Skin:  No rash or ulcers noted   Back:  no spinal or paraspinal muscle tenderness or rigidity, no CVA tenderness     Psychiatric:  No suicidal or homicidal ideations, appropriate mood and affect         Labs: Results:   Chemistry Recent Labs     06/24/21  0522 06/23/21  0538 06/22/21  0409 06/21/21  1310 06/21/21  1310   GLU 70* 76 81   < > 121*   * 132* 128*   < > 125*   K 4.3 4.3 4.3   < > 4.6    100 95*   < > 92*   CO2 28 28 28   < > 26   BUN 9 13 18   < > 23*   CREA 0.62 0.71 0.77   < > 0.91   CA 8.8 8.9 9.0   < > 8.6   AGAP 2* 4 5   < > 7   BUCR 15 18 23*   < > 25*   AP  --  40*  --   --  46   TP  --  6.4  --   --  6.6   ALB  --  3.1*  --   --  2.9*   GLOB  --  3.3  --   --  3.7   AGRAT  --  0.9  --   --  0.8    < > = values in this interval not displayed. CBC w/Diff Recent Labs     06/24/21  0522 06/23/21  0538 06/22/21  0409 06/21/21  1310 06/21/21  1310   WBC 18.2* 22.1* 24.7*   < > 26.0*   RBC 2.88* 2.94* 3.13*   < > 3.22*   HGB 8.2* 8.5* 9.1*   < > 9.4*   HCT 27.4* 28.2* 29.1*   < > 29.8*    226 241   < > 235   GRANS  --   --  91*  --  92*   LYMPH  --   --  2*  --  4*   EOS  --   --  2  --  0    < > = values in this interval not displayed. Microbiology No results for input(s): CULT in the last 72 hours. RADIOLOGY:    All available imaging studies/reports in [facility] for this admission were reviewed      Disclaimer: Sections of this note are dictated utilizing voice recognition software, which may have resulted in some phonetic based errors in grammar and contents.  Even though attempts were made to correct all the mistakes, some may have been missed, and remained in the body of the document. If questions arise, please contact our department.     Dr. Ben Trimble, Infectious Disease Specialist  [phone]  June 24, 2021  9:47 AM

## 2021-06-24 NOTE — PROGRESS NOTES
Hospitalist Progress Note    Patient: Adonis Buck Age: 80 y.o. : 1940 MR#: [id number] SSN: [id number]  Date/Time: 2021 10:49 AM    DOA: 2021  PCP: Juanito Villar MD    Subjective: Wife at bedside with updates     Tolerate getting out of bed, sitting in chair. Participated with PT/OT. No further bradycardia event. afib is controlled with carvedilol   No fever, decrease leukocytosis   Na improved with IV fluid  Tolerate zosyn,   Tolerates diet, surgery wants increase his diet       Interval Hospital Course:  80 y.o male with HTN, CAD with CABG, persistent AFIB, COPD, PAD, h/o embolic CVA, anemia, presented from ARU for unresponsive while on toilet. RRT called to find hypotension and bradycardia required atropine. In the ER, he was found tachycardia, stomach pain, recent CT with possible mesenteric abscess. He was started on IV zosyn for sepsis concern. ROS: No current fever/chills, no headache, no dizziness, no sinus congestion,   No swallowing pain, No chest pain, no palpitation, no shortness of breath, + abd pain,  No diarrhea, no urinary complaint, no leg pain or swelling    Assessment/Plan:     1. 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 Sepsis poa (leukocytosis+tachycardia+abscess)   3. Symptomatic bradycardia, transient, resolved   4. Syncope and collapse   5. Hypotension, hypovolemia   6. CAD with CABG, recent NSTEMI s/p stent placement  7. Persistent AFIB   8. PAD   9. COPD   10. Recent embolic CVA   11.  Leukocytosis   12. Hyponatremia  13. Normocytic anemia     Advance to low fiber diet as tolerated. Continue Zosyn for today.    Spoke with ID to switch to cefpodoxime and metronidazole unit 21  Blood culture follow up  Surgery follows,  no intervention at this time due to his cardiac risk and the need to be on eliquis and plavix   Appreciate IR consult   Continue his cardiac medications, Plavix needed, statin, carvedilol added back   Cardiology follows. Continue bronchodilator, budesonide, arformoterol   ICS  PT/OT   PPI    Full code  Called his wife for updates but no answer        Additional Notes:    Time spent >30 minutes    Case discussed with:  [x]Patient  [x]Family  [x]Nursing  [x]Case Management  DVT Prophylaxis:  []Lovenox  [x]eliquis  []SCDs  []Coumadin   []On Heparin gtt    Signed By: Selina Park MD     2021 10:49 AM              Objective:   VS:   Visit Vitals  /73 (BP 1 Location: Left upper arm, BP Patient Position: At rest)   Pulse 84   Temp 97.6 °F (36.4 °C)   Resp 23   Ht 5' 8\" (1.727 m)   Wt 77.8 kg (171 lb 8 oz)   SpO2 100%   BMI 26.08 kg/m²      Tmax/24hrs: Temp (24hrs), Av.1 °F (36.7 °C), Min:97.5 °F (36.4 °C), Max:99 °F (37.2 °C)      Intake/Output Summary (Last 24 hours) at 2021 1049  Last data filed at 2021 0910  Gross per 24 hour   Intake 1200 ml   Output 1800 ml   Net -600 ml       Tele:   General:  Cooperative, Not in acute distress, speaks in full sentence while in bed  HEENT: PERRL, EOMI, supple neck, no JVD, dry oral mucosa  Cardiovascular: S1S2 irregular, no rub/gallop   Pulmonary:  air entry bilaterally, no wheezing, no crackle  GI:  Soft, non tender, non distended, +bs, no guarding   Extremities:  No pedal edema, +distal pulses appreciated   Neuro: AOx3, moving all extremities, no gross deficit.      Additional:       Current Facility-Administered Medications   Medication Dose Route Frequency    carvediloL (COREG) tablet 6.25 mg  6.25 mg Oral BID WITH MEALS    budesonide (PULMICORT) 500 mcg/2 ml nebulizer suspension  500 mcg Nebulization BID RT    apixaban (ELIQUIS) tablet 5 mg  5 mg Oral BID    arformoteroL (BROVANA) neb solution 15 mcg  15 mcg Nebulization BID RT    sodium chloride (NS) flush 5-40 mL  5-40 mL IntraVENous Q8H    sodium chloride (NS) flush 5-40 mL  5-40 mL IntraVENous PRN    acetaminophen (TYLENOL) tablet 650 mg  650 mg Oral Q6H PRN    Or    acetaminophen (TYLENOL) suppository 650 mg  650 mg Rectal Q6H PRN    ondansetron (ZOFRAN ODT) tablet 4 mg  4 mg Oral Q8H PRN    Or    ondansetron (ZOFRAN) injection 4 mg  4 mg IntraVENous Q6H PRN    piperacillin-tazobactam (ZOSYN) 3.375 g in 0.9% sodium chloride (MBP/ADV) 100 mL MBP  3.375 g IntraVENous Q6H    albuterol (PROVENTIL VENTOLIN) nebulizer solution 2.5 mg  2.5 mg Nebulization Q4H PRN    clopidogreL (PLAVIX) tablet 75 mg  75 mg Oral DAILY WITH BREAKFAST    gabapentin (NEURONTIN) capsule 100 mg  100 mg Oral TID    multivitamin, tx-iron-ca-min (THERA-M w/ IRON) tablet 1 Tablet  1 Tablet Oral DAILY    rosuvastatin (CRESTOR) tablet 10 mg  10 mg Oral DAILY    tamsulosin (FLOMAX) capsule 0.4 mg  0.4 mg Oral PCD    L. acidophilus,casei,rhamnosus (BIO-K PLUS) capsule 1 Capsule  1 Capsule Oral DAILY    ipratropium (ATROVENT) 0.02 % nebulizer solution 0.5 mg  0.5 mg Nebulization TID            Lab/Data Review:  Labs: Results:       Chemistry Recent Labs     06/24/21 0522 06/23/21  0538 06/22/21  0409   GLU 70* 76 81   * 132* 128*   K 4.3 4.3 4.3    100 95*   CO2 28 28 28   BUN 9 13 18   CREA 0.62 0.71 0.77   BUCR 15 18 23*   AGAP 2* 4 5   CA 8.8 8.9 9.0   PHOS  --  2.4* 3.4     Recent Labs     06/23/21  0538 06/21/21  1310   ALT 15* 17   TP 6.4 6.6   ALB 3.1* 2.9*   GLOB 3.3 3.7   AGRAT 0.9 0.8      CBC w/Diff Recent Labs     06/24/21  0522 06/23/21  0538 06/23/21  0538 06/22/21  0409 06/22/21  0409 06/21/21  1310 06/21/21  1310   WBC 18.2*  --  22.1*  --  24.7*   < > 26.0*   RBC 2.88*  --  2.94*  --  3.13*   < > 3.22*   HGB 8.2*  --  8.5*  --  9.1*   < > 9.4*   HCT 27.4*  --  28.2*  --  29.1*   < > 29.8*   MCV 95.1   < > 95.9   < > 93.0   < > 92.5   MCH 28.5   < > 28.9   < > 29.1   < > 29.2   MCHC 29.9*   < > 30.1*   < > 31.3   < > 31.5   RDW 17.2*   < > 17.2*   < > 17.1*   < > 16.9*     --  226  --  241   < > 235   GRANS  --   -- --   --  91*  --  92*   LYMPH  --   --   --   --  2*  --  4*   EOS  --   --   --   --  2  --  0    < > = values in this interval not displayed. Coagulation Recent Labs     06/23/21  0538   PTP 20.3*   INR 1.8*   APTT 49.2*       Iron/Ferritin No results found for: IRON, FE, TIBC, IBCT, PSAT, FERR    BNP    Cardiac Enzymes Lab Results   Component Value Date/Time    CK 78 06/22/2021 04:09 AM    CK - MB 2.3 06/22/2021 04:09 AM    CK-MB Index 2.9 06/22/2021 04:09 AM    Troponin-I, QT 0.07 (H) 06/22/2021 04:09 AM        Lactic Acid    Thyroid Studies          All Micro Results     None            Images:    CT (Most Recent). CT Results (most recent):  Results from Hospital Encounter encounter on 06/07/21    CT CHEST ABD PELV W CONT    Narrative  CT CHEST, ABDOMEN AND PELVIS WITH ENHANCEMENT    INDICATION: Leukocytosis. Abdominal pain. Myocardial infarction. TECHNIQUE: Axial images obtained of the chest, abdomen and pelvis following the  uneventful administration of 100 cc Isovue-300 nonionic intravenous contrast.  Coronal and sagittal reformatted images obtained. All CT scans are performed  using dose optimization techniques as appropriate to the performed exam  including the following: Automated exposure control, adjustment of mA and/or kV  according to patient size, and use of iterative reconstructive technique. COMPARISON: CTA chest 11/14/2015, CT abdomen and pelvis 6/16/2014. CHEST FINDINGS:  Lung: Emphysema. Pleura: Unremarkable. Heart: No effusion. CABG. Cardiomegaly. Vessels: No aortic aneurysm. Scattered wall calcifications. Lymph Nodes: Unremarkable. Thyroid: Stable to prior. The right gland is larger than the left and could  indicate an underlying nodule. Bones: Sternotomy. Lower thoracic degenerative disc disease. ABDOMEN AND PELVIS FINDINGS:  Liver: Unremarkable. Biliary: Unremarkable. Spleen: Unremarkable. Pancreas: Unremarkable.     Adrenal Glands: Stable chronic mild nodular thickening of the left gland since  2014. Kidneys: Chronic left renal cortical scarring. Subcentimeter right renal cyst.    Bowel: Normal appendix. Diverticulosis. Thickened and collapsed segment of mid  sigmoid colon has an appearance similar to prior 2014 exam. There is mild  proximal sigmoid colon distention suggesting a stricture in this region. Positioned between this thickened segment of colon and a thickened inflamed loop  of small bowel is a mesenteric gas and fluid containing rim enhanced collection  measuring 3.5 x 2.3 cm. At least 2 additional small phlegmonous developing  collections are seen closely along serosal mesenteric margin of the affected  small bowel loop best seen on sagittal image 31 and 38. Dilated fluid-filled small bowel loops transitioning at this abnormally  thickened small bowel loop however the bowel appears patent and gas and fluid  containing on both ends of this process suggesting a superimposed ileus  contributing to the picture. Bladder/ Pelvic Organs: Mild prostate enlargement. Mild thickening of the  bladder dome wall, likely reactive to adjacent mesenteric/small bowel process. Peritoneum/ Retroperitoneum: No free air. Trace free fluid. Mesenteric  inflammation in the pelvis as discussed above. Lymph Nodes: Borderline enlarged 9-10 mm mesenteric lymph nodes may be reactive. Vessels: Arteriosclerosis. Renal artery stents. Bones/Soft tissues: Mild chronic fatty left inguinal hernia is similar to prior. Degenerative disc disease throughout the lumbar spine most advanced at L2-L3. Right greater than left hip degenerative joint disease. Impression  1. No acute findings in the chest.  -Emphysema. -CABG. -Stable mildly enlarged right thyroid gland which could indicate underlying  nodule, stable for many years.   2. 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 It is unclear if initial source of abscess is from  small bowel inflammation or diverticulitis/pericolonic abscess. 3. Dilated small bowel. This is likely a combination of generalized small bowel  ileus and low-grade partial small bowel obstruction at site of abnormal pelvic  small bowel loop discussed above. 4. Diverticulosis. Stable thickened collapsed segment of sigmoid colon since  2014 suggesting a chronic stricture. This is limited for assessment by CT. 5. Bladder dome inflammation is likely secondary to the adjacent mesentery  process. 6. Other chronic incidental findings. Findings were discussed with emergency department physician on 6/21/2021 at 1:55  PM.         XRAY (Most Recent)      EKG No results found for this or any previous visit.      2D ECHO

## 2021-06-24 NOTE — PROGRESS NOTES
Cardiology Progress Note    Admit Date: 6/21/2021  Attending Cardiologist: Dr. Ziegler Forget:     -Syncope in setting of below.  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 The patient is on Eliquis and Plavix due to recent PCI and Afib. Patient is at high risk for graft and stent occlusion if plavix is held. Appreciate IR involvement. Risks outweigh benefits of CT guided drainage of small abscess that may not be amenable to drainage due to anatomic positioning at this time. -CAD, Hx CABG x 2 (LIMA-LAD, SVG-LCx), recent NSTEMI s/p cardiac cath 5/18/2021 at [facility] with PCI to proximal SVG-LCx with 3.5 x 12 mm Juan J JENNIE; PCI to mid SVG with 3.5 x 23 mm Juan J JENNIE; PCI to distal SVG with 3.5 x 15 mm [place] JENNIE; Balloon angioplasty to distal SVG anastomosis.  Discharged on Plavix and Eliquis (no ASA to avoid triple therapy).    -Initial cardiac cath 5/18/2021 at [facility] with distal LM 50% blockage; LAD is large vessel with 80% proximal stenosis with patent LIMA-LAD, there is a 70% stenosis distal to LIMA anastomosis; LCx is dominant, large diffusely diseased vessel with sequential 80% stenosis at proximal and mid portion of the vessel; RCA is non-dominant, small diffusely diseased vessel  -Echo 5/2021 at [facility] with EF 50%.   -Atrial fibrillation, recent diagnosis, on Eliquis for anticoagulation.  -Leukocytosis, WBC up to 26.7K on 6/20/2021 (up from 14.3  -Severe COPD.  O2 goal 89-93% per [facility] records.  -Hx squamous cell lung cancer, s/p right middle and lower lobectomy.  -Peripheral vascular disease, Hx bilateral carotid endarterectomies  -Hx renal artery stenosis, Hx bilateral stenting 2011  -Acute CVA during recent admission at [facility]; considered cardioembolic, s/p PCI 7/21/2257, symptom onset 5/20/2021 while on Eliquis; suspected plaque mobilization.  -Recent acute blood loss anemia due to melena and hematuria (self-inflicted [place] trauma), GI consulted 5/26/2021, cleared for Plavix/Eliquis  -Hx HTN with hypotension during recent admission, medications adjusted at that time.  Coreg decreased to 12.5 mg BID, Isosorbid-Hydralazine and Lisinopril discontinued. -Dementia, mild.     Primary cardiologist at [address]:     Independently seen and evaluated. Agree with below. His heart rate is quite stable in the 80's. Would increase activity as tolerated. If his heart rate remains stable without symptomatic bradycardia, no further cardiac evaluation.    -Remains in afib, no significant bradycardia overnight. Continued on Coreg 6.25 mg BID. -Continued on Eliquis for anticoagulation due to afib, Plavix due to recent stent placement. Not on ASA due to bleed risk with triple therapy.  -Continued on Crestor.  -No plans for further intervention per colorectal surgery/IR teams.  -Antibiotics per ID team, appreciate assistance.  -Dispo planning per primary team.    Subjective:     No new complaints.      Objective:      Patient Vitals for the past 8 hrs:   Temp Pulse Resp BP SpO2   06/24/21 0837 -- -- -- -- 100 %   06/24/21 0720 97.6 °F (36.4 °C) 84 23 115/73 100 %   06/24/21 0420 97.5 °F (36.4 °C) 97 21 123/85 100 %         Patient Vitals for the past 96 hrs:   Weight   06/24/21 0420 77.8 kg (171 lb 8 oz)   06/23/21 1443 80.7 kg (178 lb)   06/23/21 0435 78.6 kg (173 lb 3.2 oz)   06/22/21 0400 76.5 kg (168 lb 11.2 oz)   06/21/21 1821 76.7 kg (169 lb 3.2 oz)       TELE: AFIB               Current Facility-Administered Medications   Medication Dose Route Frequency Last Admin    carvediloL (COREG) tablet 6.25 mg  6.25 mg Oral BID WITH MEALS 6.25 mg at 06/24/21 0803    budesonide (PULMICORT) 500 mcg/2 ml nebulizer suspension  500 mcg Nebulization BID  mcg at 06/24/21 0837    apixaban (ELIQUIS) tablet 5 mg  5 mg Oral BID 5 mg at 06/24/21 0803    arformoteroL (BROVANA) neb solution 15 mcg  15 mcg Nebulization BID RT 15 mcg at 06/24/21 0837    sodium chloride (NS) flush 5-40 mL  5-40 mL IntraVENous Q8H 10 mL at 06/24/21 0524    sodium chloride (NS) flush 5-40 mL  5-40 mL IntraVENous PRN      acetaminophen (TYLENOL) tablet 650 mg  650 mg Oral Q6H PRN      Or    acetaminophen (TYLENOL) suppository 650 mg  650 mg Rectal Q6H PRN      ondansetron (ZOFRAN ODT) tablet 4 mg  4 mg Oral Q8H PRN      Or    ondansetron (ZOFRAN) injection 4 mg  4 mg IntraVENous Q6H PRN      piperacillin-tazobactam (ZOSYN) 3.375 g in 0.9% sodium chloride (MBP/ADV) 100 mL MBP  3.375 g IntraVENous Q6H 3.375 g at 06/24/21 0530    albuterol (PROVENTIL VENTOLIN) nebulizer solution 2.5 mg  2.5 mg Nebulization Q4H PRN      clopidogreL (PLAVIX) tablet 75 mg  75 mg Oral DAILY WITH BREAKFAST 75 mg at 06/24/21 0803    gabapentin (NEURONTIN) capsule 100 mg  100 mg Oral  mg at 06/24/21 0803    multivitamin, tx-iron-ca-min (THERA-M w/ IRON) tablet 1 Tablet  1 Tablet Oral DAILY 1 Tablet at 06/24/21 0803    rosuvastatin (CRESTOR) tablet 10 mg  10 mg Oral DAILY 10 mg at 06/24/21 0803    tamsulosin (FLOMAX) capsule 0.4 mg  0.4 mg Oral PCD 0.4 mg at 06/23/21 1741    L. acidophilus,casei,rhamnosus (BIO-K PLUS) capsule 1 Capsule  1 Capsule Oral DAILY 1 Capsule at 06/24/21 0900    ipratropium (ATROVENT) 0.02 % nebulizer solution 0.5 mg  0.5 mg Nebulization TID 0.5 mg at 06/24/21 3100         Intake/Output Summary (Last 24 hours) at 6/24/2021 1019  Last data filed at 6/24/2021 0910  Gross per 24 hour   Intake 1200 ml   Output 1800 ml   Net -600 ml       Physical Exam:  General:  alert, cooperative, no distress, appears stated age  Neck:  supple  Lungs:  clear to auscultation bilaterally  Heart:  irregularly irregular rhythm  Abdomen:  abdomen is soft without significant tenderness, masses, organomegaly or guarding  Extremities:  atraumatic, no edema    Visit Vitals  /73 (BP 1 Location: Left upper arm, BP Patient Position: At rest)   Pulse 84   Temp 97.6 °F (36.4 °C)   Resp 23   Ht 5' 8\" (1.727 m)   Wt 77.8 kg (171 lb 8 oz)   SpO2 100%   BMI 26.08 kg/m²       Data Review:     Labs: Results:       Chemistry Recent Labs     06/24/21 0522 06/23/21  0538 06/22/21  0409 06/21/21  1310 06/21/21  1310   GLU 70* 76 81   < > 121*   * 132* 128*   < > 125*   K 4.3 4.3 4.3   < > 4.6    100 95*   < > 92*   CO2 28 28 28   < > 26   BUN 9 13 18   < > 23*   CREA 0.62 0.71 0.77   < > 0.91   CA 8.8 8.9 9.0   < > 8.6   MG 2.3 2.3 2.3   < > 2.3   PHOS  --  2.4* 3.4  --   --    AGAP 2* 4 5   < > 7   BUCR 15 18 23*   < > 25*   AP  --  40*  --   --  46   TP  --  6.4  --   --  6.6   ALB  --  3.1*  --   --  2.9*   GLOB  --  3.3  --   --  3.7   AGRAT  --  0.9  --   --  0.8    < > = values in this interval not displayed. CBC w/Diff Recent Labs     06/24/21 0522 06/23/21  0538 06/22/21  0409 06/21/21  1310 06/21/21  1310   WBC 18.2* 22.1* 24.7*   < > 26.0*   RBC 2.88* 2.94* 3.13*   < > 3.22*   HGB 8.2* 8.5* 9.1*   < > 9.4*   HCT 27.4* 28.2* 29.1*   < > 29.8*    226 241   < > 235   GRANS  --   --  91*  --  92*   LYMPH  --   --  2*  --  4*   EOS  --   --  2  --  0    < > = values in this interval not displayed.       Coagulation Recent Labs     06/23/21 0538   PTP 20.3*   INR 1.8*   APTT 49.2*       Liver Enzymes Recent Labs     06/23/21 0538   TP 6.4   ALB 3.1*   AP 40*      Thyroid Studies Lab Results   Component Value Date/Time    TSH 1.57 06/21/2021 02:12 PM          Signed By: Va Chopra PA-C     June 24, 2021

## 2021-06-24 NOTE — PROGRESS NOTES
PT orders received and chart reviewed. Pt currently with respiratory therapist at this time. Will follow up. 2nd attempt at [phone], pt just receiving lunch and family at bedside, will continue to follow.      Thank you for this referral.   Collette Joy PT DPT

## 2021-06-24 NOTE — PROGRESS NOTES
Pt on IR schedule tomorrow for port requested for tpn. IR does not recommend ports for this indication as literature shows much higher risk of infection vs. a tunneled central catheter with external lumens. Called ordering provider, Dr. Esteban Daly and relayed this recommendation. He agrees to place what is best and asked I discuss with patient tomorrow. He approves of IR recommendation on tunneled CVC over port.   [facility] 2 CV STEPDOWN  [address] [facility] [address]  [phone]  Colon and Rectal Surgery Progress Note      Patient: Alberto Ibrahim MRN: [id number]  SSN: [id number]    YOB: 1940  Age: 80 y.o. Sex: male      Admit Date: 6/21/2021    LOS: 3 days     Subjective: Tolerated fulls. Denies pain. Objective:     Vitals:    06/23/21 2056 06/24/21 0022 06/24/21 0420 06/24/21 0720   BP:  115/68 123/85 115/73   Pulse:  94 97 84   Resp:  23 21 23   Temp:  99 °F (37.2 °C) 97.5 °F (36.4 °C) 97.6 °F (36.4 °C)   SpO2: 100% 100% 100% 100%   Weight:   77.8 kg (171 lb 8 oz)    Height:            Intake and Output:  Current Shift: 06/24 0701 - 06/24 1900  In: 500 [P.O.:200;  I.V.:300]  Out: -   Last three shifts: 06/22 1901 - 06/24 0700  In: 1730 [I.V.:1730]  Out: 2725 [Urine:2725]    Physical Exam:     abd soft NTND    Lab/Data Review:    CMP:   Lab Results   Component Value Date/Time     (L) 06/24/2021 05:22 AM    K 4.3 06/24/2021 05:22 AM     06/24/2021 05:22 AM    CO2 28 06/24/2021 05:22 AM    AGAP 2 (L) 06/24/2021 05:22 AM    GLU 70 (L) 06/24/2021 05:22 AM    BUN 9 06/24/2021 05:22 AM    CREA 0.62 06/24/2021 05:22 AM    GFRAA >60 06/24/2021 05:22 AM    GFRNA >60 06/24/2021 05:22 AM    CA 8.8 06/24/2021 05:22 AM    MG 2.3 06/24/2021 05:22 AM     CBC:   Lab Results   Component Value Date/Time    WBC 18.2 (H) 06/24/2021 05:22 AM    HGB 8.2 (L) 06/24/2021 05:22 AM    HCT 27.4 (L) 06/24/2021 05:22 AM     06/24/2021 05:22 AM        Assessment:     Diverticulitis with abscess    Plan:     Continue abx  Advance to low fiber diet  Can D/C from my standpoint once tolerating solids  F/U with me in office after discharge 2 weeks  Abx course per ID  Call with concerns    Signed By: Manjit Santiago MD        June 24, 2021

## 2021-06-24 NOTE — PROGRESS NOTES
Bedside turnover given to David Bell. SBAR,MAR,ED summary given with updates R/T the night, a chance to ask questions was given. PT is on the cardiac tele monitor in stable condition. Bed is in the lowest position with the wheels locked. Call bell and personal effects  are on the bedside table within reach.

## 2021-06-24 NOTE — PROGRESS NOTES
RENAL DAILY PROGRESS NOTE    Patient: Shelbie Mendes               Sex: male          DOA: 6/21/2021  1:28 PM        YOB: 1940      Age:  80 y.o.        LOS:  LOS: 3 days     Subjective:     Shelbie Mendes is a 80 y.o.  who presents with Mesenteric abscess ([facility] [address] [K65.1]. Asked to evaluate for hyponatremia,had syncopal episodes. hx of mi,mesenteric abscess,copd  Chief complains: Patient denies nausea, vomiting, chest pain, dizziness, shortness of breath or headache.  - Reviewed last 24 hrs events     Current Facility-Administered Medications   Medication Dose Route Frequency    carvediloL (COREG) tablet 6.25 mg  6.25 mg Oral BID WITH MEALS    budesonide (PULMICORT) 500 mcg/2 ml nebulizer suspension  500 mcg Nebulization BID RT    apixaban (ELIQUIS) tablet 5 mg  5 mg Oral BID    arformoteroL (BROVANA) neb solution 15 mcg  15 mcg Nebulization BID RT    sodium chloride (NS) flush 5-40 mL  5-40 mL IntraVENous Q8H    sodium chloride (NS) flush 5-40 mL  5-40 mL IntraVENous PRN    acetaminophen (TYLENOL) tablet 650 mg  650 mg Oral Q6H PRN    Or    acetaminophen (TYLENOL) suppository 650 mg  650 mg Rectal Q6H PRN    ondansetron (ZOFRAN ODT) tablet 4 mg  4 mg Oral Q8H PRN    Or    ondansetron (ZOFRAN) injection 4 mg  4 mg IntraVENous Q6H PRN    piperacillin-tazobactam (ZOSYN) 3.375 g in 0.9% sodium chloride (MBP/ADV) 100 mL MBP  3.375 g IntraVENous Q6H    albuterol (PROVENTIL VENTOLIN) nebulizer solution 2.5 mg  2.5 mg Nebulization Q4H PRN    clopidogreL (PLAVIX) tablet 75 mg  75 mg Oral DAILY WITH BREAKFAST    gabapentin (NEURONTIN) capsule 100 mg  100 mg Oral TID    multivitamin, tx-iron-ca-min (THERA-M w/ IRON) tablet 1 Tablet  1 Tablet Oral DAILY    rosuvastatin (CRESTOR) tablet 10 mg  10 mg Oral DAILY    tamsulosin (FLOMAX) capsule 0.4 mg  0.4 mg Oral PCD    L. acidophilus,casei,rhamnosus (BIO-K PLUS) capsule 1 Capsule  1 Capsule Oral DAILY    ipratropium (ATROVENT) 0.02 % nebulizer solution 0.5 mg  0.5 mg Nebulization TID       Objective:     Visit Vitals  /66 (BP 1 Location: Left upper arm, BP Patient Position: At rest)   Pulse 84   Temp 97.8 °F (36.6 °C)   Resp 16   Ht 5' 8\" (1.727 m)   Wt 77.8 kg (171 lb 8 oz)   SpO2 100%   BMI 26.08 kg/m²       Intake/Output Summary (Last 24 hours) at 6/24/2021 1253  Last data filed at 6/24/2021 1152  Gross per 24 hour   Intake 800 ml   Output 1800 ml   Net -1000 ml       Physical Examination:       RS: Chest is bilateral equal,   CVS: S1-S2 heard,   Abdomen: Soft,   Extremities: No edema  CNS: Awake & follows commands,  HEENT: Head is atraumatic, PERRLA, conjunctiva pink & non icteric. No JVD or carotid bruit       Data Review:      Labs:     Hematology:   Recent Labs     06/24/21  0522 06/23/21  0538 06/22/21  0409 06/21/21  1310   WBC 18.2* 22.1* 24.7* 26.0*   HGB 8.2* 8.5* 9.1* 9.4*   HCT 27.4* 28.2* 29.1* 29.8*     Chemistry:   Recent Labs     06/24/21  0522 06/23/21  0538 06/22/21  0409 06/21/21  1310   BUN 9 13 18 23*   CREA 0.62 0.71 0.77 0.91   CA 8.8 8.9 9.0 8.6   ALB  --  3.1*  --  2.9*   K 4.3 4.3 4.3 4.6   * 132* 128* 125*    100 95* 92*   CO2 28 28 28 26   PHOS  --  2.4* 3.4  --    GLU 70* 76 81 121*        Images:    XR (Most Recent). CXR reviewed by me and compared with previous CXR Results from Hospital Encounter encounter on 06/07/21    XR CHEST PORT    Narrative  EXAM: XR CHEST PORT    INDICATION: RRT    COMPARISON: 11/25/2015    FINDINGS: A portable AP radiograph of the chest was obtained at 0 535 hours. Status post median sternotomy and clips over the mediastinum, stable. . Low lung  volumes. . Question streaky opacities in the lung bases. Question small right  pleural opacity. .  Enlarged cardiac silhouette. Osseous degenerative changes. Clips noted overlying the left neck. .    Impression  Enlarged cardiac silhouette.     Low lung volumes with streaky bibasilar densities. Please see report for additional details. CT (Most Recent) Results from Hospital Encounter encounter on 06/07/21    CT CHEST ABD PELV W CONT    Narrative  CT CHEST, ABDOMEN AND PELVIS WITH ENHANCEMENT    INDICATION: Leukocytosis. Abdominal pain. Myocardial infarction. TECHNIQUE: Axial images obtained of the chest, abdomen and pelvis following the  uneventful administration of 100 cc Isovue-300 nonionic intravenous contrast.  Coronal and sagittal reformatted images obtained. All CT scans are performed  using dose optimization techniques as appropriate to the performed exam  including the following: Automated exposure control, adjustment of mA and/or kV  according to patient size, and use of iterative reconstructive technique. COMPARISON: CTA chest 11/14/2015, CT abdomen and pelvis 6/16/2014. CHEST FINDINGS:  Lung: Emphysema. Pleura: Unremarkable. Heart: No effusion. CABG. Cardiomegaly. Vessels: No aortic aneurysm. Scattered wall calcifications. Lymph Nodes: Unremarkable. Thyroid: Stable to prior. The right gland is larger than the left and could  indicate an underlying nodule. Bones: Sternotomy. Lower thoracic degenerative disc disease. ABDOMEN AND PELVIS FINDINGS:  Liver: Unremarkable. Biliary: Unremarkable. Spleen: Unremarkable. Pancreas: Unremarkable. Adrenal Glands: Stable chronic mild nodular thickening of the left gland since  2014. Kidneys: Chronic left renal cortical scarring. Subcentimeter right renal cyst.    Bowel: Normal appendix. Diverticulosis. Thickened and collapsed segment of mid  sigmoid colon has an appearance similar to prior 2014 exam. There is mild  proximal sigmoid colon distention suggesting a stricture in this region. Positioned between this thickened segment of colon and a thickened inflamed loop  of small bowel is a mesenteric gas and fluid containing rim enhanced collection  measuring 3.5 x 2.3 cm.  At least 2 additional small phlegmonous developing  collections are seen closely along serosal mesenteric margin of the affected  small bowel loop best seen on sagittal image 31 and 38. Dilated fluid-filled small bowel loops transitioning at this abnormally  thickened small bowel loop however the bowel appears patent and gas and fluid  containing on both ends of this process suggesting a superimposed ileus  contributing to the picture. Bladder/ Pelvic Organs: Mild prostate enlargement. Mild thickening of the  bladder dome wall, likely reactive to adjacent mesenteric/small bowel process. Peritoneum/ Retroperitoneum: No free air. Trace free fluid. Mesenteric  inflammation in the pelvis as discussed above. Lymph Nodes: Borderline enlarged 9-10 mm mesenteric lymph nodes may be reactive. Vessels: Arteriosclerosis. Renal artery stents. Bones/Soft tissues: Mild chronic fatty left inguinal hernia is similar to prior. Degenerative disc disease throughout the lumbar spine most advanced at L2-L3. Right greater than left hip degenerative joint disease. Impression  1. No acute findings in the chest.  -Emphysema. -CABG. -Stable mildly enlarged right thyroid gland which could indicate underlying  nodule, stable for many years. 2. 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 It is unclear if initial source of abscess is from  small bowel inflammation or diverticulitis/pericolonic abscess. 3. Dilated small bowel. This is likely a combination of generalized small bowel  ileus and low-grade partial small bowel obstruction at site of abnormal pelvic  small bowel loop discussed above. 4. Diverticulosis. Stable thickened collapsed segment of sigmoid colon since  2014 suggesting a chronic stricture. This is limited for assessment by CT.   5. Bladder dome inflammation is likely secondary to the adjacent mesentery  process. 6. Other chronic incidental findings. Findings were discussed with emergency department physician on 6/21/2021 at 1:55  PM.       EKG No results found for this or any previous visit. I have personally reviewed the old medical records and patient's labs    Plan / Recommendation:      1.  Acute/chronic hyponatremia,very low urine sodium c/w pre renal etiology,back to baseline  2.cad,cabg,no signs of chf on exam  3.mesenteric abscess    D/w Dr. Brittni Olsen MD  Nephrology  6/24/2021

## 2021-06-25 NOTE — PROGRESS NOTES
Infectious Disease progress Note        Reason: Leukocytosis, evaluate for sepsis    Current abx Prior abx   Zosyn since 6/21/21 Ceftriaxone, intravenous metronidazole, oral vancomycin, IV vancomycin 6/20-6/21     Lines:       Assessment :    80-year-old man with past medical history of diastolic CHF, coronary artery disease status post CABG, history of lung cancer status post pneumonectomy, hypertension admitted to [facility] acute rehab on 6/7/2021. Recent hospitalization at [facility] 5/17-6/7 for non stemI s/p CABG  Post op course complicated by profound unexplained leukocytosis. Wbc:37 k on 6/7/21    Now with leukocytosis, diarrhea    Clinical presentation consistent with mesenteric abscess, acute on chronic sigmoid diverticulitis    CT without contrast 5/22 at [facility] revealed evidence of diverticular disease   Recent history of profound unexplained leukocytosis during stay at [facility]. Wbc:37 k on 6/7/21 could be due to flare up of diverticultis, diverticular perforation. Worsening leukocytosis since 6/20 likely due to recurrent diverticulitis. RLQ pain/tenderness on prior exam likely due to this. Colorectal surgery f/u appreciated. Plans for possible IR guided drainage noted. Elevated bilirubin 2.2 on 6/20/21- normal transaminases-likely due to sepsis. No evidence of undiagnosed hepatobiliary pathology noted on CT scan 6/21/2021    Syncope 6/21- likely vasovagal.  Cardiology follow-up appreciated    Subjective sense of improvement. Hemodynamically stable on today's exam    Recommendations:    1. D/c piperacillin/tazobactam  2. Recommend to switch to po cefpodoxime, metronidazole till 8/4/21   3. Follow-up colorectal surgery recommendations - patient will need surgical intervention/colectomy if fails conservative measures &/or recurrent diverticulitis-recommend to arrange follow-up with colorectal surgery  4. Probiotics while on antibiotics  5.   Discharge planning per primary team     Above plan was discussed in details with patient, primary team. Please call me if any further questions or concerns. Will continue to participate in the care of this patient. HPI:    Patient states that he feels better today compared to yesterday. Denies abdominal pain. Tolerating p.o. diet. Continues to feel dizzy when he sits up.     home Medication List    Details   nitroglycerin (Nitrostat) 0.4 mg SL tablet 0.4 mg by SubLINGual route every five (5) minutes as needed for Chest Pain. Up to 3 doses. fluticasone furoate-vilanteroL (Breo Ellipta) 100-25 mcg/dose inhaler Take 1 Puff by inhalation daily. therapeutic multivitamin (THERAGRAN) tablet Take 1 Tablet by mouth daily. rosuvastatin (Crestor) 10 mg tablet Take 10 mg by mouth nightly. furosemide (Lasix) 20 mg tablet Take 20 mg by mouth daily. clopidogreL (Plavix) 75 mg tab Take 75 mg by mouth daily. apixaban (ELIQUIS) 5 mg tablet Take 5 mg by mouth two (2) times a day. tamsulosin (FLOMAX) 0.4 mg capsule Take 1 Cap by mouth daily (after dinner). Qty: 30 Cap, Refills: 6      carvediloL (Coreg) 12.5 mg tablet Take 12.5 mg by mouth two (2) times daily (with meals). albuterol (PROVENTIL HFA, VENTOLIN HFA) 90 mcg/actuation inhaler Take  by inhalation.              Current Facility-Administered Medications   Medication Dose Route Frequency    carvediloL (COREG) tablet 6.25 mg  6.25 mg Oral BID WITH MEALS    budesonide (PULMICORT) 500 mcg/2 ml nebulizer suspension  500 mcg Nebulization BID RT    apixaban (ELIQUIS) tablet 5 mg  5 mg Oral BID    arformoteroL (BROVANA) neb solution 15 mcg  15 mcg Nebulization BID RT    sodium chloride (NS) flush 5-40 mL  5-40 mL IntraVENous Q8H    sodium chloride (NS) flush 5-40 mL  5-40 mL IntraVENous PRN    acetaminophen (TYLENOL) tablet 650 mg  650 mg Oral Q6H PRN    Or    acetaminophen (TYLENOL) suppository 650 mg  650 mg Rectal Q6H PRN    ondansetron (ZOFRAN ODT) tablet 4 mg  4 mg Oral Q8H PRN Or    ondansetron (ZOFRAN) injection 4 mg  4 mg IntraVENous Q6H PRN    piperacillin-tazobactam (ZOSYN) 3.375 g in 0.9% sodium chloride (MBP/ADV) 100 mL MBP  3.375 g IntraVENous Q6H    albuterol (PROVENTIL VENTOLIN) nebulizer solution 2.5 mg  2.5 mg Nebulization Q4H PRN    clopidogreL (PLAVIX) tablet 75 mg  75 mg Oral DAILY WITH BREAKFAST    gabapentin (NEURONTIN) capsule 100 mg  100 mg Oral TID    multivitamin, tx-iron-ca-min (THERA-M w/ IRON) tablet 1 Tablet  1 Tablet Oral DAILY    rosuvastatin (CRESTOR) tablet 10 mg  10 mg Oral DAILY    tamsulosin (FLOMAX) capsule 0.4 mg  0.4 mg Oral PCD    L. acidophilus,casei,rhamnosus (BIO-K PLUS) capsule 1 Capsule  1 Capsule Oral DAILY    ipratropium (ATROVENT) 0.02 % nebulizer solution 0.5 mg  0.5 mg Nebulization TID       Allergies: Lipitor [atorvastatin] and Norvasc [amlodipine]    Temp (24hrs), Av.1 °F (36.7 °C), Min:97.7 °F (36.5 °C), Max:98.5 °F (36.9 °C)    Visit Vitals  /80   Pulse 99   Temp 97.7 °F (36.5 °C)   Resp 20   Ht 5' 8\" (1.727 m)   Wt 78.5 kg (173 lb)   SpO2 100%   BMI 26.30 kg/m²       ROS: 12 point ROS obtained in details. Pertinent positives as mentioned in HPI,   otherwise negative    Physical Exam:    General: Well developed, well nourished male laying on the bed/sitting on the  bed AAOx3 in no acute distress. General:   awake alert and oriented   HEENT:  Normocephalic, atraumatic,  EOMI, no scleral icterus or pallor; no conjunctival hemmohage;  nasal and oral mucous are moist and without evidence of lesions. No thrush. Dentition good. Neck supple, no bruits. Lymph Nodes:   no cervical, axillary or inguinal adenopathy   Lungs:   non-labored, bilaterally clear to auscultation- no crackles wheezes rales or rhonchi   Heart:  RRR, s1 and s2; no  rubs or gallops, no edema   Abdomen:  soft, non-distended, active bowel sounds, no hepatomegaly, no splenomegaly.   Resolved abdominal tenderness   Genitourinary:  deferred   Extremities:   no clubbing, cyanosis; no joint effusions or swelling; Full ROM of all large joints to the upper and lower extremities; muscle mass appropriate for age   Neurologic:  No gross focal sensory abnormalities; 5/5 muscle strength to upper and lower extremities. Speech appropriate. Cranial nerves intact                        Skin:  No rash or ulcers noted   Back:  no spinal or paraspinal muscle tenderness or rigidity, no CVA tenderness     Psychiatric:  No suicidal or homicidal ideations, appropriate mood and affect         Labs: Results:   Chemistry Recent Labs     06/24/21  0522 06/23/21  0538   GLU 70* 76   * 132*   K 4.3 4.3    100   CO2 28 28   BUN 9 13   CREA 0.62 0.71   CA 8.8 8.9   AGAP 2* 4   BUCR 15 18   AP  --  40*   TP  --  6.4   ALB  --  3.1*   GLOB  --  3.3   AGRAT  --  0.9      CBC w/Diff Recent Labs     06/25/21  0534 06/24/21  0522 06/23/21  0538   WBC 18.4* 18.2* 22.1*   RBC 3.00* 2.88* 2.94*   HGB 8.6* 8.2* 8.5*   HCT 28.5* 27.4* 28.2*    246 226      Microbiology No results for input(s): CULT in the last 72 hours. RADIOLOGY:    All available imaging studies/reports in [facility] for this admission were reviewed      Disclaimer: Sections of this note are dictated utilizing voice recognition software, which may have resulted in some phonetic based errors in grammar and contents. Even though attempts were made to correct all the mistakes, some may have been missed, and remained in the body of the document. If questions arise, please contact our department.     Dr. Ace Cornell, Infectious Disease Specialist  [phone]  June 25, 2021  9:47 AM

## 2021-06-25 NOTE — ROUTINE PROCESS
Bedside shift change report given to Miguel Portillo (oncoming nurse) by Ajay Trimble RN (offgoing nurse). Report included the following information SBAR, Kardex, Intake/Output, MAR, Recent Results and Cardiac Rhythm A fib.

## 2021-06-25 NOTE — PROGRESS NOTES
1248-Discharge order noted for today. CM called pt's wife to see which home health agency she had chosen. Pt's wife states that she had a few in mind that she believed were in network with pt's insurance. CM sent referrals to those agencies, however all have declined. 1315-CM has greatly expanded home health search in [facility] 26 in attempt to find an agency that goes to their address [facility]) and is can accept pt's [employer]. 26- New PT note entered. PT now recommending rehab again. CM has reached out to [facility] to see if they would be able to accept pt back. 1334- CM met with pt to discuss rehab. Pt states, \"I'm sick of all of this. \" He defers all decision making to his wife. CM called pt's wife to discuss further. No answer to the phone, left a voicemail. 1400- Met with wife at the bedside. Stated she wanted to take pt home. CM informed her CM was working on finding a [facility] agency. 5- Notified by Director Bereket Montoya that pt's wife has now changed her mind and wants pt to go to rehab. CM has called and asked [facility] if they would accept. 1557- Notified by Art Lyman that they will submit for auth for [facility] admission. CM informed pt and called pt's wife to inform as well.     1600- MD Reginald Smith aware of current d/c plan.           hSaron Greer RN BSN  Care Manager  [phone]

## 2021-06-25 NOTE — PROGRESS NOTES
[facility] REFERRAL CONTACT NOTE  [id number] [place] [address] for Physical Rehabilitation    RE:  Logan Britton    Referral received to review this patient's case for admission to [id number] [place] [address] for Physical Rehabilitation. Current status reviewed with team and patient meets criteria for admission to [facility] for Physical Rehabilitation pending authorization from [facility]. Case has been opened with [facility]; and is pending review. Writer will notify  of status/determination once obtained. Thank you for this referral.  Should you have any questions please do not hesitate to call. Sincerely,  Elvin Yuan. KULDIP Meehan  [address] [place] [address] Physical Rehabilitation  [phone]

## 2021-06-25 NOTE — PROGRESS NOTES
Spoke to patient and his wife about their questions regarding recommendations for rehab vs home with home health. After much discussion amongst themselves they both came to the decision that they would like for Mr Cely Burciaga to go to the [facility] unit here. If not available, they would like for him to go to a rehab facility in [place] if one is available.       CM updated on patient and wife's decision

## 2021-06-25 NOTE — PROGRESS NOTES
Problem: Mobility Impaired (Adult and Pediatric)  Goal: *Acute Goals and Plan of Care (Insert Text)  Description: Physical Therapy Goals  Initiated 6/25/2021 and to be accomplished within 7 day(s)  1. Patient will move from supine to sit and sit to supine  in bed with modified independence. 2.  Patient will transfer from bed to chair and chair to bed with modified independence using the least restrictive device. 3.  Patient will perform sit to stand with modified independence. 4.  Patient will ambulate with modified independence for 150 feet with the least restrictive device. 5.  Patient will ascend/descend 3 stairs with handrail(s) with supervision/set-up. PLOF: Mod I.    6/25/2021 1319 by Daron Kam, PT  Outcome: Progressing Towards Goal  PHYSICAL THERAPY EVALUATION    Patient: Roldan Reyna (37 y.o. male)  Date: 6/25/2021  Primary Diagnosis: Mesenteric abscess ([facility] [address] [K65.1]  Precautions: Fall  ASSESSMENT :  Discharge orders in chart. Co-treated with OT to maximize patient safety and participation in functional mobility. HR 110s at rest. Supervision for supine to sit. Min A for sit to stand. Amb 15ft with ww and min A. Poor standing posture with flexion at knees and trunk. Cues for safety and walker negotiation. Min A for transfers and standing and seated dynamic balance for ADL tasks. Poor safety awareness. Increased time for processing commands. Amb 10ft with ww and min A to return to bed. Observed shortness of breath; prolonged time for reading via O2 sensor to forehead, O2 saturation 100%. HR in 90s post bathroom. Min A for sit to supine into bed; assist with LLE. HR returned to 110s upon return to bed. Seated in bed with HOB elevated. Educated on need for RN assistance with mobility; verbalized understanding. Call bell in reach. Would recommend discharge to rehab to maximize patient independence in functional mobility and decrease caregiver burden.      Patient will benefit from skilled intervention to address the above impairments. Patient's rehabilitation potential is considered to be Fair  Factors which may influence rehabilitation potential include:   []         None noted  []         Mental ability/status  [x]         Medical condition  []         Home/family situation and support systems  []         Safety awareness  []         Pain tolerance/management  []         Other:      PLAN :  Recommendations and Planned Interventions:   [x]           Bed Mobility Training             [x]    Neuromuscular Re-Education  [x]           Transfer Training                   []    Orthotic/Prosthetic Training  [x]           Gait Training                          []    Modalities  [x]           Therapeutic Exercises           []    Edema Management/Control  [x]           Therapeutic Activities            [x]    Family Training/Education  [x]           Patient Education  []           Other (comment):    Frequency/Duration: Patient will be followed by physical therapy 1-2 times per day/4-7 days per week to address goals. Discharge Recommendations: Rehab  Further Equipment Recommendations for Discharge: rolling walker     SUBJECTIVE:   Patient stated Roxanne Bentley are getting me one. 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, brainstem and cerebellum) without residual deficit    Anticoagulated by anticoagulation treatment 5/19/2021    On Apixaban    Benign prostatic hyperplasia with urinary retention     Chronic obstructive pulmonary disease (COPD) ([facility] [address]     Coronary artery disease involving native coronary artery of native heart     Essential hypertension     Heart failure with preserved left ventricular function (HFpEF) ([facility] [address]     2D echocardiogram (5/18/2021) showed EF 50%; concentric LV hypertrophy with a severely thickened septal wall and mildly thickened posterior wall; left atrial chamber is severely enlarged; right atrial chamber volume is moderately enlarged; no mass, shunts, or thrombi.  History of carotid stenosis     History of gross hematuria 5/26/2021    Attributed to [place] trauma while patient was altered    History of renal artery stenosis     Leukocytosis 5/17/2021    Attributed to reactive leukocytosis due to NSTEMI    Malignant neoplasm of lower lobe of right lung (HCC)     Neuropathy involving both lower extremities 10/22/2020    Non-ST elevation (NSTEMI) myocardial infarction ([address] 5/17/2021    On clopidogrel therapy 5/19/2021    Paroxysmal atrial fibrillation ([address] 5/17/2021    Peripheral vascular disease of extremity with claudication ([address] 6/14/2021    Urinary retention      Past Surgical History:   Procedure Laterality Date    HX CAROTID ENDARTERECTOMY Left 06/07/2012    HX CAROTID ENDARTERECTOMY Right 05/28/2014    Dr. Ami Hernandez [address] [facility]    HX CORONARY STENT PLACEMENT  05/18/2021    S/P PCI to proximal SVG with 3.5 x 12 mm Juan J drug-eluting stent; PCI to mid SVG with 3.5 x 34 mm Lincoln drug-eluting stent; PCI to distal SVG with 3.5 x 15 mm Lincoln drug-eluting stent;  Balloon angioplasty to distal SVG anastomosis (5/18/2021 - Dr. Trevin Bautista)     HX LOBECTOMY Right     Middle lobe and lower lobe    HX RENAL ARTERY STENT Bilateral 2011    HX TONSILLECTOMY      IR BRONCHOSCOPY  11/25/2015    Dr. Leif Sandoval     Barriers to Learning/Limitations: None  Compensate with: Visual Cues, Verbal Cues, Tactile Cues and Kinesthetic Cues    Home Situation:  Home Situation  Home Environment: Private residence  # Steps to Enter: 3  One/Two Story Residence: Two story  # of Interior Steps: 7  Interior Rails: Both  Living Alone: No  Support Systems: Spouse/Significant Other/Partner  Patient Expects to be Discharged to[de-identified] House  Current DME Used/Available at Home: None  Tub or Shower Type: Tub/Shower combination    Critical Behavior:  Neurologic State: Alert  Orientation Level: Oriented to person;Oriented to place  Cognition: Follows commands;Poor safety awareness     Psychosocial  Patient Behaviors: Cooperative    Strength:    Manual Muscle Testing (LE)         R     L    Hip Flexion:   4+/5  4+/5  Knee EXT:   4+/5  4+/5  Knee FLEX:   4+/5  4+/5  Ankle DF:   4+/5  4+/5  _________________________________________________   Range Of Motion:  BLE AROM WFL  Functional Mobility:  Bed Mobility:  Supine to Sit: Supervision  Sit to Supine: Minimum assistance  Transfers:  Sit to Stand: Minimum assistance  Stand to Sit: Minimum assistance  Balance:   Sitting: Impaired  Sitting - Static: Good (unsupported)  Sitting - Dynamic: Fair (occasional)  Standing: Impaired  Standing - Static: Fair  Standing - Dynamic : Fair  Ambulation/Gait Training:  Distance (ft):  (15ft, 10ft)   Assistive Device: Walker, rolling  Ambulation - Level of Assistance: Minimal assistance  Neuro Re-education:  Seated balance; 5 minutes  Standing balance; 5 minutes  Therapeutic Exercises:   Sit to stand x2  Lateral scoot x3    Pain:  Pain level pre-treatment: 0/10   Pain level post-treatment: 0/10     Activity Tolerance:   Fair    After treatment:   []         Patient left in no apparent distress sitting up in chair  [x]         Patient left in no apparent distress in bed  [x]         Call bell left within reach  [x]         Nursing notified  []         Caregiver present  []         Bed alarm activated  []         SCDs applied    COMMUNICATION/EDUCATION:   [x]         Role of physical therapy and plan of care in the acute care setting. [x]         Fall prevention education was provided and the patient/caregiver indicated understanding. [x]         Patient/family have participated as able in goal setting and plan of care.   []         Patient/family agree to work toward stated goals and plan of care. []         Patient understands intent and goals of therapy, but is neutral about his/her participation. []         Patient is unable to participate in goal setting/plan of care: ongoing with therapy staff.     Thank you for this referral.  Edson Adame, PT   Time Calculation: 23 mins    Eval Complexity: History: MEDIUM  Complexity : 1-2 comorbidities / personal factors will impact the outcome/ POC Exam:MEDIUM Complexity : 3 Standardized tests and measures addressing body structure, function, activity limitation and / or participation in recreation  Presentation: MEDIUM Complexity : Evolving with changing characteristics  Clinical Decision Making:Medium Complexity    Clinical judgement; ROM, MMT, functional mobility Overall Complexity:MEDIUM

## 2021-06-25 NOTE — PROGRESS NOTES
RENAL DAILY PROGRESS NOTE    Patient: Montez James               Sex: male          DOA: 6/21/2021  1:28 PM        YOB: 1940      Age:  80 y.o.        LOS:  LOS: 4 days     Subjective:     Montez James is a 80 y.o.  who presents with Mesenteric abscess ([facility] [address] [K65.1]. Asked to evaluate for hyponatremia,had syncopal episodes. hx of mi,mesenteric abscess,copd  Chief complains: Patient denies nausea, vomiting, chest pain, dizziness, shortness of breath or headache.  - Reviewed last 24 hrs events     Current Facility-Administered Medications   Medication Dose Route Frequency    cefpodoxime (VANTIN) tablet 200 mg  200 mg Oral Q12H    metroNIDAZOLE (FLAGYL) tablet 500 mg  500 mg Oral Q12H    carvediloL (COREG) tablet 6.25 mg  6.25 mg Oral BID WITH MEALS    budesonide (PULMICORT) 500 mcg/2 ml nebulizer suspension  500 mcg Nebulization BID RT    apixaban (ELIQUIS) tablet 5 mg  5 mg Oral BID    arformoteroL (BROVANA) neb solution 15 mcg  15 mcg Nebulization BID RT    sodium chloride (NS) flush 5-40 mL  5-40 mL IntraVENous Q8H    sodium chloride (NS) flush 5-40 mL  5-40 mL IntraVENous PRN    acetaminophen (TYLENOL) tablet 650 mg  650 mg Oral Q6H PRN    Or    acetaminophen (TYLENOL) suppository 650 mg  650 mg Rectal Q6H PRN    ondansetron (ZOFRAN ODT) tablet 4 mg  4 mg Oral Q8H PRN    Or    ondansetron (ZOFRAN) injection 4 mg  4 mg IntraVENous Q6H PRN    albuterol (PROVENTIL VENTOLIN) nebulizer solution 2.5 mg  2.5 mg Nebulization Q4H PRN    clopidogreL (PLAVIX) tablet 75 mg  75 mg Oral DAILY WITH BREAKFAST    gabapentin (NEURONTIN) capsule 100 mg  100 mg Oral TID    multivitamin, tx-iron-ca-min (THERA-M w/ IRON) tablet 1 Tablet  1 Tablet Oral DAILY    rosuvastatin (CRESTOR) tablet 10 mg  10 mg Oral DAILY    tamsulosin (FLOMAX) capsule 0.4 mg  0.4 mg Oral PCD    L. acidophilus,casei,rhamnosus (BIO-K PLUS) capsule 1 Capsule  1 Capsule Oral DAILY    ipratropium (ATROVENT) 0.02 % nebulizer solution 0.5 mg  0.5 mg Nebulization TID       Objective:     Visit Vitals  /84   Pulse 97   Temp 98 °F (36.7 °C)   Resp 18   Ht 5' 8\" (1.727 m)   Wt 78.5 kg (173 lb)   SpO2 100%   BMI 26.30 kg/m²       Intake/Output Summary (Last 24 hours) at 6/25/2021 1409  Last data filed at 6/25/2021 1324  Gross per 24 hour   Intake 1000 ml   Output 400 ml   Net 600 ml       Physical Examination:       RS: Chest is bilateral equal,   CVS: S1-S2 heard,   Abdomen: Soft,   Extremities: No edema  CNS: Awake & follows commands,  HEENT: Head is atraumatic, PERRLA, conjunctiva pink & non icteric. No JVD or carotid bruit       Data Review:      Labs:     Hematology:   Recent Labs     06/25/21  0534 06/24/21  0522 06/23/21  0538   WBC 18.4* 18.2* 22.1*   HGB 8.6* 8.2* 8.5*   HCT 28.5* 27.4* 28.2*     Chemistry:   Recent Labs     06/24/21  0522 06/23/21  0538   BUN 9 13   CREA 0.62 0.71   CA 8.8 8.9   ALB  --  3.1*   K 4.3 4.3   * 132*    100   CO2 28 28   PHOS  --  2.4*   GLU 70* 76        Images:    XR (Most Recent). CXR reviewed by me and compared with previous CXR Results from Hospital Encounter encounter on 06/07/21    XR CHEST PORT    Narrative  EXAM: XR CHEST PORT    INDICATION: RRT    COMPARISON: 11/25/2015    FINDINGS: A portable AP radiograph of the chest was obtained at 0 535 hours. Status post median sternotomy and clips over the mediastinum, stable. . Low lung  volumes. . Question streaky opacities in the lung bases. Question small right  pleural opacity. .  Enlarged cardiac silhouette. Osseous degenerative changes. Clips noted overlying the left neck. .    Impression  Enlarged cardiac silhouette. Low lung volumes with streaky bibasilar densities. Please see report for additional details.        CT (Most Recent) Results from Hospital Encounter encounter on 06/07/21    CT CHEST ABD PELV W CONT    Narrative  CT CHEST, ABDOMEN AND PELVIS WITH ENHANCEMENT    INDICATION: Leukocytosis. Abdominal pain. Myocardial infarction. TECHNIQUE: Axial images obtained of the chest, abdomen and pelvis following the  uneventful administration of 100 cc Isovue-300 nonionic intravenous contrast.  Coronal and sagittal reformatted images obtained. All CT scans are performed  using dose optimization techniques as appropriate to the performed exam  including the following: Automated exposure control, adjustment of mA and/or kV  according to patient size, and use of iterative reconstructive technique. COMPARISON: CTA chest 11/14/2015, CT abdomen and pelvis 6/16/2014. CHEST FINDINGS:  Lung: Emphysema. Pleura: Unremarkable. Heart: No effusion. CABG. Cardiomegaly. Vessels: No aortic aneurysm. Scattered wall calcifications. Lymph Nodes: Unremarkable. Thyroid: Stable to prior. The right gland is larger than the left and could  indicate an underlying nodule. Bones: Sternotomy. Lower thoracic degenerative disc disease. ABDOMEN AND PELVIS FINDINGS:  Liver: Unremarkable. Biliary: Unremarkable. Spleen: Unremarkable. Pancreas: Unremarkable. Adrenal Glands: Stable chronic mild nodular thickening of the left gland since  2014. Kidneys: Chronic left renal cortical scarring. Subcentimeter right renal cyst.    Bowel: Normal appendix. Diverticulosis. Thickened and collapsed segment of mid  sigmoid colon has an appearance similar to prior 2014 exam. There is mild  proximal sigmoid colon distention suggesting a stricture in this region. Positioned between this thickened segment of colon and a thickened inflamed loop  of small bowel is a mesenteric gas and fluid containing rim enhanced collection  measuring 3.5 x 2.3 cm. At least 2 additional small phlegmonous developing  collections are seen closely along serosal mesenteric margin of the affected  small bowel loop best seen on sagittal image 31 and 38.     Dilated fluid-filled small bowel loops transitioning at this abnormally  thickened small bowel loop however the bowel appears patent and gas and fluid  containing on both ends of this process suggesting a superimposed ileus  contributing to the picture. Bladder/ Pelvic Organs: Mild prostate enlargement. Mild thickening of the  bladder dome wall, likely reactive to adjacent mesenteric/small bowel process. Peritoneum/ Retroperitoneum: No free air. Trace free fluid. Mesenteric  inflammation in the pelvis as discussed above. Lymph Nodes: Borderline enlarged 9-10 mm mesenteric lymph nodes may be reactive. Vessels: Arteriosclerosis. Renal artery stents. Bones/Soft tissues: Mild chronic fatty left inguinal hernia is similar to prior. Degenerative disc disease throughout the lumbar spine most advanced at L2-L3. Right greater than left hip degenerative joint disease. Impression  1. No acute findings in the chest.  -Emphysema. -CABG. -Stable mildly enlarged right thyroid gland which could indicate underlying  nodule, stable for many years. 2. 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 It is unclear if initial source of abscess is from  small bowel inflammation or diverticulitis/pericolonic abscess. 3. Dilated small bowel. This is likely a combination of generalized small bowel  ileus and low-grade partial small bowel obstruction at site of abnormal pelvic  small bowel loop discussed above. 4. Diverticulosis. Stable thickened collapsed segment of sigmoid colon since  2014 suggesting a chronic stricture. This is limited for assessment by CT. 5. Bladder dome inflammation is likely secondary to the adjacent mesentery  process. 6. Other chronic incidental findings. Findings were discussed with emergency department physician on 6/21/2021 at 1:55  PM.       EKG No results found for this or any previous visit.      I have personally reviewed the old medical records and patient's labs    Plan / Recommendation:      1. Acute/chronic hyponatremia,very low urine sodium c/w pre renal etiology,improved. recheck bmp.discussed with wife  2.cad,cabg,no signs of chf on exam  3.mesenteric abscess  4.afib,on coreg    D/w Dr. Juliette Kirkpatrick MD  Nephrology  6/25/2021

## 2021-06-26 NOTE — PROGRESS NOTES
Hospitalist Progress Note    Patient: Cheng Blood Age: 80 y.o. : 1940 MR#: [id number] SSN: [id number]  Date/Time: 2021 12:10 PM    DOA: 2021  PCP: Navi Anders MD    Subjective:     He expressed wanting to get up and OOB. He wants to participate in PT  Apparently, his HR was downed 30s while laying in bed last evening. This resolved after he had flatus   No further report of bradycardia. He remains on carvedilol 6.25mg     Otherwise, he feels better. Feels that his heels numbness bilaterally    No fever, decrease leukocytosis   Tolerated oral diet. No diarrhea. Interval Hospital Course:  80 y.o male with HTN, CAD with CABG, persistent AFIB, COPD, PAD, h/o embolic CVA, anemia, presented from ARU for unresponsive while on toilet. RRT called to find hypotension and bradycardia required atropine. In the ER, he was found tachycardia, stomach pain, recent CT with possible mesenteric abscess. He was started on IV zosyn for sepsis concern. ROS: No current fever/chills, no headache, no dizziness, no sinus congestion,   No swallowing pain, No chest pain, no palpitation, no shortness of breath, + abd pain,  No diarrhea, no urinary complaint, no leg pain or swelling    Assessment/Plan:     1. 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 Sepsis poa (leukocytosis+tachycardia+abscess)   3. Symptomatic bradycardia, transient, resolved   4. Syncope and collapse, resolved    5. Hypotension, hypovolemia, resolved   6. CAD with CABG, recent NSTEMI s/p stent placement  7. Persistent AFIB   8. PAD   9. COPD   10. Recent embolic CVA   11.  Leukocytosis, improving    12. Hyponatremia  13. Normocytic anemia       Will follow up with cardiology for further care  Continue his cardiac medications, Plavix needed, statin, carvedilol continue for now  Cardiology follows.   He is ready for ARU, continue cefepime and flagyl  Advance to low fiber diet as tolerated. Blood culture follow up  Surgery follows,  no intervention at this time due to his cardiac risk and the need to be on eliquis and plavix   Appreciate IR consult   Continue bronchodilator, budesonide, arformoterol   ICS  PT/OT   PPI    Full code  Spoke with wife with clinical updates. ICM is working on discharge ARU      Additional Notes:    Time spent >30 minutes    Case discussed with:  [x]Patient  [x]Family  [x]Nursing  [x]Case Management  DVT Prophylaxis:  []Lovenox  [x]eliquis  []SCDs  []Coumadin   []On Heparin gtt    Signed By: Yoly Smith MD     2021 12:10 PM              Objective:   VS:   Visit Vitals  BP (!) 152/90   Pulse 93   Temp 98.1 °F (36.7 °C)   Resp 20   Ht 5' 8\" (1.727 m)   Wt 80.3 kg (177 lb 1.6 oz)   SpO2 100%   BMI 26.93 kg/m²      Tmax/24hrs: Temp (24hrs), Av.2 °F (36.8 °C), Min:97.3 °F (36.3 °C), Max:98.7 °F (37.1 °C)      Intake/Output Summary (Last 24 hours) at 2021 1210  Last data filed at 2021 1142  Gross per 24 hour   Intake 600 ml   Output 820 ml   Net -220 ml       Tele:   General:  Cooperative, Not in acute distress, speaks in full sentence while in bed  HEENT: PERRL, EOMI, supple neck, no JVD, dry oral mucosa  Cardiovascular: S1S2 irregular, no rub/gallop   Pulmonary:  air entry bilaterally, no wheezing, no crackle  GI:  Soft, non tender, non distended, +bs, no guarding   Extremities:  No pedal edema, +distal pulses appreciated   Neuro: AOx3, moving all extremities, no gross deficit.      Additional:       Current Facility-Administered Medications   Medication Dose Route Frequency    cefpodoxime (VANTIN) tablet 200 mg  200 mg Oral Q12H    metroNIDAZOLE (FLAGYL) tablet 500 mg  500 mg Oral Q12H    carvediloL (COREG) tablet 6.25 mg  6.25 mg Oral BID WITH MEALS    budesonide (PULMICORT) 500 mcg/2 ml nebulizer suspension  500 mcg Nebulization BID RT    apixaban (ELIQUIS) tablet 5 mg  5 mg Oral BID    arformoteroL (BROVANA) neb solution 15 mcg  15 mcg Nebulization BID RT    sodium chloride (NS) flush 5-40 mL  5-40 mL IntraVENous Q8H    sodium chloride (NS) flush 5-40 mL  5-40 mL IntraVENous PRN    acetaminophen (TYLENOL) tablet 650 mg  650 mg Oral Q6H PRN    Or    acetaminophen (TYLENOL) suppository 650 mg  650 mg Rectal Q6H PRN    ondansetron (ZOFRAN ODT) tablet 4 mg  4 mg Oral Q8H PRN    Or    ondansetron (ZOFRAN) injection 4 mg  4 mg IntraVENous Q6H PRN    albuterol (PROVENTIL VENTOLIN) nebulizer solution 2.5 mg  2.5 mg Nebulization Q4H PRN    clopidogreL (PLAVIX) tablet 75 mg  75 mg Oral DAILY WITH BREAKFAST    gabapentin (NEURONTIN) capsule 100 mg  100 mg Oral TID    multivitamin, tx-iron-ca-min (THERA-M w/ IRON) tablet 1 Tablet  1 Tablet Oral DAILY    rosuvastatin (CRESTOR) tablet 10 mg  10 mg Oral DAILY    tamsulosin (FLOMAX) capsule 0.4 mg  0.4 mg Oral PCD    L. acidophilus,casei,rhamnosus (BIO-K PLUS) capsule 1 Capsule  1 Capsule Oral DAILY    ipratropium (ATROVENT) 0.02 % nebulizer solution 0.5 mg  0.5 mg Nebulization TID            Lab/Data Review:  Labs: Results:       Chemistry Recent Labs     06/24/21 0522   GLU 70*   *   K 4.3      CO2 28   BUN 9   CREA 0.62   BUCR 15   AGAP 2*   CA 8.8     No results for input(s): TBIL, ALT, ALKP, TP, ALB, GLOB, AGRAT in the last 72 hours. No lab exists for component: SGOT   CBC w/Diff Recent Labs     06/25/21  0534 06/24/21  0522 06/24/21  0522   WBC 18.4*  --  18.2*   RBC 3.00*  --  2.88*   HGB 8.6*  --  8.2*   HCT 28.5*  --  27.4*   MCV 95.0   < > 95.1   MCH 28.7   < > 28.5   MCHC 30.2*   < > 29.9*   RDW 17.4*   < > 17.2*     --  246    < > = values in this interval not displayed. Coagulation No results for input(s): PTP, INR, APTT, INREXT, INREXT in the last 72 hours.     Iron/Ferritin No results found for: IRON, FE, TIBC, IBCT, PSAT, FERR    BNP    Cardiac Enzymes Lab Results   Component Value Date/Time    CK 78 06/22/2021 04:09 AM    CK - MB 2.3 06/22/2021 04:09 AM    CK-MB Index 2.9 06/22/2021 04:09 AM    Troponin-I, QT 0.07 (H) 06/22/2021 04:09 AM        Lactic Acid    Thyroid Studies          All Micro Results     None            Images:    CT (Most Recent). CT Results (most recent):  Results from Hospital Encounter encounter on 06/07/21    CT CHEST ABD PELV W CONT    Narrative  CT CHEST, ABDOMEN AND PELVIS WITH ENHANCEMENT    INDICATION: Leukocytosis. Abdominal pain. Myocardial infarction. TECHNIQUE: Axial images obtained of the chest, abdomen and pelvis following the  uneventful administration of 100 cc Isovue-300 nonionic intravenous contrast.  Coronal and sagittal reformatted images obtained. All CT scans are performed  using dose optimization techniques as appropriate to the performed exam  including the following: Automated exposure control, adjustment of mA and/or kV  according to patient size, and use of iterative reconstructive technique. COMPARISON: CTA chest 11/14/2015, CT abdomen and pelvis 6/16/2014. CHEST FINDINGS:  Lung: Emphysema. Pleura: Unremarkable. Heart: No effusion. CABG. Cardiomegaly. Vessels: No aortic aneurysm. Scattered wall calcifications. Lymph Nodes: Unremarkable. Thyroid: Stable to prior. The right gland is larger than the left and could  indicate an underlying nodule. Bones: Sternotomy. Lower thoracic degenerative disc disease. ABDOMEN AND PELVIS FINDINGS:  Liver: Unremarkable. Biliary: Unremarkable. Spleen: Unremarkable. Pancreas: Unremarkable. Adrenal Glands: Stable chronic mild nodular thickening of the left gland since  2014. Kidneys: Chronic left renal cortical scarring. Subcentimeter right renal cyst.    Bowel: Normal appendix. Diverticulosis. Thickened and collapsed segment of mid  sigmoid colon has an appearance similar to prior 2014 exam. There is mild  proximal sigmoid colon distention suggesting a stricture in this region.   Positioned between this thickened segment of colon and a thickened inflamed loop  of small bowel is a mesenteric gas and fluid containing rim enhanced collection  measuring 3.5 x 2.3 cm. At least 2 additional small phlegmonous developing  collections are seen closely along serosal mesenteric margin of the affected  small bowel loop best seen on sagittal image 31 and 38. Dilated fluid-filled small bowel loops transitioning at this abnormally  thickened small bowel loop however the bowel appears patent and gas and fluid  containing on both ends of this process suggesting a superimposed ileus  contributing to the picture. Bladder/ Pelvic Organs: Mild prostate enlargement. Mild thickening of the  bladder dome wall, likely reactive to adjacent mesenteric/small bowel process. Peritoneum/ Retroperitoneum: No free air. Trace free fluid. Mesenteric  inflammation in the pelvis as discussed above. Lymph Nodes: Borderline enlarged 9-10 mm mesenteric lymph nodes may be reactive. Vessels: Arteriosclerosis. Renal artery stents. Bones/Soft tissues: Mild chronic fatty left inguinal hernia is similar to prior. Degenerative disc disease throughout the lumbar spine most advanced at L2-L3. Right greater than left hip degenerative joint disease. Impression  1. No acute findings in the chest.  -Emphysema. -CABG. -Stable mildly enlarged right thyroid gland which could indicate underlying  nodule, stable for many years. 2. 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 It is unclear if initial source of abscess is from  small bowel inflammation or diverticulitis/pericolonic abscess. 3. Dilated small bowel. This is likely a combination of generalized small bowel  ileus and low-grade partial small bowel obstruction at site of abnormal pelvic  small bowel loop discussed above. 4. Diverticulosis.  Stable thickened collapsed segment of sigmoid colon since  2014 suggesting a chronic stricture. This is limited for assessment by CT. 5. Bladder dome inflammation is likely secondary to the adjacent mesentery  process. 6. Other chronic incidental findings. Findings were discussed with emergency department physician on 6/21/2021 at 1:55  PM.         XRAY (Most Recent)      EKG No results found for this or any previous visit.      2D ECHO

## 2021-06-26 NOTE — PROGRESS NOTES
Hospitalist Progress Note    Patient: Luis Garcia Age: 80 y.o. : 1940 MR#: [id number] SSN: [id number]  Date/Time: 2021 12:00 PM    DOA: 2021  PCP: Carmela Hunt MD    Subjective:     Feels better overall. Tolerated oral diet. No diarrhea. No further bradycardia event. afib is controlled with carvedilol   No fever, decrease leukocytosis     Awaiting for PT recommendation. Interval Hospital Course:  80 y.o male with HTN, CAD with CABG, persistent AFIB, COPD, PAD, h/o embolic CVA, anemia, presented from ARU for unresponsive while on toilet. RRT called to find hypotension and bradycardia required atropine. In the ER, he was found tachycardia, stomach pain, recent CT with possible mesenteric abscess. He was started on IV zosyn for sepsis concern. ROS: No current fever/chills, no headache, no dizziness, no sinus congestion,   No swallowing pain, No chest pain, no palpitation, no shortness of breath, + abd pain,  No diarrhea, no urinary complaint, no leg pain or swelling    Assessment/Plan:     1. 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 Sepsis poa (leukocytosis+tachycardia+abscess)   3. Symptomatic bradycardia, transient, resolved   4. Syncope and collapse   5. Hypotension, hypovolemia   6. CAD with CABG, recent NSTEMI s/p stent placement  7. Persistent AFIB   8. PAD   9. COPD   10. Recent embolic CVA   11.  Leukocytosis   12. Hyponatremia  13. Normocytic anemia       Spoke with patient that pending PT eval, he can go to ARU vs HH,   Stop zosyn, transition to oral vantin and flagyl per ID recommendation unit 21  Advance to low fiber diet as tolerated.    Blood culture follow up  Surgery follows,  no intervention at this time due to his cardiac risk and the need to be on eliquis and plavix   Appreciate IR consult   Continue his cardiac medications, Plavix needed, statin, carvedilol added back   Cardiology follows. [id number] Allyn Lopez with current carvedilol dosing   Continue bronchodilator, budesonide, arformoterol   ICS  PT/OT   PPI    Full code  Spoke with wife with clinical updates. ICM is working on discharge ARU      Additional Notes:    Time spent >30 minutes    Case discussed with:  [x]Patient  [x]Family  [x]Nursing  [x]Case Management  DVT Prophylaxis:  []Lovenox  [x]eliquis  []SCDs  []Coumadin   []On Heparin gtt    Signed By: Alex Mehta MD     2021 12:00 PM              Objective:   VS:   Visit Vitals  /69 (BP 1 Location: Right upper arm, BP Patient Position: At rest)   Pulse (!) 101   Temp 98.3 °F (36.8 °C)   Resp 22   Ht 5' 8\" (1.727 m)   Wt 78.5 kg (173 lb)   SpO2 100%   BMI 26.30 kg/m²      Tmax/24hrs: Temp (24hrs), Av.1 °F (36.7 °C), Min:97.7 °F (36.5 °C), Max:98.6 °F (37 °C)      Intake/Output Summary (Last 24 hours) at 2021 2140  Last data filed at 2021 1818  Gross per 24 hour   Intake 600 ml   Output 520 ml   Net 80 ml       Tele:   General:  Cooperative, Not in acute distress, speaks in full sentence while in bed  HEENT: PERRL, EOMI, supple neck, no JVD, dry oral mucosa  Cardiovascular: S1S2 irregular, no rub/gallop   Pulmonary:  air entry bilaterally, no wheezing, no crackle  GI:  Soft, non tender, non distended, +bs, no guarding   Extremities:  No pedal edema, +distal pulses appreciated   Neuro: AOx3, moving all extremities, no gross deficit.      Additional:       Current Facility-Administered Medications   Medication Dose Route Frequency    cefpodoxime (VANTIN) tablet 200 mg  200 mg Oral Q12H    metroNIDAZOLE (FLAGYL) tablet 500 mg  500 mg Oral Q12H    carvediloL (COREG) tablet 6.25 mg  6.25 mg Oral BID WITH MEALS    budesonide (PULMICORT) 500 mcg/2 ml nebulizer suspension  500 mcg Nebulization BID RT    apixaban (ELIQUIS) tablet 5 mg  5 mg Oral BID    arformoteroL (BROVANA) neb solution 15 mcg  15 mcg Nebulization BID RT    sodium chloride (NS) flush 5-40 mL  5-40 mL IntraVENous Q8H    sodium chloride (NS) flush 5-40 mL  5-40 mL IntraVENous PRN    acetaminophen (TYLENOL) tablet 650 mg  650 mg Oral Q6H PRN    Or    acetaminophen (TYLENOL) suppository 650 mg  650 mg Rectal Q6H PRN    ondansetron (ZOFRAN ODT) tablet 4 mg  4 mg Oral Q8H PRN    Or    ondansetron (ZOFRAN) injection 4 mg  4 mg IntraVENous Q6H PRN    albuterol (PROVENTIL VENTOLIN) nebulizer solution 2.5 mg  2.5 mg Nebulization Q4H PRN    clopidogreL (PLAVIX) tablet 75 mg  75 mg Oral DAILY WITH BREAKFAST    gabapentin (NEURONTIN) capsule 100 mg  100 mg Oral TID    multivitamin, tx-iron-ca-min (THERA-M w/ IRON) tablet 1 Tablet  1 Tablet Oral DAILY    rosuvastatin (CRESTOR) tablet 10 mg  10 mg Oral DAILY    tamsulosin (FLOMAX) capsule 0.4 mg  0.4 mg Oral PCD    L. acidophilus,casei,rhamnosus (BIO-K PLUS) capsule 1 Capsule  1 Capsule Oral DAILY    ipratropium (ATROVENT) 0.02 % nebulizer solution 0.5 mg  0.5 mg Nebulization TID            Lab/Data Review:  Labs: Results:       Chemistry Recent Labs     06/24/21 0522 06/23/21  0538   GLU 70* 76   * 132*   K 4.3 4.3    100   CO2 28 28   BUN 9 13   CREA 0.62 0.71   BUCR 15 18   AGAP 2* 4   CA 8.8 8.9   PHOS  --  2.4*     Recent Labs     06/23/21  0538   ALT 15*   TP 6.4   ALB 3.1*   GLOB 3.3   AGRAT 0.9      CBC w/Diff Recent Labs     06/25/21  0534 06/24/21  0522 06/24/21  0522 06/23/21  0538 06/23/21  0538   WBC 18.4*  --  18.2*  --  22.1*   RBC 3.00*  --  2.88*  --  2.94*   HGB 8.6*  --  8.2*  --  8.5*   HCT 28.5*  --  27.4*  --  28.2*   MCV 95.0   < > 95.1   < > 95.9   MCH 28.7   < > 28.5   < > 28.9   MCHC 30.2*   < > 29.9*   < > 30.1*   RDW 17.4*   < > 17.2*   < > 17.2*     --  246  --  226    < > = values in this interval not displayed.       Coagulation Recent Labs     06/23/21  0538   PTP 20.3*   INR 1.8*   APTT 49.2*       Iron/Ferritin No results found for: IRON, FE, TIBC, IBCT, PSAT, FERR    BNP    Cardiac Enzymes Lab Results   Component Value Date/Time    CK 78 06/22/2021 04:09 AM    CK - MB 2.3 06/22/2021 04:09 AM    CK-MB Index 2.9 06/22/2021 04:09 AM    Troponin-I, QT 0.07 (H) 06/22/2021 04:09 AM        Lactic Acid    Thyroid Studies          All Micro Results     None            Images:    CT (Most Recent). CT Results (most recent):  Results from Hospital Encounter encounter on 06/07/21    CT CHEST ABD PELV W CONT    Narrative  CT CHEST, ABDOMEN AND PELVIS WITH ENHANCEMENT    INDICATION: Leukocytosis. Abdominal pain. Myocardial infarction. TECHNIQUE: Axial images obtained of the chest, abdomen and pelvis following the  uneventful administration of 100 cc Isovue-300 nonionic intravenous contrast.  Coronal and sagittal reformatted images obtained. All CT scans are performed  using dose optimization techniques as appropriate to the performed exam  including the following: Automated exposure control, adjustment of mA and/or kV  according to patient size, and use of iterative reconstructive technique. COMPARISON: CTA chest 11/14/2015, CT abdomen and pelvis 6/16/2014. CHEST FINDINGS:  Lung: Emphysema. Pleura: Unremarkable. Heart: No effusion. CABG. Cardiomegaly. Vessels: No aortic aneurysm. Scattered wall calcifications. Lymph Nodes: Unremarkable. Thyroid: Stable to prior. The right gland is larger than the left and could  indicate an underlying nodule. Bones: Sternotomy. Lower thoracic degenerative disc disease. ABDOMEN AND PELVIS FINDINGS:  Liver: Unremarkable. Biliary: Unremarkable. Spleen: Unremarkable. Pancreas: Unremarkable. Adrenal Glands: Stable chronic mild nodular thickening of the left gland since  2014. Kidneys: Chronic left renal cortical scarring. Subcentimeter right renal cyst.    Bowel: Normal appendix. Diverticulosis.  Thickened and collapsed segment of mid  sigmoid colon has an appearance similar to prior 2014 exam. There is mild  proximal sigmoid colon distention suggesting a stricture in this region. Positioned between this thickened segment of colon and a thickened inflamed loop  of small bowel is a mesenteric gas and fluid containing rim enhanced collection  measuring 3.5 x 2.3 cm. At least 2 additional small phlegmonous developing  collections are seen closely along serosal mesenteric margin of the affected  small bowel loop best seen on sagittal image 31 and 38. Dilated fluid-filled small bowel loops transitioning at this abnormally  thickened small bowel loop however the bowel appears patent and gas and fluid  containing on both ends of this process suggesting a superimposed ileus  contributing to the picture. Bladder/ Pelvic Organs: Mild prostate enlargement. Mild thickening of the  bladder dome wall, likely reactive to adjacent mesenteric/small bowel process. Peritoneum/ Retroperitoneum: No free air. Trace free fluid. Mesenteric  inflammation in the pelvis as discussed above. Lymph Nodes: Borderline enlarged 9-10 mm mesenteric lymph nodes may be reactive. Vessels: Arteriosclerosis. Renal artery stents. Bones/Soft tissues: Mild chronic fatty left inguinal hernia is similar to prior. Degenerative disc disease throughout the lumbar spine most advanced at L2-L3. Right greater than left hip degenerative joint disease. Impression  1. No acute findings in the chest.  -Emphysema. -CABG. -Stable mildly enlarged right thyroid gland which could indicate underlying  nodule, stable for many years. 2. 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 It is unclear if initial source of abscess is from  small bowel inflammation or diverticulitis/pericolonic abscess. 3. Dilated small bowel.  This is likely a combination of generalized small bowel  ileus and low-grade partial small bowel obstruction at site of abnormal pelvic  small bowel loop discussed above. 4. Diverticulosis. Stable thickened collapsed segment of sigmoid colon since  2014 suggesting a chronic stricture. This is limited for assessment by CT. 5. Bladder dome inflammation is likely secondary to the adjacent mesentery  process. 6. Other chronic incidental findings. Findings were discussed with emergency department physician on 6/21/2021 at 1:55  PM.         XRAY (Most Recent)      EKG No results found for this or any previous visit.      2D ECHO

## 2021-06-26 NOTE — ROUTINE PROCESS
TRANSFER - OUT REPORT:    Verbal report given to Riley, [address] (name) on Silviano Smith  being transferred to [facility] (unit) for routine progression of care       Report consisted of patients Situation, Background, Assessment and   Recommendations(SBAR). Information from the following report(s) SBAR, Kardex, Intake/Output, MAR, Recent Results and Cardiac Rhythm A fib was reviewed with the receiving nurse. Lines:   Peripheral IV 06/21/21 Left Forearm (Active)   Site Assessment Clean, dry, & intact 06/26/21 1046   Phlebitis Assessment 0 06/26/21 1046   Infiltration Assessment 0 06/26/21 1046   Dressing Status Clean, dry, & intact 06/26/21 1046   Dressing Type Transparent 06/26/21 1046   Hub Color/Line Status Green 06/26/21 1046   Action Taken Open ports on tubing capped 06/26/21 1046   Alcohol Cap Used Yes 06/26/21 1046        Opportunity for questions and clarification was provided.       Patient transported with:   O2 @ 2 liters  Registered Nurse  [facility]

## 2021-06-26 NOTE — PROGRESS NOTES
Received pt on floor from [facility]. Pt Aox4, NC-2 liters, 4 eye skin assessment completed w/ Rosemary Ramon RN. Vitals taken, telemetry box applied, pt denies pain or discomfort at this time.

## 2021-06-26 NOTE — PROGRESS NOTES
RENAL DAILY PROGRESS NOTE            77-year-old male with past medical history of atrial fibrillation, severe COPD, renal artery stenosis presented with a syncope, sepsis following for hyponatremia  Subjective:       Complaint:   Overnight events noted  Hemodynamic stable  Documented urine output about 520 cc    IMPRESSION:   Hyponatremia, hypovolemic hyponatremia, very low urine sodium, improving  Sepsis, mesenteric abscess and pelvic  History of bladder fibrillation, episode of bradycardia, cardiology colleague following  Anemia   PLAN:   Gradually improving sodium, continue current supportive treatment. Avoid diuretics.            Current Facility-Administered Medications   Medication Dose Route Frequency    senna-docusate (PERICOLACE) 8.6-50 mg per tablet 2 Tablet  2 Tablet Oral QHS    cefpodoxime (VANTIN) tablet 200 mg  200 mg Oral Q12H    metroNIDAZOLE (FLAGYL) tablet 500 mg  500 mg Oral Q12H    carvediloL (COREG) tablet 6.25 mg  6.25 mg Oral BID WITH MEALS    budesonide (PULMICORT) 500 mcg/2 ml nebulizer suspension  500 mcg Nebulization BID RT    apixaban (ELIQUIS) tablet 5 mg  5 mg Oral BID    arformoteroL (BROVANA) neb solution 15 mcg  15 mcg Nebulization BID RT    sodium chloride (NS) flush 5-40 mL  5-40 mL IntraVENous Q8H    sodium chloride (NS) flush 5-40 mL  5-40 mL IntraVENous PRN    acetaminophen (TYLENOL) tablet 650 mg  650 mg Oral Q6H PRN    Or    acetaminophen (TYLENOL) suppository 650 mg  650 mg Rectal Q6H PRN    ondansetron (ZOFRAN ODT) tablet 4 mg  4 mg Oral Q8H PRN    Or    ondansetron (ZOFRAN) injection 4 mg  4 mg IntraVENous Q6H PRN    albuterol (PROVENTIL VENTOLIN) nebulizer solution 2.5 mg  2.5 mg Nebulization Q4H PRN    clopidogreL (PLAVIX) tablet 75 mg  75 mg Oral DAILY WITH BREAKFAST    gabapentin (NEURONTIN) capsule 100 mg  100 mg Oral TID    multivitamin, tx-iron-ca-min (THERA-M w/ IRON) tablet 1 Tablet  1 Tablet Oral DAILY    rosuvastatin (CRESTOR) tablet 10 mg  10 mg Oral DAILY    tamsulosin (FLOMAX) capsule 0.4 mg  0.4 mg Oral PCD    L. acidophilus,casei,rhamnosus (BIO-K PLUS) capsule 1 Capsule  1 Capsule Oral DAILY    ipratropium (ATROVENT) 0.02 % nebulizer solution 0.5 mg  0.5 mg Nebulization TID       Review of Symptoms: comprehensive ROS negative except above.    Objective:     Patient Vitals for the past 24 hrs:   Temp Pulse Resp BP SpO2   06/26/21 1043 98.1 °F (36.7 °C) 93 20 (!) 152/90 100 %   06/26/21 0804 -- -- -- -- 100 %   06/26/21 0752 97.3 °F (36.3 °C) 99 14 (!) 158/92 100 %   06/26/21 0400 -- 96 21 120/81 100 %   06/26/21 0104 98.7 °F (37.1 °C) (!) 102 21 (!) 125/90 100 %   06/25/21 2300 -- (!) 101 -- (!) 81/25 --   06/25/21 1941 -- -- -- -- 100 %   06/25/21 1900 98.3 °F (36.8 °C) (!) 101 22 124/69 100 %   06/25/21 1734 -- (!) 109 22 126/82 98 %   06/25/21 1551 98.6 °F (37 °C) (!) 101 19 129/88 100 %        Weight change: 0.028 kg (1 oz)     06/24 1901 - 06/26 0700  In: 840 [P.O.:840]  Out: 520 [Urine:520]    Intake/Output Summary (Last 24 hours) at 6/26/2021 1314  Last data filed at 6/26/2021 1305  Gross per 24 hour   Intake 720 ml   Output 420 ml   Net 300 ml     Physical Exam:   General: comfortable, no acute distress   HEENT sclera anicteric,   CVS: S1S2 heard,  no rub  RS: + air entry b/l,   Abd: Soft, Non tender,   Neuro:  awake, alert ,   Extrm: no edema, no cyanosis, clubbing   Skin: no visible  Rash  Musculoskeletal: No gross joints or bone deformities         Data Review:     LABS:   Hematology:   Recent Labs     06/25/21  0534 06/24/21 0522   WBC 18.4* 18.2*   HGB 8.6* 8.2*   HCT 28.5* 27.4*     Chemistry:   Recent Labs     06/24/21 0522   BUN 9   CREA 0.62   CA 8.8   K 4.3   *      CO2 28   GLU 70*            Procedures/imaging: see electronic medical records for all procedures, Xrays and details which were not copied into this note but were reviewed prior to creation of Plan          Assessment & Plan:     As above Susi Ghosh MD  6/26/2021  1:14 PM

## 2021-06-26 NOTE — PROGRESS NOTES
TRANSFER - IN REPORT:    Verbal report received from Amber Jeffers RN(name) on Alberto Ibrahim  being received from [facility](unit) for routine progression of care      Report consisted of patients Situation, Background, Assessment and   Recommendations(SBAR). Information from the following report(s) SBAR, Kardex, [facility] and Cardiac Rhythm A-fib was reviewed with the receiving nurse. Opportunity for questions and clarification was provided. Assessment completed upon patients arrival to unit and care assumed.

## 2021-06-26 NOTE — PROGRESS NOTES
1900-Bedside and Verbal shift change report given to Mary Ellen Valdez (oncoming nurse) by Saint Martin RN (offgoing nurse). Report included the following information SBAR, Kardex, MAR and Cardiac Rhythm A-fib.

## 2021-06-26 NOTE — PROGRESS NOTES
Pt called out that he needed to use restroom, pt laying in bed HR dropped to 30s a fib pt NAD then back up to 50s, instructed to pt that he will be put on a bedpan due to previous syncopal events, pt did not have bowel movement while on bed pan but did pass gas, pt HR went back up to 90s - 110s a fib all other vitals WDL

## 2021-06-26 NOTE — PROGRESS NOTES
[facility] - Progress Note  Admit Date: 6/21/2021    Assessment:     -Syncope in setting of below.  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 The patient is on Eliquis and Plavix due to recent PCI and Afib. Patient is at high risk for graft and stent occlusion if plavix is held. Appreciate IR involvement. Risks outweigh benefits of CT guided drainage of small abscess that may not be amenable to drainage due to anatomic positioning at this time. -CAD, Hx CABG x 2 (LIMA-LAD, SVG-LCx), recent NSTEMI s/p cardiac cath 5/18/2021 at [facility] with PCI to proximal SVG-LCx with 3.5 x 12 mm Juan J JENNIE; PCI to mid SVG with 3.5 x 23 mm Hastings JENNIE; PCI to distal SVG with 3.5 x 15 mm Hastings JENNIE; Balloon angioplasty to distal SVG anastomosis.  Discharged on Plavix and Eliquis (no ASA to avoid triple therapy).    -Initial cardiac cath 5/18/2021 at [place] [address] with distal LM 50% blockage; LAD is large vessel with 80% proximal stenosis with patent LIMA-LAD, there is a 70% stenosis distal to LIMA anastomosis; LCx is dominant, large diffusely diseased vessel with sequential 80% stenosis at proximal and mid portion of the vessel; RCA is non-dominant, small diffusely diseased vessel  -Echo 5/2021 at [facility] with EF 50%.   -Atrial fibrillation, recent diagnosis, on Eliquis for anticoagulation.  -Leukocytosis, WBC up to 26.7K on 6/20/2021 (up from 14.3  -Severe COPD.  O2 goal 89-93% per [facility] records.  -Hx squamous cell lung cancer, s/p right middle and lower lobectomy.  -Peripheral vascular disease, Hx bilateral carotid endarterectomies  -Hx renal artery stenosis, Hx bilateral stenting 2011  -Acute CVA during recent admission at [facility]; considered cardioembolic, s/p PCI 4/82/8912, symptom onset 5/20/2021 while on Eliquis; suspected plaque mobilization.  -Recent acute blood loss anemia due to melena and hematuria (self-inflicted [place] trauma), GI consulted 5/26/2021, cleared for Plavix/Eliquis  -Hx HTN with hypotension during recent admission, medications adjusted at that time.  Coreg decreased to 12.5 mg BID, Isosorbid-Hydralazine and Lisinopril discontinued. -Dementia, mild.     Primary cardiologist at [place] [id number].:     Discussed with Dr. Kamron Fragoso, patient has sick sinus syndrome, will decrease coreg today and allow HR to run slightly higher in order to avoid pacemaker due to infection. Subjective:     Transient bradycardia to 30's noted overnight.     Objective:      Patient Vitals for the past 8 hrs:   Temp Pulse Resp BP SpO2   06/26/21 1043 98.1 °F (36.7 °C) 93 20 (!) 152/90 100 %   06/26/21 0804 -- -- -- -- 100 %   06/26/21 0752 97.3 °F (36.3 °C) 99 14 (!) 158/92 100 %         Patient Vitals for the past 96 hrs:   Weight   06/26/21 0400 80.3 kg (177 lb 1.6 oz)   06/25/21 1920 78.5 kg (173 lb 1 oz)   06/25/21 0405 78.5 kg (173 lb)   06/24/21 0420 77.8 kg (171 lb 8 oz)   06/23/21 1443 80.7 kg (178 lb)   06/23/21 0435 78.6 kg (173 lb 3.2 oz)                    Intake/Output Summary (Last 24 hours) at 6/26/2021 1454  Last data filed at 6/26/2021 1305  Gross per 24 hour   Intake 480 ml   Output 420 ml   Net 60 ml       Physical Exam:  General:  alert, cooperative, no distress, appears stated age  Neck:  nontender  Lungs:  clear to auscultation bilaterally  Heart: irreg, S1, S2 normal, no murmur, click, rub or gallop  Abdomen:  abdomen is soft without significant tenderness, masses, organomegaly or guarding  Extremities:  extremities normal, atraumatic, no cyanosis or edema    Data Review:     Labs: Results:       Chemistry Recent Labs     06/24/21  0522   GLU 70*   *   K 4.3      CO2 28   BUN 9   CREA 0.62   CA 8.8   MG 2.3   AGAP 2*   BUCR 15 CBC w/Diff Recent Labs     06/25/21  0534 06/24/21  0522   WBC 18.4* 18.2*   RBC 3.00* 2.88*   HGB 8.6* 8.2*   HCT 28.5* 27.4*    246      Cardiac Enzymes No results found for: CPK, CK, CKMMB, CKMB, RCK3, CKMBT, CKNDX, CKND1, WAGNER, TROPT, TROIQ, RAFAEL, TROPT, TNIPOC, BNP, BNPP   Coagulation No results for input(s): PTP, INR, APTT, INREXT in the last 72 hours. Lipid Panel No results found for: CHOL, CHOLPOCT, CHOLX, CHLST, CHOLV, 273865, HDL, HDLP, LDL, LDLC, DLDLP, 468613, VLDLC, VLDL, TGLX, TRIGL, TRIGP, TGLPOCT, CHHD, CHHDX   BNP No results found for: BNP, BNPP, XBNPT   Liver Enzymes No results for input(s): TP, ALB, TBIL, AP in the last 72 hours.     No lab exists for component: SGOT, GPT, DBIL   Digoxin    Thyroid Studies Lab Results   Component Value Date/Time    TSH 1.57 06/21/2021 02:12 PM          Signed By: Natalia Johnson MD     June 26, 2021

## 2021-06-26 NOTE — PROGRESS NOTES
1920: Assumed patient care from [facility]. Patient is alert and oriented to person, place,but disoriented to  time and situation. Respiratory status is stable on 2L via nasal cannula. Vital signs are stable. MEWS score is a three. Patient denies any pain, discomfort, nausea vomiting dizziness or anxiety. White board and fall card is updated. Bed is locked and in lowest position. Call bell, water and personal belongings are within reach. Patient has no questions, comments or concerns after bedside shift report. 0700: Patient had an uneventful shift. Respiratory status, vital signs and MEWS score remained stable. Patient was resting quietly with no signs of distress noted. Bed locked and in lowest position. Call bell water and personal belongings were within reach. Patient had no questions, comments or concerns after bedside shift report.  Bedside report given to Hugo Mosnalve R.N.

## 2021-06-27 NOTE — PROGRESS NOTES
[facility] - Progress Note  Admit Date: 6/21/2021    Assessment:     -Syncope in setting of below.  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 The patient is on Eliquis and Plavix due to recent PCI and Afib. Patient is at high risk for graft and stent occlusion if plavix is held. Appreciate IR involvement. Risks outweigh benefits of CT guided drainage of small abscess that may not be amenable to drainage due to anatomic positioning at this time. -CAD, Hx CABG x 2 (LIMA-LAD, SVG-LCx), recent NSTEMI s/p cardiac cath 5/18/2021 at [facility] with PCI to proximal SVG-LCx with 3.5 x 12 mm [place] JENNIE; PCI to mid SVG with 3.5 x 23 mm [place] JENNIE; PCI to distal SVG with 3.5 x 15 mm [place] JENNIE; Balloon angioplasty to distal SVG anastomosis.  Discharged on Plavix and Eliquis (no ASA to avoid triple therapy).    -Initial cardiac cath 5/18/2021 at [place] [address] with distal LM 50% blockage; LAD is large vessel with 80% proximal stenosis with patent LIMA-LAD, there is a 70% stenosis distal to LIMA anastomosis; LCx is dominant, large diffusely diseased vessel with sequential 80% stenosis at proximal and mid portion of the vessel; RCA is non-dominant, small diffusely diseased vessel  -Echo 5/2021 at [facility] with EF 50%.   -Atrial fibrillation, recent diagnosis, on Eliquis for anticoagulation.  -Leukocytosis, WBC up to 26.7K on 6/20/2021 (up from 14.3  -Severe COPD.  O2 goal 89-93% per [facility] records.  -Hx squamous cell lung cancer, s/p right middle and lower lobectomy.  -Peripheral vascular disease, Hx bilateral carotid endarterectomies  -Hx renal artery stenosis, Hx bilateral stenting 2011  -Acute CVA during recent admission at [facility]; considered cardioembolic, s/p PCI 7/78/0268, symptom onset 5/20/2021 while on Eliquis; suspected plaque mobilization.  -Recent acute blood loss anemia due to melena and hematuria (self-inflicted [place] trauma), GI consulted 5/26/2021, cleared for Plavix/Eliquis  -Hx HTN with hypotension during recent admission, medications adjusted at that time.  Coreg decreased to 12.5 mg BID, Isosorbid-Hydralazine and Lisinopril discontinued. -Dementia, mild.     Primary cardiologist at [facility]:     Pulse reasonable today, will avoid uptitration of AV blockers due to bradycardia over the weekend and desire to avoid pacemaker given infection. Dispo planning. Subjective:     A.fib rates  bpm, no symptoms.     Objective:      Patient Vitals for the past 8 hrs:   Temp Pulse Resp BP SpO2   06/27/21 0849 97.9 °F (36.6 °C) 61 18 (!) 160/83 95 %         Patient Vitals for the past 96 hrs:   Weight   06/27/21 0645 81 kg (178 lb 9.2 oz)   06/26/21 0400 80.3 kg (177 lb 1.6 oz)   06/25/21 1920 78.5 kg (173 lb 1 oz)   06/25/21 0405 78.5 kg (173 lb)   06/24/21 0420 77.8 kg (171 lb 8 oz)   06/23/21 1443 80.7 kg (178 lb)                    Intake/Output Summary (Last 24 hours) at 6/27/2021 1053  Last data filed at 6/27/2021 0917  Gross per 24 hour   Intake 360 ml   Output 950 ml   Net -590 ml       Physical Exam:  General:  alert, cooperative, no distress, appears stated age  Neck:  nontender  Lungs:  clear to auscultation bilaterally  Heart: irreg, S1, S2 normal, no murmur, click, rub or gallop  Abdomen:  abdomen is soft without significant tenderness, masses, organomegaly or guarding  Extremities:  extremities normal, atraumatic, no cyanosis or edema    Data Review:     Labs: Results:       Chemistry Recent Labs     06/27/21  0516   GLU 73*   *   K 4.7   CL 99*   CO2 27   BUN 11   CREA 0.67   CA 9.1   MG 2.1   AGAP 6   BUCR 16      CBC w/Diff Recent Labs     06/27/21  0516 06/25/21  0534   WBC 21.7* 18.4*   RBC 3.41* 3.00*   HGB 9. 5* 8.6*   HCT 32.0* 28.5*    274   GRANS 81*  --    LYMPH 6*  --    EOS 3  --       Cardiac Enzymes No results found for: CPK, CK, CKMMB, CKMB, RCK3, CKMBT, CKNDX, CKND1, WAGNER, TROPT, TROIQ, RAFAEL, TROPT, TNIPOC, BNP, BNPP   Coagulation No results for input(s): PTP, INR, APTT, INREXT in the last 72 hours. Lipid Panel No results found for: CHOL, CHOLPOCT, CHOLX, CHLST, CHOLV, 739828, HDL, HDLP, LDL, LDLC, DLDLP, 704688, VLDLC, VLDL, TGLX, TRIGL, TRIGP, TGLPOCT, CHHD, CHHDX   BNP No results found for: BNP, BNPP, XBNPT   Liver Enzymes No results for input(s): TP, ALB, TBIL, AP in the last 72 hours.     No lab exists for component: SGOT, GPT, DBIL   Digoxin    Thyroid Studies Lab Results   Component Value Date/Time    TSH 1.57 06/21/2021 02:12 PM          Signed By: Kristen West MD     June 27, 2021

## 2021-06-27 NOTE — PROGRESS NOTES
Pt states he does not want or need nebulizer.  He does not know why he is receiving them his breathing is fine

## 2021-06-27 NOTE — PROGRESS NOTES
Hospitalist Progress Note    Patient: Adonis Buck Age: 80 y.o. : 1940 MR#: [id number] SSN: [id number]  Date/Time: 2021 9:14 AM    DOA: 2021  PCP: Juanito Villar MD    Subjective:     Leukocytosis elevated, on oral antibx (vantin+flagyl) for the last two days. No fever. HR in AFIB, no chest pain. No further syncope event. No further bradycardia except report Saturday   Cardiology has changed carvedilol to 3.125mg  Still with pain in his heels of feet     No other complaint        Interval Hospital Course:  80 y.o male with HTN, CAD with CABG, persistent AFIB, COPD, PAD, h/o embolic CVA, anemia, presented from ARU for unresponsive while on toilet. RRT called to find hypotension and bradycardia required atropine. In the ER, he was found tachycardia, stomach pain, recent CT with possible mesenteric abscess. He was started on IV zosyn for sepsis concern. Surgery consulted for further care. Recommended IR for drainage tube but IR reviewed this case and unable to get access. Furthermore, he requires plavix in the setting of recent stent, IR deferred for further intervention. He tolerated IV antibiotics. He has improved of his bradycardia and hyponatremia. He was able to tolerated diet. Surgery has no further intervention at this time. ID consulted and recommend oral antibiotics once he tolerated oral intake. Cardiology has been followed for bradycardia        ROS: No current fever/chills, no headache, no dizziness, no sinus congestion,   No swallowing pain, No chest pain, no palpitation, no shortness of breath, + abd pain,  No diarrhea, no urinary complaint, no leg pain or swelling    Assessment/Plan:     1. 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 Sepsis poa (leukocytosis+tachycardia+abscess), resolving   3. Symptomatic bradycardia, transient, resolved   4. Syncope and collapse, resolved    5.    Hypotension, hypovolemia, resolved   6. CAD with CABG, recent NSTEMI s/p stent placement  7. Persistent AFIB, tolerates HR at lower 100s  8. PAD   9. COPD   10. Recent embolic CVA   11.  Leukocytosis, improving    12. Hyponatremia  13. Normocytic anemia       He remains stable. Cardiology recommended to continue carvedilol 3.125mg with avoidance of increasing as he is tolerating current HR. Continue his cardiac medications, Plavix needed, statin, carvedilol 3.125mg  Cardiology follows. He is ready for ARU, continues cefepime and flagyl  Advance to low fiber diet as tolerated. Blood culture follow up  Surgery follows,  no intervention at this time due to his cardiac risk and the need to be on eliquis and plavix   Appreciate IR consult   Continue bronchodilator, budesonide, arformoterol   ICS  PT/OT   PPI    Full code  Spoke with wife with clinical updates.    ICM is working on discharge ARU      Additional Notes:    Time spent >30 minutes    Case discussed with:  [x]Patient  [x]Family  [x]Nursing  [x]Case Management  DVT Prophylaxis:  []Lovenox  [x]eliquis  []SCDs  []Coumadin   []On Heparin gtt    Signed By: Suzi Harrison MD     2021 9:14 AM              Objective:   VS:   Visit Vitals  BP (!) 160/83 (BP 1 Location: Left upper arm, BP Patient Position: At rest;Supine;Lying) Comment: KULDIP Lin aware of patients BP   Pulse 61   Temp 97.9 °F (36.6 °C)   Resp 18   Ht 5' 8\" (1.727 m)   Wt 81 kg (178 lb 9.2 oz)   SpO2 95%   BMI 27.15 kg/m²      Tmax/24hrs: Temp (24hrs), Av.8 °F (36.6 °C), Min:97.5 °F (36.4 °C), Max:98.1 °F (36.7 °C)      Intake/Output Summary (Last 24 hours) at 2021 0914  Last data filed at 2021 0840  Gross per 24 hour   Intake 360 ml   Output 750 ml   Net -390 ml       Tele:   General:  Cooperative, Not in acute distress, speaks in full sentence while in bed  HEENT: PERRL, EOMI, supple neck, no JVD, dry oral mucosa  Cardiovascular: S1S2 irregular, no rub/gallop   Pulmonary:  air entry bilaterally, no wheezing, no crackle  GI:  Soft, non tender, non distended, +bs, no guarding   Extremities:  No pedal edema, +distal pulses appreciated   Neuro: AOx3, moving all extremities, no gross deficit. Additional:       Current Facility-Administered Medications   Medication Dose Route Frequency    senna-docusate (PERICOLACE) 8.6-50 mg per tablet 2 Tablet  2 Tablet Oral QHS    carvediloL (COREG) tablet 3.125 mg  3.125 mg Oral BID WITH MEALS    cefpodoxime (VANTIN) tablet 200 mg  200 mg Oral Q12H    metroNIDAZOLE (FLAGYL) tablet 500 mg  500 mg Oral Q12H    apixaban (ELIQUIS) tablet 5 mg  5 mg Oral BID    sodium chloride (NS) flush 5-40 mL  5-40 mL IntraVENous Q8H    sodium chloride (NS) flush 5-40 mL  5-40 mL IntraVENous PRN    acetaminophen (TYLENOL) tablet 650 mg  650 mg Oral Q6H PRN    Or    acetaminophen (TYLENOL) suppository 650 mg  650 mg Rectal Q6H PRN    ondansetron (ZOFRAN ODT) tablet 4 mg  4 mg Oral Q8H PRN    Or    ondansetron (ZOFRAN) injection 4 mg  4 mg IntraVENous Q6H PRN    albuterol (PROVENTIL VENTOLIN) nebulizer solution 2.5 mg  2.5 mg Nebulization Q4H PRN    clopidogreL (PLAVIX) tablet 75 mg  75 mg Oral DAILY WITH BREAKFAST    gabapentin (NEURONTIN) capsule 100 mg  100 mg Oral TID    multivitamin, tx-iron-ca-min (THERA-M w/ IRON) tablet 1 Tablet  1 Tablet Oral DAILY    rosuvastatin (CRESTOR) tablet 10 mg  10 mg Oral DAILY    tamsulosin (FLOMAX) capsule 0.4 mg  0.4 mg Oral PCD    L. acidophilus,casei,rhamnosus (BIO-K PLUS) capsule 1 Capsule  1 Capsule Oral DAILY            Lab/Data Review:  Labs: Results:       Chemistry Recent Labs     06/27/21  0516   GLU 73*   *   K 4.7   CL 99*   CO2 27   BUN 11   CREA 0.67   BUCR 16   AGAP 6   CA 9.1     No results for input(s): TBIL, ALT, ALKP, TP, ALB, GLOB, AGRAT in the last 72 hours.     No lab exists for component: SGOT   CBC w/Diff Recent Labs     06/27/21  0516 06/25/21  0534 06/25/21  0534   WBC 21.7*  --  18.4* RBC 3.41*  --  3.00*   HGB 9.5*  --  8.6*   HCT 32.0*  --  28.5*   MCV 93.8   < > 95.0   MCH 27.9   < > 28.7   MCHC 29.7*   < > 30.2*   RDW 17.6*   < > 17.4*     --  274   GRANS 81*  --   --    LYMPH 6*  --   --    EOS 3  --   --     < > = values in this interval not displayed. Coagulation No results for input(s): PTP, INR, APTT, INREXT, INREXT in the last 72 hours. Iron/Ferritin No results found for: IRON, FE, TIBC, IBCT, PSAT, FERR    BNP    Cardiac Enzymes Lab Results   Component Value Date/Time    CK 78 06/22/2021 04:09 AM    CK - MB 2.3 06/22/2021 04:09 AM    CK-MB Index 2.9 06/22/2021 04:09 AM    Troponin-I, QT 0.07 (H) 06/22/2021 04:09 AM        Lactic Acid    Thyroid Studies          All Micro Results     None            Images:    CT (Most Recent). CT Results (most recent):  Results from Hospital Encounter encounter on 06/07/21    CT CHEST ABD PELV W CONT    Narrative  CT CHEST, ABDOMEN AND PELVIS WITH ENHANCEMENT    INDICATION: Leukocytosis. Abdominal pain. Myocardial infarction. TECHNIQUE: Axial images obtained of the chest, abdomen and pelvis following the  uneventful administration of 100 cc Isovue-300 nonionic intravenous contrast.  Coronal and sagittal reformatted images obtained. All CT scans are performed  using dose optimization techniques as appropriate to the performed exam  including the following: Automated exposure control, adjustment of mA and/or kV  according to patient size, and use of iterative reconstructive technique. COMPARISON: CTA chest 11/14/2015, CT abdomen and pelvis 6/16/2014. CHEST FINDINGS:  Lung: Emphysema. Pleura: Unremarkable. Heart: No effusion. CABG. Cardiomegaly. Vessels: No aortic aneurysm. Scattered wall calcifications. Lymph Nodes: Unremarkable. Thyroid: Stable to prior. The right gland is larger than the left and could  indicate an underlying nodule. Bones: Sternotomy. Lower thoracic degenerative disc disease.     ABDOMEN AND PELVIS FINDINGS:  Liver: Unremarkable. Biliary: Unremarkable. Spleen: Unremarkable. Pancreas: Unremarkable. Adrenal Glands: Stable chronic mild nodular thickening of the left gland since  2014. Kidneys: Chronic left renal cortical scarring. Subcentimeter right renal cyst.    Bowel: Normal appendix. Diverticulosis. Thickened and collapsed segment of mid  sigmoid colon has an appearance similar to prior 2014 exam. There is mild  proximal sigmoid colon distention suggesting a stricture in this region. Positioned between this thickened segment of colon and a thickened inflamed loop  of small bowel is a mesenteric gas and fluid containing rim enhanced collection  measuring 3.5 x 2.3 cm. At least 2 additional small phlegmonous developing  collections are seen closely along serosal mesenteric margin of the affected  small bowel loop best seen on sagittal image 31 and 38. Dilated fluid-filled small bowel loops transitioning at this abnormally  thickened small bowel loop however the bowel appears patent and gas and fluid  containing on both ends of this process suggesting a superimposed ileus  contributing to the picture. Bladder/ Pelvic Organs: Mild prostate enlargement. Mild thickening of the  bladder dome wall, likely reactive to adjacent mesenteric/small bowel process. Peritoneum/ Retroperitoneum: No free air. Trace free fluid. Mesenteric  inflammation in the pelvis as discussed above. Lymph Nodes: Borderline enlarged 9-10 mm mesenteric lymph nodes may be reactive. Vessels: Arteriosclerosis. Renal artery stents. Bones/Soft tissues: Mild chronic fatty left inguinal hernia is similar to prior. Degenerative disc disease throughout the lumbar spine most advanced at L2-L3. Right greater than left hip degenerative joint disease. Impression  1. No acute findings in the chest.  -Emphysema. -CABG.   -Stable mildly enlarged right thyroid gland which could indicate underlying  nodule, stable for many years.  2. 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 It is unclear if initial source of abscess is from  small bowel inflammation or diverticulitis/pericolonic abscess. 3. Dilated small bowel. This is likely a combination of generalized small bowel  ileus and low-grade partial small bowel obstruction at site of abnormal pelvic  small bowel loop discussed above. 4. Diverticulosis. Stable thickened collapsed segment of sigmoid colon since  2014 suggesting a chronic stricture. This is limited for assessment by CT. 5. Bladder dome inflammation is likely secondary to the adjacent mesentery  process. 6. Other chronic incidental findings. Findings were discussed with emergency department physician on 6/21/2021 at 1:55  PM.         XRAY (Most Recent)      EKG No results found for this or any previous visit.      2D ECHO

## 2021-06-27 NOTE — PROGRESS NOTES
1900-Bedside and Verbal shift change report given to Abdirahman (oncoming nurse) by [place] RN(offgoing nurse). Report included the following information SBAR, Kardex, MAR and Cardiac Rhythm A-fib.

## 2021-06-28 NOTE — PROGRESS NOTES
Problem: Self Care Deficits Care Plan (Adult)  Goal: *Acute Goals and Plan of Care (Insert Text)  Description: Occupational Therapy Goals  Initiated 6/24/2021 within 7 day(s). 1.  Patient will perform grooming in stance at the sink with supervision/set-up demonstrating F+ balance and no c/o dizziness. 2.  Patient will perform lower body dressing with modified independence. 3.  Patient will perform bathing with supervision/set-up. 4.  Patient will perform toilet transfers with supervision/set-up. 5.  Patient will perform all aspects of toileting with modified independence. 6.  Patient will participate in upper extremity therapeutic exercise/activities with modified independence for 8 minutes. 7.  Patient will utilize energy conservation techniques during functional activities with verbal cues. Prior Level of Function: Pt presents from ARU and was doing well and at supv for grooming, bathing, UB dressing, and toileting, and CGA for LB dressing, toilet transfers. Pt lives in [facility] with his wife. Pt stated he has 14 interior steps; however chart stated 7. Pt only has grab bars in shower, no other DME or AE at home. Outcome: Progressing Towards Goal   OCCUPATIONAL THERAPY TREATMENT    Patient: Heather Strickland (67 y.o. male)  Date: 6/28/2021  Diagnosis: [facility] [K65.1] <principal problem not specified>       Precautions: Fall, Aspiration  PLOF: Pt presents from ARU and was doing well and at supv for grooming, bathing, UB dressing, and toileting, and CGA for LB dressing, toilet transfers. Pt lives in [facility] with his wife. Pt stated he has 14 interior steps; however chart stated 7. Pt only has grab bars in shower, no other DME or AE at home. Chart, occupational therapy assessment, plan of care, and goals were reviewed. ASSESSMENT:  Pt cleared by RN for OT tx at this time. PT co tx to maximize pt safety and participation.  Pt presented supine in bed upon therapist arrival requiring encouragement for participation and requesting to wash up. Pt maneuvered to EOB from supine with SBA in prep for ADL tasks. Once sitting EOB he demonstrated G balance and performed morning ADLs sitting EOB and in stance in order to assess safety, performance, and independence during routine, see below for levels of assist. STS transfer CGA with RW and performed functional room mobility ~ 15 ft to reclining chair simulating toilet transfer. At end of session pt was positioned for comfort in chair, B LE's elevated, and all needs left within reach. RN made aware of pt's participation and position. []          Improving appropriately and progressing toward goals  [x]          Improving slowly and progressing toward goals  []          Not making progress toward goals and plan of care will be adjusted     PLAN:  Patient continues to benefit from skilled intervention to address the above impairments. Continue treatment per established plan of care. Discharge Recommendations:  Rehab vs HH with 24/7 assist  Further Equipment Recommendations for Discharge:  RW, shower chair      SUBJECTIVE:   Patient stated  I want to get washed up. \"     OBJECTIVE DATA SUMMARY:   Cognitive/Behavioral Status:  Neurologic State: Alert  Orientation Level: Oriented X4  Cognition: Appropriate decision making, Follows commands  Safety/Judgement: Fall prevention    Functional Mobility and Transfers for ADLs:   Bed Mobility:     Supine to Sit: Stand-by assistance     Scooting: Stand-by assistance   Transfers:  Sit to Stand: Contact guard assistance  Stand to Sit: Contact guard assistance       Balance:  Sitting: Intact  Sitting - Static: Good (unsupported)  Sitting - Dynamic: Good (unsupported)  Standing: Impaired; With support  Standing - Static: Good  Standing - Dynamic : Fair (+)    ADL Intervention:       Grooming  Grooming Assistance: Set-up  Position Performed: Seated edge of bed  Washing Face: Set-up  Brushing Teeth: Set-up    Upper Body Bathing  Bathing Assistance: Set-up  Position Performed: Seated edge of bed    Lower Body Bathing  Perineal  : Contact guard assistance  Position Performed: Standing  Cues: Verbal cues provided  Adaptive Equipment: Ruth Lizbeth; Wipes(personal cleansing cloth)  Lower Body : Stand-by assistance  Position Performed: Seated edge of bed  Cues: Verbal cues provided    Upper Body [address]: Supervision    Lower Body Dressing Assistance  Socks: Stand-by assistance  Leg Crossed Method Used: No  Position Performed: Bending forward method;Seated edge of bed       Pain:  Pain level pre-treatment: 0/10   Pain level post-treatment: 0/10    Activity Tolerance:    Fair   Please refer to the flowsheet for vital signs taken during this treatment. After treatment:   [x]  Patient left in no apparent distress sitting up in chair  []  Patient left in no apparent distress in bed  [x]  Call bell left within reach  [x]  Nursing notified  []  Caregiver present  []  Bed alarm activated    COMMUNICATION/EDUCATION:   [x] Role of Occupational Therapy in the acute care setting  [] Home safety education was provided and the patient/caregiver indicated understanding. [x] Patient/family have participated as able in working towards goals and plan of care. [x] Patient/family agree to work toward stated goals and plan of care. [] Patient understands intent and goals of therapy, but is neutral about his/her participation. [] Patient is unable to participate in goal setting and plan of care.       Thank you for this referral.  LINH Pichardo  Time Calculation: 38 mins

## 2021-06-28 NOTE — PROGRESS NOTES
Problem: Mobility Impaired (Adult and Pediatric)  Goal: *Acute Goals and Plan of Care (Insert Text)  Description: Physical Therapy Goals  Initiated 6/25/2021 and to be accomplished within 7 day(s)  1. Patient will move from supine to sit and sit to supine  in bed with modified independence. 2.  Patient will transfer from bed to chair and chair to bed with modified independence using the least restrictive device. 3.  Patient will perform sit to stand with modified independence. 4.  Patient will ambulate with modified independence for 150 feet with the least restrictive device. 5.  Patient will ascend/descend 3 stairs with handrail(s) with supervision/set-up. PLOF: Mod I. Outcome: Progressing Towards Goal    PHYSICAL THERAPY TREATMENT    Patient: Augustus Whitman (83 y.o. male)  Date: 6/28/2021  Diagnosis: Mesenteric abscess ([facility] [address] [K65.1] <principal problem not specified>       Precautions: Fall, Aspiration    ASSESSMENT:  Pt cleared to participate in PT session, pt received semi-reclined in bed and agreeable to therapy session. Completing with OT to maximize safety and mobility. Pt seemingly upset, reporting he has been sitting in the bed since 730. Pt not knowing why he was brought up to the floor from ARU and unhappy with current care as he was doing so much more down in ARU. Pt assisted with changing socks and gown and cleaning up. Pt declining standing for hygiene tasks but was willing to ambulate to recliner. Pt requiring RW and CGA for mobility at this time, declining ambulating in [place]. Pt positioned for comfort and educated to call for assist before getting up, pt verbalized understanding. Pt left with all needs met and call bell in reach. RN notified of position and participation. Time of arrival to room and tasks completed written on whiteboard.        Progression toward goals:   []      Improving appropriately and progressing toward goals  [x]      Improving slowly and progressing toward goals  []      Not making progress toward goals and plan of care will be adjusted     PLAN:  Patient continues to benefit from skilled intervention to address the above impairments. Continue treatment per established plan of care. Discharge Recommendations:  Home Health with 24/7 assist vs Inpatient Rehab  Further Equipment Recommendations for Discharge:  rolling walker     SUBJECTIVE:   Patient stated Should I eat before I take my meds? Jen Dennis    OBJECTIVE DATA SUMMARY:   Critical Behavior:  Neurologic State: Alert  Orientation Level: Oriented X4  Cognition: Appropriate decision making, Follows commands  Safety/Judgement: Fall prevention  Functional Mobility Training:  Bed Mobility:     Supine to Sit: Stand-by assistance     Scooting: Stand-by assistance    Transfers:  Sit to Stand: Contact guard assistance  Stand to Sit: Contact guard assistance    Balance:  Sitting: Intact  Sitting - Static: Good (unsupported)  Sitting - Dynamic: Good (unsupported)  Standing: Impaired; With support  Standing - Static: Good  Standing - Dynamic : Fair (+)    Ambulation/Gait Training:  Distance (ft): 15 Feet (ft)  Assistive Device: Walker, rolling  Ambulation - Level of Assistance: Contact guard assistance     Gait Description (WDL): Exceptions to WDL  Gait Abnormalities: Decreased step clearance    Base of Support: Narrowed     Speed/Ekta: Slow  Step Length: Right shortened;Left shortened    Therapeutic Exercises:   Instructed in and completed the following :      EXERCISE   Sets   Reps   Active Active Assist   Passive Self ROM   Comments   Ankle Pumps 1 10  [x] [] [] []    Quad Sets/Glut Sets    [] [] [] [] Hold for 5 secs   Hamstring Sets   [] [] [] []    Short Arc Quads   [] [] [] []    Heel Slides   [] [] [] []    Straight Leg Raises   [] [] [] []    Hip Add   [] [] [] [] Hold for 5 secs, w/ pillow squeeze   Long Arc Quads 1 10 [x] [] [] []    Seated Marching   [] [] [] []    Standing Marching   [] [] [] [] [] [] [] []        Pain:  Pain level pre-treatment: 0/10  Pain level post-treatment: 0/10     Activity Tolerance:   Fair tolerance     Please refer to the flowsheet for vital signs taken during this treatment. After treatment:   [x] Patient left in no apparent distress sitting up in chair  [] Patient left in no apparent distress in bed  [x] Call bell left within reach  [x] Nursing notified  [] Caregiver present  [] Bed alarm activated  [] SCDs applied      COMMUNICATION/EDUCATION:   [x]         Role of Physical Therapy in the acute care setting. [x]         Fall prevention education was provided and the patient/caregiver indicated understanding. [x]         Patient/family have participated as able in working toward goals and plan of care. [x]         Patient/family agree to work toward stated goals and plan of care. []         Patient understands intent and goals of therapy, but is neutral about his/her participation.   []         Patient is unable to participate in stated goals/plan of care: ongoing with therapy staff.  []         Other:        Minh Jarrell, PT   Time Calculation: 38 mins

## 2021-06-28 NOTE — PROGRESS NOTES
Discharge/Transition Planning     0940:Called Daniela with ARU and ARU did receive insurance auth. ARU questioned the WBC increase and heart rate. Sent Perfect Serve to Dr Luz Marina Meehan.    Candelario Meng stated ARU bed huddle is at  and Dr Bhumi Hernandez with ARU would be reviewing pt before accepting today    [phone]: Dr Luz Marina Meehan stated pt will stay inpatient one more day on new antibiotics      Sean Iraheta RN BSN  Outcomes Manager    Pager # [phone]

## 2021-06-28 NOTE — PROGRESS NOTES
ARU/IPR REFERRAL CONTACT NOTE  [id number] [place] [address] for Physical Rehabilitation    RE: Walter King     Thank you for the opportunity to review this patient's case for admission to [address] [place] [address] for Physical Rehabilitation. Based on our pre-admission screening:     [x ] Our Team/Medical Director is following this case. Comments: We have received authorization for ARU when the patient is medically stable. We have a concern re: the increase in elevation of WBC's and was wondering if surgery needed to be re-consulted    Again, Thank you for this referral. Should you have any questions please do not hesitate to call. Sincerely,  Renae Redmond. Reina Bocanegra, [address]   Reina Bocanegra, RN  Admissions [facility] for Physical Rehabilitation  [phone]

## 2021-06-28 NOTE — FACE TO FACE
Took patient to the bathroom left door ajar and stood outside the door, Helped back to his chair with needs in reach.

## 2021-06-28 NOTE — ROUTINE PROCESS
Patient alert with confusion to purpose and time. Patient verbalize he was unsatisfied, fells he was not receiving care, patient reminded nurse was at [place], he was also assisted to BR. Call light in reach and condom cath intact. -120s while resting, patient asymptomatic, denies any discomforts.

## 2021-06-28 NOTE — PROGRESS NOTES
Cardiology Progress Note    Admit Date: 6/21/2021  Attending Cardiologist: Dr. Julius Mcdaniels:     -Syncope in setting of below.  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 The patient is on Eliquis and Plavix due to recent PCI and Afib. Patient is at high risk for graft and stent occlusion if plavix is held. Appreciate IR involvement. Risks outweigh benefits of CT guided drainage of small abscess that may not be amenable to drainage due to anatomic positioning at this time. -CAD, Hx CABG x 2 (LIMA-LAD, SVG-LCx), recent NSTEMI s/p cardiac cath 5/18/2021 at [facility] with PCI to proximal SVG-LCx with 3.5 x 12 mm Juan J JENNIE; PCI to mid SVG with 3.5 x 23 mm Juan J JENNIE; PCI to distal SVG with 3.5 x 15 mm Florence JENNIE; Balloon angioplasty to distal SVG anastomosis.  Discharged on Plavix and Eliquis (no ASA to avoid triple therapy).    -Initial cardiac cath 5/18/2021 at [place] [address] with distal LM 50% blockage; LAD is large vessel with 80% proximal stenosis with patent LIMA-LAD, there is a 70% stenosis distal to LIMA anastomosis; LCx is dominant, large diffusely diseased vessel with sequential 80% stenosis at proximal and mid portion of the vessel; RCA is non-dominant, small diffusely diseased vessel  -Echo 5/2021 at [facility] with EF 50%.   -Atrial fibrillation, recent diagnosis, on Eliquis for anticoagulation.  -Leukocytosis, WBC up to 26.7K on 6/20/2021 (up from 14.3  -Severe COPD.  O2 goal 89-93% per [facility] records.  -Hx squamous cell lung cancer, s/p right middle and lower lobectomy.  -Peripheral vascular disease, Hx bilateral carotid endarterectomies  -Hx renal artery stenosis, Hx bilateral stenting 2011  -Acute CVA during recent admission at [facility]; considered cardioembolic, s/p PCI 6/00/9881, symptom onset 5/20/2021 while on Eliquis; suspected plaque mobilization.  -Recent acute blood loss anemia due to melena and hematuria (self-inflicted [place] trauma), GI consulted 5/26/2021, cleared for Plavix/Eliquis  -Hx HTN with hypotension during recent admission, medications adjusted at that time.  Coreg decreased to 12.5 mg BID, Isosorbid-Hydralazine and Lisinopril discontinued. -Dementia, mild.     Primary cardiologist at [place] [address].:     -Stable  -Awaiting placement in rehab and will defer to primary team.  -His EF has dropped to 30-35% by echo this admission. His volume status current appears to be at baseline.  -Will start on ACE/ARB and monitor. He may benefit with i[facility], and will discuss with MD.  -Would refrain from aggressively treating his HR in setting of his sepsis in hopes to avoid any bradycardia.  -Hopefully transferred to rehab soon. Staff addendum:  Starting lisinopril. Will hold off on entresto for now with hopes of improvement in EF quickly now that infection being treated. Dispo planning. I saw, examined, and evaluated the patient. I personally reviewed the patient's labs, tests, vitals, orders, medications, updated history, and other providers assessments. I personally agree with the findings as stated and the plan as documented. Sneha Jean MD      Subjective:     No new complaints. Concerned over the process to rehab and the delay in getting there.      Objective:      Patient Vitals for the past 8 hrs:   Temp Pulse Resp BP SpO2   06/28/21 0819 98 °F (36.7 °C) 98 18 (!) 163/94 97 %   06/28/21 0328 98 °F (36.7 °C) 98 18 123/72 93 %         Patient Vitals for the past 96 hrs:   Weight   06/28/21 0649 83.6 kg (184 lb 4.8 oz)   06/27/21 0645 81 kg (178 lb 9.2 oz)   06/26/21 0400 80.3 kg (177 lb 1.6 oz)   06/25/21 1920 78.5 kg (173 lb 1 oz)   06/25/21 0405 78.5 kg (173 lb)       TELE: AFIB               Current Facility-Administered Medications   Medication Dose Route Frequency Last Admin    senna-docusate (PERICOLACE) 8.6-50 mg per tablet 2 Tablet  2 Tablet Oral QHS 2 Tablet at 06/26/21 2131    carvediloL (COREG) tablet 3.125 mg  3.125 mg Oral BID WITH MEALS 3.125 mg at 06/28/21 0843    cefpodoxime (VANTIN) tablet 200 mg  200 mg Oral Q12H 200 mg at 06/28/21 0840    metroNIDAZOLE (FLAGYL) tablet 500 mg  500 mg Oral Q12H 500 mg at 06/28/21 0843    apixaban (ELIQUIS) tablet 5 mg  5 mg Oral BID 5 mg at 06/28/21 0843    sodium chloride (NS) flush 5-40 mL  5-40 mL IntraVENous Q8H 10 mL at 06/28/21 0703    sodium chloride (NS) flush 5-40 mL  5-40 mL IntraVENous PRN      acetaminophen (TYLENOL) tablet 650 mg  650 mg Oral Q6H  mg at 06/28/21 0844    Or    acetaminophen (TYLENOL) suppository 650 mg  650 mg Rectal Q6H PRN      ondansetron (ZOFRAN ODT) tablet 4 mg  4 mg Oral Q8H PRN      Or    ondansetron (ZOFRAN) injection 4 mg  4 mg IntraVENous Q6H PRN      albuterol (PROVENTIL VENTOLIN) nebulizer solution 2.5 mg  2.5 mg Nebulization Q4H PRN      clopidogreL (PLAVIX) tablet 75 mg  75 mg Oral DAILY WITH BREAKFAST 75 mg at 06/28/21 0841    gabapentin (NEURONTIN) capsule 100 mg  100 mg Oral  mg at 06/28/21 0840    multivitamin, tx-iron-ca-min (THERA-M w/ IRON) tablet 1 Tablet  1 Tablet Oral DAILY 1 Tablet at 06/28/21 0842    rosuvastatin (CRESTOR) tablet 10 mg  10 mg Oral DAILY 10 mg at 06/28/21 0840    tamsulosin (FLOMAX) capsule 0.4 mg  0.4 mg Oral PCD 0.4 mg at 06/27/21 1727    L. acidophilus,casei,rhamnosus (BIO-K PLUS) capsule 1 Capsule  1 Capsule Oral DAILY 1 Capsule at 06/28/21 3913         Intake/Output Summary (Last 24 hours) at 6/28/2021 0926  Last data filed at 6/28/2021 0740  Gross per 24 hour   Intake 1080 ml   Output 600 ml   Net 480 ml       Physical Exam:  General:  alert, cooperative, no distress, appears stated age  Neck:  nontender, no JVD  Lungs:  clear to auscultation bilaterally  Heart:  regular rate and rhythm, S1, S2 normal, no murmur, click, rub or gallop  Abdomen:  abdomen is soft without significant tenderness, masses, organomegaly or guarding  Extremities:  extremities normal, atraumatic, no cyanosis or edema    Visit Vitals  BP (!) 163/94 (BP 1 Location: Right upper arm, BP Patient Position: At rest)   Pulse 98   Temp 98 °F (36.7 °C)   Resp 18   Ht 5' 8\" (1.727 m)   Wt 83.6 kg (184 lb 4.8 oz)   SpO2 97%   BMI 28.02 kg/m²       Data Review:     Labs: Results:       Chemistry Recent Labs     06/27/21  0516   GLU 73*   *   K 4.7   CL 99*   CO2 27   BUN 11   CREA 0.67   CA 9.1   MG 2.1   AGAP 6   BUCR 16      CBC w/Diff Recent Labs     06/28/21  0230 06/27/21  0516   WBC 24.5* 21.7*   RBC 3.31* 3.41*   HGB 9.5* 9.5*   HCT 30.9* 32.0*    366   GRANS 82* 81*   LYMPH 16* 6*   EOS 0 3      Cardiac Enzymes No results found for: CPK, CK, CKMMB, CKMB, RCK3, CKMBT, CKNDX, CKND1, WAGNER, TROPT, TROIQ, RAFAEL, TROPT, TNIPOC, BNP, BNPP   Coagulation No results for input(s): PTP, INR, APTT, INREXT in the last 72 hours. Lipid Panel No results found for: CHOL, CHOLPOCT, CHOLX, CHLST, CHOLV, 992197, HDL, HDLP, LDL, LDLC, DLDLP, 894213, VLDLC, VLDL, TGLX, TRIGL, TRIGP, TGLPOCT, CHHD, CHHDX   BNP No results found for: BNP, BNPP, XBNPT   Liver Enzymes No results for input(s): TP, ALB, TBIL, AP in the last 72 hours.     No lab exists for component: SGOT, GPT, DBIL   Thyroid Studies Lab Results   Component Value Date/Time    TSH 1.57 06/21/2021 02:12 PM          Signed By: LAMBERT Pacheco     June 28, 2021

## 2021-06-28 NOTE — ROUTINE PROCESS
Bedside and Verbal shift change report given to [facility] RN (oncoming nurse) by Sil Moya (offgoing nurse). Report included the following information SBAR, Kardex, MAR, Recent Results and Cardiac Rhythm A FIB with PVCs.

## 2021-06-28 NOTE — PROGRESS NOTES
Hospitalist Progress Note    Patient: Stefanie Monge Age: 80 y.o. : 1940 MR#: [id number] SSN: [id number]  Date/Time: 2021 2:49 PM    DOA: 2021  PCP: Laurita Perez MD    Subjective:     He is found sitting in chair and upset again because he is not getting to his ARU  Noted to him again that his infection might not be under control with his oral antibiotics  Educated him again why he is on antibiotics and that appropriate antibiotics are needed for is abdominal abscess. Surgery is not going to do anything at this point due to his persist need for plavix and Eliquis. Wife has been updated. Otherwise, increase leukocytosis since started on oral antibx (vantin+flagyl) for the last 3 days. No fever. HR in AFIB, no chest pain. No further syncope event. No further bradycardia except report Saturday   Cardiology has changed carvedilol to 3.125mg  Still with pain in his heels of feet, feels numbness on both leg. Interval Hospital Course:  80 y.o male with HTN, CAD with CABG, persistent AFIB, COPD, PAD, h/o embolic CVA, anemia, presented from ARU for unresponsive while on toilet. RRT called to find hypotension and bradycardia required atropine. In the ER, he was found tachycardia, stomach pain, recent CT with possible mesenteric abscess. He was started on IV zosyn for sepsis concern. Surgery consulted for further care. Recommended IR for drainage tube but IR reviewed this case and unable to get access. Furthermore, he requires plavix in the setting of recent stent, IR deferred for further intervention. He tolerated IV antibiotics. He has improved of his bradycardia and hyponatremia. He was able to tolerated diet. Surgery has no further intervention at this time. ID consulted and recommend oral antibiotics once he tolerated oral intake. Cardiology has been followed for bradycardia with adjustment of his betablocker.          ROS: No current fever/chills, no headache, no dizziness, no sinus congestion,   No swallowing pain, No chest pain, no palpitation, no shortness of breath, + abd pain,  No diarrhea, no urinary complaint, + leg pain or swelling    Assessment/Plan:     1. 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 Sepsis poa (leukocytosis+tachycardia+abscess), resolving   3. Symptomatic bradycardia, transient, resolved   4. Syncope and collapse, resolved    5. Hypotension, hypovolemia, resolved   6. CAD with CABG, recent NSTEMI s/p stent placement  7. Persistent AFIB, tolerates HR at lower 100s  8. PAD with peripheral neuropathy  9. COPD   10. Recent embolic CVA   11.  Leukocytosis, improving    12. Hyponatremia, due to low solute, resolving   13. Normocytic anemia   14. Hypoglycemia noted in AM, nursing will provide snatch prior to bedtime       Spoke with ID for stopping flagyl, add Augmentin for Enterococcus coverage. Cont Cefepime  Daily check CBC. Able to d/c if down trend noted. He is not a surgical candidate due to his needs for Plavix. He remains stable. Cardiology recommended to continue carvedilol 3.125mg with avoidance of increasing as he is tolerating current HR. Continue his cardiac medications, Plavix needed, cont carvedilol 3.125mg, statin  He is ready for ARU, authorization noted   Advance to low fiber diet as tolerated. Blood culture follow up  Surgery follows,  no intervention at this time due to his cardiac risk and the need to be on eliquis and plavix   Appreciate IR consult   Continue bronchodilator, budesonide, arformoterol   Increase his gabapentin due to persistent neuropathy   ICS  PT/OT   PPI    Full code  Spoke with wife with clinical updates.        Additional Notes:    Time spent >30 minutes    Case discussed with:  [x]Patient  [x]Family  [x]Nursing  [x]Case Management  DVT Prophylaxis:  []Lovenox  [x]eliquis  []SCDs  []Coumadin   []On Heparin gtt    Signed By: Chester Cowan MD     2021 2:49 PM              Objective:   VS:   Visit Vitals  BP (!) 140/77 (BP 1 Location: Right upper arm, BP Patient Position: At rest)   Pulse 100   Temp 97.7 °F (36.5 °C)   Resp 16   Ht 5' 8\" (1.727 m)   Wt 83.6 kg (184 lb 4.8 oz)   SpO2 97%   BMI 28.02 kg/m²      Tmax/24hrs: Temp (24hrs), Av.9 °F (36.6 °C), Min:97.6 °F (36.4 °C), Max:98 °F (36.7 °C)      Intake/Output Summary (Last 24 hours) at 2021 1449  Last data filed at 2021 1031  Gross per 24 hour   Intake 940 ml   Output 600 ml   Net 340 ml       Tele:   General:  Cooperative, Not in acute distress, speaks in full sentence while in bed  HEENT: PERRL, EOMI, supple neck, no JVD, dry oral mucosa  Cardiovascular: S1S2 irregular, no rub/gallop   Pulmonary:  air entry bilaterally, no wheezing, no crackle  GI:  Soft, non tender, non distended, +bs, no guarding   Extremities:  No pedal edema, +distal pulses appreciated   Neuro: AOx3, moving all extremities, no gross deficit.      Additional:       Current Facility-Administered Medications   Medication Dose Route Frequency    lisinopriL (PRINIVIL, ZESTRIL) tablet 5 mg  5 mg Oral DAILY    amoxicillin-clavulanate (AUGMENTIN) 500-125 mg per tablet 1 Tablet  1 Tablet Oral Q12H    senna-docusate (PERICOLACE) 8.6-50 mg per tablet 2 Tablet  2 Tablet Oral QHS    carvediloL (COREG) tablet 3.125 mg  3.125 mg Oral BID WITH MEALS    cefpodoxime (VANTIN) tablet 200 mg  200 mg Oral Q12H    apixaban (ELIQUIS) tablet 5 mg  5 mg Oral BID    sodium chloride (NS) flush 5-40 mL  5-40 mL IntraVENous Q8H    sodium chloride (NS) flush 5-40 mL  5-40 mL IntraVENous PRN    acetaminophen (TYLENOL) tablet 650 mg  650 mg Oral Q6H PRN    Or    acetaminophen (TYLENOL) suppository 650 mg  650 mg Rectal Q6H PRN    ondansetron (ZOFRAN ODT) tablet 4 mg  4 mg Oral Q8H PRN    Or    ondansetron (ZOFRAN) injection 4 mg  4 mg IntraVENous Q6H PRN    albuterol (PROVENTIL VENTOLIN) nebulizer solution 2.5 mg  2.5 mg Nebulization Q4H PRN    clopidogreL (PLAVIX) tablet 75 mg  75 mg Oral DAILY WITH BREAKFAST    gabapentin (NEURONTIN) capsule 100 mg  100 mg Oral TID    multivitamin, tx-iron-ca-min (THERA-M w/ IRON) tablet 1 Tablet  1 Tablet Oral DAILY    rosuvastatin (CRESTOR) tablet 10 mg  10 mg Oral DAILY    tamsulosin (FLOMAX) capsule 0.4 mg  0.4 mg Oral PCD    L. acidophilus,casei,rhamnosus (BIO-K PLUS) capsule 1 Capsule  1 Capsule Oral DAILY            Lab/Data Review:  Labs: Results:       Chemistry Recent Labs     06/28/21 0230 06/27/21  0516   GLU 62* 73*   * 132*   K 4.8 4.7    99*   CO2 27 27   BUN 11 11   CREA 0.69 0.67   BUCR 16 16   AGAP 8 6   CA 8.8 9.1     No results for input(s): TBIL, ALT, ALKP, TP, ALB, GLOB, AGRAT in the last 72 hours. No lab exists for component: SGOT   CBC w/Diff Recent Labs     06/28/21 0230 06/27/21  0516 06/27/21  0516   WBC 24.5*  --  21.7*   RBC 3.31*  --  3.41*   HGB 9.5*  --  9.5*   HCT 30.9*  --  32.0*   MCV 93.4   < > 93.8   MCH 28.7   < > 27.9   MCHC 30.7*   < > 29.7*   RDW 17.8*   < > 17.6*     --  366   GRANS 82*  --  81*   LYMPH 16*  --  6*   EOS 0  --  3    < > = values in this interval not displayed. Coagulation No results for input(s): PTP, INR, APTT, INREXT, INREXT in the last 72 hours. Iron/Ferritin No results found for: IRON, FE, TIBC, IBCT, PSAT, FERR    BNP    Cardiac Enzymes Lab Results   Component Value Date/Time    CK 78 06/22/2021 04:09 AM    CK - MB 2.3 06/22/2021 04:09 AM    CK-MB Index 2.9 06/22/2021 04:09 AM    Troponin-I, QT 0.07 (H) 06/22/2021 04:09 AM        Lactic Acid    Thyroid Studies          All Micro Results     None            Images:    CT (Most Recent). CT Results (most recent):  Results from Hospital Encounter encounter on 06/07/21    CT CHEST ABD PELV W CONT    Narrative  CT CHEST, ABDOMEN AND PELVIS WITH ENHANCEMENT    INDICATION: Leukocytosis. Abdominal pain. Myocardial infarction.     TECHNIQUE: Axial images obtained of the chest, abdomen and pelvis following the  uneventful administration of 100 cc Isovue-300 nonionic intravenous contrast.  Coronal and sagittal reformatted images obtained. All CT scans are performed  using dose optimization techniques as appropriate to the performed exam  including the following: Automated exposure control, adjustment of mA and/or kV  according to patient size, and use of iterative reconstructive technique. COMPARISON: CTA chest 11/14/2015, CT abdomen and pelvis 6/16/2014. CHEST FINDINGS:  Lung: Emphysema. Pleura: Unremarkable. Heart: No effusion. CABG. Cardiomegaly. Vessels: No aortic aneurysm. Scattered wall calcifications. Lymph Nodes: Unremarkable. Thyroid: Stable to prior. The right gland is larger than the left and could  indicate an underlying nodule. Bones: Sternotomy. Lower thoracic degenerative disc disease. ABDOMEN AND PELVIS FINDINGS:  Liver: Unremarkable. Biliary: Unremarkable. Spleen: Unremarkable. Pancreas: Unremarkable. Adrenal Glands: Stable chronic mild nodular thickening of the left gland since  2014. Kidneys: Chronic left renal cortical scarring. Subcentimeter right renal cyst.    Bowel: Normal appendix. Diverticulosis. Thickened and collapsed segment of mid  sigmoid colon has an appearance similar to prior 2014 exam. There is mild  proximal sigmoid colon distention suggesting a stricture in this region. Positioned between this thickened segment of colon and a thickened inflamed loop  of small bowel is a mesenteric gas and fluid containing rim enhanced collection  measuring 3.5 x 2.3 cm. At least 2 additional small phlegmonous developing  collections are seen closely along serosal mesenteric margin of the affected  small bowel loop best seen on sagittal image 31 and 38.     Dilated fluid-filled small bowel loops transitioning at this abnormally  thickened small bowel loop however the bowel appears patent and gas and fluid  containing on both ends of this process suggesting a superimposed ileus  contributing to the picture. Bladder/ Pelvic Organs: Mild prostate enlargement. Mild thickening of the  bladder dome wall, likely reactive to adjacent mesenteric/small bowel process. Peritoneum/ Retroperitoneum: No free air. Trace free fluid. Mesenteric  inflammation in the pelvis as discussed above. Lymph Nodes: Borderline enlarged 9-10 mm mesenteric lymph nodes may be reactive. Vessels: Arteriosclerosis. Renal artery stents. Bones/Soft tissues: Mild chronic fatty left inguinal hernia is similar to prior. Degenerative disc disease throughout the lumbar spine most advanced at L2-L3. Right greater than left hip degenerative joint disease. Impression  1. No acute findings in the chest.  -Emphysema. -CABG. -Stable mildly enlarged right thyroid gland which could indicate underlying  nodule, stable for many years. 2. 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 It is unclear if initial source of abscess is from  small bowel inflammation or diverticulitis/pericolonic abscess. 3. Dilated small bowel. This is likely a combination of generalized small bowel  ileus and low-grade partial small bowel obstruction at site of abnormal pelvic  small bowel loop discussed above. 4. Diverticulosis. Stable thickened collapsed segment of sigmoid colon since  2014 suggesting a chronic stricture. This is limited for assessment by CT. 5. Bladder dome inflammation is likely secondary to the adjacent mesentery  process. 6. Other chronic incidental findings. Findings were discussed with emergency department physician on 6/21/2021 at 1:55  PM.         XRAY (Most Recent)      EKG No results found for this or any previous visit.      2D ECHO

## 2021-06-28 NOTE — PROGRESS NOTES
Infectious Disease progress Note        Reason: Leukocytosis, evaluate for sepsis    Current abx Prior abx   Zosyn since 6/21/21-6/25  Cefpodoxime, metronidazole since 6/25/2021 Ceftriaxone, intravenous metronidazole, oral vancomycin, IV vancomycin 6/20-6/21     Lines:       Assessment :    80-year-old man with past medical history of diastolic CHF, coronary artery disease status post CABG, history of lung cancer status post pneumonectomy, hypertension admitted to [facility] acute rehab on 6/7/2021. Recent hospitalization at [facility] 5/17-6/7 for non stemI s/p CABG  Post op course complicated by profound unexplained leukocytosis. Wbc:37 k on 6/7/21    Now with leukocytosis, diarrhea    Clinical presentation consistent with mesenteric abscess, acute on chronic sigmoid diverticulitis    CT without contrast 5/22 at [facility] revealed evidence of diverticular disease   Recent history of profound unexplained leukocytosis during stay at [facility]. Wbc:37 k on 6/7/21 could be due to flare up of diverticultis, diverticular perforation. Worsening leukocytosis since 6/20 likely due to recurrent diverticulitis. RLQ pain/tenderness on prior exam likely due to this. Colorectal surgery f/u appreciated. Plans for possible IR guided drainage noted. Elevated bilirubin 2.2 on 6/20/21- normal transaminases-likely due to sepsis. No evidence of undiagnosed hepatobiliary pathology noted on CT scan 6/21/2021    Syncope 6/21- likely vasovagal.  Cardiology follow-up appreciated    Subjective sense of improvement. Hemodynamically stable on today's exam  Worsening leukocytosis-partially treated abscess? Due to switch to oral antibiotics-no worsening abdominal pain/tenderness noted on today's exam.  No diarrhea/constipation. Recommendations:    1. D/c metronidazole  2.  continue po cefpodoxime, start augmentin  3.   Follow-up colorectal surgery recommendations - patient will need surgical intervention/colectomy if fails conservative measures &/or recurrent diverticulitis-recommend to arrange follow-up with colorectal surgery  4. Probiotics while on antibiotics  5. Will continue above antibiotics atleast till 7/8/21 if continued clinical/lab improvement noted on future exam     Above plan was discussed in details with patient, primary team. Please call me if any further questions or concerns. Will continue to participate in the care of this patient. HPI:    Patient states that he feels better. Is eager to be transferred to ARU. Denies abdominal pain. Tolerating p.o. diet. Continues to feel dizzy when he sits up.     home Medication List    Details   nitroglycerin (Nitrostat) 0.4 mg SL tablet 0.4 mg by SubLINGual route every five (5) minutes as needed for Chest Pain. Up to 3 doses. fluticasone furoate-vilanteroL (Breo Ellipta) 100-25 mcg/dose inhaler Take 1 Puff by inhalation daily. therapeutic multivitamin (THERAGRAN) tablet Take 1 Tablet by mouth daily. rosuvastatin (Crestor) 10 mg tablet Take 10 mg by mouth nightly. furosemide (Lasix) 20 mg tablet Take 20 mg by mouth daily. clopidogreL (Plavix) 75 mg tab Take 75 mg by mouth daily. apixaban (ELIQUIS) 5 mg tablet Take 5 mg by mouth two (2) times a day. tamsulosin (FLOMAX) 0.4 mg capsule Take 1 Cap by mouth daily (after dinner). Qty: 30 Cap, Refills: 6      carvediloL (Coreg) 12.5 mg tablet Take 12.5 mg by mouth two (2) times daily (with meals). albuterol (PROVENTIL HFA, VENTOLIN HFA) 90 mcg/actuation inhaler Take  by inhalation.              Current Facility-Administered Medications   Medication Dose Route Frequency    senna-docusate (PERICOLACE) 8.6-50 mg per tablet 2 Tablet  2 Tablet Oral QHS    carvediloL (COREG) tablet 3.125 mg  3.125 mg Oral BID WITH MEALS    cefpodoxime (VANTIN) tablet 200 mg  200 mg Oral Q12H    metroNIDAZOLE (FLAGYL) tablet 500 mg  500 mg Oral Q12H    apixaban (ELIQUIS) tablet 5 mg  5 mg Oral BID    sodium chloride (NS) flush 5-40 mL  5-40 mL IntraVENous Q8H    sodium chloride (NS) flush 5-40 mL  5-40 mL IntraVENous PRN    acetaminophen (TYLENOL) tablet 650 mg  650 mg Oral Q6H PRN    Or    acetaminophen (TYLENOL) suppository 650 mg  650 mg Rectal Q6H PRN    ondansetron (ZOFRAN ODT) tablet 4 mg  4 mg Oral Q8H PRN    Or    ondansetron (ZOFRAN) injection 4 mg  4 mg IntraVENous Q6H PRN    albuterol (PROVENTIL VENTOLIN) nebulizer solution 2.5 mg  2.5 mg Nebulization Q4H PRN    clopidogreL (PLAVIX) tablet 75 mg  75 mg Oral DAILY WITH BREAKFAST    gabapentin (NEURONTIN) capsule 100 mg  100 mg Oral TID    multivitamin, tx-iron-ca-min (THERA-M w/ IRON) tablet 1 Tablet  1 Tablet Oral DAILY    rosuvastatin (CRESTOR) tablet 10 mg  10 mg Oral DAILY    tamsulosin (FLOMAX) capsule 0.4 mg  0.4 mg Oral PCD    L. acidophilus,casei,rhamnosus (BIO-K PLUS) capsule 1 Capsule  1 Capsule Oral DAILY       Allergies: Lipitor [atorvastatin] and Norvasc [amlodipine]    Temp (24hrs), Av °F (36.7 °C), Min:97.6 °F (36.4 °C), Max:98.5 °F (36.9 °C)    Visit Vitals  BP (!) 163/94 (BP 1 Location: Right upper arm, BP Patient Position: At rest) Comment: nurse knows   Pulse 98   Temp 98 °F (36.7 °C)   Resp 18   Ht 5' 8\" (1.727 m)   Wt 83.6 kg (184 lb 4.8 oz)   SpO2 97%   BMI 28.02 kg/m²       ROS: 12 point ROS obtained in details. Pertinent positives as mentioned in HPI,   otherwise negative    Physical Exam:    General: Well developed, well nourished male laying on the bed/sitting on the  bed AAOx3 in no acute distress. General:   awake alert and oriented   HEENT:  Normocephalic, atraumatic,  EOMI, no scleral icterus or pallor; no conjunctival hemmohage;  nasal and oral mucous are moist and without evidence of lesions. No thrush. Dentition good. Neck supple, no bruits.    Lymph Nodes:   no cervical, axillary or inguinal adenopathy   Lungs:   non-labored, bilaterally clear to auscultation- no crackles wheezes rales or rhonchi   Heart:  RRR, s1 and s2; no  rubs or gallops, no edema   Abdomen:  soft, non-distended, active bowel sounds, no hepatomegaly, no splenomegaly. Resolved abdominal tenderness   Genitourinary:  deferred   Extremities:   no clubbing, cyanosis; no joint effusions or swelling; Full ROM of all large joints to the upper and lower extremities; muscle mass appropriate for age   Neurologic:  No gross focal sensory abnormalities; 5/5 muscle strength to upper and lower extremities. Speech appropriate. Cranial nerves intact                        Skin:  No rash or ulcers noted   Back:  no spinal or paraspinal muscle tenderness or rigidity, no CVA tenderness     Psychiatric:  No suicidal or homicidal ideations, appropriate mood and affect         Labs: Results:   Chemistry Recent Labs     06/27/21  0516   GLU 73*   *   K 4.7   CL 99*   CO2 27   BUN 11   CREA 0.67   CA 9.1   AGAP 6   BUCR 16      CBC w/Diff Recent Labs     06/28/21  0230 06/27/21  0516   WBC 24.5* 21.7*   RBC 3.31* 3.41*   HGB 9.5* 9.5*   HCT 30.9* 32.0*    366   GRANS 82* 81*   LYMPH 16* 6*   EOS 0 3      Microbiology No results for input(s): CULT in the last 72 hours. RADIOLOGY:    All available imaging studies/reports in [facility] care for this admission were reviewed  High complexity decision making was performed during the evaluation of this patient at high risk for decompensation with multiple organ involvement         Disclaimer: Sections of this note are dictated utilizing voice recognition software, which may have resulted in some phonetic based errors in grammar and contents. Even though attempts were made to correct all the mistakes, some may have been missed, and remained in the body of the document. If questions arise, please contact our department.     Dr. Mavis Ackerman, Infectious Disease Specialist  [phone]  June 28, 2021  9:47 AM

## 2021-06-28 NOTE — PROGRESS NOTES
RENAL DAILY PROGRESS NOTE            59-year-old male with past medical history of atrial fibrillation, severe COPD, renal artery stenosis presented with a syncope, sepsis following for hyponatremia  Subjective:       Complaint:   Overnight events noted  Hemodynamic stable      IMPRESSION:   Hyponatremia, hypovolemic hyponatremia, very low urine sodium, improving  Sepsis, mesenteric abscess and pelvic  History of bladder fibrillation, episode of bradycardia, cardiology colleague following  Anemia   PLAN:   His sodium is improving today , okay to resume diuretics per cardiology colleague. Will be available if any question or concern.  .           Current Facility-Administered Medications   Medication Dose Route Frequency    lisinopriL (PRINIVIL, ZESTRIL) tablet 5 mg  5 mg Oral DAILY    amoxicillin-clavulanate (AUGMENTIN) 500-125 mg per tablet 1 Tablet  1 Tablet Oral Q12H    senna-docusate (PERICOLACE) 8.6-50 mg per tablet 2 Tablet  2 Tablet Oral QHS    carvediloL (COREG) tablet 3.125 mg  3.125 mg Oral BID WITH MEALS    cefpodoxime (VANTIN) tablet 200 mg  200 mg Oral Q12H    apixaban (ELIQUIS) tablet 5 mg  5 mg Oral BID    sodium chloride (NS) flush 5-40 mL  5-40 mL IntraVENous Q8H    sodium chloride (NS) flush 5-40 mL  5-40 mL IntraVENous PRN    acetaminophen (TYLENOL) tablet 650 mg  650 mg Oral Q6H PRN    Or    acetaminophen (TYLENOL) suppository 650 mg  650 mg Rectal Q6H PRN    ondansetron (ZOFRAN ODT) tablet 4 mg  4 mg Oral Q8H PRN    Or    ondansetron (ZOFRAN) injection 4 mg  4 mg IntraVENous Q6H PRN    albuterol (PROVENTIL VENTOLIN) nebulizer solution 2.5 mg  2.5 mg Nebulization Q4H PRN    clopidogreL (PLAVIX) tablet 75 mg  75 mg Oral DAILY WITH BREAKFAST    gabapentin (NEURONTIN) capsule 100 mg  100 mg Oral TID    multivitamin, tx-iron-ca-min (THERA-M w/ IRON) tablet 1 Tablet  1 Tablet Oral DAILY    rosuvastatin (CRESTOR) tablet 10 mg  10 mg Oral DAILY    tamsulosin (FLOMAX) capsule 0.4 mg  0.4 mg Oral PCD    L. acidophilus,casei,rhamnosus (BIO-K PLUS) capsule 1 Capsule  1 Capsule Oral DAILY       Review of Symptoms: comprehensive ROS negative except above.    Objective:     Patient Vitals for the past 24 hrs:   Temp Pulse Resp BP SpO2   06/28/21 0819 98 °F (36.7 °C) 98 18 (!) 163/94 97 %   06/28/21 0328 98 °F (36.7 °C) 98 18 123/72 93 %   06/27/21 2336 97.6 °F (36.4 °C) 76 18 (!) 143/85 95 %   06/27/21 2035 97.9 °F (36.6 °C) 87 18 (!) 142/99 95 %   06/27/21 1623 97.9 °F (36.6 °C) 60 18 (!) 94/59 95 %   06/27/21 1245 98.5 °F (36.9 °C) 60 16 (!) 148/68 96 %        Weight change: 2.598 kg (5 lb 11.6 oz)     06/26 1901 - 06/28 0700  In: 1080 [P.O.:1080]  Out: 800 [Urine:800]    Intake/Output Summary (Last 24 hours) at 6/28/2021 1142  Last data filed at 6/28/2021 1031  Gross per 24 hour   Intake 1420 ml   Output 600 ml   Net 820 ml     Physical Exam:   General: comfortable, no acute distress   HEENT sclera anicteric,   CVS: S1S2 heard,  no rub  RS: + air entry b/l,   Abd: Soft, Non tender,   Neuro:  awake, alert ,   Extrm: no edema, no cyanosis, clubbing   Skin: no visible  Rash  Musculoskeletal: No gross joints or bone deformities         Data Review:     LABS:   Hematology:   Recent Labs     06/28/21  0230 06/27/21  0516   WBC 24.5* 21.7*   HGB 9.5* 9.5*   HCT 30.9* 32.0*     Chemistry:   Recent Labs     06/28/21  0230 06/27/21  0516   BUN 11 11   CREA 0.69 0.67   CA 8.8 9.1   K 4.8 4.7   * 132*    99*   CO2 27 27   GLU 62* 73*            Procedures/imaging: see electronic medical records for all procedures, Xrays and details which were not copied into this note but were reviewed prior to creation of Plan          Assessment & Plan:     As above         Zay Ortiz MD  6/28/2021  1:14 PM

## 2021-06-29 PROBLEM — Z86.79 HISTORY OF SINUS BRADYCARDIA: Status: ACTIVE | Noted: 2021-01-01

## 2021-06-29 PROBLEM — K57.30 DIVERTICULOSIS OF SIGMOID COLON: Chronic | Status: ACTIVE | Noted: 2021-01-01

## 2021-06-29 PROBLEM — K57.20 DIVERTICULITIS OF LARGE INTESTINE WITH ABSCESS: Status: ACTIVE | Noted: 2021-01-01

## 2021-06-29 PROBLEM — R53.1 GENERALIZED WEAKNESS: Status: ACTIVE | Noted: 2021-01-01

## 2021-06-29 NOTE — PROGRESS NOTES
ARU/IPR REFERRAL CONTACT NOTE  [id number] [place] [address] for Physical Rehabilitation          RE:  Summer Gardner     Thank you for the opportunity to review this patient's case for admission to [address] [place] [address] for Physical Rehabilitation. Based on our pre-admission screening patient meets criteria for admission to [facility] for Physical Rehabilitation. Plan for admission today to room 186 at 4pm.  Will need d/c summary/med rec completed and report called to [phone] prior to patient's arrival.    Again, Thank you for this referral. Should you have any questions please do not hesitate to call. Sincerely,  Keyla Boles.  [facility] for [facility]  [phone]

## 2021-06-29 NOTE — PROGRESS NOTES
Received report on pt.from off going RN. Resting  in bed on rounds. Denies c/o pain or SOB at this time. Agitated and forgetful to over night events. Sts\" no one is coming in or doing anything for me\". Reoriented and reassured that frequent rounds would be made . Call bell at side. call bell at side. No acute distress noted. Will cont to monitor for any changes in status. 1800 pt rounded on every 1 to 2 hours. Call bell at side. Bed alarm on. HOB elevated. Oriented at this time x3. .     1940 Bedside and Verbal shift change report given to Abdirahman (oncoming nurse) by Deshaun Broussard RN (offgoing nurse). Report given with Tremayne CHENG and MAR.

## 2021-06-29 NOTE — PROGRESS NOTES
Comprehensive Nutrition Assessment    Type and Reason for Visit: Initial, RD nutrition re-screen/LOS    Nutrition Recommendations/Plan:  - Continue oral diet and add supplements: Ensure Enlive, BID.  - Monitor and encourage po intake as tolerated. Nutrition Assessment:  Transferred from ARU to ED with mesenteric abscess & diverticulitis with abscess per CRS, (NPO or restricted to liquid diet 6/21-6/24). Diet advanced to full liquids & solids on 6/24. Poor to fair intake of recent meals and refusing some meals per chart (pt reporting estimated 30% intake of meals). Denies diet intolerance but c/o dislike of hospital meals. Agreeable to supplements with plan for transfer to ARU today. Malnutrition Assessment:  Malnutrition Status: At risk for malnutrition (specify) (early satiety with decreased po intake)      Nutrition History and Allergies: PMHx- Stroke, COPD, CAD, HTN, heart failure, CABG, right lobectomy (middle, lower lobe). Recent admission to ARU unit s/p NSTEMI with RD assessment 6/9- pt with poor meal intake due to early satiety & intake improved as of 6/16 & was consuming Sara-Jarvis. Wt fluctuations per chart 187# (2/24/20), 163# (6/7/21) & 178# (6/29/21). NKFA. Estimated Daily Nutrient Needs:  Energy (kcal): 0854-5411; Weight Used for Energy Requirements: Current (81 kg)  Protein (g): 65-81; Weight Used for Protein Requirements: Current (0.8-1)  Fluid (ml/day): 9631-6693; Method Used for Fluid Requirements: 1 ml/kcal    Nutrition Related Findings:  Loose stools- BM 6/29. +Edema. Antibiotic & probiotic therapy with bowel regimen being held, MVI w/ iron. Wounds:    None       Current Nutrition Therapies:  ADULT DIET Regular; Low Fiber    Anthropometric Measures:  · Height:  5' 8\" (172.7 cm)  · Current Body Wt:  80.8 kg (178 lb 2.1 oz)   · Admission Body Wt:  169 lb 3.9 oz    · Usual Body Wt:  80.7 kg (178 lb) (pt reported)     · Ideal Body Wt:  154 lbs:  115.7 %   · BMI Category:   Overweight (BMI 25.0-29. 9)       Nutrition Diagnosis:   · Inadequate oral intake related to early satiety (dislike of hospital meals) as evidenced by intake 26-50%    Nutrition Interventions:   Food and/or Nutrient Delivery: Continue current diet, Start oral nutrition supplement, Vitamin supplement, Mineral supplement  Nutrition Education and Counseling: Education not indicated  Coordination of Nutrition Care: Continue to monitor while inpatient    Goals:  PO nutrition intake will meet >75% of patients estimated nutritional needs over the next 7 days.        Nutrition Monitoring and Evaluation:   Behavioral-Environmental Outcomes: None identified  Food/Nutrient Intake Outcomes: Food and nutrient intake, Supplement intake, Vitamin/mineral intake  Physical Signs/Symptoms Outcomes: Biochemical data, GI status, Meal time behavior, Nutrition focused physical findings    Discharge Planning:    Continue oral nutrition supplement, Continue current diet     Electronically signed by Jessa Fontaine RD on 6/29/2021 at 12:51 PM    Contact: [phone]

## 2021-06-29 NOTE — ROUTINE PROCESS
Bedside and Verbal shift change report given to Bettie (oncoming nurse) by Audelia Lu RN (offgoing nurse). Report included the following information SBAR, Kardex, MAR, Recent Results and Cardiac Rhythm A FIB with PVCs. Patient awake on rounds, call light in reach.

## 2021-06-29 NOTE — PROGRESS NOTES
Cardiology Progress Note    Admit Date: 6/21/2021  Attending Cardiologist: Dr. Dada Catalan:     -Syncope in setting of below.  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 The patient is on Eliquis and Plavix due to recent PCI and Afib. Patient is at high risk for graft and stent occlusion if plavix is held. Appreciate IR involvement. Risks outweigh benefits of CT guided drainage of small abscess that may not be amenable to drainage due to anatomic positioning at this time. -CAD, Hx CABG x 2 (LIMA-LAD, SVG-LCx), recent NSTEMI s/p cardiac cath 5/18/2021 at [facility] with PCI to proximal SVG-LCx with 3.5 x 12 mm [place] JENNIE; PCI to mid SVG with 3.5 x 23 mm [place] JENNIE; PCI to distal SVG with 3.5 x 15 mm [place] J JENNIE; Balloon angioplasty to distal SVG anastomosis.  Discharged on Plavix and Eliquis (no ASA to avoid triple therapy).    -Initial cardiac cath 5/18/2021 at [facility] 35 with distal LM 50% blockage; LAD is large vessel with 80% proximal stenosis with patent LIMA-LAD, there is a 70% stenosis distal to LIMA anastomosis; LCx is dominant, large diffusely diseased vessel with sequential 80% stenosis at proximal and mid portion of the vessel; RCA is non-dominant, small diffusely diseased vessel  -Echo 5/2021 at [facility] with EF 50%.   -Atrial fibrillation, recent diagnosis, on Eliquis for anticoagulation.  -Leukocytosis, WBC up to 26.7K on 6/20/2021 (up from 14.3  -Severe COPD.  O2 goal 89-93% per [facility] records.  -Hx squamous cell lung cancer, s/p right middle and lower lobectomy.  -Peripheral vascular disease, Hx bilateral carotid endarterectomies  -Hx renal artery stenosis, Hx bilateral stenting 2011  -Acute CVA during recent admission at [place]; considered cardioembolic, s/p PCI 4/13/1171, symptom onset 5/20/2021 while on Eliquis; suspected plaque mobilization.  -Recent acute blood loss anemia due to melena and hematuria (self-inflicted [place] trauma), GI consulted 5/26/2021, cleared for Plavix/Eliquis  -Hx HTN with hypotension during recent admission, medications adjusted at that time.  Coreg decreased to 12.5 mg BID, Isosorbid-Hydralazine and Lisinopril discontinued. -Dementia, mild.     Primary cardiologist at [place] [id number].:     Patient continues to make slow and steady recovery. Plan to transfer to rehab today. Noted patients HR is elevated low 100s. However only tolerating low dose coreg (decreased 6/26) given bradycardia earlier this admission. Patient likely has underlying SSS but trying to avoid PPM placement give infection risk. Patient appears asymptomatic from cardiac standpoint at this time. Would continue current BB dose at this time. Ok for transfer to rehab from cardiac standpoint. Staff addendum:  A.fib rates  bpm acceptable on low dose coreg, avoiding high dose due to risk for pacemaker and ongoing infeciton. [id number] [address] Lopez for rehab and if heart rate stays consistently > 110 bpm, can increase to 6.25 mg BID. Please call if questions. I saw, examined, and evaluated the patient. I personally reviewed the patient's labs, tests, vitals, orders, medications, updated history, and other providers assessments. I personally agree with the findings as stated and the plan as documented. Issa Hawk MD      Subjective:     No CP, No SOB, no palpitations.     Objective:      Patient Vitals for the past 8 hrs:   Temp Pulse Resp BP SpO2   06/29/21 0800 -- -- -- -- 97 %   06/29/21 0751 98 °F (36.7 °C) (!) 110 18 (!) 140/75 97 %   06/29/21 0429 97.4 °F (36.3 °C) 89 20 131/85 95 %         Patient Vitals for the past 96 hrs:   Weight   06/29/21 0429 178 lb 1.6 oz (80.8 kg)   06/28/21 0649 184 lb 4.8 oz (83.6 kg)   06/27/21 0645 178 lb 9.2 oz (81 kg)   06/26/21 0400 177 lb 1.6 oz (80.3 kg)   06/25/21 1920 173 lb 1 oz (78.5 kg)       TELE: AFIB               Current Facility-Administered Medications   Medication Dose Route Frequency Last Admin    lisinopriL (PRINIVIL, ZESTRIL) tablet 5 mg  5 mg Oral DAILY 5 mg at 06/29/21 1018    amoxicillin-clavulanate (AUGMENTIN) 500-125 mg per tablet 1 Tablet  1 Tablet Oral Q12H 1 Tablet at 06/29/21 1017    gabapentin (NEURONTIN) capsule 300 mg  300 mg Oral  mg at 06/29/21 1017    guaiFENesin (ROBITUSSIN) 100 mg/5 mL oral liquid 100 mg  100 mg Oral Q8H  mg at 06/28/21 2304    senna-docusate (PERICOLACE) 8.6-50 mg per tablet 2 Tablet  2 Tablet Oral QHS 2 Tablet at 06/26/21 2131    carvediloL (COREG) tablet 3.125 mg  3.125 mg Oral BID WITH MEALS 3.125 mg at 06/29/21 1017    cefpodoxime (VANTIN) tablet 200 mg  200 mg Oral Q12H 200 mg at 06/29/21 1017    apixaban (ELIQUIS) tablet 5 mg  5 mg Oral BID 5 mg at 06/29/21 1017    sodium chloride (NS) flush 5-40 mL  5-40 mL IntraVENous Q8H 10 mL at 06/29/21 0646    sodium chloride (NS) flush 5-40 mL  5-40 mL IntraVENous PRN      acetaminophen (TYLENOL) tablet 650 mg  650 mg Oral Q6H  mg at 06/28/21 2156    Or    acetaminophen (TYLENOL) suppository 650 mg  650 mg Rectal Q6H PRN      ondansetron (ZOFRAN ODT) tablet 4 mg  4 mg Oral Q8H PRN      Or    ondansetron (ZOFRAN) injection 4 mg  4 mg IntraVENous Q6H PRN      albuterol (PROVENTIL VENTOLIN) nebulizer solution 2.5 mg  2.5 mg Nebulization Q4H PRN 2.5 mg at 06/28/21 2156    clopidogreL (PLAVIX) tablet 75 mg  75 mg Oral DAILY WITH BREAKFAST 75 mg at 06/29/21 1018    multivitamin, tx-iron-ca-min (THERA-M w/ IRON) tablet 1 Tablet  1 Tablet Oral DAILY 1 Tablet at 06/29/21 1017    rosuvastatin (CRESTOR) tablet 10 mg  10 mg Oral DAILY 10 mg at 06/29/21 1017    tamsulosin (FLOMAX) capsule 0.4 mg  0.4 mg Oral PCD 0.4 mg at 06/28/21 1827    L. acidophilus,casei,rhamnosus (BIO-K PLUS) capsule 1 Capsule  1 Capsule Oral DAILY 1 Capsule at 06/29/21 1017         Intake/Output Summary (Last 24 hours) at 6/29/2021 1145  Last data filed at 6/29/2021 0429  Gross per 24 hour   Intake 440 ml   Output 800 ml   Net -360 ml       Physical Exam:  General:  alert, cooperative, no distress, appears stated age  Neck:  no JVD  Lungs:  clear to auscultation bilaterally  Heart:  irregularly irregular rhythm  Abdomen:  abdomen is soft without significant tenderness, masses, organomegaly or guarding  Extremities:  extremities normal, atraumatic, no cyanosis trace edema    Visit Vitals  BP (!) 140/75 (BP 1 Location: Left upper arm, BP Patient Position: At rest)   Pulse (!) 110   Temp 98 °F (36.7 °C)   Resp 18   Ht 5' 8\" (1.727 m)   Wt 178 lb 1.6 oz (80.8 kg)   SpO2 97%   BMI 27.08 kg/m²       Data Review:     Labs: Results:       Chemistry Recent Labs     06/29/21 0211 06/28/21  0230 06/27/21  0516   GLU 84 62* 73*   * 135* 132*   K 4.1 4.8 4.7    100 99*   CO2 25 27 27   BUN 8 11 11   CREA 0.58* 0.69 0.67   CA 8.8 8.8 9.1   MG  --   --  2.1   AGAP 8 8 6   BUCR 14 16 16      CBC w/Diff Recent Labs     06/29/21 0211 06/28/21  0230 06/27/21  0516   WBC 21.0* 24.5* 21.7*   RBC 3.60* 3.31* 3.41*   HGB 10.3* 9.5* 9.5*   HCT 33.8* 30.9* 32.0*    351 366   GRANS 90* 82* 81*   LYMPH 6* 16* 6*   EOS 0 0 3      Cardiac Enzymes No results found for: CPK, CK, CKMMB, CKMB, RCK3, CKMBT, CKNDX, CKND1, WAGNER, TROPT, TROIQ, RAFAEL, TROPT, TNIPOC, BNP, BNPP   Coagulation No results for input(s): PTP, INR, APTT, INREXT in the last 72 hours. Lipid Panel No results found for: CHOL, CHOLPOCT, CHOLX, CHLST, CHOLV, 474237, HDL, HDLP, LDL, LDLC, DLDLP, 666122, VLDLC, VLDL, TGLX, TRIGL, TRIGP, TGLPOCT, CHHD, CHHDX   BNP No results found for: BNP, BNPP, XBNPT   Liver Enzymes No results for input(s): TP, ALB, TBIL, AP in the last 72 hours.     No lab exists for component: SGOT, GPT, DBIL   Thyroid Studies Lab Results   Component Value Date/Time    TSH 1.57 06/21/2021 02:12 PM          Signed By: LAMBERT Álvarez     June 29, 2021

## 2021-06-29 NOTE — DISCHARGE SUMMARY
Transfer Summary    Patient: Everette Quevedo MRN: [id number]  CSN: [id number]    YOB: 1940  Age: 80 y.o. Sex: male    DOA: 6/21/2021 LOS:  LOS: 8 days   Discharge Date: 6/29/2021     Disposition: Transfer to ARU    Admission Diagnoses: Mesenteric abscess ([address] [K65.1]    Discharge Diagnoses:   1. 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 Acute on Chronic sigmoid diverticulitis   3  Sepsis poa  4. Symptomatic bradycardia, transient, resolved   5. Syncope and collapse, resolved    6. Hypotension, hypovolemia, resolved   7. CAD with CABG, recent NSTEMI s/p stent placement  8. Persistent AFIB, tolerates HR at lower 100s  9. PAD with peripheral neuropathy  10. COPD   6. Recent embolic CVA   12. Leukocytosis, improving    13. Hyponatremia, due to low solute, resolving       Discharge Condition: Stable    PHYSICAL EXAM  Visit Vitals  /86 (BP 1 Location: Right upper arm, BP Patient Position: At rest;Semi fowlers)   Pulse 88   Temp 97.8 °F (36.6 °C)   Resp 18   Ht 5' 8\" (1.727 m)   Wt 80.8 kg (178 lb 1.6 oz)   SpO2 97%   BMI 27.08 kg/m²       General: Alert, cooperative, no acute distress    HEENT: PERRLA, EOMI. Anicteric sclerae. Lungs:  CTA Bilaterally. No Wheezing/Rales. Heart:             iRRegular rate and Rhythm. Abdomen: Soft, Non distended, Non tender. + Bowel sounds. Extremities: No edema. Psych:   Good insight. Not anxious or agitated. Neurologic:  AA, oriented X 3. Moves all ext                                 Hospital Course:   80 y.o male with HTN, CAD with CABG, persistent AFIB, COPD, PAD, h/o embolic CVA, anemia, presented from ARU for unresponsive while on toilet. RRT called to find hypotension and bradycardia required atropine. In the ER, he was found tachycardia, stomach pain, recent CT with possible mesenteric abscess. He was started on IV zosyn for sepsis concern. Surgery consulted for further care.  Recommended IR for drainage tube but IR reviewed this case and unable to get access. Furthermore, he requires plavix in the setting of recent stent, IR deferred for further intervention. He tolerated IV antibiotics. He has improved of his bradycardia and hyponatremia. He was able to tolerated diet. Surgery has no further intervention at this time. ID consulted and recommend oral antibiotics once he tolerated oral intake. Cardiology has been followed for bradycardia with adjustment of his betablocker. Patient leukocytosis trending down, no fevers. ID recommends okay to switch him to p.o. antibiotics and okay for discharge to ARU. ID will be following patient in ARU. ID recommends repeat CT abdomen pelvis in 2 weeks to monitor abscess, if worsening then need surgical intervention. Colorectal surgery Dr. Francisco Hollins seen the patient in hospital.  Surgery signed off on the patient. Patient's bradycardia has resolved now, patient is slightly tachycardia in A. fib. Cardiology recommends low-dose of beta-blocker given his significant bradycardia on admission. Cardiology okay for patient's heart rate 90s to low 100s. Cardiology cleared the patient for discharge. Patient has mild cough, suspect bronchitis. I have advised patient on incentive spirometry. Patient is stable for transfer to acute rehab once bed available. Discussed with the patient and also with his wife over the phone about his discharge plan, follow-up appointments, medication changes and side effects of her medication. I have discussed with the patient about intra-abdominal abscess, need for repeat CT in 2 weeks and further plan based on the CT results. Patient and his wife understood and agreed with my plan.         Procedures: None     Consults:   Dr. Francisco Hollins, colorectal surgery  Dr. Nicolasa Camarena, infectious disease  Dr. Herbie Gonzalez, cardiology      Imaging studies:   CT Results (most recent):  Results from Hospital Encounter encounter on 06/07/21    CT CHEST ABD PELV W CONT    Narrative  CT CHEST, ABDOMEN AND PELVIS WITH ENHANCEMENT    INDICATION: Leukocytosis. Abdominal pain. Myocardial infarction. TECHNIQUE: Axial images obtained of the chest, abdomen and pelvis following the  uneventful administration of 100 cc Isovue-300 nonionic intravenous contrast.  Coronal and sagittal reformatted images obtained. All CT scans are performed  using dose optimization techniques as appropriate to the performed exam  including the following: Automated exposure control, adjustment of mA and/or kV  according to patient size, and use of iterative reconstructive technique. COMPARISON: CTA chest 11/14/2015, CT abdomen and pelvis 6/16/2014. CHEST FINDINGS:  Lung: Emphysema. Pleura: Unremarkable. Heart: No effusion. CABG. Cardiomegaly. Vessels: No aortic aneurysm. Scattered wall calcifications. Lymph Nodes: Unremarkable. Thyroid: Stable to prior. The right gland is larger than the left and could  indicate an underlying nodule. Bones: Sternotomy. Lower thoracic degenerative disc disease. ABDOMEN AND PELVIS FINDINGS:  Liver: Unremarkable. Biliary: Unremarkable. Spleen: Unremarkable. Pancreas: Unremarkable. Adrenal Glands: Stable chronic mild nodular thickening of the left gland since  2014. Kidneys: Chronic left renal cortical scarring. Subcentimeter right renal cyst.    Bowel: Normal appendix. Diverticulosis. Thickened and collapsed segment of mid  sigmoid colon has an appearance similar to prior 2014 exam. There is mild  proximal sigmoid colon distention suggesting a stricture in this region. Positioned between this thickened segment of colon and a thickened inflamed loop  of small bowel is a mesenteric gas and fluid containing rim enhanced collection  measuring 3.5 x 2.3 cm. At least 2 additional small phlegmonous developing  collections are seen closely along serosal mesenteric margin of the affected  small bowel loop best seen on sagittal image 31 and 38.     Dilated fluid-filled small bowel loops transitioning at this abnormally  thickened small bowel loop however the bowel appears patent and gas and fluid  containing on both ends of this process suggesting a superimposed ileus  contributing to the picture. Bladder/ Pelvic Organs: Mild prostate enlargement. Mild thickening of the  bladder dome wall, likely reactive to adjacent mesenteric/small bowel process. Peritoneum/ Retroperitoneum: No free air. Trace free fluid. Mesenteric  inflammation in the pelvis as discussed above. Lymph Nodes: Borderline enlarged 9-10 mm mesenteric lymph nodes may be reactive. Vessels: Arteriosclerosis. Renal artery stents. Bones/Soft tissues: Mild chronic fatty left inguinal hernia is similar to prior. Degenerative disc disease throughout the lumbar spine most advanced at L2-L3. Right greater than left hip degenerative joint disease. Impression  1. No acute findings in the chest.  -Emphysema. -CABG. -Stable mildly enlarged right thyroid gland which could indicate underlying  nodule, stable for many years. 2. 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 It is unclear if initial source of abscess is from  small bowel inflammation or diverticulitis/pericolonic abscess. 3. Dilated small bowel. This is likely a combination of generalized small bowel  ileus and low-grade partial small bowel obstruction at site of abnormal pelvic  small bowel loop discussed above. 4. Diverticulosis. Stable thickened collapsed segment of sigmoid colon since  2014 suggesting a chronic stricture. This is limited for assessment by CT. 5. Bladder dome inflammation is likely secondary to the adjacent mesentery  process. 6. Other chronic incidental findings.     Findings were discussed with emergency department physician on 6/21/2021 at 1:55  PM.    06/21/21    ECHO ADULT FOLLOW-UP OR LIMITED 06/23/2021 6/23/2021    Interpretation Summary  · LV: Estimated LVEF is 30 - 35%. Normal cavity size. Moderate concentric hypertrophy. Moderately reduced systolic function. · LA: Severely dilated left atrium. Left Atrium volume index is 63 mL/m2. · PA: Moderate pulmonary hypertension. Pulmonary arterial systolic pressure is 53 mmHg. · IVC: Mildly elevated central venous pressure (8 mmHg); IVC diameter is less than 21 mm and collapses less than 50% with respiration. Signed by: Serene Mitchell MD on 6/23/2021  3:31 PM        Discharge Medications:     Current Discharge Medication List      START taking these medications    Details   amoxicillin-clavulanate (AUGMENTIN) 500-125 mg per tablet Take 1 Tablet by mouth every twelve (12) hours for 9 days. Qty: 18 Tablet, Refills: 0      cefpodoxime (VANTIN) 200 mg tablet Take 1 Tablet by mouth every twelve (12) hours for 9 days. Qty: 18 Tablet, Refills: 0      guaiFENesin (ROBITUSSIN) 100 mg/5 mL liquid Take 5 mL by mouth every eight (8) hours as needed for Cough. Qty: 30 mL, Refills: 0      senna-docusate (PERICOLACE) 8.6-50 mg per tablet Take 2 Tablets by mouth nightly. Qty: 60 Tablet, Refills: 0         CONTINUE these medications which have CHANGED    Details   carvediloL (COREG) 3.125 mg tablet Take 1 Tablet by mouth two (2) times daily (with meals). Qty: 60 Tablet, Refills: 0         CONTINUE these medications which have NOT CHANGED    Details   fluticasone furoate-vilanteroL (BREO ELLIPTA) 100-25 mcg/dose inhaler Take 1 Puff by inhalation daily. albuterol (PROVENTIL VENTOLIN) 2.5 mg /3 mL (0.083 %) nebu 3 mL by Nebulization route every four (4) hours as needed for Wheezing or Shortness of Breath. Qty: 30 Nebule, Refills: 0      apixaban (ELIQUIS) 5 mg tablet Take 1 Tablet by mouth two (2) times a day.  Indications: treatment to prevent blood clots in chronic atrial fibrillation  Qty: 30 Tablet, Refills: 0      rosuvastatin (CRESTOR) 10 mg tablet Take 1 Tablet by mouth daily. Indications: excessive fat in the blood, treatment to prevent a heart attack  Qty: 15 Tablet, Refills: 0      multivitamin, tx-iron-ca-min (THERA-M w/ IRON) 9 mg iron-400 mcg tab tablet Take 1 Tablet by mouth daily. Indications: treatment to prevent mineral deficiency, treatment to prevent vitamin deficiency  Qty: 30 Tablet, Refills: 0      clopidogreL (PLAVIX) 75 mg tab Take 1 Tablet by mouth daily (with breakfast). Indications: treatment to prevent a heart attack  Qty: 15 Tablet, Refills: 0    Associated Diagnoses: On clopidogrel therapy; Acute embolic stroke ([facility] [address] Coronary artery disease involving native coronary artery of native heart, unspecified whether angina present; Non-ST elevation (NSTEMI) myocardial infarction ([facility])      ipratropium (ATROVENT) 0.02 % soln 2.5 mL by Nebulization route three (3) times daily. Qty: 15 Vial, Refills: 0      gabapentin (NEURONTIN) 100 mg capsule Take 1 Capsule by mouth three (3) times daily. Max Daily Amount: 300 mg. Indications: neuropathic pain  Qty: 30 Capsule, Refills: 0    Associated Diagnoses: Peripheral vascular disease of extremity with claudication ([facility])      L. acidophilus,casei,rhamnosus (BIO-K PLUS) 50 billion cell cpDR capsule Take 1 Capsule by mouth daily. Qty: 15 Capsule, Refills: 0      cholestyramine-aspartame (QUESTRAN LIGHT) 4 gram packet Take 1 Packet by mouth three (3) times daily (with meals). Qty: 15 Packet, Refills: 0      lisinopriL (PRINIVIL, ZESTRIL) 10 mg tablet Take 20 mg by mouth daily. Indications: chronic heart failure, high blood pressure      acetaminophen (TYLENOL) 325 mg tablet Take 2 Tablets by mouth every four (4) hours as needed (for fever or pain). Indications: fever, pain  Qty: 30 Tablet, Refills: 0      nitroglycerin (NITROSTAT) 0.4 mg SL tablet 1 Tablet by SubLINGual route as needed for Chest Pain. Up to 3 doses.   Indications: acute attack of angina  Qty: 15 Tablet, Refills: 0         STOP taking these medications       isosorbide-hydrALAZINE (BiDiL) 20-37.5 mg per tablet Comments:   Reason for Stopping:         isosorbide-hydrALAZINE (BiDiL) 20-37.5 mg per tablet Comments:   Reason for Stopping:         ibuprofen (MOTRIN) 400 mg tablet Comments:   Reason for Stopping:         tamsulosin (FLOMAX) 0.4 mg capsule Comments:   Reason for Stopping:               DIET:  Cardiac Diet    ACTIVITY: Activity as tolerated  Patient needs to be on Fall, aspiration, decubitus precaution. ·    PT/OT consult  ·             Speech consult  ·  Incentive spirometry  ·             DVT prophylaxis     ADDITIONAL INFORMATION: If you experience any of the following symptoms but not limited to Fever, chills, nausea, vomiting, diarrhea, change in mentation, falling, bleeding, shortness of breath, chest pain, please call your primary care physician or return to the emergency room if you cannot get hold of your doctor:     FOLLOW UP CARE:  Follow-up with 1. Physician at ARU in 1-2 days with Cbc with diff, bmp, mg.                             2. Dr. Lia Montes in 2 week                           3. VIC Becker in 1 week                           4. Cardiology in 2 weeks     Pt's PCP: Segundo Rios MD.    Minutes spent on discharge: >40 minutes spent coordinating this discharge (review instructions/follow-up, prescriptions, preparing report for sign off)    Sonali Hall MD  6/29/2021 1:06 PM    Disclaimer: Sections of this note are dictated using utilizing voice recognition software. Minor typographical errors may be present. If questions arise, please do not hesitate to contact me or call our department.

## 2021-06-29 NOTE — DISCHARGE INSTRUCTIONS
Patient Education        Lightheadedness or Faintness: Care Instructions  Your Care Instructions  Lightheadedness is a feeling that you are about to faint or \"pass out. \" You do not feel as if you or your surroundings are moving. It is different from vertigo, which is the feeling that you or things around you are spinning or tilting. Lightheadedness usually goes away or gets better when you lie down. If lightheadedness gets worse, it can lead to a fainting spell. It is common to feel lightheaded from time to time. Lightheadedness usually is not caused by a serious problem. It often is caused by a short-lasting drop in blood pressure and blood flow to your head that occurs when you get up too quickly from a seated or lying position. Follow-up care is a key part of your treatment and safety. Be sure to make and go to all appointments, and call your doctor if you are having problems. It's also a good idea to know your test results and keep a list of the medicines you take. How can you care for yourself at home? · Lie down for 1 or 2 minutes when you feel lightheaded. After lying down, sit up slowly and remain sitting for 1 to 2 minutes before slowly standing up. · Avoid movements, positions, or activities that have made you lightheaded in the past.  · Get plenty of rest, especially if you have a cold or flu, which can cause lightheadedness. · Make sure you drink plenty of fluids, especially if you have a fever or have been sweating. · Do not drive or put yourself and others in danger while you feel lightheaded. When should you call for help? Call 911 anytime you think you may need emergency care. For example, call if:    · You have symptoms of a stroke. These may include:  ? Sudden numbness, tingling, weakness, or loss of movement in your face, arm, or leg, especially on only one side of your body. ? Sudden vision changes. ? Sudden trouble speaking.   ? Sudden confusion or trouble understanding simple statements. ? Sudden problems with walking or balance. ? A sudden, severe headache that is different from past headaches.     · You have symptoms of a heart attack. These may include:  ? Chest pain or pressure, or a strange feeling in the chest.  ? Sweating. ? Shortness of breath. ? Nausea or vomiting. ? Pain, pressure, or a strange feeling in the back, neck, jaw, or upper belly or in one or both shoulders or arms. ? Lightheadedness or sudden weakness. ? A fast or irregular heartbeat. After you call 911, the  may tell you to chew 1 adult-strength or 2 to 4 low-dose aspirin. Wait for an ambulance. Do not try to drive yourself. Watch closely for changes in your health, and be sure to contact your doctor if:    · Your lightheadedness gets worse or does not get better with home care. Where can you learn more? Go to http://www.gray.com/  Enter [id number] in the search box to learn more about \"Lightheadedness or Faintness: Care Instructions. \"  Current as of: February 26, 2020               Content Version: 12.8  © 1233-6779 [employer]. Care instructions adapted under license by [employer] (which disclaims liability or warranty for this information). If you have questions about a medical condition or this instruction, always ask your healthcare professional. [place] [address] any warranty or liability for your use of this information. Patient Education        Vasovagal Syncope: Care Instructions  Your Care Instructions     Vasovagal syncope (say \"hzj-ywx-DUJ-terrancel YTFU-nyq-uqu\")is sudden dizziness or fainting that can be set off by things such as pain, stress, fear, or trauma. You may sweat or feel lightheaded, sick to your stomach, or tingly. The problem causes the heart rate to slow and the blood vessels to widen, or dilate, for a short time. When this happens, blood pools in the lower body, and less blood goes to the brain.   You can usually get relief by lying down with your legs raised (elevated). This helps more blood to flow to your brain and may help relieve symptoms like feeling dizzy. Some doctors may recommend a technique that involves tensing your fists and arms. This type of fainting is often easy to predict. For example, it happens to some people when they see blood or have to get a shot. They may feel symptoms before they faint. An episode of vasovagal syncope usually responds well to self-care. Other treatment often isn't needed. But if the fainting keeps happening, your doctor may suggest further treatments. Follow-up care is a key part of your treatment and safety. Be sure to make and go to all appointments, and call your doctor if you are having problems. It's also a good idea to know your test results and keep a list of the medicines you take. How can you care for yourself at home? · Drink plenty of fluids to prevent dehydration. If you have kidney, heart, or liver disease and have to limit fluids, talk with your doctor before you increase your fluid intake. · Try to avoid things that you think may set off vasovagal syncope. · Talk to your doctor about any medicines you take. Some medicines may increase the chance of this condition occurring. · If you feel symptoms, lie down with your legs raised. Talk to your doctor about what to do if your symptoms come back. When should you call for help? Call 911 anytime you think you may need emergency care. For example, call if:    · You have symptoms of a heart problem. These may include:  ? Chest pain or pressure. ? Severe trouble breathing. ? A fast or irregular heartbeat. Watch closely for changes in your health, and be sure to contact your doctor if:    · You have more episodes of fainting at home.     · You do not get better as expected. Where can you learn more?   Go to http://www.gray.com/  Enter L754 in the search box to learn more about \"Vasovagal Syncope: Care Instructions. \"  Current as of: February 26, 2020               Content Version: 12.8  © 2006-2021 [employer], [facility]. Care instructions adapted under license by [employer] (which disclaims liability or warranty for this information). If you have questions about a medical condition or this instruction, always ask your healthcare professional. [address] any warranty or liability for your use of this information.

## 2021-06-29 NOTE — PROGRESS NOTES
Infectious Disease progress Note        Reason: Leukocytosis, evaluate for sepsis    Current abx Prior abx   Zosyn since 6/21/21-6/25  Cefpodoxime, metronidazole since 6/25/2021 Ceftriaxone, intravenous metronidazole, oral vancomycin, IV vancomycin 6/20-6/21     Lines:       Assessment :    19-year-old man with past medical history of diastolic CHF, coronary artery disease status post CABG, history of lung cancer status post pneumonectomy, hypertension admitted to [facility] acute rehab on 6/7/2021. Recent hospitalization at [facility] 5/17-6/7 for non stemI s/p CABG  Post op course complicated by profound unexplained leukocytosis. Wbc:37 k on 6/7/21    Now with leukocytosis, diarrhea    Clinical presentation consistent with mesenteric abscess, acute on chronic sigmoid diverticulitis    CT without contrast 5/22 at [facility] revealed evidence of diverticular disease   Recent history of profound unexplained leukocytosis during stay at [facility]. Wbc:37 k on 6/7/21 could be due to flare up of diverticultis, diverticular perforation. Worsening leukocytosis since 6/20 likely due to recurrent diverticulitis. RLQ pain/tenderness on prior exam likely due to this. Colorectal surgery f/u appreciated. Plans for possible IR guided drainage noted. Elevated bilirubin 2.2 on 6/20/21- normal transaminases-likely due to sepsis. No evidence of undiagnosed hepatobiliary pathology noted on CT scan 6/21/2021    Syncope 6/21- likely vasovagal.  Cardiology follow-up appreciated    Subjective sense of improvement. Hemodynamically stable on today's exam  Worsening leukocytosis on 6/28 could be due to partially treated abscess? Enterococcal infection. Decreased leukocytosis after addition of Augmentin    no worsening abdominal pain/tenderness noted on today's exam.  No diarrhea/constipation. Recommendations:    1.   continue po cefpodoxime, start augmentin  3. Follow-up colorectal surgery recommendations   4.   Probiotics while on antibiotics  5. Will continue above antibiotics atleast till 7/21/21 if continued clinical/lab improvement noted on future exam- will need repeat ct scan prior to completion of antibiotics - around 7/15/21 to determine if therapy needs to be extended longer. Will need colorectal surgery evaluation once repeat ct results available. patient will need surgical intervention/colectomy if fails conservative measures &/or recurrent diverticulitis. Will f/u in ARU prn. Please call me if any new questions or concerns. Thanks     Above plan was discussed in details with patient, primary team.    HPI:    Patient states that he feels better. Is eager to be transferred to ARU. Denies abdominal pain. Tolerating p.o. diet. Continues to feel dizzy when he sits up.     home Medication List    Details   nitroglycerin (Nitrostat) 0.4 mg SL tablet 0.4 mg by SubLINGual route every five (5) minutes as needed for Chest Pain. Up to 3 doses. fluticasone furoate-vilanteroL (Breo Ellipta) 100-25 mcg/dose inhaler Take 1 Puff by inhalation daily. therapeutic multivitamin (THERAGRAN) tablet Take 1 Tablet by mouth daily. rosuvastatin (Crestor) 10 mg tablet Take 10 mg by mouth nightly. furosemide (Lasix) 20 mg tablet Take 20 mg by mouth daily. clopidogreL (Plavix) 75 mg tab Take 75 mg by mouth daily. apixaban (ELIQUIS) 5 mg tablet Take 5 mg by mouth two (2) times a day. tamsulosin (FLOMAX) 0.4 mg capsule Take 1 Cap by mouth daily (after dinner). Qty: 30 Cap, Refills: 6      carvediloL (Coreg) 12.5 mg tablet Take 12.5 mg by mouth two (2) times daily (with meals). albuterol (PROVENTIL HFA, VENTOLIN HFA) 90 mcg/actuation inhaler Take  by inhalation.              Current Facility-Administered Medications   Medication Dose Route Frequency    lisinopriL (PRINIVIL, ZESTRIL) tablet 5 mg  5 mg Oral DAILY    amoxicillin-clavulanate (AUGMENTIN) 500-125 mg per tablet 1 Tablet  1 Tablet Oral Q12H    gabapentin (NEURONTIN) capsule 300 mg  300 mg Oral TID    guaiFENesin (ROBITUSSIN) 100 mg/5 mL oral liquid 100 mg  100 mg Oral Q8H PRN    senna-docusate (PERICOLACE) 8.6-50 mg per tablet 2 Tablet  2 Tablet Oral QHS    carvediloL (COREG) tablet 3.125 mg  3.125 mg Oral BID WITH MEALS    cefpodoxime (VANTIN) tablet 200 mg  200 mg Oral Q12H    apixaban (ELIQUIS) tablet 5 mg  5 mg Oral BID    sodium chloride (NS) flush 5-40 mL  5-40 mL IntraVENous Q8H    sodium chloride (NS) flush 5-40 mL  5-40 mL IntraVENous PRN    acetaminophen (TYLENOL) tablet 650 mg  650 mg Oral Q6H PRN    Or    acetaminophen (TYLENOL) suppository 650 mg  650 mg Rectal Q6H PRN    ondansetron (ZOFRAN ODT) tablet 4 mg  4 mg Oral Q8H PRN    Or    ondansetron (ZOFRAN) injection 4 mg  4 mg IntraVENous Q6H PRN    albuterol (PROVENTIL VENTOLIN) nebulizer solution 2.5 mg  2.5 mg Nebulization Q4H PRN    clopidogreL (PLAVIX) tablet 75 mg  75 mg Oral DAILY WITH BREAKFAST    multivitamin, tx-iron-ca-min (THERA-M w/ IRON) tablet 1 Tablet  1 Tablet Oral DAILY    rosuvastatin (CRESTOR) tablet 10 mg  10 mg Oral DAILY    tamsulosin (FLOMAX) capsule 0.4 mg  0.4 mg Oral PCD    L. acidophilus,casei,rhamnosus (BIO-K PLUS) capsule 1 Capsule  1 Capsule Oral DAILY       Allergies: Lipitor [atorvastatin] and Norvasc [amlodipine]    Temp (24hrs), Av.9 °F (36.6 °C), Min:97.4 °F (36.3 °C), Max:98.7 °F (37.1 °C)    Visit Vitals  BP (!) 140/75 (BP 1 Location: Left upper arm, BP Patient Position: At rest)   Pulse (!) 110   Temp 98 °F (36.7 °C)   Resp 18   Ht 5' 8\" (1.727 m)   Wt 80.8 kg (178 lb 1.6 oz)   SpO2 97%   BMI 27.08 kg/m²       ROS: 12 point ROS obtained in details. Pertinent positives as mentioned in HPI,   otherwise negative    Physical Exam:    General: Well developed, well nourished male laying on the bed/sitting on the  bed AAOx3 in no acute distress.     General:   awake alert and oriented   HEENT:  Normocephalic, atraumatic,  EOMI, no scleral icterus or pallor; no conjunctival hemmohage;  nasal and oral mucous are moist and without evidence of lesions. No thrush. Dentition good. Neck supple, no bruits. Lymph Nodes:   no cervical, axillary or inguinal adenopathy   Lungs:   non-labored, bilaterally clear to auscultation- no crackles wheezes rales or rhonchi   Heart:  RRR, s1 and s2; no  rubs or gallops, no edema   Abdomen:  soft, non-distended, active bowel sounds, no hepatomegaly, no splenomegaly. Resolved abdominal tenderness   Genitourinary:  deferred   Extremities:   no clubbing, cyanosis; no joint effusions or swelling; Full ROM of all large joints to the upper and lower extremities; muscle mass appropriate for age   Neurologic:  No gross focal sensory abnormalities; 5/5 muscle strength to upper and lower extremities. Speech appropriate. Cranial nerves intact                        Skin:  No rash or ulcers noted   Back:  no spinal or paraspinal muscle tenderness or rigidity, no CVA tenderness     Psychiatric:  No suicidal or homicidal ideations, appropriate mood and affect         Labs: Results:   Chemistry Recent Labs     06/29/21 0211 06/28/21  0230 06/27/21  0516   GLU 84 62* 73*   * 135* 132*   K 4.1 4.8 4.7    100 99*   CO2 25 27 27   BUN 8 11 11   CREA 0.58* 0.69 0.67   CA 8.8 8.8 9.1   AGAP 8 8 6   BUCR 14 16 16      CBC w/Diff Recent Labs     06/29/21 0211 06/28/21  0230 06/27/21  0516   WBC 21.0* 24.5* 21.7*   RBC 3.60* 3.31* 3.41*   HGB 10.3* 9.5* 9.5*   HCT 33.8* 30.9* 32.0*    351 366   GRANS 90* 82* 81*   LYMPH 6* 16* 6*   EOS 0 0 3      Microbiology No results for input(s): CULT in the last 72 hours. RADIOLOGY:    All available imaging studies/reports in [facility] care for this admission were reviewed        Disclaimer: Sections of this note are dictated utilizing voice recognition software, which may have resulted in some phonetic based errors in grammar and contents.  Even though attempts were made to correct all the mistakes, some may have been missed, and remained in the body of the document. If questions arise, please contact our department.     Dr. Annie Estrada, Infectious Disease Specialist  [phone]  June 29, 2021  9:47 AM

## 2021-06-29 NOTE — ADT AUTH CERT NOTES
Gastroenterology [address] Day 7 (6/27/2021) by Oneida Mathews RN       Review Status Review Entered   Completed 6/28/2021 11:56      Criteria Review      Care Day: 7 Care Date: 6/27/2021 Level of Care: Step Down    Guideline Day 3    Level Of Care    ( ) * Activity level acceptable    ( ) * Complete discharge planning    Clinical Status    (X) * Hemodynamic stability    ( ) * Abdominal status acceptable    (X) * Pain and nausea absent or adequately managed    (X) * Temperature status acceptable    ( ) * Intestinal status acceptable    ( ) * Hepatic and biliary abnormalities absent or acceptable    ( ) * General Discharge Criteria met    Interventions    ( ) * No inpatient interventions needed    ( ) * Intake acceptable    6/28/2021 11:56:43 EDT by Claudean Cough    Subject: Additional Clinical Information    Clinical 6-: Antionette Ball now in 60's, remain a fib, but controlled. Continues on coreg at lower dose. WBC elevated, continues on po antibiotics x 2. Discharge plan is for rehab, awaiting auth from insurance.               VITALS:T- 98.5      B/P-148/68      HR-60      RR-16      SATS-96  RA              LABS:      WBC-21.7      h/h-9.5/32.0      neutro-81  absol neutro- 17.6      Na-132      chlor-99      glucose-73              ORDERS:      continue current meds & tx      d/c rehab when obtains auth       * Milestone      Gastroenterology [address] Day 6 (6/26/2021) by Oneida Mathews RN       Review Status Review Entered   Completed 6/28/2021 11:51      Criteria Review      Care Day: 6 Care Date: 6/26/2021 Level of Care: Step Down    Guideline Day 3    Level Of Care    ( ) * Activity level acceptable    ( ) * Complete discharge planning    Clinical Status    (X) * Hemodynamic stability    ( ) * Abdominal status acceptable    (X) * Pain and nausea absent or adequately managed    (X) * Temperature status acceptable    ( ) * Intestinal status acceptable    ( ) * Hepatic and biliary abnormalities absent or acceptable    ( ) * General Discharge Criteria met    Interventions    ( ) * No inpatient interventions needed    ( ) * Intake acceptable    6/28/2021 11:51:22 EDT by Manasa Robin    Subject: Additional Clinical Information    Clinical 6-:      He expressed wanting to get up and OOB. He wants to participate in PT    Apparently, his HR was downed 30s while laying in bed last evening. This resolved after he had flatus    No further report of bradycardia. He remains on carvedilol 6.25mg , decreased to 3.125mg po bid secondary to cardiology staing decrease chance of needing pacemaker while pt has infection     Otherwise, he feels better. Feels that his heels numbness bilaterally     No fever, decrease leukocytosis    Tolerated oral diet.  No diarrhea              VITALS:T- 97.3      B/P-158/92      HR from 30's to 110      RR-19      SATS-99  2L NC              ORDERS:      coreg 3.125 mg po bid      vantin 200mg po q 12h      flagyl 500mg po q 12h      continue current tx       * [facility]      Gastroenterology [address] [place] [address] Day 5 (6/25/2021) by Dallas Mars RN       Review Status Review Entered   Completed 6/25/2021 10:47      Criteria Review      Care Day: 5 Care Date: 6/25/2021 Level of Care: Step Down    Guideline Day 3    Level Of Care    ( ) * Activity level acceptable    ( ) * Complete discharge planning    Clinical Status    (X) * Hemodynamic stability    6/25/2021 10:47:50 EDT by Manasa Robin      stable    ( ) * Abdominal status acceptable    (X) * Pain and nausea absent or adequately managed    6/25/2021 10:47:50 EDT by Mitchell Popper managed    (X) * Temperature status acceptable    6/25/2021 10:47:50 EDT by Mitchell Popper temp-97.7    ( ) * Intestinal status acceptable    ( ) * Hepatic and biliary abnormalities absent or acceptable    ( ) * General Discharge Criteria met    Interventions    ( ) * No inpatient interventions needed    ( ) * Intake acceptable    6/25/2021 10:47:50 EDT by Reginald Keller    Subject: Additional Clinical Information    Clinical 6-: Patient with mesenteric abscess. Interventional radiology and cardiology have stated too high risk to be off eliquis and plavix since patient has had recent heart stents for fear of stent stenosis. They recommend to continue with IV antibiotics. Patient tolerated full liquids and is interested in advancing diet.  Has good urinary output of 1800cc.  WBC continues to be elevated but trending down. B/P elevated, he is tachycardic. Has been OOB to chair, states he is weak. Infectious disease states when patient discharged to switch to po antibiotics thru 8-4-2021. D/C plan is home              VITALS:T- 97.7      B/P-135/80      HR-105      RR-20      SATS-100  2L NC              LABS:      WBC-18.4      h/h-8.6/28. 5      plt-274      Na-132      K+-4.3      glucose-70      creat-0.62              ORDERS:      PT/OT      eliquis 2.5mg po bid      coreg 6.25mg po bid      atrovent nebs TID      zosyn 3.375g iv q 6h      advance to low fiber diet       * Milestone

## 2021-06-29 NOTE — PROGRESS NOTES
conducted a Follow up consultation and Spiritual Assessment for [facility], who is a 80 y.o.,male. The  provided the following Interventions:  Continued the relationship of care and support. Listened empathically. The following outcomes were achieved:  Patient expressed gratitude for 's visit. Assessment:  There are no further spiritual or [employer] issues which require Spiritual Care Services interventions at this time. Plan:  Chaplains will continue to follow and will provide pastoral care on an as needed/requested basis.  recommends bedside caregivers page  on duty if patient shows signs of acute spiritual or emotional distress.        [facility]   [phone]

## 2021-06-29 NOTE — PROGRESS NOTES
Patient accepted for [facility] for today. Room 186. Can go at 4pm.   Report to extension [phone]. Did send Perfect Serve to Dr Emilie Wright as pt wished for MD to update wife    56: Updated pt and wife in room.  Notified KULDIP Saldivar on room, time, and number for report as well

## 2021-06-29 NOTE — PROGRESS NOTES
Discharge/Transition Planning    Spoke with Dr Jim Pendleton and MD following up with ID concerning pt and would like to d/c today. Called Daniela at [facility] and updated.   ARU MD will review pt at 1130 -12 p    Wang Mitchell RN BSN  Outcomes Manager    Pager # [phone]

## 2021-06-30 NOTE — ROUTINE PROCESS
Dottie Hicks is a 80 y.o.  male admitted on 6/29/2021 from [address] [place] [address]. Report received from [address]. Transportation was provided bed, and the patient was transferred to his room via [employer]. Patient was assisted to bed with assist of 1. The patient was oriented to his room. Fall risk precautions were discussed with the patient; he was instructed to call for help prior to getting up. The following fall risk precautions were initiated: bed/ chair alarms, door signage, yellow bracelet and socks as well as completion of the [place] Pillow tool in the patient's room. Four eyes nurse skin assessment was performed by Keron Maloney LPN and [facility] RN.  Skin problems noted: JUWAN Manning LPN

## 2021-06-30 NOTE — PROGRESS NOTES
Problem: Mobility Impaired (Adult and Pediatric)  Goal: *Therapy Goal (Edit Goal, Insert Text)  Description: Physical Therapy Short Term Goals  Initiated 6/30/2021 and to be accomplished within 7 day(s) on 7/7/2021:  1. Patient will move from supine to sit and sit to supine , scoot up and down, and roll side to side in bed with supervision/set-up. 2.  Patient will transfer from bed to chair and chair to bed with supervision/set-up using the least restrictive device. 3.  Patient will perform sit to stand with supervision/set-up. 4.  Patient will ambulate with supervision/set-up for 150 feet with the least restrictive device. 5.  Patient will ascend/descend 14 stairs with 1-2 handrail(s) with minimal assistance/contact guard assist.    Physical Therapy Long Term Goals  Initiated 6/30/2021 and to be accomplished within 10-14 day(s) 7/14/2021  1. Patient will move from supine to sit and sit to supine , scoot up and down, and roll side to side in bed with modified independence. 2.  Patient will transfer from bed to chair and chair to bed with modified independence using the least restrictive device. 3.  Patient will perform sit to stand with modified independence. 4.  Patient will ambulate with modified independence for at least 150 feet with the least restrictive device. 5.  Patient will ascend/descend 14 stairs with 2 handrail(s) with supervision/set-up. 6.  Patient will ascend/descend 1 curb step with appropriate AD with supervision/setup to safely get into and out of house. Outcome: Progressing Towards Goal   PHYSICAL THERAPY EVALUATION    Patient: Stefanie Monge (2 y.o. male)  Date: 6/30/2021  Diagnosis: Diverticulitis of large intestine with abscess [K57.20] Diverticulitis of large intestine with abscess  Precautions: Fall, Skin  Chart, physical therapy assessment, plan of care and goals were reviewed.     Time in:1035  Time out:1205    Patient seen for: Balance activities;Gait training;Patient education; Therapeutic exercise;Transfer training; Wheelchair mobility    Pain:  Patient denied pain during session. Patient identified with name and : yes    SUBJECTIVE:     Patient stated I am doing all right.  Patient admitted back to ARU after having rapid response team responding to his syncopal event while in the bathroom 2021. Patient indicating that he was somewhat discouraged about being back in hospital when he was supposed to discharge home, but toward end of session reporting that he is feeling better about how he is doing and is glad to be doing more activity on ARU again. Patient's Goal for Physical Therapy: Patient reporting that he wants to get back to feeling more energy and better ability to walk and perform activity at home. OBJECTIVE DATA SUMMARY:     Past Medical History:   Diagnosis Date    Acute embolic stroke ([facility] 473    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, brainstem and cerebellum) without residual deficit    Anticoagulated by anticoagulation treatment 2021    On Apixaban    Benign prostatic hyperplasia with urinary retention     Chronic obstructive pulmonary disease (COPD) ([facility]     Coronary artery disease involving native coronary artery of native heart     Diverticulitis of large intestine with abscess 2021    Diverticulosis of sigmoid colon 2021    Essential hypertension     Heart failure with preserved left ventricular function (HFpEF) [facility])     2D echocardiogram (2021) showed EF 50%; concentric LV hypertrophy with a severely thickened septal wall and mildly thickened posterior wall; left atrial chamber is severely enlarged; right atrial chamber volume is moderately enlarged; no mass, shunts, or thrombi.      History of carotid stenosis     History of gross hematuria 2021    Attributed to [place] trauma while patient was altered    History of renal artery stenosis     History of sinus bradycardia 6/21/2021    Attributed to Carvedilol 25 mg PO BID with meals    Leukocytosis 5/17/2021    Attributed to reactive leukocytosis due to NSTEMI    Malignant neoplasm of lower lobe of right lung (HCC)     Mesenteric abscess ([facility] [address] [address] 6/21/2021    Neuropathy involving both lower extremities 10/22/2020    Non-ST elevation (NSTEMI) myocardial infarction ([facility] [address] [facility] 5/17/2021    On clopidogrel therapy 5/19/2021    Paroxysmal atrial fibrillation ([facility] 5/17/2021    Peripheral vascular disease of extremity with claudication ([facility] [address] 6/14/2021    Urinary retention      Past Surgical History:   Procedure Laterality Date    HX CAROTID ENDARTERECTOMY Left 06/07/2012    HX CAROTID ENDARTERECTOMY Right 05/28/2014    Dr. Renae Hall [address] [facility]    HX CORONARY STENT PLACEMENT  05/18/2021    S/P PCI to proximal SVG with 3.5 x 12 mm Nesbit drug-eluting stent; PCI to mid SVG with 3.5 x 34 mm Nesbit drug-eluting stent; PCI to distal SVG with 3.5 x 15 mm Nesbit drug-eluting stent;  Balloon angioplasty to distal SVG anastomosis (5/18/2021 - Dr. Naeem Reeder)     HX LOBECTOMY Right     Middle lobe and lower lobe    HX RENAL ARTERY STENT Bilateral 2011    HX TONSILLECTOMY      IR BRONCHOSCOPY  11/25/2015    Dr. Segura Shed       Problem List:    Decreased strength B LE  [x]     Decreased strength trunk/core  [x]     Decreased AROM   []     Decreased PROM  []    Decreased endurance  [x]     Decreased balance sitting  [x]     Decreased balance standing  [x]     Pain   []     Slow ambulation velocity  [x]    Decreased coordination  []    Decreased safety awareness  [x]      Functional Limitations:   Decreased independence with bed mobility  [x]     Decreased independence with functional transfers  [x]     Decreased independence with ambulation  [x]     Decreased independence with stair negotiation  [x]       Previous Functional Level: Prior to initial hospitalization, was independent with transfers, gait, bed mobility, and negotiation of stairs without AD. Patient also was able to drive, do yard work prior to initial admission. Just prior to his second hospitalization from [facility], was using RW for safety with mobility. Home Environment: Home Environment: Private residence  # Steps to Enter: 1 (front stoop)  One/Two Story Residence: Two story  # of Interior Steps: 14  Interior Rails: Both  Living Alone: No  Support Systems: Spouse/Significant Other/Partner  Patient Expects to be Discharged toVF Cor[de-identified]ration  Current DME Used/Available at Home: None    Barriers to Learning/Limitations: yes;  cognitive and physical  Compensate with: visual, verbal, tactile, kinesthetic cues/model         Outcome Measures: Balance to be more formally assessed at future session.      MMT Initial Assessment   Right Lower Extremity Left Lower Extremity   Hip Flexion 3+ 4   Knee Extension 4+ 4   Knee Flexion 4+ 4   Ankle Dorsiflexion 4+ 4   0/5 No palpable muscle contraction  1/5 Palpable muscle contraction, no joint movement  2-/5 Less than full range of motion in gravity eliminated position  2/5 Able to complete full range of motion in gravity eliminated position  2+/5 Able to initiate movement against gravity  3-/5 More than half but not full range of motion against gravity  3/5 Able to complete full range of motion against gravity  3+/5 Completes full range of motion against gravity with minimal resistance  4-/5 Completes full range of motion against gravity with minimal-moderate resistance  4/5 Completes full range of motion against gravity with moderate resistance  4+/5 Completes full range of motion against gravity with moderate-maximum resistance  5/5 Completes full range of motion against gravity with maximum resistance        GROSS ASSESSMENT Initial Assessment 6/30/2021   AROM Within functional limits   Strength Generally decreased, functional Coordination Within functional limits   Tone Normal   Sensation Impaired (numbness/tingling toes, swelling bilat feet reported)   PROM Within functional limits       POSTURE Initial Assessment 6/30/2021   Posture (WDL) Exceptions to [facility]   Posture Assessment Forward head;Rounded shoulders       BALANCE Initial Assessment 6/30/2021    Sitting - Static: Good (unsupported)  Sitting - Dynamic: Good (unsupported)  Standing - Static: Fair  Standing - Dynamic : Impaired       BED/CHAIR/WHEELCHAIR TRANSFERS Initial Assessment 6/30/2021   Rolling Right 4 (Minimal assistance)   Rolling Left 4 (Minimal assistance)   Supine to Sit 4 (Minimal assistance)   Sit to Stand  (mod A from low bed height surface, CG/min A otherwise)   Sit to Supine 3 (Moderate assistance) (assist with lifting each LE up into bed)   Transfer Assist Score 4 (Minimal assistance)   Transfer Type Other   Comments  Patient performing bed mobility with flat head of bed without railings. Patient also performing stand step transfers with RW. Cues for safe hand placement, adequate forward flexed trunk posture to improve ability to come into standing. Patient heavily reliant on bilat UE to push up into standing, unable to perform sit to stand without use of arms. Car Transfer Minimum assistance (using RW)   Car Type car transfer simulator       WHEELCHAIR MOBILITY/MANAGEMENT Initial Assessment 6/30/2021   Able to Propel 160 feet   Assist Level 4 (min A for steering)   Curbs/ramps assistance required 0 (Not tested)   Wheelchair set up assistance required 4 (Minimal assistance)   Wheelchair management Manages left brake, Manages right brake (using brake extender)   Comments Patient needing min A for steering only, using primarily bilat UE to propel.        WALKING INDEPENDENCE Initial Assessment 6/30/2021   Assistive device Walker, rolling, Gait belt   Ambulation assistance - level surface 4 (Minimal assistance)   Distance 160 Feet (ft)   Comments  Min A for gait using RW, cues for more upright posture and stepping appropriately into middle of RW. Ambulation assistance - unlevel surface  (NT)       GAIT Initial Assessment 6/30/2021   Gait Description (WDL) Exceptions to WDL   Gait Abnormalities Decreased step clearance (forward flexed trunk)       STEPS/STAIRS Initial Assessment 6/30/2021   Steps/Stairs ambulated 6   Rail Use Both   Assistance Level 4 (Minimal assistance)   Comments  Min A for steadying support, cues for proper hand placement. Performing step to pattern with 2 feet to a stair before negotiating next step. Curbs/Ramps Minimum assistance (using RW and 6 inch step), moderate to maximal cues for sequencing. Therapeutic Exercises:   Standing therex with bilat UE support of RW, to improve ability to perform gait, bed mobility, transfers: Hip flex marches, mini squats, heel raises 3 x 10 reps, needing intermittent seated rests. Neuromuscular re-education:   Static standing 2 min without UE support, emphasis on upright posture and maintaining balance. Needing SBA for safety. ASSESSMENT :  Based on the objective data described above, the patient presents with decreased strength, endurance, balance leading to difficulty performing safe and independent functional mobility. Patient will benefit from skilled intervention to address the above impairments. Patient's rehabilitation potential is considered to be Good  Factors which may influence rehabilitation potential include:   []         None noted  []         Mental ability/status  [x]         Medical condition  [x]         Home/family situation and support systems  []         Safety awareness  []         Pain tolerance/management  []         Other:      PLAN :  See STG and LTG above. Order received from MD for physical therapy services and chart reviewed. Pt to be seen 5 times per week for 3 hours of total therapy per day for 10-14 days.   Thank you for the referral.    Pt would benefit from skilled physical therapy in order to improve independent functional mobility within the home. Interventions may include range of motion (AROM, PROM B LE/trunk), motor function (B LE/trunk strengthening/coordination), activity tolerance (vitals, oxygen saturation levels), bed mobility training, balance activities, gait training (progressive ambulation program), wheelchair management and functional transfer training. Discharge Recommendations: Home Health  Further Equipment Recommendations for Discharge: rolling walker       Activity Tolerance:   Fair to good depending on fatigue. Patient denied dizziness/lightheadedness during session. After treatment:   [] Patient left in no apparent distress in bed  [x] Patient left in no apparent distress sitting up in chair  [] Patient left in no apparent distress in w/c mobilizing under own power  [] Patient left in no apparent distress dining area  [] Patient left in no apparent distress mobilizing under own power  [x] Call bell left within reach  [x] Nursing notified  [] Caregiver present  [] Bed alarm activated   [x] Chair alarm activated. COMMUNICATION/EDUCATION:   [x]         Fall prevention education was provided and the patient/caregiver indicated understanding. [x]         Patient/family have participated as able in goal setting and plan of care. [x]         Patient/family agree to work toward stated goals and plan of care. []         Patient understands intent and goals of therapy, but is neutral about his/her participation. []         Patient is unable to participate in goal setting and plan of care.     Thank you for this referral.  Stephie Hill, PT  6/30/2021

## 2021-06-30 NOTE — CONSULTS
Comprehensive Nutrition Assessment    Type and Reason for Visit: Initial, Consult    Nutrition Recommendations/Plan:   - Continue current nutrition interventions. Encourage/ monitor po intake of meals and supplements. Nutrition Assessment:  Pt was in acute rehab unit; then transferred to ED with mesenteric abscess and diverticulitis with abscess per CRS. Transferred back to rehab unit. Pt had poor/fair meal intake while in acute care hospital per chart review; per review, pt noted to dislike hospital meals. Ensure Enlive was ordered and continued following pt transfer back to rehab unit. Noted 9 lb wt loss x 1 year and 4 month PTA per chart hx; not significant. Will monitor po intake of meals/ supplements. Malnutrition Assessment:  Malnutrition Status: At risk for malnutrition (specify) (decreased po intake PTA while in acute care hospital)      Nutrition History and Allergies: Past medical hx:  Stroke, COPD, CAD, diverticulitis of large intestine with abscess, diverticulosis of sigmoid colon, HTN. Poor/fair meal intake while in acute care hospital per chart review. Noted wt loss of 9 lb, 4.8% x 1 year and 4 month PTA per chart hx; not significant. Wt trends;  192 lb in February 2018,  187 lb in February 2020. No known food allergies. Estimated Daily Nutrient Needs:  Energy (kcal): 8602-8890; Weight Used for Energy Requirements: Admission (80.8 kg)  Protein (g): 65-81; Weight Used for Protein Requirements: Admission (x0.8-1)  Fluid (ml/day): 7681-8039; Method Used for Fluid Requirements: 1 ml/kcal      Nutrition Related Findings:  BM 6/30, loose. + edema. Meds: MVI with iron      Wounds:    None       Current Nutrition Therapies:  ADULT ORAL NUTRITION SUPPLEMENT Breakfast, Dinner; Standard High Calorie/High Protein  ADULT DIET Regular; No Salt Added (3-4 gm);  Low Fiber    Anthropometric Measures:  · Height:  5' 8\" (172.7 cm)  · Current Body Wt:  80.8 kg (178 lb 2.1 oz)   · Admission Body Wt:  178 lb 2.1 oz    · Usual Body Wt:   (Per chart hx:   187 lb in February 2020.    192 lb in February 2021)     · Ideal Body Wt:  154 lbs:  115.7 %   · Adjusted Body Weight:   ; Weight Adjustment for: No adjustment   · BMI Category: Overweight (BMI 25.0-29. 9)       Nutrition Diagnosis:   · Predicted inadequate energy intake related to early satiety (dislike of hospital meals per chart review) as evidenced by intake 26-50% (while in acute care hospital)      Nutrition Interventions:   Food and/or Nutrient Delivery: Continue current diet, Vitamin supplement, Mineral supplement, Continue oral nutrition supplement  Nutrition Education and Counseling: Education not indicated  Coordination of Nutrition Care: Continue to monitor while inpatient    Goals:  PO nutrition intake will meet >75% of patients estimated nutritional needs over the next 7 days.        Nutrition Monitoring and Evaluation:   Behavioral-Environmental Outcomes: None identified  Food/Nutrient Intake Outcomes: Food and nutrient intake, Supplement intake, Vitamin/mineral intake  Physical Signs/Symptoms Outcomes: Biochemical data, GI status, Nutrition focused physical findings, Meal time behavior    Discharge Planning:    Continue oral nutrition supplement, Continue current diet     Electronically signed by Rafael Carmen RD on 6/30/2021 at 3:18 PM    Contact: [phone]

## 2021-06-30 NOTE — PROGRESS NOTES
conducted an initial consultation and Spiritual Assessment for [facility], who is a 80 y.o.,male. Patients Primary Language is: [place]. According to the patients EMR [employer] Affiliation is: Other. The reason the Patient came to the hospital is:   Patient Active Problem List    Diagnosis Date Noted    Mesenteric abscess ([facility] 06/21/2021    Diverticulitis of large intestine with abscess 06/21/2021    History of sinus bradycardia 06/21/2021    Generalized weakness 06/21/2021    Peripheral vascular disease of extremity with claudication ([address] 06/14/2021    Mixed hyperlipidemia     Benign prostatic hyperplasia with urinary retention     Malignant neoplasm of lower lobe of right lung (HCC)     Heart failure with preserved left ventricular function (HFpEF) ([address]     Coronary artery disease involving native coronary artery of native heart     Chronic obstructive pulmonary disease (COPD) ([address]     History of carotid stenosis     History of renal artery stenosis     History of gross hematuria 54/93/5148    Acute embolic stroke ([address] 99/16/8824    Diverticulosis of sigmoid colon 05/22/2021    Anticoagulated by anticoagulation treatment 05/19/2021    On clopidogrel therapy 05/19/2021    Status post insertion of drug eluting coronary artery stent 05/18/2021    Non-ST elevation (NSTEMI) myocardial infarction ([address] 05/17/2021    Paroxysmal atrial fibrillation ([address] 05/17/2021    Leukocytosis 05/17/2021    Impaired mobility and ADLs 05/17/2021    Neuropathy involving both lower extremities 10/22/2020    Urinary retention     Essential hypertension     History of coronary artery bypass graft 2001        The  provided the following Interventions:  Initiated a relationship of care and support with patient in room 188 today and offered pastoral care support to the patient.   Listened empathically as patient talked about his being here once again  Provided information about Spiritual Care Services. Offered prayer and assurance of continued prayers on patients behalf. The following outcomes were achieved:  Patient shared limited information about his medical narrative and spiritual journey/beliefs. Patient processed feeling about current hospitalization. Patient expressed gratitude for pastoral care visit. Assessment:  Patient does not have any Confucianism/cultural needs that will affect patients preferences in health care. There are no further spiritual or Confucianism issues which require Spiritual Care Services interventions at this time. Plan:  Chaplains will continue to follow and will provide pastoral care on an as needed/requested basis    . [facility]   [phone]

## 2021-06-30 NOTE — ROUTINE PROCESS
[facility]    Bedside and Verbal shift change report given to ROLDAN CEVALLOS (oncoming nurse) by Jabari Rausch RN (offgoing nurse). Report included the following information SBAR, Kardex, MAR and Recent Results. Situation:   Code Status: Full Code   Hospital Day: 1   Problem List:   Hospital Problems  Date Reviewed: 6/18/2021        Codes Class Noted POA    Mesenteric abscess ([address] ICD-10-CM: K65.1  ICD-9-CM: 567.22  6/21/2021 Yes        * (Principal) Diverticulitis of large intestine with abscess ICD-10-CM: K57.20  ICD-9-CM: 562.11, 569.5  6/21/2021 Yes        History of sinus bradycardia ICD-10-CM: Z86.79  ICD-9-CM: V12.59  6/21/2021 Yes    Overview Signed 6/29/2021 10:02 PM by Rogelio Mg MD     Attributed to Carvedilol 25 mg PO BID with meals             Generalized weakness ICD-10-CM: R53.1  ICD-9-CM: 780.79  6/21/2021 Yes        Mixed hyperlipidemia (Chronic) ICD-10-CM: I36.6  ICD-9-CM: 272.2  Unknown Yes        Heart failure with preserved left ventricular function (HFpEF) (HCC) (Chronic) ICD-10-CM: I50.30  ICD-9-CM: 428.9  Unknown Yes    Overview Signed 6/7/2021 11:03 PM by Rogelio Mg MD     2D echocardiogram (5/18/2021) showed EF 50%; concentric LV hypertrophy with a severely thickened septal wall and mildly thickened posterior wall; left atrial chamber is severely enlarged; right atrial chamber volume is moderately enlarged; no mass, shunts, or thrombi.               Coronary artery disease involving native coronary artery of native heart (Chronic) ICD-10-CM: I25.10  ICD-9-CM: 414.01  Unknown Yes        Diverticulosis of sigmoid colon (Chronic) ICD-10-CM: K57.30  ICD-9-CM: 562.10  5/22/2021 Yes        Anticoagulated by anticoagulation treatment ICD-10-CM: Z79.01  ICD-9-CM: V58.61  5/19/2021 Yes    Overview Signed 6/7/2021 10:48 PM by Rogelio Mg MD     On Apixaban             On clopidogrel therapy ICD-10-CM: Z79.01  ICD-9-CM: V58.61  5/19/2021 Yes        Status post insertion of drug eluting coronary artery stent ICD-10-CM: Z95.5  ICD-9-CM: V45.82  5/18/2021 Yes    Overview Signed 6/18/2021  1:38 PM by Rocael Bear MD     S/P PCI to proximal SVG with 3.5 x 12 mm [place] drug-eluting stent; PCI to mid SVG with 3.5 x 34 mm [place] drug-eluting stent; PCI to distal SVG with 3.5 x 15 mm [place] drug-eluting stent;  Balloon angioplasty to distal SVG anastomosis (5/18/2021 - Dr. Michelle Barrett)              Non-ST elevation (NSTEMI) myocardial infarction [facility]) ICD-10-CM: I21.4  ICD-9-CM: 410.70  5/17/2021 Yes        Paroxysmal atrial fibrillation ([facility] ICD-10-CM: I48.0  ICD-9-CM: 427.31  5/17/2021 Yes        Impaired mobility and ADLs ICD-10-CM: Z74.09, Z78.9  ICD-9-CM: V49.89  5/17/2021 Yes        Neuropathy involving both lower extremities (Chronic) ICD-10-CM: T23.61  ICD-9-CM: 356.9  10/22/2020 Yes        Essential hypertension (Chronic) ICD-10-CM: I10  ICD-9-CM: 401.9  Unknown Yes        History of coronary artery bypass graft ICD-10-CM: Z95.1  ICD-9-CM: V45.81  2001 Yes    Overview Signed 6/7/2021 11:04 PM by Rocael Bear MD     [facility]                   Background:   Past Medical History:   Past Medical History:   Diagnosis Date    Acute embolic stroke ([facility] 4/88/9598    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, brainstem and cerebellum) without residual deficit    Anticoagulated by anticoagulation treatment 5/19/2021    On Apixaban    Benign prostatic hyperplasia with urinary retention     Chronic obstructive pulmonary disease (COPD) ([facility]     Coronary artery disease involving native coronary artery of native heart     Diverticulitis of large intestine with abscess 6/21/2021    Diverticulosis of sigmoid colon 5/22/2021    Essential hypertension     Heart failure with preserved left ventricular function (HFpEF) ([facility])     2D echocardiogram (5/18/2021) showed EF 50%; concentric LV hypertrophy with a severely thickened septal wall and mildly thickened posterior wall; left atrial chamber is severely enlarged; right atrial chamber volume is moderately enlarged; no mass, shunts, or thrombi.     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[address] 6/21/2021    Neuropathy involving both lower extremities 10/22/2020    Non-ST elevation (NSTEMI) myocardial infarction ([address] 5/17/2021    On clopidogrel therapy 5/19/2021    Paroxysmal atrial fibrillation ([facility] 5/17/2021    Peripheral vascular disease of extremity with claudication ([facility] 6/14/2021    Urinary retention         Assessment:   Changes in Assessment throughout shift: No change to previous assessment     Patient has a central line: no Reasons if yes: na  Insertion date:na Last dressing date:na   Patient has [place] Cath: no Reasons if yes: na   Insertion date:na  Shift [place] care completed: NO     Last Vitals:     Vitals:    06/29/21 1804 06/29/21 2016   BP: (!) 144/87 126/77   Pulse: (!) 110 (!) 116   Resp: 18 20   Temp: 98 °F (36.7 °C) 98.4 °F (36.9 °C)   SpO2: 95% 95%        PAIN    Pain Assessment    Pain Intensity 1: 0 (06/30/21 0442) Pain Intensity 1: 2 (12/29/14 1105)      Pain Location 1: Abdomen      Pain Intervention(s) 1: Medication (see MAR)  Patient Stated Pain Goal: 0 Patient Stated Pain Goal: 0  o Intervention effective: yes  o Other actions taken for pain:  none     Skin Assessment  Skin color Skin Color: Appropriate for ethnicity  Condition/Temperature Skin Condition/Temp: Dry, Rash  Integrity Skin Integrity: Intact  Turgor Turgor: Non-tenting  Weekly Pressure Ulcer Documentation  Pressure  Injury Documentation: No Pressure Injury Noted-Pressure Ulcer Prevention Initiated  Wound Prevention & Protection Methods  Orientation of wound Orientation of Wound Prevention: Posterior  Location of Prevention Location of Wound Prevention: Sacrum/Coccyx  Dressing Present Dressing Present : No  Dressing Status Dressing Status: Intact  Wound Offloading Wound Offloading (Prevention Methods): Bed, pressure reduction mattress, Pillows, Repositioning, Walker, Wheelchair     INTAKE/OUPUT  Date 06/29/21 0700 - 06/30/21 0659 06/30/21 0700 - 07/01/21 0659   Shift 0700-1859 1900-0659 24 Hour Total 0700-1859 1900-0659 24 Hour Total   INTAKE   Shift Total         OUTPUT   Urine  800 800        Urine Voided  800 800        Urine Occurrence(s)  3 x 3 x      Emesis/NG output           Emesis Occurrence(s)  0 x 0 x      Stool           Stool Occurrence(s)  2 x 2 x      Shift Total  800 800      NET  -800 -800      Weight (kg)             Recommendations:  1. Patient needs and requests: met    2. Pending tests/procedures: lab     3. Functional Level/Equipment: Partial (one person) / Bed (comment); Velta Aver; Wheelchair    Fall Precautions:   Fall risk precautions were reinforced with the patient; he was instructed to call for help prior to getting up. The following fall risk precautions were continued: bed/ chair alarms, door signage, yellow bracelet and socks as well as update of the Arnaldo Showman tool in the patient's room. [place] Score: 3    HEALS Safety Check    A safety check occurred in the patient's room between off going nurse and oncoming nurse listed above.     The safety check included the below items  Area Items   H  High Alert Medications - Verify all high alert medication drips (heparin, PCA, etc.)   E  Equipment - Suction is set up for ALL patients (with yanker)  - Red plugs utilized for all equipment (IV pumps, etc.)  - WOWs wiped down at end of shift.  - Room stocked with oxygen, suction, and other unit-specific supplies   A  Alarms - Bed alarm is set for fall risk patients  - Ensure chair alarm is in place and activated if patient is up in a chair   L  Lines - Check IV for any infiltration  - Taveras bag is empty if patient has a Taveras   - Tubing and IV bags are labeled   S  Safety   - Room is clean, patient is clean, and equipment is clean. - Hallways are clear from equipment besides carts. - Fall bracelet on for fall risk patients  - Ensure room is clear and free of clutter  - Suction is set up for ALL patients (with yanker)  - Hallways are clear from equipment besides carts.    - Isolation precautions followed, supplies available outside room, sign posted     Aimee Munoz RN

## 2021-06-30 NOTE — H&P
[address]  [address] [place] [address], [address]     INPATIENT REHABILITATION  HISTORY AND PHYSICAL    Name: Carlos Mason CSN: [id number]   Age: 80 y.o. MRN: [id number]   Sex: male Admit Date: 6/29/2021     PCP: Dr. 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 History of the Present Illness: The patient is an 27-year-old [facility] male with multiple medical comorbidities who was admitted to [facility] on 6/21/2021 due to syncope. On 5/17/2021, the patient was admitted to [facility]  Due to shortness of breath and elevated blood pressure. WBC count (5/17/2021) = 18.6  WBC count (5/18/2021) = 21.4    Cardiac catheterization (5/18/2021 - Dr. Emiliana Murphy) showed:  1. Coronaries: Multivessel CAD with patent LIMA to LAD with severe sequential stenosis of the SVG to LCX. 2. Left ventricle: Mildly elevated LVEDP of 15mmHg   3. No aortic stenosis      Dr. Clau Borden recommended to transfer the patient to the [facility] for consideration of revascularization of the SVG to LCX.     -    On 5/18/2021, the patient was discharged from [facility] and was transferred to the [facility].     S/P PCI to proximal SVG with 3.5 x 12 mm [place] drug-eluting stent; PCI to mid SVG with 3.5 x 34 mm [place] drug-eluting stent; PCI to distal SVG with 3.5 x 15 mm [place] drug-eluting stent; Balloon angioplasty to distal SVG anastomosis (5/18/2021 - Dr. Krissy Worley)    WBC count (5/19/2021) = 22.4    On 5/19/2021, patient was started on Apixaban and Clopidogrel. Aspirin was discontinued.     WBC count (5/21/2021) = 27.3  WBC count (5/22/2021) = 24.9     CT scan of the chest/abdomen/pelvis without contrast (5/22/2021) showed:  1. Mild infiltrate in the superior segment of the right lower lobe with small right pleural effusion. No consolidation. 2. Delayed renal clearance, chronic renal disease suspected. Additionally, areas of cortical cysts with poor perfusion.  Pyelonephritis not excluded. No hydronephrosis. Correlation with urinalysis? 3. Diverticulosis, with potential diverticulitis in the left lower quadrant, junction of descending and sigmoid colon. However, evaluation is very limited due to severe motion artifacts. 4. Focal mucosal thickening in the proximal sigmoid. Further evaluation with barium enema or colonoscopy as indicated.      WBC count (5/23/2021) = 30.2    WBC count (6/2/2021) = 25.4  WBC count (6/3/2021) = 22.5  WBC count (6/4/2021) = 23.4  WBC count (6/5/2021) = 21.7  WBC count (6/6/2021) = 24.1  WBC count (6/7/2021) = 20.0    -    On 6/7/2021, the patient was discharged from [facility] and was transferred to the [facility] for Physical Rehabilitation. On 6/8/2021, patient had constipation so the patient was started on Pericolace 2 tabs PO once daily after dinner.     On 6/9/2021, added Polyethylene glycol 17 grams in 8 oz water PO once daily    On 6/19/2021, patient was noted to have diarrhea   > Held:    > Polyethylene glycol 17 grams in 8 oz water PO once daily    > Pericolace 2 tabs PO once daily after dinner   > Started Bio K Plus 1 cap PO once daily    Stool culture (collected 6/19/2021, resulted 6/20/2021): Negative    WBC count (6/20/2021) = 26.7    Blood culture x 2 sets (collected 6/20/2021, resulted on 6/26/2021) yielded no growth after 6 days    On 6/20/2021, started:   > Cholestyramine 4 grams PO TID with meals   > Ceftriaxone 1000 mg IV q 24 hr   > Metronidazole 500 mg IV q 8 hr   > Vancomycin 1750 mg  IV x 1 dose    (+) shortness of breath and dizziness after returning from bathroom at 3AM              > Rapid Response Team was called at 3:10 AM; Patient was seen and evaluated by 120 [facility] resident (Dr. Hodan Pride)              > Work up:                          > WBC count (6/21/2021) = 26.3                          > Cardiac enzymes (6/21/2021): Negative x 1 set                          > Chest x-ray (6/21/2021) showed an enlarged cardiac silhouette; low lung volumes with streaky bibasilar densities. > Patient back at baseline; On-call physician (Dr. Oma Persaud) was informed of the above events; patient to remain on unit. WBC count (6/21/2021) = 26.3    Infectious Disease consult (Dr. Sarah Back) was called on 6/21/2021 for evaluation and comanagement    On 6/21/2021:   > Discontinue:    > Polyethylene glycol 17 grams in 8 oz water PO once daily    > Pericolace 2 tabs PO once daily after dinner   > Change Vancomycin IV to Vancomycin 500 mg PO q 6 hr    CT scan of the chest, abdomen and pelvis with contrast (6/21/2021) showed:  1. No acute findings in the chest.   -Emphysema. -CABG. -Stable mildly enlarged right thyroid gland which could indicate underlying nodule, stable for many years. 2. 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 It is unclear if initial source of abscess is from small bowel inflammation or diverticulitis/pericolonic abscess. 3. Dilated small bowel. This is likely a combination of generalized small bowel ileus and low-grade partial small bowel obstruction at site of abnormal pelvic small bowel loop discussed above. 4. Diverticulosis. Stable thickened collapsed segment of sigmoid colon since 2014 suggesting a chronic stricture. This is limited for assessment by CT. 5. Bladder dome inflammation is likely secondary to the adjacent mesentery process.   6. Other chronic incidental findings.     (+) unresponsiveness while seated on toilet bowl at 1PM > Rapid Response Team was called; Patient was seen and examined by 120 [facility] (Dr. Skinny Ring)              > I saw and examined the patient at the bedside              > Excerpt from the RRT note by Dr. Skinny Ring: \"Rapid response called for syncope. Patient was found unresponsive and unconscious while on the toilet. The patient was moved back to the bed and RRT called. On arrival, the patient had an unreadable BP by machine, and a slow pulse in the low 40s. Patient continued to be bradycardic for a number of minutes, with lowest rate in low 30s. While bradycardic, patient continued to have unreadable pressure. Patient was given 500cc bolus, and during, patient converted to NSR, and next pressure was 190s/100s, patient began to regain senses and communicated at this time, although he continued to not be at baseline, and was sleepy/lathargic. Patient was deemed stable for transfer and moved to the ED. \"              > Due to altered mental status, momentary hypotension, bradycardia and leukocytosis of unknown origin, it was felt that the patient had to be transferred to the [facility] Emergency Department for further evaluation, work up and disposition              > I called Emergency Medicine (Dr. Christ West) and informed him of the above prior to transfer to the ER    -    On 6/21/2021, the patient was transferred to the [facility] Emergency Department for further evaluation. The patient was seen and examined by Emergency Medicine (Dr. Steve Roberts). Excerpt (History of Presenting Illness) from the ED Provider Note by Dr. Steve Roberts:  Goran Amaro is a 80 y.o. male presenting with the noted PMH to the ED c/o syncopal episode just prior to arrival.  He is been in inpatient rehab after he had an NSTEMI with a cardiac stent placed in May.   He has been in inpatient rehab and recovering for this.  He reportedly had a syncopal episode earlier this morning around midnight with possibly even having agonal breathing but recovered from this. They are in the midst of working patient up over the past 2 days as he has had a new leukocytosis of unclear significance cause. Infectious disease has been consulted and it appears that he is on ceftriaxone and metronidazole and vancomycin. Just prior to arrival he had another syncopal episode while having a bowel movement on the toilet. Rapid response was called he was found to be bradycardic in the 30s minimally responsive and unable to get blood pressure. They give him a small amount of IV fluids and he seemed to eventually improve and is now back to his baseline. He has no complaints at this time. Denies any chest pain or shortness of breath or fevers or chills or abdominal pain. He does state that he is not been having normal bowel movements the past 3 days that he has been on MiraLAX and having some loose stools. \"    Cardiology consult (Dr. Dane Vidales) was called on 6/21/2021 for evaluation and comanagement of bradycardia. Carvedilol was put on hold. Colorectal Surgery (Dr. Sahil Matos) was called on 6/21/2021 for evaluation and comanagement. No surgical intervention recommended since unable to safely stop Apixaban (for paroxysmal atrial fibrillation) and Clopidogrel (recent placement of 3 JENNIE to SVG-LCx on 5/18/2021). Nephrology consult (Dr. Oleg Andersen) was called on 6/2/1/2021 for evaluation and comanagement of acute/chronic hyponatremia. The patient was admitted under the service of the [employer] [facility] (Dr. Emerson Gowers). Excerpt (History of Present Illness) from the H&P by Tesha Brar NP:  Anita Santiago is a 80 y.o.    male who presents with 2 recent episodes of syncope or to arrival in ED and then an additional episode and ED (at approximately 1600 per nursing) episode earlier today at ARU occurred when patient was having a bowel movement and then this afternoon again after patient had had a small BM returned to bed from bedside commode where he proceeded to feel lightheaded and feeling of passing out during which heart rate dropped into 40s and then 30s and patient was poorly responsive at this time. Patient subsequently received atropine and heart rate now in the 110s. At time of visit with this provider patient continues to have some discomfort to right lower quadrant of abdomen, he reports a small BM \" barely\" earlier today in ED. he does report some dyspnea with exertion but denies other concerns including chest pain, headaches, fevers or chills, nausea or vomiting, cough or blood in urine or stools of recent. He does note continued neuropathy with poor sensation to extremities. \"    On 6/21/2021:   > Discontinued:    > Cholestyramine 4 grams PO TID with meals    > Ceftriaxone 1000 mg IV q 24 hr    > Metronidazole 500 mg IV q 8 hr    > Vancomycin 500 mg PO q 6 hr   > Started Piperacillin-Tazobactam 3.375 grams IV q 6 hr    On 6/25/2021:   > Discontinued Piperacillin-Tazobactam 3.375 grams IV q 6 hr   > Started:    > Cefpodoxime 200 mg PO q 12 hr    > Metronidazole 500 mg PO q 12 hr    Blood culture x 2 sets (collected 6/20/2021, resulted on 6/26/2021) yielded no growth after 6 days    On 6/26/2021, Cardiology restarted the patient on Carvedilol 3.125 mg PO BID with meals. On 6/28/2021:   > Discontinued Metronidazole 500 mg PO q 12 hr   > Started Amoxicillin-Clavulanate 500-125 1 tab PO q 12 hr    Dr. Bismark Thompson recommended to continue the Cefpodoxime PO and Amoxicillin-Clavulanate until 7/21/2021 and a CT scan of the abdomen and pelvis on 7/15/2021.     Upon discharge from the ARU, the patient will need to follow up with Colorectal Surgery (Dr. Freeman Banerjee)    [facility] Course) from the Discharge Summary by Dr. Raman Gonzalez:  \"80 y.o male with HTN, CAD with CABG, persistent AFIB, COPD, PAD, h/o embolic CVA, anemia, presented from ARU for unresponsive while on toilet. RRT called to find hypotension and bradycardia required atropine. In the ER, he was found tachycardia, stomach pain, recent CT with possible mesenteric abscess. He was started on IV zosyn for sepsis concern.   Surgery consulted for further care. Recommended IR for drainage tube but IR reviewed this case and unable to get access. Furthermore, he requires plavix in the setting of recent stent, IR deferred for further intervention. He tolerated IV antibiotics. He has improved of his bradycardia and hyponatremia. He was able to tolerated diet. Surgery has no further intervention at this time. ID consulted and recommend oral antibiotics once he tolerated oral intake. Cardiology has been followed for bradycardia with adjustment of his betablocker. Patient leukocytosis trending down, no fevers. ID recommends okay to switch him to p.o. antibiotics and okay for discharge to ARU. ID will be following patient in ARU. ID recommends repeat CT abdomen pelvis in 2 weeks to monitor abscess, if worsening then need surgical intervention. Colorectal surgery Dr. Aristides Kwon seen the patient in hospital.  Surgery signed off on the patient. Patient's bradycardia has resolved now, patient is slightly tachycardia in A. fib. Cardiology recommends low-dose of beta-blocker given his significant bradycardia on admission. Cardiology okay for patient's heart rate 90s to low 100s. Cardiology cleared the patient for discharge. Patient has mild cough, suspect bronchitis. I have advised patient on incentive spirometry.     Patient is stable for transfer to acute rehab once bed available.     Discussed with the patient and also with his wife over the phone about his discharge plan, follow-up appointments, medication changes and side effects of her medication.   I have discussed with the patient about intra-abdominal abscess, need for repeat CT in 2 weeks and further plan based on the CT results. Patient and his wife understood and agreed with my plan. \"      CBC    06/29/21 0211 06/28/21  0230 06/27/21  0516 06/25/21  0534 06/24/21  0522 06/23/21  0538 06/22/21  0409 06/21/21  1310 06/21/21  0330 06/20/21  0550   WBC 21.0* 24.5* 21.7* 18.4* 18.2* 22.1* 24.7* 26.0* 26.3* 26.7*   HGB 10.3* 9.5* 9.5* 8.6* 8.2* 8.5* 9.1* 9.4* 9.1* 9.5*   HCT 33.8* 30.9* 32.0* 28.5* 27.4* 28.2* 29.1* 29.8* 27.5* 31.0*    351 366 274 246 226 241 235 220 228       Electrolytes    06/29/21 0211 06/28/21  0230 06/27/21  0516 06/24/21  0522 06/23/21  0538 06/22/21  0409 06/21/21  1310 06/21/21  0330 06/20/21  0550 06/17/21  0430   * 135* 132* 132* 132* 128* 125* 126* 126* 134*   K 4.1 4.8 4.7 4.3 4.3 4.3 4.6 4.5 4.5 4.2    100 99* 102 100 95* 92* 91* 90* 98*   CA 8.8 8.8 9.1 8.8 8.9 9.0 8.6 8.3* 9.3 9.0   PHOS  --   --   --   --  2.4* 3.4  --   --   --   --    MG  --   --  2.1 2.3 2.3 2.3 2.3 2.0 2.2  --        Renal Function    06/29/21 0211 06/28/21  0230 06/27/21  0516 06/24/21  0522 06/23/21  0538 06/22/21  0409 06/21/21  1310 06/21/21  0330 06/20/21  0550 06/17/21  0430   BUN 8 11 11 9 13 18 23* 25* 28* 22*   CREA 0.58* 0.69 0.67 0.62 0.71 0.77 0.91 0.89 1.12 1.00   CO2 25 27 27 28 28 28 26 24 30 30       Liver Function    06/23/21  0538 06/21/21  1310 06/21/21  0330 06/20/21  0550   TBILI 0.8 1.2* 1.3* 2.2*   TP 6.4 6.6 6.0* 6.2*   ALB 3.1* 2.9* 3.1* 3.2*   GLOB 3.3 3.7 2.9 3.0   ALT 15* 17 18 16   AST 16 21 28 22   AP 40* 46 48 46       Pain was controlled with Acetaminophen    Apixaban was continued for cerebral thromboembolism/DVT prophylaxis. The patient had remained hemodynamically stable but due to the above events, the patient was noted to be generally weak and with impaired mobility and ADLs. Patient was felt to be a good candidate for acute inpatient rehabilitation.  Upon evaluation by Physical Therapy and Occupational Therapy, the patient was recommended for acute inpatient rehabilitation. The patient was discharged and was subsequently admitted to the [facility] for Physical Rehabilitation for intensive rehabilitation to help recover strength, function and mobility. Past Medical History:  Past Medical History:   Diagnosis Date    Acute embolic stroke ([address] 1/42/9120    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, brainstem and cerebellum) without residual deficit    Anticoagulated by anticoagulation treatment 5/19/2021    On Apixaban    Benign prostatic hyperplasia with urinary retention     Chronic obstructive pulmonary disease (COPD) ([address]     Coronary artery disease involving native coronary artery of native heart     Diverticulitis of large intestine with abscess 6/21/2021    Diverticulosis of sigmoid colon 5/22/2021    Essential hypertension     Heart failure with preserved left ventricular function (HFpEF) ([facility])     2D echocardiogram (5/18/2021) showed EF 50%; concentric LV hypertrophy with a severely thickened septal wall and mildly thickened posterior wall; left atrial chamber is severely enlarged; right atrial chamber volume is moderately enlarged; no mass, shunts, or thrombi.     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[address] 6/21/2021    Neuropathy involving both lower extremities 10/22/2020    Non-ST elevation (NSTEMI) myocardial infarction ([address] 5/17/2021    On clopidogrel therapy 5/19/2021    Paroxysmal atrial fibrillation ([address] 5/17/2021    Peripheral vascular disease of extremity with claudication ([facility] 2021    Urinary retention        Past Surgical History:  Past Surgical History:   Procedure Laterality Date    HX CAROTID ENDARTERECTOMY Left 2012    HX CAROTID ENDARTERECTOMY Right 2014    Dr. Hair Christianson [address] [place] [address]      [facility] CORONARY STENT PLACEMENT  2021    S/P PCI to proximal SVG with 3.5 x 12 mm [place] drug-eluting stent; PCI to mid SVG with 3.5 x 34 mm [place] drug-eluting stent; PCI to distal SVG with 3.5 x 15 mm [place] drug-eluting stent; Balloon angioplasty to distal SVG anastomosis (2021 - Dr. Amanda Chow)     HX LOBECTOMY Right     Middle lobe and lower lobe    HX RENAL ARTERY STENT Bilateral     HX TONSILLECTOMY      IR BRONCHOSCOPY  2015    Dr. Yuriy Brunson       Allergies: Allergies   Allergen Reactions    Lipitor [Atorvastatin] Rash    Norvasc [Amlodipine] Rash       Social History: The patient is , lives with his wife in a 2-story house with no steps to enter in [place], [place]. He quit smoking cigarettes and drinking alcoholic beverages ~78 years ago. He denied any illicit drug use. He retired for the [employer] 23 years ago then worked at the [employer]. He retired from the [employer] in 2019. Family History: Both parents are . Family History   Problem Relation Age of Onset    Hypertension Mother     Cancer Mother        Home Medications (from the Consult Note dated 2021 prepared by Dr. Demetri Moses):  Home Medication List - Marked as Reviewed on 10/22/20 1526   Medication Sig   Carvedilol (COREG) 25 mg PO TABS Take 25 mg by Mouth Twice Daily. isosorbid-hydralazine (BIDIL) 20-37.5 mg PO TABS Take 1 Tab by Mouth 3 Times Daily.    lisinopril (PRINIVIL) 20 mg PO TABS lisinopril 20 mg tablet       Home Medications ( [x] Verbally confirmed with patient    [] From medication bottles brought by patient     [] From list provided by patient):  1. Carvedilol 25 mg PO BID with meals  2. Isosorbide dinitrate-Hydralazine 20-37.5 1 tab PO TID  3. Lisinopril 20 mg PO once daily  4. Ibuprofen 400 mg PO q 6 hr PRN for pain      Transfer Medications (from the Discharge Summary prepared by Dr. Petty Richard):  Prior to Admission Medications   Prescriptions Last Dose Informant Patient Reported? Taking? L. acidophilus,casei,rhamnosus (BIO-K PLUS) 50 billion cell cpDR capsule Unknown at Unknown time  No No   Sig: Take 1 Capsule by mouth daily. acetaminophen (TYLENOL) 325 mg tablet Unknown at Unknown time  No No   Sig: Take 2 Tablets by mouth every four (4) hours as needed (for fever or pain). Indications: fever, pain   albuterol (PROVENTIL VENTOLIN) 2.5 mg /3 mL (0.083 %) nebu Unknown at Unknown time  No No   Sig: 3 mL by Nebulization route every four (4) hours as needed for Wheezing or Shortness of Breath. amoxicillin-clavulanate (AUGMENTIN) 500-125 mg per tablet Unknown at Unknown time  No No   Sig: Take 1 Tablet by mouth every twelve (12) hours for 9 days. apixaban (ELIQUIS) 5 mg tablet Unknown at Unknown time  No No   Sig: Take 1 Tablet by mouth two (2) times a day. Indications: treatment to prevent blood clots in chronic atrial fibrillation   carvediloL (COREG) 3.125 mg tablet Unknown at Unknown time  No No   Sig: Take 1 Tablet by mouth two (2) times daily (with meals). cefpodoxime (VANTIN) 200 mg tablet Unknown at Unknown time  No No   Sig: Take 1 Tablet by mouth every twelve (12) hours for 9 days. cholestyramine-aspartame (QUESTRAN LIGHT) 4 gram packet Unknown at Unknown time  No No   Sig: Take 1 Packet by mouth three (3) times daily (with meals). clopidogreL (PLAVIX) 75 mg tab Unknown at Unknown time  No No   Sig: Take 1 Tablet by mouth daily (with breakfast).  Indications: treatment to prevent a heart attack   fluticasone furoate-vilanteroL (BREO ELLIPTA) 100-25 mcg/dose inhaler Unknown at Unknown time  Yes No   Sig: Take 1 Puff by inhalation daily. gabapentin (NEURONTIN) 100 mg capsule Unknown at Unknown time  No No   Sig: Take 1 Capsule by mouth three (3) times daily. Max Daily Amount: 300 mg. Indications: neuropathic pain   guaiFENesin (ROBITUSSIN) 100 mg/5 mL liquid Unknown at Unknown time  No No   Sig: Take 5 mL by mouth every eight (8) hours as needed for Cough. ipratropium (ATROVENT) 0.02 % soln Unknown at Unknown time  No No   Si.5 mL by Nebulization route three (3) times daily. lisinopriL (PRINIVIL, ZESTRIL) 10 mg tablet Unknown at Unknown time  Yes No   Sig: Take 20 mg by mouth daily. Indications: chronic heart failure, high blood pressure   multivitamin, tx-iron-ca-min (THERA-M w/ IRON) 9 mg iron-400 mcg tab tablet Unknown at Unknown time  No No   Sig: Take 1 Tablet by mouth daily. Indications: treatment to prevent mineral deficiency, treatment to prevent vitamin deficiency   nitroglycerin (NITROSTAT) 0.4 mg SL tablet Unknown at Unknown time  No No   Si Tablet by SubLINGual route as needed for Chest Pain. Up to 3 doses. Indications: acute attack of angina   rosuvastatin (CRESTOR) 10 mg tablet Unknown at Unknown time  No No   Sig: Take 1 Tablet by mouth daily. Indications: excessive fat in the blood, treatment to prevent a heart attack   senna-docusate (PERICOLACE) 8.6-50 mg per tablet Unknown at Unknown time  No No   Sig: Take 2 Tablets by mouth nightly. Facility-Administered Medications: None       Review Of Systems:   CONSTITUTIONAL: No weight loss. EYES: No blurred vision and no eye discharge. ENT: No nasal discharge. No ear pain. CARDIOVASCULAR: No chest pain and no diaphoresis. RESPIRATORY: No cough, no hemoptysis. GI: No vomiting, no diarrhea   : No urinary frequency and no dysuria. MUSCULOSKELETAL: No muscle pains. SKIN: No rashes. NEURO: No dizziness, no numbness. ENDOCRINE: No polyphagia and no polydipsia. HEMATOLOGY: No bleeding tendencies. Objective:     Vital Signs:  Patient Vitals for the past 24 hrs:   BP Temp Pulse Resp SpO2   06/30/21 0734 (!) 148/87 97 °F (36.1 °C) (!) 109 18 94 %   06/29/21 2016 126/77 98.4 °F (36.9 °C) (!) 116 20 95 %   06/29/21 1804 (!) 144/87 98 °F (36.7 °C) (!) 110 18 95 %        There is no height or weight on file to calculate BMI. Physical Examination:  GENERAL SURVEY: Patient is awake, alert, oriented x 3, sitting comfortably on the chair, not in acute respiratory distress. HEENT: pale palpebral conjunctivae, anicteric sclerae, no nasoaural discharge, moist oral mucosa  NECK: supple, no jugular venous distention, no palpable lymph nodes  CHEST/LUNGS: symmetrical chest expansion, good air entry, clear breath sounds  HEART: adynamic precordium, good S1 S2, no S3, regular rhythm, tachycardic, no murmurs  ABDOMEN: flat, bowel sounds appreciated, soft, non-tender  EXTREMITIES: pale nailbeds, bipedal edema, full and equal pulses, no calf tenderness   NEUROLOGICAL EXAM: The patient is awake, alert and oriented x3, able to answer questions fairly appropriately, able to follow 1 and 2 step commands. Able to tell time from the wall clock. Cranial nerves II-XII are grossly intact. No gross sensory deficit. Motor strength is 4 to 4+/5 on all 4 extremities.     Legend:  0/5   No palpable muscle contraction  1/5   Palpable muscle contraction, no joint movement  2-/5  Less than full range of motion in gravity eliminated position  2/5   Able to complete full range of motion in gravity eliminated position  2+/5 Able to initiate movement against gravity  3-/5  More than half but not full range of motion against gravity  3/5   Able to complete full range of motion against gravity  3+/5 Completes full range of motion against gravity with minimal resistance  4-/5  Completes full range of motion against gravity with minimal resistance  4/5   Completes full range of motion against gravity with moderate resistance  5/5   Completes full range of motion against gravity with maximum resistance       Current Medications:  Current Facility-Administered Medications   Medication Dose Route Frequency    acetaminophen (TYLENOL) tablet 650 mg  650 mg Oral Q4H PRN    bisacodyL (DULCOLAX) tablet 10 mg  10 mg Oral Q48H PRN    amoxicillin-clavulanate (AUGMENTIN) 500-125 mg per tablet 1 Tablet  1 Tablet Oral BID WITH MEALS    cefpodoxime (VANTIN) tablet 200 mg  200 mg Oral Q12H    guaiFENesin ER (MUCINEX) tablet 600 mg  600 mg Oral DAILY    dextromethorphan (DELSYM) 30 mg/5 mL syrup 30 mg  30 mg Oral QHS    carvediloL (COREG) tablet 3.125 mg  3.125 mg Oral BID WITH MEALS    clopidogreL (PLAVIX) tablet 75 mg  75 mg Oral DAILY WITH BREAKFAST    rosuvastatin (CRESTOR) tablet 10 mg  10 mg Oral DAILY    nitroglycerin (NITROSTAT) tablet 0.4 mg  0.4 mg SubLINGual PRN    arformoteroL (BROVANA) neb solution 15 mcg  15 mcg Nebulization BID RT    ipratropium (ATROVENT) 0.02 % nebulizer solution 0.5 mg  0.5 mg Nebulization TID    albuterol (PROVENTIL VENTOLIN) nebulizer solution 2.5 mg  2.5 mg Nebulization Q4H PRN    apixaban (ELIQUIS) tablet 5 mg  5 mg Oral BID    multivitamin, tx-iron-ca-min (THERA-M w/ IRON) tablet 1 Tablet  1 Tablet Oral DAILY    lisinopriL (PRINIVIL, ZESTRIL) tablet 5 mg  5 mg Oral DAILY    tamsulosin (FLOMAX) capsule 0.4 mg  0.4 mg Oral DAILY WITH DINNER    gabapentin (NEURONTIN) capsule 300 mg  300 mg Oral TID       Functional Assessment on Admission:     Occupational Therapy   Post-Admission Evaluation   Eating      Grooming      Upper Body Dressing      Lower Body Dressing      Toileting  Functional Level: 5        Physical Therapy   Post-Admission Evaluation   Gait  Amount of Assistance: 4 (Minimal assistance)  Distance (ft): 160 Feet (ft)  Assistive Device: Walker, rolling, Gait belt   Bed/Mat Mobility  Rolling Right : 4 (Minimal assistance)  Rolling Left : 4 (Minimal assistance)  Supine to Sit : 4 (Minimal assistance)  Sit to Supine : 3 (Moderate assistance) (assist with lifting each LE up into bed)   Bed/Mat Mobility  Rolling Right : 4 (Minimal assistance)  Rolling Left : 4 (Minimal assistance)  Supine to Sit : 4 (Minimal assistance)  Sit to Supine : 3 (Moderate assistance) (assist with lifting each LE up into bed)   Bed/Mat Mobility  Rolling Right : 4 (Minimal assistance)  Rolling Left : 4 (Minimal assistance)  Supine to Sit : 4 (Minimal assistance)  Sit to Supine : 3 (Moderate assistance) (assist with lifting each LE up into bed)   Transfers  Transfer Type: Other  Other: stand step with RW  Transfer Assistance : 4 (Minimal assistance)  Sit to Stand Assistance:  (mod A from low bed height surface, CG/min A otherwise)  Car Transfers: Minimum assistance (using RW)  Car Type: car transfer simulator   Toilet Transfers  Toilet Transfer Score: 5     Speech and Language Pathology  Post-Admission Evaluation           Expression (Native Language)  Primary Mode of Expression: Verbal                         Legend:   7 - Independent   6 - Modified Independent   5 - Standby Assistance / Supervision / Set-up   4 - Minimum Assistance / [address]   3 - Moderate Assistance   2 - Maximum Assistance   1 - Total Assistance / Dependent       Labs on Admission:  Recent Results (from the past 24 hour(s))   CBC WITH AUTOMATED DIFF    Collection Time: 06/30/21  4:50 AM   Result Value Ref Range    WBC 18.2 (H) 4.6 - 13.2 K/uL    RBC 3.22 (L) 4.35 - 5.65 M/uL    HGB 9.1 (L) 13.0 - 16.0 g/dL    HCT 31.1 (L) 36.0 - 48.0 %    MCV 96.6 74.0 - 97.0 FL    MCH 28.3 24.0 - 34.0 PG    MCHC 29.3 (L) 31.0 - 37.0 g/dL    RDW 18.6 (H) 11.6 - 14.5 %    PLATELET 938 497 - 725 K/uL    MPV 8.9 (L) 9.2 - 11.8 FL    NEUTROPHILS 89 (H) 40 - 73 %    LYMPHOCYTES 8 (L) 21 - 52 %    MONOCYTES 3 3 - 10 %    EOSINOPHILS 0 0 - 5 %    BASOPHILS 0 0 - 2 %    ABS. NEUTROPHILS 16.2 (H) 1.8 - 8.0 K/UL    ABS.  LYMPHOCYTES 1.5 0.9 - 3.6 K/UL    ABS. MONOCYTES 0.5 0.05 - 1.2 K/UL    ABS. EOSINOPHILS 0.0 0.0 - 0.4 K/UL    ABS. BASOPHILS 0.0 0.0 - 0.1 K/UL    DF MANUAL      PLATELET COMMENTS ADEQUATE PLATELETS      RBC COMMENTS POLYCHROMASIA  1+        RBC COMMENTS VÍCTOR CELLS  1+       MAGNESIUM    Collection Time: 06/30/21  4:50 AM   Result Value Ref Range    Magnesium 2.1 1.6 - 2.6 mg/dL   METABOLIC PANEL, BASIC    Collection Time: 06/30/21  4:50 AM   Result Value Ref Range    Sodium 135 (L) 136 - 145 mmol/L    Potassium 4.2 3.5 - 5.5 mmol/L    Chloride 101 100 - 111 mmol/L    CO2 31 21 - 32 mmol/L    Anion gap 3 3.0 - 18 mmol/L    Glucose 73 (L) 74 - 99 mg/dL    BUN 7 7.0 - 18 MG/DL    Creatinine 0.59 (L) 0.6 - 1.3 MG/DL    BUN/Creatinine ratio 12 12 - 20      GFR est AA >60 >60 ml/min/1.73m2    GFR est non-AA >60 >60 ml/min/1.73m2    Calcium 8.5 8.5 - 10.1 MG/DL       Estimated Glomerular Filtration Rate:  On admission, estimated GFR based on a Creatinine of 0.59 mg/dl:   Using CKD-EPI = 110 mL/min/1.73m2   Using MDRD = 169.6 mL/min/1.73m2      Assessment:     Primary Rehabilitation Diagnosis  1. Generalized weakness with Impaired mobility and ADLs  2.  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    Comorbidities  Patient Active Problem List   Diagnosis Code    Urinary retention R33.9    Essential hypertension I10    Non-ST elevation (NSTEMI) myocardial infarction ([facility] I21.4    Paroxysmal atrial fibrillation (HCC) I48.0    Anticoagulated by anticoagulation treatment Z79.01    Mixed hyperlipidemia E78.2    Benign prostatic hyperplasia with urinary retention N40.1, R33.8    Malignant neoplasm of lower lobe of right lung (HCC) C34.31    Heart failure with preserved left ventricular function (HFpEF) ([facility]) I50.30    Coronary artery disease involving native coronary artery of native heart I25.10    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    Chronic obstructive pulmonary disease (COPD) ([facility] J44.9    History of carotid stenosis Z86.79    History of renal artery stenosis Z86.79    On clopidogrel therapy Z79.01    History of gross hematuria K03.926    Acute embolic stroke (HCC) J05.2    Leukocytosis D72.829    Peripheral vascular disease of extremity with claudication (HCC) I73.9    Neuropathy involving both lower extremities G57.93    Impaired mobility and ADLs Z74.09, Z78.9    Status post insertion of drug eluting coronary artery stent Z95.5    Mesenteric abscess ([address] K65.1    Diverticulitis of large intestine with abscess K57.20    Diverticulosis of sigmoid colon K57.30    History of sinus bradycardia Z86.79    Generalized weakness R53.1       Willingness to participate in the program: Good      Rehabilitation Potential: Good      Plan:     1. Medical Issues being followed closely:    [x]  Fall and safety precautions     []  Wound Care     [x]  Bowel and Bladder Function     [x]  Fluid Electrolyte and Nutrition Balance     [x]  Pain Control      2. Issues that 24 hour rehabilitation nursing is following:    [x]  Fall and safety precautions     []  Wound Care     [x]  Bowel and Bladder Function     [x]  Fluid Electrolyte and Nutrition Balance     [x]  Pain Control      [x]  Assistance with and education on in-room safety with transfers to and from the bed, wheelchair, toilet and shower. 3. Acute rehabilitation plan of care:    [x]  Patient to be evaluated and treated by:           [x]  Physical Therapy           [x]  Occupational Therapy           [x]  Speech Therapy     []  Hold Rehab until further notice     5. Medications:    [x]  MAR Reviewed     [x]  Continue Present Medications     6. DVT Prophylaxis:      []  Enoxaparin     []  Unfractionated Heparin     []  Warfarin     [x]  NOAC     []  MARTHA Stockings     []  Sequential Compression Device     []  None     7. Code status    [x]  Full code     []  Partial code     []  Do not intubate     []  Do not resuscitate     8.  Rehabilitation program and expectations from patient, as well as medical issues discussed with the patient. 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 Mild infiltrate in the superior segment of the right lower lobe with small right pleural effusion. No consolidation. 2. Delayed renal clearance, chronic renal disease suspected. Additionally, areas of cortical cysts with poor perfusion.  Pyelonephritis not excluded. No hydronephrosis. Correlation with urinalysis? 3. Diverticulosis, with potential diverticulitis in the left lower quadrant, junction of descending and sigmoid colon. However, evaluation is very limited due to severe motion artifacts. 4. Focal mucosal thickening in the proximal sigmoid. Further evaluation with barium enema or colonoscopy as indicated. > WBC count (5/23/2021) = 30.2                          > . .  .                           > WBC count (6/2/2021) = 25.4                          > WBC count (6/3/2021) = 22.5                          > WBC count (6/4/2021) = 23.4                          > WBC count (6/5/2021) = 21.7                          > WBC count (6/6/2021) = 24.1                          > WBC count (6/7/2021) = 20.0                          > Work up done was negative for a source of infection              > WBC count (6/8/2021, on admission to the ARU) = 14.4   > On 6/8/2021, started Pericolace 2 tabs PO once daily after dinner   > On 6/9/2021, started Polyethylene glycol 17 grams in 8 oz water PO once daily   > On 6/19/2021:    > Patient was noted to have diarrhea    > Held:     > Polyethylene glycol 17 grams in 8 oz water PO once daily     > Pericolace 2 tabs PO once daily after dinner    > Started Bio K Plus 1 cap PO once daily   > Stool culture (collected 6/19/2021, resulted 6/20/2021): Negative   > WBC count (6/20/2021) = 26.7   > Blood culture x 2 sets (collected 6/20/2021, resulted on 6/26/2021) yielded no growth after 6 days   > On 6/20/2021, started:    > Cholestyramine 4 grams PO TID with meals    > Ceftriaxone 1000 mg IV q 24 hr    > Metronidazole 500 mg IV q 8 hr    > Vancomycin 1750 mg  IV x 1 dose   > WBC count (6/21/2021) = 26.3   > Infectious Disease consult (Dr. Fior Montes) was called on 6/21/2021 for evaluation and comanagement   > On 6/21/2021:    > Discontinue:     > Polyethylene glycol 17 grams in 8 oz water PO once daily     > Pericolace 2 tabs PO once daily after dinner    > Change Vancomycin IV to Vancomycin 500 mg PO q 6 hr   > CT scan of the chest, abdomen and pelvis with contrast (6/21/2021) showed:    1. No acute findings in the chest.     -Emphysema. -CABG. -Stable mildly enlarged right thyroid gland which could indicate underlying nodule, stable for many years. 2. 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 It is unclear if initial source of abscess is from small bowel inflammation or diverticulitis/pericolonic abscess. 3. Dilated small bowel. This is likely a combination of generalized small bowel ileus and low-grade partial small bowel obstruction at site of abnormal pelvic small bowel loop discussed above. 4. Diverticulosis. Stable thickened collapsed segment of sigmoid colon since 2014 suggesting a chronic stricture. This is limited for assessment by CT.     5. Bladder dome inflammation is likely secondary to the adjacent mesentery process. 6. Other chronic incidental findings.    > On 6/21/2021:    > Discontinued:     > Cholestyramine 4 grams PO TID with meals     > Ceftriaxone 1000 mg IV q 24 hr     > Metronidazole 500 mg IV q 8 hr     > Vancomycin 500 mg PO q 6 hr    > Started Piperacillin-Tazobactam 3.375 grams IV q 6 hr   > Colorectal Surgery (Dr. Chung Ramsey) was called on 6/22/2021 for evaluation and comanagement    > No surgical intervention recommended since unable to safely stop Apixaban (for paroxysmal atrial fibrillation) and Clopidogrel (recent placement of 3 JENNIE to SVG-LCx on 5/18/2021)   > On 6/25/2021:    > Discontinued Piperacillin-Tazobactam 3.375 grams IV q 6 hr    > Started:     > Cefpodoxime 200 mg PO q 12 hr     > Metronidazole 500 mg PO q 12 hr   > On 6/28/2021:    > Discontinued Metronidazole 500 mg PO q 12 hr    > Started Amoxicillin-Clavulanate 500-125 1 tab PO q 12 hr      06/29/21  0211 06/28/21  0230 06/27/21  0516 06/25/21  0534 06/24/21  0522 06/23/21  0538 06/22/21  0409 06/21/21  1310 06/21/21  0330 06/20/21  0550   WBC 21.0* 24.5* 21.7* 18.4* 18.2* 22.1* 24.7* 26.0* 26.3* 26.7*      > Upon discharge from the ARU, the patient will need to follow up with Colorectal Surgery (Dr. Chung Ramsey)   > CT scan of the abdomen and pelvis with contrast on 7/15/2021   > Cefpodoxime 200 mg PO q 12 hr (STOP DATE: 7/21/2021)   > Amoxicillin-Clavulanate 500-125 1 tab PO q 12 hr (STOP DATE: 7/21/2021)    > 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, brainstem and cerebellum) without residual deficit   > Clopidogrel 75 mg PO once daily with breakfast   > Rosuvastatin 10 mg PO once daily    > Benign prostatic hyperplasia with urinary retention   > Tamsulosin 0.4 mg PO once daily after dinner    > Chronic heart failure with preserved ejection fraction (HFpEF)   > 2D echocardiogram (5/18/2021) showed EF 50%; concentric LV hypertrophy with a severely thickened septal wall and mildly thickened posterior wall; left atrial chamber is severely enlarged; right atrial chamber volume is moderately enlarged; no mass, shunts, or thrombi.    > Carvedilol 3.125 mg PO BID with meals (8AM, 5PM)    > Chronic obstructive pulmonary disease (COPD)   > Arformoterol nebulization BID   > Ipratropium nebulization TID   > Albuterol nebulization q 4 hr PRN for shortness of breath/wheezing    > Essential hypertension   > 2D echocardiogram (5/18/2021) showed EF 50%; concentric LV hypertrophy with a severely thickened septal wall and mildly thickened posterior wall; left atrial chamber is severely enlarged; right atrial chamber volume is moderately enlarged; no mass, shunts, or thrombi.    > Carvedilol 3.125 mg PO BID with meals (8AM, 5PM)    > Mixed hyperlipidemia   > Lipid profile (5/18/2021) showed TG 83, , HDL 50, LDL 93   > Rosuvastatin 10 mg PO once daily    > Non-ST elevation (NSTEMI) myocardial infarction; Coronary artery disease; History of CABG; S/P PCI to proximal SVG with 3.5 x 12 mm [place] drug-eluting stent; PCI to mid SVG with 3.5 x 34 mm Juan J drug-eluting stent; PCI to distal SVG with 3.5 x 15 mm Juan J drug-eluting stent; Balloon angioplasty to distal SVG anastomosis (5/18/2021 - Dr. Jenaro Mahan)    > Carvedilol 3.125 mg PO BID with meals (8AM, 5PM)   > Clopidogrel 75 mg PO once daily with breakfast   > Rosuvastatin 10 mg PO once daily   > Nitroglycerin 0.4 mg SL PRN for chest pain    > Paroxysmal atrial fibrillation, anticoagulated on Apixaban   > Apixaban 5 mg PO BID   > Carvedilol 3.125 mg PO BID with meals (8AM, 5PM)    > Peripheral vascular disease of extremity with claudication;  Neuropathy involving both lower extremities   > (+) pain and numbness of toes of both feet   > PVL arterial doppler of both lower extremities with exercise (9/12/2019) showed:    > Moderate arterial insufficiency in the right lower extremity at the level of the trifurcation at rest.     > Severe arterial insufficiency in the left lower extremity at the level of the trifurcation at rest.   > PVL arterial doppler of both lower extremities with exercise (10/22/2020) showed:    > Mild right lower extremity arterial insufficiency of indeterminate level at rest.     > Moderate left lower extremity arterial insufficiency involving the femoral-popliteal segment at rest.     > The ankle brachial indices post exercise were unreliable as described. No evidence of inflow involvement.   > Planned to start patient on Cilostazol for the claudication symptoms but patient already on Apixaban + Clopidogrel; addition of Cilostazol may increase risk for bleeding; will hold off on adding Cilostazol for now   > As per the last Progress Note by Dr. Ant Guzman (dated 10/22/2020): \"Pt major symptom is not related to his PAD but sounds more like neuropathy\"   > On 6/14/2021, started a trial of Gabapentin 100 mg PO BID   > Upon discharge from the ARU, the patient will need to follow up with Vascular Surgery (Dr. Ant Guzman)   > On 6/18/2021, increased Gabapentin from 100 mg PO BID to 100 mg PO TID   > On 6/28/2021, increased Gabapentin from 100 mg to 300 mg PO TID   > Gabapentin 300 mg PO TID    > Transient bradycardia, while on Carvedilol 25 mg PO BID with meals (6/21/2021)   > On 6/21/2021, Carvedilol was held   > On 6/26/2021, Cardiology restarted the patient on Carvedilol 3.125 mg PO BID with meals   > Instructions of Cardiology:     > high dose due to risk for pacemaker and ongoing infeciton    > if heart rate stays consistently > 110 bpm, can increase to 6.25 mg BID   > Carvedilol 3.125 mg PO BID with meals (8AM, 5PM)    > Analgesia   > Acetaminophen 650 mg PO q 4 hr PRN for pain     > Diet:   > Specifications: Low fiber/No added salt (3-4 grams)   > Solids (consistency): Regular    > Liquids (consistency): Thin    > Fluid restriction: None      PRECAUTIONS:   1. Safety/fall precautions.    2. Deep venous thrombosis precautions. POTENTIAL BARRIERS TO DISCHARGE: Risk for falls. Personal Protective Equipment (LB06 face mask) was used while interacting with the patient. Patient was using a surgical mask. Total clinical care time is 75 minutes, including review of chart including all labs, radiology, past medical history, and discussion with patient. Greater than 50% of my time was spent in coordination of care and counseling.       Signed:    Renee Xiong MD    June 30, 2021

## 2021-06-30 NOTE — INTERDISCIPLINARY ROUNDS
[facility] PHYSICAL REHABILITATION  [address] [place] [address], [address]    INPATIENT REHABILITATION  PRE-TEAM CONFERENCE SUMMARY     Date of Conference: 7/1/2021    Patient Information:        Name: Silviano Smith Age / Sex: 80 y.o. / male   CSN: [id number] MRN: [id number]   Admit Date: 6/29/2021 Length of Stay: 1 days     Primary Rehabilitation Diagnosis  1. Generalized weakness with Impaired mobility and ADLs  2.  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    Comorbidities  Patient Active Problem List   Diagnosis Code    Urinary retention R33.9    Essential hypertension I10    Non-ST elevation (NSTEMI) myocardial infarction ([facility] [address] I21.4    Paroxysmal atrial fibrillation ([facility]) I48.0    Anticoagulated by anticoagulation treatment Z79.01    Mixed hyperlipidemia E78.2    Benign prostatic hyperplasia with urinary retention N40.1, R33.8    Malignant neoplasm of lower lobe of right lung ([facility]) C34.31    Heart failure with preserved left ventricular function (HFpEF) ([facility]) I50.30    Coronary artery disease involving native coronary artery of native heart I25.10    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    Chronic obstructive pulmonary disease (COPD) ([facility]) J44.9    History of carotid stenosis Z86.79    History of renal artery stenosis Z86.79    On clopidogrel therapy Z79.01    History of gross hematuria L40.155    Acute embolic stroke ([facility]) U20.2    Leukocytosis D72.829    Peripheral vascular disease of extremity with claudication ([facility]) I73.9    Neuropathy involving both lower extremities G57.93    Impaired mobility and ADLs Z74.09, Z78.9    Status post insertion of drug eluting coronary artery stent Z95.5    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    Diverticulitis of large intestine with abscess K57.20    Diverticulosis of sigmoid colon K57.30    History of sinus bradycardia Z86.79    Generalized weakness R53.1          Therapy:     FIM SCORES Initial Assessment Weekly Progress Assessment 6/30/2021   Eating  6  6 Mod I following s/u   Swallowing     Grooming  5  5 set-up w/ cues for task initiation   Bathing  4     5- Supv UB bathing   4- Min A LB bathing   Upper Body Dressing  5  5-Supv UB dressing   Lower Body Dressing  4  4-Min A LB dressing   Toileting Functional level: 4.5 CGA  4.5-CGA   Bladder 0 0   Bowel  0 0   Toilet Transfer [place] 4  4-Min A for sit to stand and CGA w/ RW    Tub/Shower Transfer [place]  4-Min A (simulated task)  4-Min A (simulated task)      Comprehension  Primary Mode:  Auditory 5  5      Expression Primary Mode of Expression: Verbal 6  6   Social Interaction  6  6   Problem Solving  5  5   Memory  4  4     FIM SCORES Initial Assessment Weekly Progress Assessment 6/30/2021   Bed/Chair/Wheelchair Transfers Transfer Type: Other  Other: stand step with RW  Transfer Assistance : 4 (Minimal assistance)  Sit to Stand Assistance:  (mod A from low bed height surface, CG/min A otherwise)  Car Transfers: Minimum assistance (using RW)  Car Type: car transfer simulator Transfer Type:  Other  Other: stand step with RW  Transfer Assistance : 4 (Minimal assistance)  Sit to Stand Assistance:  (mod A from low bed height surface, CG/min A otherwise)  Car Transfers: Minimum assistance (using RW)  Car Type: car transfer simulator   Bed Mobility Rolling Right 4 (Minimal assistance)   Rolling Left 4 (Minimal assistance)   Supine to Sit 4 (Minimal assistance)   Sit to Stand  (mod A from low bed height surface, CG/min A otherwise)   Sit to Supine 3 (Moderate assistance) (assist with lifting each LE up into bed)    Rolling Right   4 (Minimal assistance)   Rolling Left   4 (Minimal assistance)   Supine to Sit   4 (Minimal assistance)   Sit to Stand    (mod A from low bed height surface, CG/min A otherwise)   Sit to Supine   3 (Moderate assistance) (assist with lifting each LE up into bed)      Locomotion (W/C) Able to Propel (ft): 160 feet  Functional Level: 4 (min A for steering)  Curbs/Ramps Assist Required (FIM Score): 0 (Not tested)  Wheelchair Setup Assist Required : 4 (Minimal assistance)  Wheelchair Management: Manages left brake, Manages right brake (using brake extender) Function 4 (min A for steering)  Setup Assistance  4 (Minimal assistance)      Locomotion (W/C distance)   160 feet   Locomotion (Walk) 4 (Minimal assistance) 4 (Minimal assistance)  Walker, rolling;Gait belt   Locomotion (Walk dist.) 160 Feet (ft) 160 Feet (ft)   Steps/Stairs Steps/Stairs Ambulated (#): 6  Level of Assist : 4 (Minimal assistance)  Rail Use: Both  6 stairs min A with bilat handrails         Nursing:     Neuro:   AAA&O x   4         Respiratory:   [x] WNL   [] O2 LPM:   Other:  Peripheral Vascular:   [] TEDS present   [] Edema present ____ Grade   Cardiac:   [x] WNL   [] Other  Genitourinary:   [x] continent   [] incontinent   [] matamoros  Abdominal ___06/30____ LBM  GI: _Reg______ Diet _Thin_____ Liquids _____ tube feeds  Musculoskeletal: ____ ROM Transfers wheelchair_____ Assistive Device Used  ___Mod_ Level of Assistance  Skin Integumentary:   [x] Intact   [] Not Intact   __________Preventative Measures  Details______________________________________________________________  Pain: [x] Controlled   [] Not Controlled   Pain Meds:   [] Scheduled   [] PRN        Registered Dietitian / Nutrition:   No data found. Pt was in acute rehab unit; then transferred to ED with mesenteric abscess and diverticulitis with abscess per CRS. Transferred back to rehab unit. Pt had poor/fair meal intake while in acute care hospital per chart review; per review, pt noted to dislike hospital meals. Ensure Enlive was ordered and continued following pt transfer back to rehab unit. Noted 9 lb wt loss x 1 year and 4 month PTA per chart hx; not significant. Will monitor po intake of meals/ supplements.       Supplements:          [x] Yes:  Ensure Enlive BID   [] No      Amount of supplement consumed:  Unable to assess at this time      Intake/Output Summary (Last 24 hours) at 6/30/2021 1617  Last data filed at 6/30/2021 0211  Gross per 24 hour   Intake    Output 800 ml   Net -800 ml                                Last bowel movement: 6/30 loose      Interdisciplinary Team Goals:     1. Discipline  Physical Therapy    Goal  Patient will perform bed<->chair transfer with RW at SBA level with only intermittent cues for safe hand placement. Barrier  Decreased strength, endurance, balance    Intervention  Therex, theract, neuromuscular re-ed    Goal written by:   Misha Poll, PT, DPT     2. Discipline  Occupational Therapy    Goal  Patient will perform LB ADLs (bathing and dressing)self-care performance tasks w/ no more than CGA and intermittent verbal cues for task initiation. Barrier  Weakness, strength, standing balance    Intervention  ADL training, functional transfers, ADL related mobility w/ least restrictive AD, safety awareness, energy conservation strategies    Goal written by:  Syd Hughes OTR/L     3. Discipline  Speech Therapy    Goal  Patient will read sentence length material and respond appropriately to content, 90% accuracy. Barrier  mild cognitive-linguistic deficits    Intervention  cognitive retraining    Goal written by:  Isaura Roberts, CCC-SLP     4. Discipline  Nursing    Goal  Prevent falls    Barrier  Weakness and unsteady when moblile    Intervention  Prevent injury (nonsid socks, doesn't walk without assistance. Wheelchair breaks on while transferring, bed in the lowest locked position, necessary items within reach, call bell within reach,urinal, food items and side rails up x 3     Goal written by:  Jaxon Stephenson LPN     5.   Discipline  Clinical Psychology    Goal  Maintain mood stability and treatment effort on ARU    Barrier  Stress with prolonged hospitalization and complications    Intervention  Patient education and support , as needed    Goal written by:  Sabina Tran, PhD     6. Discipline  Nutrition / Dietetics    Goal  - PO nutrition intake will meet >75% of patient estimated nutritional needs within the next 7 days. Barrier  appetite; dislike of hospital meals    Intervention  Continue current nutrition interventions. Encourage/ monitor po intake of meals and supplements. Goal written by:  Chelsea Kaiser RD       Disposition / Discharge Planning: Follow-up services:  [x] Physical Therapy             [] Occupational Therapy       [] Speech Therapy           [] Skilled Nursing      [] Medical Social Worker   [] Aide        [] Outpatient      [] vs   [x] Home Health  [] vs       [] to progress to outpatient       [] with 24-hour supervision       [] with 24-hour assistance   [] [facility] recommendations:  RW   Estimated discharge date:  tbd   Discharge Location:  [] Home  [] versus    [] [facility]    [] [facility]   [] Other:           Electronic Signatures:      Signature Date Signed   Physical Therapist    Louisa Zambrano, PT, DPT  6/30/2021   Occupational Therapist    Brisa Middleton OTR/L  7/1/2021   Speech Therapist    Aleah Segovia, [address] [facility]  6/30/21   Recreational Therapist    Yovani Pepper, CTRS 6/30/2021   Nursing    Paulette Diaz LPDAVID  55/98/66   Dietitian    Chelsea Kaiser RD  6/30/2021   Clinical Psychologist    Sabina Tran, PhD  6/30/2021    Physician    Reji Russell MD   6/30/2021        Tarah Mera, MSW  6/30/2021     Opportunity to share with family/caregiver[] YES [] NO    Relationship to patient____________________________________________________      The above information has been reviewed with the patient in a language that they can understand. Opportunity for comments and questions has been provided and a signed attestation has been scanned into the \"media tab\" of the EMR.       Patient Signature: ______________________________________________________    Date Signed: __________________________________________________________

## 2021-06-30 NOTE — PROGRESS NOTES
Problem: Neurolinguistics Impaired (Adult)  Goal: *Speech Goal: (INSERT TEXT)  Description: Long term goals  Patient will:  1. Be oriented x 3 and recall events of the day, supervision. 2. Recall 3 words after 5 minutes, supervision. 3. Read I-2 sentence stimuli and respond appropriately, 90% accuracy. 4. Read short paragraphs and respond to content questions, 90% accuracy. 5. Write simple sentences with 90% accuracy (to dictation and then spontaneously to describe actions/objects). Short term goals (7/7/21): Patient will:  1. Be oriented x 3 and recall events of the day, min assist-supervision. 2. Recall 3 words after 5 minutes, min assist.  3. Read I sentence stimuli and respond appropriately, 70-80% accuracy. 4. Read short paragraphs and respond to content questions, 70-80% accuracy. 5. Write simple sentences with 70-80% accuracy (to dictation). Note:   Speech LAnguage Pathology evaluation    Patient: Klaus Wright (26 y.o. male)  Date: 6/30/2021  Primary Diagnosis: Diverticulitis of large intestine with abscess [K57.20]        Precautions:   Fall  Time in: 0930  Time Out:  1030    Pain:   Pain Scale 1: Visual  Pain Intensity 1: 0  Patient did not report pain during this morning's session. SUBJECTIVE:   Patient stated It/\'s a [process I'm still going through.     OBJECTIVE:     Past Medical History:   Diagnosis Date    Acute embolic stroke ([facility] [address] 2/77/0985    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, brainstem and cerebellum) without residual deficit    Anticoagulated by anticoagulation treatment 5/19/2021    On Apixaban    Benign prostatic hyperplasia with urinary retention     Chronic obstructive pulmonary disease (COPD) ([facility] [address]     Coronary artery disease involving native coronary artery of native heart     Diverticulitis of large intestine with abscess 6/21/2021    Diverticulosis of sigmoid colon 5/22/2021    Essential hypertension     Heart failure with preserved left ventricular function (HFpEF) ([address]     2D echocardiogram (5/18/2021) showed EF 50%; concentric LV hypertrophy with a severely thickened septal wall and mildly thickened posterior wall; left atrial chamber is severely enlarged; right atrial chamber volume is moderately enlarged; no mass, shunts, or thrombi. History of carotid stenosis     History of gross hematuria 5/26/2021    Attributed to [place] trauma while patient was altered    History of renal artery stenosis     History of sinus bradycardia 6/21/2021    Attributed to Carvedilol 25 mg PO BID with meals    Leukocytosis 5/17/2021    Attributed to reactive leukocytosis due to NSTEMI    Malignant neoplasm of lower lobe of right lung [facility])     Mesenteric abscess ([address] 6/21/2021    Neuropathy involving both lower extremities 10/22/2020    Non-ST elevation (NSTEMI) myocardial infarction ([address] 5/17/2021    On clopidogrel therapy 5/19/2021    Paroxysmal atrial fibrillation ([address] 5/17/2021    Peripheral vascular disease of extremity with claudication ([address] 6/14/2021    Urinary retention      Past Surgical History:   Procedure Laterality Date    HX CAROTID ENDARTERECTOMY Left 06/07/2012    HX CAROTID ENDARTERECTOMY Right 05/28/2014    Dr. Florian Fonseca    HX [address]  [address]    [facility]    HX CORONARY STENT PLACEMENT  05/18/2021    S/P PCI to proximal SVG with 3.5 x 12 mm [place] drug-eluting stent; PCI to mid SVG with 3.5 x 34 mm Juan J drug-eluting stent; PCI to distal SVG with 3.5 x 15 mm Juan J drug-eluting stent;  Balloon angioplasty to distal SVG anastomosis (5/18/2021 - Dr. Mile Corona)     HX LOBECTOMY Right     Middle lobe and lower lobe    HX RENAL ARTERY STENT Bilateral 2011    HX TONSILLECTOMY      IR BRONCHOSCOPY  11/25/2015    Dr. Chery Ramírez     Prior Level of Function/Home Situation: lives with his wife  Home Situation  Home Environment: Private residence  # Steps to Enter: 1 (Front stoop/slab to reach door)  One/Two Story Residence: Two story  # of Interior Steps: 15  Interior Rails: Both  Living Alone: No  Support Systems: Spouse/Significant Other/Partner  Patient Expects to be Discharged toF Cor[de-identified]ration  Current DME Used/Available at Home: None  Mental Status:  Neurologic State: Alert  Orientation Level: Oriented X4  Cognition: Appropriate for age attention/concentration, Follows commands  Perception: Appears intact  Perseveration: No perseveration noted  Safety/Judgement: Awareness of environment, Fall prevention  Motor Speech:  Oral-Motor Structure/Motor Speech  Face: No impairment  Labial: No impairment  Oral Hygiene: WFL  Lingual: No impairment  Velum: No impairment  Mandible: No impairment  Apraxic Characteristics: None  Dysarthric Characteristics: None  Intelligibility: No impairment  Intonation: WFL  Rate: WFL  Prosody: WFL  Overall Impairment Severity: None  Language Comprehension and Expression:  Auditory Comprehension  Auditory Impairment: No   Verbal Expression  Primary Mode of Expression: Verbal  Initiation: No impairment  Automatic Speech Task: No impairment  Repetition: Impaired  Word Repetition (%): 60 %  Sentence Repetition (%): 100 %  Naming: No impairment  Conversation: No impairment  Overall Impairment: Minimal  Cueing type: Verbal;Sentence completion cue;Repetition  Cueing amount: Minimal  Reading Comprehension  Visual Impairment: Glasses/contacts  Pre-Morbid Reading Status: Literate  Scanning/Tracking : No impairment  Words : Yes  Sentence: Impaired  Paragraph : Impaired  Oral Reading: No impairment  Interfering Components: Attention to detail;Processing time; Working memory  Effective Techniques: Large print;Prescription glasses/contact lenses;Remove environmental distractions  Overall Impairment Severity: Moderate  Written Expression  Pre-Morbid Dominant Hand: Right  Pre-Morbid Writing Skills: WFL  Legibility : Decreased legibility;Uses dominant hand  Dictation : No impairment  Formulation: Impaired  Word Level (%): 100 %  Sentence Level (%): 65 %  Overall Impairment Severity: Mild-moderate  Neuro-Linguistics:  Verbal Reasoning Tasks: No Impairment  Verbal Problem Solving: No Impairment  Verbal Organization: No Impairment  Thought Organization: WFL  Mathematical: Impaired (Functional math impaired)  Memory: Impaired (Mild)  Attention : No Impairement  Pragmatics:  Pragmatics Impairment: No impairment  Voice:  Patient is soft spoken. Otherwise his voice quality is WFL for his age and gender. After treatment:   [x]              Patient left in no apparent distress sitting up in chair  []              Patient left in no apparent distress in bed  [x]              Call bell left within reach  []              Nursing notified  []              Caregiver present  []              Bed alarm activated    ASSESSMENT:   Based on the objective data described below, the patient presents with mild cognitive-linguistic deficits following CVA and mesenteric abscess . Patient will benefit from skilled intervention to address the above impairments. Patients rehabilitation potential is considered to be Good  Factors which may influence rehabilitation potential include:   []              None noted  []              Mental ability/status  [x]              Medical condition  [x]              Home/family situation and support systems  [x]              Safety awareness  []              Pain tolerance/management  []              Other:     PLAN:   Recommendations and Planned Interventions:  SLP will follow daily to address recall, written language skills and functional math. Frequency/Duration: Patient will be followed by speech-language pathology 1-2 times per day/4-7 days per week to address goals.   Discharge Recommendations: Home Health    Estimated LOS: 2-3 weeks    COMMUNICATION/EDUCATION:   [x]  Patient/family have participated as able in goal setting and plan of care. [x]  Patient/family agree to work toward stated goals and plan of care. []  Patient understands intent and goals of therapy, but is neutral about his/her participation. []  Patient is unable to participate in goal setting and plan of care.     Thank you for this referral.  Stacie Dupont, SLP  Time Calculation: 60 mins

## 2021-06-30 NOTE — PROGRESS NOTES
Problem: Self Care Deficits Care Plan (Adult)  Goal: *Therapy Goal (Edit Goal, Insert Text)  Description: Occupational Therapy Goals   Long Term Goals  Initiated 2021 and to be accomplished within 2 week(s) 2021  1. Pt will perform self-feeding with I.  2. Pt will perform grooming with Mod I.  3. Pt will perform UB bathing with Mod I.  4. Pt will perform LB bathing with Supv.  5. Pt will perform tub/shower transfer with SBA. 6. Pt will perform UB dressing with Mod I.  7. Pt will perform LB dressing with Supv.  8. Pt will perform toileting task with Mod I.  9. Pt will perform toilet transfer with Supv. Short Term Goals   Initiated 2021 and to be accomplished within 7 day(s) to be accomplished by 2021  1. Pt will perform self-feeding with Mod I.   2. Pt will perform grooming with set-up assistance. 3. Pt will perform UB bathing with SBA. 4. Pt will perform LB bathing with CGA w/ use of AE as needed. 5. Pt will perform tub/shower transfer with CGA. 6. Pt will perform UB dressing with Mod I.  7. Pt will perform LB dressing with CGA w/ use of AE as needed. 8. Pt will perform toileting task with CGA. 9. Pt will perform toilet transfer with CGA w/ least restrictive AD. Outcome: Progressing Towards Goal   OCCUPATIONAL THERAPY EVALUATION    Patient: Ross Allan 80 y.o.   Date: 2021  Primary Diagnosis: Diverticulitis of large intestine with abscess [K57.20]    Patient identified with name and : Yes    Past Medical History:   Past Medical History:   Diagnosis Date    Acute embolic stroke [address] [address]     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, brainstem and cerebellum) without residual deficit    Anticoagulated by anticoagulation treatment 2021    On Apixaban    Benign prostatic hyperplasia with urinary retention     Chronic obstructive pulmonary disease (COPD) (HCC)     Coronary artery disease involving native coronary artery of native heart     Diverticulitis of large intestine with abscess 6/21/2021    Diverticulosis of sigmoid colon 5/22/2021    Essential hypertension     Heart failure with preserved left ventricular function (HFpEF) ([address]     2D echocardiogram (5/18/2021) showed EF 50%; concentric LV hypertrophy with a severely thickened septal wall and mildly thickened posterior wall; left atrial chamber is severely enlarged; right atrial chamber volume is moderately enlarged; no mass, shunts, or thrombi. 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[address] 6/21/2021    Neuropathy involving both lower extremities 10/22/2020    Non-ST elevation (NSTEMI) myocardial infarction ([address] 5/17/2021    On clopidogrel therapy 5/19/2021    Paroxysmal atrial fibrillation ([address] 5/17/2021    Peripheral vascular disease of extremity with claudication ([address] 6/14/2021    Urinary retention      Precautions: fall risk    Time In: 0702  Time Out[de-identified] 0802    Pain:  Pt reports 0/10 pain or discomfort prior to treatment. Pt reports 0/10 pain or discomfort post treatment. SUBJECTIVE:   Patient stated I would like to work on showering to improve my independence.     OBJECTIVE DATA SUMMARY:     [x]  Right hand dominant   []  Left hand dominant    Therapy Diagnosis:   Difficulty with ADLs  [x]     Difficulty with functional transfers  [x]     Difficulty with ambulation  [x]     Difficulty with IADLs  [x]       Problem List:    Decreased strength B UE  [x]     Decreased strength trunk/core  []     Decreased AROM   []     Decreased endurance [x]     Decreased balance sitting  []     Decreased balance standing  [x]     Pain   []     Decreased PROM  []       Functional Limitations:   Decreased independence with ADL  [x]     Decreased independence with functional transfers  [x]     Decreased independence with ambulation  [x]     Decreased independence with IADL  [x]       Previous Functional Level: Mod I to I w/ ADLs    Home Environment: Home Situation  Home Environment: Private residence  # Steps to Enter: 3  One/Two Story Residence: Two story  # of Interior Steps: 7  Interior Rails: Both  Living Alone: No  Support Systems: Spouse/Significant Other/Partner  Patient Expects to be Discharged toVF Cor[de-identified]ration  Current DME Used/Available at Home: None    Barriers to Learning/Limitations: none  Compensate with: visual, verbal, tactile, kinesthetic cues/model    Outcome Measures:      MMT Initial Assessment   Right Upper Extremity  Left Upper Extremity    UE AROM WFL, MMT 4/5 grossly WFL.  MMT 4/5 grossly   Shoulder flexion     Shoulder extension     Shoulder ABDuction     Shoulder ADDUction     Elbow Flexion     Elbow Extension     Wrist Extension/Flexion      4/5 4/5   0/5 No palpable muscle contraction  1/5 Palpable muscle contraction, no joint movement  2-/5 Less than full range of motion in gravity eliminated position  2/5 Able to complete full range of motion in gravity eliminated position  2+/5 Able to initiate movement against gravity  3-/5 More than half but not full range of motion against gravity  3/5 Able to complete full range of motion against gravity  3+/5 Completes full range of motion against gravity with minimal resistance  4-/5 Completes full range of motion against gravity with minimal resistance  4/5 Completes full range of motion against gravity with moderate resistance  5/5 Completes full range of motion against gravity with maximum resistance    Sensation: intact  Coordination: appears intact    Please see IRC Interdisciplinary Eval: Coordination/Balance Section for details regarding FIM score description.     COGNITION/PERCEPTION Initial Assessment   Reading Status Literate   Writing Status WFL   Arousal/Alertness     Orientation Level Oriented X4   Visual Fields     Praxis     Body Scheme       COMPREHENSION MODE Initial Assessment   Primary Mode of Comprehension Auditory   Hearing Aide None   Corrective Lenses Reading glasses   Score 5     EXPRESSION Initial Assessment   Primary Mode of Expression Verbal   Score 6   Comments       SOCIAL INTERACTION/PRAGMATICS Initial Assessment   Score 5   Comments       PROBLEM SOLVING Initial Assessment   Score 3   Comments       MEMORY Initial Assessment   Score 4   Comments       EATING Initial Assessment   Functional Level 6   Comments  Mod I after s/u     GROOMING Initial Assessment   Functional Level 5    Oral Hygiene FIM: 5 set-up   Tasks completed by patient  washed face & hands w/ wash cloth    Comments  seated w/c level at sink     BATHING Initial Assessment   Functional Level 4   Body parts patient bathed  Upper and lower body   Comments UB bathing SBA; LB CGA/Min A level     TUB/SHOWER TRANSFER INDEPENDENCE Initial Assessment   Score 4   Comments CGA w/ for simulated shower transfer w/ RW to tub transfer bench , Min A transitioning sit to stand     UPPER BODY DRESSING/UNDRESSING Initial Assessment   Functional Level 5   Items applied/Steps completed  don/doff pull over shirt   Comments  seated w/c level        LOWER BODY DRESSING/UNDRESSING Initial Assessment   Functional Level 4    Sock and/or Shoe Management FIM: 4   Items applied/Steps completed  donned underwear, scrub pants, and slipper socks   Comments  performed seated w/c level and in stance      TOILETING Initial Assessment   Functional Level 4/5   Comments  CGA to Min A     TOILET TRANSFER INDEPENDENCE Initial Assessment   Transfer score 4/5   Comments  Min A for transitioning from sit to stand, CGA w/ RW     INSTRUMENTAL ADL Initial Assessment (PLOF)   Meal preparation  PLOF Independent per patient & spouse   Homemaking   PLOF Independent per patient & spouse   Medicine Management   PLOF Independent per patient & spouse   Financial Management   PLOF Independent per patient & spouse      There ex: Pt performed BUE strengthening using 4# weighted dowel seated in w/c 3 sets 10 reps each for bicep curls and chest press; performed for increased strength for increased I w/ ADLs and functional transfers. Min vc's and demonstration for body positioning and technique. Min vc's for slowing pace. ASSESSMENT :  Based on the objective data described above, the patient presents with decreased BUE strength, decreased endurance, ADL related mobility and balance impairments impacting safety and indep w/ ADL/IADL routines. Pt would benefit from skilled occupational therapy in order to improve independent functional mobility/ADLs,/IADLs within the home. Interventions may include range of motion (AROM, PROM B UE), motor function (B UE/ strengthening/coordination), activity tolerance (vitals, oxygen saturation levels), balance training, ADL/IADL training and functional transfer training. Please see IRC; Interdisciplinary Eval, Care Plan, and Patient Education for further information regarding occupational therapy examination and plan of care.       PLAN :  Recommendations and Planned Interventions:  [x]               Self Care Training                   [x]        Therapeutic Activities  [x]               Functional Mobility Training    []        Cognitive Retraining  [x]               Therapeutic Exercises            [x]        Endurance Activities  [x]               Balance Training                     [x]        Neuromuscular Re-Education  []               Visual/Perceptual Training      [x]   Home Safety Training  [x]               Patient Education                    [x]        Family Training/Education  []               Other (comment):    Frequency/Duration: Patient will be followed by occupational therapy 1-2 times per day/4-7 days per week to address goals. Discharge Recommendations: Home Health w/ assistance of spouse  Further Equipment Recommendations for Discharge: bedside commode, gait belt, and transfer bench     Please refer to the flow sheet for vital signs taken during this treatment. After treatment:   [x] Patient left in no apparent distress sitting up in wheelchair  [x] Patient left in no apparent distress in bed  [x] Call bell left within reach  [] Nursing notified  [] Caregiver present  [x] wheelchair alarm activated    COMMUNICATION/EDUCATION:   [x] Home safety education was provided and the patient/caregiver indicated understanding. [x] Patient/family have participated as able in goal setting and plan of care. [x] Patient/family agree to work toward stated goals and plan of care. [] Patient understands intent and goals of therapy, but is neutral about his/her participation. [] Patient is unable to participate in goal setting and plan of care. Order received from MD for occupational therapy services and chart reviewed. Pt to be seen 5 times per week for 3 hours of total therapy per day for 2 weeks.   Thank you for the referral.    Thank you for this referral.  Danelle Quintana, OT

## 2021-06-30 NOTE — ROUTINE PROCESS
[facility]    Bedside and Verbal shift change report given to Catracho Cummings (oncoming nurse) by eMrle Fritz LPN (offgoing nurse). Report included the following information SBAR, Kardex, MAR and Recent Results. Situation:   Code Status: Full Code   Hospital Day: 1   Problem List:   Hospital Problems  Date Reviewed: 6/30/2021        Codes Class Noted POA    Mesenteric abscess ([address] ICD-10-CM: K65.1  ICD-9-CM: 567.22  6/21/2021 Yes        * (Principal) Diverticulitis of large intestine with abscess ICD-10-CM: K57.20  ICD-9-CM: 562.11, 569.5  6/21/2021 Yes        History of sinus bradycardia ICD-10-CM: Z86.79  ICD-9-CM: V12.59  6/21/2021 Yes    Overview Signed 6/29/2021 10:02 PM by Saul Ochoa MD     Attributed to Carvedilol 25 mg PO BID with meals             Generalized weakness ICD-10-CM: R53.1  ICD-9-CM: 780.79  6/21/2021 Yes        Mixed hyperlipidemia (Chronic) ICD-10-CM: D74.4  ICD-9-CM: 272.2  Unknown Yes        Heart failure with preserved left ventricular function (HFpEF) (HCC) (Chronic) ICD-10-CM: I50.30  ICD-9-CM: 428.9  Unknown Yes    Overview Signed 6/7/2021 11:03 PM by Saul Ochoa MD     2D echocardiogram (5/18/2021) showed EF 50%; concentric LV hypertrophy with a severely thickened septal wall and mildly thickened posterior wall; left atrial chamber is severely enlarged; right atrial chamber volume is moderately enlarged; no mass, shunts, or thrombi.               Coronary artery disease involving native coronary artery of native heart (Chronic) ICD-10-CM: I25.10  ICD-9-CM: 414.01  Unknown Yes        Diverticulosis of sigmoid colon (Chronic) ICD-10-CM: K57.30  ICD-9-CM: 562.10  5/22/2021 Yes        Anticoagulated by anticoagulation treatment ICD-10-CM: Z79.01  ICD-9-CM: V58.61  5/19/2021 Yes    Overview Signed 6/7/2021 10:48 PM by Saul Ochoa MD     On Apixaban             On clopidogrel therapy ICD-10-CM: Z79.01  ICD-9-CM: V58.61  5/19/2021 Yes        Status post insertion of drug eluting coronary artery stent ICD-10-CM: Z95.5  ICD-9-CM: V45.82  5/18/2021 Yes    Overview Signed 6/18/2021  1:38 PM by Katheryn Bennett MD     S/P PCI to proximal SVG with 3.5 x 12 mm Juan J drug-eluting stent; PCI to mid SVG with 3.5 x 34 mm [place] drug-eluting stent; PCI to distal SVG with 3.5 x 15 mm [place] drug-eluting stent;  Balloon angioplasty to distal SVG anastomosis (5/18/2021 - Dr. Dominick Irvin)              Non-ST elevation (NSTEMI) myocardial infarction [facility]) ICD-10-CM: I21.4  ICD-9-CM: 410.70  5/17/2021 Yes        [facility]ox[facility] fibrillation [facility] ICD-10-CM: I48.0  ICD-9-CM: 427.31  5/17/2021 Yes        Impaired mobility and ADLs ICD-10-CM: Z74.09, Z78.9  ICD-9-CM: V49.89  5/17/2021 Yes        Neuropathy involving both lower extremities (Chronic) ICD-10-CM: X37.63  ICD-9-CM: 356.9  10/22/2020 Yes        Essential hypertension (Chronic) ICD-10-CM: I10  ICD-9-CM: 401.9  Unknown Yes        History of coronary artery bypass graft ICD-10-CM: Z95.1  ICD-9-CM: V45.81  2001 Yes    Overview Signed 6/7/2021 11:04 PM by Katheryn Bennett MD     [facility]                   Background:   Past Medical History:   Past Medical History:   Diagnosis Date    Acute embolic stroke ([facility] [address] 9/30/5523    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, brainstem and cerebellum) without residual deficit    Anticoagulated by anticoagulation treatment 5/19/2021    On Apixaban    Benign prostatic hyperplasia with urinary retention     Chronic obstructive pulmonary disease (COPD) ([facility] [address]     Coronary artery disease involving native coronary artery of native heart     Diverticulitis of large intestine with abscess 6/21/2021    Diverticulosis of sigmoid colon 5/22/2021    Essential hypertension     Heart failure with preserved left ventricular function (HFpEF) ([facility])     2D echocardiogram (5/18/2021) showed EF 50%; concentric LV hypertrophy with a severely thickened septal wall and mildly thickened posterior wall; left atrial chamber is severely enlarged; right atrial chamber volume is moderately enlarged; no mass, shunts, or thrombi.     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[facility] [address] 6/21/2021    Neuropathy involving both lower extremities 10/22/2020    Non-ST elevation (NSTEMI) myocardial infarction ([facility] [address] 5/17/2021    On clopidogrel therapy 5/19/2021    Paroxysmal atrial fibrillation ([facility] [address] 5/17/2021    Peripheral vascular disease of extremity with claudication ([facility] [address] 6/14/2021    Urinary retention         Assessment:   Changes in Assessment throughout shift: No change to previous assessment     Patient has a central line: no Reasons if yes: na  Insertion date:na Last dressing date:na   Patient has [place] Cath: no Reasons if yes: na   Insertion date:na  Shift [place] care completed: NO     Last Vitals:     Vitals:    06/30/21 0734 06/30/21 1520 06/30/21 1529 06/30/21 1534   BP: (!) 148/87 (!) 141/85     Pulse: (!) 109 92     Resp: 18 16     Temp: 97 °F (36.1 °C) 97.7 °F (36.5 °C)     SpO2: 94% 94%     Weight:    80.8 kg (178 lb 1.6 oz)   Height:   5' 8\" (1.727 m)         PAIN    Pain Assessment    Pain Intensity 1: 0 (06/30/21 1621) Pain Intensity 1: 2 (12/29/14 1105)      Pain Location 1: Abdomen      Pain Intervention(s) 1: Medication (see MAR)  Patient Stated Pain Goal: 0 Patient Stated Pain Goal: 0  o Intervention effective: yes  o Other actions taken for pain:  none     Skin Assessment  Skin color Skin Color: Appropriate for ethnicity  Condition/Temperature Skin Condition/Temp: Dry, Rash  Integrity Skin Integrity: Intact  Turgor Turgor: Non-tenting  Weekly Pressure Ulcer Documentation  Pressure  Injury Documentation: No Pressure Injury Noted-Pressure Ulcer Prevention Initiated  Wound Prevention & Protection Methods  Orientation of wound Orientation of Wound Prevention: Posterior  Location of Prevention Location of Wound Prevention: Sacrum/Coccyx  Dressing Present Dressing Present : No  Dressing Status Dressing Status: Intact  Wound Offloading Wound Offloading (Prevention Methods): Bed, pressure reduction mattress, Pillows, Repositioning, Walker, Wheelchair     INTAKE/OUPUT  Date 06/29/21 1900 - 06/30/21 0659 06/30/21 0700 - 07/01/21 0659   Shift 8234-0587 24 Hour Total 1397-4972 2228-0608 24 Hour Total   INTAKE   Shift Total(mL/kg)        OUTPUT   Urine 800 800        Urine Voided 800 800        Urine Occurrence(s) 3 x 3 x 2 x  2 x   Emesis/NG output          Emesis Occurrence(s) 0 x 0 x      Stool          Stool Occurrence(s) 2 x 2 x 2 x  2 x   Shift Total(mL/kg) 800 800      NET -800 -800      Weight (kg)   80.8 80.8 80.8       Recommendations:  1. Patient needs and requests: met    2. Pending tests/procedures: lab     3. Functional Level/Equipment:   /      Fall Precautions:   Fall risk precautions were reinforced with the patient; he was instructed to call for help prior to getting up. The following fall risk precautions were continued: bed/ chair alarms, door signage, yellow bracelet and socks as well as update of the [employer] tool in the patient's room. [place] Score: 3    HEALS Safety Check    A safety check occurred in the patient's room between off going nurse and oncoming nurse listed above.     The safety check included the below items  Area Items   H  High Alert Medications - Verify all high alert medication drips (heparin, PCA, etc.)   E  Equipment - Suction is set up for ALL patients (with laila)  - Red plugs utilized for all equipment (IV pumps, etc.)  - WOWs wiped down at end of shift.  - Room stocked with oxygen, suction, and other unit-specific supplies   A  Alarms - Bed alarm is set for fall risk patients  - Ensure chair alarm is in place and activated if patient is up in a chair   L  Lines - Check IV for any infiltration  - [place] bag is empty if patient has a [place]   - Tubing and IV bags are labeled   S  Safety   - Room is clean, patient is clean, and equipment is clean. - Hallways are clear from equipment besides carts. - Fall bracelet on for fall risk patients  - Ensure room is clear and free of clutter  - Suction is set up for ALL patients (with yanker)  - Hallways are clear from equipment besides carts.    - Isolation precautions followed, supplies available outside room, sign posted     Denver Flint, LPN

## 2021-06-30 NOTE — ROUTINE PROCESS
SHIFT CHANGE NOTE FOR [facility]    Bedside and Verbal shift change report given to Vivi CHRISTIANSEN (oncoming nurse) by Milena Fuentes LPN (offgoing nurse). Report included the following information SBAR, Kardex, MAR and Recent Results. Situation:   Code Status: Full Code   Hospital Day: 0   Problem List:   Hospital Problems  Date Reviewed: 6/18/2021        Codes Class Noted POA    Diverticulitis of large intestine with abscess ICD-10-CM: K57.20  ICD-9-CM: 562.11, 569.5  6/29/2021 Unknown              Background:   Past Medical History:   Past Medical History:   Diagnosis Date    Acute embolic stroke ([address] 0/92/7566    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, brainstem and cerebellum) without residual deficit    Anticoagulated by anticoagulation treatment 5/19/2021    On Apixaban    Benign prostatic hyperplasia with urinary retention     Chronic obstructive pulmonary disease (COPD) ([address]     Coronary artery disease involving native coronary artery of native heart     Essential hypertension     Heart failure with preserved left ventricular function (HFpEF) ([address]     2D echocardiogram (5/18/2021) showed EF 50%; concentric LV hypertrophy with a severely thickened septal wall and mildly thickened posterior wall; left atrial chamber is severely enlarged; right atrial chamber volume is moderately enlarged; no mass, shunts, or thrombi.      History of carotid stenosis     History of gross hematuria 5/26/2021    Attributed to Taveras trauma while patient was altered    History of renal artery stenosis     Leukocytosis 5/17/2021    Attributed to reactive leukocytosis due to NSTEMI    Malignant neoplasm of lower lobe of right lung (HCC)     Neuropathy involving both lower extremities 10/22/2020    Non-ST elevation (NSTEMI) myocardial infarction ([address] 5/17/2021    On clopidogrel therapy 5/19/2021    Paroxysmal atrial fibrillation ([address] 5/17/2021    Peripheral vascular disease of extremity with claudication ([facility] 6/14/2021    Urinary retention         Assessment:   Changes in Assessment throughout shift: No change to previous assessment     Patient has a central line: no Reasons if yes: na  Insertion date:na Last dressing date:na   Patient has [place] Cath: no Reasons if yes: na   Insertion date:na  Shift matam[place]os care completed: NO     Last Vitals:     Vitals:    06/29/21 1804   BP: (!) 144/87   Pulse: (!) 110   Resp: 18   Temp: 98 °F (36.7 °C)   SpO2: 95%        PAIN    Pain Assessment    Pain Intensity 1: 0 (06/29/21 2016) Pain Intensity 1: 2 (12/29/14 1105)      Pain Location 1: Abdomen      Pain Intervention(s) 1: Medication (see MAR)  Patient Stated Pain Goal: 0 Patient Stated Pain Goal: 0  o Intervention effective: yes  o Other actions taken for pain:  none     Skin Assessment  Skin color Skin Color: Appropriate for ethnicity  Condition/Temperature Skin Condition/Temp: Dry, Warm  Integrity Skin Integrity: Intact  Turgor Turgor: Non-tenting  Weekly Pressure Ulcer Documentation    Wound Prevention & Protection Methods  Orientation of wound Orientation of Wound Prevention: Posterior  Location of Prevention Location of Wound Prevention: Sacrum/Coccyx  Dressing Present Dressing Present : No  Dressing Status Dressing Status: Intact  Wound Offloading       INTAKE/OUPUT  Date 06/28/21 1900 - 06/29/21 0659(Not Admitted) 06/29/21 0700 - 06/30/21 0659   Shift 3138-6182 24 Hour Total 0131-2663 7720-1457 24 Hour Total   INTAKE   Shift Total        OUTPUT   Urine          Urine Occurrence(s)    0 x 0 x   Emesis/NG output          Emesis Occurrence(s)    0 x 0 x   Stool          Stool Occurrence(s)    0 x 0 x   Shift Total        NET        Weight (kg)            Recommendations:  1. Patient needs and requests: met    2. Pending tests/procedures: lab     3.  Functional Level/Equipment: Partial (one person) /      Fall Precautions:   Fall risk precautions were reinforced with the patient; he was instructed to call for help prior to getting up. The following fall risk precautions were continued: bed/ chair alarms, door signage, yellow bracelet and socks as well as update of the Eugenio Minerva tool in the patient's room. [place] Score: 3    HEALS Safety Check    A safety check occurred in the patient's room between off going nurse and oncoming nurse listed above. The safety check included the below items  Area Items   H  High Alert Medications - Verify all high alert medication drips (heparin, PCA, etc.)   E  Equipment - Suction is set up for ALL patients (with laila)  - Red plugs utilized for all equipment (IV pumps, etc.)  - WOWs wiped down at end of shift.  - Room stocked with oxygen, suction, and other unit-specific supplies   A  Alarms - Bed alarm is set for fall risk patients  - Ensure chair alarm is in place and activated if patient is up in a chair   L  Lines - Check IV for any infiltration  - Taveras bag is empty if patient has a Taveras   - Tubing and IV bags are labeled   S  Safety   - Room is clean, patient is clean, and equipment is clean. - Hallways are clear from equipment besides carts. - Fall bracelet on for fall risk patients  - Ensure room is clear and free of clutter  - Suction is set up for ALL patients (with laila)  - Hallways are clear from equipment besides carts.    - Isolation precautions followed, supplies available outside room, sign posted     Jossy Gilmore LPN

## 2021-07-01 NOTE — CONSULTS
ARU PSYCHOLOGICAL SCREENING    Assessment Initiated:  July 1, 2021    Rehab Diagnosis:  General Debilitation secondary to Cardiac (NSTEMI)    Pertinent Physical/Psychiatric History:     Patient Active Problem List   Diagnosis Code    Urinary retention R33.9    Essential hypertension I10    Non-ST elevation (NSTEMI) myocardial infarction ([facility] I21.4    Paroxysmal atrial fibrillation ([facility] I48.0    Anticoagulated by anticoagulation treatment Z79.01    Mixed hyperlipidemia E78.2    Benign prostatic hyperplasia with urinary retention N40.1, R33.8    Malignant neoplasm of lower lobe of right lung ([facility]) C34.31    Heart failure with preserved left ventricular function (HFpEF) ([facility]) I50.30    Coronary artery disease involving native coronary artery of native heart I25.10    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    Chronic obstructive pulmonary disease (COPD) ([facility]) J44.9    History of carotid stenosis Z86.79    History of renal artery stenosis Z86.79    On clopidogrel therapy Z79.01    History of gross hematuria I60.684    Acute embolic stroke ([facility]) A81.4    Leukocytosis D72.829    Peripheral vascular disease of extremity with claudication ([facility]) I73.9    Neuropathy involving both lower extremities G57.93    Impaired mobility and ADLs Z74.09, Z78.9    Status post insertion of drug eluting coronary artery stent Z95.5    Mesenteric abscess ([facility] K65.1    Diverticulitis of large intestine with abscess K57.20    Diverticulosis of sigmoid colon K57.30    History of sinus bradycardia Z86.79    Generalized weakness R53.1       Patient presents no history of mental health services and is not currently requiring psychotropic medication for mood nor behavior stability, in spite of recent stress with further medical complications. Patient stopped tobacco and alcohol use many years ago and denies other substance use nor dependence.       OBJECTIVE  GENERAL OBSERVATIONS  Willingness to participate in program: [x] good   [] fair [] indifferent [] poor    General Appearance: patient appears casually and appropriately dressed and groomed, heavily bearded, and sitting upright in wheelchair in bedroom, not in any distress    Sensory Impairments:  Patient has satisfactory auditory reception and comprehension and both responds to simple inquiry well as well as initiates conversation; he is able to voluntarily move all extremities    Lutheran Affiliation:  Unknown    Admission Assessment  Discharge Status   [x] alert  [] lethargic  [] difficult to arouse  [] fluctuating  [] other: Level of Consciousness [x] alert  [] lethargic  [] difficult to arouse  [] fluctuating  [] other:   [x] person  [x] place  [] time  [x] situation Oriented [x] person  [x] place  [] time  [x] situation   [x] within normal limits  [] impaired       [] mild        [] moderate        [] severe Attention [x] within normal limits  [] impaired       [] mild        [] moderate        [] severe   [x] within normal limits  [x] impaired       [x] mild        [] moderate        [] severe Memory [x] within normal limits  [x] impaired       [x] mild        [] moderate        [] severe   [x] appropriate to situation  [] depressed  [] anxious  [] angry   [] fearful  [] emotionally labile  [x] other: Patient is denying acute feelings of emotional distress on admit Mood [x] appropriate to situation  [] depressed  [] anxious  [] angry   [] fearful  [] emotionally labile  [] other:   [x] appropriate  [] flat  [] inappropriate to content of speech Affect [x] appropriate  [] flat  [] inappropriate to content of speech   [x] appropriate  [] aggressive/agitated  [] withdrawn  [] inappropriate  [] other: Behavior [x] appropriate  [] aggressive/agitated  [] withdrawn  [] inappropriate  [] other:   [] good  [x] limited  [] denial  [] none Insight Into Illness [] good  [x] limited  [] denial  [] none   [x] intact  [x] impaired       [x] mild        [] moderate        [] severe       Describe: Patient will continue to require cues and prompts with novel and/or complex instruction Cognition [x] intact  [x] impaired       [x] mild        [] moderate        [] severe       Describe: Patient will require supervision for safety and assist   [x] coping  [] demonstrates poor adjustment  [] undetermined       As evidenced by: He insists that he is motivated to improve and return to home Patient Adjustment to Disability [x] coping  [] demonstrates poor adjustment  [] undetermined       As evidenced by: Pleased with progress as well as discharge to home   [] coping  [] demonstrates poor adjustment  [x] undetermined      As evidenced by: Not available on evaluation Family Adjustment to Disability [x] coping  [] demonstrates poor adjustment  [] undetermined      As evidenced by:      ASSESSMENT  Clinical Impression:  Patient is an 80year old, , retired (initially from [employer] and last as  at [employer] - in 2016 at age 68 years!), [facility] [employer] male. He and spouse reside in [place] in two level residence with three steps to enter and seven steps to second floor for sleep, with half bath on first floor. Patient is supported by three daughters who reside locally. Patient continues to be motivated to participate in therapy program on ARU in order to regain strength and endurance; and, he seems to be entirely accepting of what has turned out to be a prolonged hospitalization since his original hospital admit, prior to ARU. In fact, he is even accepting of the possibility of a surgical intervention needed if he does not respond to medication treatment for gastrointestinal problems. Patient will continue to benefit from further education to best identify realistic goals for himself in recovery. Emotionally, he remains calm and composed. There is no report of mental health history. He does not require psychotropic medications for stability.   Further, he now seems to be more accepting of what has become a prolonged hospitalization in order to be able to return to home with independence, as able. He will continue to benefit from daily education to best understand all parameters of his physical rehabilitation need/recovery, and in order to identify realistic goals for self. Cognitively, patient remains alert but not entirely oriented. His auditory reception is good and he seems to understand all direction. Of course, cues, prompts and redirection will be helpful with novel and/or complex instruction, as well as with recall of information. Again, what seems to be his entire cooperation will serve him well. Patient Strengths:  Alert, cooperative, entirely pleasant and with calm demeanor, and obviously motivated to regain functional independence, as able    Patient Preferences:  Patient expects to return to home with spouse    Rehab Potential:  Good, secondary to presenting medical circumstances    Educational Needs: Under each heading list the specific items in which the patient or family will need education/training.  Example: hip precautions, use of walker, ADL equipment, neglect, judgment, adjustment, etc.     Special considerations or accommodations for teaching: Continued prompts for safety and pace in recovery  [] Yes     [] No     [] NA  If Yes, explain:  Discharge Status    Completed Demonstrated/ Verbalized Understanding    Yes No Yes No   Info regarding disability:  [] [] [] []   Adjustment:  [] [] [] []   Cognition:  [] [] [] []   Other: [] [] [] []   Other: [] [] [] []   If education not completed, explain: [] [] [] []     PLAN  Problem: Safety and pace in recovery  Long Term Goal: Maximize all safety awareness  Intervention: Patient education and reinforcement  At Discharge  LTG Achieved: [x] Yes [] No If not achieved, explain:    Problem: Dependency versus independence  Long Term Goal: Accept forced dependency  Intervention: Patient education and support   At Discharge  LTG Achieved: [x] Yes [] No If not achieved, explain:    Problem: Presumed, underlying stress with medical complications  Long Term Goal: Minimize subjective stress/distress  Intervention: Support  and behavioral redirection  At Discharge  LTG Achieved: [x] Yes [] No If not achieved, explain:    Problem: Recall of novel information  Long Term Goal: Maximize recall of all treatment information  Intervention: Cues, prompts, repetition, redirection and reinforcement, as needed  At Discharge  LTG Achieved: [x] Yes [] No If not achieved, explain:    Problem:   Long Term Goal:   Intervention:   At Discharge  LTG Achieved: [] Yes [] No If not achieved, explain:    Wilmer Herrera, THE [facility]  7/1/2021 9:47 AM    DISCHARGE STATUS    Clinical Impressions: Patient feeling satisfied with gains made on ARU and most definitely pleased for discharge and return to home. At discharge, he reflected on some of the things that he is looking forward to in an appropriate fashion; his mood is stable and no distress is observed nor reported by him. He feels supported by others in residence, as well.     Follow-up Services Recommended Purpose                 Wilmer Herrera, PHD  Discharge Date/Time:

## 2021-07-01 NOTE — REHAB NOTE
[facility] PHYSICAL REHABILITATION  [address] [place] [address], [address]     [address] OF CARE    Name: Kunal Bonilla CSN: [id number]   Age: 80 y.o. MRN: [id number]   Sex: male Admit Date: 6/29/2021     Primary Rehabilitation Diagnosis  1. Generalized weakness with Impaired mobility and ADLs  2.  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    Comorbidities  Patient Active Problem List   Diagnosis Code    Urinary retention R33.9    Essential hypertension I10    Non-ST elevation (NSTEMI) myocardial infarction ([facility] I21.4    Paroxysmal atrial fibrillation ([facility]) I48.0    Anticoagulated by anticoagulation treatment Z79.01    Mixed hyperlipidemia E78.2    Benign prostatic hyperplasia with urinary retention N40.1, R33.8    Malignant neoplasm of lower lobe of right lung ([facility]) C34.31    Heart failure with preserved left ventricular function (HFpEF) ([facility]) I50.30    Coronary artery disease involving native coronary artery of native heart I25.10    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    Chronic obstructive pulmonary disease (COPD) ([facility]) J44.9    History of carotid stenosis Z86.79    History of renal artery stenosis Z86.79    On clopidogrel therapy Z79.01    History of gross hematuria J42.794    Acute embolic stroke ([facility]) N60.0    Leukocytosis D72.829    Peripheral vascular disease of extremity with claudication ([facility]) I73.9    Neuropathy involving both lower extremities G57.93    Impaired mobility and ADLs Z74.09, Z78.9    Status post insertion of drug eluting coronary artery stent Z95.5    Mesenteric abscess ([facility] K65.1    Diverticulitis of large intestine with abscess K57.20    Diverticulosis of sigmoid colon K57.30    History of sinus bradycardia Z86.79    Generalized weakness R53.1         ANTICIPATED INTERVENTIONS THAT SUPPORT THE MEDICAL NECESSITY OF THIS ADMISSION:    I. Physical Therapy              A. Intensity: 1 hour per day              B. Frequency: 5 times per week              C. Duration: 10-14 days              D. Long Term Goals:    1. Patient will move from supine to sit and sit to supine , scoot up and down, and roll side to side in bed with modified independence. 2.  Patient will transfer from bed to chair and chair to bed with modified independence using the least restrictive device. 3.  Patient will perform sit to stand with modified independence. 4.  Patient will ambulate with modified independence for at least 150 feet with the least restrictive device. 5.  Patient will ascend/descend 14 stairs with 2 handrail(s) with supervision/set-up. 6.  Patient will ascend/descend 1 curb step with appropriate AD with supervision/setup to safely get into and out of house. E. Interventions: Interventions may include range of motion (AROM, PROM B LE/trunk), motor function (B LE/trunk strengthening/coordination), activity tolerance (vitals, oxygen saturation levels), bed mobility training, balance activities, gait training (progressive ambulation program), wheelchair management and functional transfer training. II. Occupational Therapy              A. Intensity: 1 hour per day              B. Frequency: 5 times per week              C. Duration: 2 weeks              D. Long Term Goals:    1. Pt will perform self-feeding with I.    2. Pt will perform grooming with Mod I.    3. Pt will perform UB bathing with Mod I.    4. Pt will perform LB bathing with Supv.    5. Pt will perform tub/shower transfer with SBA.      6. Pt will perform UB dressing with Mod I.    7. Pt will perform LB dressing with Supv.    8. Pt will perform toileting task with Mod I.    9. Pt will perform toilet transfer with Supv.   E. Interventions: Interventions may include range of motion (AROM, PROM B UE), motor function (B UE/ strengthening/coordination), activity tolerance (vitals, oxygen saturation levels), balance training, ADL/IADL training and functional transfer training. III. Speech Therapy              A. Intensity: 1-2 times per day              B. Frequency: 4-7 times per week              C. Duration: 2-3 weeks              D. Long Term Goals:    1. Be oriented x 3 and recall events of the day, supervision. 2. Recall 3 words after 5 minutes, supervision. 3. Read I-2 sentence stimuli and respond appropriately, 90% accuracy. 4. Read short paragraphs and respond to content questions, 90% accuracy. 5. Write simple sentences with 90% accuracy (to dictation and then spontaneously to describe actions/objects). E. Interventions: SLP will follow daily to address recall, written language skills and functional math. PHYSICIAN'S ASSESSMENT OF FINDINGS:    Are the established goals sufficient for achieving the optimal level of function? [x]  Yes      []  No    What changes would you recommend to the goals as written? None      Are the interventions noted sufficient for achieving the optimal level of function? [x]  Yes      []  No    What changes would you recommend to the interventions noted? If therapy staff is unable to provide 3 hr of total therapy per day in 5 days due to medical issues or decreased patient tolerance, may modify treatment schedule to 15 hr/week.       Estimated length of stay: 2 weeks      Medical rehabilitation prognosis:    []  Excellent     [x]  Good     []  Fair     []  Guarded       Discharge Destination:     [x]  Home     []  [address]     []  [place]     []  [facility]     []  Peggy     []  Other:       Signed:    Kang Phelps MD    July 1, 2021

## 2021-07-01 NOTE — ROUTINE PROCESS
[facility]    Bedside and Verbal shift change report given to Roxanne RN (oncoming nurse) by Vasquez Tena RN (offgoing nurse). Report included the following information SBAR, Kardex, MAR and Recent Results. Situation:   Code Status: Full Code   Hospital Day: 2   Problem List:   Hospital Problems  Date Reviewed: 7/1/2021        Codes Class Noted POA    Mesenteric abscess ([address] ICD-10-CM: K65.1  ICD-9-CM: 567.22  6/21/2021 Yes        * (Principal) Diverticulitis of large intestine with abscess ICD-10-CM: K57.20  ICD-9-CM: 562.11, 569.5  6/21/2021 Yes        History of sinus bradycardia ICD-10-CM: Z86.79  ICD-9-CM: V12.59  6/21/2021 Yes    Overview Signed 6/29/2021 10:02 PM by Dorsey Apley, MD     Attributed to Carvedilol 25 mg PO BID with meals             Generalized weakness ICD-10-CM: R53.1  ICD-9-CM: 780.79  6/21/2021 Yes        Mixed hyperlipidemia (Chronic) ICD-10-CM: U63.9  ICD-9-CM: 272.2  Unknown Yes        Heart failure with preserved left ventricular function (HFpEF) (HCC) (Chronic) ICD-10-CM: I50.30  ICD-9-CM: 428.9  Unknown Yes    Overview Signed 6/7/2021 11:03 PM by Dorsey Apley, MD     2D echocardiogram (5/18/2021) showed EF 50%; concentric LV hypertrophy with a severely thickened septal wall and mildly thickened posterior wall; left atrial chamber is severely enlarged; right atrial chamber volume is moderately enlarged; no mass, shunts, or thrombi.               Coronary artery disease involving native coronary artery of native heart (Chronic) ICD-10-CM: I25.10  ICD-9-CM: 414.01  Unknown Yes        Diverticulosis of sigmoid colon (Chronic) ICD-10-CM: K57.30  ICD-9-CM: 562.10  5/22/2021 Yes        Anticoagulated by anticoagulation treatment ICD-10-CM: Z79.01  ICD-9-CM: V58.61  5/19/2021 Yes    Overview Signed 6/7/2021 10:48 PM by Dorsey Apley, MD     On Apixaban             On clopidogrel therapy ICD-10-CM: Z79.01  ICD-9-CM: V58.61  5/19/2021 Yes        Status post insertion of drug eluting coronary artery stent ICD-10-CM: Z95.5  ICD-9-CM: V45.82  5/18/2021 Yes    Overview Signed 6/18/2021  1:38 PM by Katheryn Bennett MD     S/P PCI to proximal SVG with 3.5 x 12 mm Juan J drug-eluting stent; PCI to mid SVG with 3.5 x 34 mm Alvin drug-eluting stent; PCI to distal SVG with 3.5 x 15 mm Alvin drug-eluting stent;  Balloon angioplasty to distal SVG anastomosis (5/18/2021 - Dr. Dominick Irvin)              Non-ST elevation (NSTEMI) myocardial infarction [facility]) ICD-10-CM: I21.4  ICD-9-CM: 410.70  5/17/2021 Yes        Paroxysmal atrial fibrillation ([facility] ICD-10-CM: I48.0  ICD-9-CM: 427.31  5/17/2021 Yes        Impaired mobility and ADLs ICD-10-CM: Z74.09, Z78.9  ICD-9-CM: V49.89  5/17/2021 Yes        Neuropathy involving both lower extremities (Chronic) ICD-10-CM: C68.27  ICD-9-CM: 356.9  10/22/2020 Yes        Essential hypertension (Chronic) ICD-10-CM: I10  ICD-9-CM: 401.9  Unknown Yes        History of coronary artery bypass graft ICD-10-CM: Z95.1  ICD-9-CM: V45.81  2001 Yes    Overview Signed 6/7/2021 11:04 PM by Katheryn Bennett MD     [facility]                   Background:   Past Medical History:   Past Medical History:   Diagnosis Date    Acute embolic stroke ([facility] 7/07/9284    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, brainstem and cerebellum) without residual deficit    Anticoagulated by anticoagulation treatment 5/19/2021    On Apixaban    Benign prostatic hyperplasia with urinary retention     Chronic obstructive pulmonary disease (COPD) ([facility]     Coronary artery disease involving native coronary artery of native heart     Diverticulitis of large intestine with abscess 6/21/2021    Diverticulosis of sigmoid colon 5/22/2021    Essential hypertension     Heart failure with preserved left ventricular function (HFpEF) ([facility])     2D echocardiogram (5/18/2021) showed EF 50%; concentric LV hypertrophy with a severely thickened septal wall and mildly thickened posterior wall; left atrial chamber is severely enlarged; right atrial chamber volume is moderately enlarged; no mass, shunts, or thrombi.     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[address] 6/21/2021    Neuropathy involving both lower extremities 10/22/2020    Non-ST elevation (NSTEMI) myocardial infarction ([address] 5/17/2021    On clopidogrel therapy 5/19/2021    Paroxysmal atrial fibrillation ([facility] 5/17/2021    Peripheral vascular disease of extremity with claudication ([facility] 6/14/2021    Urinary retention         Assessment:   Changes in Assessment throughout shift: No change to previous assessment     Patient has a central line: no Reasons if yes: na  Insertion date:na Last dressing date:na   Patient has [place] Cath: no Reasons if yes: na   Insertion date:na  Shift [place] care completed: NO     Last Vitals:     Vitals:    06/30/21 1534 06/30/21 2009 07/01/21 0742 07/01/21 1507   BP:  120/76 (!) 147/95 (!) 155/89   Pulse:  75 81 (!) 101   Resp:  18 19 18   Temp:  98.1 °F (36.7 °C) 98 °F (36.7 °C) 98.9 °F (37.2 °C)   SpO2:  98% 97% 98%   Weight: 80.8 kg (178 lb 1.6 oz)      Height:            PAIN    Pain Assessment    Pain Intensity 1: 0 (07/01/21 1631) Pain Intensity 1: 2 (12/29/14 1105)    Pain Location 1: Foot Pain Location 1: Abdomen    Pain Intervention(s) 1: Medication (see MAR) Pain Intervention(s) 1: Medication (see MAR)  Patient Stated Pain Goal: 0 Patient Stated Pain Goal: 0  o Intervention effective: yes  o Other actions taken for pain:  none     Skin Assessment  Skin color Skin Color: Appropriate for ethnicity  Condition/Temperature Skin Condition/Temp: Dry, Warm  Integrity Skin Integrity: Intact  Turgor Turgor: Non-tenting  Weekly Pressure Ulcer Documentation  Pressure  Injury Documentation: No Pressure Injury Noted-Pressure Ulcer Prevention Initiated  Wound Prevention & Protection Methods  Orientation of wound Orientation of Wound Prevention: Posterior  Location of Prevention Location of Wound Prevention: Sacrum/Coccyx  Dressing Present Dressing Present : No  Dressing Status Dressing Status: Intact  Wound Offloading Wound Offloading (Prevention Methods): Bed, pressure reduction mattress     INTAKE/OUPUT  Date 06/30/21 1900 - 07/01/21 0659 07/01/21 0700 - 07/02/21 0659   Shift 8172-8297 24 Hour Total 0257-8938 3816-6765 24 Hour Total   INTAKE   Shift Total(mL/kg)        OUTPUT   Urine(mL/kg/hr)          Urine Occurrence(s) 1 x 3 x 3 x  3 x   Stool          Stool Occurrence(s) 1 x 3 x 2 x  2 x   Shift Total(mL/kg)        NET        Weight (kg) 80.8 80.8 80.8 80.8 80.8       Recommendations:  1. Patient needs and requests: met    2. Pending tests/procedures: lab     3. Functional Level/Equipment: Partial (one person) / Wheelchair    Fall Precautions:   Fall risk precautions were reinforced with the patient; he was instructed to call for help prior to getting up. The following fall risk precautions were continued: bed/ chair alarms, door signage, yellow bracelet and socks as well as update of the [employer] tool in the patient's room. [place] Score: 3    HEALS Safety Check    A safety check occurred in the patient's room between off going nurse and oncoming nurse listed above.     The safety check included the below items  Area Items   H  High Alert Medications - Verify all high alert medication drips (heparin, PCA, etc.)   E  Equipment - Suction is set up for ALL patients (with yanker)  - Red plugs utilized for all equipment (IV pumps, etc.)  - WOWs wiped down at end of shift.  - Room stocked with oxygen, suction, and other unit-specific supplies   A  Alarms - Bed alarm is set for fall risk patients  - Ensure chair alarm is in place and activated if patient is up in a chair   L  Lines - Check IV for any infiltration  - Taveras bag is empty if patient has a Taveras   - Tubing and IV bags are labeled   S  Safety   - Room is clean, patient is clean, and equipment is clean. - Hallways are clear from equipment besides carts. - Fall bracelet on for fall risk patients  - Ensure room is clear and free of clutter  - Suction is set up for ALL patients (with yanker)  - Hallways are clear from equipment besides carts.    - Isolation precautions followed, supplies available outside room, sign posted     Teddy Santoyo RN

## 2021-07-01 NOTE — PROGRESS NOTES
[id number]  [address] [place] [address], [address]     INPATIENT REHABILITATION  DAILY PROGRESS NOTE     Date: 7/1/2021    Name: Montez James Age / Sex: 80 y.o. 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 Blood pressure controlled. No documented fever since admission. Team conference was held at bedside this PM.     Patient's Complaint:   No significant medical complaints    Pain Control: stable, mild-to-moderate joint symptoms intermittently, reasonably well controlled by current meds      Objective:     Vital Signs:  Patient Vitals for the past 24 hrs:   BP Temp Pulse Resp SpO2 Height Weight   07/01/21 0742 (!) 147/95 98 °F (36.7 °C) 81 19 97 %     06/30/21 2009 120/76 98.1 °F (36.7 °C) 75 18 98 %     06/30/21 1534       80.8 kg (178 lb 1.6 oz)   06/30/21 1529      5' 8\" (1.727 m)    06/30/21 1520 (!) 141/85 97.7 °F (36.5 °C) 92 16 94 %          Physical Examination:  GENERAL SURVEY: Patient is awake, alert, oriented x 3, sitting comfortably on the chair, not in acute respiratory distress. HEENT: pale palpebral conjunctivae, anicteric sclerae, no nasoaural discharge, moist oral mucosa  NECK: supple, no jugular venous distention, no palpable lymph nodes  CHEST/LUNGS: symmetrical chest expansion, good air entry, clear breath sounds  HEART: adynamic precordium, good S1 S2, no S3, regular rhythm, tachycardic, no murmurs  ABDOMEN: flat, bowel sounds appreciated, soft, non-tender  EXTREMITIES: pale nailbeds, bipedal edema, full and equal pulses, no calf tenderness   NEUROLOGICAL EXAM: The patient is awake, alert and oriented x3, able to answer questions fairly appropriately, able to follow 1 and 2 step commands. Able to tell time from the wall clock. Cranial nerves II-XII are grossly intact. No gross sensory deficit. Motor strength is 4 to 4+/5 on all 4 extremities. Current Medications:  Current Facility-Administered Medications   Medication Dose Route Frequency    acetaminophen (TYLENOL) tablet 650 mg  650 mg Oral Q4H PRN    bisacodyL (DULCOLAX) tablet 10 mg  10 mg Oral Q48H PRN    amoxicillin-clavulanate (AUGMENTIN) 500-125 mg per tablet 1 Tablet  1 Tablet Oral BID WITH MEALS    cefpodoxime (VANTIN) tablet 200 mg  200 mg Oral Q12H    guaiFENesin ER (MUCINEX) tablet 600 mg  600 mg Oral DAILY    dextromethorphan (DELSYM) 30 mg/5 mL syrup 30 mg  30 mg Oral QHS    carvediloL (COREG) tablet 3.125 mg  3.125 mg Oral BID WITH MEALS    clopidogreL (PLAVIX) tablet 75 mg  75 mg Oral DAILY WITH BREAKFAST    rosuvastatin (CRESTOR) tablet 10 mg  10 mg Oral DAILY    nitroglycerin (NITROSTAT) tablet 0.4 mg  0.4 mg SubLINGual PRN    arformoteroL (BROVANA) neb solution 15 mcg  15 mcg Nebulization BID RT    ipratropium (ATROVENT) 0.02 % nebulizer solution 0.5 mg  0.5 mg Nebulization TID    albuterol (PROVENTIL VENTOLIN) nebulizer solution 2.5 mg  2.5 mg Nebulization Q4H PRN    apixaban (ELIQUIS) tablet 5 mg  5 mg Oral BID    multivitamin, tx-iron-ca-min (THERA-M w/ IRON) tablet 1 Tablet  1 Tablet Oral DAILY    lisinopriL (PRINIVIL, ZESTRIL) tablet 5 mg  5 mg Oral DAILY    tamsulosin (FLOMAX) capsule 0.4 mg  0.4 mg Oral DAILY WITH DINNER    gabapentin (NEURONTIN) capsule 300 mg  300 mg Oral TID       Allergies:   Allergies   Allergen Reactions    Lipitor [Atorvastatin] Rash    Norvasc [Amlodipine] Rash       Functional Progress:    SPEECH AND LANGUAGE PATHOLOGY    ON ADMISSION MOST RECENT   Comprehension (Native Language)  Primary Mode of Comprehension: Auditory  Score: 5 Comprehension (Native Language)  Primary Mode of Comprehension: Auditory  Score: 5     Expression (Native Language)  Primary Mode of Expression: Verbal  Score: 6   Expression (Native Language)  Primary Mode of Expression: Verbal  Score: 6     Social Interaction/Pragmatics  Score: 5 Social Interaction/Pragmatics  Score: 6     Problem Solving  Score: 3   Problem Solving  Score: 5     Memory  Score: 4 Memory  Score: 4       Legend:   7 - Independent   6 - Modified Independent   5 - Standby Assistance / Supervision / Set-up   4 - Minimum Assistance / Contact Guard Assistance   3 - Moderate Assistance   2 - Maximum Assistance   1 - Total Assistance / Dependent       Lab/Data Review:  Recent Results (from the past 24 hour(s))   HGB & HCT    Collection Time: 07/01/21  5:16 AM   Result Value Ref Range    HGB 9.6 (L) 13.0 - 16.0 g/dL    HCT 31.3 (L) 36.0 - 48.0 %       Assessment:     Primary Rehabilitation Diagnosis  1. Generalized weakness with Impaired mobility and ADLs  2.  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    Comorbidities  Patient Active Problem List   Diagnosis Code    Urinary retention R33.9    Essential hypertension I10    Non-ST elevation (NSTEMI) myocardial infarction ([facility] [address] I21.4    Paroxysmal atrial fibrillation ([facility]) I48.0    Anticoagulated by anticoagulation treatment Z79.01    Mixed hyperlipidemia E78.2    Benign prostatic hyperplasia with urinary retention N40.1, R33.8    Malignant neoplasm of lower lobe of right lung ([facility]) C34.31    Heart failure with preserved left ventricular function (HFpEF) ([facility]) I50.30    Coronary artery disease involving native coronary artery of native heart I25.10    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    Chronic obstructive pulmonary disease (COPD) ([facility]) J44.9    History of carotid stenosis Z86.79    History of renal artery stenosis Z86.79    On clopidogrel therapy Z79.01    History of gross hematuria L41.672    Acute embolic stroke ([facility]) H47.7    Leukocytosis D72.829    Peripheral vascular disease of extremity with claudication ([facility]) I73.9    Neuropathy involving both lower extremities G57.93    Impaired mobility and ADLs Z74.09, Z78.9    Status post insertion of drug eluting coronary artery stent Z95.5    Mesenteric abscess ([address] K65.1    Diverticulitis of large intestine with abscess K57.20    Diverticulosis of sigmoid colon K57.30    History of sinus bradycardia Z86.79    Generalized weakness R53.1       Plan:     1. Justification for continued stay: Good progression towards established rehabilitation goals. 2. Medical Issues being followed closely:    [x]  Fall and safety precautions     []  Wound Care     [x]  Bowel and Bladder Function     [x]  Fluid Electrolyte and Nutrition Balance     [x]  Pain Control      3. Issues that 24 hour rehabilitation nursing is following:    [x]  Fall and safety precautions     []  Wound Care     [x]  Bowel and Bladder Function     [x]  Fluid Electrolyte and Nutrition Balance     [x]  Pain Control      [x]  Assistance with and education on in-room safety with transfers to and from the bed, wheelchair, toilet and shower. 4. Acute rehabilitation plan of care:    [x]  Continue current care and rehab. [x]  Physical Therapy           [x]  Occupational Therapy           [x]  Speech Therapy     []  Hold Rehab until further notice     5. Medications:    [x]  MAR Reviewed     [x]  Continue Present Medications     6. DVT Prophylaxis:      []  Enoxaparin     []  Unfractionated Heparin     []  Warfarin     [x]  NOAC     []  MARTHA Stockings     []  Sequential Compression Device     []  None     7. Code status    [x]  Full code     []  Partial code     []  Do not intubate     []  Do not resuscitate     8.  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 Mild infiltrate in the superior segment of the right lower lobe with small right pleural effusion. No consolidation. 2. Delayed renal clearance, chronic renal disease suspected. Additionally, areas of cortical cysts with poor perfusion.  Pyelonephritis not excluded. No hydronephrosis. Correlation with urinalysis? 3. Diverticulosis, with potential diverticulitis in the left lower quadrant, junction of descending and sigmoid colon. However, evaluation is very limited due to severe motion artifacts. 4. Focal mucosal thickening in the proximal sigmoid. Further evaluation with barium enema or colonoscopy as indicated. > WBC count (5/23/2021) = 30.2                          > . .  .                           > WBC count (6/2/2021) = 25.4                          > WBC count (6/3/2021) = 22.5                          > WBC count (6/4/2021) = 23.4                          > WBC count (6/5/2021) = 21.7                          > WBC count (6/6/2021) = 24.1                          > WBC count (6/7/2021) = 20.0                          > Work up done was negative for a source of infection              > WBC count (6/8/2021, on admission to the ARU) = 14.4   > On 6/8/2021, started Pericolace 2 tabs PO once daily after dinner   > On 6/9/2021, started Polyethylene glycol 17 grams in 8 oz water PO once daily   > On 6/19/2021:    > Patient was noted to have diarrhea    > Held:     > Polyethylene glycol 17 grams in 8 oz water PO once daily     > Pericolace 2 tabs PO once daily after dinner    > Started Bio K Plus 1 cap PO once daily   > Stool culture (collected 6/19/2021, resulted 6/20/2021): Negative   > WBC count (6/20/2021) = 26.7   > Blood culture x 2 sets (collected 6/20/2021, resulted on 6/26/2021) yielded no growth after 6 days   > On 6/20/2021, started:    > Cholestyramine 4 grams PO TID with meals    > Ceftriaxone 1000 mg IV q 24 hr    > Metronidazole 500 mg IV q 8 hr    > Vancomycin 1750 mg  IV x 1 dose   > WBC count (6/21/2021) = 26.3   > Infectious Disease consult (Dr. Sarah Back) was called on 6/21/2021 for evaluation and comanagement   > On 6/21/2021:    > Discontinue:     > Polyethylene glycol 17 grams in 8 oz water PO once daily     > Pericolace 2 tabs PO once daily after dinner    > Change Vancomycin IV to Vancomycin 500 mg PO q 6 hr   > CT scan of the chest, abdomen and pelvis with contrast (6/21/2021) showed:    1. No acute findings in the chest.     -Emphysema. -CABG. -Stable mildly enlarged right thyroid gland which could indicate underlying nodule, stable for many years. 2. 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 It is unclear if initial source of abscess is from small bowel inflammation or diverticulitis/pericolonic abscess. 3. Dilated small bowel. This is likely a combination of generalized small bowel ileus and low-grade partial small bowel obstruction at site of abnormal pelvic small bowel loop discussed above. 4. Diverticulosis. Stable thickened collapsed segment of sigmoid colon since 2014 suggesting a chronic stricture. This is limited for assessment by CT. 5. Bladder dome inflammation is likely secondary to the adjacent mesentery process. 6. Other chronic incidental findings.    > On 6/21/2021:    > Discontinued:     > Cholestyramine 4 grams PO TID with meals     > Ceftriaxone 1000 mg IV q 24 hr     > Metronidazole 500 mg IV q 8 hr     > Vancomycin 500 mg PO q 6 hr    > Started Piperacillin-Tazobactam 3.375 grams IV q 6 hr   > Colorectal Surgery (Dr. Carmen Parker) was called on 6/22/2021 for evaluation and comanagement    > No surgical intervention recommended since unable to safely stop Apixaban (for paroxysmal atrial fibrillation) and Clopidogrel (recent placement of 3 JENNIE to SVG-LCx on 5/18/2021)   > On 6/25/2021:    > Discontinued Piperacillin-Tazobactam 3.375 grams IV q 6 hr    > Started:     > Cefpodoxime 200 mg PO q 12 hr     > Metronidazole 500 mg PO q 12 hr   > On 6/28/2021:    > Discontinued Metronidazole 500 mg PO q 12 hr    > Started Amoxicillin-Clavulanate 500-125 1 tab PO q 12 hr      06/29/21  0211 06/28/21  0230 06/27/21  0516 06/25/21  0534 06/24/21  0522 06/23/21  0538 06/22/21  0409 06/21/21  1310 06/21/21  0330 06/20/21  0550   WBC 21.0* 24.5* 21.7* 18.4* 18.2* 22.1* 24.7* 26.0* 26.3* 26.7*      > Upon discharge from the ARU, the patient will need to follow up with Colorectal Surgery (Dr. Shaquille Reddy)   > CT scan of the abdomen and pelvis with contrast on 7/15/2021   > Continue:    > Cefpodoxime 200 mg PO q 12 hr (STOP DATE: 7/21/2021)    > Amoxicillin-Clavulanate 500-125 1 tab PO q 12 hr (STOP DATE: 7/21/2021)    > 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, brainstem and cerebellum) without residual deficit   > Continue:    > Clopidogrel 75 mg PO once daily with breakfast    > Rosuvastatin 10 mg PO once daily    > Benign prostatic hyperplasia with urinary retention   > Continue Tamsulosin 0.4 mg PO once daily after dinner    > Chronic heart failure with preserved ejection fraction (HFpEF)   > 2D echocardiogram (5/18/2021) showed EF 50%; concentric LV hypertrophy with a severely thickened septal wall and mildly thickened posterior wall; left atrial chamber is severely enlarged; right atrial chamber volume is moderately enlarged; no mass, shunts, or thrombi.    > Continue Carvedilol 3.125 mg PO BID with meals (8AM, 5PM)    > Chronic obstructive pulmonary disease (COPD)   > Continue:    > Arformoterol nebulization BID    > Ipratropium nebulization TID    > Albuterol nebulization q 4 hr PRN for shortness of breath/wheezing    > Essential hypertension   > 2D echocardiogram (5/18/2021) showed EF 50%; concentric LV hypertrophy with a severely thickened septal wall and mildly thickened posterior wall; left atrial chamber is severely enlarged; right atrial chamber volume is moderately enlarged; no mass, shunts, or thrombi.    > Continue Carvedilol 3.125 mg PO BID with meals (8AM, 5PM)    > Mixed hyperlipidemia   > Lipid profile (5/18/2021) showed TG 83, , HDL 50, LDL 93   > Continue Rosuvastatin 10 mg PO once daily    > Non-ST elevation (NSTEMI) myocardial infarction; Coronary artery disease; History of CABG; S/P PCI to proximal SVG with 3.5 x 12 mm Juan J drug-eluting stent; PCI to mid SVG with 3.5 x 34 mm [place] drug-eluting stent; PCI to distal SVG with 3.5 x 15 mm Juan J drug-eluting stent; Balloon angioplasty to distal SVG anastomosis (5/18/2021 - Dr. Maria Luisa Loving)    > Continue:    > Carvedilol 3.125 mg PO BID with meals (8AM, 5PM)    > Clopidogrel 75 mg PO once daily with breakfast    > Rosuvastatin 10 mg PO once daily    > Nitroglycerin 0.4 mg SL PRN for chest pain    > Paroxysmal atrial fibrillation, anticoagulated on Apixaban   > Continue:    > Apixaban 5 mg PO BID    > Carvedilol 3.125 mg PO BID with meals (8AM, 5PM)    > Peripheral vascular disease of extremity with claudication;  Neuropathy involving both lower extremities   > (+) pain and numbness of toes of both feet   > PVL arterial doppler of both lower extremities with exercise (9/12/2019) showed:    > Moderate arterial insufficiency in the right lower extremity at the level of the trifurcation at rest.     > Severe arterial insufficiency in the left lower extremity at the level of the trifurcation at rest.   > PVL arterial doppler of both lower extremities with exercise (10/22/2020) showed:    > Mild right lower extremity arterial insufficiency of indeterminate level at rest.     > Moderate left lower extremity arterial insufficiency involving the femoral-popliteal segment at rest.     > The ankle brachial indices post exercise were unreliable as described. No evidence of inflow involvement.   > Planned to start patient on Cilostazol for the claudication symptoms but patient already on Apixaban + Clopidogrel; addition of Cilostazol may increase risk for bleeding; will hold off on adding Cilostazol for now   > As per the last Progress Note by Dr. Robert Pan (dated 10/22/2020): \"Pt major symptom is not related to his PAD but sounds more like neuropathy\"   > On 6/14/2021, started a trial of Gabapentin 100 mg PO BID   > Upon discharge from the ARU, the patient will need to follow up with Vascular Surgery (Dr. Robert Pan)   > On 6/18/2021, increased Gabapentin from 100 mg PO BID to 100 mg PO TID   > On 6/28/2021, increased Gabapentin from 100 mg to 300 mg PO TID   > Continue Gabapentin 300 mg PO TID    > Transient bradycardia, while on Carvedilol 25 mg PO BID with meals (6/21/2021)   > On 6/21/2021, Carvedilol was held   > On 6/26/2021, Cardiology restarted the patient on Carvedilol 3.125 mg PO BID with meals   > Instructions of Cardiology:     > high dose due to risk for pacemaker and ongoing infeciton    > if heart rate stays consistently > 110 bpm, can increase to 6.25 mg BID   > Continue Carvedilol 3.125 mg PO BID with meals (8AM, 5PM)    > Analgesia   > Continue Acetaminophen 650 mg PO q 4 hr PRN for pain     > Diet:   > Specifications: Low fiber/No added salt (3-4 grams)   > Solids (consistency): Regular    > Liquids (consistency): Thin    > Fluid restriction: None      9. Personal Protective Equipment (WX28 face mask) was used while interacting with the patient. Patient was using a surgical mask. 10. Patient's progress in rehabilitation and medical issues discussed with the patient. All questions answered to the best of my ability. Care plan discussed with patient and nurse.     11. Total clinical care time is 30 minutes, including review of chart including all labs, radiology, past medical history, and discussion with patient. Greater than 50% of my time was spent in coordination of care and counseling.       Signed:    Noah Valle MD    July 1, 2021

## 2021-07-01 NOTE — INTERDISCIPLINARY ROUNDS
[facility] PHYSICAL REHABILITATION  [address] [place] [address], [address]    INPATIENT REHABILITATION  PRE-TEAM CONFERENCE SUMMARY     Date of Conference: 7/1/2021    Patient Information:        Name: [facility] SERVICES Age / Sex: 80 y.o. / male   CSN: [id number] MRN: [id number]   Admit Date: 6/29/2021 Length of Stay: 2 days     Primary Rehabilitation Diagnosis  1. Generalized weakness with Impaired mobility and ADLs  2.  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) I48.0    Anticoagulated by anticoagulation treatment Z79.01    Mixed hyperlipidemia E78.2    Benign prostatic hyperplasia with urinary retention N40.1, R33.8    Malignant neoplasm of lower lobe of right lung ([facility]) C34.31    Heart failure with preserved left ventricular function (HFpEF) ([facility]) I50.30    Coronary artery disease involving native coronary artery of native heart I25.10    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    Chronic obstructive pulmonary disease (COPD) ([facility]) J44.9    History of carotid stenosis Z86.79    History of renal artery stenosis Z86.79    On clopidogrel therapy Z79.01    History of gross hematuria K48.935    Acute embolic stroke ([facility]) Q82.9    Leukocytosis D72.829    Peripheral vascular disease of extremity with claudication (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    Diverticulitis of large intestine with abscess K57.20    Diverticulosis of sigmoid colon K57.30    History of sinus bradycardia Z86.79    Generalized weakness R53.1          Therapy:     FIM SCORES Initial Assessment Weekly Progress Assessment 7/1/2021   Eating Functional Level: 6  6 Mod I following s/u   Swallowing     Grooming 5  5 set-up w/ cues for task initiation   Bathing 4     5- Supv UB bathing   4- Min A LB bathing   Upper Body Dressing Functional Level: 5  5-Supv UB dressing   Lower Body Dressing Functional Level: 4  4-Min A LB dressing   Toileting Functional level: 4.5 CGA  4.5-CGA   Bladder 0 0   Bowel  0 0   Toilet Transfer [place] 4  4-Min A for sit to stand and CGA w/ RW    Tub/Shower Transfer [place] Tub/Shower Transfer Score: 4  Comments: CGA w/ for simulated shower transfer w/ RW to tub transfer bench 4-Min A (simulated task)  4-Min A (simulated task)      Comprehension Primary Mode of Comprehension: Auditory  Score: 5Primary Mode:  Auditory 5  5      Expression Primary Mode of Expression: Verbal  Score: 6   6   Social Interaction Score: 5  6   Problem Solving Score: 3  5   Memory Score: 4  4     FIM SCORES Initial Assessment Weekly Progress Assessment 7/1/2021   Bed/Chair/Wheelchair Transfers Transfer Type: Other  Other: stand step with RW  Transfer Assistance : 4 (Minimal assistance)  Sit to Stand Assistance:  (mod A from low bed height surface, CG/min A otherwise)  Car Transfers: Minimum assistance (using RW)  Car Type: car transfer simulator Transfer Type:  Other  Other: stand step with RW  Transfer Assistance : 4 (Contact guard assistance)  Sit to Stand Assistance: Contact guard assistance   Bed Mobility Rolling Right 4 (Minimal assistance)   Rolling Left 4 (Minimal assistance)   Supine to Sit 4 (Minimal assistance)   Sit to Stand  (mod A from low bed height surface, CG/min A otherwise)   Sit to Supine 3 (Moderate assistance) (assist with lifting each LE up into bed)    Rolling Right    min A   Rolling Left    min A   Supine to Sit    Min A   Sit to Stand   Contact guard assistance   Sit to Supine    mod A      Locomotion (W/C) Able to Propel (ft): 160 feet  Functional Level: 4 (min A for steering)  Curbs/Ramps Assist Required (FIM Score): 0 (Not tested)  Wheelchair Setup Assist Required : 4 (Minimal assistance)  Wheelchair Management: Manages left brake, Manages right brake (using brake extender) Function 5  Setup Assistance  5 (Stand-by assistance)      Locomotion (W/C distance)   150 feet (primarily using bilat UE to propel)   Locomotion (Walk) 4 (Minimal assistance) 4 (Contact guard assistance)  Walker, rolling;Gait belt   Locomotion (Walk dist.) 160 Feet (ft) 260 Feet (ft)   Steps/Stairs Steps/Stairs Ambulated (#): 6  Level of Assist : 4 (Minimal assistance)  Rail Use: Both  6 stairs min A with bilat handrails         Nursing:     Neuro:   AAA&O x   4         Respiratory:   [x] WNL   [] O2 LPM:   Other:  Peripheral Vascular:   [] TEDS present   [] Edema present ____ Grade   Cardiac:   [x] WNL   [] Other  Genitourinary:   [x] continent   [] incontinent   [] matamoros  Abdominal ___06/30____ LBM  GI: _Reg______ Diet _Thin_____ Liquids _____ tube feeds  Musculoskeletal: ____ ROM Transfers wheelchair_____ Assistive Device Used  ___Mod_ Level of Assistance  Skin Integumentary:   [x] Intact   [] Not Intact   __________Preventative Measures  Details______________________________________________________________  Pain: [x] Controlled   [] Not Controlled   Pain Meds:   [] Scheduled   [] PRN        Registered Dietitian / Nutrition:   No data found. Pt was in acute rehab unit; then transferred to ED with mesenteric abscess and diverticulitis with abscess per CRS. Transferred back to rehab unit. Pt had poor/fair meal intake while in acute care hospital per chart review; per review, pt noted to dislike hospital meals. Ensure Enlive was ordered and continued following pt transfer back to rehab unit. Noted 9 lb wt loss x 1 year and 4 month PTA per chart hx; not significant. Will monitor po intake of meals/ supplements.       Supplements:          [x] Yes:  Ensure Enlive BID   [] No      Amount of supplement consumed:  Unable to assess at this time    No intake or output data in the 24 hours ending 07/01/21 1109                             Last bowel movement: 6/30 loose      Interdisciplinary Team Goals:     1. Discipline  Physical Therapy    Goal  Patient will perform bed<->chair transfer with RW at SBA level with only intermittent cues for safe hand placement. Barrier  Decreased strength, endurance, balance    Intervention  Therex, theract, neuromuscular re-ed    Goal written by:   Adry Wolff, PT, DPT     2. Discipline  Occupational Therapy    Goal  Patient will perform LB ADLs (bathing and dressing)self-care performance tasks w/ no more than CGA and intermittent verbal cues for task initiation. Barrier  Weakness, strength, standing balance    Intervention  ADL training, functional transfers, ADL related mobility w/ least restrictive AD, safety awareness, energy conservation strategies    Goal written by:  Lesa Lawson OTR/L     3. Discipline  Speech Therapy    Goal  Patient will read sentence length material and respond appropriately to content, 90% accuracy. Barrier  mild cognitive-linguistic deficits    Intervention  cognitive retraining    Goal written by:  Sheree White, MADINA-SLP     4. Discipline  Nursing    Goal  Prevent falls    Barrier  Weakness and unsteady when moblile    Intervention  Prevent injury (nonsid socks, doesn't walk without assistance. Wheelchair breaks on while transferring, bed in the lowest locked position, necessary items within reach, call bell within reach,urinal, food items and side rails up x 3     Goal written by:  Dottie Blevins LPN     5. Discipline  Clinical Psychology    Goal  Maintain mood stability and treatment effort on ARU    Barrier  Stress with prolonged hospitalization and complications    Intervention  Patient education and support , as needed    Goal written by:  Donnell Jamison, PhD     6.   Discipline  Nutrition / Dietetics    Goal  - PO nutrition intake will meet >75% of patient estimated nutritional needs within the next 7 days. Barrier  appetite; dislike of hospital meals    Intervention  Continue current nutrition interventions. Encourage/ monitor po intake of meals and supplements. Goal written by:  Yesenia Frias RD       Disposition / Discharge Planning: Follow-up services:  [x] Physical Therapy             [] Occupational Therapy       [] Speech Therapy           [] Skilled Nursing      [] Medical Social Worker   [] Aide        [] Outpatient      [] vs   [x] Home Health  [] vs       [] to progress to outpatient       [] with 24-hour supervision       [] with 24-hour assistance   [] [place]   [facility] recommendations:  RW   Estimated discharge date:  tbd   Discharge Location:  [] Home  [] versus    [] [facility]    [] [address]   [] Other:           Electronic Signatures:      Signature Date Signed   Physical Therapist    Galina Caldwell PT, DPT  6/30/2021   Occupational Therapist    Reed Heimlich OTR/L  7/1/2021   Speech Therapist    [address], [address] [facility]  6/30/21   Recreational Therapist    Omid Schofield, CTRS 6/30/2021   Nursing    Bishop Jodi MONTILLA/Lien HELLER [facility] RN  06/30/21   Dietitian    Yesenia Frias, [address] [place] [address]  6/30/2021   Clinical Psychologist    Jordana Acuna, PhD  6/30/2021    Physician    Milton Paige MD   6/30/2021        JOSIE Serrano  6/30/2021     Opportunity to share with family/caregiver[] YES [] NO    Relationship to patient____________________________________________________      The above information has been reviewed with the patient in a language that they can understand. Opportunity for comments and questions has been provided and a signed attestation has been scanned into the \"media tab\" of the EMR.       Patient Signature: ______________________________________________________    Date Signed: __________________________________________________________

## 2021-07-01 NOTE — ROUTINE PROCESS
SHIFT CHANGE NOTE FOR [facility]    Bedside and Verbal shift change report given to Reuben Blackman, RN (oncoming nurse) by Shira Valderrama, RN (offgoing nurse). Report included the following information SBAR, Kardex, MAR and Recent Results. Situation:   Code Status: Full Code   Hospital Day: 2   Problem List:   Hospital Problems  Date Reviewed: 6/30/2021        Codes Class Noted POA    Mesenteric abscess ([address] ICD-10-CM: K65.1  ICD-9-CM: 567.22  6/21/2021 Yes        * (Principal) Diverticulitis of large intestine with abscess ICD-10-CM: K57.20  ICD-9-CM: 562.11, 569.5  6/21/2021 Yes        History of sinus bradycardia ICD-10-CM: Z86.79  ICD-9-CM: V12.59  6/21/2021 Yes    Overview Signed 6/29/2021 10:02 PM by Jasmeet Kurtz MD     Attributed to Carvedilol 25 mg PO BID with meals             Generalized weakness ICD-10-CM: R53.1  ICD-9-CM: 780.79  6/21/2021 Yes        Mixed hyperlipidemia (Chronic) ICD-10-CM: Y01.8  ICD-9-CM: 272.2  Unknown Yes        Heart failure with preserved left ventricular function (HFpEF) (HCC) (Chronic) ICD-10-CM: I50.30  ICD-9-CM: 428.9  Unknown Yes    Overview Signed 6/7/2021 11:03 PM by Jasmeet Kurtz MD     2D echocardiogram (5/18/2021) showed EF 50%; concentric LV hypertrophy with a severely thickened septal wall and mildly thickened posterior wall; left atrial chamber is severely enlarged; right atrial chamber volume is moderately enlarged; no mass, shunts, or thrombi.               Coronary artery disease involving native coronary artery of native heart (Chronic) ICD-10-CM: I25.10  ICD-9-CM: 414.01  Unknown Yes        Diverticulosis of sigmoid colon (Chronic) ICD-10-CM: K57.30  ICD-9-CM: 562.10  5/22/2021 Yes        Anticoagulated by anticoagulation treatment ICD-10-CM: Z79.01  ICD-9-CM: V58.61  5/19/2021 Yes    Overview Signed 6/7/2021 10:48 PM by Jasmeet Kurtz MD     On Apixaban             On clopidogrel therapy ICD-10-CM: Z79.01  ICD-9-CM: V58.61  5/19/2021 Yes        Status post insertion of drug eluting coronary artery stent ICD-10-CM: Z95.5  ICD-9-CM: V45.82  5/18/2021 Yes    Overview Signed 6/18/2021  1:38 PM by Theresa Emery MD     S/P PCI to proximal SVG with 3.5 x 12 mm [place] drug-eluting stent; PCI to mid SVG with 3.5 x 34 mm [place] drug-eluting stent; PCI to distal SVG with 3.5 x 15 mm [place] J drug-eluting stent;  Balloon angioplasty to distal SVG anastomosis (5/18/2021 - Dr. Dyan Daniel)              Non-ST elevation (NSTEMI) myocardial infarction [facility]) ICD-10-CM: I21.4  ICD-9-CM: 410.70  5/17/2021 Yes        Paroxysmal atrial fibrillation ([facility] [address] ICD-10-CM: I48.0  ICD-9-CM: 427.31  5/17/2021 Yes        Impaired mobility and ADLs ICD-10-CM: Z74.09, Z78.9  ICD-9-CM: V49.89  5/17/2021 Yes        Neuropathy involving both lower extremities (Chronic) ICD-10-CM: B24.48  ICD-9-CM: 356.9  10/22/2020 Yes        Essential hypertension (Chronic) ICD-10-CM: I10  ICD-9-CM: 401.9  Unknown Yes        History of coronary artery bypass graft ICD-10-CM: Z95.1  ICD-9-CM: V45.81  2001 Yes    Overview Signed 6/7/2021 11:04 PM by Theresa Emery MD     [facility]                   Background:   Past Medical History:   Past Medical History:   Diagnosis Date    Acute embolic stroke ([facility] [address] 0/48/9891    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, brainstem and cerebellum) without residual deficit    Anticoagulated by anticoagulation treatment 5/19/2021    On Apixaban    Benign prostatic hyperplasia with urinary retention     Chronic obstructive pulmonary disease (COPD) ([facility] [address]     Coronary artery disease involving native coronary artery of native heart     Diverticulitis of large intestine with abscess 6/21/2021    Diverticulosis of sigmoid colon 5/22/2021    Essential hypertension     Heart failure with preserved left ventricular function (HFpEF) ([facility] [address]     2D echocardiogram (5/18/2021) showed EF 50%; concentric LV hypertrophy with a severely thickened septal wall and mildly thickened posterior wall; left atrial chamber is severely enlarged; right atrial chamber volume is moderately enlarged; no mass, shunts, or thrombi.     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[address] 6/21/2021    Neuropathy involving both lower extremities 10/22/2020    Non-ST elevation (NSTEMI) myocardial infarction ([address] 5/17/2021    On clopidogrel therapy 5/19/2021    Paroxysmal atrial fibrillation ([facility] [address] 5/17/2021    Peripheral vascular disease of extremity with claudication ([facility] [address] 6/14/2021    Urinary retention         Assessment:   Changes in Assessment throughout shift: No change to previous assessment     Patient has a central line: no Reasons if yes: na  Insertion date:na Last dressing date:na   Patient has [place] Cath: no Reasons if yes: na   Insertion date:na  Shift matamoros care completed: NO     Last Vitals:     Vitals:    06/30/21 1520 06/30/21 1529 06/30/21 1534 06/30/21 2009   BP: (!) 141/85   120/76   Pulse: 92   75   Resp: 16   18   Temp: 97.7 °F (36.5 °C)   98.1 °F (36.7 °C)   SpO2: 94%   98%   Weight:   80.8 kg (178 lb 1.6 oz)    Height:  5' 8\" (1.727 m)          PAIN    Pain Assessment    Pain Intensity 1: 6 (07/01/21 0508) Pain Intensity 1: 2 (12/29/14 1105)    Pain Location 1: Foot Pain Location 1: Abdomen    Pain Intervention(s) 1: Medication (see MAR) Pain Intervention(s) 1: Medication (see MAR)  Patient Stated Pain Goal: 0 Patient Stated Pain Goal: 0  o Intervention effective: yes  o Other actions taken for pain: Medication (see MAR)none     Skin Assessment  Skin color Skin Color: Appropriate for ethnicity  Condition/Temperature Skin Condition/Temp: Dry, Warm  Integrity Skin Integrity: Intact  Turgor Turgor: Non-tenting  Weekly Pressure Ulcer Documentation  Pressure  Injury Documentation: No Pressure Injury Noted-Pressure Ulcer Prevention Initiated  Wound Prevention & Protection Methods  Orientation of wound Orientation of Wound Prevention: Posterior  Location of Prevention Location of Wound Prevention: Sacrum/Coccyx  Dressing Present Dressing Present : No  Dressing Status Dressing Status: Intact  Wound Offloading Wound Offloading (Prevention Methods): Bed, pressure redistribution/air, Bed, pressure reduction mattress, Repositioning, Wheelchair     INTAKE/OUPUT  Date 06/30/21 0700 - 07/01/21 0659 07/01/21 0700 - 07/02/21 0659   Shift 3586-6091 1087-3128 24 Hour Total 6976-3222 6587-9226 24 Hour Total   INTAKE   Shift Total(mL/kg)         OUTPUT   Urine(mL/kg/hr)           Urine Occurrence(s) 2 x 1 x 3 x      Stool           Stool Occurrence(s) 2 x 1 x 3 x      Shift Total(mL/kg)         NET         Weight (kg) 80.8 80.8 80.8 80.8 80.8 80.8       Recommendations:  1. Patient needs and requests: met    2. Pending tests/procedures: lab     3. Functional Level/Equipment: Partial (one person) / Bed (comment); Wheelchair;Walker    Fall Precautions:   Fall risk precautions were reinforced with the patient; he was instructed to call for help prior to getting up. The following fall risk precautions were continued: bed/ chair alarms, door signage, yellow bracelet and socks as well as update of the Diana Ban tool in the patient's room. [place] Score: 3    HEALS Safety Check    A safety check occurred in the patient's room between off going nurse and oncoming nurse listed above.     The safety check included the below items  Area Items   H  High Alert Medications - Verify all high alert medication drips (heparin, PCA, etc.)   E  Equipment - Suction is set up for ALL patients (with laila)  - Red plugs utilized for all equipment (IV pumps, etc.)  - WOWs wiped down at end of shift.  - Room stocked with oxygen, suction, and other unit-specific supplies   A  Alarms - Bed alarm is set for fall risk patients  - Ensure chair alarm is in place and activated if patient is up in a chair   L  Lines - Check IV for any infiltration  - Taveras bag is empty if patient has a Taveras   - Tubing and IV bags are labeled   S  Safety   - Room is clean, patient is clean, and equipment is clean. - Hallways are clear from equipment besides carts. - Fall bracelet on for fall risk patients  - Ensure room is clear and free of clutter  - Suction is set up for ALL patients (with yanker)  - Hallways are clear from equipment besides carts.    - Isolation precautions followed, supplies available outside room, sign posted     Manuela Aguilar RN

## 2021-07-01 NOTE — PROGRESS NOTES
Problem: Self Care Deficits Care Plan (Adult)  Goal: *Therapy Goal (Edit Goal, Insert Text)  Description: Occupational Therapy Goals   Long Term Goals  Initiated 2021 and to be accomplished within 2 week(s) 2021  1. Pt will perform self-feeding with I.  2. Pt will perform grooming with Mod I.  3. Pt will perform UB bathing with Mod I.  4. Pt will perform LB bathing with Supv.  5. Pt will perform tub/shower transfer with SBA. 6. Pt will perform UB dressing with Mod I.  7. Pt will perform LB dressing with Supv.  8. Pt will perform toileting task with Mod I.  9. Pt will perform toilet transfer with Supv. Short Term Goals   Initiated 2021 and to be accomplished within 7 day(s) to be accomplished by 2021  1. Pt will perform self-feeding with Mod I.   2. Pt will perform grooming with set-up assistance. 3. Pt will perform UB bathing with SBA. 4. Pt will perform LB bathing with CGA w/ use of AE as needed. 5. Pt will perform tub/shower transfer with CGA. 6. Pt will perform UB dressing with Mod I.  7. Pt will perform LB dressing with CGA w/ use of AE as needed. 8. Pt will perform toileting task with CGA. 9. Pt will perform toilet transfer with CGA w/ least restrictive AD. Outcome: Progressing Towards Goal   Occupational Therapy TREATMENT    Patient: Stefanie Cage   80 y.o. Patient identified with name and : Yes    Date: 2021    First Tx Session  Time In: 1330  Time Out[de-identified] 0178    Diagnosis: Diverticulitis of large intestine with abscess [K57.20]   Precautions: Fall  Chart, occupational therapy assessment, plan of care, and goals were reviewed. Pain:  Pt reports 0/10 pain or discomfort prior to treatment. Pt reports 0/10 pain or discomfort post treatment. Intervention Provided: Patient reported discomfort of both feet described as 'pins and needle' sensation; MD made aware during today's team conference.     SUBJECTIVE:   Patient stated I take care of my own medications and do the cooking\". OBJECTIVE DATA SUMMARY:     THERAPEUTIC ACTIVITY Daily Assessment    Functional mobility training performed RW level w/ CGA during cone retrieval task (10 cones x1 trial) completed in OT kitchen. Therapist provided vc's for standing w/in frame of walker and for positioning of walker to facilitate increased safety and stability when reaching to upper cabinets. Functional mobility training performed for increased safety and indep w/ ADL/IADL tasks for carryover to home environment. There act: Pt performed functional reaching (out-front) during balloon tap and catch activity w/ therapist while seated edge of mat. There act performed to challenge pt.'s sitting balance and to facilitate increased act den for indep w/ ADLs. Pt demonstrated good tolerance and good static/dynamic sitting balance. THERAPEUTIC EXERCISE Daily Assessment    BUE there ex performed using arm bike moderate resistance for 15 min duration for increased strength and endurance for ADLs/IADLs. Pt demonstrated good tolerance during sustained resistive challenge w/ one rest break mid way and one at completion. BUE strengthening performed seated edge of mat using red flexbar (10# resistance) for 10 reps x 2 sets for bending bar up, down, and twisting motion. Vc's and demonstration for hand positioning and technique. Rest breaks between sets secondary to fatigue.      TOILETING Daily Assessment    Toileting  Toileting Assistance (FIM Score): 4 (Minimal assistance)  Cues: Tactile cues provided;Verbal cues provided;Physical assistance for pants up  Adaptive Equipment: Elevated seat;Walker (CGA A w/ RW; Min A to reposition shorts posteriorly standing)     MOBILITY/TRANSFERS Daily Assessment    Functional Transfers  Toilet Transfer : Other (comment) (Stand step w/ walker at [facility])  Amount of Assistance Required: 4 (Contact guard assistance)     ASSESSMENT:  Pt will continue to benefit from skilled OT services to assess safety and indep w/ self-care, ADL related mobility, instruction in use of compensatory strategies, increase in safety awareness and fall prevention, increased functional act den, strengthening and ROM, and dynamic/static standing balance in order to increase safety and indep w/ ADL/IADL routines. Pt demonstrated good motivation throughout today's therapy session. Progression toward goals:  [x]          Improving appropriately and progressing toward goals  []          Improving slowly and progressing toward goals  []          Not making progress toward goals and plan of care will be adjusted     PLAN:  Patient continues to benefit from skilled intervention to address the above impairments. Continue treatment per established plan of care. Discharge Recommendations:  Home Health w/ assistance by spouse and To Be Determined  Further Equipment Recommendations for Discharge:  bedside commode, gait belt, and transfer bench     Activity Tolerance: Good    Estimated LOS: estimated dc 7/13/2021    Please refer to the flowsheet for vital signs taken during this treatment. After treatment:   [x]  Patient left in no apparent distress sitting up in wheelchair   [x]  Patient left in no apparent distress in bed  [x]  Call bell left within reach  [x]  Nursing notified  []  Caregiver present  [x]  Bed alarm wheelchair activated    COMMUNICATION/EDUCATION:   [x] Home safety education was provided and the patient/caregiver indicated understanding. [x] Patient/family have participated as able in goal setting and plan of care. [x] Patient/family agree to work toward stated goals and plan of care. [] Patient understands intent and goals of therapy, but is neutral about his/her participation. [] Patient is unable to participate in goal setting and plan of care.       [address] [facility], OT

## 2021-07-01 NOTE — PROGRESS NOTES
Problem: Mobility Impaired (Adult and Pediatric)  Goal: *Therapy Goal (Edit Goal, Insert Text)  Description: Physical Therapy Short Term Goals  Initiated 6/30/2021 and to be accomplished within 7 day(s) on 7/7/2021:  1. Patient will move from supine to sit and sit to supine , scoot up and down, and roll side to side in bed with supervision/set-up. 2.  Patient will transfer from bed to chair and chair to bed with supervision/set-up using the least restrictive device. 3.  Patient will perform sit to stand with supervision/set-up. 4.  Patient will ambulate with supervision/set-up for 150 feet with the least restrictive device. 5.  Patient will ascend/descend 14 stairs with 1-2 handrail(s) with minimal assistance/contact guard assist.    Physical Therapy Long Term Goals  Initiated 6/30/2021 and to be accomplished within 10-14 day(s) 7/14/2021  1. Patient will move from supine to sit and sit to supine , scoot up and down, and roll side to side in bed with modified independence. 2.  Patient will transfer from bed to chair and chair to bed with modified independence using the least restrictive device. 3.  Patient will perform sit to stand with modified independence. 4.  Patient will ambulate with modified independence for at least 150 feet with the least restrictive device. 5.  Patient will ascend/descend 14 stairs with 2 handrail(s) with supervision/set-up. 6.  Patient will ascend/descend 1 curb step with appropriate AD with supervision/setup to safely get into and out of house. Outcome: Progressing Towards Goal   PHYSICAL THERAPY TREATMENT    Patient: Laron Baptiste (21 y.o. male)  Date: 7/1/2021  Diagnosis: Diverticulitis of large intestine with abscess [K57.20] Diverticulitis of large intestine with abscess  Precautions: Fall  Chart, physical therapy assessment, plan of care and goals were reviewed.     Time IS:1486  Time Out:1003   Time In: 1135  Time Out: 1210    Patient seen for: Gait training;Patient education; Therapeutic exercise;Transfer training; Wheelchair mobility    Pain:  Patient denied pain during session. Patient identified with name and : yes    SUBJECTIVE:      Patient agreeable to treatment. Reporting that he slept all right and was willing to work with PT.     OBJECTIVE DATA SUMMARY:    Objective:     TRANSFERS Daily Assessment     Transfer Type: Other  Other: stand step with RW  Transfer Assistance : 4 (Contact guard assistance)  Sit to Stand Assistance: Contact guard assistance     Emphasis on sit to stand from 23\" height mat table surface with bilat UE at distal thighs to improve functional LE strength. Performing 3 x 5 reps, seated rests in between bouts. Continues to rely heavily on bilat UE to get up into standing from lower surfaces including w/c. GAIT Daily Assessment    Gait Description (WDL) Exceptions to WDL    Gait Abnormalities Decreased step clearance (forward flexed posture)    Assistive device Walker, rolling;Gait belt    Ambulation assistance - level surface 4 (Contact guard assistance)    Distance 260 Feet (ft)    Ambulation assistance- uneven surface  (NT)    Comments Patient performing gait with cues for more upright posture and staying closer to middle of RW. Performing x 260 ft bout during first session, then 2 150-ft bouts during second session.            BALANCE Daily Assessment     Sitting - Static: Good (unsupported)  Sitting - Dynamic: Good (unsupported)  Standing - Static: Fair  Standing - Dynamic : Impaired   Keller Balance Scale: 30/56  *Indicates increased risk for falls        WHEELCHAIR MOBILITY Daily Assessment     Able to Propel (ft): 150 feet (primarily using bilat UE to propel)  Functional Level: 5  Curbs/Ramps Assist Required (FIM Score): 0 (Not tested)  Wheelchair Setup Assist Required : 5 (Stand-by assistance)  Wheelchair Management: Manages left brake;Manages right brake (using brake extender)     W/c mobility primarily using bilat UE to propel with bilat LE use intermittently. THERAPEUTIC EXERCISES Daily Assessment       Seated therex including LAQ 2# cuff weights bilat LE 3 x 12 reps. Standing therex to improve transfers and gait including hip flex marches and toe/heel raises 3 x 10-12 reps, using bilat UE support of RW.           ASSESSMENT:  Patient would continue to benefit from skilled PT to improve ability to perform safe functional mobility. Continues to have decreased functional strength in bilat LE, decreased balance and endurance. Ongoing cognitive impairment affecting ability to initiate and sequence tasks. Progression toward goals:  []      Improving appropriately and progressing toward goals  [x]      Improving slowly and progressing toward goals  []      Not making progress toward goals and plan of care will be adjusted      PLAN:  Patient continues to benefit from skilled intervention to address the above impairments. Continue treatment per established plan of care. Discharge Recommendations:  Home Health and support of spouse  Further Equipment Recommendations for Discharge:  RW      Estimated Discharge Date: 7/13/2021    Activity Tolerance:   Fair due to fatigue.     After treatment:   [] Patient left in no apparent distress in bed  [x] Patient left in no apparent distress sitting up in chair  [] Patient left in no apparent distress in w/c mobilizing under own power  [] Patient left in no apparent distress dining area  [] Patient left in no apparent distress mobilizing under own power  [x] Call bell left within reach  [] Nursing notified  [] Caregiver present  [] Bed alarm activated   [x] Chair alarm activated      Sydney Rome PT  7/1/2021

## 2021-07-01 NOTE — PROGRESS NOTES
[x] Psychology  [] Social Work [] Recreational Therapy    INTERVENTION  UNITS/TIME OF SERVICE   Assessment  July 1, 2021   Supportive Counseling    Orientation    Discharge Planning    Resource Linkage              Progress/Current Status    Patient seen for Psychological Evaluation as requested on readmission to ARU by Dr. Erica Vickers. Patient found sitting upright in wheelchair in bedroom after breakfast and immediately able to engage in discussion. He is obviously familiar with rehabilitation milieu and modalities and insists that he remains motivated to improve his functional status and be able to return to home with greater capability. Patient is not lamenting his recent transfer secondary to medical circumstances, but is aware that the possibility of surgery has been discussed and may yet to be affirmed as necessary. Regardless, he is not observed in distress about same and further denies problems with anxiety nor depression. No psychotropic medication is needed for mood nor behavior stability. He remains quite alert, verbally engaged and although with mild cognitive deficits, very able to engage in animated and detailed discussion. Patient will be monitored for any possible, emergent, emotional and/or behavioral difficulty while on ARU and be encouraged to persevere.     Glenis Bray, PHD 7/1/2021 9:40 AM

## 2021-07-01 NOTE — INTERDISCIPLINARY ROUNDS
[facility] PHYSICAL REHABILITATION  [address] [place] [address], [address]    INPATIENT REHABILITATION  TEAM CONFERENCE SUMMARY     Date of Conference: 7/1/2021    Patient Information:        Name: Ardis Spurling Age / Sex: 80 y.o. / male   CSN: [id number] MRN: [id number]   Admit Date: 6/29/2021 Length of Stay: 2 days     Primary Rehabilitation Diagnosis  1. Generalized weakness with Impaired mobility and ADLs  2.  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    Comorbidities  Patient Active Problem List   Diagnosis Code    Urinary retention R33.9    Essential hypertension I10    Non-ST elevation (NSTEMI) myocardial infarction ([facility] [address] I21.4    Paroxysmal atrial fibrillation ([facility]) I48.0    Anticoagulated by anticoagulation treatment Z79.01    Mixed hyperlipidemia E78.2    Benign prostatic hyperplasia with urinary retention N40.1, R33.8    Malignant neoplasm of lower lobe of right lung ([facility]) C34.31    Heart failure with preserved left ventricular function (HFpEF) ([facility]) I50.30    Coronary artery disease involving native coronary artery of native heart I25.10    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    Chronic obstructive pulmonary disease (COPD) ([facility]) J44.9    History of carotid stenosis Z86.79    History of renal artery stenosis Z86.79    On clopidogrel therapy Z79.01    History of gross hematuria P29.295    Acute embolic stroke ([facility]) D19.4    Leukocytosis D72.829    Peripheral vascular disease of extremity with claudication ([facility]) I73.9    Neuropathy involving both lower extremities G57.93    Impaired mobility and ADLs Z74.09, Z78.9    Status post insertion of drug eluting coronary artery stent Z95.5    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    Diverticulitis of large intestine with abscess K57.20    Diverticulosis of sigmoid colon K57.30    History of sinus bradycardia Z86.79    Generalized weakness R53.1          Therapy:     FIM SCORES Initial Assessment Weekly Progress Assessment 7/1/2021   Eating Functional Level: 6  6 Mod I following s/u   Swallowing     Grooming 5  5 set-up w/ cues for task initiation   Bathing 4     5- Supv UB bathing   4- Min A LB bathing   Upper Body Dressing Functional Level: 5  5-Supv UB dressing   Lower Body Dressing Functional Level: 4  4-Min A LB dressing   Toileting Functional level: 4.5 CGA  4.5-CGA   Bladder 0 0   Bowel  0 0   Toilet Transfer [place] 4  4-Min A for sit to stand and CGA w/ RW    Tub/Shower Transfer [place] Tub/Shower Transfer Score: 4  Comments: CGA w/ for simulated shower transfer w/ RW to tub transfer bench 4-Min A (simulated task)  4-Min A (simulated task)      Comprehension Primary Mode of Comprehension: Auditory  Score: 5Primary Mode:  Auditory 5  5      Expression Primary Mode of Expression: Verbal  Score: 6   6   Social Interaction Score: 5  6   Problem Solving Score: 3  5   Memory Score: 4  4     FIM SCORES Initial Assessment Weekly Progress Assessment 7/1/2021   Bed/Chair/Wheelchair Transfers Transfer Type: Other  Other: stand step with RW  Transfer Assistance : 4 (Minimal assistance)  Sit to Stand Assistance:  (mod A from low bed height surface, CG/min A otherwise)  Car Transfers: Minimum assistance (using RW)  Car Type: car transfer simulator Transfer Type:  Other  Other: stand step with RW  Transfer Assistance : 4 (Contact guard assistance)  Sit to Stand Assistance: Contact guard assistance   Bed Mobility Rolling Right 4 (Minimal assistance)   Rolling Left 4 (Minimal assistance)   Supine to Sit 4 (Minimal assistance)   Sit to Stand  (mod A from low bed height surface, CG/min A otherwise)   Sit to Supine 3 (Moderate assistance) (assist with lifting each LE up into bed)    Rolling Right    min A   Rolling Left    min A   Supine to Sit    Min A   Sit to Stand   Contact guard assistance   Sit to Supine    mod A      Locomotion (W/C) Able to Propel (ft): 160 feet  Functional Level: 4 (min A for steering)  Curbs/Ramps Assist Required (FIM Score): 0 (Not tested)  Wheelchair Setup Assist Required : 4 (Minimal assistance)  Wheelchair Management: Manages left brake, Manages right brake (using brake extender) Function 5  Setup Assistance  5 (Stand-by assistance)      Locomotion (W/C distance)   150 feet (primarily using bilat UE to propel)   Locomotion (Walk) 4 (Minimal assistance) 4 (Contact guard assistance)  Walker, rolling;Gait belt   Locomotion (Walk dist.) 160 Feet (ft) 260 Feet (ft)   Steps/Stairs Steps/Stairs Ambulated (#): 6  Level of Assist : 4 (Minimal assistance)  Rail Use: Both  6 stairs min A with bilat handrails         Nursing:     Neuro:   AAA&O x   4         Respiratory:   [x] WNL   [] O2 LPM:   Other:  Peripheral Vascular:   [] TEDS present   [] Edema present ____ Grade   Cardiac:   [x] WNL   [] Other  Genitourinary:   [x] continent   [] incontinent   [] matamoros  Abdominal ___06/30____ LBM  GI: _Reg______ Diet _Thin_____ Liquids _____ tube feeds  Musculoskeletal: ____ ROM Transfers wheelchair_____ Assistive Device Used  ___Mod_ Level of Assistance  Skin Integumentary:   [x] Intact   [] Not Intact   __________Preventative Measures  Details______________________________________________________________  Pain: [x] Controlled   [] Not Controlled   Pain Meds:   [] Scheduled   [] PRN        Registered Dietitian / Nutrition:   No data found. Pt was in acute rehab unit; then transferred to ED with mesenteric abscess and diverticulitis with abscess per CRS. Transferred back to rehab unit. Pt had poor/fair meal intake while in acute care hospital per chart review; per review, pt noted to dislike hospital meals. Ensure Enlive was ordered and continued following pt transfer back to rehab unit. Noted 9 lb wt loss x 1 year and 4 month PTA per chart hx; not significant. Will monitor po intake of meals/ supplements.       Supplements:          [x] Yes:  Ensure Enlive BID   [] No      Amount of supplement consumed:  Unable to assess at this time    No intake or output data in the 24 hours ending 07/01/21 1514                             Last bowel movement: 6/30 loose      Interdisciplinary Team Goals:     1. Discipline  Physical Therapy    Goal  Patient will perform bed<->chair transfer with RW at SBA level with only intermittent cues for safe hand placement. Barrier  Decreased strength, endurance, balance    Intervention  Therex, theract, neuromuscular re-ed    Goal written by:   Alina Lopes, PT, DPT     2. Discipline  Occupational Therapy    Goal  Patient will perform LB ADLs (bathing and dressing)self-care performance tasks w/ no more than CGA and intermittent verbal cues for task initiation. Barrier  Weakness, strength, standing balance    Intervention  ADL training, functional transfers, ADL related mobility w/ least restrictive AD, safety awareness, energy conservation strategies    Goal written by:  Alcieds Fink OTR/L     3. Discipline  Speech Therapy    Goal  Patient will read sentence length material and respond appropriately to content, 90% accuracy. Barrier  mild cognitive-linguistic deficits    Intervention  cognitive retraining    Goal written by:  Deana Velazco, CCC-SLP     4. Discipline  Nursing    Goal  Prevent falls    Barrier  Weakness and unsteady when moblile    Intervention  Prevent injury (nonsid socks, doesn't walk without assistance. Wheelchair breaks on while transferring, bed in the lowest locked position, necessary items within reach, call bell within reach,urinal, food items and side rails up x 3     Goal written by:  Serjio Mustafa LPN     5. Discipline  Clinical Psychology    Goal  Maintain mood stability and treatment effort on ARU    Barrier  Stress with prolonged hospitalization and complications    Intervention  Patient education and support , as needed    Goal written by:  Michelle Nugent, PhD     6.   Discipline  Nutrition / Dietetics    Goal  - PO nutrition intake will meet >75% of patient estimated nutritional needs within the next 7 days. Barrier  appetite; dislike of hospital meals    Intervention  Continue current nutrition interventions. Encourage/ monitor po intake of meals and supplements. Goal written by:  Celso Barkley RD       Disposition / Discharge Planning:      Follow-up services:  [x] Physical Therapy             [] Occupational Therapy       [] Speech Therapy           [] Skilled Nursing      [] Medical Social Worker   [] Aide        [] Outpatient      [] vs   [x] Home Health  [] vs       [] to progress to outpatient       [] with 24-hour supervision       [] with 24-hour assistance   [] Raudel HONG recommendations:  RW   Estimated discharge date:  tbd   Discharge Location:  [x] Home  [] versus    [] Merged with [facility]    [] [address]   [] Other:           Interdisciplinary team rounds were held this PM with the following team members:       Name   Physical Therapist    Manuela Coughlin PT, DPT   Occupational Therapist    Mago Cole MS, OTR/L   Speech Therapist    Sergio Decker, [id number] [facility]   Recreational Therapist    Kunal Rose  96. RN   Physician    Max Almanzar MD        JOSIE Min       Signed:  Max Almanzar MD    July 1, 2021

## 2021-07-01 NOTE — PROGRESS NOTES
Problem: Neurolinguistics Impaired (Adult)  Goal: *Speech Goal: (INSERT TEXT)  Description: Long term goals  Patient will:  1. Be oriented x 3 and recall events of the day, supervision. 2. Recall 3 words after 5 minutes, supervision. 3. Read I-2 sentence stimuli and respond appropriately, 90% accuracy. 4. Read short paragraphs and respond to content questions, 90% accuracy. 5. Write simple sentences with 90% accuracy (to dictation and then spontaneously to describe actions/objects). Short term goals (7/7/21): Patient will:  1. Be oriented x 3 and recall events of the day, min assist-supervision. 2. Recall 3 words after 5 minutes, min assist.  3. Read I sentence stimuli and respond appropriately, 70-80% accuracy. 4. Read short paragraphs and respond to content questions, 70-80% accuracy. 5. Write simple sentences with 70-80% accuracy (to dictation). Note:   Speech language pathology treatment    Patient: Stefanie Monge (47 y.o. male)  Date: 7/1/2021  Diagnosis: Diverticulitis of large intestine with abscess [K57.20] Diverticulitis of large intestine with abscess    Time in:1435  Time Out: 1500    Pain:  Pre-tx: No pain reported  Post tx: No pain reported    SUBJECTIVE:   Patient stated I've been working all day. OBJECTIVE:   Mental Status:  Ms. Zulay Kirkland was alert and oriented. Treatment & Interventions: The patient was seen for a 25 minute session. The session began late due to the patient's occupation therapy session running late.  The following treatment tasks were presented:  Neuro-Linguistics:   Orientation:     Supervision  Recent memory:    Supervision  Recall 3 words:    Supervision  Reading single sentences:   100%, 2 self-corrections noted  Responding appropriately to sentences: 100%  Writing sentences from dictation:  71%, patient often added extra letters into words      Response & Tolerance to Activities:  Mr. Zulay Kirkland was cooperative despite commenting that he was tired from a busy day. He tolerated all activities well. Pain:  Pain Scale 1: Numeric (0 - 10)  Pain Orientation 1: Right;Left  Pain Description 1: Tingling; Hypersensitivity;Numb  After treatment:   [x]       Patient left in no apparent distress sitting up in chair  []       Patient left in no apparent distress in bed  [x]       Call bell left within reach  []       Nursing notified  []       Caregiver present  []       Bed alarm activated    ASSESSMENT:   Progression toward goals:  []       Improving appropriately and progressing toward goals  [x]       Improving slowly and progressing toward goals  []       Not making progress toward goals and plan of care will be adjusted    PLAN:   Patient continues to benefit from skilled intervention to address the above impairments. Continue treatment per established plan of care.   Discharge Recommendations:  Home Health    Estimated LOS: Two-three weeks    [facility] Speech-Language Pathology Student  Time Calculation: 25 mins

## 2021-07-01 NOTE — PROGRESS NOTES
Keller Balance Scale    Patient: Anil Roberts (82 y.o. male)  Date: 7/1/2021  Diagnosis: Diverticulitis of large intestine with abscess [K57.20] Diverticulitis of large intestine with abscess  Precautions: Fall  Evaluator: Mary Ervin PT    Sitting to standing  INSTRUCTIONS: Please stand up. Try not to use your hand for support. [] 4 able to stand without using hands and stabilize independently  [] 3 able to stand independently using hands  [x] 2 able to stand using hands after several tries  [] 1 needs minimal aid to stand or stabilize  [] 0 needs moderate or maximal assist to stand    2. Standing unsupported  INSTRUCTIONS: Please stand for two minutes without holding on. [x] 4 able to stand safely for two minutes without holding on  [] 3 able to stand for two minutes with supervision  [] 2 able to stand 30 seconds unsupported  [] 1 needs several tries to stand 30 seconds unsupported  [] 0 unable to stand 30 seconds unsupported    If a subject is able to stand 2 minutes unsupported, score full points for sitting unsupported. Proceed to item #4.    3. Sitting with back unsupported but feet supported on floor or on a stool  INSTRUCTIONS: Please sit with arms folded for 2 minutes    [x] 4 able to sit safely and securely for 2 minutes  [] 3 able to sit 2 minutes under supervision  [] 2 able to sit 30 seconds  [] 1 able to sit 10 seconds  [] 0 unable to sit without support 10 seconds    4. Standing to sitting  INSTRUCTIONS: Please sit down  [] 4 sits safely with minimal use of hands  [x] 3 controls decent by using hands  [] 2 uses back of legs against chair to control descent  [] 1 sits independently but has uncontrolled descent  [] 0 needs assist to sit    5. Transfers  INSTRUCTIONS: Arrange chair(s) for pivot transfer. Ask subject to transfer one way toward a seat with armrests and one way toward a seat without armrests.  You may use two chairs (one with and one without armrests) or a bed and a chair.  [] 4 able to transfer safely with minor use of hands  [] 3 able to transfer safely definite need of hands  [x] 2 able to transfer with verbal cuing and/or supervision  [] 1 needs one person to assist  [] 0 needs two people to assist or supervise to be safe    6. Standing unsupported with eyes closed  INSTRUCTIONS: Please close your eyes and stand still for 10 seconds. [] 4 able to stand for 10 seconds safely  [x] 3 able to stand for 10 seconds with supervision  [] 2 able to stand 3 seconds  [] 1 unable to keep eyes closed 3 seconds but stays safely  [] 0 needs help to keep from falling    7. Standing unsupported with feet together  INSTRUCTIONS: Place your feet together and stand without holding on  [] 4 able to place feet together independently and stand 1 minute safely  [x] 3 able to place feet together independently and stand 1 minute with supervison  [] 2 able to place feet together independently but unable to hold for 30 seconds  [] 1 needs help to attain position but able to stand 15 seconds feet together  [] 0 needs help to attain position and unable to hold for 15 seconds    8. Reaching forward with outstretched arm while standing  INSTRUCTIONS: Lift arm to 90 degrees. Stretch out your fingers and reach forward as far as you can. (Examiner places a ruler at the end of fingertips when arm is at 90 degrees. Fingers should not touch the ruler while reaching forward. The recorded measure is the distance forward that the fingers reach while the subject is in the most forward lean position. When possible, ask subject to use both arms when reaching to avoid rotation of the trunk.)  [] 4 can reach forward confidently 25cm (10 inches)  [] 3 can reach forward 12 cm (5 inches)  [] 2 can reach forward 5 cm (2 inches)  [x] 1 reaches forward but needs supervision  [] 0 loses balance while trying/requires external support    9.   object from the floor from a standing position  INSTRUCTIONS:  the shoe/slipper, which is place in front of your feet  [] 4 able to  slipper safely and easily  [x] 3 able to  slipper but needs supervision  [] 2 unable to  but reaches 2-5 cm(1-2 inches) from slipper and keeps balance independently   [] 1 unable to  and needs supervision while trying  [] 0 unable to try/needs assist to keep from losing balance or falling    10. Turning to look behind over left and right shoulders while standing  INSTRUCTIONS: Turn to look directly behind you over toward the left shoulder. Repeat to the right. Examiner may pick and object to look at directly behind the subject to encourage a better twist turn. [] 4 looks behind from both sides and weight shifts well  [x] 3 looks behind one side only other side shows less weight shift  [] 2 turns sideways only but maintains balance  [] 1 needs supervision when turning  [] 0 needs assist to keep from losing balance or falling    11. Turn 360 degrees  INSTRUCTIONS: turn complete around in a full [place]. Pause. Then turn a full [place] in the other direction  [] 4 able to turn 360 degrees safely in 4 seconds or less  [] 3 able to turn 360 degrees safely one side on 4 seconds or less  [] 2 able to turn 360 degrees safely but slowly  [] 1 needs close supervision or verbal cuing  [x] 0 needs assistance while turning    12. Place alternate foot on step or stool while standing unsupported  INSTRUCTIONS: Place each foot alternately on the step/stool. Continue until each foot has touch the step/stool four times. [] 4 able to stand independently and safely and complete 8 steps in 20 seconds  [] 3 able to stand independently and complete 8 steps >20 seconds  [] 2 able to complete 4 steps without aid with supervision  [] 1 able to complete >2 steps needs minimal assist  [x] 0 needs assist to keep from falling/unable to try    13.  Standing unsupported one foot in front  INSTRUCTIONS: (DEMONSTRATE TO SUBJECT) Place one foot directly in front of the other. If you feel that you cannot place your foot directly in front, try to stop far enough ahead that the heel of your forward foot is ahead of the toes of the other foot. (To score 3 points, the length of the step should exceed the length of the other foot and the width of the stand should approximate the subject's normal stride width). [] 4 able to place foot tandem independently and hold 30 seconds  [] 3 able to place the foot ahead independently and hold 30 seconds  [x] 2 able to take small step independently and hold 30 seconds  [] 1 needs help to step but can hold 15 seconds  [] 0 loses balance while stepping or standing    14.  Standing on one leg  INSTRUCTIONS: stand on one leg as long as you can without holding on  [] 4 able to lift leg independently and hold >10 seconds  [] 3 able to lift leg independently and hold 5-10 seconds  [] 2 able to lift leg independently and hold for >3 seconds  [] 1 tries to lift leg unable to hold 3 seconds but remains standing independently  [x] 0 unable to try/needs assist to prevent fall    __30/56__ TOTAL SCORE (Maximum =56)

## 2021-07-02 PROBLEM — R19.5 LOOSE STOOLS: Status: ACTIVE | Noted: 2021-01-01

## 2021-07-02 NOTE — PROGRESS NOTES
[facility] PHYSICAL REHABILITATION  [address] [place] [address], [address]     INPATIENT REHABILITATION  DAILY PROGRESS NOTE     Date: 7/2/2021    Name: Silviano Smith Age / Sex: 80 y.o. 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 Blood pressure controlled. No documented fever since admission. Patient's Complaint:   Loose stools since he had been oral antibiotics    Pain Control: stable, mild-to-moderate joint symptoms intermittently, reasonably well controlled by current meds      Objective:     Vital Signs:  Patient Vitals for the past 24 hrs:   BP Temp Pulse Resp SpO2   07/02/21 0723 (!) 140/89 97 °F (36.1 °C) 86 19 95 %   07/01/21 2022 130/83 98.3 °F (36.8 °C) 84 20 97 %   07/01/21 1507 (!) 155/89 98.9 °F (37.2 °C) (!) 101 18 98 %        Physical Examination:  GENERAL SURVEY: Patient is awake, alert, oriented x 3, sitting comfortably on the chair, not in acute respiratory distress. HEENT: pale palpebral conjunctivae, anicteric sclerae, no nasoaural discharge, moist oral mucosa  NECK: supple, no jugular venous distention, no palpable lymph nodes  CHEST/LUNGS: symmetrical chest expansion, good air entry, clear breath sounds  HEART: adynamic precordium, good S1 S2, no S3, regular rhythm, tachycardic, no murmurs  ABDOMEN: flat, bowel sounds appreciated, soft, non-tender  EXTREMITIES: pale nailbeds, bipedal edema, full and equal pulses, no calf tenderness   NEUROLOGICAL EXAM: The patient is awake, alert and oriented x3, able to answer questions fairly appropriately, able to follow 1 and 2 step commands. Able to tell time from the wall clock. Cranial nerves II-XII are grossly intact. No gross sensory deficit.   Motor strength is 4 to 4+/5 on all 4 extremities. Current Medications:  Current Facility-Administered Medications   Medication Dose Route Frequency    acetaminophen (TYLENOL) tablet 650 mg  650 mg Oral Q4H PRN    bisacodyL (DULCOLAX) tablet 10 mg  10 mg Oral Q48H PRN    amoxicillin-clavulanate (AUGMENTIN) 500-125 mg per tablet 1 Tablet  1 Tablet Oral BID WITH MEALS    cefpodoxime (VANTIN) tablet 200 mg  200 mg Oral Q12H    guaiFENesin ER (MUCINEX) tablet 600 mg  600 mg Oral DAILY    dextromethorphan (DELSYM) 30 mg/5 mL syrup 30 mg  30 mg Oral QHS    carvediloL (COREG) tablet 3.125 mg  3.125 mg Oral BID WITH MEALS    clopidogreL (PLAVIX) tablet 75 mg  75 mg Oral DAILY WITH BREAKFAST    rosuvastatin (CRESTOR) tablet 10 mg  10 mg Oral DAILY    nitroglycerin (NITROSTAT) tablet 0.4 mg  0.4 mg SubLINGual PRN    arformoteroL (BROVANA) neb solution 15 mcg  15 mcg Nebulization BID RT    ipratropium (ATROVENT) 0.02 % nebulizer solution 0.5 mg  0.5 mg Nebulization TID    albuterol (PROVENTIL VENTOLIN) nebulizer solution 2.5 mg  2.5 mg Nebulization Q4H PRN    apixaban (ELIQUIS) tablet 5 mg  5 mg Oral BID    multivitamin, tx-iron-ca-min (THERA-M w/ IRON) tablet 1 Tablet  1 Tablet Oral DAILY    lisinopriL (PRINIVIL, ZESTRIL) tablet 5 mg  5 mg Oral DAILY    tamsulosin (FLOMAX) capsule 0.4 mg  0.4 mg Oral DAILY WITH DINNER    gabapentin (NEURONTIN) capsule 300 mg  300 mg Oral TID       Allergies: Allergies   Allergen Reactions    Lipitor [Atorvastatin] Rash    Norvasc [Amlodipine] Rash       Lab/Data Review:  No results found for this or any previous visit (from the past 24 hour(s)). Assessment:     Primary Rehabilitation Diagnosis  1. Generalized weakness with Impaired mobility and ADLs  2.  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    Comorbidities  Patient Active Problem List   Diagnosis Code    Urinary retention R33.9    Essential hypertension I10    Non-ST elevation (NSTEMI) myocardial infarction ([facility] [address] I21.4    Paroxysmal atrial fibrillation ([facility]) I48.0    Anticoagulated by anticoagulation treatment Z79.01    Mixed hyperlipidemia E78.2    Benign prostatic hyperplasia with urinary retention N40.1, R33.8    Malignant neoplasm of lower lobe of right lung ([facility]) C34.31    Heart failure with preserved left ventricular function (HFpEF) ([facility]) I50.30    Coronary artery disease involving native coronary artery of native heart I25.10    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    Chronic obstructive pulmonary disease (COPD) ([facility]) J44.9    History of carotid stenosis Z86.79    History of renal artery stenosis Z86.79    On clopidogrel therapy Z79.01    History of gross hematuria A45.641    Acute embolic stroke ([facility]) K22.5    Leukocytosis D72.829    Peripheral vascular disease of extremity with claudication ([facility]) I73.9    Neuropathy involving both lower extremities G57.93    Impaired mobility and ADLs Z74.09, Z78.9    Status post insertion of drug eluting coronary artery stent Z95.5    Mesenteric abscess ([facility] [address] K65.1    Diverticulitis of large intestine with abscess K57.20    Diverticulosis of sigmoid colon K57.30    History of sinus bradycardia Z86.79    Generalized weakness R53.1       Plan:     1. Justification for continued stay: Good progression towards established rehabilitation goals. 2. Medical Issues being followed closely:    [x]  Fall and safety precautions     []  Wound Care     [x]  Bowel and Bladder Function     [x]  Fluid Electrolyte and Nutrition Balance     [x]  Pain Control      3. Issues that 24 hour rehabilitation nursing is following:    [x]  Fall and safety precautions     []  Wound Care     [x]  Bowel and Bladder Function     [x]  Fluid Electrolyte and Nutrition Balance     [x]  Pain Control      [x]  Assistance with and education on in-room safety with transfers to and from the bed, wheelchair, toilet and shower.       4. Acute rehabilitation plan of care:    [x]  Continue current care and rehab. [x]  Physical Therapy           [x]  Occupational Therapy           [x]  Speech Therapy     []  Hold Rehab until further notice     5. Medications:    [x]  MAR Reviewed     [x]  Continue Present Medications     6. DVT Prophylaxis:      []  Enoxaparin     []  Unfractionated Heparin     []  Warfarin     [x]  NOAC     []  MARTHA Stockings     []  Sequential Compression Device     []  None     7. Code status    [x]  Full code     []  Partial code     []  Do not intubate     []  Do not resuscitate     8. 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 Mild infiltrate in the superior segment of the right lower lobe with small right pleural effusion. No consolidation. 2. Delayed renal clearance, chronic renal disease suspected. Additionally, areas of cortical cysts with poor perfusion.  Pyelonephritis not excluded. No hydronephrosis. Correlation with urinalysis? 3. Diverticulosis, with potential diverticulitis in the left lower quadrant, junction of descending and sigmoid colon. However, evaluation is very limited due to severe motion artifacts. 4. Focal mucosal thickening in the proximal sigmoid. Further evaluation with barium enema or colonoscopy as indicated. > WBC count (5/23/2021) = 30.2                          > . .  .                           > WBC count (6/2/2021) = 25.4                          > WBC count (6/3/2021) = 22.5                          > WBC count (6/4/2021) = 23.4 > WBC count (6/5/2021) = 21.7                          > WBC count (6/6/2021) = 24.1                          > WBC count (6/7/2021) = 20.0                          > Work up done was negative for a source of infection              > WBC count (6/8/2021, on admission to the ARU) = 14.4   > On 6/8/2021, started Pericolace 2 tabs PO once daily after dinner   > On 6/9/2021, started Polyethylene glycol 17 grams in 8 oz water PO once daily   > On 6/19/2021:    > Patient was noted to have diarrhea    > Held:     > Polyethylene glycol 17 grams in 8 oz water PO once daily     > Pericolace 2 tabs PO once daily after dinner    > Started Bio K Plus 1 cap PO once daily   > Stool culture (collected 6/19/2021, resulted 6/20/2021): Negative   > WBC count (6/20/2021) = 26.7   > Blood culture x 2 sets (collected 6/20/2021, resulted on 6/26/2021) yielded no growth after 6 days   > On 6/20/2021, started:    > Cholestyramine 4 grams PO TID with meals    > Ceftriaxone 1000 mg IV q 24 hr    > Metronidazole 500 mg IV q 8 hr    > Vancomycin 1750 mg  IV x 1 dose   > WBC count (6/21/2021) = 26.3   > Infectious Disease consult (Dr. Rosa Ferreira) was called on 6/21/2021 for evaluation and comanagement   > On 6/21/2021:    > Discontinue:     > Polyethylene glycol 17 grams in 8 oz water PO once daily     > Pericolace 2 tabs PO once daily after dinner    > Change Vancomycin IV to Vancomycin 500 mg PO q 6 hr   > CT scan of the chest, abdomen and pelvis with contrast (6/21/2021) showed:    1. No acute findings in the chest.     -Emphysema. -CABG. -Stable mildly enlarged right thyroid gland which could indicate underlying nodule, stable for many years. 2. 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  It is unclear if initial source of abscess is from small bowel inflammation or diverticulitis/pericolonic abscess. 3. Dilated small bowel. This is likely a combination of generalized small bowel ileus and low-grade partial small bowel obstruction at site of abnormal pelvic small bowel loop discussed above. 4. Diverticulosis. Stable thickened collapsed segment of sigmoid colon since 2014 suggesting a chronic stricture. This is limited for assessment by CT. 5. Bladder dome inflammation is likely secondary to the adjacent mesentery process. 6. Other chronic incidental findings.    > On 6/21/2021:    > Discontinued:     > Cholestyramine 4 grams PO TID with meals     > Ceftriaxone 1000 mg IV q 24 hr     > Metronidazole 500 mg IV q 8 hr     > Vancomycin 500 mg PO q 6 hr    > Started Piperacillin-Tazobactam 3.375 grams IV q 6 hr   > Colorectal Surgery (Dr. Debbie Cooley) was called on 6/22/2021 for evaluation and comanagement    > No surgical intervention recommended since unable to safely stop Apixaban (for paroxysmal atrial fibrillation) and Clopidogrel (recent placement of 3 JENNIE to SVG-LCx on 5/18/2021)   > On 6/25/2021:    > Discontinued Piperacillin-Tazobactam 3.375 grams IV q 6 hr    > Started:     > Cefpodoxime 200 mg PO q 12 hr     > Metronidazole 500 mg PO q 12 hr   > On 6/28/2021:    > Discontinued Metronidazole 500 mg PO q 12 hr    > Started Amoxicillin-Clavulanate 500-125 1 tab PO q 12 hr      06/29/21  0211 06/28/21  0230 06/27/21  0516 06/25/21  0534 06/24/21  0522 06/23/21  0538 06/22/21  0409 06/21/21  1310 06/21/21  0330 06/20/21  0550   WBC 21.0* 24.5* 21.7* 18.4* 18.2* 22.1* 24.7* 26.0* 26.3* 26.7*      > Upon discharge from the ARU, the patient will need to follow up with Colorectal Surgery (Dr. Debbie Cooley)   > CT scan of the abdomen and pelvis with contrast on 7/15/2021   > Continue:    > Cefpodoxime 200 mg PO q 12 hr (STOP DATE: 7/21/2021)    > Amoxicillin-Clavulanate 500-125 1 tab PO q 12 hr (STOP DATE: 7/21/2021)    > 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, brainstem and cerebellum) without residual deficit   > Continue:    > Clopidogrel 75 mg PO once daily with breakfast    > Rosuvastatin 10 mg PO once daily    > Benign prostatic hyperplasia with urinary retention   > Continue Tamsulosin 0.4 mg PO once daily after dinner    > Chronic heart failure with preserved ejection fraction (HFpEF)   > 2D echocardiogram (5/18/2021) showed EF 50%; concentric LV hypertrophy with a severely thickened septal wall and mildly thickened posterior wall; left atrial chamber is severely enlarged; right atrial chamber volume is moderately enlarged; no mass, shunts, or thrombi.    > Continue Carvedilol 3.125 mg PO BID with meals (8AM, 5PM)    > Chronic obstructive pulmonary disease (COPD)   > Continue:    > Arformoterol nebulization BID    > Ipratropium nebulization TID    > Albuterol nebulization q 4 hr PRN for shortness of breath/wheezing    > Essential hypertension   > 2D echocardiogram (5/18/2021) showed EF 50%; concentric LV hypertrophy with a severely thickened septal wall and mildly thickened posterior wall; left atrial chamber is severely enlarged; right atrial chamber volume is moderately enlarged; no mass, shunts, or thrombi.    > Continue Carvedilol 3.125 mg PO BID with meals (8AM, 5PM)    > Mixed hyperlipidemia   > Lipid profile (5/18/2021) showed TG 83, , HDL 50, LDL 93   > Continue Rosuvastatin 10 mg PO once daily    > Non-ST elevation (NSTEMI) myocardial infarction; Coronary artery disease; History of CABG; S/P PCI to proximal SVG with 3.5 x 12 mm Juan J drug-eluting stent; PCI to mid SVG with 3.5 x 34 mm Juan J drug-eluting stent; PCI to distal SVG with 3.5 x 15 mm Juan J drug-eluting stent;  Balloon angioplasty to distal SVG anastomosis (5/18/2021 - Dr. Bel Patiño)    > Continue:    > Carvedilol 3.125 mg PO BID with meals (8AM, 5PM)    > Clopidogrel 75 mg PO once daily with breakfast    > Rosuvastatin 10 mg PO once daily    > Nitroglycerin 0.4 mg SL PRN for chest pain    > Paroxysmal atrial fibrillation, anticoagulated on Apixaban   > Continue:    > Apixaban 5 mg PO BID    > Carvedilol 3.125 mg PO BID with meals (8AM, 5PM)    > Peripheral vascular disease of extremity with claudication; Neuropathy involving both lower extremities   > (+) pain and numbness of toes of both feet   > PVL arterial doppler of both lower extremities with exercise (9/12/2019) showed:    > Moderate arterial insufficiency in the right lower extremity at the level of the trifurcation at rest.     > Severe arterial insufficiency in the left lower extremity at the level of the trifurcation at rest.   > PVL arterial doppler of both lower extremities with exercise (10/22/2020) showed:    > Mild right lower extremity arterial insufficiency of indeterminate level at rest.     > Moderate left lower extremity arterial insufficiency involving the femoral-popliteal segment at rest.     > The ankle brachial indices post exercise were unreliable as described. No evidence of inflow involvement.   > Planned to start patient on Cilostazol for the claudication symptoms but patient already on Apixaban + Clopidogrel; addition of Cilostazol may increase risk for bleeding; will hold off on adding Cilostazol for now   > As per the last Progress Note by Dr. Raj Figueroa (dated 10/22/2020):  \"Pt major symptom is not related to his PAD but sounds more like neuropathy\"   > On 6/14/2021, started a trial of Gabapentin 100 mg PO BID   > Upon discharge from the ARU, the patient will need to follow up with Vascular Surgery (Dr. Raj Figueroa)   > On 6/18/2021, increased Gabapentin from 100 mg PO BID to 100 mg PO TID   > On 6/28/2021, increased Gabapentin from 100 mg to 300 mg PO TID   > Continue Gabapentin 300 mg PO TID    > Transient bradycardia, while on Carvedilol 25 mg PO BID with meals (6/21/2021)   > On 6/21/2021, Carvedilol was held   > On 6/26/2021, Cardiology restarted the patient on Carvedilol 3.125 mg PO BID with meals   > Instructions of Cardiology:     > high dose due to risk for pacemaker and ongoing infeciton    > if heart rate stays consistently > 110 bpm, can increase to 6.25 mg BID   > Continue Carvedilol 3.125 mg PO BID with meals (8AM, 5PM)    > Loose stools, most likely side effect of oral antibiotics   > Start Loperamide 2 mg PO TID    > Analgesia   > Continue Acetaminophen 650 mg PO q 4 hr PRN for pain     > Diet:   > Specifications: Low fiber/No added salt (3-4 grams)   > Solids (consistency): Regular    > Liquids (consistency): Thin    > Fluid restriction: None      9. Personal Protective Equipment (DC47 face mask) was used while interacting with the patient. Patient was using a surgical mask. 10. Patient's progress in rehabilitation and medical issues discussed with the patient. All questions answered to the best of my ability. Care plan discussed with patient and nurse. 11. Total clinical care time is 30 minutes, including review of chart including all labs, radiology, past medical history, and discussion with patient. Greater than 50% of my time was spent in coordination of care and counseling.       Signed:    Vicente Carter MD    July 2, 2021

## 2021-07-02 NOTE — PROGRESS NOTES
07/02/21 0753   Edema   LLE 2+;Pitting   RLE 2+;Pitting     Dr. Estela Lazo notified. MARTHA isaac deferred d/t PVD. MD recommends that we continue to elevate legs. Feet also cool to touch, DP & PT pulses faint.     Latonia Estes MSN, RN, [address] [place] [address]  [phone]

## 2021-07-02 NOTE — PROGRESS NOTES
Pt is an 80year old male admitted to the ARU for sepsis. Pt is alert and oriented, alone in the room. Pt states that he lives with his wife in a 2 level home with no steps to enter and a full flight of steps to the second floor. Pt states that the master is on the second floor and that the bathroom is a tub shower. Pt states that he has a half bath on the first floor. Pt states that prior to admission he was able to self care and denies history with DME, home health, outpatient therapy and SNF. Pt states that his wife, Fozia Nieves ([phone]), is his POA and NOK. Pt states that he sees Dr Tran Lopez as his PCP and denies using the [place] for any services. Pt states that he fills his medications at [facility] on [address] and via [employer]. Sw offered the [facility] program and pt accepts. Pt states that he has had both COVID vaccines. Pt confirms his insurance as [employer] and [employer]. Pt states that he has a 40 % service connected disability with the [place]. Sw reviewed insurance updates, dc planning, dc date and team conference. Pt states understanding and denies questions. Sw will follow.

## 2021-07-02 NOTE — PROGRESS NOTES
Problem: Neurolinguistics Impaired (Adult)  Goal: *Speech Goal: (INSERT TEXT)  Description: Long term goals  Patient will:  1. Be oriented x 3 and recall events of the day, supervision. 2. Recall 3 words after 5 minutes, supervision. 3. Read I-2 sentence stimuli and respond appropriately, 90% accuracy. 4. Read short paragraphs and respond to content questions, 90% accuracy. 5. Write simple sentences with 90% accuracy (to dictation and then spontaneously to describe actions/objects). Short term goals (7/7/21): Patient will:  1. Be oriented x 3 and recall events of the day, min assist-supervision. 2. Recall 3 words after 5 minutes, min assist.  3. Read I sentence stimuli and respond appropriately, 70-80% accuracy. 4. Read short paragraphs and respond to content questions, 70-80% accuracy. 5. Write simple sentences with 70-80% accuracy (to dictation). Note:   Speech language pathology treatment    Patient: Ulises Valderrama (62 y.o. male)  Date: 7/2/2021  Diagnosis: Diverticulitis of large intestine with abscess [K57.20] Diverticulitis of large intestine with abscess    Time in: 1130, 1400  Time Out:  1200, 1430    Pain:  Pre-tx: No pain reported  Post tx: No pain reported    SUBJECTIVE:   Patient stated I read the bible when I'm down. OBJECTIVE:   Mental Status:  Mr. Trudy Cain was alert and oriented. Treatment & Interventions: The patient was seen for a 30 minute group education session and a 30 minute individual speech session. The following treatment tasks were presented:  Neuro-Linguistics:   Orientation:     Minimal assistance  Recent memory:    Supervision  Recall 3 words:    Maximum assistance  Read 1 sentence:    95%, self-corrections noted  Respond appropriately to questions:  100%  Write sentences from dictation:  70%, patient tended to rush and add or delete letters    Patient participated in a 30 minute stroke education group this morning.  The acronym BE FAST was presented for education regarding the signs and symptoms of stroke. SLP elaborated upon these and patients reported some of the symptoms they had experienced. Common risk factors (hypertension, high cholesterol, diabetes, etc) were discussed. Finally life style modifications were encouraged regarding risk factors. There was good participation in discussion and SLP responded to questions posed. Response & Tolerance to Activities:  Mr. Heber Joya was cooperative and tolerated all activities well. Pain:  Pain Scale 1: Numeric (0 - 10)  Pain Orientation 1: Left  Pain Description 1: Sore  After treatment:   [x]       Patient left in no apparent distress sitting up in chair  []       Patient left in no apparent distress in bed  [x]       Call bell left within reach  []       Nursing notified  []       Caregiver present  []       Bed alarm activated    ASSESSMENT:   Progression toward goals:  []       Improving appropriately and progressing toward goals  [x]       Improving slowly and progressing toward goals  []       Not making progress toward goals and plan of care will be adjusted    PLAN:   Patient continues to benefit from skilled intervention to address the above impairments. Continue treatment per established plan of care.   Discharge Recommendations:  Home Health    Estimated LOS: Through 7/13/21    Lady Harvey Speech-Language Pathology Student  Time calculation: 60 mins

## 2021-07-02 NOTE — ROUTINE PROCESS
SHIFT CHANGE NOTE FOR [facility]    Bedside and Verbal shift change report given to Farzad Gaston (oncoming nurse) by Reji Alan, KULDIP (offgoing nurse). Report included the following information SBAR, Kardex, MAR and Recent Results. Situation:   Code Status: Full Code   Hospital Day: 3   Problem List:   Hospital Problems  Date Reviewed: 7/1/2021        Codes Class Noted POA    Mesenteric abscess ([address] ICD-10-CM: K65.1  ICD-9-CM: 567.22  6/21/2021 Yes        * (Principal) Diverticulitis of large intestine with abscess ICD-10-CM: K57.20  ICD-9-CM: 562.11, 569.5  6/21/2021 Yes        History of sinus bradycardia ICD-10-CM: Z86.79  ICD-9-CM: V12.59  6/21/2021 Yes    Overview Signed 6/29/2021 10:02 PM by Timmy Tapia MD     Attributed to Carvedilol 25 mg PO BID with meals             Generalized weakness ICD-10-CM: R53.1  ICD-9-CM: 780.79  6/21/2021 Yes        Mixed hyperlipidemia (Chronic) ICD-10-CM: L18.1  ICD-9-CM: 272.2  Unknown Yes        Heart failure with preserved left ventricular function (HFpEF) (HCC) (Chronic) ICD-10-CM: I50.30  ICD-9-CM: 428.9  Unknown Yes    Overview Signed 6/7/2021 11:03 PM by Timmy Tapia MD     2D echocardiogram (5/18/2021) showed EF 50%; concentric LV hypertrophy with a severely thickened septal wall and mildly thickened posterior wall; left atrial chamber is severely enlarged; right atrial chamber volume is moderately enlarged; no mass, shunts, or thrombi.               Coronary artery disease involving native coronary artery of native heart (Chronic) ICD-10-CM: I25.10  ICD-9-CM: 414.01  Unknown Yes        Diverticulosis of sigmoid colon (Chronic) ICD-10-CM: K57.30  ICD-9-CM: 562.10  5/22/2021 Yes        Anticoagulated by anticoagulation treatment ICD-10-CM: Z79.01  ICD-9-CM: V58.61  5/19/2021 Yes    Overview Signed 6/7/2021 10:48 PM by Timmy Tapia MD     On Apixaban             On clopidogrel therapy ICD-10-CM: Z79.01  ICD-9-CM: V58.61  5/19/2021 Yes        Status post insertion of drug eluting coronary artery stent ICD-10-CM: Z95.5  ICD-9-CM: V45.82  5/18/2021 Yes    Overview Signed 6/18/2021  1:38 PM by Edvin Beckman MD     S/P PCI to proximal SVG with 3.5 x 12 mm [place] drug-eluting stent; PCI to mid SVG with 3.5 x 34 mm [place] drug-eluting stent; PCI to distal SVG with 3.5 x 15 mm [place] drug-eluting stent;  Balloon angioplasty to distal SVG anastomosis (5/18/2021 - Dr. Corey Harris)              Non-ST elevation (NSTEMI) myocardial infarction [facility]) ICD-10-CM: I21.4  ICD-9-CM: 410.70  5/17/2021 Yes        Paroxysmal atrial fibrillation ([facility] ICD-10-CM: I48.0  ICD-9-CM: 427.31  5/17/2021 Yes        Impaired mobility and ADLs ICD-10-CM: Z74.09, Z78.9  ICD-9-CM: V49.89  5/17/2021 Yes        Neuropathy involving both lower extremities (Chronic) ICD-10-CM: V55.48  ICD-9-CM: 356.9  10/22/2020 Yes        Essential hypertension (Chronic) ICD-10-CM: I10  ICD-9-CM: 401.9  Unknown Yes        History of coronary artery bypass graft ICD-10-CM: Z95.1  ICD-9-CM: V45.81  2001 Yes    Overview Signed 6/7/2021 11:04 PM by Edvin Beckman MD     [facility]                   Background:   Past Medical History:   Past Medical History:   Diagnosis Date    Acute embolic stroke ([facility] 2/52/4715    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, brainstem and cerebellum) without residual deficit    Anticoagulated by anticoagulation treatment 5/19/2021    On Apixaban    Benign prostatic hyperplasia with urinary retention     Chronic obstructive pulmonary disease (COPD) ([facility]     Coronary artery disease involving native coronary artery of native heart     Diverticulitis of large intestine with abscess 6/21/2021    Diverticulosis of sigmoid colon 5/22/2021    Essential hypertension     Heart failure with preserved left ventricular function (HFpEF) ([facility] [facility]     2D echocardiogram (5/18/2021) showed EF 50%; concentric LV hypertrophy with a severely thickened septal wall and mildly thickened posterior wall; left atrial chamber is severely enlarged; right atrial chamber volume is moderately enlarged; no mass, shunts, or thrombi.     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[address] 6/21/2021    Neuropathy involving both lower extremities 10/22/2020    Non-ST elevation (NSTEMI) myocardial infarction ([address] 5/17/2021    On clopidogrel therapy 5/19/2021    Paroxysmal atrial fibrillation ([facility] 5/17/2021    Peripheral vascular disease of extremity with claudication ([facility] [facility] 6/14/2021    Urinary retention         Assessment:   Changes in Assessment throughout shift: No change to previous assessment     Patient has a central line: no Reasons if yes: n/a  Insertion date:n/a Last dressing date:n/a   Patient has [place] Cath: no Reasons if yes: n/a   Insertion date:n/a  Shift [place] care completed: NO     Last Vitals:     Vitals:    06/30/21 2009 07/01/21 0742 07/01/21 1507 07/01/21 2022   BP: 120/76 (!) 147/95 (!) 155/89 130/83   Pulse: 75 81 (!) 101 84   Resp: 18 19 18 20   Temp: 98.1 °F (36.7 °C) 98 °F (36.7 °C) 98.9 °F (37.2 °C) 98.3 °F (36.8 °C)   SpO2: 98% 97% 98% 97%   Weight:       Height:            PAIN    Pain Assessment    Pain Intensity 1: 0 (07/02/21 0408) Pain Intensity 1: 2 (12/29/14 1105)    Pain Location 1: Foot Pain Location 1: Abdomen    Pain Intervention(s) 1: Medication (see MAR), Position (elevated on pillows) Pain Intervention(s) 1: Medication (see MAR)  Patient Stated Pain Goal: 0 Patient Stated Pain Goal: 0  o Intervention effective: yes  o Other actions taken for pain: Medication (see MAR); Position (elevated on pillows)     Skin Assessment  Skin color Skin Color: Appropriate for ethnicity  Condition/Temperature Skin Condition/Temp: Dry, Warm  Integrity Skin Integrity: Intact  Turgor Turgor: Non-tenting  Weekly Pressure Ulcer Documentation  Pressure  Injury Documentation: No Pressure Injury Noted-Pressure Ulcer Prevention Initiated  Wound Prevention & Protection Methods  Orientation of wound Orientation of Wound Prevention: Posterior  Location of Prevention Location of Wound Prevention: Buttocks, Sacrum/Coccyx  Dressing Present Dressing Present : No  Dressing Status Dressing Status: Intact  Wound Offloading Wound Offloading (Prevention Methods): Bed, pressure redistribution/air, Adaptive equipment, Pillows, Repositioning, Turning, Wheelchair     INTAKE/OUPUT  Date 07/01/21 0700 - 07/02/21 0659 07/02/21 0700 - 07/03/21 0659   Shift 8397-6918 0428-7649 24 Hour Total 3024-3352 3261-5803 24 Hour Total   INTAKE   P.O.  240 240        P. O.  240 240      Shift Total(mL/kg)  240(3) 240(3)      OUTPUT   Urine(mL/kg/hr)           Urine Occurrence(s) 3 x 4 x 7 x      Stool           Stool Occurrence(s) 2 x 4 x 6 x      Shift Total(mL/kg)         NET  240 240      Weight (kg) 80.8 80.8 80.8 80.8 80.8 80.8       Recommendations:  1. Patient needs and requests: assistance with the toileting; evaluate swelling on BLE per pt noted increase in swelling. 2. Pending tests/procedures: scheduled labs     3. Functional Level/Equipment: Partial (one person) / Wheelchair    Fall Precautions:   Fall risk precautions were reinforced with the patient; he was instructed to call for help prior to getting up. The following fall risk precautions were continued: bed/ chair alarms, door signage, yellow bracelet and socks as well as update of the Dylonenette Blake tool in the patient's room.    [place] Score: 3    HEALS Safety Check    A safety check occurred in the patient's room between off going nurse and oncoming nurse listed above. The safety check included the below items  Area Items   H  High Alert Medications - Verify all high alert medication drips (heparin, PCA, etc.)   E  Equipment - Suction is set up for ALL patients (with laila)  - Red plugs utilized for all equipment (IV pumps, etc.)  - WOWs wiped down at end of shift.  - Room stocked with oxygen, suction, and other unit-specific supplies   A  Alarms - Bed alarm is set for fall risk patients  - Ensure chair alarm is in place and activated if patient is up in a chair   L  Lines - Check IV for any infiltration  - Taveras bag is empty if patient has a Taveras   - Tubing and IV bags are labeled   S  Safety   - Room is clean, patient is clean, and equipment is clean. - Hallways are clear from equipment besides carts. - Fall bracelet on for fall risk patients  - Ensure room is clear and free of clutter  - Suction is set up for ALL patients (with laila)  - Hallways are clear from equipment besides carts.    - Isolation precautions followed, supplies available outside room, sign posted     Nacho Garcia RN

## 2021-07-02 NOTE — PROGRESS NOTES
Problem: Pressure Injury - Risk of  Goal: *Prevention of pressure injury  Description: Document Nicko Scale and appropriate interventions in the flowsheet.   7/1/2021 2036 by Amilcar Aguilar RN  Outcome: Progressing Towards Goal  Note: Pressure Injury Interventions:  Sensory Interventions: Assess changes in LOC, Keep linens dry and wrinkle-free, Maintain/enhance activity level, Minimize linen layers, Pressure redistribution bed/mattress (bed type)    Moisture Interventions: Maintain skin hydration (lotion/cream)    Activity Interventions: Increase time out of bed, Pressure redistribution bed/mattress(bed type), PT/OT evaluation    Mobility Interventions: HOB 30 degrees or less, Pressure redistribution bed/mattress (bed type), PT/OT evaluation    Nutrition Interventions: Document food/fluid/supplement intake    Friction and Shear Interventions: Minimize layers             7/1/2021 2035 by Amilcar Aguilar RN  Note: Pressure Injury Interventions:  Sensory Interventions: Assess changes in LOC, Keep linens dry and wrinkle-free, Maintain/enhance activity level, Minimize linen layers, Pressure redistribution bed/mattress (bed type)    Moisture Interventions: Maintain skin hydration (lotion/cream)    Activity Interventions: Increase time out of bed, Pressure redistribution bed/mattress(bed type), PT/OT evaluation    Mobility Interventions: HOB 30 degrees or less, Pressure redistribution bed/mattress (bed type), PT/OT evaluation    Nutrition Interventions: Document food/fluid/supplement intake    Friction and Shear Interventions: Minimize layers             7/1/2021 2035 by Amilcar Aguilar RN  Note: Pressure Injury Interventions:  Sensory Interventions: Assess changes in LOC, Keep linens dry and wrinkle-free, Maintain/enhance activity level, Minimize linen layers, Pressure redistribution bed/mattress (bed type)    Moisture Interventions: Maintain skin hydration (lotion/cream)    Activity Interventions: Increase time out of bed, Pressure redistribution bed/mattress(bed type), PT/OT evaluation    Mobility Interventions: HOB 30 degrees or less, Pressure redistribution bed/mattress (bed type), PT/OT evaluation    Nutrition Interventions: Document food/fluid/supplement intake    Friction and Shear Interventions: Minimize layers                Problem: Falls - Risk of  Goal: *Absence of Falls  Description: Document [place] Fall Risk and appropriate interventions in the flowsheet.   7/1/2021 2036 by Mikki Giraldo RN  Outcome: Progressing Towards Goal  Note: Fall Risk Interventions:  Mobility Interventions: Assess mobility with egress test, Bed/chair exit alarm, Communicate number of staff needed for ambulation/transfer, Patient to call before getting OOB, OT consult for ADLs, PT Consult for mobility concerns, PT Consult for assist device competence, Strengthening exercises (ROM-active/passive), Utilize gait belt for transfers/ambulation    Mentation Interventions: Adequate sleep, hydration, pain control, Bed/chair exit alarm, Door open when patient unattended, Evaluate medications/consider consulting pharmacy, Eyeglasses and hearing aids, Gait belt with transfers/ambulation, Toileting rounds    Medication Interventions: Patient to call before getting OOB, Teach patient to arise slowly, Utilize gait belt for transfers/ambulation, Bed/chair exit alarm    Elimination Interventions: Bed/chair exit alarm, Call light in reach, Patient to call for help with toileting needs, Stay With Me (per policy), Urinal in reach    History of Falls Interventions: Bed/chair exit alarm, Consult care management for discharge planning, Door open when patient unattended, Evaluate medications/consider consulting pharmacy, Utilize gait belt for transfer/ambulation      7/1/2021 2035 by Mikki Giraldo RN  Note: Fall Risk Interventions:  Mobility Interventions: Assess mobility with egress test, Bed/chair exit alarm, Communicate number of staff needed for ambulation/transfer, Patient to call before getting OOB, OT consult for ADLs, PT Consult for mobility concerns, PT Consult for assist device competence, Strengthening exercises (ROM-active/passive), Utilize gait belt for transfers/ambulation    Mentation Interventions: Adequate sleep, hydration, pain control, Bed/chair exit alarm, Door open when patient unattended, Evaluate medications/consider consulting pharmacy, Eyeglasses and hearing aids, Gait belt with transfers/ambulation, Toileting rounds    Medication Interventions: Patient to call before getting OOB, Teach patient to arise slowly, Utilize gait belt for transfers/ambulation, Bed/chair exit alarm    Elimination Interventions: Bed/chair exit alarm, Call light in reach, Patient to call for help with toileting needs, Stay With Me (per policy), Urinal in reach    History of Falls Interventions: Bed/chair exit alarm, Consult care management for discharge planning, Door open when patient unattended, Evaluate medications/consider consulting pharmacy, Utilize gait belt for transfer/ambulation

## 2021-07-02 NOTE — ROUTINE PROCESS
SHIFT CHANGE NOTE FOR [facility]    Bedside and Verbal shift change report given to Lary Griffin LPN (oncoming nurse) by Anayeli Patino, RN (offgoing nurse). Report included the following information SBAR, Kardex, MAR and Recent Results. Situation:   Code Status: Full Code   Hospital Day: 3   Problem List:   Hospital Problems  Date Reviewed: 7/2/2021        Codes Class Noted POA    Mesenteric abscess ([address] ICD-10-CM: K65.1  ICD-9-CM: 567.22  6/21/2021 Yes        * (Principal) Diverticulitis of large intestine with abscess ICD-10-CM: K57.20  ICD-9-CM: 562.11, 569.5  6/21/2021 Yes        History of sinus bradycardia ICD-10-CM: Z86.79  ICD-9-CM: V12.59  6/21/2021 Yes    Overview Signed 6/29/2021 10:02 PM by Sherice Denise MD     Attributed to Carvedilol 25 mg PO BID with meals             Generalized weakness ICD-10-CM: R53.1  ICD-9-CM: 780.79  6/21/2021 Yes        Mixed hyperlipidemia (Chronic) ICD-10-CM: B44.7  ICD-9-CM: 272.2  Unknown Yes        Heart failure with preserved left ventricular function (HFpEF) (HCC) (Chronic) ICD-10-CM: I50.30  ICD-9-CM: 428.9  Unknown Yes    Overview Signed 6/7/2021 11:03 PM by Sherice Denise MD     2D echocardiogram (5/18/2021) showed EF 50%; concentric LV hypertrophy with a severely thickened septal wall and mildly thickened posterior wall; left atrial chamber is severely enlarged; right atrial chamber volume is moderately enlarged; no mass, shunts, or thrombi.               Coronary artery disease involving native coronary artery of native heart (Chronic) ICD-10-CM: I25.10  ICD-9-CM: 414.01  Unknown Yes        Diverticulosis of sigmoid colon (Chronic) ICD-10-CM: K57.30  ICD-9-CM: 562.10  5/22/2021 Yes        Anticoagulated by anticoagulation treatment ICD-10-CM: Z79.01  ICD-9-CM: V58.61  5/19/2021 Yes    Overview Signed 6/7/2021 10:48 PM by Sherice Denise MD     On Apixaban             On clopidogrel therapy ICD-10-CM: Z79.01  ICD-9-CM: V58.61  5/19/2021 Yes        Status post insertion of drug eluting coronary artery stent ICD-10-CM: Z95.5  ICD-9-CM: V45.82  5/18/2021 Yes    Overview Signed 6/18/2021  1:38 PM by Theresa Emery MD     S/P PCI to proximal SVG with 3.5 x 12 mm [place] drug-eluting stent; PCI to mid SVG with 3.5 x 34 mm [place] drug-eluting stent; PCI to distal SVG with 3.5 x 15 mm [place] drug-eluting stent;  Balloon angioplasty to distal SVG anastomosis (5/18/2021 - Dr. Dyan Daniel)              Non-ST elevation (NSTEMI) myocardial infarction [facility]) ICD-10-CM: I21.4  ICD-9-CM: 410.70  5/17/2021 Yes        Paroxysmal [facility] fibrillation ([facility] ICD-10-CM: I48.0  ICD-9-CM: 427.31  5/17/2021 Yes        Impaired mobility and ADLs ICD-10-CM: Z74.09, Z78.9  ICD-9-CM: V49.89  5/17/2021 Yes        Neuropathy involving both lower extremities (Chronic) ICD-10-CM: B22.59  ICD-9-CM: 356.9  10/22/2020 Yes        Essential hypertension (Chronic) ICD-10-CM: I10  ICD-9-CM: 401.9  Unknown Yes        History of coronary artery bypass graft ICD-10-CM: Z95.1  ICD-9-CM: V45.81  2001 Yes    Overview Signed 6/7/2021 11:04 PM by Theresa Emery MD     [facility]                   Background:   Past Medical History:   Past Medical History:   Diagnosis Date    Acute embolic stroke ([facility] 2/24/0591    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, brainstem and cerebellum) without residual deficit    Anticoagulated by anticoagulation treatment 5/19/2021    On Apixaban    Benign prostatic hyperplasia with urinary retention     Chronic obstructive pulmonary disease (COPD) ([facility]     Coronary artery disease involving native coronary artery of native heart     Diverticulitis of large intestine with abscess 6/21/2021    Diverticulosis of sigmoid colon 5/22/2021    Essential hypertension     Heart failure with preserved left ventricular function (HFpEF) (HCC)     2D echocardiogram (5/18/2021) showed EF 50%; concentric LV hypertrophy with a severely thickened septal wall and mildly thickened posterior wall; left atrial chamber is severely enlarged; right atrial chamber volume is moderately enlarged; no mass, shunts, or thrombi.     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[address] 6/21/2021    Neuropathy involving both lower extremities 10/22/2020    Non-ST elevation (NSTEMI) myocardial infarction ([address] 5/17/2021    On clopidogrel therapy 5/19/2021    Paroxysmal atrial fibrillation ([facility] 5/17/2021    Peripheral vascular disease of extremity with claudication ([facility] [facility] 6/14/2021    Urinary retention         Assessment:   Changes in Assessment throughout shift: No change to previous assessment    Patient has a central line: no   Patient has [place] Cath: no      Last Vitals:     Vitals:    07/01/21 0742 07/01/21 1507 07/01/21 2022 07/02/21 0723   BP: (!) 147/95 (!) 155/89 130/83 (!) 140/89   Pulse: 81 (!) 101 84 86   Resp: 19 18 20 19   Temp: 98 °F (36.7 °C) 98.9 °F (37.2 °C) 98.3 °F (36.8 °C) 97 °F (36.1 °C)   SpO2: 97% 98% 97% 95%   Weight:       Height:            PAIN    Pain Assessment    Pain Intensity 1: 0 (07/02/21 1248) Pain Intensity 1: 2 (12/29/14 1105)    Pain Location 1: Hip Pain Location 1: Abdomen    Pain Intervention(s) 1: Ambulation/Increased Activity Pain Intervention(s) 1: Medication (see MAR)  Patient Stated Pain Goal: 0 Patient Stated Pain Goal: 0  o Intervention effective: yes  o Other actions taken for pain: Ambulation/Increased Activity     Skin Assessment  Skin color Skin Color: Appropriate for ethnicity  Condition/Temperature Skin Condition/Temp: Dry, Warm  Integrity Skin Integrity: Intact  Turgor Turgor: Non-tenting  Weekly Pressure Ulcer Documentation  Pressure  Injury Documentation: No Pressure Injury Noted-Pressure Ulcer Prevention Initiated  Wound Prevention & Protection Methods  Orientation of wound Orientation of Wound Prevention: Posterior  Location of Prevention Location of Wound Prevention: Buttocks, Sacrum/Coccyx  Dressing Present Dressing Present : No  Dressing Status Dressing Status: Intact  Wound Offloading Wound Offloading (Prevention Methods): Bed, pressure redistribution/air, Bed, pressure reduction mattress, Pillows, Repositioning, Walker, Wheelchair, Elevate heels     INTAKE/OUPUT  Date 07/01/21 0700 - 07/02/21 0659 07/02/21 0700 - 07/03/21 0659   Shift 0700-1859 1900-0659 24 Hour Total 0700-1859 1900-0659 24 Hour Total   INTAKE   P.O.  240 240        P. O.  240 240      Shift Total(mL/kg)  240(3) 240(3)      OUTPUT   Urine(mL/kg/hr)           Urine Occurrence(s) 3 x 4 x 7 x 1 x  1 x   Stool           Stool Occurrence(s) 2 x 4 x 6 x 1 x  1 x   Shift Total(mL/kg)         NET  240 240      Weight (kg) 80.8 80.8 80.8 80.8 80.8 80.8       Recommendations:  1. Patient needs and requests: pain management, help with toileting    2. Pending tests/procedures: CT ABD Pelv for 7/15/21    3. Functional Level/Equipment: Partial (one person) / Bed (comment); Stabilization belt; Wheelchair;Walker    Fall Precautions:   Fall risk precautions were reinforced with the patient; he was instructed to call for help prior to getting up. The following fall risk precautions were continued: bed/ chair alarms, door signage, yellow bracelet and socks as well as update of the Arnaldo Showman tool in the patient's room. [place] Score: 3    HEALS Safety Check    A safety check occurred in the patient's room between off going nurse and oncoming nurse listed above.     The safety check included the below items  Area Items   H  High Alert Medications - Verify all high alert medication drips (heparin, PCA, etc.)   E  Equipment - Suction is set up for ALL patients (with laila)  - Red plugs utilized for all equipment (IV pumps, etc.)  - WOWs wiped down at end of shift.  - Room stocked with oxygen, suction, and other unit-specific supplies   A  Alarms - Bed alarm is set for fall risk patients  - Ensure chair alarm is in place and activated if patient is up in a chair   L  Lines - Check IV for any infiltration  - Taver[place] bag is empty if patient has a Taveras   - Tubing and IV bags are labeled   S  Safety   - Room is clean, patient is clean, and equipment is clean. - Hallways are clear from equipment besides carts. - Fall bracelet on for fall risk patients  - Ensure room is clear and free of clutter  - Suction is set up for ALL patients (with laila)  - Hallways are clear from equipment besides carts.    - Isolation precautions followed, supplies available outside room, sign posted     Montez Anne RN

## 2021-07-02 NOTE — PROGRESS NOTES
Problem: Mobility Impaired (Adult and Pediatric)  Goal: *Therapy Goal (Edit Goal, Insert Text)  Description: Physical Therapy Short Term Goals  Initiated 6/30/2021 and to be accomplished within 7 day(s) on 7/7/2021:  1. Patient will move from supine to sit and sit to supine , scoot up and down, and roll side to side in bed with supervision/set-up. 2.  Patient will transfer from bed to chair and chair to bed with supervision/set-up using the least restrictive device. 3.  Patient will perform sit to stand with supervision/set-up. 4.  Patient will ambulate with supervision/set-up for 150 feet with the least restrictive device. 5.  Patient will ascend/descend 14 stairs with 1-2 handrail(s) with minimal assistance/contact guard assist.    Physical Therapy Long Term Goals  Initiated 6/30/2021 and to be accomplished within 10-14 day(s) 7/14/2021  1. Patient will move from supine to sit and sit to supine , scoot up and down, and roll side to side in bed with modified independence. 2.  Patient will transfer from bed to chair and chair to bed with modified independence using the least restrictive device. 3.  Patient will perform sit to stand with modified independence. 4.  Patient will ambulate with modified independence for at least 150 feet with the least restrictive device. 5.  Patient will ascend/descend 14 stairs with 2 handrail(s) with supervision/set-up. 6.  Patient will ascend/descend 1 curb step with appropriate AD with supervision/setup to safely get into and out of house. Outcome: Progressing Towards Goal   PHYSICAL THERAPY TREATMENT    Patient: Ori Tamayo (78 y.o. male)  Date: 7/2/2021  Diagnosis: Diverticulitis of large intestine with abscess [K57.20] Diverticulitis of large intestine with abscess  Precautions: Fall  Chart, physical therapy assessment, plan of care and goals were reviewed.   Time In: 0806  Time Out: 2405  Time In:1205  Time KSH:[id number]    Patient seen for: Patient education;Gait training; Therapeutic exercise;Transfer training; Wheelchair mobility;Balance activities    Pain:  Patient did not indicate pain during session    Patient identified with name and : yes    SUBJECTIVE:      Patient agreeable to treatment. Reporting ongoing motivation to participate in therapy    OBJECTIVE DATA SUMMARY:    Objective:       TRANSFERS Daily Assessment     Transfer Type: Other  Other: stand step with RW  Transfer Assistance : 4 (Contact guard assistance)  Sit to Stand Assistance: Contact guard assistance    Repeated sit to stands from 23\" height mat table surface for improved functional LE strength, bilat UE on distal thighs to decreased UE reliance during transitional movement. Performing 3 x 6 reps, CGA and cues for appropriate anterior trunk weightshift over base of support. GAIT Daily Assessment    Gait Description (WDL) Exceptions to WDL    Gait Abnormalities Decreased step clearance (forward flexed trunk)    Assistive device Walker, rolling;Gait belt    Ambulation assistance - level surface 4 (Contact guard assistance) (Only intermittent CGA needed)    Distance 300 Feet (ft)    Ambulation assistance- uneven surface  (NT)    Comments Cues for more upright posture and stepping appropriately to center of RW. Performing 300 ft bout, multiple gait bouts of ~ 150 ft during two sessions. STEPS/STAIRS Daily Assessment     Steps/Stairs ambulated 8    Assistance Required 4 (Minimal assistance)    Rail Use Both    Comments  Slight steadying support required    Curbs/Ramps  (NT)        BALANCE Daily Assessment     Sitting - Static: Good (unsupported)  Sitting - Dynamic: Good (unsupported)  Standing - Static: Fair  Standing - Dynamic : Impaired     Static standing without UE support for 2 minute duration, emphasis on more upright posture during standing bout.          WHEELCHAIR MOBILITY Daily Assessment     Able to Propel (ft): 150 feet (using bilat UE and LE to propel chair)  Functional Level: 5  Curbs/Ramps Assist Required (FIM Score): 0 (Not tested)  Wheelchair Setup Assist Required : 5 (Stand-by assistance)  Wheelchair Management: Manages left brake;Manages right brake (using brake extender)        THERAPEUTIC EXERCISES Daily Assessment       Performing standing exercises: hip flex marches, calf raises 3 x 15 reps with bilat UE support of RW, seated rests in between sets. ASSESSMENT:  Patient continues to improve with ability to perform sit to stand, gait, and stairs. Continues to be safest with use of RW for all standing activities. Progression toward goals:  []      Improving appropriately and progressing toward goals  [x]      Improving slowly and progressing toward goals  []      Not making progress toward goals and plan of care will be adjusted      PLAN:  Patient continues to benefit from skilled intervention to address the above impairments. Continue treatment per established plan of care. Discharge Recommendations:  Home Health  Further Equipment Recommendations for Discharge:  rolling walker      Estimated Discharge Date: 7/13/2021    Activity Tolerance:   Fair, needing frequent rests during session.    After treatment:   [] Patient left in no apparent distress in bed  [x] Patient left in no apparent distress sitting up in chair  [] Patient left in no apparent distress in w/c mobilizing under own power  [] Patient left in no apparent distress dining area  [] Patient left in no apparent distress mobilizing under own power  [x] Call bell left within reach  [x] Nursing notified  [] Caregiver present  [] Bed alarm activated   [x] Chair alarm activated      Yeni Grover, PT  7/2/2021

## 2021-07-02 NOTE — PROGRESS NOTES
Problem: Self Care Deficits Care Plan (Adult)  Goal: *Therapy Goal (Edit Goal, Insert Text)  Description: Occupational Therapy Goals   Long Term Goals  Initiated 2021 and to be accomplished within 2 week(s) 2021  1. Pt will perform self-feeding with I.  2. Pt will perform grooming with Mod I.  3. Pt will perform UB bathing with Mod I.  4. Pt will perform LB bathing with Supv.  5. Pt will perform tub/shower transfer with SBA. 6. Pt will perform UB dressing with Mod I.  7. Pt will perform LB dressing with Supv.  8. Pt will perform toileting task with Mod I.  9. Pt will perform toilet transfer with Supv. Short Term Goals   Initiated 2021 and to be accomplished within 7 day(s) to be accomplished by 2021  1. Pt will perform self-feeding with Mod I.   2. Pt will perform grooming with set-up assistance. 3. Pt will perform UB bathing with SBA. 4. Pt will perform LB bathing with CGA w/ use of AE as needed. 5. Pt will perform tub/shower transfer with CGA. 6. Pt will perform UB dressing with Mod I.  7. Pt will perform LB dressing with CGA w/ use of AE as needed. 8. Pt will perform toileting task with CGA. 9. Pt will perform toilet transfer with CGA w/ least restrictive AD. Occupational Therapy TREATMENT    Patient: Everette Quevedo   80 y.o. Patient identified with name and : Yes    Date: 2021    First Tx Session  Time In: 1000  Time Out[de-identified] 1100    Diagnosis: Diverticulitis of large intestine with abscess [K57.20]   Precautions: Fall  Chart, occupational therapy assessment, plan of care, and goals were reviewed. Pain:  Pt reports 0/10 pain or discomfort prior to treatment. Pt reports 0/10 pain or discomfort post treatment. Intervention Provided: N/A      SUBJECTIVE:   Patient stated I have to  have surgery.     OBJECTIVE DATA SUMMARY:     THERAPEUTIC ACTIVITY Daily Assessment    Resistive Clip in standing with RW. Pt able to stand up to  7:38 to take off.  Pt stood up to 3 minutes with RW to take clip down     Jenga to promote increased strength and coordination for ADLs. Pt showed increased coordination        THERAPEUTIC EXERCISE Daily Assessment    Connect 4 with 2lb weight on wrist.  Pt showed increased strength in BUE. MOBILITY/TRANSFERS Daily Assessment     Sit to stand with CGA and vcs for form. ASSESSMENT:  Pt increasing safe transfers, standing tolerance,  and BUE strength for ADLs. Progression toward goals:  [x]          Improving appropriately and progressing toward goals  []          Improving slowly and progressing toward goals  []          Not making progress toward goals and plan of care will be adjusted     PLAN:  Patient continues to benefit from skilled intervention to address the above impairments. Continue treatment per established plan of care. Discharge Recommendations:  Home Health with asst   Further Equipment Recommendations for Discharge:  bedside commode, transfer bench      Activity Tolerance:  Fair     Estimated LOS:    Please refer to the flowsheet for vital signs taken during this treatment. After treatment:   []  Patient left in no apparent distress sitting up in chair   [x]  Patient left in no apparent distress in bed  []  Call bell left within reach  []  Nursing notified  []  Caregiver present  []  Bed alarm activated    COMMUNICATION/EDUCATION:   [] Home safety education was provided and the patient/caregiver indicated understanding. [x] Patient/family have participated as able in goal setting and plan of care. [] Patient/family agree to work toward stated goals and plan of care. [] Patient understands intent and goals of therapy, but is neutral about his/her participation. [] Patient is unable to participate in goal setting and plan of care.       Chencho Porter, OT     Outcome: Progressing Towards Goal  Goal: Interventions  Outcome: Progressing Towards Goal

## 2021-07-02 NOTE — PROGRESS NOTES
Problem: Pressure Injury - Risk of  Goal: *Prevention of pressure injury  Description: Document Nicko Scale and appropriate interventions in the flowsheet.   Outcome: Progressing Towards Goal  Note: Pressure Injury Interventions:  Sensory Interventions: Minimize linen layers, Monitor skin under medical devices, Keep linens dry and wrinkle-free, Discuss PT/OT consult with provider    Moisture Interventions: Maintain skin hydration (lotion/cream), Apply protective barrier, creams and emollients, Assess need for specialty bed    Activity Interventions: PT/OT evaluation, Pressure redistribution bed/mattress(bed type)    Mobility Interventions: HOB 30 degrees or less, Float heels, PT/OT evaluation    Nutrition Interventions: Offer support with meals,snacks and hydration    Friction and Shear Interventions: Minimize layers, Transferring/repositioning devices, Feet elevated on foot rest

## 2021-07-03 NOTE — PROGRESS NOTES
Progress Note    Patient: Karrie Abarca MRN: [id number]  CSN: [id number]    YOB: 1940  Age: 80 y.o. Sex: male    DOA: 6/29/2021 LOS:  LOS: 4 days                    Subjective:     Primary Rehab Diagnosis: Generalized weakness with Impaired mobility and ADLs secondary to 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    Patient reports continued diarrhea, had first dose immodium today. Chronic lower extremity edema, interventions to date hav enot helped, still having neuropathic pain LE as well. No other acute concerns    Review of systems  General: No fevers or chills. Cardiovascular: No chest pain or pressure. No palpitations. Pulmonary: No shortness of breath, cough or wheeze. Gastrointestinal: No abdominal pain, nausea, vomiting. Loose stools. Genitourinary: No urinary frequency, urgency, hesitancy or dysuria. Musculoskeletal: pain fairly controlled. Neurologic: generalized weakness. +neuropathy BLE    Objective:     Physical Exam:  Visit Vitals  /86 (BP 1 Location: Left upper arm, BP Patient Position: Sitting)   Pulse 94   Temp 98.4 °F (36.9 °C)   Resp 16   Ht 5' 8\" (1.727 m)   Wt 80.8 kg (178 lb 1.6 oz)   SpO2 96%   BMI 27.08 kg/m²        General:         Alert, cooperative, no acute distress    HEENT: NC, Atraumatic. PERRLA, anicteric sclerae. Lungs: CTA Bilaterally. No Wheezing/Rhonchi/Rales. Heart:  Regular  rhythm,  No murmur, No Rubs, No Gallops  Abdomen: Soft, Non distended, Non tender.  +Bowel sounds, no HSM  Extremities: 2+ LE edema, no calf tenderness. Psych:   Not anxious or agitated. Neurologic:  CN 2-12 grossly intact, Alert and oriented X 3. No acute neurological   deficits    Intake and Output:  Current Shift:  No intake/output data recorded.   Last three shifts:  07/01 1901 - 07/03 0700  In: 240 [P.O.:240]  Out: -     Labs: Results:       Chemistry No results for input(s): GLU, NA, K, CL, CO2, BUN, CREA, CA, AGAP, BUCR, TBIL, AP, TP, ALB, GLOB, AGRAT in the last 72 hours. No lab exists for component: GPT   CBC w/Diff Recent Labs     07/01/21  0516   HGB 9.6*   HCT 31.3*      Cardiac Enzymes No results for input(s): CPK, CKND1, WAGNER in the last 72 hours. No lab exists for component: CKRMB, TROIP   Coagulation No results for input(s): PTP, INR, APTT, INREXT in the last 72 hours. Lipid Panel No results found for: CHOL, CHOLPOCT, CHOLX, CHLST, CHOLV, 053508, HDL, HDLP, LDL, LDLC, DLDLP, 443263, VLDLC, VLDL, TGLX, TRIGL, TRIGP, TGLPOCT, CHHD, CHHDX   BNP No results for input(s): BNPP in the last 72 hours. Liver Enzymes No results for input(s): TP, ALB, TBIL, AP in the last 72 hours.     No lab exists for component: SGOT, GPT, DBIL   Thyroid Studies Lab Results   Component Value Date/Time    TSH 1.57 06/21/2021 02:12 PM          Procedures/imaging: see electronic medical records for all procedures/Xrays and details which were not copied into this note but were reviewed prior to creation of Plan    Medications:   Current Facility-Administered Medications   Medication Dose Route Frequency    pneumococcal 23-valent (PNEUMOVAX 23) injection 0.5 mL  0.5 mL IntraMUSCular PRIOR TO DISCHARGE    loperamide (IMODIUM) capsule 2 mg  2 mg Oral TID    acetaminophen (TYLENOL) tablet 650 mg  650 mg Oral Q4H PRN    bisacodyL (DULCOLAX) tablet 10 mg  10 mg Oral Q48H PRN    amoxicillin-clavulanate (AUGMENTIN) 500-125 mg per tablet 1 Tablet  1 Tablet Oral BID WITH MEALS    cefpodoxime (VANTIN) tablet 200 mg  200 mg Oral Q12H    guaiFENesin ER (MUCINEX) tablet 600 mg  600 mg Oral DAILY    dextromethorphan (DELSYM) 30 mg/5 mL syrup 30 mg  30 mg Oral QHS    carvediloL (COREG) tablet 3.125 mg  3.125 mg Oral BID WITH MEALS    clopidogreL (PLAVIX) tablet 75 mg  75 mg Oral DAILY WITH BREAKFAST    rosuvastatin (CRESTOR) tablet 10 mg  10 mg Oral DAILY    nitroglycerin (NITROSTAT) tablet 0.4 mg  0.4 mg SubLINGual PRN    arformoteroL (BROVANA) neb solution 15 mcg  15 mcg Nebulization BID RT    ipratropium (ATROVENT) 0.02 % nebulizer solution 0.5 mg  0.5 mg Nebulization TID    albuterol (PROVENTIL VENTOLIN) nebulizer solution 2.5 mg  2.5 mg Nebulization Q4H PRN    apixaban (ELIQUIS) tablet 5 mg  5 mg Oral BID    multivitamin, tx-iron-ca-min (THERA-M w/ IRON) tablet 1 Tablet  1 Tablet Oral DAILY    lisinopriL (PRINIVIL, ZESTRIL) tablet 5 mg  5 mg Oral DAILY    tamsulosin (FLOMAX) capsule 0.4 mg  0.4 mg Oral DAILY WITH DINNER    gabapentin (NEURONTIN) capsule 300 mg  300 mg Oral TID       Assessment/Plan     Principal Problem:    Diverticulitis of large intestine with abscess (6/21/2021)    Active Problems:    Essential hypertension ()      Non-ST elevation (NSTEMI) myocardial infarction ([facility] [address] (5/17/2021)      Paroxysmal atrial fibrillation ([facility] [address] (5/17/2021)      Anticoagulated by anticoagulation treatment (5/19/2021)      Overview: On Apixaban      Mixed hyperlipidemia ()      Heart failure with preserved left ventricular function (HFpEF) ([facility]) ()      Overview: 2D echocardiogram (5/18/2021) showed EF 50%; concentric LV hypertrophy       with a severely thickened septal wall and mildly thickened posterior wall;       left atrial chamber is severely enlarged; right atrial chamber volume is       moderately enlarged; no mass, shunts, or thrombi. Coronary artery disease involving native coronary artery of native heart ()      History of coronary artery bypass graft (2001)      Overview: [facility]      On clopidogrel therapy (5/19/2021)      Neuropathy involving both lower extremities (10/22/2020)      Impaired mobility and ADLs (5/17/2021)      Status post insertion of drug eluting coronary artery stent (5/18/2021)      Overview: S/P PCI to proximal SVG with 3.5 x 12 mm [place] drug-eluting stent; PCI to       mid SVG with 3.5 x 34 mm [place] drug-eluting stent; PCI to distal SVG with       3.5 x 15 mm Juan J drug-eluting stent;  Balloon angioplasty to distal SVG       anastomosis (5/18/2021 - Dr. Babita Morales)       Mesenteric abscess [facility]) (6/21/2021)      Diverticulosis of sigmoid colon (5/22/2021)      History of sinus bradycardia (6/21/2021)      Overview: Attributed to Carvedilol 25 mg PO BID with meals      Generalized weakness (6/21/2021)      Loose stools (7/1/2021)      Constipation --> Diarrhea; Leukocytosis --> 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  Upon discharge from the ARU, the patient will need to follow up with Colorectal Surgery (Dr. 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, brainstem and cerebellum) without residual deficit  Continue Clopidogrel 75 mg PO once daily with breakfast and Rosuvastatin 10 mg PO once daily     Benign prostatic hyperplasia with urinary retention  Continue Tamsulosin 0.4 mg PO once daily after dinner     Chronic heart failure with preserved ejection fraction (HFpEF); Essential hypertension  BP well controlled  Continue Carvedilol 3.125 mg PO BID with meals (8AM, 5PM)     Chronic obstructive pulmonary disease (COPD)  Continue Arformoterol nebulization BID, Ipratropium nebulization TID and Albuterol nebulization q 4 hr PRN for shortness of breath/wheezing     Mixed hyperlipidemia  Lipid profile (5/18/2021) showed TG 83, , HDL 50, LDL 93  Continue Rosuvastatin 10 mg PO once daily     Non-ST elevation (NSTEMI) myocardial infarction; Coronary artery disease; History of CABG; S/P PCI to proximal SVG with 3.5 x 12 mm [place] drug-eluting stent; PCI to mid SVG with 3.5 x 34 mm [place] drug-eluting stent; PCI to distal SVG with 3.5 x 15 mm [place] drug-eluting stent;  Balloon angioplasty to distal SVG anastomosis (5/18/2021 - Dr. Jyoti Morrow Lissett Goddard)   Continue Carvedilol 3.125 mg PO BID with meals (8AM, 5PM), Clopidogrel 75 mg PO once daily with breakfast, Rosuvastatin 10 mg PO once daily, Nitroglycerin 0.4 mg SL PRN for chest pain     Paroxysmal atrial fibrillation, anticoagulated on Apixaban  Continue apixaban 5 mg PO BID and Carvedilol 3.125 mg PO BID with meals (8AM, 5PM)     Peripheral vascular disease of extremity with claudication;  Neuropathy involving both lower extremities  Upon discharge from the ARU, the patient will need to follow up with Vascular Surgery (Dr. العلي)  Continue Gabapentin 300 mg PO TID     Loose stools, most likely side effect of oral antibiotics  continue Loperamide 2 mg PO TID, monitor for effect       Shaquille Villar MD  7/3/2021 13:30

## 2021-07-03 NOTE — PROGRESS NOTES
Problem: Pressure Injury - Risk of  Goal: *Prevention of pressure injury  Description: Document Nicko Scale and appropriate interventions in the flowsheet. Outcome: Progressing Towards Goal  Note: Pressure Injury Interventions:  Sensory Interventions: Assess changes in LOC    Moisture Interventions: Absorbent underpads    Activity Interventions: Chair cushion, Increase time out of bed, Pressure redistribution bed/mattress(bed type)    Mobility Interventions: Chair cushion, HOB 30 degrees or less, Pressure redistribution bed/mattress (bed type)    Nutrition Interventions: Document food/fluid/supplement intake    Friction and Shear Interventions: Foam dressings/transparent film/skin sealants, HOB 30 degrees or less                Problem: Patient Education: Go to Patient Education Activity  Goal: Patient/Family Education  Outcome: Progressing Towards Goal     Problem: Falls - Risk of  Goal: *Absence of Falls  Description: Document [place] Fall Risk and appropriate interventions in the flowsheet.   Outcome: Progressing Towards Goal  Note: Fall Risk Interventions:  Mobility Interventions: Bed/chair exit alarm, Patient to call before getting OOB    Mentation Interventions: Bed/chair exit alarm, Evaluate medications/consider consulting pharmacy    Medication Interventions: Bed/chair exit alarm, Evaluate medications/consider consulting pharmacy    Elimination Interventions: Call light in reach, Bed/chair exit alarm, Patient to call for help with toileting needs    History of Falls Interventions: Bed/chair exit alarm, Evaluate medications/consider consulting pharmacy         Problem: Patient Education: Go to Patient Education Activity  Goal: Patient/Family Education  Outcome: Progressing Towards Goal     Problem: Patient Education: Go to Patient Education Activity  Goal: Patient/Family Education  Outcome: Progressing Towards Goal     Problem: Nutrition Deficit  Goal: *Optimize nutritional status  Outcome: Progressing Towards Goal     Problem: Patient Education: Go to Patient Education Activity  Goal: Patient/Family Education  Outcome: Progressing Towards Goal     Problem: Patient Education: Go to Patient Education Activity  Goal: Patient/Family Education  Outcome: Progressing Towards Goal     Problem: Inpatient Rehab (Adult)  Goal: *LTG: Avoids injury/falls 100% of time related to deficits  Outcome: Progressing Towards Goal  Goal: *LTG: Avoids infection 100% of time related to deficits  Outcome: Progressing Towards Goal  Goal: *LTG: Verbalize understanding of diagnosis and risk factors for recurring stroke  Outcome: Progressing Towards Goal  Goal: *LTG: Absence of DVT during hospitalization  Outcome: Progressing Towards Goal  Goal: *LTG: Maintains Skin Integrity With No Evidence of Pressure Injury 100% of Time  Outcome: Progressing Towards Goal  Goal: Interventions  Outcome: Progressing Towards Goal     Problem: Patient Education: Go to Patient Education Activity  Goal: Patient/Family Education  Outcome: Progressing Towards Goal

## 2021-07-03 NOTE — PROGRESS NOTES
Problem: Mobility Impaired (Adult and Pediatric)  Goal: *Therapy Goal (Edit Goal, Insert Text)  Description: Physical Therapy Short Term Goals  Initiated 6/30/2021 and to be accomplished within 7 day(s) on 7/7/2021:  1. Patient will move from supine to sit and sit to supine , scoot up and down, and roll side to side in bed with supervision/set-up. 2.  Patient will transfer from bed to chair and chair to bed with supervision/set-up using the least restrictive device. 3.  Patient will perform sit to stand with supervision/set-up. 4.  Patient will ambulate with supervision/set-up for 150 feet with the least restrictive device. 5.  Patient will ascend/descend 14 stairs with 1-2 handrail(s) with minimal assistance/contact guard assist.    Physical Therapy Long Term Goals  Initiated 6/30/2021 and to be accomplished within 10-14 day(s) 7/14/2021  1. Patient will move from supine to sit and sit to supine , scoot up and down, and roll side to side in bed with modified independence. 2.  Patient will transfer from bed to chair and chair to bed with modified independence using the least restrictive device. 3.  Patient will perform sit to stand with modified independence. 4.  Patient will ambulate with modified independence for at least 150 feet with the least restrictive device. 5.  Patient will ascend/descend 14 stairs with 2 handrail(s) with supervision/set-up. 6.  Patient will ascend/descend 1 curb step with appropriate AD with supervision/setup to safely get into and out of house. Outcome: Progressing Towards Goal   PHYSICAL THERAPY TREATMENT    Patient: Silviano Smith (89 y.o. male)  Date: 7/3/2021  Diagnosis: Diverticulitis of large intestine with abscess [K57.20] Diverticulitis of large intestine with abscess  Precautions: Fall  Chart, physical therapy assessment, plan of care and goals were reviewed.     Time UV:8077  Time Out:0900  Time In: 3875  Time Out: 0662    Patient seen for: Gait training;Patient education; Therapeutic exercise;Transfer training; Wheelchair mobility    Pain:  Patient denied significant pain during session. Patient identified with name and : yes    SUBJECTIVE:      Patient agreeable to session. Motivated to participate. OBJECTIVE DATA SUMMARY:    Objective:       TRANSFERS Daily Assessment     Transfer Type: Other  Other: stand step with RW  Transfer Assistance : 4 (Contact guard assistance)  Sit to Stand Assistance: Contact guard assistance    Repeated sit to stands from 23\" height mat table surface for improved functional LE strength, bilat UE on distal thighs to decreased UE reliance during transitional movement. Performing 3 x 6 reps, CGA and cues for appropriate anterior trunk weightshift over base of support. GAIT Daily Assessment    Gait Description (WDL) Exceptions to WDL    Gait Abnormalities Decreased step clearance (forward flexed trunk)    Assistive device Walker, rolling;Gait belt    Ambulation assistance - level surface 4 (Contact guard assistance) (CG/SBA)    Distance 300 Feet (ft)    Ambulation assistance- uneven surface  Gait for 200 ft including outside sidewalk surface using RW, CGA for safety and min A occasionally for RW management to stay on path. Comments Gait training with emphasis on upright posture and improved foot clearance. Worsening foot clearance for outside gait training needing increased cues for foot clearance and for staying in center of pathway. STEPS/STAIRS Daily Assessment     Steps/Stairs ambulated 10    Assistance Required 4 (Minimal assistance)    Rail Use Both    Comments  Steadying support at trunk for negotiation of stairs.      Curbs/Ramps  (NT)        BALANCE Daily Assessment     Sitting - Static: Good (unsupported)  Sitting - Dynamic: Good (unsupported)  Standing - Static: Fair  Standing - Dynamic : Impaired    Static standing balance for 2 minutes without UE support, emphasis more upright trunk posture. Also performing static standing with narrow SUKHJINDER without UE support. WHEELCHAIR MOBILITY Daily Assessment     Able to Propel (ft): 150 feet  Functional Level: 5  Curbs/Ramps Assist Required (FIM Score): 0 (Not tested)  Wheelchair Setup Assist Required : 5 (Stand-by assistance)  Wheelchair Management: Manages left brake;Manages right brake (using brake extender)    W/C mobility for improved functional endurance. THERAPEUTIC EXERCISES Daily Assessment       Standing therex including alternating toe touches to 6\" step with bilat UE support of RW for improved hip flex and weightshifting, also performing seated LAQ with 3# cuff weights 3 x 10 reps for improved LE strength for sit to stand transfers. ASSESSMENT:  Recommend ongoing LE functional strengthening for ease of sit to stand transfers, negotiation of stairs and for postural awareness training to improve gait and standing activities. Progression toward goals:  []      Improving appropriately and progressing toward goals  [x]      Improving slowly and progressing toward goals  []      Not making progress toward goals and plan of care will be adjusted      PLAN:  Patient continues to benefit from skilled intervention to address the above impairments. Continue treatment per established plan of care. Discharge Recommendations:  Home Health and caregiver assistance  Further Equipment Recommendations for Discharge:  rolling walker      Estimated Discharge Date: 7/13/2021    Activity Tolerance:   Fair to good depending on fatigue.    After treatment:   [] Patient left in no apparent distress in bed  [x] Patient left in no apparent distress sitting up in chair  [] Patient left in no apparent distress in w/c mobilizing under own power  [] Patient left in no apparent distress dining area  [] Patient left in no apparent distress mobilizing under own power  [x] Call bell left within reach  [] Nursing notified  [] Caregiver present  [] Bed alarm activated   [x] Chair alarm activated      Scar Cohen, PT  7/3/2021

## 2021-07-04 NOTE — PROGRESS NOTES
Problem: Pressure Injury - Risk of  Goal: *Prevention of pressure injury  Description: Document Nicko Scale and appropriate interventions in the flowsheet. Outcome: Progressing Towards Goal  Note: Pressure Injury Interventions:  Sensory Interventions: Assess changes in LOC, Assess need for specialty bed, Chair cushion, Float heels, Keep linens dry and wrinkle-free, Maintain/enhance activity level, Minimize linen layers, Pad between skin to skin, Pressure redistribution bed/mattress (bed type)    Moisture Interventions: Absorbent underpads, Assess need for specialty bed, Maintain skin hydration (lotion/cream), Minimize layers    Activity Interventions: Assess need for specialty bed, Chair cushion, Pressure redistribution bed/mattress(bed type), Increase time out of bed    Mobility Interventions: Assess need for specialty bed, Chair cushion, HOB 30 degrees or less, Float heels, Pressure redistribution bed/mattress (bed type)    Nutrition Interventions: Document food/fluid/supplement intake    Friction and Shear Interventions: Feet elevated on foot rest, HOB 30 degrees or less, Minimize layers, Transferring/repositioning devices                Problem: Falls - Risk of  Goal: *Absence of Falls  Description: Document [place] Fall Risk and appropriate interventions in the flowsheet.   Outcome: Progressing Towards Goal  Note: Fall Risk Interventions:  Mobility Interventions: Bed/chair exit alarm, Communicate number of staff needed for ambulation/transfer, Patient to call before getting OOB, Utilize walker, cane, or other assistive device, Utilize gait belt for transfers/ambulation    Mentation Interventions: Adequate sleep, hydration, pain control, Bed/chair exit alarm, Familiar objects from home, Gait belt with transfers/ambulation, Increase mobility, Room close to nurse's station, Toileting rounds, Update white board    Medication Interventions: Bed/chair exit alarm, Patient to call before getting OOB, Teach patient to arise slowly, Utilize gait belt for transfers/ambulation    Elimination Interventions: Bed/chair exit alarm, Call light in reach, Patient to call for help with toileting needs, Urinal in reach    History of Falls Interventions: Bed/chair exit alarm, Room close to nurse's station, Utilize gait belt for transfer/ambulation         Problem: Inpatient Rehab (Adult)  Goal: *LTG: Avoids injury/falls 100% of time related to deficits  Outcome: Progressing Towards Goal  Goal: *LTG: Avoids infection 100% of time related to deficits  Outcome: Progressing Towards Goal  Goal: *LTG: Verbalize understanding of diagnosis and risk factors for recurring stroke  Outcome: Progressing Towards Goal  Goal: *LTG: Absence of DVT during hospitalization  Outcome: Progressing Towards Goal  Goal: *LTG: Maintains Skin Integrity With No Evidence of Pressure Injury 100% of Time  Outcome: Progressing Towards Goal  Goal: Interventions  Outcome: Progressing Towards Goal

## 2021-07-04 NOTE — PROGRESS NOTES
Progress Note    Patient: Mariam Herndon MRN: [id number]  CSN: [id number]    YOB: 1940  Age: 80 y.o. Sex: male    DOA: 6/29/2021 LOS:  LOS: 5 days                    Subjective:     Primary Rehab Diagnosis: Generalized weakness with Impaired mobility and ADLs secondary to 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    Patient reports diarrhea has improved some today. No acute concerns. Review of systems  General: No fevers or chills. Cardiovascular: No chest pain or pressure. No palpitations. Pulmonary: No shortness of breath, cough or wheeze. Gastrointestinal: No abdominal pain, nausea, vomiting. Loose stools. Genitourinary: No urinary frequency, urgency, hesitancy or dysuria. Musculoskeletal: pain fairly controlled. Neurologic: generalized weakness. +neuropathy BLE    Objective:     Physical Exam:  Visit Vitals  /76 (BP 1 Location: Left upper arm, BP Patient Position: Sitting)   Pulse (!) 105   Temp 97.2 °F (36.2 °C)   Resp 18   Ht 5' 8\" (1.727 m)   Wt 80.8 kg (178 lb 1.6 oz)   SpO2 95%   BMI 27.08 kg/m²        General:         Alert, cooperative, no acute distress    HEENT: NC, Atraumatic. PERRLA, anicteric sclerae. Lungs: CTA Bilaterally. No Wheezing/Rhonchi/Rales. Heart:  Regular  rhythm,  No murmur, No Rubs, No Gallops  Abdomen: Soft, Non distended, Non tender.  +Bowel sounds, no HSM  Extremities: 2+ LE edema, no calf tenderness. Psych:   Not anxious or agitated. Neurologic:  CN 2-12 grossly intact, Alert and oriented X 3. No acute neurological   deficits    Intake and Output:  Current Shift:  No intake/output data recorded. Last three shifts:  No intake/output data recorded. Labs: Results:       Chemistry No results for input(s): GLU, NA, K, CL, CO2, BUN, CREA, CA, AGAP, BUCR, TBIL, AP, TP, ALB, GLOB, AGRAT in the last 72 hours.     No lab exists for component: GPT   CBC w/Diff No results for input(s): WBC, RBC, HGB, HCT, PLT, GRANS, LYMPH, EOS, HGBEXT, HCTEXT, PLTEXT, HGBEXT, HCTEXT, PLTEXT in the last 72 hours. Cardiac Enzymes No results for input(s): CPK, CKND1, WAGNER in the last 72 hours. No lab exists for component: CKRMB, TROIP   Coagulation No results for input(s): PTP, INR, APTT, INREXT, INREXT in the last 72 hours. Lipid Panel No results found for: CHOL, CHOLPOCT, CHOLX, CHLST, CHOLV, 874246, HDL, HDLP, LDL, LDLC, DLDLP, 110869, VLDLC, VLDL, TGLX, TRIGL, TRIGP, TGLPOCT, CHHD, CHHDX   BNP No results for input(s): BNPP in the last 72 hours. Liver Enzymes No results for input(s): TP, ALB, TBIL, AP in the last 72 hours.     No lab exists for component: SGOT, GPT, DBIL   Thyroid Studies Lab Results   Component Value Date/Time    TSH 1.57 06/21/2021 02:12 PM          Procedures/imaging: see electronic medical records for all procedures/Xrays and details which were not copied into this note but were reviewed prior to creation of Plan    Medications:   Current Facility-Administered Medications   Medication Dose Route Frequency    pneumococcal 23-valent (PNEUMOVAX 23) injection 0.5 mL  0.5 mL IntraMUSCular PRIOR TO DISCHARGE    loperamide (IMODIUM) capsule 2 mg  2 mg Oral TID    acetaminophen (TYLENOL) tablet 650 mg  650 mg Oral Q4H PRN    bisacodyL (DULCOLAX) tablet 10 mg  10 mg Oral Q48H PRN    amoxicillin-clavulanate (AUGMENTIN) 500-125 mg per tablet 1 Tablet  1 Tablet Oral BID WITH MEALS    cefpodoxime (VANTIN) tablet 200 mg  200 mg Oral Q12H    guaiFENesin ER (MUCINEX) tablet 600 mg  600 mg Oral DAILY    dextromethorphan (DELSYM) 30 mg/5 mL syrup 30 mg  30 mg Oral QHS    carvediloL (COREG) tablet 3.125 mg  3.125 mg Oral BID WITH MEALS    clopidogreL (PLAVIX) tablet 75 mg  75 mg Oral DAILY WITH BREAKFAST    rosuvastatin (CRESTOR) tablet 10 mg  10 mg Oral DAILY    nitroglycerin (NITROSTAT) tablet 0.4 mg  0.4 mg SubLINGual PRN    arformoteroL (BROVANA) neb solution 15 mcg  15 mcg Nebulization BID RT    ipratropium (ATROVENT) 0.02 % nebulizer solution 0.5 mg  0.5 mg Nebulization TID    albuterol (PROVENTIL VENTOLIN) nebulizer solution 2.5 mg  2.5 mg Nebulization Q4H PRN    apixaban (ELIQUIS) tablet 5 mg  5 mg Oral BID    multivitamin, tx-iron-ca-min (THERA-M w/ IRON) tablet 1 Tablet  1 Tablet Oral DAILY    lisinopriL (PRINIVIL, ZESTRIL) tablet 5 mg  5 mg Oral DAILY    tamsulosin (FLOMAX) capsule 0.4 mg  0.4 mg Oral DAILY WITH DINNER    gabapentin (NEURONTIN) capsule 300 mg  300 mg Oral TID       Assessment/Plan     Principal Problem:    Diverticulitis of large intestine with abscess (6/21/2021)    Active Problems:    Essential hypertension ()      Non-ST elevation (NSTEMI) myocardial infarction ([facility] [address] (5/17/2021)      Paroxysmal atrial fibrillation ([facility] [address] (5/17/2021)      Anticoagulated by anticoagulation treatment (5/19/2021)      Overview: On Apixaban      Mixed hyperlipidemia ()      Heart failure with preserved left ventricular function (HFpEF) ([facility]) ()      Overview: 2D echocardiogram (5/18/2021) showed EF 50%; concentric LV hypertrophy       with a severely thickened septal wall and mildly thickened posterior wall;       left atrial chamber is severely enlarged; right atrial chamber volume is       moderately enlarged; no mass, shunts, or thrombi. Coronary artery disease involving native coronary artery of native heart ()      History of coronary artery bypass graft (2001)      Overview: [facility]      On clopidogrel therapy (5/19/2021)      Neuropathy involving both lower extremities (10/22/2020)      Impaired mobility and ADLs (5/17/2021)      Status post insertion of drug eluting coronary artery stent (5/18/2021)      Overview: S/P PCI to proximal SVG with 3.5 x 12 mm [place] drug-eluting stent; PCI to       mid SVG with 3.5 x 34 mm [place] drug-eluting stent; PCI to distal SVG with       3.5 x 15 mm [place] drug-eluting stent;  Balloon angioplasty to distal SVG       anastomosis (5/18/2021 -  Trevin Bautista)       Mesenteric abscess [facility]) (6/21/2021)      Diverticulosis of sigmoid colon (5/22/2021)      History of sinus bradycardia (6/21/2021)      Overview: Attributed to Carvedilol 25 mg PO BID with meals      Generalized weakness (6/21/2021)      Loose stools (7/1/2021)      Constipation --> Diarrhea; Leukocytosis --> 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  Upon discharge from the ARU, the patient will need to follow up with Colorectal Surgery (Dr. 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, brainstem and cerebellum) without residual deficit  Continue Clopidogrel 75 mg PO once daily with breakfast and Rosuvastatin 10 mg PO once daily     Benign prostatic hyperplasia with urinary retention  Continue Tamsulosin 0.4 mg PO once daily after dinner     Chronic heart failure with preserved ejection fraction (HFpEF); Essential hypertension  BP well controlled  Continue Carvedilol 3.125 mg PO BID with meals (8AM, 5PM)     Chronic obstructive pulmonary disease (COPD)  Continue Arformoterol nebulization BID, Ipratropium nebulization TID and Albuterol nebulization q 4 hr PRN for shortness of breath/wheezing     Mixed hyperlipidemia  Lipid profile (5/18/2021) showed TG 83, , HDL 50, LDL 93  Continue Rosuvastatin 10 mg PO once daily     Non-ST elevation (NSTEMI) myocardial infarction; Coronary artery disease; History of CABG; S/P PCI to proximal SVG with 3.5 x 12 mm [place] drug-eluting stent; PCI to mid SVG with 3.5 x 34 mm [place] drug-eluting stent; PCI to distal SVG with 3.5 x 15 mm [place] drug-eluting stent;  Balloon angioplasty to distal SVG anastomosis (5/18/2021 - Dr. Trevin Bautista)   Continue Carvedilol 3.125 mg PO BID with meals (8AM, 5PM), Clopidogrel 75 mg PO once daily with breakfast, Rosuvastatin 10 mg PO once daily, Nitroglycerin 0.4 mg SL PRN for chest pain     Paroxysmal atrial fibrillation, anticoagulated on Apixaban  Continue apixaban 5 mg PO BID and Carvedilol 3.125 mg PO BID with meals (8AM, 5PM)     Peripheral vascular disease of extremity with claudication;  Neuropathy involving both lower extremities  Upon discharge from the ARU, the patient will need to follow up with Vascular Surgery (Dr. Nancy Tripp)  Continue Gabapentin 300 mg PO TID     Loose stools, most likely side effect of oral antibiotics  continue Loperamide 2 mg PO TID, monitor for effect       Grace Sosa MD  7/4/2021 14:18

## 2021-07-04 NOTE — PROGRESS NOTES
Problem: Mobility Impaired (Adult and Pediatric)  Goal: *Therapy Goal (Edit Goal, Insert Text)  Description: Physical Therapy Short Term Goals  Initiated 6/30/2021 and to be accomplished within 7 day(s) on 7/7/2021:  1. Patient will move from supine to sit and sit to supine , scoot up and down, and roll side to side in bed with supervision/set-up. 2.  Patient will transfer from bed to chair and chair to bed with supervision/set-up using the least restrictive device. 3.  Patient will perform sit to stand with supervision/set-up. 4.  Patient will ambulate with supervision/set-up for 150 feet with the least restrictive device. 5.  Patient will ascend/descend 14 stairs with 1-2 handrail(s) with minimal assistance/contact guard assist.    Physical Therapy Long Term Goals  Initiated 6/30/2021 and to be accomplished within 10-14 day(s) 7/14/2021  1. Patient will move from supine to sit and sit to supine , scoot up and down, and roll side to side in bed with modified independence. 2.  Patient will transfer from bed to chair and chair to bed with modified independence using the least restrictive device. 3.  Patient will perform sit to stand with modified independence. 4.  Patient will ambulate with modified independence for at least 150 feet with the least restrictive device. 5.  Patient will ascend/descend 14 stairs with 2 handrail(s) with supervision/set-up. 6.  Patient will ascend/descend 1 curb step with appropriate AD with supervision/setup to safely get into and out of house. Outcome: Progressing Towards Goal   PHYSICAL THERAPY TREATMENT    Patient: Cheng Middleton (40 y.o. male)  Date: 7/4/2021  Diagnosis: Diverticulitis of large intestine with abscess [K57.20] Diverticulitis of large intestine with abscess  Precautions: Fall  Chart, physical therapy assessment, plan of care and goals were reviewed.     Time In:1138  Time Out:1310    Patient seen for: Wheelchair mobility;Transfer training;Gait training; Therapeutic exercise (stair training)    Pain:  Pt pain was reported as no c/o pre-treatment. Pt pain was reported as no c/o post-treatment. Intervention: NA     Patient identified with name and : yes     SUBJECTIVE:      Pt reports \"My feet are so swollen they don't fit in any of my shoes. \" Pt also reports B feet are numb. OBJECTIVE DATA SUMMARY:    Objective:     TRANSFERS Daily Assessment     Sit to Stand Assistance: Stand-by assistance  Car Transfers: Supervision  Car Type: car txfr simulator  Pt performed sit to stand from w/c with SBA and pushing up from B arm rests. Pt required v/c to slow descent with stand to sit. Pt performed car txfr in simulator with Supervision/SBA with RW and v/c for safe technique to enter simulator. GAIT Daily Assessment    Gait Description (WDL)      Gait Abnormalities Decreased step clearance    Assistive device Walker, rolling    Ambulation assistance - level surface 5 (Supervision/setup)    Distance 300 Feet (ft) (150)    Ambulation assistance- uneven surface      Comments Pt ambulated 150ft and 300ft with RW and Supervision. Pt with decreased B foot clearance and flexed posture, requiring min v/c for remaining within base of RW and stand erect. STEPS/STAIRS Daily Assessment     Steps/Stairs ambulated 10 (combo 4\" and 6\" steps)    Assistance Required 5 (Stand-by assistance)    Rail Use Both    Comments  Pt negotiated up and down 10 steps with BHR and SBA. Pt performed step through pattern to ascend and step to pattern with alternating LE lead to descend.      Curbs/Ramps  NT        BALANCE Daily Assessment     Sitting - Static: Good (unsupported)  Sitting - Dynamic: Good (unsupported)  Standing - Static: Fair  Standing - Dynamic : Impaired        WHEELCHAIR MOBILITY Daily Assessment     Able to Propel (ft): 150 feet  Functional Level: 5  Curbs/Ramps Assist Required (FIM Score): 0 (Not tested)  Wheelchair Setup Assist Required : 6 (Modified independent)  Wheelchair Management: Manages left brake;Manages right brake  Pt propelled w/c from room to gym with BLE and supervision with v/c for navigation. THERAPEUTIC EXERCISES Daily Assessment       Seated BLE LAQ, hip flexion, hip add with bolster and abd with GTB 2x15  Standing with RW marching and hip abd x15 each        ASSESSMENT:  Pt continues to make progress with functional tolerance but requires v/c for erect posture frequently during gait. Progression toward goals:  []      Improving appropriately and progressing toward goals  [x]      Improving slowly and progressing toward goals  []      Not making progress toward goals and plan of care will be adjusted      PLAN:  Patient continues to benefit from skilled intervention to address the above impairments. Continue treatment per established plan of care. Discharge Recommendations:  Home Health  Further Equipment Recommendations for Discharge:  rolling walker      Estimated Discharge Date: 7/13/2021    Activity Tolerance:   Fair+  Please refer to the flowsheet for vital signs taken during this treatment.     After treatment:   [] Patient left in no apparent distress in bed  [x] Patient left in no apparent distress sitting up in chair  [] Patient left in no apparent distress in w/c mobilizing under own power  [] Patient left in no apparent distress dining area  [] Patient left in no apparent distress mobilizing under own power  [x] Call bell left within reach  [] Nursing notified  [] Caregiver present  [] Bed alarm activated   [x] Chair alarm activated      Braulio Romero, JAYME  7/4/2021

## 2021-07-04 NOTE — ROUTINE PROCESS
SHIFT CHANGE NOTE FOR [facility]    Bedside and Verbal shift change report given to Princess Sergey ÁLVAREZN (oncoming nurse) by Renetta Streeter, RN (offgoing nurse). Report included the following information SBAR, Kardex, MAR and Recent Results. Situation:   Code Status: Full Code   Hospital Day: 5   Problem List:   Hospital Problems  Date Reviewed: 7/2/2021        Codes Class Noted POA    Loose stools ICD-10-CM: R19.5  ICD-9-CM: 787.7  7/1/2021 Yes        Mesenteric abscess ([address] ICD-10-CM: K65.1  ICD-9-CM: 567.22  6/21/2021 Yes        * (Principal) Diverticulitis of large intestine with abscess ICD-10-CM: K57.20  ICD-9-CM: 562.11, 569.5  6/21/2021 Yes        History of sinus bradycardia ICD-10-CM: Z86.79  ICD-9-CM: V12.59  6/21/2021 Yes    Overview Signed 6/29/2021 10:02 PM by Theresa Emery MD     Attributed to Carvedilol 25 mg PO BID with meals             Generalized weakness ICD-10-CM: R53.1  ICD-9-CM: 780.79  6/21/2021 Yes        Mixed hyperlipidemia (Chronic) ICD-10-CM: R39.6  ICD-9-CM: 272.2  Unknown Yes        Heart failure with preserved left ventricular function (HFpEF) (HCC) (Chronic) ICD-10-CM: I50.30  ICD-9-CM: 428.9  Unknown Yes    Overview Signed 6/7/2021 11:03 PM by Theresa Emery MD     2D echocardiogram (5/18/2021) showed EF 50%; concentric LV hypertrophy with a severely thickened septal wall and mildly thickened posterior wall; left atrial chamber is severely enlarged; right atrial chamber volume is moderately enlarged; no mass, shunts, or thrombi.               Coronary artery disease involving native coronary artery of native heart (Chronic) ICD-10-CM: I25.10  ICD-9-CM: 414.01  Unknown Yes        Diverticulosis of sigmoid colon (Chronic) ICD-10-CM: K57.30  ICD-9-CM: 562.10  5/22/2021 Yes        Anticoagulated by anticoagulation treatment ICD-10-CM: Z79.01  ICD-9-CM: V58.61  5/19/2021 Yes    Overview Signed 6/7/2021 10:48 PM by Theresa Emery MD     On Apixaban             On clopidogrel therapy ICD-10-CM: Z79.01  ICD-9-CM: V58.61  5/19/2021 Yes        Status post insertion of drug eluting coronary artery stent ICD-10-CM: Z95.5  ICD-9-CM: V45.82  5/18/2021 Yes    Overview Signed 6/18/2021  1:38 PM by Allen Coughlin MD     S/P PCI to proximal SVG with 3.5 x 12 mm [place] drug-eluting stent; PCI to mid SVG with 3.5 x 34 mm [place] drug-eluting stent; PCI to distal SVG with 3.5 x 15 mm [place] drug-eluting stent;  Balloon angioplasty to distal SVG anastomosis (5/18/2021 - Dr. Laura Reddy)              Non-ST elevation (NSTEMI) myocardial infarction [facility]) ICD-10-CM: I21.4  ICD-9-CM: 410.70  5/17/2021 Yes        Paroxysmal atrial fibrillation ([facility] [address] ICD-10-CM: I48.0  ICD-9-CM: 427.31  5/17/2021 Yes        Impaired mobility and ADLs ICD-10-CM: Z74.09, Z78.9  ICD-9-CM: V49.89  5/17/2021 Yes        Neuropathy involving both lower extremities (Chronic) ICD-10-CM: J74.59  ICD-9-CM: 356.9  10/22/2020 Yes        Essential hypertension (Chronic) ICD-10-CM: I10  ICD-9-CM: 401.9  Unknown Yes        History of coronary artery bypass graft ICD-10-CM: Z95.1  ICD-9-CM: V45.81  2001 Yes    Overview Signed 6/7/2021 11:04 PM by Allen Coughlin MD     [facility]                   Background:   Past Medical History:   Past Medical History:   Diagnosis Date    Acute embolic stroke ([facility] [address] 6/94/5451    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, brainstem and cerebellum) without residual deficit    Anticoagulated by anticoagulation treatment 5/19/2021    On Apixaban    Benign prostatic hyperplasia with urinary retention     Chronic obstructive pulmonary disease (COPD) ([facility] [address]     Coronary artery disease involving native coronary artery of native heart     Diverticulitis of large intestine with abscess 6/21/2021    Diverticulosis of sigmoid colon 5/22/2021    Essential hypertension     Heart failure with preserved left ventricular function (HFpEF) ([facility] [address]     2D echocardiogram (5/18/2021) showed EF 50%; concentric LV hypertrophy with a severely thickened septal wall and mildly thickened posterior wall; left atrial chamber is severely enlarged; right atrial chamber volume is moderately enlarged; no mass, shunts, or thrombi.     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[address] 6/21/2021    Neuropathy involving both lower extremities 10/22/2020    Non-ST elevation (NSTEMI) myocardial infarction ([address] 5/17/2021    On clopidogrel therapy 5/19/2021    Paroxysmal atrial fibrillation ([facility] [address] 5/17/2021    Peripheral vascular disease of extremity with claudication ([facility] [address] 6/14/2021    Urinary retention         Assessment:   Changes in Assessment throughout shift: No change to previous assessment    Patient has a central line: no   Patient has Taveras Cath: no      Last Vitals:     Vitals:    07/03/21 1539 07/03/21 2100 07/04/21 0713 07/04/21 1523   BP: 134/78 127/80 126/76 (!) 142/88   Pulse: 95 95 (!) 105 70   Resp: 16 18 18 16   Temp: 98.1 °F (36.7 °C) 98.2 °F (36.8 °C) 97.2 °F (36.2 °C) 98.1 °F (36.7 °C)   SpO2: 94% 95%     Weight:       Height:            PAIN    Pain Assessment    Pain Intensity 1: 0 (07/04/21 1625) Pain Intensity 1: 2 (12/29/14 1105)    Pain Location 1: Foot Pain Location 1: Abdomen    Pain Intervention(s) 1: Medication (see MAR) Pain Intervention(s) 1: Medication (see MAR)  Patient Stated Pain Goal: 0 Patient Stated Pain Goal: 0  o Intervention effective: yes  o Other actions taken for pain:       Skin Assessment  Skin color Skin Color: Appropriate for ethnicity  Condition/Temperature Skin Condition/Temp: Dry  Integrity Skin Integrity: Intact  Turgor Turgor: Non-tenting  Weekly Pressure Ulcer Documentation  Pressure  Injury Documentation: No Pressure Injury Noted-Pressure Ulcer Prevention Initiated  Wound Prevention & Protection Methods  Orientation of wound Orientation of Wound Prevention: Posterior  Location of Prevention Location of Wound Prevention: Sacrum/Coccyx  Dressing Present Dressing Present : No  Dressing Status Dressing Status: Intact  Wound Offloading Wound Offloading (Prevention Methods): Bed, pressure redistribution/air, Bed, pressure reduction mattress, Wheelchair     INTAKE/OUPUT  Date 07/03/21 1900 - 07/04/21 0659 07/04/21 0700 - 07/05/21 0659   Shift 5857-3752 24 Hour Total 1101-0266 1420-6221 24 Hour Total   INTAKE   Shift Total(mL/kg)        OUTPUT   Urine(mL/kg/hr)          Urine Occurrence(s) 2 x 5 x 4 x  4 x   Stool          Stool Occurrence(s) 2 x 5 x 2 x  2 x   Shift Total(mL/kg)        NET        Weight (kg) 80.8 80.8 80.8 80.8 80.8       Recommendations:  1. Patient needs and requests: pain management, help with toileting    2. Pending tests/procedures: CT ABD Pelv for 7/15/21    3. Functional Level/Equipment: Partial (one person) /      Fall Precautions:   Fall risk precautions were reinforced with the patient; he was instructed to call for help prior to getting up. The following fall risk precautions were continued: bed/ chair alarms, door signage, yellow bracelet and socks as well as update of the Lerry Raspberry tool in the patient's room. [place] Score: 3    HEALS Safety Check    A safety check occurred in the patient's room between off going nurse and oncoming nurse listed above.     The safety check included the below items  Area Items   H  High Alert Medications - Verify all high alert medication drips (heparin, PCA, etc.)   E  Equipment - Suction is set up for ALL patients (with yanker)  - Red plugs utilized for all equipment (IV pumps, etc.)  - WOWs wiped down at end of shift.  - Room stocked with oxygen, suction, and other unit-specific supplies   A  Alarms - Bed alarm is set for fall risk patients  - Ensure chair alarm is in place and activated if patient is up in a chair   L  Lines - Check IV for any infiltration  - Taveras bag is empty if patient has a Taveras   - Tubing and IV bags are labeled   S  Safety   - Room is clean, patient is clean, and equipment is clean. - Hallways are clear from equipment besides carts. - Fall bracelet on for fall risk patients  - Ensure room is clear and free of clutter  - Suction is set up for ALL patients (with yanker)  - Hallways are clear from equipment besides carts.    - Isolation precautions followed, supplies available outside room, sign posted     Amisha Clay RN

## 2021-07-04 NOTE — PROGRESS NOTES
Problem: Pressure Injury - Risk of  Goal: *Prevention of pressure injury  Description: Document Nicko Scale and appropriate interventions in the flowsheet. Outcome: Progressing Towards Goal  Note: Pressure Injury Interventions:  Sensory Interventions: Assess changes in LOC    Moisture Interventions: Absorbent underpads    Activity Interventions: Chair cushion    Mobility Interventions: Chair cushion, Pressure redistribution bed/mattress (bed type)    Nutrition Interventions: Document food/fluid/supplement intake    Friction and Shear Interventions: HOB 30 degrees or less, Lift team/patient mobility team, Minimize layers                Problem: Patient Education: Go to Patient Education Activity  Goal: Patient/Family Education  Outcome: Progressing Towards Goal     Problem: Falls - Risk of  Goal: *Absence of Falls  Description: Document [place] Fall Risk and appropriate interventions in the flowsheet.   Outcome: Progressing Towards Goal  Note: Fall Risk Interventions:  Mobility Interventions: Bed/chair exit alarm, Patient to call before getting OOB    Mentation Interventions: Bed/chair exit alarm, Evaluate medications/consider consulting pharmacy    Medication Interventions: Bed/chair exit alarm, Evaluate medications/consider consulting pharmacy, Patient to call before getting OOB    Elimination Interventions: Call light in reach, Bed/chair exit alarm, Patient to call for help with toileting needs    History of Falls Interventions: Bed/chair exit alarm, Evaluate medications/consider consulting pharmacy         Problem: Patient Education: Go to Patient Education Activity  Goal: Patient/Family Education  Outcome: Progressing Towards Goal     Problem: Patient Education: Go to Patient Education Activity  Goal: Patient/Family Education  Outcome: Progressing Towards Goal     Problem: Nutrition Deficit  Goal: *Optimize nutritional status  Outcome: Progressing Towards Goal     Problem: Patient Education: Go to Patient Education Activity  Goal: Patient/Family Education  Outcome: Progressing Towards Goal     Problem: Patient Education: Go to Patient Education Activity  Goal: Patient/Family Education  Outcome: Progressing Towards Goal     Problem: Inpatient Rehab (Adult)  Goal: *LTG: Avoids injury/falls 100% of time related to deficits  Outcome: Progressing Towards Goal  Goal: *LTG: Avoids infection 100% of time related to deficits  Outcome: Progressing Towards Goal  Goal: *LTG: Verbalize understanding of diagnosis and risk factors for recurring stroke  Outcome: Progressing Towards Goal  Goal: *LTG: Absence of DVT during hospitalization  Outcome: Progressing Towards Goal  Goal: *LTG: Maintains Skin Integrity With No Evidence of Pressure Injury 100% of Time  Outcome: Progressing Towards Goal  Goal: Interventions  Outcome: Progressing Towards Goal     Problem: Patient Education: Go to Patient Education Activity  Goal: Patient/Family Education  Outcome: Progressing Towards Goal

## 2021-07-04 NOTE — ROUTINE PROCESS
SHIFT CHANGE NOTE FOR [facility]    Bedside and Verbal shift change report given to Miki Weiss RN (oncoming nurse) by Susie Jensen RN (offgoing nurse). Report included the following information SBAR, Kardex, MAR and Recent Results. Situation:   Code Status: Full Code   Hospital Day: 5   Problem List:   Hospital Problems  Date Reviewed: 7/2/2021        Codes Class Noted POA    Loose stools ICD-10-CM: R19.5  ICD-9-CM: 787.7  7/1/2021 Yes        Mesenteric abscess ([address] ICD-10-CM: K65.1  ICD-9-CM: 567.22  6/21/2021 Yes        * (Principal) Diverticulitis of large intestine with abscess ICD-10-CM: K57.20  ICD-9-CM: 562.11, 569.5  6/21/2021 Yes        History of sinus bradycardia ICD-10-CM: Z86.79  ICD-9-CM: V12.59  6/21/2021 Yes    Overview Signed 6/29/2021 10:02 PM by Camelia Cortez MD     Attributed to Carvedilol 25 mg PO BID with meals             Generalized weakness ICD-10-CM: R53.1  ICD-9-CM: 780.79  6/21/2021 Yes        Mixed hyperlipidemia (Chronic) ICD-10-CM: P16.6  ICD-9-CM: 272.2  Unknown Yes        Heart failure with preserved left ventricular function (HFpEF) (HCC) (Chronic) ICD-10-CM: I50.30  ICD-9-CM: 428.9  Unknown Yes    Overview Signed 6/7/2021 11:03 PM by Camelia Cortez MD     2D echocardiogram (5/18/2021) showed EF 50%; concentric LV hypertrophy with a severely thickened septal wall and mildly thickened posterior wall; left atrial chamber is severely enlarged; right atrial chamber volume is moderately enlarged; no mass, shunts, or thrombi.               Coronary artery disease involving native coronary artery of native heart (Chronic) ICD-10-CM: I25.10  ICD-9-CM: 414.01  Unknown Yes        Diverticulosis of sigmoid colon (Chronic) ICD-10-CM: K57.30  ICD-9-CM: 562.10  5/22/2021 Yes        Anticoagulated by anticoagulation treatment ICD-10-CM: Z79.01  ICD-9-CM: V58.61  5/19/2021 Yes    Overview Signed 6/7/2021 10:48 PM by Camelia Cortez MD     On Apixaban             On clopidogrel therapy ICD-10-CM: Z79.01  ICD-9-CM: V58.61  5/19/2021 Yes        Status post insertion of drug eluting coronary artery stent ICD-10-CM: Z95.5  ICD-9-CM: V45.82  5/18/2021 Yes    Overview Signed 6/18/2021  1:38 PM by Marylou Dubon MD     S/P PCI to proximal SVG with 3.5 x 12 mm [place] drug-eluting stent; PCI to mid SVG with 3.5 x 34 mm [place] drug-eluting stent; PCI to distal SVG with 3.5 x 15 mm [place] drug-eluting stent;  Balloon angioplasty to distal SVG anastomosis (5/18/2021 - Dr. Dave Ballesteros)              Non-ST elevation (NSTEMI) myocardial infarction [facility]) ICD-10-CM: I21.4  ICD-9-CM: 410.70  5/17/2021 Yes        Paroxysmal atrial fibrillation ([facility] [address] ICD-10-CM: I48.0  ICD-9-CM: 427.31  5/17/2021 Yes        Impaired mobility and ADLs ICD-10-CM: Z74.09, Z78.9  ICD-9-CM: V49.89  5/17/2021 Yes        Neuropathy involving both lower extremities (Chronic) ICD-10-CM: V59.61  ICD-9-CM: 356.9  10/22/2020 Yes        Essential hypertension (Chronic) ICD-10-CM: I10  ICD-9-CM: 401.9  Unknown Yes        History of coronary artery bypass graft ICD-10-CM: Z95.1  ICD-9-CM: V45.81  2001 Yes    Overview Signed 6/7/2021 11:04 PM by Marylou Dubon MD     [facility]                   Background:   Past Medical History:   Past Medical History:   Diagnosis Date    Acute embolic stroke ([facility] [address] 6/73/3880    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, brainstem and cerebellum) without residual deficit    Anticoagulated by anticoagulation treatment 5/19/2021    On Apixaban    Benign prostatic hyperplasia with urinary retention     Chronic obstructive pulmonary disease (COPD) ([facility] [address]     Coronary artery disease involving native coronary artery of native heart     Diverticulitis of large intestine with abscess 6/21/2021    Diverticulosis of sigmoid colon 5/22/2021    Essential hypertension     Heart failure with preserved left ventricular function (HFpEF) (HCC)     2D echocardiogram (5/18/2021) showed EF 50%; concentric LV hypertrophy with a severely thickened septal wall and mildly thickened posterior wall; left atrial chamber is severely enlarged; right atrial chamber volume is moderately enlarged; no mass, shunts, or thrombi.     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[address] 6/21/2021    Neuropathy involving both lower extremities 10/22/2020    Non-ST elevation (NSTEMI) myocardial infarction ([address] 5/17/2021    On clopidogrel therapy 5/19/2021    Paroxysmal atrial fibrillation ([facility] [address] 5/17/2021    Peripheral vascular disease of extremity with claudication ([facility] [address] 6/14/2021    Urinary retention         Assessment:   Changes in Assessment throughout shift: No change to previous assessment    Patient has a central line: no   Patient has Taveras Cath: no      Last Vitals:     Vitals:    07/02/21 2031 07/03/21 0745 07/03/21 1539 07/03/21 2100   BP: 122/75 136/86 134/78 127/80   Pulse: 75 94 95 95   Resp: 18 16 16 18   Temp: 98.1 °F (36.7 °C) 98.4 °F (36.9 °C) 98.1 °F (36.7 °C) 98.2 °F (36.8 °C)   SpO2: 97% 96% 94% 95%   Weight:       Height:            PAIN    Pain Assessment    Pain Intensity 1: 0 (07/04/21 0423) Pain Intensity 1: 2 (12/29/14 1105)    Pain Location 1: Foot Pain Location 1: Abdomen    Pain Intervention(s) 1: Medication (see MAR) Pain Intervention(s) 1: Medication (see MAR)  Patient Stated Pain Goal: 0 Patient Stated Pain Goal: 0  o Intervention effective: yes  o Other actions taken for pain: Medication (see MAR)     Skin Assessment  Skin color Skin Color: Appropriate for ethnicity  Condition/Temperature Skin Condition/Temp: Dry, Warm  Integrity Skin Integrity: Intact  Turgor Turgor: Non-tenting  Weekly Pressure Ulcer Documentation  Pressure  Injury Documentation: No Pressure Injury Noted-Pressure Ulcer Prevention Initiated  Wound Prevention & Protection Methods  Orientation of wound Orientation of Wound Prevention: Posterior  Location of Prevention Location of Wound Prevention: Buttocks, Sacrum/Coccyx  Dressing Present Dressing Present : No  Dressing Status Dressing Status: Intact  Wound Offloading Wound Offloading (Prevention Methods): Bed, pressure redistribution/air, Bed, pressure reduction mattress, Wheelchair     INTAKE/OUPUT  Date 07/03/21 0700 - 07/04/21 0659 07/04/21 0700 - 07/05/21 0659   Shift 6243-8600 9549-4850 24 Hour Total 0700-1859 1900-0659 24 Hour Total   INTAKE   Shift Total(mL/kg)         OUTPUT   Urine(mL/kg/hr)           Urine Occurrence(s) 3 x 2 x 5 x      Stool           Stool Occurrence(s) 3 x 2 x 5 x      Shift Total(mL/kg)         NET         Weight (kg) 80.8 80.8 80.8 80.8 80.8 80.8       Recommendations:  1. Patient needs and requests: pain management, help with toileting, immodium    2. Pending tests/procedures: CT ABD Pelv for 7/15/21    3. Functional Level/Equipment: Partial (one person) / Bed (comment); Wheelchair    Fall Precautions:   Fall risk precautions were reinforced with the patient; he was instructed to call for help prior to getting up. The following fall risk precautions were continued: bed/ chair alarms, door signage, yellow bracelet and socks as well as update of the [employer] tool in the patient's room. [place] Score: 3    HEALS Safety Check    A safety check occurred in the patient's room between off going nurse and oncoming nurse listed above.     The safety check included the below items  Area Items   H  High Alert Medications - Verify all high alert medication drips (heparin, PCA, etc.)   E  Equipment - Suction is set up for ALL patients (with jeremiasker)  - Red plugs utilized for all equipment (IV pumps, etc.)  - WOWs wiped down at end of shift.  - Room stocked with oxygen, suction, and other unit-specific supplies   A  Alarms - Bed alarm is set for fall risk patients  - Ensure chair alarm is in place and activated if patient is up in a chair   L  Lines - Check IV for any infiltration  - Taveras bag is empty if patient has a Taveras   - Tubing and IV bags are labeled   S  Safety   - Room is clean, patient is clean, and equipment is clean. - Hallways are clear from equipment besides carts. - Fall bracelet on for fall risk patients  - Ensure room is clear and free of clutter  - Suction is set up for ALL patients (with yanker)  - Hallways are clear from equipment besides carts.    - Isolation precautions followed, supplies available outside room, sign posted     Rodney Silva RN

## 2021-07-05 NOTE — ROUTINE PROCESS
0800 Pt. Awake sitting up in bed no change in assessment no signs of distress pt. Stated to be feeling fine. 0930 Pt. Sitting up in chair eating breakfast.  1200 pt. With therapy. 1330 able to transfer from bed to chair with minimal assist.  1500 no change in assessment. 1800 Pt. Sitting up in chair eating dinner.

## 2021-07-05 NOTE — PROGRESS NOTES
Problem: Mobility Impaired (Adult and Pediatric)  Goal: *Therapy Goal (Edit Goal, Insert Text)  Description: Physical Therapy Short Term Goals  Initiated 6/30/2021 and to be accomplished within 7 day(s) on 7/7/2021:  1. Patient will move from supine to sit and sit to supine , scoot up and down, and roll side to side in bed with supervision/set-up. 2.  Patient will transfer from bed to chair and chair to bed with supervision/set-up using the least restrictive device. 3.  Patient will perform sit to stand with supervision/set-up. 4.  Patient will ambulate with supervision/set-up for 150 feet with the least restrictive device. 5.  Patient will ascend/descend 14 stairs with 1-2 handrail(s) with minimal assistance/contact guard assist.    Physical Therapy Long Term Goals  Initiated 6/30/2021 and to be accomplished within 10-14 day(s) 7/14/2021  1. Patient will move from supine to sit and sit to supine , scoot up and down, and roll side to side in bed with modified independence. 2.  Patient will transfer from bed to chair and chair to bed with modified independence using the least restrictive device. 3.  Patient will perform sit to stand with modified independence. 4.  Patient will ambulate with modified independence for at least 150 feet with the least restrictive device. 5.  Patient will ascend/descend 14 stairs with 2 handrail(s) with supervision/set-up. 6.  Patient will ascend/descend 1 curb step with appropriate AD with supervision/setup to safely get into and out of house. Outcome: Progressing Towards Goal  PHYSICAL THERAPY TREATMENT    Patient: Ardis Spurling (62 y.o. male)  Date: 7/5/2021  Diagnosis: Diverticulitis of large intestine with abscess [K57.20] Diverticulitis of large intestine with abscess  Precautions: Fall  Chart, physical therapy assessment, plan of care and goals were reviewed.     Time EH:8589  Time TVY:8453  Time In: 0188  Time Out: 9651     Patient seen for: Gait training; Therapeutic exercise;Transfer training;Balance activities    Pain:  Pt pain was reported as 0 pre-treatment. Pt pain was reported as 0 post-treatment. Intervention: n/a    Patient identified with name and :yes    SUBJECTIVE:      \"I want to keep getting stronger and go home. \"    OBJECTIVE DATA SUMMARY:    Objective:     GROSS ASSESSMENT Daily Assessment            BED/MAT MOBILITY Daily Assessment            TRANSFERS Daily Assessment     Sit to Stand Assistance: Stand-by assistance   Pt performed sit to stand 2x5 from the w/c with cues to decrease reliance on UEs        GAIT Daily Assessment    Gait Description (WDL)      Gait Abnormalities Decreased step clearance    Assistive device Walker, rolling;Gait belt    Ambulation assistance - level surface 5 (Supervision/setup)    Distance 150 Feet (ft) (150), 400 feet    Ambulation assistance- uneven surface      Comments Verbal cues to stay close to the walker and for a more upright posture; pt with shuffling feet as he fatigues, educated patient on watching for signs of fatigue with mobility to prevent falls. STEPS/STAIRS Daily Assessment     Steps/Stairs ambulated 15    Assistance Required 5 (Stand-by assistance)/CGA    Rail Use Both    Comments   pt utilized and alternating step pattern when ascending and a marking time pattern descending    Curbs/Ramps  NT        BALANCE Daily Assessment     Sitting - Static: Good (unsupported)  Sitting - Dynamic: Good (unsupported)  Standing - Static:  (fair+)     Standing balloon volleyball 2x5 minutes with a several minute seated rest break between trials to increase dynamic standing balance. Pt performed the task with one hand on the RW for support and required CGA/min A to maintain balance when reaching outside his base of support.         WHEELCHAIR MOBILITY Daily Assessment     Able to Propel (ft): 150 feet  Functional Level: 5  Curbs/Ramps Assist Required (FIM Score): 0 (Not tested)  Wheelchair Setup Assist Required : 6 (Modified independent)  Wheelchair Management: Manages right brake;Manages left brake        THERAPEUTIC EXERCISES Daily Assessment       seated LE exercises 2x10 with 2# weights on B LEs: LAQ, marching, glute sets, hip abduction with green t-band, hip adduction with blue ball; standing 1x10: heel raises, marching          ASSESSMENT:  Pt is progressing towards goals however continues to have LE weakness causing decreased independence with functional mobility and decreased standing balance. Progression toward goals:  [x]      Improving appropriately and progressing toward goals  []      Improving slowly and progressing toward goals  []      Not making progress toward goals and plan of care will be adjusted      PLAN:  Patient continues to benefit from skilled intervention to address the above impairments. Continue treatment per established plan of care. Discharge Recommendations:  Home Health  Further Equipment Recommendations for Discharge:  rolling walker      Estimated Discharge Date:7/13/2021    Activity Tolerance:   Fair-  Please refer to the flowsheet for vital signs taken during this treatment.     After treatment:   [] Patient left in no apparent distress in bed  [x] Patient left in no apparent distress sitting up in chair  [] Patient left in no apparent distress in w/c mobilizing under own power  [] Patient left in no apparent distress dining area  [] Patient left in no apparent distress mobilizing under own power  [x] Call bell left within reach  [x] Nursing notified  [x] Caregiver present  [] Bed alarm activated   [x] Chair alarm activated      Radames Caldwell PT  7/5/2021

## 2021-07-05 NOTE — ROUTINE PROCESS
SHIFT CHANGE NOTE FOR [facility]    Bedside and Verbal shift change report given to [address] Magdi (oncoming nurse) by Angelica Flores LPN (offgoing nurse). Report included the following information SBAR, Kardex, MAR and Recent Results. Situation:   Code Status: Full Code   Hospital Day: 6   Problem List:   Hospital Problems  Date Reviewed: 7/2/2021        Codes Class Noted POA    Loose stools ICD-10-CM: R19.5  ICD-9-CM: 787.7  7/1/2021 Yes        Mesenteric abscess ([address] ICD-10-CM: K65.1  ICD-9-CM: 567.22  6/21/2021 Yes        * (Principal) Diverticulitis of large intestine with abscess ICD-10-CM: K57.20  ICD-9-CM: 562.11, 569.5  6/21/2021 Yes        History of sinus bradycardia ICD-10-CM: Z86.79  ICD-9-CM: V12.59  6/21/2021 Yes    Overview Signed 6/29/2021 10:02 PM by Joshua Bray MD     Attributed to Carvedilol 25 mg PO BID with meals             Generalized weakness ICD-10-CM: R53.1  ICD-9-CM: 780.79  6/21/2021 Yes        Mixed hyperlipidemia (Chronic) ICD-10-CM: W13.4  ICD-9-CM: 272.2  Unknown Yes        Heart failure with preserved left ventricular function (HFpEF) (HCC) (Chronic) ICD-10-CM: I50.30  ICD-9-CM: 428.9  Unknown Yes    Overview Signed 6/7/2021 11:03 PM by Joshua Bray MD     2D echocardiogram (5/18/2021) showed EF 50%; concentric LV hypertrophy with a severely thickened septal wall and mildly thickened posterior wall; left atrial chamber is severely enlarged; right atrial chamber volume is moderately enlarged; no mass, shunts, or thrombi.               Coronary artery disease involving native coronary artery of native heart (Chronic) ICD-10-CM: I25.10  ICD-9-CM: 414.01  Unknown Yes        Diverticulosis of sigmoid colon (Chronic) ICD-10-CM: K57.30  ICD-9-CM: 562.10  5/22/2021 Yes        Anticoagulated by anticoagulation treatment ICD-10-CM: Z79.01  ICD-9-CM: V58.61  5/19/2021 Yes    Overview Signed 6/7/2021 10:48 PM by Joshua Bray MD     On Apixaban             On clopidogrel therapy ICD-10-CM: Z79.01  ICD-9-CM: V58.61  5/19/2021 Yes        Status post insertion of drug eluting coronary artery stent ICD-10-CM: Z95.5  ICD-9-CM: V45.82  5/18/2021 Yes    Overview Signed 6/18/2021  1:38 PM by Isiah Flores MD     S/P PCI to proximal SVG with 3.5 x 12 mm [place] drug-eluting stent; PCI to mid SVG with 3.5 x 34 mm [place] drug-eluting stent; PCI to distal SVG with 3.5 x 15 mm [place] drug-eluting stent;  Balloon angioplasty to distal SVG anastomosis (5/18/2021 - Dr. Naeem Reeder)              Non-ST elevation (NSTEMI) myocardial infarction [facility]) ICD-10-CM: I21.4  ICD-9-CM: 410.70  5/17/2021 Yes        Paroxysmal atrial fibrillation ([facility] [address] ICD-10-CM: I48.0  ICD-9-CM: 427.31  5/17/2021 Yes        Impaired mobility and ADLs ICD-10-CM: Z74.09, Z78.9  ICD-9-CM: V49.89  5/17/2021 Yes        Neuropathy involving both lower extremities (Chronic) ICD-10-CM: J94.19  ICD-9-CM: 356.9  10/22/2020 Yes        Essential hypertension (Chronic) ICD-10-CM: I10  ICD-9-CM: 401.9  Unknown Yes        History of coronary artery bypass graft ICD-10-CM: Z95.1  ICD-9-CM: V45.81  2001 Yes    Overview Signed 6/7/2021 11:04 PM by Isiah Flores MD     [facility]                   Background:   Past Medical History:   Past Medical History:   Diagnosis Date    Acute embolic stroke ([facility] [address] 9/44/6820    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, brainstem and cerebellum) without residual deficit    Anticoagulated by anticoagulation treatment 5/19/2021    On Apixaban    Benign prostatic hyperplasia with urinary retention     Chronic obstructive pulmonary disease (COPD) ([facility] [address]     Coronary artery disease involving native coronary artery of native heart     Diverticulitis of large intestine with abscess 6/21/2021    Diverticulosis of sigmoid colon 5/22/2021    Essential hypertension     Heart failure with preserved left ventricular function (HFpEF) ([facility] [address]     2D echocardiogram (5/18/2021) showed EF 50%; concentric LV hypertrophy with a severely thickened septal wall and mildly thickened posterior wall; left atrial chamber is severely enlarged; right atrial chamber volume is moderately enlarged; no mass, shunts, or thrombi.     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[address] 6/21/2021    Neuropathy involving both lower extremities 10/22/2020    Non-ST elevation (NSTEMI) myocardial infarction ([address] 5/17/2021    On clopidogrel therapy 5/19/2021    Paroxysmal atrial fibrillation ([facility] [address] 5/17/2021    Peripheral vascular disease of extremity with claudication ([facility] [address] 6/14/2021    Urinary retention         Assessment:   Changes in Assessment throughout shift: No change to previous assessment    Patient has a central line: no   Patient has Taveras Cath: no      Last Vitals:     Vitals:    07/03/21 2100 07/04/21 0713 07/04/21 1523 07/04/21 2155   BP: 127/80 126/76 (!) 142/88 (!) 140/89   Pulse: 95 (!) 105 70 69   Resp: 18 18 16 18   Temp: 98.2 °F (36.8 °C) 97.2 °F (36.2 °C) 98.1 °F (36.7 °C) 98.1 °F (36.7 °C)   SpO2: 95%   100%   Weight:       Height:            PAIN    Pain Assessment    Pain Intensity 1: 0 (07/05/21 0000) Pain Intensity 1: 2 (12/29/14 1105)    Pain Location 1: Foot Pain Location 1: Abdomen    Pain Intervention(s) 1: Medication (see MAR) Pain Intervention(s) 1: Medication (see MAR)  Patient Stated Pain Goal: 0 Patient Stated Pain Goal: 0  o Intervention effective: yes  o Other actions taken for pain: Medication (see MAR)     Skin Assessment  Skin color Skin Color: Appropriate for ethnicity  Condition/Temperature Skin Condition/Temp: Dry  Integrity Skin Integrity: Intact  Turgor Turgor: Non-tenting  Weekly Pressure Ulcer Documentation  Pressure  Injury Documentation: No Pressure Injury Noted-Pressure Ulcer Prevention Initiated  Wound Prevention & Protection Methods  Orientation of wound Orientation of Wound Prevention: Posterior  Location of Prevention Location of Wound Prevention: Sacrum/Coccyx  Dressing Present Dressing Present : No  Dressing Status Dressing Status: Intact  Wound Offloading Wound Offloading (Prevention Methods): Bed, pressure redistribution/air, Bed, pressure reduction mattress, Wheelchair     INTAKE/OUPUT  Date 07/04/21 0700 - 07/05/21 0659 07/05/21 0700 - 07/06/21 0659   Shift 2477-9752 0152-9364 24 Hour Total 0700-1859 1900-0659 24 Hour Total   INTAKE   Shift Total(mL/kg)         OUTPUT   Urine(mL/kg/hr)           Urine Occurrence(s) 4 x 2 x 6 x      Stool           Stool Occurrence(s) 2 x 2 x 4 x      Shift Total(mL/kg)         NET         Weight (kg) 80.8 80.8 80.8 80.8 80.8 80.8       Recommendations:  1. Patient needs and requests: pain management, help with toileting    2. Pending tests/procedures: CT ABD Pelv for 7/15/21    3. Functional Level/Equipment: Partial (one person) /      Fall Precautions:   Fall risk precautions were reinforced with the patient; he was instructed to call for help prior to getting up. The following fall risk precautions were continued: bed/ chair alarms, door signage, yellow bracelet and socks as well as update of the [place] tool in the patient's room. [place] Score: 3    HEALS Safety Check    A safety check occurred in the patient's room between off going nurse and oncoming nurse listed above.     The safety check included the below items  Area Items   H  High Alert Medications - Verify all high alert medication drips (heparin, PCA, etc.)   E  Equipment - Suction is set up for ALL patients (with laila)  - Red plugs utilized for all equipment (IV pumps, etc.)  - WOWs wiped down at end of shift.  - Room stocked with oxygen, suction, and other unit-specific supplies   A  Alarms - Bed alarm is set for fall risk patients  - Ensure chair alarm is in place and activated if patient is up in a chair   L  Lines - Check IV for any infiltration  - Taveras bag is empty if patient has a Taveras   - Tubing and IV bags are labeled   S  Safety   - Room is clean, patient is clean, and equipment is clean. - Hallways are clear from equipment besides carts. - Fall bracelet on for fall risk patients  - Ensure room is clear and free of clutter  - Suction is set up for ALL patients (with yanker)  - Hallways are clear from equipment besides carts.    - Isolation precautions followed, supplies available outside room, sign posted     Magen Fink LPN

## 2021-07-05 NOTE — PROGRESS NOTES
Progress Note    Patient: Mahogany Hughes MRN: [id number]  CSN: [id number]    YOB: 1940  Age: 80 y.o. Sex: male    DOA: 6/29/2021 LOS:  LOS: 6 days                    Subjective:     Primary Rehab Diagnosis: Generalized weakness with Impaired mobility and ADLs secondary to 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    Patient reports diarrhea has returned to watery again today. No other acute concerns. Review of systems  General: No fevers or chills. Cardiovascular: No chest pain or pressure. No palpitations. Pulmonary: No shortness of breath, cough or wheeze. Gastrointestinal: No abdominal pain, nausea, vomiting. Loose stools. Genitourinary: No urinary frequency, urgency, hesitancy or dysuria. Musculoskeletal: pain fairly controlled. Neurologic: generalized weakness. +neuropathy BLE    Objective:     Physical Exam:  Visit Vitals  BP (!) 160/93 (BP 1 Location: Left upper arm, BP Patient Position: At rest)   Pulse 60   Temp 97.7 °F (36.5 °C)   Resp 18   Ht 5' 8\" (1.727 m)   Wt 80.8 kg (178 lb 1.6 oz)   SpO2 96%   BMI 27.08 kg/m²        General:         Alert, cooperative, no acute distress    HEENT: NC, Atraumatic. PERRLA, anicteric sclerae. Lungs: CTA Bilaterally. No Wheezing/Rhonchi/Rales. Heart:  Regular  rhythm,  No murmur, No Rubs, No Gallops  Abdomen: Soft, Non distended, Non tender.  +Bowel sounds, no HSM  Extremities: 2+ LE edema, no calf tenderness. Psych:   Not anxious or agitated. Neurologic:  CN 2-12 grossly intact, Alert and oriented X 3. No acute neurological   deficits    Intake and Output:  Current Shift:  No intake/output data recorded. Last three shifts:  No intake/output data recorded.     Labs: Results:       Chemistry Recent Labs     07/05/21  0530   GLU 74      K 4.8   CL 98*   CO2 36*   BUN 9   CREA 0.68   CA 9.1   AGAP 2*   BUCR 13      CBC w/Diff Recent Labs     07/05/21  0530   HGB 9.7*   HCT 32.5*      Cardiac Enzymes No results for input(s): CPK, CKND1, WAGNER in the last 72 hours. No lab exists for component: CKRMB, TROIP   Coagulation No results for input(s): PTP, INR, APTT, INREXT, INREXT in the last 72 hours. Lipid Panel No results found for: CHOL, CHOLPOCT, CHOLX, CHLST, CHOLV, 737542, HDL, HDLP, LDL, LDLC, DLDLP, 240666, VLDLC, VLDL, TGLX, TRIGL, TRIGP, TGLPOCT, CHHD, CHHDX   BNP No results for input(s): BNPP in the last 72 hours. Liver Enzymes No results for input(s): TP, ALB, TBIL, AP in the last 72 hours.     No lab exists for component: SGOT, GPT, DBIL   Thyroid Studies Lab Results   Component Value Date/Time    TSH 1.57 06/21/2021 02:12 PM          Procedures/imaging: see electronic medical records for all procedures/Xrays and details which were not copied into this note but were reviewed prior to creation of Plan    Medications:   Current Facility-Administered Medications   Medication Dose Route Frequency    loperamide (IMODIUM) capsule 2 mg  2 mg Oral QID    pneumococcal 23-valent (PNEUMOVAX 23) injection 0.5 mL  0.5 mL IntraMUSCular PRIOR TO DISCHARGE    acetaminophen (TYLENOL) tablet 650 mg  650 mg Oral Q4H PRN    bisacodyL (DULCOLAX) tablet 10 mg  10 mg Oral Q48H PRN    amoxicillin-clavulanate (AUGMENTIN) 500-125 mg per tablet 1 Tablet  1 Tablet Oral BID WITH MEALS    cefpodoxime (VANTIN) tablet 200 mg  200 mg Oral Q12H    guaiFENesin ER (MUCINEX) tablet 600 mg  600 mg Oral DAILY    dextromethorphan (DELSYM) 30 mg/5 mL syrup 30 mg  30 mg Oral QHS    carvediloL (COREG) tablet 3.125 mg  3.125 mg Oral BID WITH MEALS    clopidogreL (PLAVIX) tablet 75 mg  75 mg Oral DAILY WITH BREAKFAST    rosuvastatin (CRESTOR) tablet 10 mg  10 mg Oral DAILY    nitroglycerin (NITROSTAT) tablet 0.4 mg  0.4 mg SubLINGual PRN    arformoteroL (BROVANA) neb solution 15 mcg  15 mcg Nebulization BID RT    ipratropium (ATROVENT) 0.02 % nebulizer solution 0.5 mg  0.5 mg Nebulization TID    albuterol (PROVENTIL VENTOLIN) nebulizer solution 2.5 mg  2.5 mg Nebulization Q4H PRN    apixaban (ELIQUIS) tablet 5 mg  5 mg Oral BID    multivitamin, tx-iron-ca-min (THERA-M w/ IRON) tablet 1 Tablet  1 Tablet Oral DAILY    lisinopriL (PRINIVIL, ZESTRIL) tablet 5 mg  5 mg Oral DAILY    tamsulosin (FLOMAX) capsule 0.4 mg  0.4 mg Oral DAILY WITH DINNER    gabapentin (NEURONTIN) capsule 300 mg  300 mg Oral TID       Assessment/Plan     Principal Problem:    Diverticulitis of large intestine with abscess (6/21/2021)    Active Problems:    Essential hypertension ()      Non-ST elevation (NSTEMI) myocardial infarction ([facility] [address] (5/17/2021)      Paroxysmal atrial fibrillation ([facility] [address] (5/17/2021)      Anticoagulated by anticoagulation treatment (5/19/2021)      Overview: On Apixaban      Mixed hyperlipidemia ()      Heart failure with preserved left ventricular function (HFpEF) ([facility]) ()      Overview: 2D echocardiogram (5/18/2021) showed EF 50%; concentric LV hypertrophy       with a severely thickened septal wall and mildly thickened posterior wall;       left atrial chamber is severely enlarged; right atrial chamber volume is       moderately enlarged; no mass, shunts, or thrombi. Coronary artery disease involving native coronary artery of native heart ()      History of coronary artery bypass graft (2001)      Overview: [facility]      On clopidogrel therapy (5/19/2021)      Neuropathy involving both lower extremities (10/22/2020)      Impaired mobility and ADLs (5/17/2021)      Status post insertion of drug eluting coronary artery stent (5/18/2021)      Overview: S/P PCI to proximal SVG with 3.5 x 12 mm [place] drug-eluting stent; PCI to       mid SVG with 3.5 x 34 mm [place] drug-eluting stent; PCI to distal SVG with       3.5 x 15 mm [place] drug-eluting stent;  Balloon angioplasty to distal SVG       anastomosis (5/18/2021 - Dr. Dyan Daniel)       Mesenteric abscess [facility]) (6/21/2021)      Diverticulosis of sigmoid colon (5/22/2021)      History of sinus bradycardia (6/21/2021)      Overview: Attributed to Carvedilol 25 mg PO BID with meals      Generalized weakness (6/21/2021)      Loose stools (7/1/2021)      Constipation --> Diarrhea; Leukocytosis --> 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  Upon discharge from the ARU, the patient will need to follow up with Colorectal Surgery (Dr. Suzi Tran)  CT scan of the abdomen and pelvis with contrast on 7/15/2021  Continue Cefpodoxime 200 mg PO q 12 hr (STOP DATE: 7/21/2021) and Amoxicillin-Clavulanate 500-125 1 tab PO q 12 hr (STOP DATE: 6/58/7044)     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, brainstem and cerebellum) without residual deficit  Continue Clopidogrel 75 mg PO once daily with breakfast and Rosuvastatin 10 mg PO once daily     Benign prostatic hyperplasia with urinary retention  Continue Tamsulosin 0.4 mg PO once daily after dinner     Chronic heart failure with preserved ejection fraction (HFpEF); Essential hypertension  BP elevated today, Continue Carvedilol 3.125 mg PO BID with meals (8AM, 5PM). Monitor and adjust medications as needed.     Chronic obstructive pulmonary disease (COPD)  Continue Arformoterol nebulization BID, Ipratropium nebulization TID and Albuterol nebulization q 4 hr PRN for shortness of breath/wheezing     Mixed hyperlipidemia  Lipid profile (5/18/2021) showed TG 83, , HDL 50, LDL 93  Continue Rosuvastatin 10 mg PO once daily     Non-ST elevation (NSTEMI) myocardial infarction; Coronary artery disease; History of CABG; S/P PCI to proximal SVG with 3.5 x 12 mm [place] drug-eluting stent; PCI to mid SVG with 3.5 x 34 mm [place] drug-eluting stent; PCI to distal SVG with 3.5 x 15 mm [place] drug-eluting stent;  Balloon angioplasty to distal SVG anastomosis (5/18/2021 - Dr. Trevin Bautista)   Continue Carvedilol 3.125 mg PO BID with meals (8AM, 5PM), Clopidogrel 75 mg PO once daily with breakfast, Rosuvastatin 10 mg PO once daily, Nitroglycerin 0.4 mg SL PRN for chest pain     Paroxysmal atrial fibrillation, anticoagulated on Apixaban  Continue apixaban 5 mg PO BID and Carvedilol 3.125 mg PO BID with meals (8AM, 5PM)     Peripheral vascular disease of extremity with claudication;  Neuropathy involving both lower extremities  Upon discharge from the ARU, the patient will need to follow up with Vascular Surgery (Dr. Sadie Camarillo)  Continue Gabapentin 300 mg PO TID     Loose stools, most likely side effect of oral antibiotics  Change Loperamide 2 mg PO TID to QID, monitor for effect       Yonathan Torres MD  7/5/2021 10:59am

## 2021-07-05 NOTE — PROGRESS NOTES
Problem: Pressure Injury - Risk of  Goal: *Prevention of pressure injury  Description: Document Nicko Scale and appropriate interventions in the flowsheet. Outcome: Progressing Towards Goal  Note: Pressure Injury Interventions:  Sensory Interventions: Assess changes in LOC    Moisture Interventions: Absorbent underpads    Activity Interventions: Increase time out of bed, Pressure redistribution bed/mattress(bed type)    Mobility Interventions: Pressure redistribution bed/mattress (bed type)    Nutrition Interventions: Document food/fluid/supplement intake    Friction and Shear Interventions: HOB 30 degrees or less                Problem: Patient Education: Go to Patient Education Activity  Goal: Patient/Family Education  Outcome: Progressing Towards Goal     Problem: Falls - Risk of  Goal: *Absence of Falls  Description: Document [place] Fall Risk and appropriate interventions in the flowsheet.   Outcome: Progressing Towards Goal  Note: Fall Risk Interventions:  Mobility Interventions: Bed/chair exit alarm, Patient to call before getting OOB    Mentation Interventions: Bed/chair exit alarm    Medication Interventions: Bed/chair exit alarm, Patient to call before getting OOB    Elimination Interventions: Call light in reach, Bed/chair exit alarm, Patient to call for help with toileting needs    History of Falls Interventions: Bed/chair exit alarm         Problem: Patient Education: Go to Patient Education Activity  Goal: Patient/Family Education  Outcome: Progressing Towards Goal     Problem: Patient Education: Go to Patient Education Activity  Goal: Patient/Family Education  Outcome: Progressing Towards Goal     Problem: Nutrition Deficit  Goal: *Optimize nutritional status  Outcome: Progressing Towards Goal     Problem: Patient Education: Go to Patient Education Activity  Goal: Patient/Family Education  Outcome: Progressing Towards Goal     Problem: Patient Education: Go to Patient Education Activity  Goal: Patient/Family Education  Outcome: Progressing Towards Goal     Problem: Inpatient Rehab (Adult)  Goal: *LTG: Avoids injury/falls 100% of time related to deficits  Outcome: Progressing Towards Goal  Goal: *LTG: Avoids infection 100% of time related to deficits  Outcome: Progressing Towards Goal  Goal: *LTG: Verbalize understanding of diagnosis and risk factors for recurring stroke  Outcome: Progressing Towards Goal  Goal: *LTG: Absence of DVT during hospitalization  Outcome: Progressing Towards Goal  Goal: *LTG: Maintains Skin Integrity With No Evidence of Pressure Injury 100% of Time  Outcome: Progressing Towards Goal  Goal: Interventions  Outcome: Progressing Towards Goal     Problem: Patient Education: Go to Patient Education Activity  Goal: Patient/Family Education  Outcome: Progressing Towards Goal

## 2021-07-05 NOTE — ROUTINE PROCESS
SHIFT CHANGE NOTE FOR [facility]    Bedside and Verbal shift change report given to Daisy Gillis LPN (oncoming nurse) by Shakira Cortez RN (offgoing nurse). Report included the following information SBAR, Kardex, MAR and Recent Results. Situation:   Code Status: Full Code   Hospital Day: 6   Problem List:   Hospital Problems  Date Reviewed: 7/2/2021        Codes Class Noted POA    Loose stools ICD-10-CM: R19.5  ICD-9-CM: 787.7  7/1/2021 Yes        Mesenteric abscess ([address] ICD-10-CM: K65.1  ICD-9-CM: 567.22  6/21/2021 Yes        * (Principal) Diverticulitis of large intestine with abscess ICD-10-CM: K57.20  ICD-9-CM: 562.11, 569.5  6/21/2021 Yes        History of sinus bradycardia ICD-10-CM: Z86.79  ICD-9-CM: V12.59  6/21/2021 Yes    Overview Signed 6/29/2021 10:02 PM by Johanna Reagan MD     Attributed to Carvedilol 25 mg PO BID with meals             Generalized weakness ICD-10-CM: R53.1  ICD-9-CM: 780.79  6/21/2021 Yes        Mixed hyperlipidemia (Chronic) ICD-10-CM: Q96.4  ICD-9-CM: 272.2  Unknown Yes        Heart failure with preserved left ventricular function (HFpEF) (HCC) (Chronic) ICD-10-CM: I50.30  ICD-9-CM: 428.9  Unknown Yes    Overview Signed 6/7/2021 11:03 PM by Johanna Reagan MD     2D echocardiogram (5/18/2021) showed EF 50%; concentric LV hypertrophy with a severely thickened septal wall and mildly thickened posterior wall; left atrial chamber is severely enlarged; right atrial chamber volume is moderately enlarged; no mass, shunts, or thrombi.               Coronary artery disease involving native coronary artery of native heart (Chronic) ICD-10-CM: I25.10  ICD-9-CM: 414.01  Unknown Yes        Diverticulosis of sigmoid colon (Chronic) ICD-10-CM: K57.30  ICD-9-CM: 562.10  5/22/2021 Yes        Anticoagulated by anticoagulation treatment ICD-10-CM: Z79.01  ICD-9-CM: V58.61  5/19/2021 Yes    Overview Signed 6/7/2021 10:48 PM by Johanna Reagan MD     On Apixaban             On clopidogrel therapy ICD-10-CM: Z79.01  ICD-9-CM: V58.61  5/19/2021 Yes        Status post insertion of drug eluting coronary artery stent ICD-10-CM: Z95.5  ICD-9-CM: V45.82  5/18/2021 Yes    Overview Signed 6/18/2021  1:38 PM by General Romina MD     S/P PCI to proximal SVG with 3.5 x 12 mm [place] drug-eluting stent; PCI to mid SVG with 3.5 x 34 mm [place] drug-eluting stent; PCI to distal SVG with 3.5 x 15 mm [place] drug-eluting stent;  Balloon angioplasty to distal SVG anastomosis (5/18/2021 - Dr. Galo Schmitt)              Non-ST elevation (NSTEMI) myocardial infarction [facility]) ICD-10-CM: I21.4  ICD-9-CM: 410.70  5/17/2021 Yes        Paroxysmal atrial fibrillation ([facility] [address] ICD-10-CM: I48.0  ICD-9-CM: 427.31  5/17/2021 Yes        Impaired mobility and ADLs ICD-10-CM: Z74.09, Z78.9  ICD-9-CM: V49.89  5/17/2021 Yes        Neuropathy involving both lower extremities (Chronic) ICD-10-CM: I91.51  ICD-9-CM: 356.9  10/22/2020 Yes        Essential hypertension (Chronic) ICD-10-CM: I10  ICD-9-CM: 401.9  Unknown Yes        History of coronary artery bypass graft ICD-10-CM: Z95.1  ICD-9-CM: V45.81  2001 Yes    Overview Signed 6/7/2021 11:04 PM by General Romina MD     [facility]                   Background:   Past Medical History:   Past Medical History:   Diagnosis Date    Acute embolic stroke ([facility] [address] 1/95/0567    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, brainstem and cerebellum) without residual deficit    Anticoagulated by anticoagulation treatment 5/19/2021    On Apixaban    Benign prostatic hyperplasia with urinary retention     Chronic obstructive pulmonary disease (COPD) ([facility] [address]     Coronary artery disease involving native coronary artery of native heart     Diverticulitis of large intestine with abscess 6/21/2021    Diverticulosis of sigmoid colon 5/22/2021    Essential hypertension     Heart failure with preserved left ventricular function (HFpEF) ([facility] [address]     2D echocardiogram (5/18/2021) showed EF 50%; concentric LV hypertrophy with a severely thickened septal wall and mildly thickened posterior wall; left atrial chamber is severely enlarged; right atrial chamber volume is moderately enlarged; no mass, shunts, or thrombi.     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[facility] [address] 6/21/2021    Neuropathy involving both lower extremities 10/22/2020    Non-ST elevation (NSTEMI) myocardial infarction ([address] 5/17/2021    On clopidogrel therapy 5/19/2021    Paroxysmal atrial fibrillation (HCC) 5/17/2021    Peripheral vascular disease of extremity with claudication ([facility] [address] 6/14/2021    Urinary retention         Assessment:   Changes in Assessment throughout shift: No change to previous assessment     Patient has a central line: no Reasons if yes: na  Insertion date:na Last dressing date:na   Patient has [place] Cath: no Reasons if yes: na   Insertion date:na  Shift [place] care completed: NO     Last Vitals:     Vitals:    07/04/21 1523 07/04/21 2155 07/05/21 0800 07/05/21 1600   BP: (!) 142/88 (!) 140/89 (!) 160/93 111/67   Pulse: 70 69 60 92   Resp: 16 18 18 18   Temp: 98.1 °F (36.7 °C) 98.1 °F (36.7 °C) 97.7 °F (36.5 °C) 97.8 °F (36.6 °C)   SpO2:  100% 96% 97%   Weight:       Height:            PAIN    Pain Assessment    Pain Intensity 1: 0 (07/05/21 1600) Pain Intensity 1: 2 (12/29/14 1105)    Pain Location 1: Foot Pain Location 1: Abdomen    Pain Intervention(s) 1: Medication (see MAR) Pain Intervention(s) 1: Medication (see MAR)  Patient Stated Pain Goal: 0 Patient Stated Pain Goal: 0  o Intervention effective: yes  o Other actions taken for pain:       Skin Assessment  Skin color Skin Color: Appropriate for ethnicity  Condition/Temperature Skin Condition/Temp: Dry  Integrity Skin Integrity: Intact  Turgor Turgor: Non-tenting  Weekly Pressure Ulcer Documentation  Pressure  Injury Documentation: No Pressure Injury Noted-Pressure Ulcer Prevention Initiated  Wound Prevention & Protection Methods  Orientation of wound Orientation of Wound Prevention: Posterior  Location of Prevention Location of Wound Prevention: Sacrum/Coccyx  Dressing Present Dressing Present : No  Dressing Status Dressing Status: Intact  Wound Offloading Wound Offloading (Prevention Methods): Bed, pressure redistribution/air, Bed, pressure reduction mattress, Wheelchair     INTAKE/OUPUT  Date 07/04/21 0700 - 07/05/21 0659 07/05/21 0700 - 07/06/21 0659   Shift 8771-8937 7504-2438 24 Hour Total 5159-5481 6029-0349 24 Hour Total   INTAKE   P.O.    1320  1320     P. O.    1320  1320   Shift Total(mL/kg)    1320(16.3)  1320(16.3)   OUTPUT   Urine(mL/kg/hr)           Urine Occurrence(s) 4 x 5 x 9 x 6 x  6 x   Stool           Stool Occurrence(s) 2 x 5 x 7 x 1 x  1 x   Shift Total(mL/kg)         NET    1320  1320   Weight (kg) 80.8 80.8 80.8 80.8 80.8 80.8       Recommendations:  1. Patient needs and requests: na    2. Pending tests/procedures: na     3. Functional Level/Equipment:   /      Fall Precautions:   Fall risk precautions were reinforced with the patient; he was instructed to call for help prior to getting up. The following fall risk precautions were continued: bed/ chair alarms, door signage, yellow bracelet and socks as well as update of the Cheryl Gault tool in the patient's room. [place] Score: 3    HEALS Safety Check    A safety check occurred in the patient's room between off going nurse and oncoming nurse listed above.     The safety check included the below items  Area Items   H  High Alert Medications - Verify all high alert medication drips (heparin, PCA, etc.)   E  Equipment - Suction is set up for ALL patients (with laial)  - Red plugs utilized for all equipment (IV pumps, etc.)  - WOWs wiped down at end of shift.  - Room stocked with oxygen, suction, and other unit-specific supplies   A  Alarms - Bed alarm is set for fall risk patients  - Ensure chair alarm is in place and activated if patient is up in a chair   L  Lines - Check IV for any infiltration  - Taveras bag is empty if patient has a Taveras   - Tubing and IV bags are labeled   S  Safety   - Room is clean, patient is clean, and equipment is clean. - Hallways are clear from equipment besides carts. - Fall bracelet on for fall risk patients  - Ensure room is clear and free of clutter  - Suction is set up for ALL patients (with laila)  - Hallways are clear from equipment besides carts.    - Isolation precautions followed, supplies available outside room, sign posted     Render KULDIP Cortez

## 2021-07-05 NOTE — PROGRESS NOTES
Problem: Neurolinguistics Impaired (Adult)  Goal: *Speech Goal: (INSERT TEXT)  Description: Long term goals  Patient will:  1. Be oriented x 3 and recall events of the day, supervision. 2. Recall 3 words after 5 minutes, supervision. 3. Read I-2 sentence stimuli and respond appropriately, 90% accuracy. 4. Read short paragraphs and respond to content questions, 90% accuracy. 5. Write simple sentences with 90% accuracy (to dictation and then spontaneously to describe actions/objects). Short term goals (7/7/21): Patient will:  1. Be oriented x 3 and recall events of the day, min assist-supervision. 2. Recall 3 words after 5 minutes, min assist.  3. Read I sentence stimuli and respond appropriately, 70-80% accuracy. 4. Read short paragraphs and respond to content questions, 70-80% accuracy. 5. Write simple sentences with 70-80% accuracy (to dictation). Note:   Speech language pathology treatment    Patient: Mahogany Hughes (45 y.o. male)  Date: 7/5/2021  Diagnosis: Diverticulitis of large intestine with abscess [K57.20] Diverticulitis of large intestine with abscess       Time in: 1400  Time Out:  1430    Pain:  Pre-tx:  No report of pain  Post tx: No report of pain  SUBJECTIVE:   Patient stated Brooke Bell says that I am doing well since I got back over here. OBJECTIVE:   Mental Status:  Mr. Veronica Segovia was awake, alert and engaged in treatment tasks this afternoon. Treatment & Interventions:   Patient was seen for a thirty minute therapy session. The following treatment tasks were presented:  Language Comprehension and Expression:   Reading 2 sentence paragraphs: Min-mod cues for success  Responding to questions re: above: 80% accuracy  Writing words/sentences:   To request for information, patient responded in sentence length 60% of the time       2 spelling errors in responses   Neuro-Linguistics:   Orientation:  Independent  Recent memory: Supervision  Recall 3 words: Supervision    Response & Tolerance to Activities:  Today's session was conducted with Mrs. Med Young in the room. Patient was easily engaged in conversation. He was motivated and cooperative in treatment tasks. Pain:  Pain Scale 1: Numeric (0 - 10)  No report of pain     After treatment:   [x]       Patient left in no apparent distress sitting up in chair  []       Patient left in no apparent distress in bed  [x]       Call bell left within reach  []       Nursing notified  [x]       Caregiver present  []       Bed alarm activated    ASSESSMENT:   Progression toward goals:  [x]       Improving appropriately and progressing toward goals  []       Improving slowly and progressing toward goals  []       Not making progress toward goals and plan of care will be adjusted    PLAN:   Patient continues to benefit from skilled intervention to address the above impairments. Continue treatment per established plan of care.   Discharge Recommendations:  Outpatient    Estimated LOS: Through 7/13/21    EUSEBIO Turner  Time Calculation: 30 mins

## 2021-07-05 NOTE — PROGRESS NOTES
Problem: Self Care Deficits Care Plan (Adult)  Goal: *Therapy Goal (Edit Goal, Insert Text)  Description: Occupational Therapy Goals   Long Term Goals  Initiated 2021 and to be accomplished within 2 week(s) 2021  1. Pt will perform self-feeding with I.  2. Pt will perform grooming with Mod I.  3. Pt will perform UB bathing with Mod I.  4. Pt will perform LB bathing with Supv.  5. Pt will perform tub/shower transfer with SBA. 6. Pt will perform UB dressing with Mod I.  7. Pt will perform LB dressing with Supv.  8. Pt will perform toileting task with Mod I.  9. Pt will perform toilet transfer with Supv. Short Term Goals   Initiated 2021 and to be accomplished within 7 day(s) to be accomplished by 2021  1. Pt will perform self-feeding with Mod I.   2. Pt will perform grooming with set-up assistance. 3. Pt will perform UB bathing with SBA. 4. Pt will perform LB bathing with CGA w/ use of AE as needed. 5. Pt will perform tub/shower transfer with CGA. 6. Pt will perform UB dressing with Mod I.  7. Pt will perform LB dressing with CGA w/ use of AE as needed. 8. Pt will perform toileting task with CGA. 9. Pt will perform toilet transfer with CGA w/ least restrictive AD. Outcome: Progressing Towards Goal   Occupational Therapy TREATMENT    Patient: Seferino Pagan   80 y.o. Patient identified with name and : yes    Date: 2021    First Tx Session  Time In: 734  Time Out[de-identified] 7473    Diagnosis: Diverticulitis of large intestine with abscess [K57.20]   Precautions: Fall  Chart, occupational therapy assessment, plan of care, and goals were reviewed. Pain:  Pt reports 0/10 pain or discomfort prior to treatment. Pt reports 0/10 pain or discomfort post treatment. Intervention Provided: No complaints of pain at onset, during, or at conclusion of skilled OT therapy session. SUBJECTIVE:   Patient stated I would like to get a shower today.     OBJECTIVE DATA SUMMARY:     THERAPEUTIC EXERCISE Daily Assessment    BUE there ex performed using green theraband for UE strengthening and act den for increased safety and indep w/ ADLs, functional transfers, and ADL related mobility w/ AD. R/ LUE strengthening performed using resistive band 10 reps x 3 sets each for bicep curls and chest pull w/ therapist providing demonstration and vc's for proper body mechanics and form. Pt edu/instruction on pacing and use of energy conservation strategies w/ rest breaks between sets due to fatigue. Good act den noted. GROOMING Daily Assessment    Grooming  Grooming Assistance : 6 (Modified independent)  Comments: Performed seated w/c at sink for oral care     UPPER BODY BATHING Daily Assessment    Upper Body Bathing  Bathing Assistance, Upper: 5 (Supervision)  Upper Body : Compensatory technique training  Position Performed: Seated in chair  Adaptive Equipment: Tub bench  Comments: Pt performed UB bathing seated on tub bench in shower at Supv level     LOWER BODY BATHING Daily Assessment    Lower Body Bathing  Bathing Assistance, Lower : 4 (Contact guard assistance)  Adaptive Equipment: Grab bar  Position Performed: Seated in chair;Standing  Comments: Performed seated tub transfer bench; in stance w/ CGA & use of grab bar to reach buttocks and groin      TOILETING Daily Assessment    Toileting  Toileting Assistance (FIM Score): 5 (Supervision)  Cues: Verbal cues provided  Adaptive Equipment: Elevated seat;Grab bars; Other (comment) (SBA for xfer wc to raised commode; SBA for CM & hygiene)     UPPER BODY DRESSING Daily Assessment    Upper Body Dressing   Dressing Assistance : 6 (Modified independent)  Comments: seated w/c at sink      LOWER BODY DRESSING Daily Assessment    Lower Body Dressing   Dressing Assistance : 5 (Stand-by assistance)  Leg Crossed Method Used: No  Comments: Pt demonstrates ability to reach to distal LE's seated w/c level to thread underwear & pants over both feet, donned socks seated w/c level w/ SBA  reaching to both feet, donned shoes Mod I level seated in w/c for slip on shoes     MOBILITY/TRANSFERS Daily Assessment    Functional Transfers  Toilet Transfer : Grab bars;Elevated seat  Amount of Assistance Required: 5 (stand-by assistance)  Tub or Shower Type: Shower  Amount of Assistance Required: 4 (Contact guard assistance)  Adaptive Equipment: Grab bars; Tub transfer bench       ASSESSMENT:  Assessed ADL performance for am self-care routine. ADL shower performed using tub transfer bench w/ grab bar for LB bathing tasks performed at CGA level for aspects performed in stance (SBA for those performed seated tub transfer bench), intermittent vc's for use of compensatory strategies and safety awareness. Toileting routine performed at SBA level w/ vc's for facilitating safe transition from w/c to raised commode w/ use of grab bar for balance and steadying. Instructed pt in techniques to facilitate increased indep and safety during standing aspects of LB ADLs and when reaching to distal aspects of BLE (bathing; dressing). Pt will continue to benefit from skilled OT services to assess safety and indep w/ self-care routine, ADL related mobility (use of AD as needed), instruction in use of compensatory strategies, strengthening, increased safety awareness, dynamic/static standing balance, and fall prevention strategies for increased safety and indep w/ ADLs/IADLs. Progression toward goals:  [x]          Improving appropriately and progressing toward goals  []          Improving slowly and progressing toward goals  []          Not making progress toward goals and plan of care will be adjusted     PLAN:  Patient continues to benefit from skilled intervention to address the above impairments. Continue treatment per established plan of care.   Discharge Recommendations:  Home Health w/ 24hr supv  Further Equipment Recommendations for Discharge:  bedside commode, gait belt, grab bars, and transfer bench     Activity Tolerance:  Good    Estimated LOS: estimated dc date 7/13/2021    Please refer to the flowsheet for vital signs taken during this treatment. After treatment:   [x]  Patient left in no apparent distress sitting up in wheelchair   []  Patient left in no apparent distress in bed  [x]  Call bell left within reach  [x]  Nursing notified  []  Caregiver present  [x]  Wheelchair alarm activated    COMMUNICATION/EDUCATION:   [x] Home safety education was provided and the patient/caregiver indicated understanding. [x] Patient/family have participated as able in goal setting and plan of care. [x] Patient/family agree to work toward stated goals and plan of care. [] Patient understands intent and goals of therapy, but is neutral about his/her participation. [] Patient is unable to participate in goal setting and plan of care.       [id number], OT

## 2021-07-06 NOTE — ROUTINE PROCESS
SHIFT CHANGE NOTE FOR [facility]    Bedside and Verbal shift change report given to [address] [place] [address] rRN (oncoming nurse) by Jono Baum LPN (offgoing nurse). Report included the following information SBAR, Kardex, MAR and Recent Results. Situation:   Code Status: Full Code   Hospital Day: 7   Problem List:   Hospital Problems  Date Reviewed: 7/2/2021        Codes Class Noted POA    Loose stools ICD-10-CM: R19.5  ICD-9-CM: 787.7  7/1/2021 Yes        Mesenteric abscess ([address] ICD-10-CM: K65.1  ICD-9-CM: 567.22  6/21/2021 Yes        * (Principal) Diverticulitis of large intestine with abscess ICD-10-CM: K57.20  ICD-9-CM: 562.11, 569.5  6/21/2021 Yes        History of sinus bradycardia ICD-10-CM: Z86.79  ICD-9-CM: V12.59  6/21/2021 Yes    Overview Signed 6/29/2021 10:02 PM by Nik Alas MD     Attributed to Carvedilol 25 mg PO BID with meals             Generalized weakness ICD-10-CM: R53.1  ICD-9-CM: 780.79  6/21/2021 Yes        Mixed hyperlipidemia (Chronic) ICD-10-CM: C67.6  ICD-9-CM: 272.2  Unknown Yes        Heart failure with preserved left ventricular function (HFpEF) (HCC) (Chronic) ICD-10-CM: I50.30  ICD-9-CM: 428.9  Unknown Yes    Overview Signed 6/7/2021 11:03 PM by Nki Alsa MD     2D echocardiogram (5/18/2021) showed EF 50%; concentric LV hypertrophy with a severely thickened septal wall and mildly thickened posterior wall; left atrial chamber is severely enlarged; right atrial chamber volume is moderately enlarged; no mass, shunts, or thrombi.               Coronary artery disease involving native coronary artery of native heart (Chronic) ICD-10-CM: I25.10  ICD-9-CM: 414.01  Unknown Yes        Diverticulosis of sigmoid colon (Chronic) ICD-10-CM: K57.30  ICD-9-CM: 562.10  5/22/2021 Yes        Anticoagulated by anticoagulation treatment ICD-10-CM: Z79.01  ICD-9-CM: V58.61  5/19/2021 Yes    Overview Signed 6/7/2021 10:48 PM by Nik Alas MD     On Apixaban             On clopidogrel therapy ICD-10-CM: Z79.01  ICD-9-CM: V58.61  5/19/2021 Yes        Status post insertion of drug eluting coronary artery stent ICD-10-CM: Z95.5  ICD-9-CM: V45.82  5/18/2021 Yes    Overview Signed 6/18/2021  1:38 PM by Opal Keith MD     S/P PCI to proximal SVG with 3.5 x 12 mm [place] drug-eluting stent; PCI to mid SVG with 3.5 x 34 mm [place] drug-eluting stent; PCI to distal SVG with 3.5 x 15 mm [place] drug-eluting stent;  Balloon angioplasty to distal SVG anastomosis (5/18/2021 - Dr. Babita Morales)              Non-ST elevation (NSTEMI) myocardial infarction [facility]) ICD-10-CM: I21.4  ICD-9-CM: 410.70  5/17/2021 Yes        Paroxysmal atrial fibrillation ([facility] [address] ICD-10-CM: I48.0  ICD-9-CM: 427.31  5/17/2021 Yes        Impaired mobility and ADLs ICD-10-CM: Z74.09, Z78.9  ICD-9-CM: V49.89  5/17/2021 Yes        Neuropathy involving both lower extremities (Chronic) ICD-10-CM: F06.40  ICD-9-CM: 356.9  10/22/2020 Yes        Essential hypertension (Chronic) ICD-10-CM: I10  ICD-9-CM: 401.9  Unknown Yes        History of coronary artery bypass graft ICD-10-CM: Z95.1  ICD-9-CM: V45.81  2001 Yes    Overview Signed 6/7/2021 11:04 PM by Opal Keith MD     [facility]                   Background:   Past Medical History:   Past Medical History:   Diagnosis Date    Acute embolic stroke ([facility] 5/19/9340    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, brainstem and cerebellum) without residual deficit    Anticoagulated by anticoagulation treatment 5/19/2021    On Apixaban    Benign prostatic hyperplasia with urinary retention     Chronic obstructive pulmonary disease (COPD) ([facility]     Coronary artery disease involving native coronary artery of native heart     Diverticulitis of large intestine with abscess 6/21/2021    Diverticulosis of sigmoid colon 5/22/2021    Essential hypertension     Heart failure with preserved left ventricular function (HFpEF) ([facility] [address]     2D echocardiogram (5/18/2021) showed EF 50%; concentric LV hypertrophy with a severely thickened septal wall and mildly thickened posterior wall; left atrial chamber is severely enlarged; right atrial chamber volume is moderately enlarged; no mass, shunts, or thrombi.     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[facility] [address] 6/21/2021    Neuropathy involving both lower extremities 10/22/2020    Non-ST elevation (NSTEMI) myocardial infarction ([address] 5/17/2021    On clopidogrel therapy 5/19/2021    Paroxysmal atrial fibrillation (HCC) 5/17/2021    Peripheral vascular disease of extremity with claudication ([facility] [address] 6/14/2021    Urinary retention         Assessment:   Changes in Assessment throughout shift: No change to previous assessment     Patient has a central line: no Reasons if yes: na  Insertion date:na Last dressing date:na   Patient has [place] Cath: no Reasons if yes: na   Insertion date:na  Shift [place] care completed: NO     Last Vitals:     Vitals:    07/04/21 2155 07/05/21 0800 07/05/21 1600 07/05/21 2114   BP: (!) 140/89 (!) 160/93 111/67 (!) 151/91   Pulse: 69 60 92 98   Resp: 18 18 18 18   Temp: 98.1 °F (36.7 °C) 97.7 °F (36.5 °C) 97.8 °F (36.6 °C) 98.1 °F (36.7 °C)   SpO2: 100% 96% 97% 97%   Weight:       Height:            PAIN    Pain Assessment    Pain Intensity 1: 0 (07/06/21 0000) Pain Intensity 1: 2 (12/29/14 1105)    Pain Location 1: Foot Pain Location 1: Abdomen    Pain Intervention(s) 1: Medication (see MAR) Pain Intervention(s) 1: Medication (see MAR)  Patient Stated Pain Goal: 0 Patient Stated Pain Goal: 0  o Intervention effective: yes  o Other actions taken for pain: Medication (see MAR)     Skin Assessment  Skin color Skin Color: Appropriate for ethnicity  Condition/Temperature Skin Condition/Temp: Dry  Integrity Skin Integrity: Intact  Turgor Turgor: Non-tenting  Weekly Pressure Ulcer Documentation  Pressure  Injury Documentation: No Pressure Injury Noted-Pressure Ulcer Prevention Initiated  Wound Prevention & Protection Methods  Orientation of wound Orientation of Wound Prevention: Posterior  Location of Prevention Location of Wound Prevention: Sacrum/Coccyx  Dressing Present Dressing Present : No  Dressing Status Dressing Status: Intact  Wound Offloading Wound Offloading (Prevention Methods): Bed, pressure redistribution/air, Bed, pressure reduction mattress, Wheelchair     INTAKE/OUPUT  Date 07/05/21 0700 - 07/06/21 0659 07/06/21 0700 - 07/07/21 0659   Shift 3089-9330 7214-9009 24 Hour Total 1796-0014 3853-0111 24 Hour Total   INTAKE   P.O. 1320  1320        P. O. 1320  1320      Shift Total(mL/kg) 1320(16.3)  1320(16.3)      OUTPUT   Urine(mL/kg/hr)           Urine Occurrence(s) 6 x 3 x 9 x      Stool           Stool Occurrence(s) 1 x 2 x 3 x      Shift Total(mL/kg)         NET 1320  1320      Weight (kg) 80.8 80.8 80.8 80.8 80.8 80.8       Recommendations:  1. Patient needs and requests: na    2. Pending tests/procedures: na     3. Functional Level/Equipment: Partial (one person) /      Fall Precautions:   Fall risk precautions were reinforced with the patient; he was instructed to call for help prior to getting up. The following fall risk precautions were continued: bed/ chair alarms, door signage, yellow bracelet and socks as well as update of the Dori Spray tool in the patient's room. [place] Score: 3    HEALS Safety Check    A safety check occurred in the patient's room between off going nurse and oncoming nurse listed above.     The safety check included the below items  Area Items   H  High Alert Medications - Verify all high alert medication drips (heparin, PCA, etc.)   E  Equipment - Suction is set up for ALL patients (with laila)  - Red plugs utilized for all equipment (IV pumps, etc.)  - WOWs wiped down at end of shift.  - Room stocked with oxygen, suction, and other unit-specific supplies   A  Alarms - Bed alarm is set for fall risk patients  - Ensure chair alarm is in place and activated if patient is up in a chair   L  Lines - Check IV for any infiltration  - Taveras bag is empty if patient has a Taveras   - Tubing and IV bags are labeled   S  Safety   - Room is clean, patient is clean, and equipment is clean. - Hallways are clear from equipment besides carts. - Fall bracelet on for fall risk patients  - Ensure room is clear and free of clutter  - Suction is set up for ALL patients (with laila)  - Hallways are clear from equipment besides carts.    - Isolation precautions followed, supplies available outside room, sign posted     Ken Trimble LPN

## 2021-07-06 NOTE — PROGRESS NOTES
[address]  [address] [place] [address] [address]     INPATIENT REHABILITATION  DAILY PROGRESS NOTE     Date: 7/6/2021    Name: Anil Roberts Age / Sex: 80 y.o. 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 Blood pressure controlled. No documented fever since admission. Patient's Complaint:   Still with loose stools since he had been oral antibiotics    Pain Control: stable, mild-to-moderate joint symptoms intermittently, reasonably well controlled by current meds      Objective:     Vital Signs:  Patient Vitals for the past 24 hrs:   BP Temp Pulse Resp SpO2   07/06/21 0815 126/74 97.6 °F (36.4 °C) 91 16 95 %   07/05/21 2114 (!) 151/91 98.1 °F (36.7 °C) 98 18 97 %   07/05/21 1600 111/67 97.8 °F (36.6 °C) 92 18 97 %        Physical Examination:  GENERAL SURVEY: Patient is awake, alert, oriented x 3, sitting comfortably on the chair, not in acute respiratory distress. HEENT: pale palpebral conjunctivae, anicteric sclerae, no nasoaural discharge, moist oral mucosa  NECK: supple, no jugular venous distention, no palpable lymph nodes  CHEST/LUNGS: symmetrical chest expansion, good air entry, clear breath sounds  HEART: adynamic precordium, good S1 S2, no S3, regular rhythm, tachycardic, no murmurs  ABDOMEN: flat, bowel sounds appreciated, soft, non-tender  EXTREMITIES: pale nailbeds, bipedal edema, full and equal pulses, no calf tenderness   NEUROLOGICAL EXAM: The patient is awake, alert and oriented x3, able to answer questions fairly appropriately, able to follow 1 and 2 step commands. Able to tell time from the wall clock. Cranial nerves II-XII are grossly intact. No gross sensory deficit.   Motor strength is 4 to 4+/5 on all 4 extremities. Current Medications:  Current Facility-Administered Medications   Medication Dose Route Frequency    loperamide (IMODIUM) capsule 2 mg  2 mg Oral QID    pneumococcal 23-valent (PNEUMOVAX 23) injection 0.5 mL  0.5 mL IntraMUSCular PRIOR TO DISCHARGE    acetaminophen (TYLENOL) tablet 650 mg  650 mg Oral Q4H PRN    bisacodyL (DULCOLAX) tablet 10 mg  10 mg Oral Q48H PRN    amoxicillin-clavulanate (AUGMENTIN) 500-125 mg per tablet 1 Tablet  1 Tablet Oral BID WITH MEALS    cefpodoxime (VANTIN) tablet 200 mg  200 mg Oral Q12H    guaiFENesin ER (MUCINEX) tablet 600 mg  600 mg Oral DAILY    dextromethorphan (DELSYM) 30 mg/5 mL syrup 30 mg  30 mg Oral QHS    carvediloL (COREG) tablet 3.125 mg  3.125 mg Oral BID WITH MEALS    clopidogreL (PLAVIX) tablet 75 mg  75 mg Oral DAILY WITH BREAKFAST    rosuvastatin (CRESTOR) tablet 10 mg  10 mg Oral DAILY    nitroglycerin (NITROSTAT) tablet 0.4 mg  0.4 mg SubLINGual PRN    arformoteroL (BROVANA) neb solution 15 mcg  15 mcg Nebulization BID RT    ipratropium (ATROVENT) 0.02 % nebulizer solution 0.5 mg  0.5 mg Nebulization TID    albuterol (PROVENTIL VENTOLIN) nebulizer solution 2.5 mg  2.5 mg Nebulization Q4H PRN    apixaban (ELIQUIS) tablet 5 mg  5 mg Oral BID    multivitamin, tx-iron-ca-min (THERA-M w/ IRON) tablet 1 Tablet  1 Tablet Oral DAILY    lisinopriL (PRINIVIL, ZESTRIL) tablet 5 mg  5 mg Oral DAILY    tamsulosin (FLOMAX) capsule 0.4 mg  0.4 mg Oral DAILY WITH DINNER    gabapentin (NEURONTIN) capsule 300 mg  300 mg Oral TID       Allergies:   Allergies   Allergen Reactions    Lipitor [Atorvastatin] Rash    Norvasc [Amlodipine] Rash       Functional Progress:    OCCUPATIONAL THERAPY    ON ADMISSION MOST RECENT   Eating  Functional Level: 6   Eating  Functional Level: 5     Grooming  Functional Level: 5   Grooming  Functional Level: 5     Bathing  Functional Level: 4   Bathing  Functional Level: 4     Upper Body Dressing  Functional Level: 5   Upper Body Dressing  Functional Level: 5     Lower Body Dressing  Functional Level: 4   Lower Body Dressing  Functional Level: 4     Toileting  Functional Level: 5   Toileting  Functional Level: 4     Toilet Transfers  Toilet Transfer Score: 5   Toilet Transfers  Toilet Transfer Score: 4     Tub /Shower Transfers  Tub/Shower Transfer Score: 4   Tub/Shower Transfers  Tub/Shower Transfer Score: 4       Legend:   7 - Independent   6 - Modified Independent   5 - Standby Assistance / Supervision / Set-up   4 - Minimum Assistance / Contact Guard Assistance   3 - Moderate Assistance   2 - Maximum Assistance   1 - Total Assistance / Dependent       Lab/Data Review:  No results found for this or any previous visit (from the past 24 hour(s)). Assessment:     Primary Rehabilitation Diagnosis  1. Generalized weakness with Impaired mobility and ADLs  2.  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    Comorbidities  Patient Active Problem List   Diagnosis Code    Urinary retention R33.9    Essential hypertension I10    Non-ST elevation (NSTEMI) myocardial infarction ([facility] [address] I21.4    Paroxysmal atrial fibrillation ([facility]) I48.0    Anticoagulated by anticoagulation treatment Z79.01    Mixed hyperlipidemia E78.2    Benign prostatic hyperplasia with urinary retention N40.1, R33.8    Malignant neoplasm of lower lobe of right lung ([facility]) C34.31    Heart failure with preserved left ventricular function (HFpEF) ([facility]) I50.30    Coronary artery disease involving native coronary artery of native heart I25.10    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    Chronic obstructive pulmonary disease (COPD) ([facility]) J44.9    History of carotid stenosis Z86.79    History of renal artery stenosis Z86.79    On clopidogrel therapy Z79.01    History of gross hematuria I88.381    Acute embolic stroke ([facility]) F23.2    Leukocytosis D72.829    Peripheral vascular disease of extremity with claudication ([facility]) I73.9    Neuropathy involving both lower extremities G57.93    Impaired mobility and ADLs Z74.09, Z78.9    Status post insertion of drug eluting coronary artery stent Z95.5    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 Justification for continued stay: Good progression towards established rehabilitation goals. 2. Medical Issues being followed closely:    [x]  Fall and safety precautions     []  Wound Care     [x]  Bowel and Bladder Function     [x]  Fluid Electrolyte and Nutrition Balance     [x]  Pain Control      3. Issues that 24 hour rehabilitation nursing is following:    [x]  Fall and safety precautions     []  Wound Care     [x]  Bowel and Bladder Function     [x]  Fluid Electrolyte and Nutrition Balance     [x]  Pain Control      [x]  Assistance with and education on in-room safety with transfers to and from the bed, wheelchair, toilet and shower. 4. Acute rehabilitation plan of care:    [x]  Continue current care and rehab. [x]  Physical Therapy           [x]  Occupational Therapy           [x]  Speech Therapy     []  Hold Rehab until further notice     5. Medications:    [x]  MAR Reviewed     [x]  Continue Present Medications     6. DVT Prophylaxis:      []  Enoxaparin     []  Unfractionated Heparin     []  Warfarin     [x]  NOAC     []  MARTHA Stockings     []  Sequential Compression Device     []  None     7. Code status    [x]  Full code     []  Partial code     []  Do not intubate     []  Do not resuscitate     8.  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 Mild infiltrate in the superior segment of the right lower lobe with small right pleural effusion. No consolidation. 2. Delayed renal clearance, chronic renal disease suspected. Additionally, areas of cortical cysts with poor perfusion.  Pyelonephritis not excluded. No hydronephrosis. Correlation with urinalysis? 3. Diverticulosis, with potential diverticulitis in the left lower quadrant, junction of descending and sigmoid colon. However, evaluation is very limited due to severe motion artifacts. 4. Focal mucosal thickening in the proximal sigmoid. Further evaluation with barium enema or colonoscopy as indicated. > WBC count (5/23/2021) = 30.2                          > . .  .                           > WBC count (6/2/2021) = 25.4                          > WBC count (6/3/2021) = 22.5                          > WBC count (6/4/2021) = 23.4                          > WBC count (6/5/2021) = 21.7                          > WBC count (6/6/2021) = 24.1                          > WBC count (6/7/2021) = 20.0                          > Work up done was negative for a source of infection              > WBC count (6/8/2021, on admission to the ARU) = 14.4   > On 6/8/2021, started Pericolace 2 tabs PO once daily after dinner   > On 6/9/2021, started Polyethylene glycol 17 grams in 8 oz water PO once daily   > On 6/19/2021:    > Patient was noted to have diarrhea    > Held:     > Polyethylene glycol 17 grams in 8 oz water PO once daily     > Pericolace 2 tabs PO once daily after dinner    > Started Bio K Plus 1 cap PO once daily   > Stool culture (collected 6/19/2021, resulted 6/20/2021): Negative   > WBC count (6/20/2021) = 26.7   > Blood culture x 2 sets (collected 6/20/2021, resulted on 6/26/2021) yielded no growth after 6 days   > On 6/20/2021, started:    > Cholestyramine 4 grams PO TID with meals    > Ceftriaxone 1000 mg IV q 24 hr    > Metronidazole 500 mg IV q 8 hr    > Vancomycin 1750 mg  IV x 1 dose   > WBC count (6/21/2021) = 26.3   > Infectious Disease consult (Dr. Rustam Stephens) was called on 6/21/2021 for evaluation and comanagement   > On 6/21/2021:    > Discontinue:     > Polyethylene glycol 17 grams in 8 oz water PO once daily     > Pericolace 2 tabs PO once daily after dinner    > Change Vancomycin IV to Vancomycin 500 mg PO q 6 hr   > CT scan of the chest, abdomen and pelvis with contrast (6/21/2021) showed:    1. No acute findings in the chest.     -Emphysema. -CABG. -Stable mildly enlarged right thyroid gland which could indicate underlying nodule, stable for many years. 2. 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 It is unclear if initial source of abscess is from small bowel inflammation or diverticulitis/pericolonic abscess. 3. Dilated small bowel. This is likely a combination of generalized small bowel ileus and low-grade partial small bowel obstruction at site of abnormal pelvic small bowel loop discussed above. 4. Diverticulosis. Stable thickened collapsed segment of sigmoid colon since 2014 suggesting a chronic stricture. This is limited for assessment by CT. 5. Bladder dome inflammation is likely secondary to the adjacent mesentery process. 6. Other chronic incidental findings.    > On 6/21/2021:    > Discontinued:     > Cholestyramine 4 grams PO TID with meals     > Ceftriaxone 1000 mg IV q 24 hr     > Metronidazole 500 mg IV q 8 hr     > Vancomycin 500 mg PO q 6 hr    > Started Piperacillin-Tazobactam 3.375 grams IV q 6 hr   > Colorectal Surgery (Dr. Norma Castaneda) was called on 6/22/2021 for evaluation and comanagement    > No surgical intervention recommended since unable to safely stop Apixaban (for paroxysmal atrial fibrillation) and Clopidogrel (recent placement of 3 JENNIE to SVG-LCx on 5/18/2021)   > On 6/25/2021:    > Discontinued Piperacillin-Tazobactam 3.375 grams IV q 6 hr    > Started:     > Cefpodoxime 200 mg PO q 12 hr     > Metronidazole 500 mg PO q 12 hr   > On 6/28/2021:    > Discontinued Metronidazole 500 mg PO q 12 hr    > Started Amoxicillin-Clavulanate 500-125 1 tab PO q 12 hr      06/29/21  0211 06/28/21  0230 06/27/21  0516 06/25/21  0534 06/24/21  0522 06/23/21  0538 06/22/21  0409 06/21/21  1310 06/21/21  0330 06/20/21  0550   WBC 21.0* 24.5* 21.7* 18.4* 18.2* 22.1* 24.7* 26.0* 26.3* 26.7*      > Upon discharge from the ARU, the patient will need to follow up with Colorectal Surgery (Dr. Mirian Dixon)   > CT scan of the abdomen and pelvis with contrast on 7/15/2021   > Continue:    > Cefpodoxime 200 mg PO q 12 hr (STOP DATE: 7/21/2021)    > Amoxicillin-Clavulanate 500-125 1 tab PO q 12 hr (STOP DATE: 7/21/2021)    > 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, brainstem and cerebellum) without residual deficit   > Continue:    > Clopidogrel 75 mg PO once daily with breakfast    > Rosuvastatin 10 mg PO once daily    > Benign prostatic hyperplasia with urinary retention   > Continue Tamsulosin 0.4 mg PO once daily after dinner    > Chronic heart failure with preserved ejection fraction (HFpEF)   > 2D echocardiogram (5/18/2021) showed EF 50%; concentric LV hypertrophy with a severely thickened septal wall and mildly thickened posterior wall; left atrial chamber is severely enlarged; right atrial chamber volume is moderately enlarged; no mass, shunts, or thrombi.    > Continue Carvedilol 3.125 mg PO BID with meals (8AM, 5PM)    > Chronic obstructive pulmonary disease (COPD)   > Continue:    > Arformoterol nebulization BID    > Ipratropium nebulization TID    > Albuterol nebulization q 4 hr PRN for shortness of breath/wheezing    > Essential hypertension   > 2D echocardiogram (5/18/2021) showed EF 50%; concentric LV hypertrophy with a severely thickened septal wall and mildly thickened posterior wall; left atrial chamber is severely enlarged; right atrial chamber volume is moderately enlarged; no mass, shunts, or thrombi.    > Continue Carvedilol 3.125 mg PO BID with meals (8AM, 5PM)    > Mixed hyperlipidemia   > Lipid profile (5/18/2021) showed TG 83, , HDL 50, LDL 93   > Continue Rosuvastatin 10 mg PO once daily    > Non-ST elevation (NSTEMI) myocardial infarction; Coronary artery disease; History of CABG; S/P PCI to proximal SVG with 3.5 x 12 mm Wray drug-eluting stent; PCI to mid SVG with 3.5 x 34 mm Wray drug-eluting stent; PCI to distal SVG with 3.5 x 15 mm Wray drug-eluting stent; Balloon angioplasty to distal SVG anastomosis (5/18/2021 - Dr. Maegan Steele)    > Continue:    > Carvedilol 3.125 mg PO BID with meals (8AM, 5PM)    > Clopidogrel 75 mg PO once daily with breakfast    > Rosuvastatin 10 mg PO once daily    > Nitroglycerin 0.4 mg SL PRN for chest pain    > Paroxysmal atrial fibrillation, anticoagulated on Apixaban   > Continue:    > Apixaban 5 mg PO BID    > Carvedilol 3.125 mg PO BID with meals (8AM, 5PM)    > Peripheral vascular disease of extremity with claudication;  Neuropathy involving both lower extremities   > (+) pain and numbness of toes of both feet   > PVL arterial doppler of both lower extremities with exercise (9/12/2019) showed:    > Moderate arterial insufficiency in the right lower extremity at the level of the trifurcation at rest.     > Severe arterial insufficiency in the left lower extremity at the level of the trifurcation at rest.   > PVL arterial doppler of both lower extremities with exercise (10/22/2020) showed:    > Mild right lower extremity arterial insufficiency of indeterminate level at rest.     > Moderate left lower extremity arterial insufficiency involving the femoral-popliteal segment at rest.     > The ankle brachial indices post exercise were unreliable as described. No evidence of inflow involvement.   > Planned to start patient on Cilostazol for the claudication symptoms but patient already on Apixaban + Clopidogrel; addition of Cilostazol may increase risk for bleeding; will hold off on adding Cilostazol for now   > As per the last Progress Note by Dr. Tamanna Rios (dated 10/22/2020): \"Pt major symptom is not related to his PAD but sounds more like neuropathy\"   > On 6/14/2021, started a trial of Gabapentin 100 mg PO BID   > Upon discharge from the ARU, the patient will need to follow up with Vascular Surgery (Dr. Tamanna Rios)   > On 6/18/2021, increased Gabapentin from 100 mg PO BID to 100 mg PO TID   > On 6/28/2021, increased Gabapentin from 100 mg to 300 mg PO TID   > Continue Gabapentin 300 mg PO TID    > Transient bradycardia, while on Carvedilol 25 mg PO BID with meals (6/21/2021)   > On 6/21/2021, Carvedilol was held   > On 6/26/2021, Cardiology restarted the patient on Carvedilol 3.125 mg PO BID with meals   > Instructions of Cardiology:     > high dose due to risk for pacemaker and ongoing infeciton    > if heart rate stays consistently > 110 bpm, can increase to 6.25 mg BID   > Continue Carvedilol 3.125 mg PO BID with meals (8AM, 5PM)    > Loose stools, most likely side effect of oral antibiotics   > On 7/2/2021, started Loperamide 2 mg PO TID   > On 7/5/2021, increased Loperamide from 2 mg PO TID to 2 mg PO 4 times daily   > Continue Loperamide 2 mg PO 4 times daily    > Analgesia   > Continue Acetaminophen 650 mg PO q 4 hr PRN for pain     > Diet:   > Specifications: Low fiber/No added salt (3-4 grams)   > Solids (consistency): Regular    > Liquids (consistency): Thin    > Fluid restriction: None      9.  Personal Protective Equipment ([id number] face mask) was used while interacting with the patient. Patient was using a surgical mask. 10. Patient's progress in rehabilitation and medical issues discussed with the patient. All questions answered to the best of my ability. Care plan discussed with patient and nurse. 11. Total clinical care time is 30 minutes, including review of chart including all labs, radiology, past medical history, and discussion with patient. Greater than 50% of my time was spent in coordination of care and counseling.       Signed:    Vangie Lin MD    July 6, 2021

## 2021-07-06 NOTE — PROGRESS NOTES
Problem: Self Care Deficits Care Plan (Adult)  Goal: *Therapy Goal (Edit Goal, Insert Text)  Description: Occupational Therapy Goals   Long Term Goals  Initiated 2021 and to be accomplished within 2 week(s) 2021  1. Pt will perform self-feeding with I.  2. Pt will perform grooming with Mod I.  3. Pt will perform UB bathing with Mod I.  4. Pt will perform LB bathing with Supv.  5. Pt will perform tub/shower transfer with SBA. 6. Pt will perform UB dressing with Mod I.  7. Pt will perform LB dressing with Supv.  8. Pt will perform toileting task with Mod I.  9. Pt will perform toilet transfer with Supv. Short Term Goals   Initiated 2021 and to be accomplished within 7 day(s) to be accomplished by 2021  1. Pt will perform self-feeding with Mod I.   2. Pt will perform grooming with set-up assistance. 3. Pt will perform UB bathing with SBA. 4. Pt will perform LB bathing with CGA w/ use of AE as needed. 5. Pt will perform tub/shower transfer with CGA. 6. Pt will perform UB dressing with Mod I.  7. Pt will perform LB dressing with CGA w/ use of AE as needed. 8. Pt will perform toileting task with CGA. 9. Pt will perform toilet transfer with CGA w/ least restrictive AD. Outcome: Progressing Towards Goal   Occupational Therapy TREATMENT    Patient: Errol Baires   80 y.o. Patient identified with name and : Yes    Date: 2021    First Tx Session  Time In: 09  Time Out[de-identified] 5458    Diagnosis: Diverticulitis of large intestine with abscess [K57.20]   Precautions: Fall  Chart, occupational therapy assessment, plan of care, and goals were reviewed. Pain:  Pt reports 0/10 pain or discomfort prior to treatment. Pt reports 0/10 pain or discomfort post treatment. Intervention Provided: No complaints of pain at onset, during, or at conclusion of skilled OT session. SUBJECTIVE:   Patient stated I got a good workout today.     OBJECTIVE DATA SUMMARY: THERAPEUTIC ACTIVITY Daily Assessment    Pt performed table top there act in stance w/ SBA for balance while reaching out-front and over head to secure then remove resistive clips (one at a time while alternating hands) from vertical stand. Pt stood for 7 mins followed by seated RB due to fatigue w/ remainder completed in stance (standing 4 mins). There act performed for increased act den, standing den, and for functional reaching for increased safety and indep w/ ADLs/IADLs. Pt stood (4 min x1, 4 min 14 sec x1) w/ SBA/CGA during peg board tabletop activity for inserting then removing large pegs one at a time to foam square on vertical surface. There act performed for challenging standing tolerance, FMC, and functional reaching out-front for increased indep w/ aspects of ADLs/IADLs completed in standing. Seated rest breaks w/c level due to increased fatigue. THERAPEUTIC EXERCISE Daily Assessment    BUE there ex performed using 4# dowel for bicep curls, forward roll, backward roll, and churning motion w/ bar held vertically; performed for strengthening and act den for increased indep w/ ADLs, functional transfers, and ADL related mobility. Pt benefited from demonstration and vc's for proper body mechanics and form. Pt edu/instruction on slowing pace w/ rest breaks between sets due to fatigue. Good tolerance noted. BUE there ex performed using arm bike moderate resistance for 15 min duration for increased strength and endurance for ADLs/IADLs. Pt demonstrated good act dne during sustained resistive challenge w/ two rest rest breaks secondary to fatigue. BUE strengthening performed seated w/c level using red flexbar (10# resistance) for 15 reps x 3 sets for bending bar up, down, and twisting motion. Vc's and demonstration for hand positioning and technique w/ mirror for visual feedback for correct body mechanics. Rest breaks between sets secondary to fatigue.        ASSESSMENT:  Today's skilled OT session focused on progressing standing tolerance w/ graded tasks to challenge functional reaching out-front and overhead, BUE strengthening, improving act den, and overall endurance for increased indep and safety w/ ADLs/IADLs. Pt will continue to benefit from skilled OT services to assess safety and indep w/ self-care, ADL related mobility, instruction in use of compensatory strategies, increase in safety awareness and fall prevention, increased functional act den, strengthening and ROM, and dynamic/static standing balance in order to increase safety and indep w/ ADL/IADL routines. Pt demonstrated good motivation throughout today's therapy session. Progression toward goals:  [x]          Improving appropriately and progressing toward goals  []          Improving slowly and progressing toward goals  []          Not making progress toward goals and plan of care will be adjusted     PLAN:  Patient continues to benefit from skilled intervention to address the above impairments. Continue treatment per established plan of care. Discharge Recommendations:  Home Health w/ 24hr assist  Further Equipment Recommendations for Discharge:  bedside commode, gait belt, and transfer bench     Activity Tolerance:  Good  Estimated LOS: estimated dc 7/13/2021    Please refer to the flowsheet for vital signs taken during this treatment. After treatment:   [x]  Patient left in no apparent distress sitting up in Wheelchair   [x]  Patient left in no apparent distress in bed  [x]  Call bell left within reach  [x]  Nursing notified  []  Caregiver present  [x]  Wheelchair alarm activated    COMMUNICATION/EDUCATION:   [x] Home safety education was provided and the patient/caregiver indicated understanding. [x] Patient/family have participated as able in goal setting and plan of care. [x] Patient/family agree to work toward stated goals and plan of care.   [] Patient understands intent and goals of therapy, but is neutral about his/her participation. [] Patient is unable to participate in goal setting and plan of care.       [id number] [facility], OT

## 2021-07-06 NOTE — PROGRESS NOTES
Problem: Pressure Injury - Risk of  Goal: *Prevention of pressure injury  Description: Document Nicko Scale and appropriate interventions in the flowsheet. Outcome: Progressing Towards Goal  Note: Pressure Injury Interventions:  Sensory Interventions: Assess changes in LOC, Chair cushion    Moisture Interventions: Absorbent underpads    Activity Interventions: Chair cushion, Increase time out of bed, Pressure redistribution bed/mattress(bed type)    Mobility Interventions: Chair cushion, HOB 30 degrees or less, Pressure redistribution bed/mattress (bed type)    Nutrition Interventions: Document food/fluid/supplement intake    Friction and Shear Interventions: HOB 30 degrees or less                Problem: Patient Education: Go to Patient Education Activity  Goal: Patient/Family Education  Outcome: Progressing Towards Goal     Problem: Falls - Risk of  Goal: *Absence of Falls  Description: Document [place] Fall Risk and appropriate interventions in the flowsheet.   Outcome: Progressing Towards Goal  Note: Fall Risk Interventions:  Mobility Interventions: Bed/chair exit alarm, Patient to call before getting OOB    Mentation Interventions: Bed/chair exit alarm, Evaluate medications/consider consulting pharmacy    Medication Interventions: Bed/chair exit alarm, Evaluate medications/consider consulting pharmacy, Patient to call before getting OOB    Elimination Interventions: Call light in reach, Bed/chair exit alarm, Patient to call for help with toileting needs    History of Falls Interventions: Bed/chair exit alarm, Evaluate medications/consider consulting pharmacy         Problem: Patient Education: Go to Patient Education Activity  Goal: Patient/Family Education  Outcome: Progressing Towards Goal     Problem: Patient Education: Go to Patient Education Activity  Goal: Patient/Family Education  Outcome: Progressing Towards Goal     Problem: Nutrition Deficit  Goal: *Optimize nutritional status  Outcome: Progressing Towards Goal     Problem: Patient Education: Go to Patient Education Activity  Goal: Patient/Family Education  Outcome: Progressing Towards Goal     Problem: Patient Education: Go to Patient Education Activity  Goal: Patient/Family Education  Outcome: Progressing Towards Goal     Problem: Inpatient Rehab (Adult)  Goal: *LTG: Avoids injury/falls 100% of time related to deficits  Outcome: Progressing Towards Goal  Goal: *LTG: Avoids infection 100% of time related to deficits  Outcome: Progressing Towards Goal  Goal: *LTG: Verbalize understanding of diagnosis and risk factors for recurring stroke  Outcome: Progressing Towards Goal  Goal: *LTG: Absence of DVT during hospitalization  Outcome: Progressing Towards Goal  Goal: *LTG: Maintains Skin Integrity With No Evidence of Pressure Injury 100% of Time  Outcome: Progressing Towards Goal  Goal: Interventions  Outcome: Progressing Towards Goal     Problem: Patient Education: Go to Patient Education Activity  Goal: Patient/Family Education  Outcome: Progressing Towards Goal

## 2021-07-06 NOTE — INTERDISCIPLINARY ROUNDS
[facility] PHYSICAL REHABILITATION  [address] [place] [address], [address]    INPATIENT REHABILITATION  PRE-TEAM CONFERENCE SUMMARY     Date of Conference: 7/7/2021    Patient Information:        Name: Shruthi Leonard Age / Sex: 80 y.o. / male   CSN: [id number] MRN: [id number]   Admit Date: 6/29/2021 Length of Stay: 7 days     Primary Rehabilitation Diagnosis  1. Generalized weakness with Impaired mobility and ADLs  2.  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) I48.0    Anticoagulated by anticoagulation treatment Z79.01    Mixed hyperlipidemia E78.2    Benign prostatic hyperplasia with urinary retention N40.1, R33.8    Malignant neoplasm of lower lobe of right lung ([facility]) C34.31    Heart failure with preserved left ventricular function (HFpEF) ([facility]) I50.30    Coronary artery disease involving native coronary artery of native heart I25.10    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    Chronic obstructive pulmonary disease (COPD) ([facility]) J44.9    History of carotid stenosis Z86.79    History of renal artery stenosis Z86.79    On clopidogrel therapy Z79.01    History of gross hematuria R32.632    Acute embolic stroke ([facility]) L01.2    Leukocytosis D72.829    Peripheral vascular disease of extremity with claudication (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    Diverticulitis of large intestine with abscess K57.20    Diverticulosis of sigmoid colon K57.30    History of sinus bradycardia Z86.79    Generalized weakness R53.1    Loose stools R19.5          Therapy:     FIM SCORES Initial Assessment Weekly Progress Assessment 7/6/2021   Eating Functional Level: 6  Functional level: 6 (Mod I)   Swallowing     Grooming 5  6 (Mod I) seated w/c at sink   Bathing 4  UB: 5 (Supv) seated tub transfer bench  LB: 4.5 (CGA) for aspects performed in standing (shower; w/ use of grab bar for steadying)      Upper Body Dressing Functional Level: 5  Functional Level: 6 (Mod I)   Lower Body Dressing Functional Level: 4  Functional Level: 5 (SBA)   Toileting Functional Level: 5  Functional Level: 5 (SBA)   Bladder 0 1   Bowel  0 0   Toilet Transfer [place] Toilet Transfer Score: 5  Toilet transfer: 5 (SBA)   Tub/Shower Transfer [place] Tub or Shower Type: Shower  Tub/Shower Transfer Score: 4  Comments: CGA w/ for simulated shower transfer w/ RW to tub transfer bench      Shower transfer: 5 (SBA)   Comprehension Primary Mode of Comprehension: Auditory  Score: 5     5   Expression Primary Mode of Expression: Verbal  Score: 6  6      Social Interaction Score: 5  5   Problem Solving Score: 3  4   Memory Score: 4  5     FIM SCORES Initial Assessment Weekly Progress Assessment 7/6/2021   Bed/Chair/Wheelchair Transfers Transfer Type:  Other  Other: stand step with RW  Transfer Assistance : 4 (Minimal assistance)  Sit to Stand Assistance:  (mod A from low bed height surface, CG/min A otherwise)  Car Transfers: Minimum assistance (using RW)  Car Type: car transfer simulator  Stand step with RW and SBA  Sit to stand: SBA   Bed Mobility Rolling Right 4 (Minimal assistance)   Rolling Left 4 (Minimal assistance)   Supine to Sit 4 (Minimal assistance)   Sit to Stand  (mod A from low bed height surface, CG/min A otherwise)   Sit to Supine 3 (Moderate assistance) (assist with lifting each LE up into bed)    Rolling Right    supervision   Rolling Left    supervision   Supine to Sit    supervision   Sit to Stand    SBA   Sit to Supine    supervision      Locomotion (W/C) Able to Propel (ft): 160 feet  Functional Level: 4 (min A for steering)  Curbs/Ramps Assist Required (FIM Score): 0 (Not tested)  Wheelchair Setup Assist Required : 4 (Minimal assistance)  Wheelchair Management: Manages left brake, Manages right brake (using brake extender) Function    Setup Assistance    Propels w/c >150 feet mod I     Locomotion (W/C distance)       Locomotion (Walk) 4 (Minimal assistance)  supervision/SBA      Locomotion (Walk dist.) 160 Feet (ft)  300 feet   Steps/Stairs Steps/Stairs Ambulated (#): 6  Level of Assist : 4 (Minimal assistance)  Rail Use: Both  ascends/descends 15 steps with CGA and B rails         Nursing:     Neuro:   AAA&O x   4         Respiratory:   [x] WNL   [] O2 LPM:   Other:  Peripheral Vascular:   [] TEDS present   [x] Edema present _+2___ Grade   Cardiac:   [x] WNL   [] Other  Genitourinary:   [x] continent   [] incontinent   [] matamoros  Abdominal _7/7/2021______ LBM  GI: ___Regular____ Diet _Thin_____ Liquids _____ tube feeds  Musculoskeletal: __Active__ ROM Transfers __wheelchair___ Assistive Device Used  __x1__ Level of Assistance  Skin Integumentary:   [x] Intact   [] Not Intact   __repositioning________Preventative Measures  Details______________________________________________________________  Pain: [x] Controlled   [] Not Controlled   Pain Meds:   [] Scheduled   [x] PRN        Registered Dietitian / Nutrition:   No data found. Pt reported good appetite, eating 65% of meals. tolerating diet. Consuming nutrition supplements. Supplements:          [x] Yes: Ensure Enlive BID    [] No      Amount of supplement consumed:   100%      Intake/Output Summary (Last 24 hours) at 7/6/2021 0805  Last data filed at 7/5/2021 1800  Gross per 24 hour   Intake 1200 ml   Output    Net 1200 ml                                Last bowel movement: 7/6, multiple BM. Discussed adding Banatrol, pt declined, stated is taking medication for it (imodium) and that BM are improving.        Interdisciplinary Team Goals: 1. Discipline  Physical Therapy    Goal  Patient will perform standing transfers at mod I level and ambulation with RW and supervision    Barrier  decreased LE strength, standing balance and activity tolerance    Intervention  Therex, theract, gait training, transfer training    Goal written by:   Janiya Ceballos, MARK     2. Discipline  Occupational Therapy    Goal  Pt will perform LB bathing at Supv level for aspects completed in standing. Barrier  standing balance, standing tolerance, act den, strength    Intervention  ADL training, ADL related mobility, There ex, There act    Goal written by:  Court Alvarez OTR/L     3. Discipline  Speech Therapy    Goal  Patient will write short sentence responses to questions with 80% accuracy of spelling and grammar. Barrier  Mild cognitive-linguistic deficits    Intervention  cognitive retraining    Goal written by:  Nicole Garcia, [facility]-SLP     4. Discipline  Nursing    Goal  Patient will have a decrease in the amount of loose stools. Barrier  Patient diagnosed with diverticulitis and a mesenteric abcess    Intervention Administer Imodium when scheduled and encourage fluids     Goal written by:  Jorgito Mckinley RN     5. Discipline  Clinical Psychology    Goal  Maintain mood stability and treatment effort on ARU    Barrier  Prolonged hospitalization and recovery    Intervention  Support  and ego support    Goal written by:  Fannie Rdz, PhD     6. Discipline  Nutrition / Dietetics    Goal  - PO nutrition intake will continue to meet >75% of patients estimated nutritional needs over the next 7 days. Barrier  none known     Intervention  continue po diet and nutrition supplements. Encourage/ monitor po intake of meals and supplements    Goal written by:  Kathleen Cuellar RD       Disposition / Discharge Planning:      Follow-up services:  [x] Physical Therapy             [] Occupational Therapy       [x] Speech Therapy           [] Skilled Nursing [] Medical Social Worker   [] Aide        [] Outpatient      [] vs   [x] Home Health  [] vs       [] to progress to outpatient       [] with 24-hour supervision       [] with 24-hour assistance   [] [place]   DME recommendations:     Estimated discharge date:  7/13/2021   Discharge Location:  [x] Home  [] versus    [] [facility] [place]    [] [address]   [] Other:           Electronic Signatures:      Signature Date Signed   Physical Therapist    Brian Estrada, PT  7/6/2021   Occupational Therapist    Olesya Fonseca OTR/L  7/7/2021   Speech Therapist   Maliha Noel, [address]  7/6/21   Recreational Therapist    Axel Jeffers, CTRS 7/6/2021   Nursing    Smitha Alan RN 7/7/2021   Dietitian    Alpesh Gibbs RD  7/6/2021   Clinical Psychologist    Julieta Bueno, PhD  7/6/2021    Physician    Pinky Gongora MD   7/6/2021       Eloy Workman, MSBERENICE  7/6/2021     Opportunity to share with family/caregiver[] YES [] NO    Relationship to patient____________________________________________________      The above information has been reviewed with the patient in a language that they can understand. Opportunity for comments and questions has been provided and a signed attestation has been scanned into the \"media tab\" of the EMR.       Patient Signature: ______________________________________________________    Date Signed: __________________________________________________________

## 2021-07-06 NOTE — ROUTINE PROCESS
SHIFT CHANGE NOTE FOR [facility]    Bedside and Verbal shift change report given to [facility] (oncoming nurse) by Fay James, RN (offgoing nurse). Report included the following information SBAR, Kardex, MAR and Recent Results. Situation:   Code Status: Full Code   Hospital Day: 7   Problem List:   Hospital Problems  Date Reviewed: 7/6/2021        Codes Class Noted POA    Loose stools ICD-10-CM: R19.5  ICD-9-CM: 787.7  7/1/2021 Yes        Mesenteric abscess ([address] ICD-10-CM: K65.1  ICD-9-CM: 567.22  6/21/2021 Yes        * (Principal) Diverticulitis of large intestine with abscess ICD-10-CM: K57.20  ICD-9-CM: 562.11, 569.5  6/21/2021 Yes        History of sinus bradycardia ICD-10-CM: Z86.79  ICD-9-CM: V12.59  6/21/2021 Yes    Overview Signed 6/29/2021 10:02 PM by Halle Jaffe MD     Attributed to Carvedilol 25 mg PO BID with meals             Generalized weakness ICD-10-CM: R53.1  ICD-9-CM: 780.79  6/21/2021 Yes        Mixed hyperlipidemia (Chronic) ICD-10-CM: V87.6  ICD-9-CM: 272.2  Unknown Yes        Heart failure with preserved left ventricular function (HFpEF) (HCC) (Chronic) ICD-10-CM: I50.30  ICD-9-CM: 428.9  Unknown Yes    Overview Signed 6/7/2021 11:03 PM by Halle Jaffe MD     2D echocardiogram (5/18/2021) showed EF 50%; concentric LV hypertrophy with a severely thickened septal wall and mildly thickened posterior wall; left atrial chamber is severely enlarged; right atrial chamber volume is moderately enlarged; no mass, shunts, or thrombi.               Coronary artery disease involving native coronary artery of native heart (Chronic) ICD-10-CM: I25.10  ICD-9-CM: 414.01  Unknown Yes        Diverticulosis of sigmoid colon (Chronic) ICD-10-CM: K57.30  ICD-9-CM: 562.10  5/22/2021 Yes        Anticoagulated by anticoagulation treatment ICD-10-CM: Z79.01  ICD-9-CM: V58.61  5/19/2021 Yes    Overview Signed 6/7/2021 10:48 PM by Halle Jaffe MD     On Apixaban             On clopidogrel therapy ICD-10-CM: Z79.01  ICD-9-CM: V58.61  5/19/2021 Yes        Status post insertion of drug eluting coronary artery stent ICD-10-CM: Z95.5  ICD-9-CM: V45.82  5/18/2021 Yes    Overview Signed 6/18/2021  1:38 PM by Vivi Christopher MD     S/P PCI to proximal SVG with 3.5 x 12 mm [place] drug-eluting stent; PCI to mid SVG with 3.5 x 34 mm [place] drug-eluting stent; PCI to distal SVG with 3.5 x 15 mm [place] drug-eluting stent;  Balloon angioplasty to distal SVG anastomosis (5/18/2021 - Dr. Beryl Srinivasan)              Non-ST elevation (NSTEMI) myocardial infarction [facility]) ICD-10-CM: I21.4  ICD-9-CM: 410.70  5/17/2021 Yes        Paroxysmal atrial fibrillation ([facility] ICD-10-CM: I48.0  ICD-9-CM: 427.31  5/17/2021 Yes        Impaired mobility and ADLs ICD-10-CM: Z74.09, Z78.9  ICD-9-CM: V49.89  5/17/2021 Yes        Neuropathy involving both lower extremities (Chronic) ICD-10-CM: W24.86  ICD-9-CM: 356.9  10/22/2020 Yes        Essential hypertension (Chronic) ICD-10-CM: I10  ICD-9-CM: 401.9  Unknown Yes        History of coronary artery bypass graft ICD-10-CM: Z95.1  ICD-9-CM: V45.81  2001 Yes    Overview Signed 6/7/2021 11:04 PM by Vivi Christopher MD     [facility]                   Background:   Past Medical History:   Past Medical History:   Diagnosis Date    Acute embolic stroke ([facility] 1/36/6130    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, brainstem and cerebellum) without residual deficit    Anticoagulated by anticoagulation treatment 5/19/2021    On Apixaban    Benign prostatic hyperplasia with urinary retention     Chronic obstructive pulmonary disease (COPD) ([facility]     Coronary artery disease involving native coronary artery of native heart     Diverticulitis of large intestine with abscess 6/21/2021    Diverticulosis of sigmoid colon 5/22/2021    Essential hypertension     Heart failure with preserved left ventricular function (HFpEF) ([facility] [address] [facility]     2D echocardiogram (5/18/2021) showed EF 50%; concentric LV hypertrophy with a severely thickened septal wall and mildly thickened posterior wall; left atrial chamber is severely enlarged; right atrial chamber volume is moderately enlarged; no mass, shunts, or thrombi.     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    Neuropathy involving both lower extremities 10/22/2020    Non-ST elevation (NSTEMI) myocardial infarction ([facility] 5/17/2021    On clopidogrel therapy 5/19/2021    Paroxysmal atrial fibrillation (HCC) 5/17/2021    Peripheral vascular disease of extremity with claudication ([facility] 6/14/2021    Urinary retention         Assessment:   Changes in Assessment throughout shift:       Patient has a central line: no Reasons if yes: na  Insertion date:na Last dressing date:na   Patient has [place] Cath: no Reasons if yes: na   Insertion date:na  Shift [place] care completed: NO     Last Vitals:     Vitals:    07/05/21 1600 07/05/21 2114 07/06/21 0815 07/06/21 1612   BP: 111/67 (!) 151/91 126/74 130/75   Pulse: 92 98 91 86   Resp: 18 18 16 16   Temp: 97.8 °F (36.6 °C) 98.1 °F (36.7 °C) 97.6 °F (36.4 °C) 98.8 °F (37.1 °C)   SpO2: 97% 97% 95% 97%   Weight:       Height:            PAIN    Pain Assessment    Pain Intensity 1: 6 (07/06/21 1848) Pain Intensity 1: 2 (12/29/14 1105)    Pain Location 1: Ankle, Foot Pain Location 1: Abdomen    Pain Intervention(s) 1: Medication (see MAR) Pain Intervention(s) 1: Medication (see MAR)  Patient Stated Pain Goal: 0 Patient Stated Pain Goal: 0  o Intervention effective: yes  o Other actions taken for pain: Medication (see MAR)     Skin Assessment  Skin color Skin Color: Appropriate for ethnicity  Condition/Temperature Skin Condition/Temp: Dry, Warm  Integrity Skin Integrity: Intact  Turgor Turgor: Non-tenting  Weekly Pressure Ulcer Documentation  Pressure  Injury Documentation: No Pressure Injury Noted-Pressure Ulcer Prevention Initiated  Wound Prevention & Protection Methods  Orientation of wound Orientation of Wound Prevention: Posterior  Location of Prevention Location of Wound Prevention: Sacrum/Coccyx  Dressing Present Dressing Present : No  Dressing Status Dressing Status: Intact  Wound Offloading Wound Offloading (Prevention Methods): Bed, pressure reduction mattress     INTAKE/OUPUT  Date 07/05/21 1900 - 07/06/21 0659 07/06/21 0700 - 07/07/21 0659   Shift 4198-9447 24 Hour Total 8854-1125 7960-8784 24 Hour Total   INTAKE   P.O.  1320        P. O.  1320      Shift Total(mL/kg)  1320(16.3)      OUTPUT   Urine(mL/kg/hr)          Urine Occurrence(s) 5 x 11 x 3 x  3 x   Stool          Stool Occurrence(s) 4 x 5 x 3 x  3 x   Shift Total(mL/kg)        NET  1320      Weight (kg) 80.8 80.8 80.8 80.8 80.8       Recommendations:  1. Patient needs and requests: na    2. Pending tests/procedures: na     3. Functional Level/Equipment: Partial (one person) /      Fall Precautions:   Fall risk precautions were reinforced with the patient; he was instructed to call for help prior to getting up. The following fall risk precautions were continued: bed/ chair alarms, door signage, yellow bracelet and socks as well as update of the Chip Armor tool in the patient's room. [place] Score: 3    HEALS Safety Check    A safety check occurred in the patient's room between off going nurse and oncoming nurse listed above.     The safety check included the below items  Area Items   H  High Alert Medications - Verify all high alert medication drips (heparin, PCA, etc.)   E  Equipment - Suction is set up for ALL patients (with laila)  - Red plugs utilized for all equipment (IV pumps, etc.)  - WOWs wiped down at end of shift.  - Room stocked with oxygen, suction, and other unit-specific supplies   A  Alarms - Bed alarm is set for fall risk patients  - Ensure chair alarm is in place and activated if patient is up in a chair   L  Lines - Check IV for any infiltration  - Taveras bag is empty if patient has a Taveras   - Tubing and IV bags are labeled   S  Safety   - Room is clean, patient is clean, and equipment is clean. - Hallways are clear from equipment besides carts. - Fall bracelet on for fall risk patients  - Ensure room is clear and free of clutter  - Suction is set up for ALL patients (with laila)  - Hallways are clear from equipment besides carts.    - Isolation precautions followed, supplies available outside room, sign posted     Ever Izaguirre RN

## 2021-07-06 NOTE — PROGRESS NOTES
Problem: Mobility Impaired (Adult and Pediatric)  Goal: *Therapy Goal (Edit Goal, Insert Text)  Description: Physical Therapy Short Term Goals  Initiated 6/30/2021 and to be accomplished within 7 day(s) on 7/7/2021:  1. Patient will move from supine to sit and sit to supine , scoot up and down, and roll side to side in bed with supervision/set-up. 2.  Patient will transfer from bed to chair and chair to bed with supervision/set-up using the least restrictive device. 3.  Patient will perform sit to stand with supervision/set-up. 4.  Patient will ambulate with supervision/set-up for 150 feet with the least restrictive device. 5.  Patient will ascend/descend 14 stairs with 1-2 handrail(s) with minimal assistance/contact guard assist.    Physical Therapy Long Term Goals  Initiated 6/30/2021 and to be accomplished within 10-14 day(s) 7/14/2021  1. Patient will move from supine to sit and sit to supine , scoot up and down, and roll side to side in bed with modified independence. 2.  Patient will transfer from bed to chair and chair to bed with modified independence using the least restrictive device. 3.  Patient will perform sit to stand with modified independence. 4.  Patient will ambulate with modified independence for at least 150 feet with the least restrictive device. 5.  Patient will ascend/descend 14 stairs with 2 handrail(s) with supervision/set-up. 6.  Patient will ascend/descend 1 curb step with appropriate AD with supervision/setup to safely get into and out of house. Outcome: Progressing Towards Goal  PHYSICAL THERAPY TREATMENT    Patient: Klaus Wright (45 y.o. male)  Date: 7/6/2021  Diagnosis: Diverticulitis of large intestine with abscess [K57.20] Diverticulitis of large intestine with abscess  Precautions: Fall  Chart, physical therapy assessment, plan of care and goals were reviewed.     Time In:0757  Time Out:0900  Time In: 1330  Time Out: 1400    Patient seen for: Gait training; Therapeutic exercise;Transfer training    Pain:  Pt pain was reported as 0 pre-treatment. Pt pain was reported as 0 post-treatment. Intervention: n/a    Patient identified with name and :yes    SUBJECTIVE:      \"I'm ready to go. \"    OBJECTIVE DATA SUMMARY:    Objective:     GROSS ASSESSMENT Daily Assessment            BED/MAT MOBILITY Daily Assessment            TRANSFERS Daily Assessment     Sit to Stand Assistance: Stand-by assistance    Performed sit to stand from w/c without UE support to increase the work his LEs were having to do, pt performed 2 sets of 5 with min A for anterior weight shifting throughout transfer        GAIT Daily Assessment    Gait Description (WDL)      Gait Abnormalities Decreased step clearance    Assistive device Walker, rolling;Gait belt    Ambulation assistance - level surface 5 (Stand-by assistance)    Distance 305 Feet (ft) (120)    Ambulation assistance- uneven surface      Comments Pt is progressing with upright posture with ambulation, needs occasional cues as he fatigues;  decreased step clearance and step length    During afternoon session patient ambulated 40 feet and 120 feet with RW and SBA and with verbal cues was able to increase his step length and upright posture        STEPS/STAIRS Daily Assessment     Steps/Stairs ambulated      Assistance Required NT    Rail Use      Comments      Curbs/Ramps          BALANCE Daily Assessment     Sitting - Static: Good (unsupported)  Sitting - Dynamic: Good (unsupported)  Standing - Static:  (fair+ with UE support)     Pt performed a bean bag toss activity to increase standing balance and activity tolerance. Initially patient used RW for support on the left and then progressed to no UE support and CGA for safety. Pt had no loss of balance while reaching to the right to get the bean bags or while tossing them.         WHEELCHAIR MOBILITY Daily Assessment     Able to Propel (ft): 150 feet  Functional Level: 6  Curbs/Ramps Assist Required (FIM Score): 0 (Not tested)  Wheelchair Management: Manages right brake;Manages left brake        THERAPEUTIC EXERCISES Daily Assessment       Seated 2x15 : LAQ and marching with 3# weights,  hip abduction and knee flexion with green theraband    Standing 2x10 with RW for UE support: heel raises, hip abd/adduction, marching     Standing without UE support and performing shoulder flexion overhead to increase trunk extension x10 on each side with CGA for balance        ASSESSMENT:  Pt is progressing towards goals. Continues to have decreased LE strength and balance and decreased activity tolerance resulting in decreased independence with functional mobility. Progression toward goals:  [x]      Improving appropriately and progressing toward goals  []      Improving slowly and progressing toward goals  []      Not making progress toward goals and plan of care will be adjusted      PLAN:  Patient continues to benefit from skilled intervention to address the above impairments. Continue treatment per established plan of care. Discharge Recommendations:  Home Health  Further Equipment Recommendations for Discharge:  rolling walker      Estimated Discharge Date:7/13/2021    Activity Tolerance:   fair  Please refer to the flowsheet for vital signs taken during this treatment.     After treatment:   [] Patient left in no apparent distress in bed  [x] Patient left in no apparent distress sitting up in chair  [] Patient left in no apparent distress in w/c mobilizing under own power  [] Patient left in no apparent distress dining area  [] Patient left in no apparent distress mobilizing under own power  [x] Call bell left within reach  [x] Nursing notified  [] Caregiver present  [] Bed alarm activated   [x] Chair alarm activated      Dorcas Hampton, PT  7/6/2021

## 2021-07-06 NOTE — PROGRESS NOTES
Problem: Neurolinguistics Impaired (Adult)  Goal: *Speech Goal: (INSERT TEXT)  Description: Long term goals  Patient will:  1. Be oriented x 3 and recall events of the day, supervision. 2. Recall 3 words after 5 minutes, supervision. 3. Read I-2 sentence stimuli and respond appropriately, 90% accuracy. 4. Read short paragraphs and respond to content questions, 90% accuracy. 5. Write simple sentences with 90% accuracy (to dictation and then spontaneously to describe actions/objects). Short term goals (7/7/21): Patient will:  1. Be oriented x 3 and recall events of the day, min assist-supervision. 2. Recall 3 words after 5 minutes, min assist.  3. Read I sentence stimuli and respond appropriately, 70-80% accuracy. 4. Read short paragraphs and respond to content questions, 70-80% accuracy. 5. Write simple sentences with 70-80% accuracy (to dictation). Note:   Speech language pathology treatment    Patient: Errol Baires (78 y.o. male)  Date: 7/6/2021  Diagnosis: Diverticulitis of large intestine with abscess [K57.20] Diverticulitis of large intestine with abscess       Time in: 1130  Time Out:  1200    Pain:  Pre-tx:  No report of pain  Post tx: No report of pain  SUBJECTIVE:   Patient stated I think [facility] is blessed. OBJECTIVE:   Mental Status:  Mr. Mag Macdonald was awake and alert his morning. He was more verbose than usual.    Treatment & Interventions:   Patient was seen in his room for a thirty minute speech therapy session. The following treatment tasks were presented:  Neuro-Linguistics:   Orientation:   Min assist  Recent memory:  Supervision  Recall 3 words:  Supervision  Reading paragraphs:  Patient read aloud with min assist for vocabulary. Response to questions: 83% accuracy for recall  Responses to general ?'s: 100% appropriate and intelligible responses    Voice:  Patient is very soft spoken.     Response & Tolerance to Activities:  Mr. Mag Macdonald is consistently engaged and cooperative in treatment sessions. He is very Orthodoxy and discusses many topics from a Biblical perspective. Pain:  Pain Scale 1: Numeric (0 - 10)  No report of pain     After treatment:   [x]       Patient left in no apparent distress sitting up in chair  []       Patient left in no apparent distress in bed  [x]       Call bell left within reach  []       Nursing notified  []       Caregiver present  []       Bed alarm activated    ASSESSMENT:   Progression toward goals:  []       Improving appropriately and progressing toward goals  [x]       Improving slowly and progressing toward goals  []       Not making progress toward goals and plan of care will be adjusted    PLAN:   Patient continues to benefit from skilled intervention to address the above impairments. Continue treatment per established plan of care.   Discharge Recommendations:  Outpatient    Estimated LOS: through 7/13/21    EUSEBIO Harper  Time Calculation: 30 mins

## 2021-07-06 NOTE — INTERDISCIPLINARY ROUNDS
[address]  [address] [place] [address], [address]     INPATIENT REHABILITATION  DISCHARGE RECOMMENDATION SHEET    Date: 7/6/2021     Name: Shruthi Leonard Age / Sex: 80 y.o. / male   CSN: [id number] MRN: [id number]   Admit Date: 6/29/2021 Discharge Date: 7/13        [address]      Height  []  Straight cane  [] DMV Handicap Placard    []  #14 ½ robby height  []  Forearm crutches OUTPATIENT    []  Robby height  []  Axillary crutches  []  PT    []  Standard height  []  LBQC  []  OT    []  Reclining high back  []  SBQC  []  ST       Weight  HOME HEALTH    []  Standard weight WALKERS  [x]  PT    []  Lightweight  []  Standard height  [x]  OT    []  High-strength lightweight  []  Small adult  [x]  ST    []  Heavy Duty   []  Tall walker  []  Nursing    []  Patient is unable to self-propel a standard weight wheelchair  [x]  Rolling walker with 5 single fixed wheels  []  Wound care  Location of wound:  Specify needs:      []  Anticoagulation management       Width  []  Bariatric walker  []  Diabetes management    []  Narrow (16)   []  Insulin management    []  Standard (18) ACCESSORIES  []  No insulin management    []  Wide (20)  []  Rear sure glide brakes  []  BP management    []  Other  []  10 rear wheel brake  []  CHF Telehealth       Arms  []  Tall leg extensions  []  Post-CABG care/monitoring    []  Standard  []  Other:  []  Colostomy care    []  Desk/Tray   []  Tube feeding    []  Removable BATHROOM EQUIPMENT  []  PICC line care      Foot/Leg Rests  [x]  Bedside commode  []  Taveras catheter care    []  Removable  []  Extra wide bedside commode  []  Other:     []  Elevating  []  3:1 commode WITH drop arms  []  Medical Social Worker      Other  [x]  Tub transfer bench  []  Aide    []  Brake extensions  []  Shower chair     []  Padded gloves       []  Antitippers           CUSHIONS OTHER     []  Foam cushion  []  Seat belt     []  Gel cushion  []  Gait belt     []  Asa Daniel II  []  Transfer board - Size:     []  Roho  []  Oxygen     []  Other  []  CPM      []  [address]  []  Ramp     []  Hospital bed  []  Hip kit     []  Special mattress  []  Reacher     []  Trapeze bar       Preferred Local Pharmacy (not mail-order): [facility]

## 2021-07-07 NOTE — INTERDISCIPLINARY ROUNDS
[facility] PHYSICAL REHABILITATION  [address] [place] [address], [facility], [address]    INPATIENT REHABILITATION  TEAM CONFERENCE SUMMARY     Date of Conference: 7/7/2021    Patient Information:        Name: Errol Baires Age / Sex: 80 y.o. / male   CSN: [id number] MRN: [id number]   Admit Date: 6/29/2021 Length of Stay: 8 days     Primary Rehabilitation Diagnosis  1. Generalized weakness with Impaired mobility and ADLs  2.  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    Comorbidities  Patient Active Problem List   Diagnosis Code    Urinary retention R33.9    Essential hypertension I10    Non-ST elevation (NSTEMI) myocardial infarction ([facility] I21.4    Paroxysmal atrial fibrillation ([facility]) I48.0    Anticoagulated by anticoagulation treatment Z79.01    Mixed hyperlipidemia E78.2    Benign prostatic hyperplasia with urinary retention N40.1, R33.8    Malignant neoplasm of lower lobe of right lung ([facility]) C34.31    Heart failure with preserved left ventricular function (HFpEF) ([facility]) I50.30    Coronary artery disease involving native coronary artery of native heart I25.10    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    Chronic obstructive pulmonary disease (COPD) ([facility]) J44.9    History of carotid stenosis Z86.79    History of renal artery stenosis Z86.79    On clopidogrel therapy Z79.01    History of gross hematuria Q28.187    Acute embolic stroke ([facility]) A73.6    Leukocytosis D72.829    Peripheral vascular disease of extremity with claudication ([facility]) I73.9    Neuropathy involving both lower extremities G57.93    Impaired mobility and ADLs Z74.09, Z78.9    Status post insertion of drug eluting coronary artery stent Z95.5    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    Diverticulitis of large intestine with abscess K57.20    Diverticulosis of sigmoid colon K57.30    History of sinus bradycardia Z86.79    Generalized weakness R53.1    Loose stools R19.5          Therapy:     FIM SCORES Initial Assessment Weekly Progress Assessment 7/7/2021   Eating Functional Level: 6 Feeding/Eating Assistance: 6 (Modified independent)  Comments: seated w/c levelFunctional level: 6 (Mod I)   Swallowing     Grooming 5 Grooming Assistance : 6 (Modified independent)  Comments: seated w/c level6 (Mod I) seated w/c at sink   Bathing 4 Bathing Assistance, Upper: 5 (Supervision)UB: 5 (Supv) seated tub transfer bench  LB: 4.5 (CGA) for aspects performed in standing (shower; w/ use of grab bar for steadying)      Upper Body Dressing Functional Level: 5  Functional Level: 6 (Mod I)   Lower Body Dressing Functional Level: 4  Functional Level: 5 (SBA)   Toileting Functional Level: 5  Functional Level: 5 (SBA)   Bladder 0 1   Bowel  0 0   Toilet Transfer [place] Toilet Transfer Score: 5  Toilet transfer: 5 (SBA)   Tub/Shower Transfer [place] Tub or Shower Type: Shower  Tub/Shower Transfer Score: 4  Comments: CGA w/ for simulated shower transfer w/ RW to tub transfer bench      Shower transfer: 5 (SBA)   Comprehension Primary Mode of Comprehension: Auditory  Score: 5     5   Expression Primary Mode of Expression: Verbal  Score: 6  6      Social Interaction Score: 5  5   Problem Solving Score: 3  4   Memory Score: 4  5     FIM SCORES Initial Assessment Weekly Progress Assessment 7/7/2021   Bed/Chair/Wheelchair Transfers Transfer Type:  Other  Other: stand step with RW  Transfer Assistance : 4 (Minimal assistance)  Sit to Stand Assistance:  (mod A from low bed height surface, CG/min A otherwise)  Car Transfers: Minimum assistance (using RW)  Car Type: car transfer simulator Transfer Assistance : (P) 5 (Supervision/setup)  Sit to Stand Assistance: (P) SupervisionStand step with RW and SBA  Sit to stand: SBA   Bed Mobility Rolling Right 4 (Minimal assistance)   Rolling Left 4 (Minimal assistance)   Supine to Sit 4 (Minimal assistance)   Sit to Stand  (mod A from low bed height surface, CG/min A otherwise)   Sit to Supine 3 (Moderate assistance) (assist with lifting each LE up into bed)    Rolling Right    supervision   Rolling Left    supervision   Supine to Sit    supervision   Sit to Stand   (P) SupervisionSBA   Sit to Supine    supervision      Locomotion (W/C) Able to Propel (ft): 160 feet  Functional Level: 4 (min A for steering)  Curbs/Ramps Assist Required (FIM Score): 0 (Not tested)  Wheelchair Setup Assist Required : 4 (Minimal assistance)  Wheelchair Management: Manages left brake, Manages right brake (using brake extender) Function    Setup Assistance    Propels w/c >150 feet mod I     Locomotion (W/C distance)       Locomotion (Walk) 4 (Minimal assistance) (P) 5 (Supervision/setup)supervision/SBA  (P) Walker, rolling   Locomotion (Walk dist.) 160 Feet (ft) (P) 200 Feet (ft)300 feet   Steps/Stairs Steps/Stairs Ambulated (#): 6  Level of Assist : 4 (Minimal assistance)  Rail Use: Both  ascends/descends 15 steps with CGA and B rails         Nursing:     Neuro:   AAA&O x   4         Respiratory:   [x] WNL   [] O2 LPM:   Other:  Peripheral Vascular:   [] TEDS present   [x] Edema present _+2___ Grade   Cardiac:   [x] WNL   [] Other  Genitourinary:   [x] continent   [] incontinent   [] matamoros  Abdominal _7/7/2021______ LBM  GI: ___Regular____ Diet _Thin_____ Liquids _____ tube feeds  Musculoskeletal: __Active__ ROM Transfers __wheelchair___ Assistive Device Used  __x1__ Level of Assistance  Skin Integumentary:   [x] Intact   [] Not Intact   __repositioning________Preventative Measures  Details______________________________________________________________  Pain: [x] Controlled   [] Not Controlled   Pain Meds:   [] Scheduled   [x] PRN        Registered Dietitian / Nutrition:   No data found. Pt reported good appetite, eating 65% of meals. tolerating diet. Consuming nutrition supplements.        Supplements:          [x] Yes: Ensure Enlive BID    [] No      Amount of supplement consumed:   100%    No intake or output data in the 24 hours ending 07/07/21 1243                             Last bowel movement: 7/6, multiple BM. Discussed adding Banatrol, pt declined, stated is taking medication for it (imodium) and that BM are improving. Interdisciplinary Team Goals:     1. Discipline  Physical Therapy    Goal  Patient will perform standing transfers at mod I level and ambulation with RW and supervision    Barrier  decreased LE strength, standing balance and activity tolerance    Intervention  Therex, theract, gait training, transfer training    Goal written by:   Noam Membreno, PT     2. Discipline  Occupational Therapy    Goal  Pt will perform LB bathing at Supv level for aspects completed in standing. Barrier  standing balance, standing tolerance, act den, strength    Intervention  ADL training, ADL related mobility, There ex, There act    Goal written by:  Misael Roberson OTR/L     3. Discipline  Speech Therapy    Goal  Patient will write short sentence responses to questions with 80% accuracy of spelling and grammar. Barrier  Mild cognitive-linguistic deficits    Intervention  cognitive retraining    Goal written by:  Thom King [facility]     4. Discipline  Nursing    Goal  Patient will have a decrease in the amount of loose stools. Barrier  Patient diagnosed with diverticulitis and a mesenteric abcess    Intervention Administer Imodium when scheduled and encourage fluids     Goal written by:  José Luis Zacarias, RN     5. Discipline  Clinical Psychology    Goal  Maintain mood stability and treatment effort on ARU    Barrier  Prolonged hospitalization and recovery    Intervention  Support  and ego support    Goal written by:  Megan Childress, PhD     6. Discipline  Nutrition / Dietetics    Goal  - PO nutrition intake will continue to meet >75% of patients estimated nutritional needs over the next 7 days. Barrier  none known     Intervention  continue po diet and nutrition supplements.  Encourage/ monitor po intake of meals and supplements    Goal written by:  Hussein Hollye RD       Disposition / Discharge Planning:      Follow-up services:  [x] Physical Therapy             [] Occupational Therapy       [x] Speech Therapy           [] Skilled Nursing      [] Medical Social Worker   [] Aide        [] Outpatient      [] vs   [x] Home Health  [] vs       [] to progress to outpatient       [] with 24-hour supervision       [] with 24-hour assistance   [] [facility] recommendations:  tbd   Estimated discharge date:  7/13/2021   Discharge Location:  [x] Home  [] versus    [] [facility]    [] [address]   [] Other:           Interdisciplinary team rounds were held this PM with the following team members:       Name   Physical Therapist    Oni Romero, PT   Occupational Therapist    Gabrielle Nolan OTR/L   Speech Therapist   [facility], [address] [facility]   Recreational Therapist    Eliud Turner, [facility]   Nursing    Grace Taveras, KULDIP   Physician    Yamile Wilson MD       JOSIE Masters       Signed:  Yamile Wilson MD    July 7, 2021

## 2021-07-07 NOTE — PROGRESS NOTES
Problem: Self Care Deficits Care Plan (Adult)  Goal: *Therapy Goal (Edit Goal, Insert Text)  Description: Occupational Therapy Goals   Long Term Goals  Initiated 6/30/2021 and to be accomplished within 2 week(s) 7/14/2021   1. Pt will perform self-feeding with I.  2. Pt will perform grooming with Mod I. -Goal Met 7/7/2021  3. Pt will perform UB bathing with Mod I.-Goal Met 7/7/2021  4. Pt will perform LB bathing with Supv.  5. Pt will perform tub/shower transfer with SBA. 6. Pt will perform UB dressing with Mod I.-Goal Met 7/7/2021  7. Pt will perform LB dressing with Supv.  8. Pt will perform toileting task with Mod I.  9. Pt will perform toilet transfer with Supv. Short Term Goals   Initiated 6/30/2021 (reassessed on 7/7/2021) and to be accomplished within 7 day(s) 7/14/2021   (patient has met all of their STGs as of 7/7/2021; therapist will now address LTGs)  1. Pt will perform self-feeding with Mod I. -Goal Met 7/7/2021  2. Pt will perform grooming with set-up assistance. -Goal Met 7/7/2021  3. Pt will perform UB bathing with SBA. -Goal Met 7/7/2021  4. Pt will perform LB bathing with CGA w/ use of AE as needed. -Goal Met 7/7/2021  5. Pt will perform tub/shower transfer with CGA. -Goal Met 7/7/2021  6. Pt will perform UB dressing with Mod I.-Goal Met 7/7/2021  7. Pt will perform LB dressing with CGA w/ use of AE as needed. -Goal Met 7/7/2021  8. Pt will perform toileting task with CGA. -Goal Met 7/7/2021  9. Pt will perform toilet transfer with CGA w/ least restrictive AD. -Goal Met 7/7/2021    Outcome: Progressing Towards Goal   OT WEEKLY PROGRESS NOTE  Patient Name:Elvin Roche   Time Spent With Patient  Time In: 1210  Time Out: 1235  Patient Seen For[de-identified] AM;Other (see progress notes)  Time In: 1108  Time Out: 6046    Medical Diagnosis:  Diverticulitis of large intestine with abscess [K57.20] Diverticulitis of large intestine with abscess     Pain at start of tx: 3/10 pain or discomfort. Discomfort bilateral feet and ankles; staff nurse made aware and in to assess BLE edema. Nursing to follow-up. Pain at stop of tx: 3/10 pain or discomfort. Patient identified with name and :Yes  Subjective: \"I just want to keep improving\".          Objective: /81, HR 91 bpm, O2 sat 97% room air  Outcome Measures:    AROM: RUE WFL, MMT 4/5 grossly; LUE WFL, MMT 4/5 grossly    COGNITION/PERCEPTION Initial Assessment Weekly Progress Assessment 2021   Premorbid Reading Status Literate     Premorbid Writing Status WFL     Arousal/Alertness       Orientation Level Oriented X4 Oriented X4   Visual Fields       Praxis       Body Scheme       COMPREHENSION MODE Initial Assessment Weekly Progress Assessment 2021   Primary Mode of Comprehension Auditory Auditory    Hearing Aide None None   Corrective Lenses Reading glasses Reading glasses    Score 5 5      EXPRESSION Initial Assessment Weekly Progress Assessment 2021   Primary Mode of Expression Verbal Verbal   Score 6 6   Comments         SOCIAL INTERACTION/ PRAGMATICS Initial Assessment Weekly Progress Assessment 2021   Score 5 5   Comments         PROBLEM SOLVING Initial Assessment Weekly Progress Assessment 2021   Score 3 3   Comments         MEMORY Initial Assessment Weekly Progress Assessment 2021   Score 4 4   Comments         EATING Initial Assessment Weekly Progress Assessment 2021   Functional Level 6 Feeding/Eating  Feeding/Eating Assistance: 6 (Modified independent)  Comments: seated w/c level   Comments         GROOMING Initial Assessment Weekly Progress Assessment 2021   Functional Level 5 Grooming  Grooming Assistance : 6 (Modified independent)  Comments: seated w/c level   Tasks completed by patient       Comments         BATHING Initial Assessment Weekly Progress Assessment 2021   Functional Level 4    Upper Body Bathing  Bathing Assistance, Upper: 6 (Modified independent)  Upper Body : Compensatory technique training  Position Performed: Seated in chair  Comments: seated on tub transfer bench during simulated UB bathing task  Lower Body [address] [facility], Lower : 4 (Contact guard assistance)  Adaptive Equipment: Grab bar  Position Performed: Standing;Seated in chair  Comments: Simulated LB bathing tasks performed in shower seated on tub bench and in stance w/ CGA & use of grab bar   Body parts patient bathed       Comments UB bathing SBA; LB CGA/Min A level       TUB/SHOWER TRANSFER INDEPENDENCE Initial Assessment Weekly Progress Assessment 7/7/2021   Score 4 Functional Transfers  Toilet Transfer : Elevated seat;Grab bars (SBA from w/c to raised commode; CGA w/ RW for balance)  Amount of Assistance Required: 5 (stand-by assistance)  Tub or Shower Type: Shower  Amount of Assistance Required: 4 (Contact guard assistance)  Adaptive Equipment: Grab bars; Tub transfer bench   Comments CGA w/ for simulated shower transfer w/ RW to tub transfer bench        UPPER BODY DRESSING/UNDRESSING Initial Assessment Weekly Progress Assessment 7/7/2021   Functional Level 5 Upper Body Dressing   Dressing Assistance : 6 (Modified independent)  Comments: seated w/c level   Items applied/Steps completed       Comments         LOWER BODY DRESSING/UNDRESSING Initial Assessment Weekly Progress Assessment 7/7/2021   Functional Level 4 Lower Body Dressing   Dressing Assistance : 5 (Stand-by assistance)  Position Performed: Bending forward method;Seated in chair   Items applied/Steps completed       Comments         TOILETING Initial Assessment Weekly Progress Assessment 7/7/2021   Functional Level 4/5 CGA to [facility] (FIM Score): 5 (Supervision)  Cues: Verbal cues provided   Comments         TOILET TRANSFER INDEPENDENCE Initial Assessment Weekly Progress Assessment 7/7/2021   Transfer score 4/5 CGA to Min A Functional Transfers  Toilet Transfer : Elevated seat;Grab bars (SBA from w/c to raised commode; CGA w/ RW for balance)  Amount of Assistance Required: 5 (stand-by assistance)  Tub or Shower Type: Shower  Amount of Assistance Required: 4 (Contact guard assistance)  Adaptive Equipment: Grab bars; Tub transfer bench   Comments                ASSESSMENT:  Today's skilled OT session focused on ADL training, UE strengthening, activity tolerance, and ADL related mobility w/ AD for increased indep and safety with ADLs/IADLs. Pt demonstrated steady progress and met 9/9 STG this reporting period; therapist will now address LTGs. Reviewed POC w/ patient with goals updated based on patient's current functional ability. Pt will benefit from continued skilled OT services to assess safety and indep w/ self-care routine, ADL related mobility, instruction in use of compensatory strategies, increase in safety awareness, increase functional act den, balance, strengthening    Progression toward goals:  [x]          Improving appropriately and progressing toward goals  []          Improving slowly and progressing toward goals  []          Not making progress toward goals and plan of care will be adjusted     There ex: BUE green theraband there ex performed 10 reps x 3 sets for shoulder flexion and bicep curls for increased strength and ROM for increased indep w/ ADLs and functional transfers. Min vc's and demonstration for proper body mechanics and form. Vc's for slowing pace. BUE strengthening arm bike w/ moderate resistance for 15 min duration for increased endurance, act den, and strengthening for increased indep w/ ADLs. Rest breaks x2 secondary to fatigue. There act: R/ LUE there act performed seated w/c level for functional reaching tasks during balloon tap and toss with therapist. There act performed for increased act den and functional reaching patterns for increased safety and indep w/ ADLs. Vc's for positioning. Pt edu on use of energy conservation strategies e.g. pacing.     PLAN:  Patient continues to benefit from skilled intervention to address the above impairments. Continue treatment per established plan of care. Discharge Recommendations:  Home Health w/ 24hr supv  Further Equipment Recommendations for Discharge:  bedside commode, gait belt and transfer bench, grab bars     Please refer to the flow sheet for vital signs taken during this treatment. After treatment:   [x]  Patient left in no apparent distress sitting up in Wheelchair  []  Patient left in no apparent distress in bed  [x]  Call bell left within reach  [x]  Nursing notified  []  Caregiver present  [x]  Wheelchair alarm activated    COMMUNICATION/EDUCATION:   [x] Home safety education was provided and the patient/caregiver indicated understanding. [x] Patient/family have participated as able in goal setting and plan of care. [x] Patient/family agree to work toward stated goals and plan of care. [] Patient understands intent and goals of therapy, but is neutral about his/her participation. [] Patient is unable to participate in goal setting and plan of care. Plan of Care: Please see Care Plan for updated STG/LTGs.    Family Training:  to be assessed   Estimated LOS: estimated dc date 7/13/2021    Catracho Momin OT  7/7/2021

## 2021-07-07 NOTE — PROGRESS NOTES
Sw spoke with pt regarding dc planning. Pt states that he does not have the recommended DME and is open to ordering from [place]. Sw will send orders once entered. Pt defers choice of home health agency to his wife. Sw left a message for pt's wife requesting a return call regarding dc planning and family education. Sw will follow.

## 2021-07-07 NOTE — PROGRESS NOTES
07/07/21 0912   Edema   LLE 2+;4+  (+2 knees to ankle- +4 feet; DP/PT faint)   RLE 2+;4+  (+2 knees to ankle- +4 feet; DP/PT faint)     Notified Dr. Pam Kincaid. New orders for Lasix & nitrobid.      Raimundo ULLOA, RN, [address] [place] [address] 71  [phone]

## 2021-07-07 NOTE — PROGRESS NOTES
[id number]  [address] [place] [address], [address]     INPATIENT REHABILITATION  DAILY PROGRESS NOTE     Date: 7/7/2021    Name: Heather Strickland Age / Sex: 80 y.o. 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 Blood pressure controlled. No documented fever since admission. Team conference was held at bedside this PM.     Patient's Complaint:   Still with heaviness of both legs due to swelling    Pain Control: stable, mild-to-moderate joint symptoms intermittently, reasonably well controlled by current meds      Objective:     Vital Signs:  Patient Vitals for the past 24 hrs:   BP Temp Pulse Resp SpO2   07/07/21 0810 (!) 149/87 97.4 °F (36.3 °C) 68 15    07/06/21 2006 (!) 143/73 98.5 °F (36.9 °C) 66 18 97 %   07/06/21 1612 130/75 98.8 °F (37.1 °C) 86 16 97 %        Physical Examination:  GENERAL SURVEY: Patient is awake, alert, oriented x 3, sitting comfortably on the chair, not in acute respiratory distress. HEENT: pale palpebral conjunctivae, anicteric sclerae, no nasoaural discharge, moist oral mucosa  NECK: supple, no jugular venous distention, no palpable lymph nodes  CHEST/LUNGS: symmetrical chest expansion, good air entry, clear breath sounds  HEART: adynamic precordium, good S1 S2, no S3, regular rhythm, tachycardic, no murmurs  ABDOMEN: flat, bowel sounds appreciated, soft, non-tender  EXTREMITIES: pale nailbeds, Grade 1 to 2 bipedal edema, decreased pulses on BLE, no calf tenderness   NEUROLOGICAL EXAM: The patient is awake, alert and oriented x3, able to answer questions fairly appropriately, able to follow 1 and 2 step commands. Able to tell time from the wall clock. Cranial nerves II-XII are grossly intact.   No gross sensory deficit. Motor strength is 4 to 4+/5 on all 4 extremities. Current Medications:  Current Facility-Administered Medications   Medication Dose Route Frequency    loperamide (IMODIUM) capsule 2 mg  2 mg Oral QID    pneumococcal 23-valent (PNEUMOVAX 23) injection 0.5 mL  0.5 mL IntraMUSCular PRIOR TO DISCHARGE    acetaminophen (TYLENOL) tablet 650 mg  650 mg Oral Q4H PRN    bisacodyL (DULCOLAX) tablet 10 mg  10 mg Oral Q48H PRN    amoxicillin-clavulanate (AUGMENTIN) 500-125 mg per tablet 1 Tablet  1 Tablet Oral BID WITH MEALS    cefpodoxime (VANTIN) tablet 200 mg  200 mg Oral Q12H    guaiFENesin ER (MUCINEX) tablet 600 mg  600 mg Oral DAILY    dextromethorphan (DELSYM) 30 mg/5 mL syrup 30 mg  30 mg Oral QHS    carvediloL (COREG) tablet 3.125 mg  3.125 mg Oral BID WITH MEALS    clopidogreL (PLAVIX) tablet 75 mg  75 mg Oral DAILY WITH BREAKFAST    rosuvastatin (CRESTOR) tablet 10 mg  10 mg Oral DAILY    nitroglycerin (NITROSTAT) tablet 0.4 mg  0.4 mg SubLINGual PRN    arformoteroL (BROVANA) neb solution 15 mcg  15 mcg Nebulization BID RT    ipratropium (ATROVENT) 0.02 % nebulizer solution 0.5 mg  0.5 mg Nebulization TID    albuterol (PROVENTIL VENTOLIN) nebulizer solution 2.5 mg  2.5 mg Nebulization Q4H PRN    apixaban (ELIQUIS) tablet 5 mg  5 mg Oral BID    multivitamin, tx-iron-ca-min (THERA-M w/ IRON) tablet 1 Tablet  1 Tablet Oral DAILY    lisinopriL (PRINIVIL, ZESTRIL) tablet 5 mg  5 mg Oral DAILY    tamsulosin (FLOMAX) capsule 0.4 mg  0.4 mg Oral DAILY WITH DINNER    gabapentin (NEURONTIN) capsule 300 mg  300 mg Oral TID       Allergies:   Allergies   Allergen Reactions    Lipitor [Atorvastatin] Rash    Norvasc [Amlodipine] Rash       Functional Progress:    PHYSICAL THERAPY    ON ADMISSION MOST RECENT   Wheelchair Mobility/Management  Able to Propel (ft): 160 feet  Functional Level: 4 (min A for steering)  Curbs/Ramps Assist Required (FIM Score): 0 (Not tested)  Wheelchair Setup Assist Required : 4 (Minimal assistance)  Wheelchair Management: Manages left brake, Manages right brake (using brake extender) Wheelchair Mobility/Management  Able to Propel (ft): 150 feet  Functional Level: 6  Curbs/Ramps Assist Required (FIM Score): 0 (Not tested)  Wheelchair Setup Assist Required : 6 (Modified independent)  Wheelchair Management: Manages right brake, Manages left brake     Gait  Amount of Assistance: 4 (Minimal assistance)  Distance (ft): 160 Feet (ft)  Assistive Device: Walker, rolling, Gait belt Gait  Amount of Assistance: (P) 5 (Supervision/setup)  Distance (ft): (P) 200 Feet (ft)  Assistive Device: (P) Walker, rolling     Balance-Sitting/Standing  Sitting - Static: Good (unsupported)  Sitting - Dynamic: Good (unsupported)  Standing - Static: Fair  Standing - Dynamic : Impaired  Other (comment): Keller Balance Scale: see separate documentation Balance-Sitting/Standing  Sitting - Static: (P) Good (unsupported)  Sitting - Dynamic: (P) Good (unsupported)  Standing - Static: (P) Good (with UE support)  Standing - Dynamic : Impaired  Other (comment): Keller Balance Scale: see separate documentation     Bed/Mat Mobility  Rolling Right : 4 (Minimal assistance)  Rolling Left : 4 (Minimal assistance)  Supine to Sit : 4 (Minimal assistance)  Sit to Supine : 3 (Moderate assistance) (assist with lifting each LE up into bed) Bed/Mat Mobility  Rolling Right : 4 (Minimal assistance)  Rolling Left : 4 (Minimal assistance)  Supine to Sit : 4 (Minimal assistance)  Sit to Supine : 3 (Moderate assistance) (assist with lifting each LE up into bed)     Transfers  Transfer Type: Other  Other: stand step with RW  Transfer Assistance : 4 (Minimal assistance)  Sit to Stand Assistance:  (mod A from low bed height surface, CG/min A otherwise)  Car Transfers: Minimum assistance (using RW)  Car Type: car transfer simulator Transfers  Transfer Type:  Other  Other: stand step with RW  Transfer Assistance : (P) 5 (Supervision/setup)  Sit to Stand Assistance: (P) Supervision  Car Transfers: Supervision  Car Type: car txfr simulator     Steps or Stairs  Steps/Stairs Ambulated (#): 6  Level of Assist : 4 (Minimal assistance)  Rail Use: Both Steps or Stairs  Steps/Stairs Ambulated (#): (P) 10  Level of Assist : (P) 5 (Stand-by assistance)  Rail Use: (P) Both         Lab/Data Review:  No results found for this or any previous visit (from the past 24 hour(s)). Assessment:     Primary Rehabilitation Diagnosis  1. Generalized weakness with Impaired mobility and ADLs  2.  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    Comorbidities  Patient Active Problem List   Diagnosis Code    Urinary retention R33.9    Essential hypertension I10    [facility]) [facility] I21.4    Paroxysmal atrial fibrillation ([facility]) I48.0    Anticoagulated by anticoagulation treatment Z79.01    Mixed hyperlipidemia E78.2    Benign prostatic hyperplasia with urinary retention N40.1, R33.8    Malignant neoplasm of lower lobe of right lung ([facility]) C34.31    Heart failure with preserved left ventricular function (HFpEF) ([facility]) I50.30    Coronary artery disease involving native coronary artery of native heart I25.10    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    Chronic obstructive pulmonary disease (COPD) ([facility]) J44.9    History of carotid stenosis Z86.79    History of renal artery stenosis Z86.79    On clopidogrel therapy Z79.01    History of gross hematuria K28.592    Acute embolic stroke ([facility]) F07.8    Leukocytosis D72.829    Peripheral vascular disease of extremity with claudication ([facility]) I73.9    Neuropathy involving both lower extremities G57.93    Impaired mobility and ADLs Z74.09, Z78.9    Status post insertion of drug eluting coronary artery stent Z95.5    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 Justification for continued stay: Good progression towards established rehabilitation goals. 2. Medical Issues being followed closely:    [x]  Fall and safety precautions     []  Wound Care     [x]  Bowel and Bladder Function     [x]  Fluid Electrolyte and Nutrition Balance     [x]  Pain Control      3. Issues that 24 hour rehabilitation nursing is following:    [x]  Fall and safety precautions     []  Wound Care     [x]  Bowel and Bladder Function     [x]  Fluid Electrolyte and Nutrition Balance     [x]  Pain Control      [x]  Assistance with and education on in-room safety with transfers to and from the bed, wheelchair, toilet and shower. 4. Acute rehabilitation plan of care:    [x]  Continue current care and rehab. [x]  Physical Therapy           [x]  Occupational Therapy           [x]  Speech Therapy     []  Hold Rehab until further notice     5. Medications:    [x]  MAR Reviewed     [x]  Continue Present Medications     6. DVT Prophylaxis:      []  Enoxaparin     []  Unfractionated Heparin     []  Warfarin     [x]  NOAC     []  MARTHA Stockings     []  Sequential Compression Device     []  None     7. Code status    [x]  Full code     []  Partial code     []  Do not intubate     []  Do not resuscitate     8. 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  Mild infiltrate in the superior segment of the right lower lobe with small right pleural effusion. No consolidation. 2. Delayed renal clearance, chronic renal disease suspected. Additionally, areas of cortical cysts with poor perfusion.  Pyelonephritis not excluded. No hydronephrosis. Correlation with urinalysis? 3. Diverticulosis, with potential diverticulitis in the left lower quadrant, junction of descending and sigmoid colon. However, evaluation is very limited due to severe motion artifacts. 4. Focal mucosal thickening in the proximal sigmoid. Further evaluation with barium enema or colonoscopy as indicated. > WBC count (5/23/2021) = 30.2                          > . .  .                           > WBC count (6/2/2021) = 25.4                          > WBC count (6/3/2021) = 22.5                          > WBC count (6/4/2021) = 23.4                          > WBC count (6/5/2021) = 21.7                          > WBC count (6/6/2021) = 24.1                          > WBC count (6/7/2021) = 20.0                          > Work up done was negative for a source of infection              > WBC count (6/8/2021, on admission to the ARU) = 14.4   > On 6/8/2021, started Pericolace 2 tabs PO once daily after dinner   > On 6/9/2021, started Polyethylene glycol 17 grams in 8 oz water PO once daily   > On 6/19/2021:    > Patient was noted to have diarrhea    > Held:     > Polyethylene glycol 17 grams in 8 oz water PO once daily     > Pericolace 2 tabs PO once daily after dinner    > Started Bio K Plus 1 cap PO once daily   > Stool culture (collected 6/19/2021, resulted 6/20/2021): Negative   > WBC count (6/20/2021) = 26.7   > Blood culture x 2 sets (collected 6/20/2021, resulted on 6/26/2021) yielded no growth after 6 days   > On 6/20/2021, started:    > Cholestyramine 4 grams PO TID with meals    > Ceftriaxone 1000 mg IV q 24 hr    > Metronidazole 500 mg IV q 8 hr    > Vancomycin 1750 mg  IV x 1 dose   > WBC count (6/21/2021) = 26.3   > Infectious Disease consult (Dr. Irma Rios) was called on 6/21/2021 for evaluation and comanagement   > On 6/21/2021:    > Discontinue:     > Polyethylene glycol 17 grams in 8 oz water PO once daily     > Pericolace 2 tabs PO once daily after dinner    > Change Vancomycin IV to Vancomycin 500 mg PO q 6 hr   > CT scan of the chest, abdomen and pelvis with contrast (6/21/2021) showed:    1. No acute findings in the chest.     -Emphysema. -CABG. -Stable mildly enlarged right thyroid gland which could indicate underlying nodule, stable for many years. 2. 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 It is unclear if initial source of abscess is from small bowel inflammation or diverticulitis/pericolonic abscess. 3. Dilated small bowel. This is likely a combination of generalized small bowel ileus and low-grade partial small bowel obstruction at site of abnormal pelvic small bowel loop discussed above. 4. Diverticulosis. Stable thickened collapsed segment of sigmoid colon since 2014 suggesting a chronic stricture. This is limited for assessment by CT. 5. Bladder dome inflammation is likely secondary to the adjacent mesentery process. 6. Other chronic incidental findings.    > On 6/21/2021:    > Discontinued:     > Cholestyramine 4 grams PO TID with meals     > Ceftriaxone 1000 mg IV q 24 hr     > Metronidazole 500 mg IV q 8 hr     > Vancomycin 500 mg PO q 6 hr    > Started Piperacillin-Tazobactam 3.375 grams IV q 6 hr   > Colorectal Surgery (Dr. Jhony Nixon) was called on 6/22/2021 for evaluation and comanagement    > No surgical intervention recommended since unable to safely stop Apixaban (for paroxysmal atrial fibrillation) and Clopidogrel (recent placement of 3 JENNIE to SVG-LCx on 5/18/2021)   > On 6/25/2021:    > Discontinued Piperacillin-Tazobactam 3.375 grams IV q 6 hr    > Started:     > Cefpodoxime 200 mg PO q 12 hr     > Metronidazole 500 mg PO q 12 hr   > On 6/28/2021:    > Discontinued Metronidazole 500 mg PO q 12 hr    > Started Amoxicillin-Clavulanate 500-125 1 tab PO q 12 hr      06/29/21  0211 06/28/21  0230 06/27/21  0516 06/25/21  0534 06/24/21  0522 06/23/21  0538 06/22/21  0409 06/21/21  1310 06/21/21  0330 06/20/21  0550   WBC 21.0* 24.5* 21.7* 18.4* 18.2* 22.1* 24.7* 26.0* 26.3* 26.7*      > Upon discharge from the ARU, the patient will need to follow up with Colorectal Surgery (Dr. Norma Castaneda)   > CT scan of the abdomen and pelvis with contrast on 7/15/2021   > Continue:    > Cefpodoxime 200 mg PO q 12 hr (STOP DATE: 7/21/2021)    > Amoxicillin-Clavulanate 500-125 1 tab PO q 12 hr (STOP DATE: 7/21/2021)    > 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, brainstem and cerebellum) without residual deficit   > Continue:    > Clopidogrel 75 mg PO once daily with breakfast    > Rosuvastatin 10 mg PO once daily    > Benign prostatic hyperplasia with urinary retention   > Continue Tamsulosin 0.4 mg PO once daily after dinner    > Chronic heart failure with preserved ejection fraction (HFpEF)   > 2D echocardiogram (5/18/2021) showed EF 50%; concentric LV hypertrophy with a severely thickened septal wall and mildly thickened posterior wall; left atrial chamber is severely enlarged; right atrial chamber volume is moderately enlarged; no mass, shunts, or thrombi.    > Continue Carvedilol 3.125 mg PO BID with meals (8AM, 5PM)    > Chronic obstructive pulmonary disease (COPD)   > Continue:    > Arformoterol nebulization BID    > Ipratropium nebulization TID    > Albuterol nebulization q 4 hr PRN for shortness of breath/wheezing    > Essential hypertension   > 2D echocardiogram (5/18/2021) showed EF 50%; concentric LV hypertrophy with a severely thickened septal wall and mildly thickened posterior wall; left atrial chamber is severely enlarged; right atrial chamber volume is moderately enlarged; no mass, shunts, or thrombi.    > Continue Carvedilol 3.125 mg PO BID with meals (8AM, 5PM)    > Mixed hyperlipidemia   > Lipid profile (5/18/2021) showed TG 83, , HDL 50, LDL 93   > Continue Rosuvastatin 10 mg PO once daily    > Non-ST elevation (NSTEMI) myocardial infarction; Coronary artery disease; History of CABG; S/P PCI to proximal SVG with 3.5 x 12 mm [place] drug-eluting stent; PCI to mid SVG with 3.5 x 34 mm [place] drug-eluting stent; PCI to distal SVG with 3.5 x 15 mm [place] drug-eluting stent; Balloon angioplasty to distal SVG anastomosis (5/18/2021 - Dr. Corey Harris)    > Continue:    > Carvedilol 3.125 mg PO BID with meals (8AM, 5PM)    > Clopidogrel 75 mg PO once daily with breakfast    > Rosuvastatin 10 mg PO once daily    > Nitroglycerin 0.4 mg SL PRN for chest pain    > Paroxysmal atrial fibrillation, anticoagulated on Apixaban   > Continue:    > Apixaban 5 mg PO BID    > Carvedilol 3.125 mg PO BID with meals (8AM, 5PM)    > Peripheral vascular disease of extremity with claudication; Neuropathy involving both lower extremities   > (+) pain and numbness of toes of both feet   > PVL arterial doppler of both lower extremities with exercise (9/12/2019) showed:    > Moderate arterial insufficiency in the right lower extremity at the level of the trifurcation at rest.     > Severe arterial insufficiency in the left lower extremity at the level of the trifurcation at rest.   > PVL arterial doppler of both lower extremities with exercise (10/22/2020) showed:    > Mild right lower extremity arterial insufficiency of indeterminate level at rest.     > Moderate left lower extremity arterial insufficiency involving the femoral-popliteal segment at rest.     > The ankle brachial indices post exercise were unreliable as described.  No evidence of inflow involvement.   > Planned to start patient on Cilostazol for the claudication symptoms but patient already on Apixaban + Clopidogrel; addition of Cilostazol may increase risk for bleeding; will hold off on adding Cilostazol for now   > As per the last Progress Note by Dr. Em Mathew (dated 10/22/2020): \"Pt major symptom is not related to his PAD but sounds more like neuropathy\"   > On 6/14/2021, started a trial of Gabapentin 100 mg PO BID   > Upon discharge from the ARU, the patient will need to follow up with Vascular Surgery (Dr. Em Mathew)   > On 6/18/2021, increased Gabapentin from 100 mg PO BID to 100 mg PO TID   > On 6/28/2021, increased Gabapentin from 100 mg to 300 mg PO TID   > Start:    > Furosemide 40 mg PO once daily x 5 days    > Nitroglycerin 2% ointment, applied to bilateral popliteal and calf areas BID   > Continue Gabapentin 300 mg PO TID    > Transient bradycardia, while on Carvedilol 25 mg PO BID with meals (6/21/2021)   > On 6/21/2021, Carvedilol was held   > On 6/26/2021, Cardiology restarted the patient on Carvedilol 3.125 mg PO BID with meals   > Instructions of Cardiology:     > high dose due to risk for pacemaker and ongoing infeciton    > if heart rate stays consistently > 110 bpm, can increase to 6.25 mg BID   > Continue Carvedilol 3.125 mg PO BID with meals (8AM, 5PM)    > Loose stools, most likely side effect of oral antibiotics   > On 7/2/2021, started Loperamide 2 mg PO TID   > On 7/5/2021, increased Loperamide from 2 mg PO TID to 2 mg PO 4 times daily   > Continue Loperamide 2 mg PO 4 times daily    > Analgesia   > Continue Acetaminophen 650 mg PO q 4 hr PRN for pain     > Diet:   > Specifications: Low fiber/No added salt (3-4 grams)   > Solids (consistency): Regular    > Liquids (consistency): Thin    > Fluid restriction: None      9. Personal Protective Equipment (GR06 face mask) was used while interacting with the patient. Patient was using a surgical mask. 10. Patient's progress in rehabilitation and medical issues discussed with the patient.  All questions answered to the best of my ability. Care plan discussed with patient and nurse. 11. Total clinical care time is 30 minutes, including review of chart including all labs, radiology, past medical history, and discussion with patient. Greater than 50% of my time was spent in coordination of care and counseling.       Signed:    Benedict Mcguire MD    July 7, 2021

## 2021-07-07 NOTE — PROGRESS NOTES
Problem: Pressure Injury - Risk of  Goal: *Prevention of pressure injury  Description: Document Nicko Scale and appropriate interventions in the flowsheet.   Outcome: Progressing Towards Goal  Note: Pressure Injury Interventions:  Sensory Interventions: Discuss PT/OT consult with provider, Keep linens dry and wrinkle-free, Maintain/enhance activity level, Minimize linen layers, Monitor skin under medical devices    Moisture Interventions: Minimize layers    Activity Interventions: PT/OT evaluation    Mobility Interventions: PT/OT evaluation, Float heels, HOB 30 degrees or less    Nutrition Interventions: Offer support with meals,snacks and hydration    Friction and Shear Interventions: HOB 30 degrees or less, Minimize layers

## 2021-07-07 NOTE — PROGRESS NOTES
Problem: Mobility Impaired (Adult and Pediatric)  Goal: *Therapy Goal (Edit Goal, Insert Text)  Description: Physical Therapy Short Term Goals  Initiated 6/30/2021 and to be accomplished within 7 day(s) on 7/7/2021:  1. Patient will move from supine to sit and sit to supine , scoot up and down, and roll side to side in bed with supervision/set-up. - met 7/7/21   2. Patient will transfer from bed to chair and chair to bed with supervision/set-up using the least restrictive device. met 7/7/21  3. Patient will perform sit to stand with supervision/set-up. Met 7/7/21  4. Patient will ambulate with supervision/set-up for 150 feet with the least restrictive device. Met 7/7/21  5. Patient will ascend/descend 14 stairs with 1-2 handrail(s) with minimal assistance/contact guard assist. Met 7/7/21    Physical Therapy Long Term Goals  Initiated 6/30/2021 and to be accomplished within 10-14 day(s) 7/14/2021  1. Patient will move from supine to sit and sit to supine , scoot up and down, and roll side to side in bed with modified independence. 2.  Patient will transfer from bed to chair and chair to bed with modified independence using the least restrictive device. 3.  Patient will perform sit to stand with modified independence. 4.  Patient will ambulate with modified independence for at least 150 feet with the least restrictive device. 5.  Patient will ascend/descend 14 stairs with 2 handrail(s) with supervision/set-up. 6.  Patient will ascend/descend 1 curb step with appropriate AD with supervision/setup to safely get into and out of house. Outcome: Progressing Towards Goal   PHYSICAL THERAPY WEEKLY PROGRESS NOTE    Patient: Ross Allan (44 y.o. male)  Date: 7/7/2021  Diagnosis: Diverticulitis of large intestine with abscess [K57.20] Diverticulitis of large intestine with abscess  Precautions: Fall  Chart, physical therapy assessment, plan of care and goals were reviewed.       Time in:0800  Time out:0900    Patient seen for: Gait training; Therapeutic exercise;Transfer training;Patient education      Pain:  Pt pain was reported as  0 pre-treatment. Pt pain was reported as 0 post-treatment. Patient identified with name and :yes    SUBJECTIVE:     \"I'm feeling good. \"    OBJECTIVE DATA SUMMARY:       GROSS ASSESSMENT Weekly Progress Assessment 2021   AROM  WNL   Strength Grossly 4-/5    Coordination  WNL   Tone normal    Sensation decreased    PROM WNL        POSTURE Weekly Progress Assessment 2021   Posture (WDL)     Posture Assessment Trunk flexion in standing        BALANCE Weekly Progress Assessment 2021    Sitting - Static: Good (unsupported)  Sitting - Dynamic: Good (unsupported)  Standing - Static: Good (with UE support)     BED/CHAIR/WHEELCHAIR TRANSFERS Initial Assessment Weekly Progress Assessment 2021   Rolling Right 4 (Minimal assistance)  supervision   Rolling Left 4 (Minimal assistance)  supervision   Supine to Sit 4 (Minimal assistance)  supervision   Sit to Stand  (mod A from low bed height surface, CG/min A otherwise) Supervision   Sit to Supine 3 (Moderate assistance) (assist with lifting each LE up into bed) supervision    Transfer Type Other  Stand step with RW   Transfer Assistance Needed 4 (Minimal assistance) 5 (Supervision/setup)   Comments       Car Transfer Minimum assistance (using RW)  NT   Car Type car transfer simulator         WHEELCHAIR MOBILITY/MANAGEMENT Initial Assessment Weekly Progress Assessment 2021   Able to Propel (dist) 160 feet 150    Assistance Required 4 (min A for steering) supervision   Curbs/ramps assistance required 0 (Not tested) NT    Wheelchair set up assistance required 4 (Minimal assistance)     Wheelchair management Manages left brake, Manages right brake (using brake extender) Manages left brake, Manages right brake (using brake extender)   Comments       WALKING INDEPENDENCE Initial Assessment Weekly Progress Assessment 7/7/2021   Assistive device Walker, rolling, Gait belt Walker, rolling   Ambulation assistance - level surface 4 (Minimal assistance) 5 (Supervision/setup)   Distance 160 Feet (ft) 200 Feet (ft)   Comments      Ambulation assistance - unlevel surfaces  (NT) Stand-by assistance to CGA ambulating with RW outside on multiple surfaces with small inclines and declines       GAIT Weekly Progress Assessment 7/7/2021   Gait Description (WDL)     Gait Abnormalities Pt is demonstrating increased step length but continues to have decreased step clearance when he fatigues      STEPS/STAIRS Initial Assessment Weekly Progress Assessment 7/7/2021   Steps/Stairs ambulated 6 10   Assistance Required   5 (Stand-by assistance)   Rail Use Both Both   Comments    Marking time pattern   Curbs/Ramps Minimum assistance (using RW and 6 inch step)           Therapeutic Exercise:  Seated and standing LE exercises 2x10 with 3 pound weights    ASSESSMENT:  Pt is progressing well and met all short term goals. Pt will progress to working toward long-term goals in order to reach the highest level of functional mobility prior to discharge. Progression toward goals:  [x]      Improving appropriately and progressing toward goals  []      Improving slowly and progressing toward goals  []      Not making progress toward goals and plan of care will be adjusted     PLAN:  Patient continues to benefit from skilled intervention to address the above impairments. Continue treatment per established plan of care. Discharge Recommendations:  Home Health vs outpatient  Further Equipment Recommendations for Discharge:  rolling walker     Estimated Discharge Date:7/13/21    Activity Tolerance:   fair  Please refer to the flowsheet for vital signs taken during this treatment.   After treatment:   [] Patient left in no apparent distress in bed  [x] Patient left in no apparent distress sitting up in chair  [] Patient left in no apparent distress in w/c mobilizing under own power  [] Patient left in no apparent distress dining area  [] Patient left in no apparent distress mobilizing under own power  [x] Call bell left within reach  [x] Nursing notified  [] Caregiver present  [] Bed alarm activated   [x] Chair alarm activated      Neil Alcantara, PT  7/7/2021

## 2021-07-07 NOTE — ROUTINE PROCESS
SHIFT CHANGE NOTE FOR [facility]    Bedside and Verbal shift change report given to Zeyad Perez RN (oncoming nurse) by Deborah Mace RN (offgoing nurse). Report included the following information SBAR, Kardex, MAR and Recent Results. Situation:   Code Status: Full Code   Hospital Day: 8   Problem List:   Hospital Problems  Date Reviewed: 7/6/2021        Codes Class Noted POA    Loose stools ICD-10-CM: R19.5  ICD-9-CM: 787.7  7/1/2021 Yes        Mesenteric abscess ([address] ICD-10-CM: K65.1  ICD-9-CM: 567.22  6/21/2021 Yes        * (Principal) Diverticulitis of large intestine with abscess ICD-10-CM: K57.20  ICD-9-CM: 562.11, 569.5  6/21/2021 Yes        History of sinus bradycardia ICD-10-CM: Z86.79  ICD-9-CM: V12.59  6/21/2021 Yes    Overview Signed 6/29/2021 10:02 PM by Gerson Beckford MD     Attributed to Carvedilol 25 mg PO BID with meals             Generalized weakness ICD-10-CM: R53.1  ICD-9-CM: 780.79  6/21/2021 Yes        Mixed hyperlipidemia (Chronic) ICD-10-CM: A72.5  ICD-9-CM: 272.2  Unknown Yes        Heart failure with preserved left ventricular function (HFpEF) (HCC) (Chronic) ICD-10-CM: I50.30  ICD-9-CM: 428.9  Unknown Yes    Overview Signed 6/7/2021 11:03 PM by Gerson Beckford MD     2D echocardiogram (5/18/2021) showed EF 50%; concentric LV hypertrophy with a severely thickened septal wall and mildly thickened posterior wall; left atrial chamber is severely enlarged; right atrial chamber volume is moderately enlarged; no mass, shunts, or thrombi.               Coronary artery disease involving native coronary artery of native heart (Chronic) ICD-10-CM: I25.10  ICD-9-CM: 414.01  Unknown Yes        Diverticulosis of sigmoid colon (Chronic) ICD-10-CM: K57.30  ICD-9-CM: 562.10  5/22/2021 Yes        Anticoagulated by anticoagulation treatment ICD-10-CM: Z79.01  ICD-9-CM: V58.61  5/19/2021 Yes    Overview Signed 6/7/2021 10:48 PM by Gerson Beckford MD     On Apixaban             On clopidogrel therapy ICD-10-CM: Z79.01  ICD-9-CM: V58.61  5/19/2021 Yes        Status post insertion of drug eluting coronary artery stent ICD-10-CM: Z95.5  ICD-9-CM: V45.82  5/18/2021 Yes    Overview Signed 6/18/2021  1:38 PM by Leatha Benitez MD     S/P PCI to proximal SVG with 3.5 x 12 mm Gentry drug-eluting stent; PCI to mid SVG with 3.5 x 34 mm Gentry drug-eluting stent; PCI to distal SVG with 3.5 x 15 mm Juan J drug-eluting stent;  Balloon angioplasty to distal SVG anastomosis (5/18/2021 - Dr. Rudolph Dahl)              Non-ST elevation (NSTEMI) myocardial infarction [facility]) ICD-10-CM: I21.4  ICD-9-CM: 410.70  5/17/2021 Yes        Paroxysmal atrial fibrillation ([facility] [address] ICD-10-CM: I48.0  ICD-9-CM: 427.31  5/17/2021 Yes        Impaired mobility and ADLs ICD-10-CM: Z74.09, Z78.9  ICD-9-CM: V49.89  5/17/2021 Yes        Neuropathy involving both lower extremities (Chronic) ICD-10-CM: H03.90  ICD-9-CM: 356.9  10/22/2020 Yes        Essential hypertension (Chronic) ICD-10-CM: I10  ICD-9-CM: 401.9  Unknown Yes        History of coronary artery bypass graft ICD-10-CM: Z95.1  ICD-9-CM: V45.81  2001 Yes    Overview Signed 6/7/2021 11:04 PM by Leatha Benitez MD     [facility]                   Background:   Past Medical History:   Past Medical History:   Diagnosis Date    Acute embolic stroke ([facility] [address] 9/59/8037    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, brainstem and cerebellum) without residual deficit    Anticoagulated by anticoagulation treatment 5/19/2021    On Apixaban    Benign prostatic hyperplasia with urinary retention     Chronic obstructive pulmonary disease (COPD) ([facility] [address]     Coronary artery disease involving native coronary artery of native heart     Diverticulitis of large intestine with abscess 6/21/2021    Diverticulosis of sigmoid colon 5/22/2021    Essential hypertension     Heart failure with preserved left ventricular function (HFpEF) ([facility] [address]     2D echocardiogram (5/18/2021) showed EF 50%; concentric LV hypertrophy with a severely thickened septal wall and mildly thickened posterior wall; left atrial chamber is severely enlarged; right atrial chamber volume is moderately enlarged; no mass, shunts, or thrombi.     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[address] 6/21/2021    Neuropathy involving both lower extremities 10/22/2020    Non-ST elevation (NSTEMI) myocardial infarction ([address] 5/17/2021    On clopidogrel therapy 5/19/2021    Paroxysmal atrial fibrillation ([facility] [address] 5/17/2021    Peripheral vascular disease of extremity with claudication ([facility] [address] 6/14/2021    Urinary retention         Assessment:   Changes in Assessment throughout shift: No change to previous assessment     Patient has a central line: no Reasons if yes: na  Insertion date:na Last dressing date:na   Patient has [place] Cath: no Reasons if yes: na   Insertion date:na  Shift [place] care completed: NO     Last Vitals:     Vitals:    07/05/21 2114 07/06/21 0815 07/06/21 1612 07/06/21 2006   BP: (!) 151/91 126/74 130/75 (!) 143/73   Pulse: 98 91 86 66   Resp: 18 16 16 18   Temp: 98.1 °F (36.7 °C) 97.6 °F (36.4 °C) 98.8 °F (37.1 °C) 98.5 °F (36.9 °C)   SpO2: 97% 95% 97% 97%   Weight:       Height:            PAIN    Pain Assessment    Pain Intensity 1: 0 (07/07/21 0404) Pain Intensity 1: 2 (12/29/14 1105)    Pain Location 1: Ankle, Foot Pain Location 1: Abdomen    Pain Intervention(s) 1: Medication (see MAR) Pain Intervention(s) 1: Medication (see MAR)  Patient Stated Pain Goal: 0 Patient Stated Pain Goal: 0  o Intervention effective: yes  o Other actions taken for pain:       Skin Assessment  Skin color Skin Color: Appropriate for ethnicity  Condition/Temperature Skin Condition/Temp: Dry, Warm  Integrity Skin Integrity: Intact  Turgor Turgor: Non-tenting  Weekly Pressure Ulcer Documentation  Pressure  Injury Documentation: No Pressure Injury Noted-Pressure Ulcer Prevention Initiated  Wound Prevention & Protection Methods  Orientation of wound Orientation of Wound Prevention: Posterior  Location of Prevention Location of Wound Prevention: Sacrum/Coccyx  Dressing Present Dressing Present : No  Dressing Status Dressing Status: Intact  Wound Offloading Wound Offloading (Prevention Methods): Bed, pressure redistribution/air, Bed, pressure reduction mattress, Repositioning     INTAKE/OUPUT  Date 07/06/21 0700 - 07/07/21 0659 07/07/21 0700 - 07/08/21 0659   Shift 4091-2318 2861-6225 24 Hour Total 3796-0719 9779-3006 24 Hour Total   INTAKE   Shift Total(mL/kg)         OUTPUT   Urine(mL/kg/hr)           Urine Occurrence(s) 3 x 5 x 8 x      Stool           Stool Occurrence(s) 3 x 5 x 8 x      Shift Total(mL/kg)         NET         Weight (kg) 80.8 80.8 80.8 80.8 80.8 80.8       Recommendations:  1. Patient needs and requests: na    2. Pending tests/procedures: na     3. Functional Level/Equipment: Partial (one person) / Bed (comment); Wheelchair    Fall Precautions:   Fall risk precautions were reinforced with the patient; he was instructed to call for help prior to getting up. The following fall risk precautions were continued: bed/ chair alarms, door signage, yellow bracelet and socks as well as update of the [employer] Bougie tool in the patient's room. [place] Score: 3    HEALS Safety Check    A safety check occurred in the patient's room between off going nurse and oncoming nurse listed above.     The safety check included the below items  Area Items   H  High Alert Medications - Verify all high alert medication drips (heparin, PCA, etc.) E  Equipment - Suction is set up for ALL patients (with laila)  - Red plugs utilized for all equipment (IV pumps, etc.)  - WOWs wiped down at end of shift.  - Room stocked with oxygen, suction, and other unit-specific supplies   A  Alarms - Bed alarm is set for fall risk patients  - Ensure chair alarm is in place and activated if patient is up in a chair   L  Lines - Check IV for any infiltration  - Taveras bag is empty if patient has a Taversa   - Tubing and IV bags are labeled   S  Safety   - Room is clean, patient is clean, and equipment is clean. - Hallways are clear from equipment besides carts. - Fall bracelet on for fall risk patients  - Ensure room is clear and free of clutter  - Suction is set up for ALL patients (with laila)  - Hallways are clear from equipment besides carts.    - Isolation precautions followed, supplies available outside room, sign posted     Rosalia Gu, RN

## 2021-07-07 NOTE — ROUTINE PROCESS
SHIFT CHANGE NOTE FOR [facility]    Bedside and Verbal shift change report given to Gigi Trejo RN (oncoming nurse) by Patricia Huynh RN (offgoing nurse). Report included the following information SBAR, Kardex, MAR and Recent Results. Situation:   Code Status: Full Code   Hospital Day: 8   Problem List:   Hospital Problems  Date Reviewed: 2021        Codes Class Noted POA    Loose stools ICD-10-CM: R19.5  ICD-9-CM: 787.7  2021 Yes        Mesenteric abscess ([address] ICD-10-CM: K65.1  ICD-9-CM: 567.22  2021 Yes        * (Principal) Diverticulitis of large intestine with abscess ICD-10-CM: K57.20  ICD-9-CM: 562.11, 569.5  2021 Yes        History of sinus bradycardia ICD-10-CM: Z86.79  ICD-9-CM: V12.59  2021 Yes    Overview Signed 2021 10:02 PM by Brisa Erickson MD     Attributed to Carvedilol 25 mg PO BID with meals             Generalized weakness ICD-10-CM: R53.1  ICD-9-CM: 780.79  2021 Yes        Mixed hyperlipidemia (Chronic) ICD-10-CM: D80.0  ICD-9-CM: 272.2  Unknown Yes        Heart failure with preserved left ventricular function (HFpEF) (HCC) (Chronic) ICD-10-CM: I50.30  ICD-9-CM: 428.9  Unknown Yes    Overview Signed 2021 11:03 PM by Brisa Erickson MD     2D echocardiogram (2021) showed EF 50%; concentric LV hypertrophy with a severely thickened septal wall and mildly thickened posterior wall; left atrial chamber is severely enlarged; right atrial chamber volume is moderately enlarged; no mass, shunts, or thrombi.               Coronary artery disease involving native coronary artery of native heart (Chronic) ICD-10-CM: I25.10  ICD-9-CM: 414.01  Unknown Yes        Diverticulosis of sigmoid colon (Chronic) ICD-10-CM: K57.30  ICD-9-CM: 562.10  2021 Yes        Anticoagulated by anticoagulation treatment ICD-10-CM: Z79.01  ICD-9-CM: V58.61  2021 Yes    Overview Signed 2021 10:48 PM by Brisa Erickson MD     On Apixaban             On clopidogrel therapy ICD-10-CM: Z79.01  ICD-9-CM: V58.61  5/19/2021 Yes        Status post insertion of drug eluting coronary artery stent ICD-10-CM: Z95.5  ICD-9-CM: V45.82  5/18/2021 Yes    Overview Signed 6/18/2021  1:38 PM by Marylou Dubon MD     S/P PCI to proximal SVG with 3.5 x 12 mm [place] drug-eluting stent; PCI to mid SVG with 3.5 x 34 mm Juan J drug-eluting stent; PCI to distal SVG with 3.5 x 15 mm [place] drug-eluting stent;  Balloon angioplasty to distal SVG anastomosis (5/18/2021 - Dr. Dave Ballesteros)              Non-ST elevation (NSTEMI) myocardial infarction [facility]) ICD-10-CM: I21.4  ICD-9-CM: 410.70  5/17/2021 Yes        Paroxysmal atrial fibrillation ([facility] [address] ICD-10-CM: I48.0  ICD-9-CM: 427.31  5/17/2021 Yes        Impaired mobility and ADLs ICD-10-CM: Z74.09, Z78.9  ICD-9-CM: V49.89  5/17/2021 Yes        Neuropathy involving both lower extremities (Chronic) ICD-10-CM: E75.49  ICD-9-CM: 356.9  10/22/2020 Yes        Essential hypertension (Chronic) ICD-10-CM: I10  ICD-9-CM: 401.9  Unknown Yes        History of coronary artery bypass graft ICD-10-CM: Z95.1  ICD-9-CM: V45.81  2001 Yes    Overview Signed 6/7/2021 11:04 PM by Marylou Dubon MD     [facility]                   Background:   Past Medical History:   Past Medical History:   Diagnosis Date    Acute embolic stroke ([facility] [address] [facility] 6/54/4135    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, brainstem and cerebellum) without residual deficit    Anticoagulated by anticoagulation treatment 5/19/2021    On Apixaban    Benign prostatic hyperplasia with urinary retention     Chronic obstructive pulmonary disease (COPD) ([facility] [address]     Coronary artery disease involving native coronary artery of native heart     Diverticulitis of large intestine with abscess 6/21/2021    Diverticulosis of sigmoid colon 5/22/2021    Essential hypertension     Heart failure with preserved left ventricular function (HFpEF) ([facility] [address]     2D echocardiogram (5/18/2021) showed EF 50%; concentric LV hypertrophy with a severely thickened septal wall and mildly thickened posterior wall; left atrial chamber is severely enlarged; right atrial chamber volume is moderately enlarged; no mass, shunts, or thrombi.     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[address] 6/21/2021    Neuropathy involving both lower extremities 10/22/2020    Non-ST elevation (NSTEMI) myocardial infarction ([address] 5/17/2021    On clopidogrel therapy 5/19/2021    Paroxysmal atrial fibrillation ([address] 5/17/2021    Peripheral vascular disease of extremity with claudication ([facility] [address] 6/14/2021    Urinary retention         Assessment:   Changes in Assessment throughout shift: No change to previous assessment    Patient has a central line: no   Patient has Taveras Cath: no      Last Vitals:     Vitals:    07/06/21 0815 07/06/21 1612 07/06/21 2006 07/07/21 0810   BP: 126/74 130/75 (!) 143/73 (!) 149/87   Pulse: 91 86 66 68   Resp: 16 16 18 15   Temp: 97.6 °F (36.4 °C) 98.8 °F (37.1 °C) 98.5 °F (36.9 °C) 97.4 °F (36.3 °C)   SpO2: 95% 97% 97%    Weight:       Height:            PAIN    Pain Assessment    Pain Intensity 1: 0 (07/07/21 0907) Pain Intensity 1: 2 (12/29/14 1105)    Pain Location 1: Ankle, Foot Pain Location 1: Abdomen    Pain Intervention(s) 1: Medication (see MAR) Pain Intervention(s) 1: Medication (see MAR)  Patient Stated Pain Goal: 0 Patient Stated Pain Goal: 0  o Intervention effective: yes  o Other actions taken for pain:       Skin Assessment  Skin color Skin Color: Appropriate for ethnicity  Condition/Temperature Skin Condition/Temp: Dry, Warm  Integrity Skin Integrity: Intact  Turgor Turgor: Non-tenting  Weekly Pressure Ulcer Documentation  Pressure  Injury Documentation: No Pressure Injury Noted-Pressure Ulcer Prevention Initiated  Wound Prevention & Protection Methods  Orientation of wound Orientation of Wound Prevention: Posterior  Location of Prevention Location of Wound Prevention: Sacrum/Coccyx  Dressing Present Dressing Present : No  Dressing Status Dressing Status: Intact  Wound Offloading Wound Offloading (Prevention Methods): Bed, pressure redistribution/air, Bed, pressure reduction mattress, Wheelchair, Repositioning, Pillows, Elevate heels     INTAKE/OUPUT  Date 07/06/21 0700 - 07/07/21 0659 07/07/21 0700 - 07/08/21 0659   Shift 6963-7841 4156-1543 24 Hour Total 0700-1859 1900-0659 24 Hour Total   INTAKE   Shift Total(mL/kg)         OUTPUT   Urine(mL/kg/hr)           Urine Occurrence(s) 3 x 5 x 8 x      Stool           Stool Occurrence(s) 3 x 5 x 8 x      Shift Total(mL/kg)         NET         Weight (kg) 80.8 80.8 80.8 80.8 80.8 80.8       Recommendations:  1. Patient needs and requests: pain management, elevate BLE for edema reduction    2. Pending tests/procedures: Labs tomorrow morning; CT ABD PELV W WO CONT on 7/15/21     3. Functional Level/Equipment: Partial (one person) / Stabilization belt; Wheelchair;Bed (comment)    Fall Precautions:   Fall risk precautions were reinforced with the patient; he was instructed to call for help prior to getting up. The following fall risk precautions were continued: bed/ chair alarms, door signage, yellow bracelet and socks as well as update of the Dylonenette Blake tool in the patient's room. [place] Score: 3    HEALS Safety Check    A safety check occurred in the patient's room between off going nurse and oncoming nurse listed above.     The safety check included the below items  Area Items   H  High Alert Medications - Verify all high alert medication drips (heparin, PCA, etc.)   E  Equipment - Suction is set up for ALL patients (with laila)  - Red plugs utilized for all equipment (IV pumps, etc.)  - WOWs wiped down at end of shift.  - Room stocked with oxygen, suction, and other unit-specific supplies   A  Alarms - Bed alarm is set for fall risk patients  - Ensure chair alarm is in place and activated if patient is up in a chair   L  Lines - Check IV for any infiltration  - Taveras bag is empty if patient has a Taveras   - Tubing and IV bags are labeled   S  Safety   - Room is clean, patient is clean, and equipment is clean. - Hallways are clear from equipment besides carts. - Fall bracelet on for fall risk patients  - Ensure room is clear and free of clutter  - Suction is set up for ALL patients (with laila)  - Hallways are clear from equipment besides carts.    - Isolation precautions followed, supplies available outside room, sign posted     Rubia Palacios RN

## 2021-07-07 NOTE — PROGRESS NOTES
Problem: Neurolinguistics Impaired (Adult)  Goal: *Speech Goal: (INSERT TEXT)  Description: Long term goals  Patient will:  1. Be oriented x 3 and recall events of the day, supervision. 2. Recall 3 words after 5 minutes, supervision. 3. Read I-2 sentence stimuli and respond appropriately, 90% accuracy. 4. Read short paragraphs and respond to content questions, 90% accuracy. 5. Write simple sentences with 90% accuracy (to dictation and then spontaneously to describe actions/objects). Short term goals (7/14/21):  Patient will:  1. Be oriented x 3 and recall events of the day, supervision. 2. Recall 3 words after 5 minutes, supervision. 3. Read I sentence stimuli and respond appropriately, 90% accuracy. 4. Read short paragraphs and respond to content questions, 90% accuracy. 5. Write simple sentences with 90% accuracy (to dictation). Note:   Speech language pathology treatment    Patient: Cheng Middleton (1 y.o. male)  Date: 7/7/2021  Diagnosis: Diverticulitis of large intestine with abscess [K57.20] Diverticulitis of large intestine with abscess         Time in:1030  Time Out: 1100    Pain:  Pre-tx: No pain reported  Post tx: No pain reported    SUBJECTIVE:   Patient stated I used three pound weights today. OBJECTIVE:   Mental Status:  Mr. Bailey Izaguirre was alert and oriented. Treatment & Interventions: The patient was seen for a 30 minute session. The following treatment tasks were presented:  Neuro-Linguistics:   Orientation:    Supervision  Recent memory:   Supervision  Recall 3 words: Moderate assistance  Write sentences from dictation: 83% accuracy, additional letters added or necessary letters omitted  Read single sentences:  98%  Read paragraphs:   93%, self corrections noted  Answer questions from paragraphs: 100%    Response & Tolerance to Activities:  Mr. Bailey Izaguirre was pleasant and cooperative. He tolerated all activities well.     Pain:  Pain Scale 1: Numeric (0 - 10)  No pain reported     After treatment:   [x]       Patient left in no apparent distress sitting up in chair  []       Patient left in no apparent distress in bed  [x]       Call bell left within reach  []       Nursing notified  []       Caregiver present  []       Bed alarm activated    ASSESSMENT:   Progression toward goals:  [x]       Improving appropriately and progressing toward goals  []       Improving slowly and progressing toward goals  []       Not making progress toward goals and plan of care will be adjusted    PLAN:   Patient continues to benefit from skilled intervention to address the above impairments. Continue treatment per established plan of care.   Discharge Recommendations:  Outpatient    Estimated LOS: Through 7/13/21    Rosendo Dickinson Speech-Language Pathology Student  Time Calculation: 30 mins

## 2021-07-08 NOTE — PROGRESS NOTES
Problem: Self Care Deficits Care Plan (Adult)  Goal: *Therapy Goal (Edit Goal, Insert Text)  Description: Occupational Therapy Goals   Long Term Goals  Initiated 2021 and to be accomplished within 2 week(s) 2021   1. Pt will perform self-feeding with I.  2. Pt will perform grooming with Mod I. -Goal Met 2021  3. Pt will perform UB bathing with Mod I.-Goal Met 2021  4. Pt will perform LB bathing with Supv.  5. Pt will perform tub/shower transfer with SBA. 6. Pt will perform UB dressing with Mod I.-Goal Met 2021  7. Pt will perform LB dressing with Supv.  8. Pt will perform toileting task with Mod I.  9. Pt will perform toilet transfer with Supv. Short Term Goals   Initiated 2021 (reassessed on 2021) and to be accomplished within 7 day(s) 2021   (patient has met all of their STGs as of 2021; therapist will now address LTGs)  1. Pt will perform self-feeding with Mod I. -Goal Met 2021  2. Pt will perform grooming with set-up assistance. -Goal Met 2021  3. Pt will perform UB bathing with SBA. -Goal Met 2021  4. Pt will perform LB bathing with CGA w/ use of AE as needed. -Goal Met 2021  5. Pt will perform tub/shower transfer with CGA. -Goal Met 2021  6. Pt will perform UB dressing with Mod I.-Goal Met 2021  7. Pt will perform LB dressing with CGA w/ use of AE as needed. -Goal Met 2021  8. Pt will perform toileting task with CGA. -Goal Met 2021  9. Pt will perform toilet transfer with CGA w/ least restrictive AD. -Goal Met 2021        Outcome: Progressing Towards Goal   Occupational Therapy TREATMENT    Patient: William Maldonado   80 y.o. Patient identified with name and : yes    Date: 2021    First Tx Session  Time In: 08  Time Out[de-identified] [id number]    Diagnosis: Diverticulitis of large intestine with abscess [K57.20]   Precautions: Fall  Chart, occupational therapy assessment, plan of care, and goals were reviewed.      Pain:  Pt reports 0/10 pain or discomfort prior to treatment. Pt reports 0/10 pain or discomfort post treatment. Intervention Provided: No complaints of pain at onset, during, or at conclusion of skilled OT session. SUBJECTIVE:   Patient stated I feel ready for a shower.     OBJECTIVE DATA SUMMARY:       THERAPEUTIC EXERCISE Daily Assessment    Pt performed BUE there ex seated w/c level using 4# weight dowel for 10 reps x4 bicep curls, chest press, forward circular motion, backward circular motion, and 'churning' motion for increased strength and activity tolerance for increased (I) w/ ADLs/IADLs. Pt tolerated increased number of sets this session w/ vc's for pacing and demonstration for technique. RB between sets due to fatigue.       FEEDING/EATING Daily Assessment    Feeding/Eating  Feeding/Eating Assistance: 6 (Modified independent)  Comments: seated w/c at sink     GROOMING Daily Assessment    Grooming  Grooming Assistance : 6 (Modified independent)  Comments: seated w/c level     UPPER BODY BATHING Daily Assessment    Upper Body Bathing  Bathing Assistance, Upper: 6 (Modified independent)  Upper Body : Compensatory technique training  Position Performed: Seated in chair  Comments: seated on tub transfer bench     LOWER BODY BATHING Daily Assessment    Lower Body Bathing  Bathing Assistance, Lower : 4 (Contact guard assistance)  Adaptive Equipment: Grab bar  Position Performed: Seated in chair;Standing  Comments: CGA due to wet shower environment and balance     TOILETING Daily Assessment    Toileting  Toileting Assistance (FIM Score): 5 (Supervision)  Cues: Verbal cues provided     UPPER BODY DRESSING Daily Assessment    Upper Body Dressing   Dressing Assistance : 6 (Modified independent)  Comments: seated w/c level     LOWER BODY DRESSING Daily Assessment    Lower Body Dressing   Dressing Assistance : 5 (Stand-by assistance)  Position Performed: Bending forward method;Seated in chair     MOBILITY/TRANSFERS Daily Assessment    Functional Transfers  Toilet Transfer : Elevated seat;Grab bars  Amount of Assistance Required: 5 (stand-by assistance)  Tub or Shower Type: Shower  Amount of Assistance Required: 4 (Contact guard assistance)  Adaptive Equipment: Grab bars; Tub transfer bench       ASSESSMENT:  Assessed ADL performance for safety and (I) with am self-care routine. Patient's condition continues to improve as a result of skilled OT services w/ patient completing UB bathing/dressing at Mod I level seated. Pt continues to require SBA/CGA during LB bathing performed in standing (shower) due to balance deficit and decreased activity/standing tolerance, vc's for use of grab bar and for slowing pace. Pt will continue to benefit from skilled OT interventions to assess safety and (I) w/ self-care tasks, ADL related mobility, strengthening, activity tolerance, and functional transfers w/ AD in order to facilitate increased (I) and safety w/ ADLs/IADLs for return to PLOF. Progression toward goals:  [x]          Improving appropriately and progressing toward goals  []          Improving slowly and progressing toward goals  []          Not making progress toward goals and plan of care will be adjusted     PLAN:  Patient continues to benefit from skilled intervention to address the above impairments. Continue treatment per established plan of care. Discharge Recommendations:  Home Health w/ supv  Further Equipment Recommendations for Discharge:  bedside commode, gait belt, and transfer bench, grab bars     Activity Tolerance:  Fair+    Estimated LOS:    Please refer to the flowsheet for vital signs taken during this treatment.   After treatment:   [x]  Patient left in no apparent distress sitting up in wheelchair   []  Patient left in no apparent distress in bed  [x]  Call bell left within reach  [x]  Nursing notified  []  Caregiver present  [x]  Wheelchair alarm activated    COMMUNICATION/EDUCATION:   [x] Home safety education was provided and the patient/caregiver indicated understanding. [x] Patient/family have participated as able in goal setting and plan of care. [x] Patient/family agree to work toward stated goals and plan of care. [] Patient understands intent and goals of therapy, but is neutral about his/her participation. [] Patient is unable to participate in goal setting and plan of care.       [id number] [facility], OT

## 2021-07-08 NOTE — PROGRESS NOTES
Family education is scheduled with pt's wife for Friday 7/9 at 130. Wife stated no preference for home health agencies and was open to trying [facility] since the pt's pcp is affiliated with [facility]. Sw will follow.

## 2021-07-08 NOTE — PROGRESS NOTES
[facility] PHYSICAL REHABILITATION  [address] [place] [address], [address]     INPATIENT REHABILITATION  DAILY PROGRESS NOTE     Date: 7/8/2021    Name: Luis Garcia Age / Sex: 80 y.o. 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 Blood pressure controlled. No documented fever since admission. Patient's Complaint:   Still with heaviness of both legs due to swelling    Pain Control: stable, mild-to-moderate joint symptoms intermittently, reasonably well controlled by current meds      Objective:     Vital Signs:  Patient Vitals for the past 24 hrs:   BP Temp Pulse Resp SpO2   07/08/21 0747 (!) 155/87 97.4 °F (36.3 °C) 89 18 95 %   07/08/21 0620 (!) 140/81  76     07/07/21 2032 138/82 98.1 °F (36.7 °C) 80 18 95 %   07/07/21 1600 115/71 98.1 °F (36.7 °C) 76 16 95 %        Physical Examination:  GENERAL SURVEY: Patient is awake, alert, oriented x 3, sitting comfortably on the chair, not in acute respiratory distress. HEENT: pale palpebral conjunctivae, anicteric sclerae, no nasoaural discharge, moist oral mucosa  NECK: supple, no jugular venous distention, no palpable lymph nodes  CHEST/LUNGS: symmetrical chest expansion, good air entry, clear breath sounds  HEART: adynamic precordium, good S1 S2, no S3, regular rhythm, tachycardic, no murmurs  ABDOMEN: flat, bowel sounds appreciated, soft, non-tender  EXTREMITIES: pale nailbeds, Grade 1 to 2 bipedal edema, decreased pulses on BLE, no calf tenderness   NEUROLOGICAL EXAM: The patient is awake, alert and oriented x3, able to answer questions fairly appropriately, able to follow 1 and 2 step commands. Able to tell time from the wall clock. Cranial nerves II-XII are grossly intact.   No gross sensory deficit. Motor strength is 4 to 4+/5 on all 4 extremities. Current Medications:  Current Facility-Administered Medications   Medication Dose Route Frequency    furosemide (LASIX) tablet 40 mg  40 mg Oral ACB    nitroglycerin (NITROBID) 2 % ointment 2 Inch  2 Inch Topical BID    loperamide (IMODIUM) capsule 2 mg  2 mg Oral QID    pneumococcal 23-valent (PNEUMOVAX 23) injection 0.5 mL  0.5 mL IntraMUSCular PRIOR TO DISCHARGE    acetaminophen (TYLENOL) tablet 650 mg  650 mg Oral Q4H PRN    bisacodyL (DULCOLAX) tablet 10 mg  10 mg Oral Q48H PRN    amoxicillin-clavulanate (AUGMENTIN) 500-125 mg per tablet 1 Tablet  1 Tablet Oral BID WITH MEALS    cefpodoxime (VANTIN) tablet 200 mg  200 mg Oral Q12H    carvediloL (COREG) tablet 3.125 mg  3.125 mg Oral BID WITH MEALS    clopidogreL (PLAVIX) tablet 75 mg  75 mg Oral DAILY WITH BREAKFAST    rosuvastatin (CRESTOR) tablet 10 mg  10 mg Oral DAILY    nitroglycerin (NITROSTAT) tablet 0.4 mg  0.4 mg SubLINGual PRN    arformoteroL (BROVANA) neb solution 15 mcg  15 mcg Nebulization BID RT    ipratropium (ATROVENT) 0.02 % nebulizer solution 0.5 mg  0.5 mg Nebulization TID    albuterol (PROVENTIL VENTOLIN) nebulizer solution 2.5 mg  2.5 mg Nebulization Q4H PRN    apixaban (ELIQUIS) tablet 5 mg  5 mg Oral BID    multivitamin, tx-iron-ca-min (THERA-M w/ IRON) tablet 1 Tablet  1 Tablet Oral DAILY    lisinopriL (PRINIVIL, ZESTRIL) tablet 5 mg  5 mg Oral DAILY    tamsulosin (FLOMAX) capsule 0.4 mg  0.4 mg Oral DAILY WITH DINNER    gabapentin (NEURONTIN) capsule 300 mg  300 mg Oral TID       Allergies:   Allergies   Allergen Reactions    Lipitor [Atorvastatin] Rash    Norvasc [Amlodipine] Rash       Functional Progress:    SPEECH AND LANGUAGE PATHOLOGY    ON ADMISSION MOST RECENT   Comprehension (Native Language)  Primary Mode of Comprehension: Auditory  Score: 5 Comprehension (Native Language)  Primary Mode of Comprehension: Auditory  Score: 5     Expression (Native Language)  Primary Mode of Expression: Verbal  Score: 6   Expression (Native Language)  Primary Mode of Expression: Verbal  Score: 6     Social Interaction/Pragmatics  Score: 5 Social Interaction/Pragmatics  Score: 6     Problem Solving  Score: 3   Problem Solving  Score: 5     Memory  Score: 4 Memory  Score: 4       Legend:   7 - Independent   6 - Modified Independent   5 - Standby Assistance / Supervision / Set-up   4 - Minimum Assistance / Contact Guard Assistance   3 - Moderate Assistance   2 - Maximum Assistance   1 - Total Assistance / Dependent       Lab/Data Review:  Recent Results (from the past 24 hour(s))   HGB & HCT    Collection Time: 07/08/21  5:00 AM   Result Value Ref Range    HGB 9.8 (L) 13.0 - 16.0 g/dL    HCT 33.0 (L) 36.0 - 48.0 %        Assessment:     Primary Rehabilitation Diagnosis  1. Generalized weakness with Impaired mobility and ADLs  2.  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    Comorbidities  Patient Active Problem List   Diagnosis Code    Urinary retention R33.9    Essential hypertension I10    Non-ST elevation (NSTEMI) myocardial infarction ([facility] [address] I21.4    Paroxysmal atrial fibrillation ([facility]) I48.0    Anticoagulated by anticoagulation treatment Z79.01    Mixed hyperlipidemia E78.2    Benign prostatic hyperplasia with urinary retention N40.1, R33.8    Malignant neoplasm of lower lobe of right lung ([facility]) C34.31    Heart failure with preserved left ventricular function (HFpEF) ([facility]) I50.30    Coronary artery disease involving native coronary artery of native heart I25.10    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    Chronic obstructive pulmonary disease (COPD) ([facility]) J44.9    History of carotid stenosis Z86.79    History of renal artery stenosis Z86.79    On clopidogrel therapy Z79.01    History of gross hematuria A58.748    Acute embolic stroke ([facility]) A39.3    Leukocytosis D72.829    Peripheral vascular disease of extremity with claudication ([facility] [address] I73.9    Neuropathy involving both lower extremities G57.93    Impaired mobility and ADLs Z74.09, Z78.9    Status post insertion of drug eluting coronary artery stent Z95.5    Mesenteric abscess ([facility] [address] K65.1    Diverticulitis of large intestine with abscess K57.20    Diverticulosis of sigmoid colon K57.30    History of sinus bradycardia Z86.79    Generalized weakness R53.1    Loose stools R19.5       Plan:     1. Justification for continued stay: Good progression towards established rehabilitation goals. 2. Medical Issues being followed closely:    [x]  Fall and safety precautions     []  Wound Care     [x]  Bowel and Bladder Function     [x]  Fluid Electrolyte and Nutrition Balance     [x]  Pain Control      3. Issues that 24 hour rehabilitation nursing is following:    [x]  Fall and safety precautions     []  Wound Care     [x]  Bowel and Bladder Function     [x]  Fluid Electrolyte and Nutrition Balance     [x]  Pain Control      [x]  Assistance with and education on in-room safety with transfers to and from the bed, wheelchair, toilet and shower. 4. Acute rehabilitation plan of care:    [x]  Continue current care and rehab. [x]  Physical Therapy           [x]  Occupational Therapy           [x]  Speech Therapy     []  Hold Rehab until further notice     5. Medications:    [x]  MAR Reviewed     [x]  Continue Present Medications     6. DVT Prophylaxis:      []  Enoxaparin     []  Unfractionated Heparin     []  Warfarin     [x]  NOAC     []  MARTHA Stockings     []  Sequential Compression Device     []  None     7. Code status    [x]  Full code     []  Partial code     []  Do not intubate     []  Do not resuscitate     8.  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 Mild infiltrate in the superior segment of the right lower lobe with small right pleural effusion. No consolidation. 2. Delayed renal clearance, chronic renal disease suspected. Additionally, areas of cortical cysts with poor perfusion.  Pyelonephritis not excluded. No hydronephrosis. Correlation with urinalysis? 3. Diverticulosis, with potential diverticulitis in the left lower quadrant, junction of descending and sigmoid colon. However, evaluation is very limited due to severe motion artifacts. 4. Focal mucosal thickening in the proximal sigmoid. Further evaluation with barium enema or colonoscopy as indicated. > WBC count (5/23/2021) = 30.2                          > . .  .                           > WBC count (6/2/2021) = 25.4                          > WBC count (6/3/2021) = 22.5                          > WBC count (6/4/2021) = 23.4                          > WBC count (6/5/2021) = 21.7                          > WBC count (6/6/2021) = 24.1                          > WBC count (6/7/2021) = 20.0                          > Work up done was negative for a source of infection              > WBC count (6/8/2021, on admission to the ARU) = 14.4   > On 6/8/2021, started Pericolace 2 tabs PO once daily after dinner   > On 6/9/2021, started Polyethylene glycol 17 grams in 8 oz water PO once daily   > On 6/19/2021:    > Patient was noted to have diarrhea    > Held:     > Polyethylene glycol 17 grams in 8 oz water PO once daily     > Pericolace 2 tabs PO once daily after dinner    > Started Bio K Plus 1 cap PO once daily   > Stool culture (collected 6/19/2021, resulted 6/20/2021): Negative   > WBC count (6/20/2021) = 26.7   > Blood culture x 2 sets (collected 6/20/2021, resulted on 6/26/2021) yielded no growth after 6 days   > On 6/20/2021, started:    > Cholestyramine 4 grams PO TID with meals    > Ceftriaxone 1000 mg IV q 24 hr    > Metronidazole 500 mg IV q 8 hr    > Vancomycin 1750 mg  IV x 1 dose   > WBC count (6/21/2021) = 26.3   > Infectious Disease consult (Dr. Carli King) was called on 6/21/2021 for evaluation and comanagement   > On 6/21/2021:    > Discontinue:     > Polyethylene glycol 17 grams in 8 oz water PO once daily     > Pericolace 2 tabs PO once daily after dinner    > Change Vancomycin IV to Vancomycin 500 mg PO q 6 hr   > CT scan of the chest, abdomen and pelvis with contrast (6/21/2021) showed:    1. No acute findings in the chest.     -Emphysema. -CABG. -Stable mildly enlarged right thyroid gland which could indicate underlying nodule, stable for many years. 2. 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 It is unclear if initial source of abscess is from small bowel inflammation or diverticulitis/pericolonic abscess. 3. Dilated small bowel. This is likely a combination of generalized small bowel ileus and low-grade partial small bowel obstruction at site of abnormal pelvic small bowel loop discussed above. 4. Diverticulosis. Stable thickened collapsed segment of sigmoid colon since 2014 suggesting a chronic stricture. This is limited for assessment by CT. 5. Bladder dome inflammation is likely secondary to the adjacent mesentery process. 6. Other chronic incidental findings.    > On 6/21/2021:    > Discontinued:     > Cholestyramine 4 grams PO TID with meals     > Ceftriaxone 1000 mg IV q 24 hr     > Metronidazole 500 mg IV q 8 hr     > Vancomycin 500 mg PO q 6 hr    > Started Piperacillin-Tazobactam 3.375 grams IV q 6 hr   > Colorectal Surgery (Dr. Mame Mcwilliams) was called on 6/22/2021 for evaluation and comanagement    > No surgical intervention recommended since unable to safely stop Apixaban (for paroxysmal atrial fibrillation) and Clopidogrel (recent placement of 3 JENNIE to SVG-LCx on 5/18/2021)   > On 6/25/2021:    > Discontinued Piperacillin-Tazobactam 3.375 grams IV q 6 hr    > Started:     > Cefpodoxime 200 mg PO q 12 hr     > Metronidazole 500 mg PO q 12 hr   > On 6/28/2021:    > Discontinued Metronidazole 500 mg PO q 12 hr    > Started Amoxicillin-Clavulanate 500-125 1 tab PO q 12 hr      06/30/21  0450 06/29/21  0211 06/28/21  0230 06/27/21  0516 06/25/21  0534 06/24/21  0522 06/23/21  0538 06/22/21  0409 06/21/21  1310 06/21/21  0330 06/20/21  0550   WBC 18.2* 21.0* 24.5* 21.7* 18.4* 18.2* 22.1* 24.7* 26.0* 26.3* 26.7*      > Upon discharge from the ARU, the patient will need to follow up with Colorectal Surgery (Dr. Maged Stone)   > CT scan of the abdomen and pelvis with contrast on 7/15/2021   > Continue:    > Cefpodoxime 200 mg PO q 12 hr (STOP DATE: 7/21/2021)    > Amoxicillin-Clavulanate 500-125 1 tab PO q 12 hr (STOP DATE: 7/21/2021)    > 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, brainstem and cerebellum) without residual deficit   > Continue:    > Clopidogrel 75 mg PO once daily with breakfast    > Rosuvastatin 10 mg PO once daily    > Benign prostatic hyperplasia with urinary retention   > Continue Tamsulosin 0.4 mg PO once daily after dinner    > Chronic heart failure with preserved ejection fraction (HFpEF)   > 2D echocardiogram (5/18/2021) showed EF 50%; concentric LV hypertrophy with a severely thickened septal wall and mildly thickened posterior wall; left atrial chamber is severely enlarged; right atrial chamber volume is moderately enlarged; no mass, shunts, or thrombi.    > Continue Carvedilol 3.125 mg PO BID with meals (8AM, 5PM)    > Chronic obstructive pulmonary disease (COPD)   > Continue:    > Arformoterol nebulization BID    > Ipratropium nebulization TID    > Albuterol nebulization q 4 hr PRN for shortness of breath/wheezing    > Essential hypertension   > 2D echocardiogram (5/18/2021) showed EF 50%; concentric LV hypertrophy with a severely thickened septal wall and mildly thickened posterior wall; left atrial chamber is severely enlarged; right atrial chamber volume is moderately enlarged; no mass, shunts, or thrombi.    > Continue Carvedilol 3.125 mg PO BID with meals (8AM, 5PM)    > Mixed hyperlipidemia   > Lipid profile (5/18/2021) showed TG 83, , HDL 50, LDL 93   > Continue Rosuvastatin 10 mg PO once daily    > Non-ST elevation (NSTEMI) myocardial infarction; Coronary artery disease; History of CABG; S/P PCI to proximal SVG with 3.5 x 12 mm [place] drug-eluting stent; PCI to mid SVG with 3.5 x 34 mm [place] drug-eluting stent; PCI to distal SVG with 3.5 x 15 mm [place] J drug-eluting stent; Balloon angioplasty to distal SVG anastomosis (5/18/2021 - Dr. Corey Harris)    > Continue:    > Carvedilol 3.125 mg PO BID with meals (8AM, 5PM)    > Clopidogrel 75 mg PO once daily with breakfast    > Rosuvastatin 10 mg PO once daily    > Nitroglycerin 0.4 mg SL PRN for chest pain    > Paroxysmal atrial fibrillation, anticoagulated on Apixaban   > Continue:    > Apixaban 5 mg PO BID    > Carvedilol 3.125 mg PO BID with meals (8AM, 5PM)    > Peripheral vascular disease of extremity with claudication;  Neuropathy involving both lower extremities   > (+) pain and numbness of toes of both feet   > PVL arterial doppler of both lower extremities with exercise (9/12/2019) showed:    > Moderate arterial insufficiency in the right lower extremity at the level of the trifurcation at rest.     > Severe arterial insufficiency in the left lower extremity at the level of the trifurcation at rest.   > PVL arterial doppler of both lower extremities with exercise (10/22/2020) showed:    > Mild right lower extremity arterial insufficiency of indeterminate level at rest.     > Moderate left lower extremity arterial insufficiency involving the femoral-popliteal segment at rest.     > The ankle brachial indices post exercise were unreliable as described. No evidence of inflow involvement.   > Planned to start patient on Cilostazol for the claudication symptoms but patient already on Apixaban + Clopidogrel; addition of Cilostazol may increase risk for bleeding; will hold off on adding Cilostazol for now   > As per the last Progress Note by Dr. Jenny Soriano (dated 10/22/2020):  \"Pt major symptom is not related to his PAD but sounds more like neuropathy\"   > On 6/14/2021, started a trial of Gabapentin 100 mg PO BID   > Upon discharge from the ARU, the patient will need to follow up with Vascular Surgery (Dr. Jenny Soriano)   > On 6/18/2021, increased Gabapentin from 100 mg PO BID to 100 mg PO TID   > On 6/28/2021, increased Gabapentin from 100 mg to 300 mg PO TID   > On 7/7/2021, started:    > Furosemide 40 mg PO once daily x 5 days    > Nitroglycerin 2% ointment, applied to bilateral popliteal and calf areas BID   > Continue:    > Gabapentin 300 mg PO TID    > Furosemide 40 mg PO once daily x 4 more doses    > Nitroglycerin 2% ointment, applied to bilateral popliteal and calf areas BID    > Transient bradycardia, while on Carvedilol 25 mg PO BID with meals (6/21/2021)   > On 6/21/2021, Carvedilol was held   > On 6/26/2021, Cardiology restarted the patient on Carvedilol 3.125 mg PO BID with meals   > Instructions of Cardiology:     > high dose due to risk for pacemaker and ongoing infeciton    > if heart rate stays consistently > 110 bpm, can increase to 6.25 mg BID   > Continue Carvedilol 3.125 mg PO BID with meals (8AM, 5PM)    > Loose stools, most likely side effect of oral antibiotics   > On 7/2/2021, started Loperamide 2 mg PO TID   > On 7/5/2021, increased Loperamide from 2 mg PO TID to 2 mg PO 4 times daily   > Increase Loperamide from 2 mg to 4 mg PO 4 times daily    > Analgesia   > Continue Acetaminophen 650 mg PO q 4 hr PRN for pain     > Diet:   > Specifications: Low fiber/No added salt (3-4 grams)   > Solids (consistency): Regular    > Liquids (consistency): Thin    > Fluid restriction: None      9. Personal Protective Equipment (BT31 face mask) was used while interacting with the patient. Patient was using a surgical mask. 10. Patient's progress in rehabilitation and medical issues discussed with the patient. All questions answered to the best of my ability. Care plan discussed with patient and nurse. 11. Total clinical care time is 30 minutes, including review of chart including all labs, radiology, past medical history, and discussion with patient. Greater than 50% of my time was spent in coordination of care and counseling.       Signed:    Vangie Lin MD    July 8, 2021

## 2021-07-08 NOTE — PROGRESS NOTES
Problem: Neurolinguistics Impaired (Adult)  Goal: *Speech Goal: (INSERT TEXT)  Description: Long term goals  Patient will:  1. Be oriented x 3 and recall events of the day, supervision. 2. Recall 3 words after 5 minutes, supervision. 3. Read I-2 sentence stimuli and respond appropriately, 90% accuracy. 4. Read short paragraphs and respond to content questions, 90% accuracy. 5. Write simple sentences with 90% accuracy (to dictation and then spontaneously to describe actions/objects). Short term goals (7/14/21):  Patient will:  1. Be oriented x 3 and recall events of the day, supervision. 2. Recall 3 words after 5 minutes, supervision. 3. Read I sentence stimuli and respond appropriately, 90% accuracy. 4. Read short paragraphs and respond to content questions, 90% accuracy. 5. Write simple sentences with 90% accuracy (to dictation). Note:   Speech language pathology treatment    Patient: Augustus Whitman (56 y.o. male)  Date: 7/8/2021  Diagnosis: Diverticulitis of large intestine with abscess [K57.20] Diverticulitis of large intestine with abscess    Time in:1330  Time Out: 1400    Pain:  Pre-tx: No pain reported  Post tx: No pain reported    SUBJECTIVE:   Patient stated I once had a dozen eggs each with double yolks. OBJECTIVE:   Mental Status:  Mr. Cely Burciaga was alert and oriented. Treatment & Interventions: The patient was seen for a 30 minute session.  The following treatment tasks were presented:  Neuro-Linguistics:   Orientation:                                          Supervision  Recent memory:                                  Supervision  Recall 3 words:                                    Minimum assistance  Write sentences from dictation:           98%   Write sentences spontaneously:         60% independently, 80% given minimum assistance  Read single sentences:                       96%  Read paragraphs:                               93%, self corrections noted  Answer questions from paragraphs:   80%, wait time needed     Response & Tolerance to Activities:  Mr. Hans Ribera was pleasant and cooperative. He tolerated all activities well. He continues to add additional letters or omit necessary letters when writing spontaeously. Pain:  Pain Scale 1: Numeric (0 - 10)  No pain reported     After treatment:   [x]       Patient left in no apparent distress sitting up in chair  []       Patient left in no apparent distress in bed  [x]       Call bell left within reach  []       Nursing notified  []       Caregiver present  []       Bed alarm activated    ASSESSMENT:   Progression toward goals:  [x]       Improving appropriately and progressing toward goals  []       Improving slowly and progressing toward goals  []       Not making progress toward goals and plan of care will be adjusted    PLAN:   Patient continues to benefit from skilled intervention to address the above impairments. Continue treatment per established plan of care.   Discharge Recommendations:  Home Health    Estimated LOS: through 7/13/21    Ar Gardner Speech-Language Pathology Student  Time Calculation: 30 mins

## 2021-07-08 NOTE — PROGRESS NOTES
Problem: Mobility Impaired (Adult and Pediatric)  Goal: *Therapy Goal (Edit Goal, Insert Text)  Description: Physical Therapy Short Term Goals  Initiated 6/30/2021 and to be accomplished within 7 day(s) on 7/7/2021:  1. Patient will move from supine to sit and sit to supine , scoot up and down, and roll side to side in bed with supervision/set-up. 2.  Patient will transfer from bed to chair and chair to bed with supervision/set-up using the least restrictive device. 3.  Patient will perform sit to stand with supervision/set-up. 4.  Patient will ambulate with supervision/set-up for 150 feet with the least restrictive device. 5.  Patient will ascend/descend 14 stairs with 1-2 handrail(s) with minimal assistance/contact guard assist.    Physical Therapy Long Term Goals  Initiated 6/30/2021 and to be accomplished within 10-14 day(s) 7/14/2021  1. Patient will move from supine to sit and sit to supine , scoot up and down, and roll side to side in bed with modified independence. 2.  Patient will transfer from bed to chair and chair to bed with modified independence using the least restrictive device. 3.  Patient will perform sit to stand with modified independence. 4.  Patient will ambulate with modified independence for at least 150 feet with the least restrictive device. 5.  Patient will ascend/descend 14 stairs with 2 handrail(s) with supervision/set-up. 6.  Patient will ascend/descend 1 curb step with appropriate AD with supervision/setup to safely get into and out of house. Outcome: Progressing Towards Goal   PHYSICAL THERAPY TREATMENT    Patient: Everette Quevedo (61 y.o. male)  Date: 7/8/2021  Diagnosis: Diverticulitis of large intestine with abscess [K57.20] Diverticulitis of large intestine with abscess  Precautions: Fall  Chart, physical therapy assessment, plan of care and goals were reviewed.     Time In:1115  Time [id number]:[id number]    Patient seen for: Gait training;Patient education;Transfer training; Wheelchair mobility; Therapeutic exercise    Pain:  Patient denied pain during session. Patient identified with name and : yes    SUBJECTIVE:      Patient agreeable to treatment. Continues to have decreased recall of safety techniques for transfers using RW. OBJECTIVE DATA SUMMARY:    Objective:       TRANSFERS Daily Assessment     Transfer Type: Other  Other: stand step with RW  Transfer Assistance : 5 (Supervision/setup)  Sit to Stand Assistance: Supervision (cues for safe hand placement)     Ongoing cues for safe sit to stand with RW, especially for hand placement required. Performing sit to stand reps 23\" mat table with emphasis on bilat UE on distal thighs to encourage functional LE strength. Performing 3 x 8 reps. GAIT Daily Assessment    Gait Description (WDL) Exceptions to WDL    Gait Abnormalities Decreased step clearance (forward flexed trunk)    Assistive device Walker, rolling    Ambulation assistance - level surface 5 (Supervision/setup)    Distance 250 Feet (ft)    Ambulation assistance- uneven surface  NT    Comments Patient performing gait for multiple gait bouts, ongoing cues for more upright posture and stepping into middle of RW. No loss of balance observed        BALANCE Daily Assessment     Sitting - Static: Good (unsupported)  Sitting - Dynamic: Good (unsupported)  Standing - Static: Good (continues to use RW for safety)  Standing - Dynamic : Impaired        WHEELCHAIR MOBILITY Daily Assessment     Able to Propel (ft): 150 feet  Functional Level: 6  Curbs/Ramps Assist Required (FIM Score): 0 (Not tested)  Wheelchair Setup Assist Required : 6 (Modified independent)  Wheelchair Management: Manages left brake;Manages right brake        THERAPEUTIC EXERCISES Daily Assessment       Performing standing alternating toe touches to 6\" step with cues for more upright posture 3 x 15 reps, bilat UE support of RW.  Also performing standing toe/heel raises 3 x 15 reps. ASSESSMENT:  Patient continues to demonstrate improved ability to perform gait and transfers but needing ongoing cues for safety. Progression toward goals:  []      Improving appropriately and progressing toward goals  [x]      Improving slowly and progressing toward goals  []      Not making progress toward goals and plan of care will be adjusted      PLAN:  Patient continues to benefit from skilled intervention to address the above impairments. Continue treatment per established plan of care. Recommend functional LE strengthening and balance training.    Discharge Recommendations:  Home Health  Further Equipment Recommendations for Discharge:  rolling walker      Estimated Discharge Date: 7/13/2021    Activity Tolerance:   Good    After treatment:   [] Patient left in no apparent distress in bed  [x] Patient left in no apparent distress sitting up in chair  [] Patient left in no apparent distress in w/c mobilizing under own power  [] Patient left in no apparent distress dining area  [] Patient left in no apparent distress mobilizing under own power  [x] Call bell left within reach  [x] Nursing notified  [] Caregiver present  [] Bed alarm activated   [x] Chair alarm activated      Syl Phan, PT  7/8/2021

## 2021-07-08 NOTE — PROGRESS NOTES
Problem: Pressure Injury - Risk of  Goal: *Prevention of pressure injury  Description: Document Nicko Scale and appropriate interventions in the flowsheet. Outcome: Progressing Towards Goal  Note: Pressure Injury Interventions:  Sensory Interventions: Assess changes in LOC    Moisture Interventions: Absorbent underpads    Activity Interventions: Increase time out of bed, Pressure redistribution bed/mattress(bed type)    Mobility Interventions: HOB 30 degrees or less    Nutrition Interventions: Document food/fluid/supplement intake    Friction and Shear Interventions: HOB 30 degrees or less                Problem: Patient Education: Go to Patient Education Activity  Goal: Patient/Family Education  Outcome: Progressing Towards Goal     Problem: Falls - Risk of  Goal: *Absence of Falls  Description: Document [place] Fall Risk and appropriate interventions in the flowsheet.   Outcome: Progressing Towards Goal  Note: Fall Risk Interventions:  Mobility Interventions: Patient to call before getting OOB    Mentation Interventions: Adequate sleep, hydration, pain control    Medication Interventions: Patient to call before getting OOB, Teach patient to arise slowly    Elimination Interventions: Call light in reach, Bed/chair exit alarm, Patient to call for help with toileting needs    History of Falls Interventions: Bed/chair exit alarm         Problem: Patient Education: Go to Patient Education Activity  Goal: Patient/Family Education  Outcome: Progressing Towards Goal     Problem: Patient Education: Go to Patient Education Activity  Goal: Patient/Family Education  Outcome: Progressing Towards Goal     Problem: Nutrition Deficit  Goal: *Optimize nutritional status  Outcome: Progressing Towards Goal     Problem: Patient Education: Go to Patient Education Activity  Goal: Patient/Family Education  Outcome: Progressing Towards Goal     Problem: Patient Education: Go to Patient Education Activity  Goal: Patient/Family Education  Outcome: Progressing Towards Goal     Problem: Inpatient Rehab (Adult)  Goal: *LTG: Avoids injury/falls 100% of time related to deficits  Outcome: Progressing Towards Goal  Goal: *LTG: Avoids infection 100% of time related to deficits  Outcome: Progressing Towards Goal  Goal: *LTG: Verbalize understanding of diagnosis and risk factors for recurring stroke  Outcome: Progressing Towards Goal  Goal: *LTG: Absence of DVT during hospitalization  Outcome: Progressing Towards Goal  Goal: *LTG: Maintains Skin Integrity With No Evidence of Pressure Injury 100% of Time  Outcome: Progressing Towards Goal  Goal: Interventions  Outcome: Progressing Towards Goal     Problem: Patient Education: Go to Patient Education Activity  Goal: Patient/Family Education  Outcome: Progressing Towards Goal

## 2021-07-08 NOTE — ROUTINE PROCESS
[facility]    Bedside and Verbal shift change report given to Christine Ruiz LPN (oncoming nurse) by Ron Santoyo, RN (offgoing nurse). Report included the following information SBAR, Kardex, MAR and Recent Results. Situation:   Code Status: Full Code   Hospital Day: 9   Problem List:   Hospital Problems  Date Reviewed: 7/7/2021        Codes Class Noted POA    Loose stools ICD-10-CM: R19.5  ICD-9-CM: 787.7  7/1/2021 Yes        Mesenteric abscess ([address] ICD-10-CM: K65.1  ICD-9-CM: 567.22  6/21/2021 Yes        * (Principal) Diverticulitis of large intestine with abscess ICD-10-CM: K57.20  ICD-9-CM: 562.11, 569.5  6/21/2021 Yes        History of sinus bradycardia ICD-10-CM: Z86.79  ICD-9-CM: V12.59  6/21/2021 Yes    Overview Signed 6/29/2021 10:02 PM by Timmy Tapia MD     Attributed to Carvedilol 25 mg PO BID with meals             Generalized weakness ICD-10-CM: R53.1  ICD-9-CM: 780.79  6/21/2021 Yes        Mixed hyperlipidemia (Chronic) ICD-10-CM: Q85.3  ICD-9-CM: 272.2  Unknown Yes        Heart failure with preserved left ventricular function (HFpEF) (HCC) (Chronic) ICD-10-CM: I50.30  ICD-9-CM: 428.9  Unknown Yes    Overview Signed 6/7/2021 11:03 PM by Timmy Tapia MD     2D echocardiogram (5/18/2021) showed EF 50%; concentric LV hypertrophy with a severely thickened septal wall and mildly thickened posterior wall; left atrial chamber is severely enlarged; right atrial chamber volume is moderately enlarged; no mass, shunts, or thrombi.               Coronary artery disease involving native coronary artery of native heart (Chronic) ICD-10-CM: I25.10  ICD-9-CM: 414.01  Unknown Yes        Diverticulosis of sigmoid colon (Chronic) ICD-10-CM: K57.30  ICD-9-CM: 562.10  5/22/2021 Yes        Anticoagulated by anticoagulation treatment ICD-10-CM: Z79.01  ICD-9-CM: V58.61  5/19/2021 Yes    Overview Signed 6/7/2021 10:48 PM by Timmy Tapia MD     On Apixaban             On clopidogrel therapy ICD-10-CM: Z79.01  ICD-9-CM: V58.61  5/19/2021 Yes        Status post insertion of drug eluting coronary artery stent ICD-10-CM: Z95.5  ICD-9-CM: V45.82  5/18/2021 Yes    Overview Signed 6/18/2021  1:38 PM by Joshua Bray MD     S/P PCI to proximal SVG with 3.5 x 12 mm Juan J drug-eluting stent; PCI to mid SVG with 3.5 x 34 mm [place] drug-eluting stent; PCI to distal SVG with 3.5 x 15 mm Juan J drug-eluting stent;  Balloon angioplasty to distal SVG anastomosis (5/18/2021 - Dr. Lui Guardado)              Non-ST elevation (NSTEMI) myocardial infarction [facility]) ICD-10-CM: I21.4  ICD-9-CM: 410.70  5/17/2021 Yes        Paroxysmal atrial fibrillation ([facility] ICD-10-CM: I48.0  ICD-9-CM: 427.31  5/17/2021 Yes        Impaired mobility and ADLs ICD-10-CM: Z74.09, Z78.9  ICD-9-CM: V49.89  5/17/2021 Yes        Neuropathy involving both lower extremities (Chronic) ICD-10-CM: F32.18  ICD-9-CM: 356.9  10/22/2020 Yes        Essential hypertension (Chronic) ICD-10-CM: I10  ICD-9-CM: 401.9  Unknown Yes        History of coronary artery bypass graft ICD-10-CM: Z95.1  ICD-9-CM: V45.81  2001 Yes    Overview Signed 6/7/2021 11:04 PM by Joshua Bray MD     [facility]                   Background:   Past Medical History:   Past Medical History:   Diagnosis Date    Acute embolic stroke ([facility] [facility] 6/54/5553    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, brainstem and cerebellum) without residual deficit    Anticoagulated by anticoagulation treatment 5/19/2021    On Apixaban    Benign prostatic hyperplasia with urinary retention     Chronic obstructive pulmonary disease (COPD) ([facility] [address] [facility]     Coronary artery disease involving native coronary artery of native heart     Diverticulitis of large intestine with abscess 6/21/2021    Diverticulosis of sigmoid colon 5/22/2021    Essential hypertension     Heart failure with preserved left ventricular function (HFpEF) ([facility] [address]     2D echocardiogram (5/18/2021) showed EF 50%; concentric LV hypertrophy with a severely thickened septal wall and mildly thickened posterior wall; left atrial chamber is severely enlarged; right atrial chamber volume is moderately enlarged; no mass, shunts, or thrombi.     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[address] 6/21/2021    Neuropathy involving both lower extremities 10/22/2020    Non-ST elevation (NSTEMI) myocardial infarction ([address] 5/17/2021    On clopidogrel therapy 5/19/2021    Paroxysmal atrial fibrillation ([address] 5/17/2021    Peripheral vascular disease of extremity with claudication ([facility] [address] 6/14/2021    Urinary retention         Assessment:   Changes in Assessment throughout shift: No change to previous assessment    Patient has a central line: no   Patient has Taveras Cath: no      Last Vitals:     Vitals:    07/07/21 1402 07/07/21 1600 07/07/21 2032 07/08/21 0620   BP: 128/78 115/71 138/82 (!) 140/81   Pulse: 97 76 80 76   Resp: 18 16 18    Temp: 97.9 °F (36.6 °C) 98.1 °F (36.7 °C) 98.1 °F (36.7 °C)    SpO2: 95% 95% 95%    Weight:       Height:            PAIN    Pain Assessment    Pain Intensity 1: 0 (07/08/21 0400) Pain Intensity 1: 2 (12/29/14 1105)    Pain Location 1: Foot Pain Location 1: Abdomen    Pain Intervention(s) 1: Medication (see MAR) Pain Intervention(s) 1: Medication (see MAR)  Patient Stated Pain Goal: 0 Patient Stated Pain Goal: 0  o Intervention effective: yes  o Other actions taken for pain: Medication (see MAR)     Skin Assessment  Skin color Skin Color: Appropriate for ethnicity  Condition/Temperature Skin Condition/Temp: Dry, Warm  Integrity Skin Integrity: Intact  Turgor Turgor: Non-tenting  Weekly Pressure Ulcer Documentation  Pressure  Injury Documentation: No Pressure Injury Noted-Pressure Ulcer Prevention Initiated  Wound Prevention & Protection Methods  Orientation of wound Orientation of Wound Prevention: Posterior  Location of Prevention Location of Wound Prevention: Sacrum/Coccyx  Dressing Present Dressing Present : No  Dressing Status Dressing Status: Intact  Wound Offloading Wound Offloading (Prevention Methods): Bed, pressure redistribution/air, Bed, pressure reduction mattress, Repositioning     INTAKE/OUPUT  Date 07/07/21 0700 - 07/08/21 0659 07/08/21 0700 - 07/09/21 0659   Shift 3828-0972 1039-4531 24 Hour Total 0239-6639 9333-8828 24 Hour Total   INTAKE   Shift Total(mL/kg)         OUTPUT   Urine(mL/kg/hr)           Urine Occurrence(s) 3 x 5 x 8 x      Stool           Stool Occurrence(s) 3 x 5 x 8 x      Shift Total(mL/kg)         NET         Weight (kg) 80.8 80.8 80.8 80.8 80.8 80.8       Recommendations:  1. Patient needs and requests: pain management, elevate BLE for edema reduction    2. Pending tests/procedures: Labs tomorrow morning; CT ABD PELV W WO CONT on 7/15/21     3. Functional Level/Equipment: Partial (one person) /      Fall Precautions:   Fall risk precautions were reinforced with the patient; he was instructed to call for help prior to getting up. The following fall risk precautions were continued: bed/ chair alarms, door signage, yellow bracelet and socks as well as update of the [employer] tool in the patient's room. [place] Score: 3    HEALS Safety Check    A safety check occurred in the patient's room between off going nurse and oncoming nurse listed above.     The safety check included the below items  Area Items   H  High Alert Medications - Verify all high alert medication drips (heparin, PCA, etc.)   E  Equipment - Suction is set up for ALL patients (with laila)  - Red plugs utilized for all equipment (IV pumps, etc.)  - WOWs wiped down at end of shift.  - Room stocked with oxygen, suction, and other unit-specific supplies   A  Alarms - Bed alarm is set for fall risk patients  - Ensure chair alarm is in place and activated if patient is up in a chair   L  Lines - Check IV for any infiltration  - Taveras bag is empty if patient has a Taveras   - Tubing and IV bags are labeled   S  Safety   - Room is clean, patient is clean, and equipment is clean. - Hallways are clear from equipment besides carts. - Fall bracelet on for fall risk patients  - Ensure room is clear and free of clutter  - Suction is set up for ALL patients (with yanker)  - Hallways are clear from equipment besides carts.    - Isolation precautions followed, supplies available outside room, sign posted     Diana Danielle RN

## 2021-07-09 NOTE — PROGRESS NOTES
conducted a Follow up consultation and Spiritual Assessment for Errol Baires, who is a 80 y.o.,male. The  provided the following Interventions:  Continued the relationship of care and support. Listened empathically. Offered assurance of continued prayer on patients behalf. Chart reviewed. The following outcomes were achieved:  Patient expressed gratitude for 's visit. Assessment:  There are no further spiritual or Yazidi issues which require Spiritual Care Services interventions at this time. Plan:  Chaplains will continue to follow and will provide pastoral care on an as needed/requested basis.  recommends bedside caregivers page  on duty if patient shows signs of acute spiritual or emotional distress.        [address]   [phone]

## 2021-07-09 NOTE — ROUTINE PROCESS
[facility]    Bedside and Verbal shift change report given to Anurag Noguera LPN (oncoming nurse) by Alm Shone, RN (offgoing nurse). Report included the following information SBAR, Kardex, MAR and Recent Results. Situation:   Code Status: Full Code   Hospital Day: 10   Problem List:   Hospital Problems  Date Reviewed: 7/9/2021        Codes Class Noted POA    Loose stools ICD-10-CM: R19.5  ICD-9-CM: 787.7  7/1/2021 Yes        Mesenteric abscess ([address] ICD-10-CM: K65.1  ICD-9-CM: 567.22  6/21/2021 Yes        * (Principal) Diverticulitis of large intestine with abscess ICD-10-CM: K57.20  ICD-9-CM: 562.11, 569.5  6/21/2021 Yes        History of sinus bradycardia ICD-10-CM: Z86.79  ICD-9-CM: V12.59  6/21/2021 Yes    Overview Signed 6/29/2021 10:02 PM by Vivi Christopher MD     Attributed to Carvedilol 25 mg PO BID with meals             Generalized weakness ICD-10-CM: R53.1  ICD-9-CM: 780.79  6/21/2021 Yes        Mixed hyperlipidemia (Chronic) ICD-10-CM: H81.4  ICD-9-CM: 272.2  Unknown Yes        Heart failure with preserved left ventricular function (HFpEF) (HCC) (Chronic) ICD-10-CM: I50.30  ICD-9-CM: 428.9  Unknown Yes    Overview Signed 6/7/2021 11:03 PM by Vivi Christopher MD     2D echocardiogram (5/18/2021) showed EF 50%; concentric LV hypertrophy with a severely thickened septal wall and mildly thickened posterior wall; left atrial chamber is severely enlarged; right atrial chamber volume is moderately enlarged; no mass, shunts, or thrombi.               Coronary artery disease involving native coronary artery of native heart (Chronic) ICD-10-CM: I25.10  ICD-9-CM: 414.01  Unknown Yes        Diverticulosis of sigmoid colon (Chronic) ICD-10-CM: K57.30  ICD-9-CM: 562.10  5/22/2021 Yes        Anticoagulated by anticoagulation treatment ICD-10-CM: Z79.01  ICD-9-CM: V58.61  5/19/2021 Yes    Overview Signed 6/7/2021 10:48 PM by Vivi Christopher MD     On Apixaban             On clopidogrel therapy ICD-10-CM: Z79.01  ICD-9-CM: V58.61  5/19/2021 Yes        Status post insertion of drug eluting coronary artery stent ICD-10-CM: Z95.5  ICD-9-CM: V45.82  5/18/2021 Yes    Overview Signed 6/18/2021  1:38 PM by Rocael Bear MD     S/P PCI to proximal SVG with 3.5 x 12 mm [place] drug-eluting stent; PCI to mid SVG with 3.5 x 34 mm [place] drug-eluting stent; PCI to distal SVG with 3.5 x 15 mm [place] drug-eluting stent;  Balloon angioplasty to distal SVG anastomosis (5/18/2021 - Dr. Michelle Barrett)              Non-ST elevation (NSTEMI) myocardial infarction [facility]) ICD-10-CM: I21.4  ICD-9-CM: 410.70  5/17/2021 Yes        Paroxysmal atrial fibrillation ([facility] [address] ICD-10-CM: I48.0  ICD-9-CM: 427.31  5/17/2021 Yes        Impaired mobility and ADLs ICD-10-CM: Z74.09, Z78.9  ICD-9-CM: V49.89  5/17/2021 Yes        Neuropathy involving both lower extremities (Chronic) ICD-10-CM: Y89.19  ICD-9-CM: 356.9  10/22/2020 Yes        Essential hypertension (Chronic) ICD-10-CM: I10  ICD-9-CM: 401.9  Unknown Yes        History of coronary artery bypass graft ICD-10-CM: Z95.1  ICD-9-CM: V45.81  2001 Yes    Overview Signed 6/7/2021 11:04 PM by Rocael Bear MD     [facility]                   Background:   Past Medical History:   Past Medical History:   Diagnosis Date    Acute embolic stroke ([facility] [address] 8/50/0405    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, brainstem and cerebellum) without residual deficit    Anticoagulated by anticoagulation treatment 5/19/2021    On Apixaban    Benign prostatic hyperplasia with urinary retention     Chronic obstructive pulmonary disease (COPD) ([facility] [address]     Coronary artery disease involving native coronary artery of native heart     Diverticulitis of large intestine with abscess 6/21/2021    Diverticulosis of sigmoid colon 5/22/2021    Essential hypertension     Heart failure with preserved left ventricular function (HFpEF) ([facility] [address]     2D echocardiogram (5/18/2021) showed EF 50%; concentric LV hypertrophy with a severely thickened septal wall and mildly thickened posterior wall; left atrial chamber is severely enlarged; right atrial chamber volume is moderately enlarged; no mass, shunts, or thrombi.     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[facility] 6/21/2021    Neuropathy involving both lower extremities 10/22/2020    Non-ST elevation (NSTEMI) myocardial infarction ([facility] [address] 5/17/2021    On clopidogrel therapy 5/19/2021    Paroxysmal atrial fibrillation ([facility] [address] 5/17/2021    Peripheral vascular disease of extremity with claudication ([facility] [address] 6/14/2021    Urinary retention         Assessment:   Changes in Assessment throughout shift: No change to previous assessment     Patient has a central line: no Reasons if yes: na  Insertion date:na Last dressing date:na   Patient has [place] Cath: no Reasons if yes: na   Insertion date:na  Shift [place] care completed: NO     Last Vitals:     Vitals:    07/08/21 2143 07/09/21 0610 07/09/21 0716 07/09/21 1541   BP: 132/71 130/83 130/86 102/64   Pulse: 80 81 98 74   Resp: 22  20 18   Temp: 97.7 °F (36.5 °C)  97.2 °F (36.2 °C) 97.6 °F (36.4 °C)   SpO2: 95%   95%   Weight:       Height:            PAIN    Pain Assessment    Pain Intensity 1: 0 (07/09/21 1541) Pain Intensity 1: 2 (12/29/14 1105)    Pain Location 1: Abdomen Pain Location 1: Abdomen    Pain Intervention(s) 1: Medication (see MAR) Pain Intervention(s) 1: Medication (see MAR)  Patient Stated Pain Goal: 0 Patient Stated Pain Goal: 0  o Intervention effective: yes  o Other actions taken for pain: Medication (see MAR)     Skin Assessment  Skin color Skin Color: Appropriate for ethnicity  Condition/Temperature Skin Condition/Temp: Dry, Warm  Integrity Skin Integrity: Intact  Turgor Turgor: Non-tenting  Weekly Pressure Ulcer Documentation  Pressure  Injury Documentation: No Pressure Injury Noted-Pressure Ulcer Prevention Initiated  Wound Prevention & Protection Methods  Orientation of wound Orientation of Wound Prevention: Posterior  Location of Prevention Location of Wound Prevention: Sacrum/Coccyx  Dressing Present Dressing Present : No  Dressing Status Dressing Status: Intact  Wound Offloading Wound Offloading (Prevention Methods): Bed, pressure redistribution/air, Bed, pressure reduction mattress, Repositioning     INTAKE/OUPUT  Date 07/08/21 1900 - 07/09/21 0659 07/09/21 0700 - 07/10/21 0659   Shift 0169-7906 24 Hour Total 1790-8006 8483-3623 24 Hour Total   INTAKE   P.O.   1320  1320     P. O.   1320  1320   Shift Total(mL/kg)   1320(16.3)  1320(16.3)   OUTPUT   Urine(mL/kg/hr)          Urine Occurrence(s) 4 x 10 x 5 x  5 x   Emesis/NG output          Emesis Occurrence(s) 0 x 0 x 0 x  0 x   Stool          Stool Occurrence(s) 0 x 1 x 2 x  2 x   Shift Total(mL/kg)        NET   1320  1320   Weight (kg) 80.8 80.8 80.8 80.8 80.8       Recommendations:  1. Patient needs and requests: na    2. Pending tests/procedures: na     3. Functional Level/Equipment:   /      Fall Precautions:   Fall risk precautions were reinforced with the patient; he was instructed to call for help prior to getting up. The following fall risk precautions were continued: bed/ chair alarms, door signage, yellow bracelet and socks as well as update of the Lisa Locket tool in the patient's room. [place] Score: 3    HEALS Safety Check    A safety check occurred in the patient's room between off going nurse and oncoming nurse listed above.     The safety check included the below items  Area Items   H  High Alert Medications - Verify all high alert medication drips (heparin, PCA, etc.)   E  Equipment - Suction is set up for ALL patients (with laila)  - Red plugs utilized for all equipment (IV pumps, etc.)  - WOWs wiped down at end of shift.  - Room stocked with oxygen, suction, and other unit-specific supplies   A  Alarms - Bed alarm is set for fall risk patients  - Ensure chair alarm is in place and activated if patient is up in a chair   L  Lines - Check IV for any infiltration  - Taveras bag is empty if patient has a Taveras   - Tubing and IV bags are labeled   S  Safety   - Room is clean, patient is clean, and equipment is clean. - Hallways are clear from equipment besides carts. - Fall bracelet on for fall risk patients  - Ensure room is clear and free of clutter  - Suction is set up for ALL patients (with laila)  - Hallways are clear from equipment besides carts.    - Isolation precautions followed, supplies available outside room, sign posted     Sameer Hamilton RN

## 2021-07-09 NOTE — PROGRESS NOTES
Problem: Self Care Deficits Care Plan (Adult)  Goal: *Therapy Goal (Edit Goal, Insert Text)  Description: Occupational Therapy Goals   Long Term Goals  Initiated 2021 and to be accomplished within 2 week(s) 2021   1. Pt will perform self-feeding with I.  2. Pt will perform grooming with Mod I. -Goal Met 2021  3. Pt will perform UB bathing with Mod I.-Goal Met 2021  4. Pt will perform LB bathing with Supv.  5. Pt will perform tub/shower transfer with SBA. 6. Pt will perform UB dressing with Mod I.-Goal Met 2021  7. Pt will perform LB dressing with Supv.  8. Pt will perform toileting task with Mod I.  9. Pt will perform toilet transfer with Supv. Short Term Goals   Initiated 2021 (reassessed on 2021) and to be accomplished within 7 day(s) 2021   (patient has met all of their STGs as of 2021; therapist will now address LTGs)  1. Pt will perform self-feeding with Mod I. -Goal Met 2021  2. Pt will perform grooming with set-up assistance. -Goal Met 2021  3. Pt will perform UB bathing with SBA. -Goal Met 2021  4. Pt will perform LB bathing with CGA w/ use of AE as needed. -Goal Met 2021  5. Pt will perform tub/shower transfer with CGA. -Goal Met 2021  6. Pt will perform UB dressing with Mod I.-Goal Met 2021  7. Pt will perform LB dressing with CGA w/ use of AE as needed. -Goal Met 2021  8. Pt will perform toileting task with CGA. -Goal Met 2021  9. Pt will perform toilet transfer with CGA w/ least restrictive AD. -Goal Met 2021        Outcome: Progressing Towards Goal   Occupational Therapy TREATMENT    Patient: Larissa Gamboa   80 y.o.     Patient identified with name and : Yes    Date: 2021    First Tx Session  Time In: 8118  Time Out[de-identified] 0505    Second Tx Session  Time In: 1335  Time Out[de-identified] 1252    Diagnosis: Diverticulitis of large intestine with abscess [K57.20]   Precautions: Fall  Chart, occupational therapy assessment, plan of care, and goals were reviewed. Pain:  Pt reports 4/10 pain or discomfort prior to treatment. (Bilateral foot and ankle-edema, nursing aware)  Pt reports 4/10 pain or discomfort post treatment. (Bilateral foot and ankle-edema, nursing aware)  Intervention Provided: Pt reports that \"discomfort\" improves when he is \"up and moving\", therapist incorporated there act into session w/ functional mobility and standing tasks with patient reporting relief. SUBJECTIVE:   Patient stated \"I would like to participate in a cooking task next week.  Therapist will provide opportunity for IADL cooking task (making & baking muffins) during next weeks skilled OT session for increased (I) w/ IADLs. OBJECTIVE DATA SUMMARY:     THERAPEUTIC ACTIVITY Daily Assessment    Functional mobility training performed SBA level w/ RW during IADL kitchen task. Pt stood for 8 min x1  and 6 min x1 w/ RW at sink while washing and drying plates, mugs, and measuring cups. Seated RB x1 due to fatigue. Pt demonstrated G-/F+ static standing. No LOB noted. Pt performed dynamic standing there act RW level (F+ balance) w/ SBA during beach ball toss and catch w/ therapist for challenging standing tolerance and balance for increased (I) w/ ADLs/IADLs. Edu/instructed on use of energy conservation and fall prevention strategies w/ pt verbalizing understanding. THERAPEUTIC EXERCISE Daily Assessment    BUE strengthening arm bike w/ moderate resistance for 15 min duration for increased endurance, act den, and strengthening for increased indep w/ ADLs/IADLs. Rest break x1 midway due to fatigue. Pt continues to progress w/ act den as evidenced by requiring fewer rest breaks. Pt performed BUE there ex seated w/c level using 4# weight dowel for 10 reps x4 bicep curls, chest press, forward circular motion, backward circular motion, and 'churning' motion for increased strength and activity tolerance for increased (I) w/ ADLs/IADLs.  Pt tolerated increased number of sets this session w/ vc's for pacing and demonstration for technique. RB between sets due to fatigue. ASSESSMENT:  Pt/family education session completed w/ POC and current ADL levels reviewed w/ patient and patient's wife in attendance. Pt currently performs UB bathing/dressing at Mod I level while seated; however, continues to require up to CGA during standing aspects of LB bathing due to balance deficit. Reviewed recommendations for tub transfer bench and grab bars for increased safety w/ patient's wife verbalizing understanding. Pt and therapist discussed plans for IADL cooking task to be performed in an upcoming therapy session in order to assess (I) with a higher level functional task and for safe handling of AD/RW within kitchen environment for progression toward PLOF. Pt will continue to benefit from skilled OT interventions to assess safety and (I) w/ self-care tasks, ADL related mobility, strengthening, activity tolerance, and functional transfers w/ AD in order to facilitate increased (I) and safety w/ ADLs/IADLs. Progression toward goals:  [x]          Improving appropriately and progressing toward goals  []          Improving slowly and progressing toward goals  []          Not making progress toward goals and plan of care will be adjusted     PLAN:  Patient continues to benefit from skilled intervention to address the above impairments. Continue treatment per established plan of care. Discharge Recommendations:  Home Health w/ assist  Further Equipment Recommendations for Discharge:  bedside commode, gait belt, and transfer bench     Activity Tolerance:  Good-/Fair+    Estimated LOS: estimated dc date 7/13/2021    Please refer to the flowsheet for vital signs taken during this treatment.   After treatment:   [x]  Patient left in no apparent distress sitting up in Wheelchair   []  Patient left in no apparent distress in bed  [x]  Call bell left within reach  [x]  Nursing notified  []  Caregiver present  [x]  Wheelchair alarm activated    COMMUNICATION/EDUCATION:   [x] Home safety education was provided and the patient/caregiver indicated understanding. [x] Patient/family have participated as able in goal setting and plan of care. [x] Patient/family agree to work toward stated goals and plan of care. [] Patient understands intent and goals of therapy, but is neutral about his/her participation. [] Patient is unable to participate in goal setting and plan of care.       [address] [facility],

## 2021-07-09 NOTE — ROUTINE PROCESS
SHIFT CHANGE NOTE FOR [facility]    Bedside and Verbal shift change report given to Jane Yu (oncoming nurse) by Alice Barr LPN (offgoing nurse). Report included the following information SBAR, Kardex, MAR and Recent Results. Situation:   Code Status: Full Code   Hospital Day: 9   Problem List:   Hospital Problems  Date Reviewed: 7/8/2021        Codes Class Noted POA    Loose stools ICD-10-CM: R19.5  ICD-9-CM: 787.7  7/1/2021 Yes        Mesenteric abscess ([address] ICD-10-CM: K65.1  ICD-9-CM: 567.22  6/21/2021 Yes        * (Principal) Diverticulitis of large intestine with abscess ICD-10-CM: K57.20  ICD-9-CM: 562.11, 569.5  6/21/2021 Yes        History of sinus bradycardia ICD-10-CM: Z86.79  ICD-9-CM: V12.59  6/21/2021 Yes    Overview Signed 6/29/2021 10:02 PM by Allen Coughlin MD     Attributed to Carvedilol 25 mg PO BID with meals             Generalized weakness ICD-10-CM: R53.1  ICD-9-CM: 780.79  6/21/2021 Yes        Mixed hyperlipidemia (Chronic) ICD-10-CM: L68.5  ICD-9-CM: 272.2  Unknown Yes        Heart failure with preserved left ventricular function (HFpEF) (HCC) (Chronic) ICD-10-CM: I50.30  ICD-9-CM: 428.9  Unknown Yes    Overview Signed 6/7/2021 11:03 PM by Allen Coughlin MD     2D echocardiogram (5/18/2021) showed EF 50%; concentric LV hypertrophy with a severely thickened septal wall and mildly thickened posterior wall; left atrial chamber is severely enlarged; right atrial chamber volume is moderately enlarged; no mass, shunts, or thrombi.               Coronary artery disease involving native coronary artery of native heart (Chronic) ICD-10-CM: I25.10  ICD-9-CM: 414.01  Unknown Yes        Diverticulosis of sigmoid colon (Chronic) ICD-10-CM: K57.30  ICD-9-CM: 562.10  5/22/2021 Yes        Anticoagulated by anticoagulation treatment ICD-10-CM: Z79.01  ICD-9-CM: V58.61  5/19/2021 Yes    Overview Signed 6/7/2021 10:48 PM by Allen Coughlin MD     On Apixaban             On clopidogrel therapy ICD-10-CM: Z79.01  ICD-9-CM: V58.61  5/19/2021 Yes        Status post insertion of drug eluting coronary artery stent ICD-10-CM: Z95.5  ICD-9-CM: V45.82  5/18/2021 Yes    Overview Signed 6/18/2021  1:38 PM by Nasima Arzate MD     S/P PCI to proximal SVG with 3.5 x 12 mm [place] drug-eluting stent; PCI to mid SVG with 3.5 x 34 mm [place] drug-eluting stent; PCI to distal SVG with 3.5 x 15 mm [place] drug-eluting stent;  Balloon angioplasty to distal SVG anastomosis (5/18/2021 - Dr. Gila Quiñones)              Non-ST elevation (NSTEMI) myocardial infarction [facility]) ICD-10-CM: I21.4  ICD-9-CM: 410.70  5/17/2021 Yes        Paroxysmal atrial fibrillation ([facility] [address] ICD-10-CM: I48.0  ICD-9-CM: 427.31  5/17/2021 Yes        Impaired mobility and ADLs ICD-10-CM: Z74.09, Z78.9  ICD-9-CM: V49.89  5/17/2021 Yes        Neuropathy involving both lower extremities (Chronic) ICD-10-CM: W06.24  ICD-9-CM: 356.9  10/22/2020 Yes        Essential hypertension (Chronic) ICD-10-CM: I10  ICD-9-CM: 401.9  Unknown Yes        History of coronary artery bypass graft ICD-10-CM: Z95.1  ICD-9-CM: V45.81  2001 Yes    Overview Signed 6/7/2021 11:04 PM by Nasima Arzate MD     [facility]                   Background:   Past Medical History:   Past Medical History:   Diagnosis Date    Acute embolic stroke ([facility] [address] 5/48/3282    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, brainstem and cerebellum) without residual deficit    Anticoagulated by anticoagulation treatment 5/19/2021    On Apixaban    Benign prostatic hyperplasia with urinary retention     Chronic obstructive pulmonary disease (COPD) ([facility] [address]     Coronary artery disease involving native coronary artery of native heart     Diverticulitis of large intestine with abscess 6/21/2021    Diverticulosis of sigmoid colon 5/22/2021    Essential hypertension     Heart failure with preserved left ventricular function (HFpEF) ([facility] [address]     2D echocardiogram (5/18/2021) showed EF 50%; concentric LV hypertrophy with a severely thickened septal wall and mildly thickened posterior wall; left atrial chamber is severely enlarged; right atrial chamber volume is moderately enlarged; no mass, shunts, or thrombi.     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[address] 6/21/2021    Neuropathy involving both lower extremities 10/22/2020    Non-ST elevation (NSTEMI) myocardial infarction ([address] 5/17/2021    On clopidogrel therapy 5/19/2021    Paroxysmal atrial fibrillation ([facility] [address] 5/17/2021    Peripheral vascular disease of extremity with claudication ([facility] [address] 6/14/2021    Urinary retention         Assessment:   Changes in Assessment throughout shift: No change to previous assessment    Patient has a central line: no   Patient has Taveras Cath: no      Last Vitals:     Vitals:    07/07/21 2032 07/08/21 0620 07/08/21 0747 07/08/21 1547   BP: 138/82 (!) 140/81 (!) 155/87 111/65   Pulse: 80 76 89 86   Resp: 18  18 16   Temp: 98.1 °F (36.7 °C)  97.4 °F (36.3 °C) 97.9 °F (36.6 °C)   SpO2: 95%  95% 96%   Weight:       Height:            PAIN    Pain Assessment    Pain Intensity 1: 3 (07/08/21 2040) Pain Intensity 1: 2 (12/29/14 1105)    Pain Location 1: Foot Pain Location 1: Abdomen    Pain Intervention(s) 1: Medication (see MAR) Pain Intervention(s) 1: Medication (see MAR)  Patient Stated Pain Goal: 0 Patient Stated Pain Goal: 0  o Intervention effective: yes  o Other actions taken for pain: Medication (see MAR)     Skin Assessment  Skin color Skin Color: Appropriate for ethnicity  Condition/Temperature Skin Condition/Temp: Dry, Warm  Integrity Skin Integrity: Intact  Turgor Turgor: Non-tenting  Weekly Pressure Ulcer Documentation  Pressure  Injury Documentation: No Pressure Injury Noted-Pressure Ulcer Prevention Initiated  Wound Prevention & Protection Methods  Orientation of wound Orientation of Wound Prevention: Posterior  Location of Prevention Location of Wound Prevention: Sacrum/Coccyx  Dressing Present Dressing Present : No  Dressing Status Dressing Status: Intact  Wound Offloading Wound Offloading (Prevention Methods): Bed, pressure redistribution/air, Bed, pressure reduction mattress, Repositioning     INTAKE/OUPUT  Date 07/07/21 1900 - 07/08/21 0659 07/08/21 0700 - 07/09/21 0659   Shift 6334-6391 24 Hour Total 5268-0032 0504-5375 24 Hour Total   INTAKE   Shift Total(mL/kg)        OUTPUT   Urine(mL/kg/hr)          Urine Occurrence(s) 5 x 8 x 6 x  6 x   Stool          Stool Occurrence(s) 5 x 8 x 1 x  1 x   Shift Total(mL/kg)        NET        Weight (kg) 80.8 80.8 80.8 80.8 80.8       Recommendations:  1. Patient needs and requests: pain management, elevate BLE for edema reduction    2. Pending tests/procedures: Labs ; CT ABD PELV W WO CONT on 7/15/21     3. Functional Level/Equipment:   /  wheelchair    Fall Precautions:   Fall risk precautions were reinforced with the patient; he was instructed to call for help prior to getting up. The following fall risk precautions were continued: bed/ chair alarms, door signage, yellow bracelet and socks as well as update of the [employer] tool in the patient's room. [place] Score:      HEALS Safety Check    A safety check occurred in the patient's room between off going nurse and oncoming nurse listed above.     The safety check included the below items  Area Items   H  High Alert Medications - Verify all high alert medication drips (heparin, PCA, etc.)   E  Equipment - Suction is set up for ALL patients (with laila)  - Red plugs utilized for all equipment (IV pumps, etc.)  - WOWs wiped down at end of shift.  - Room stocked with oxygen, suction, and other unit-specific supplies   A  Alarms - Bed alarm is set for fall risk patients  - Ensure chair alarm is in place and activated if patient is up in a chair   L  Lines - Check IV for any infiltration  - Taveras bag is empty if patient has a Taveras   - Tubing and IV bags are labeled   S  Safety   - Room is clean, patient is clean, and equipment is clean. - Hallways are clear from equipment besides carts. - Fall bracelet on for fall risk patients  - Ensure room is clear and free of clutter  - Suction is set up for ALL patients (with yanker)  - Hallways are clear from equipment besides carts.    - Isolation precautions followed, supplies available outside room, sign posted     Dianna Barahona LPN

## 2021-07-09 NOTE — ROUTINE PROCESS
0800 Pt. Awake sitting up in  Bed no change in assessment  Pt. Reported to be feeling fine. 0930 Pt. Sitting up in chair eating breakfast.  1200 pt. With therapy. 1330 able to transfer from bed to chair with assist.  1500 no change in assessment. 1800 Pt. Sitting up in chair eating dinner.

## 2021-07-09 NOTE — ROUTINE PROCESS
SHIFT CHANGE NOTE FOR [facility]    Bedside and Verbal shift change report given to Josselin Love (oncoming nurse) by Rani Reese, RN (offgoing nurse). Report included the following information SBAR, Kardex, MAR and Recent Results. Situation:   Code Status: Full Code   Hospital Day: 10   Problem List:   Hospital Problems  Date Reviewed: 7/8/2021        Codes Class Noted POA    Loose stools ICD-10-CM: R19.5  ICD-9-CM: 787.7  7/1/2021 Yes        Mesenteric abscess ([address] ICD-10-CM: K65.1  ICD-9-CM: 567.22  6/21/2021 Yes        * (Principal) Diverticulitis of large intestine with abscess ICD-10-CM: K57.20  ICD-9-CM: 562.11, 569.5  6/21/2021 Yes        History of sinus bradycardia ICD-10-CM: Z86.79  ICD-9-CM: V12.59  6/21/2021 Yes    Overview Signed 6/29/2021 10:02 PM by Timmy Tapia MD     Attributed to Carvedilol 25 mg PO BID with meals             Generalized weakness ICD-10-CM: R53.1  ICD-9-CM: 780.79  6/21/2021 Yes        Mixed hyperlipidemia (Chronic) ICD-10-CM: G76.1  ICD-9-CM: 272.2  Unknown Yes        Heart failure with preserved left ventricular function (HFpEF) (HCC) (Chronic) ICD-10-CM: I50.30  ICD-9-CM: 428.9  Unknown Yes    Overview Signed 6/7/2021 11:03 PM by Timmy Tapia MD     2D echocardiogram (5/18/2021) showed EF 50%; concentric LV hypertrophy with a severely thickened septal wall and mildly thickened posterior wall; left atrial chamber is severely enlarged; right atrial chamber volume is moderately enlarged; no mass, shunts, or thrombi.               Coronary artery disease involving native coronary artery of native heart (Chronic) ICD-10-CM: I25.10  ICD-9-CM: 414.01  Unknown Yes        Diverticulosis of sigmoid colon (Chronic) ICD-10-CM: K57.30  ICD-9-CM: 562.10  5/22/2021 Yes        Anticoagulated by anticoagulation treatment ICD-10-CM: Z79.01  ICD-9-CM: V58.61  5/19/2021 Yes    Overview Signed 6/7/2021 10:48 PM by Timmy Tapia MD     On Apixaban             On clopidogrel therapy ICD-10-CM: Z79.01  ICD-9-CM: V58.61  5/19/2021 Yes        Status post insertion of drug eluting coronary artery stent ICD-10-CM: Z95.5  ICD-9-CM: V45.82  5/18/2021 Yes    Overview Signed 6/18/2021  1:38 PM by Opal Keith MD     S/P PCI to proximal SVG with 3.5 x 12 mm [place] drug-eluting stent; PCI to mid SVG with 3.5 x 34 mm [place] drug-eluting stent; PCI to distal SVG with 3.5 x 15 mm [place] drug-eluting stent;  Balloon angioplasty to distal SVG anastomosis (5/18/2021 - Dr. Babita Morales)              Non-ST elevation (NSTEMI) myocardial infarction [facility]) ICD-10-CM: I21.4  ICD-9-CM: 410.70  5/17/2021 Yes        Paroxysmal atrial fibrillation ([facility] [address] ICD-10-CM: I48.0  ICD-9-CM: 427.31  5/17/2021 Yes        Impaired mobility and ADLs ICD-10-CM: Z74.09, Z78.9  ICD-9-CM: V49.89  5/17/2021 Yes        Neuropathy involving both lower extremities (Chronic) ICD-10-CM: Q28.50  ICD-9-CM: 356.9  10/22/2020 Yes        Essential hypertension (Chronic) ICD-10-CM: I10  ICD-9-CM: 401.9  Unknown Yes        History of coronary artery bypass graft ICD-10-CM: Z95.1  ICD-9-CM: V45.81  2001 Yes    Overview Signed 6/7/2021 11:04 PM by Opal Keith MD     [facility]                   Background:   Past Medical History:   Past Medical History:   Diagnosis Date    Acute embolic stroke ([facility] [address] 8/64/0018    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, brainstem and cerebellum) without residual deficit    Anticoagulated by anticoagulation treatment 5/19/2021    On Apixaban    Benign prostatic hyperplasia with urinary retention     Chronic obstructive pulmonary disease (COPD) ([facility] [address]     Coronary artery disease involving native coronary artery of native heart     Diverticulitis of large intestine with abscess 6/21/2021    Diverticulosis of sigmoid colon 5/22/2021    Essential hypertension     Heart failure with preserved left ventricular function (HFpEF) ([facility] [address]     2D echocardiogram (5/18/2021) showed EF 50%; concentric LV hypertrophy with a severely thickened septal wall and mildly thickened posterior wall; left atrial chamber is severely enlarged; right atrial chamber volume is moderately enlarged; no mass, shunts, or thrombi.     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[address] 6/21/2021    Neuropathy involving both lower extremities 10/22/2020    Non-ST elevation (NSTEMI) myocardial infarction ([address] 5/17/2021    On clopidogrel therapy 5/19/2021    Paroxysmal atrial fibrillation ([facility] [address] 5/17/2021    Peripheral vascular disease of extremity with claudication ([facility] [address] 6/14/2021    Urinary retention         Assessment:   Changes in Assessment throughout shift:       Patient has a central line: no Reasons if yes:      Patient has Taver[place] Cath: no Reasons if yes:    I     Last Vitals:     Vitals:    07/08/21 1547 07/08/21 2143 07/09/21 0610 07/09/21 0716   BP: 111/65 132/71 130/83 130/86   Pulse: 86 80 81 98   Resp: 16 22  20   Temp: 97.9 °F (36.6 °C) 97.7 °F (36.5 °C)  97.2 °F (36.2 °C)   SpO2: 96% 95%     Weight:       Height:            PAIN    Pain Assessment    Pain Intensity 1: 5 (07/09/21 0722) Pain Intensity 1: 2 (12/29/14 1105)    Pain Location 1: Abdomen Pain Location 1: Abdomen    Pain Intervention(s) 1: Medication (see MAR) Pain Intervention(s) 1: Medication (see MAR)  Patient Stated Pain Goal: 0 Patient Stated Pain Goal: 0  o Intervention effective: yes  o Other actions taken for pain: Medication (see MAR)     Skin Assessment  Skin color Skin Color: Appropriate for ethnicity  Condition/Temperature Skin Condition/Temp: Dry, Warm  Integrity Skin Integrity: Intact  Turgor Turgor: Non-tenting  Weekly Pressure Ulcer Documentation  Pressure  Injury Documentation: No Pressure Injury Noted-Pressure Ulcer Prevention Initiated  Wound Prevention & Protection Methods  Orientation of wound Orientation of Wound Prevention: Posterior  Location of Prevention Location of Wound Prevention: Sacrum/Coccyx  Dressing Present Dressing Present : No  Dressing Status Dressing Status: Intact  Wound Offloading Wound Offloading (Prevention Methods): Bed, pressure redistribution/air, Bed, pressure reduction mattress, Repositioning     INTAKE/OUPUT  Date 07/08/21 0700 - 07/09/21 0659 07/09/21 0700 - 07/10/21 0659   Shift 0700-1859 1900-0659 24 Hour Total 0700-1859 1900-0659 24 Hour Total   INTAKE   Shift Total(mL/kg)         OUTPUT   Urine(mL/kg/hr)           Urine Occurrence(s) 6 x 4 x 10 x 0 x  0 x   Emesis/NG output           Emesis Occurrence(s) 0 x 0 x 0 x 0 x  0 x   Stool           Stool Occurrence(s) 1 x 0 x 1 x 0 x  0 x   Shift Total(mL/kg)         NET         Weight (kg) 80.8 80.8 80.8 80.8 80.8 80.8       Recommendations:  1. Patient needs and requests: none    2. Pending tests/procedures:  labs    3. Functional Level/Equipment: Partial (one person) / Bed (comment)    Fall Precautions:   Fall risk precautions were reinforced with the patient; he was instructed to call for help prior to getting up. The following fall risk precautions were continued: bed/ chair alarms, door signage, yellow bracelet and socks as well as update of the Carissa [place] tool in the patient's room. [place] Score: 3    HEALS Safety Check    A safety check occurred in the patient's room between off going nurse and oncoming nurse listed above.     The safety check included the below items  Area Items   H  High Alert Medications - Verify all high alert medication drips (heparin, PCA, etc.)   E  Equipment - Suction is set up for ALL patients (with yanker)  - Red plugs utilized for all equipment (IV pumps, etc.)  - WOWs wiped down at end of shift.  - Room stocked with oxygen, suction, and other unit-specific supplies   A  Alarms - Bed alarm is set for fall risk patients  - Ensure chair alarm is in place and activated if patient is up in a chair   L  Lines - Check IV for any infiltration  - Taveras bag is empty if patient has a Taveras   - Tubing and IV bags are labeled   S  Safety   - Room is clean, patient is clean, and equipment is clean. - Hallways are clear from equipment besides carts. - Fall bracelet on for fall risk patients  - Ensure room is clear and free of clutter  - Suction is set up for ALL patients (with laila)  - Hallways are clear from equipment besides carts.    - Isolation precautions followed, supplies available outside room, sign posted     Nia Peter RN

## 2021-07-09 NOTE — PROGRESS NOTES
Problem: Mobility Impaired (Adult and Pediatric)  Goal: *Therapy Goal (Edit Goal, Insert Text)  Description: Physical Therapy Short Term Goals  Initiated 6/30/2021 and to be accomplished within 7 day(s) on 7/7/2021:  1. Patient will move from supine to sit and sit to supine , scoot up and down, and roll side to side in bed with supervision/set-up. 2.  Patient will transfer from bed to chair and chair to bed with supervision/set-up using the least restrictive device. 3.  Patient will perform sit to stand with supervision/set-up. 4.  Patient will ambulate with supervision/set-up for 150 feet with the least restrictive device. 5.  Patient will ascend/descend 14 stairs with 1-2 handrail(s) with minimal assistance/contact guard assist.    Physical Therapy Long Term Goals  Initiated 6/30/2021 and to be accomplished within 10-14 day(s) 7/14/2021  1. Patient will move from supine to sit and sit to supine , scoot up and down, and roll side to side in bed with modified independence. 2.  Patient will transfer from bed to chair and chair to bed with modified independence using the least restrictive device. 3.  Patient will perform sit to stand with modified independence. 4.  Patient will ambulate with modified independence for at least 150 feet with the least restrictive device. 5.  Patient will ascend/descend 14 stairs with 2 handrail(s) with supervision/set-up. 6.  Patient will ascend/descend 1 curb step with appropriate AD with supervision/setup to safely get into and out of house. Outcome: Progressing Towards Goal   PHYSICAL THERAPY TREATMENT    Patient: Eloy Workman (65 y.o. male)  Date: 7/9/2021  Diagnosis: Diverticulitis of large intestine with abscess [K57.20] Diverticulitis of large intestine with abscess  Precautions: Fall  Chart, physical therapy assessment, plan of care and goals were reviewed.   Time In: 0802  Time Out: 0902  Time In:1400  Time Out:1430    Patient seen for: Balance activities;Gait training;Family training;Patient education; Therapeutic exercise;Transfer training; Wheelchair mobility    Pain:  Patient denied pain during session. Patient identified with name and : yes    SUBJECTIVE:      Patient agreeable to treatment. Continues to need intermittent reminders for safety. Patient's spouse present for family education/training during PM session. Educated spouse regarding rollator (spouse asking if patient would be appropriate to use rollator for outside use in case he needed to sit down) that currently patient too heavily reliant on bilat UE support of RW and is not yet safe to use rollator; however, can reassess with [facility] or outpatient PT as patient's LE strength, endurance and standing balance improves. OBJECTIVE DATA SUMMARY:    Objective:     TRANSFERS Daily Assessment     Transfer Type: Other  Other: stand step with RW  Transfer Assistance : 5 (Supervision/setup) (cues intermittently for staying in center of RW)  Sit to Stand Assistance: Supervision (cues for safe hand placement)     Patient performing transfers with RW, supervision level with cues for more upright posture and safe hand placement at times. Performing repeated sit to stand reps 3 x 8 reps from 23\" height mat table to improve functional LE strength, bilat UE on distal thighs. GAIT Daily Assessment    Gait Description (WDL) Exceptions to WDL    Gait Abnormalities Decreased step clearance (forward flexed trunk)    Assistive device Walker, rolling    Ambulation assistance - level surface 5 (Supervision/setup)    Distance 250 Feet (ft)    Ambulation assistance- uneven surface  (NT)    Comments Gait training with emphasis on maintaining more upright trunk posture and stepping into middle of RW appropriately. Patient reporting difficulty with sensing foot positioning, feeling the bottoms of his feet so educated regarding how these sensation changes affect gait and balance.      Spouse cued regarding safe guarding technique while patient performing gait. STEPS/STAIRS Daily Assessment     Steps/Stairs ambulated 24 (up/down 12 stairs, then up 12 stairs needing rest at top)    Assistance Required 4 (Contact guard assistance)    Rail Use Both    Comments  Patient's spouse educated regarding safe guarding technique when assisting patient in stairwell. Patient observed to rely on pulling with bilat UE on railings for ascending stairs, better ability to stabilize himself with bilat LE when descending stairs. Curbs/Ramps Contact guard assistance (using RW, cues for safe sequencing, foot placement)        BALANCE Daily Assessment     Sitting - Static: Good (unsupported)  Sitting - Dynamic: Good (unsupported)  Standing - Static: Good;Occasional;Fair  Standing - Dynamic : Impaired        WHEELCHAIR MOBILITY Daily Assessment     Able to Propel (ft): 150 feet  Functional Level: 6  Curbs/Ramps Assist Required (FIM Score): 0 (Not tested)  Wheelchair Setup Assist Required : 6 (Modified independent)  Wheelchair Management: Manages left brake;Manages right brake     Patient performing w/c mobility using bilat UE and LE to propel. THERAPEUTIC EXERCISES Daily Assessment       Patient performing seated therex including LAQ 3# cuff weights 3 x 15 reps. Standing alternating toe touches 3 x 15 reps with bilat UE support of RW for improved hip flex strength. Neuro Re-Education:  Patient performed modified staggered stance using 6 inch height step, alternating each LE up on step for 3 x 20-30 sec durations. Patient needed CG/min A for safety with task, able to perform without UE support. Emphasis on improving lateral stability in standing at hips/pelvis to improve static/dynamic standing balance. ASSESSMENT:  Patient would continue to benefit from LE strengthening, balance training to improve ability to perform safe functional mobility.  Spouse able to return demonstration of safe guarding technique for gait, stairs and curb step negotiation with appropriate cues provided for more upright trunk/gaze, appropriate positioning of RW during curb step negotiation. Progression toward goals:  [x]      Improving appropriately and progressing toward goals  []      Improving slowly and progressing toward goals  []      Not making progress toward goals and plan of care will be adjusted      PLAN:  Patient continues to benefit from skilled intervention to address the above impairments. Continue treatment per established plan of care. Discharge Recommendations:  Home Health  Further Equipment Recommendations for Discharge:  rolling walker      Estimated Discharge Date: 7/13/2021    Activity Tolerance:   Fair due to fatigue.      After treatment:   [] Patient left in no apparent distress in bed  [x] Patient left in no apparent distress sitting up in chair  [] Patient left in no apparent distress in w/c mobilizing under own power  [] Patient left in no apparent distress dining area  [] Patient left in no apparent distress mobilizing under own power  [x] Call bell left within reach  [x] Nursing notified  [] Caregiver present  [] Bed alarm activated   [x] Chair alarm activated      Mary Jo Chaparro, PT  7/9/2021

## 2021-07-09 NOTE — PROGRESS NOTES
Problem: Neurolinguistics Impaired (Adult)  Goal: *Speech Goal: (INSERT TEXT)  Description: Long term goals  Patient will:  1. Be oriented x 3 and recall events of the day, supervision. 2. Recall 3 words after 5 minutes, supervision. 3. Read I-2 sentence stimuli and respond appropriately, 90% accuracy. 4. Read short paragraphs and respond to content questions, 90% accuracy. 5. Write simple sentences with 90% accuracy (to dictation and then spontaneously to describe actions/objects). Short term goals (7/14/21):  Patient will:  1. Be oriented x 3 and recall events of the day, supervision. 2. Recall 3 words after 5 minutes, supervision. 3. Read I sentence stimuli and respond appropriately, 90% accuracy. 4. Read short paragraphs and respond to content questions, 90% accuracy. 5. Write simple sentences with 90% accuracy (to dictation). Note:   Speech language pathology treatment    Patient: Errol Baires (7 y.o. male)  Date: 7/9/2021  Diagnosis: Diverticulitis of large intestine with abscess [K57.20] Diverticulitis of large intestine with abscess    Time in: 1030,  1430  Time Out:  1100,  1500    Pain:  Pre-tx: No pain reported  Post tx: No pain reported    SUBJECTIVE:   Patient stated I'm a little tired today. OBJECTIVE:   Mental Status:  Mr. Mag Macdonald was alert and oriented. Treatment & Interventions: The patient was seen for a group education session and for a 30 minute session.  The following treatment tasks were presented:  Neuro-Linguistics:   Orientation:                                          Minimum assistance  Recent memory:                                  Supervision  Recall 3 words:                                    Maximum assistance  Write sentences from dictation:           95%, 1 self correction noted   Write sentences spontaneously:         80% independently, 100% given moderate assistance  Read single sentences:                      100%  Read paragraphs: 80% independently, 100% given moderate assistance  Answer questions from paragraphs:   60% independently, 100% given moderate assistance     Response & Tolerance to Activities:  Mr. Kendra Kim wife was present for family education. The SLP intern explained Mr. Kendra Kim goals and progress thus far and answered her questions. Mr. Salvadro Don was pleasant and cooperative. He tolerated all activities well. Patient also participated in a 30 minute stroke education group this morning. The acronym BE FAST was presented for education regarding the signs and symptoms of stroke. SLP elaborated upon these and patients reported some of the symptoms they had experienced. Common risk factors (hypertension, high cholesterol, diabetes, etc) were discussed. Finally life style modifications were encouraged regarding risk factors. The SLP responded to questions posed. Pain:  Pain Scale 1: Numeric (0 - 10)     Pain Description 1: Aching  After treatment:   [x]       Patient left in no apparent distress sitting up in chair  []       Patient left in no apparent distress in bed  [x]       Call bell left within reach  []       Nursing notified  [x]       Caregiver present  []       Bed alarm activated    ASSESSMENT:   Progression toward goals:  []       Improving appropriately and progressing toward goals  [x]       Improving slowly and progressing toward goals  []       Not making progress toward goals and plan of care will be adjusted    PLAN:   Patient continues to benefit from skilled intervention to address the above impairments. Continue treatment per established plan of care.   Discharge Recommendations:  Home Health    Estimated LOS: Through 7/13/21    Julieta Cottrell Speech-Language Pathology Student  Time calculation: 60 mins

## 2021-07-10 NOTE — PROGRESS NOTES
[facility] PHYSICAL REHABILITATION  [address] [place] [address], [address]     INPATIENT REHABILITATION  DAILY PROGRESS NOTE     Date: 7/10/2021    Name: Shruthi Leonard Age / Sex: 80 y.o. 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 Blood pressure controlled. Carvedilol, Furosemide and Lisinopril were not given this AM due to BP precautions. No documented fever since admission.        Objective:     Vital Signs:  Patient Vitals for the past 24 hrs:   BP Temp Pulse Resp SpO2   07/10/21 0824 136/78 (!) 96.6 °F (35.9 °C) 84 18 99 %   07/10/21 0500 99/68       07/09/21 1957 105/66 98 °F (36.7 °C) 79 18 99 %   07/09/21 1541 102/64 97.6 °F (36.4 °C) 74 18 95 %        Current Medications:  Current Facility-Administered Medications   Medication Dose Route Frequency    loperamide (IMODIUM) capsule 4 mg  4 mg Oral BID    loperamide (IMODIUM) capsule 2 mg  2 mg Oral BID    furosemide (LASIX) tablet 40 mg  40 mg Oral ACB    nitroglycerin (NITROBID) 2 % ointment 2 Inch  2 Inch Topical BID    pneumococcal 23-valent (PNEUMOVAX 23) injection 0.5 mL  0.5 mL IntraMUSCular PRIOR TO DISCHARGE    acetaminophen (TYLENOL) tablet 650 mg  650 mg Oral Q4H PRN    bisacodyL (DULCOLAX) tablet 10 mg  10 mg Oral Q48H PRN    amoxicillin-clavulanate (AUGMENTIN) 500-125 mg per tablet 1 Tablet  1 Tablet Oral BID WITH MEALS    cefpodoxime (VANTIN) tablet 200 mg  200 mg Oral Q12H    carvediloL (COREG) tablet 3.125 mg  3.125 mg Oral BID WITH MEALS    clopidogreL (PLAVIX) tablet 75 mg  75 mg Oral DAILY WITH BREAKFAST    rosuvastatin (CRESTOR) tablet 10 mg  10 mg Oral DAILY    nitroglycerin (NITROSTAT) tablet 0.4 mg  0.4 mg SubLINGual PRN    arformoteroL (BROVANA) neb solution 15 mcg  15 mcg Nebulization BID RT    ipratropium (ATROVENT) 0.02 % nebulizer solution 0.5 mg  0.5 mg Nebulization TID    albuterol (PROVENTIL VENTOLIN) nebulizer solution 2.5 mg  2.5 mg Nebulization Q4H PRN    apixaban (ELIQUIS) tablet 5 mg  5 mg Oral BID    multivitamin, tx-iron-ca-min (THERA-M w/ IRON) tablet 1 Tablet  1 Tablet Oral DAILY    lisinopriL (PRINIVIL, ZESTRIL) tablet 5 mg  5 mg Oral DAILY    tamsulosin (FLOMAX) capsule 0.4 mg  0.4 mg Oral DAILY WITH DINNER    gabapentin (NEURONTIN) capsule 300 mg  300 mg Oral TID       Allergies: Allergies   Allergen Reactions    Lipitor [Atorvastatin] Rash    Norvasc [Amlodipine] Rash       Lab/Data Review:  No results found for this or any previous visit (from the past 24 hour(s)). Assessment:     Primary Rehabilitation Diagnosis  1. Generalized weakness with Impaired mobility and ADLs  2.  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    Comorbidities  Patient Active Problem List   Diagnosis Code    Urinary retention R33.9    Essential hypertension I10    Non-ST elevation (NSTEMI) myocardial infarction ([facility] [address] I21.4    Paroxysmal atrial fibrillation ([facility]) I48.0    Anticoagulated by anticoagulation treatment Z79.01    Mixed hyperlipidemia E78.2    Benign prostatic hyperplasia with urinary retention N40.1, R33.8    Malignant neoplasm of lower lobe of right lung ([facility]) C34.31    Heart failure with preserved left ventricular function (HFpEF) ([facility]) I50.30    Coronary artery disease involving native coronary artery of native heart I25.10    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    Chronic obstructive pulmonary disease (COPD) ([facility]) J44.9    History of carotid stenosis Z86.79    History of renal artery stenosis Z86.79    On clopidogrel therapy Z79.01    History of gross hematuria B76.741    Acute embolic stroke ([facility]) U05.9    Leukocytosis D72.829    Peripheral vascular disease of extremity with claudication (HCC) I73.9    Neuropathy involving both lower extremities G57.93    Impaired mobility and ADLs Z74.09, Z78.9    Status post insertion of drug eluting coronary artery stent Z95.5    Mesenteric abscess ([facility] [address] K65.1    Diverticulitis of large intestine with abscess K57.20    Diverticulosis of sigmoid colon K57.30    History of sinus bradycardia Z86.79    Generalized weakness R53.1    Loose stools R19.5    Edema of both lower extremities R60.0       Plan:     1. Justification for continued stay: Good progression towards established rehabilitation goals. 2. Medical Issues being followed closely:    [x]  Fall and safety precautions     []  Wound Care     [x]  Bowel and Bladder Function     [x]  Fluid Electrolyte and Nutrition Balance     [x]  Pain Control      3. Issues that 24 hour rehabilitation nursing is following:    [x]  Fall and safety precautions     []  Wound Care     [x]  Bowel and Bladder Function     [x]  Fluid Electrolyte and Nutrition Balance     [x]  Pain Control      [x]  Assistance with and education on in-room safety with transfers to and from the bed, wheelchair, toilet and shower. 4. Acute rehabilitation plan of care:    [x]  Continue current care and rehab. [x]  Physical Therapy           [x]  Occupational Therapy           [x]  Speech Therapy     []  Hold Rehab until further notice     5. Medications:    [x]  MAR Reviewed     [x]  Continue Present Medications     6. DVT Prophylaxis:      []  Enoxaparin     []  Unfractionated Heparin     []  Warfarin     [x]  NOAC     []  MARTHA Stockings     []  Sequential Compression Device     []  None     7. Code status    [x]  Full code     []  Partial code     []  Do not intubate     []  Do not resuscitate     8.  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 Mild infiltrate in the superior segment of the right lower lobe with small right pleural effusion. No consolidation. 2. Delayed renal clearance, chronic renal disease suspected. Additionally, areas of cortical cysts with poor perfusion.  Pyelonephritis not excluded. No hydronephrosis. Correlation with urinalysis? 3. Diverticulosis, with potential diverticulitis in the left lower quadrant, junction of descending and sigmoid colon. However, evaluation is very limited due to severe motion artifacts. 4. Focal mucosal thickening in the proximal sigmoid. Further evaluation with barium enema or colonoscopy as indicated. > WBC count (5/23/2021) = 30.2                          > . .  .                           > WBC count (6/2/2021) = 25.4                          > WBC count (6/3/2021) = 22.5                          > WBC count (6/4/2021) = 23.4                          > WBC count (6/5/2021) = 21.7                          > WBC count (6/6/2021) = 24.1                          > WBC count (6/7/2021) = 20.0                          > Work up done was negative for a source of infection              > WBC count (6/8/2021, on admission to the ARU) = 14.4   > On 6/8/2021, started Pericolace 2 tabs PO once daily after dinner   > On 6/9/2021, started Polyethylene glycol 17 grams in 8 oz water PO once daily   > On 6/19/2021:    > Patient was noted to have diarrhea    > Held:     > Polyethylene glycol 17 grams in 8 oz water PO once daily     > Pericolace 2 tabs PO once daily after dinner    > Started Bio K Plus 1 cap PO once daily   > Stool culture (collected 6/19/2021, resulted 6/20/2021): Negative   > WBC count (6/20/2021) = 26.7   > Blood culture x 2 sets (collected 6/20/2021, resulted on 6/26/2021) yielded no growth after 6 days   > On 6/20/2021, started:    > Cholestyramine 4 grams PO TID with meals    > Ceftriaxone 1000 mg IV q 24 hr    > Metronidazole 500 mg IV q 8 hr    > Vancomycin 1750 mg  IV x 1 dose   > WBC count (6/21/2021) = 26.3   > Infectious Disease consult (Dr. Jody Prescott) was called on 6/21/2021 for evaluation and comanagement   > On 6/21/2021:    > Discontinue:     > Polyethylene glycol 17 grams in 8 oz water PO once daily     > Pericolace 2 tabs PO once daily after dinner    > Change Vancomycin IV to Vancomycin 500 mg PO q 6 hr   > CT scan of the chest, abdomen and pelvis with contrast (6/21/2021) showed:    1. No acute findings in the chest.     -Emphysema. -CABG. -Stable mildly enlarged right thyroid gland which could indicate underlying nodule, stable for many years. 2. 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 It is unclear if initial source of abscess is from small bowel inflammation or diverticulitis/pericolonic abscess. 3. Dilated small bowel. This is likely a combination of generalized small bowel ileus and low-grade partial small bowel obstruction at site of abnormal pelvic small bowel loop discussed above. 4. Diverticulosis. Stable thickened collapsed segment of sigmoid colon since 2014 suggesting a chronic stricture. This is limited for assessment by CT. 5. Bladder dome inflammation is likely secondary to the adjacent mesentery process. 6. Other chronic incidental findings.    > On 6/21/2021:    > Discontinued:     > Cholestyramine 4 grams PO TID with meals     > Ceftriaxone 1000 mg IV q 24 hr     > Metronidazole 500 mg IV q 8 hr     > Vancomycin 500 mg PO q 6 hr    > Started Piperacillin-Tazobactam 3.375 grams IV q 6 hr   > Colorectal Surgery (Dr. Maged Stone) was called on 6/22/2021 for evaluation and comanagement    > No surgical intervention recommended since unable to safely stop Apixaban (for paroxysmal atrial fibrillation) and Clopidogrel (recent placement of 3 JENNIE to SVG-LCx on 5/18/2021)   > On 6/25/2021:    > Discontinued Piperacillin-Tazobactam 3.375 grams IV q 6 hr    > Started:     > Cefpodoxime 200 mg PO q 12 hr     > Metronidazole 500 mg PO q 12 hr   > On 6/28/2021:    > Discontinued Metronidazole 500 mg PO q 12 hr    > Started Amoxicillin-Clavulanate 500-125 1 tab PO q 12 hr      06/30/21  0450 06/29/21  0211 06/28/21  0230 06/27/21  0516 06/25/21  0534 06/24/21  0522 06/23/21  0538 06/22/21  0409 06/21/21  1310 06/21/21  0330 06/20/21  0550   WBC 18.2* 21.0* 24.5* 21.7* 18.4* 18.2* 22.1* 24.7* 26.0* 26.3* 26.7*      > Upon discharge from the ARU, the patient will need to follow up with Colorectal Surgery (Dr. Maged Stone)   > CT scan of the abdomen and pelvis with contrast on 7/15/2021   > Continue:    > Cefpodoxime 200 mg PO q 12 hr (STOP DATE: 7/21/2021)    > Amoxicillin-Clavulanate 500-125 1 tab PO q 12 hr (STOP DATE: 7/21/2021)    > 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, brainstem and cerebellum) without residual deficit   > Continue:    > Clopidogrel 75 mg PO once daily with breakfast    > Rosuvastatin 10 mg PO once daily    > Benign prostatic hyperplasia with urinary retention   > Continue Tamsulosin 0.4 mg PO once daily after dinner    > Chronic heart failure with preserved ejection fraction (HFpEF)   > 2D echocardiogram (5/18/2021) showed EF 50%; concentric LV hypertrophy with a severely thickened septal wall and mildly thickened posterior wall; left atrial chamber is severely enlarged; right atrial chamber volume is moderately enlarged; no mass, shunts, or thrombi.    > Continue:    > Carvedilol 3.125 mg PO BID with meals (8AM, 5PM)    > Lisinopril 5 mg PO once daily (9AM)    > Chronic obstructive pulmonary disease (COPD)   > Continue:    > Arformoterol nebulization BID    > Ipratropium nebulization TID    > Albuterol nebulization q 4 hr PRN for shortness of breath/wheezing    > Essential hypertension   > 2D echocardiogram (5/18/2021) showed EF 50%; concentric LV hypertrophy with a severely thickened septal wall and mildly thickened posterior wall; left atrial chamber is severely enlarged; right atrial chamber volume is moderately enlarged; no mass, shunts, or thrombi.    > Continue:    > Carvedilol 3.125 mg PO BID with meals (8AM, 5PM)    > Lisinopril 5 mg PO once daily (9AM)    > Mixed hyperlipidemia   > Lipid profile (5/18/2021) showed TG 83, , HDL 50, LDL 93   > Continue Rosuvastatin 10 mg PO once daily    > Non-ST elevation (NSTEMI) myocardial infarction; Coronary artery disease; History of CABG; S/P PCI to proximal SVG with 3.5 x 12 mm [place] drug-eluting stent; PCI to mid SVG with 3.5 x 34 mm [place] drug-eluting stent; PCI to distal SVG with 3.5 x 15 mm [place] drug-eluting stent; Balloon angioplasty to distal SVG anastomosis (5/18/2021 - Dr. Spring Briggs)    > Continue:    > Carvedilol 3.125 mg PO BID with meals (8AM, 5PM)    > Clopidogrel 75 mg PO once daily with breakfast    > Lisinopril 5 mg PO once daily (9AM)    > Rosuvastatin 10 mg PO once daily    > Nitroglycerin 0.4 mg SL PRN for chest pain    > Paroxysmal atrial fibrillation, anticoagulated on Apixaban   > Continue:    > Apixaban 5 mg PO BID    > Carvedilol 3.125 mg PO BID with meals (8AM, 5PM)    > Peripheral vascular disease of extremity with claudication;  Neuropathy involving both lower extremities; Edema of both lower extremities   > (+) pain and numbness of toes of both feet   > PVL arterial doppler of both lower extremities with exercise (9/12/2019) showed:    > Moderate arterial insufficiency in the right lower extremity at the level of the trifurcation at rest.     > Severe arterial insufficiency in the left lower extremity at the level of the trifurcation at rest.   > PVL arterial doppler of both lower extremities with exercise (10/22/2020) showed:    > Mild right lower extremity arterial insufficiency of indeterminate level at rest.     > Moderate left lower extremity arterial insufficiency involving the femoral-popliteal segment at rest.     > The ankle brachial indices post exercise were unreliable as described. No evidence of inflow involvement.   > Planned to start patient on Cilostazol for the claudication symptoms but patient already on Apixaban + Clopidogrel; addition of Cilostazol may increase risk for bleeding; will hold off on adding Cilostazol for now   > As per the last Progress Note by Dr. Sangeeta Sanchez (dated 10/22/2020):  \"Pt major symptom is not related to his PAD but sounds more like neuropathy\"   > On 6/14/2021, started a trial of Gabapentin 100 mg PO BID   > Upon discharge from the ARU, the patient will need to follow up with Vascular Surgery (Dr. Sangeeta Sanchez)   > On 6/18/2021, increased Gabapentin from 100 mg PO BID to 100 mg PO TID   > On 6/28/2021, increased Gabapentin from 100 mg to 300 mg PO TID   > On 7/7/2021, started:    > Furosemide 40 mg PO once daily x 5 days    > Nitroglycerin 2% ointment, applied to bilateral popliteal and calf areas BID   > Continue:    > Gabapentin 300 mg PO TID    > Furosemide 40 mg PO once daily x 2 more doses    > Nitroglycerin 2% ointment, applied to bilateral popliteal and calf areas BID    > Transient bradycardia, while on Carvedilol 25 mg PO BID with meals (6/21/2021)   > On 6/21/2021, Carvedilol was held   > On 6/26/2021, Cardiology restarted the patient on Carvedilol 3.125 mg PO BID with meals   > Instructions of Cardiology:     > high dose due to risk for pacemaker and ongoing infeciton    > if heart rate stays consistently > 110 bpm, can increase to 6.25 mg BID   > Continue Carvedilol 3.125 mg PO BID with meals (8AM, 5PM)    > Loose stools, most likely side effect of oral antibiotics   > On 7/2/2021, started Loperamide 2 mg PO TID   > On 7/5/2021, increased Loperamide from 2 mg PO TID to 2 mg PO 4 times daily   > On 7/8/2021, increased Loperamide from 2 mg to 4 mg PO 4 times daily   > On 7/9/2021, changed Loperamide from 4 mg PO 4 times daily to:    > Loperamide 4 mg PO BID (6AM, 4PM)    > Loperamide 2 mg PO BID (11AM, 9PM)   > Continue:    > Loperamide 4 mg PO BID (6AM, 4PM)    > Loperamide 2 mg PO BID (11AM, 9PM)    > Analgesia   > Continue Acetaminophen 650 mg PO q 4 hr PRN for pain     > Diet:   > Specifications: Low fiber/No added salt (3-4 grams)   > Solids (consistency): Regular    > Liquids (consistency):  Thin    > Fluid restriction: None      Signed:    Adan De Paz MD    July 10, 2021

## 2021-07-10 NOTE — PROGRESS NOTES
Problem: Pressure Injury - Risk of  Goal: *Prevention of pressure injury  Description: Document Nicko Scale and appropriate interventions in the flowsheet. Outcome: Progressing Towards Goal  Note: Pressure Injury Interventions:  Sensory Interventions: Assess changes in LOC    Moisture Interventions: Absorbent underpads    Activity Interventions: Chair cushion, Increase time out of bed, Pressure redistribution bed/mattress(bed type)    Mobility Interventions: Chair cushion, HOB 30 degrees or less    Nutrition Interventions: Document food/fluid/supplement intake    Friction and Shear Interventions: HOB 30 degrees or less                Problem: Patient Education: Go to Patient Education Activity  Goal: Patient/Family Education  Outcome: Progressing Towards Goal     Problem: Falls - Risk of  Goal: *Absence of Falls  Description: Document [place] Fall Risk and appropriate interventions in the flowsheet.   Outcome: Progressing Towards Goal  Note: Fall Risk Interventions:  Mobility Interventions: Bed/chair exit alarm, Patient to call before getting OOB    Mentation Interventions: Bed/chair exit alarm, Evaluate medications/consider consulting pharmacy    Medication Interventions: Bed/chair exit alarm, Evaluate medications/consider consulting pharmacy, Patient to call before getting OOB    Elimination Interventions: Call light in reach, Bed/chair exit alarm    History of Falls Interventions: Bed/chair exit alarm         Problem: Patient Education: Go to Patient Education Activity  Goal: Patient/Family Education  Outcome: Progressing Towards Goal     Problem: Patient Education: Go to Patient Education Activity  Goal: Patient/Family Education  Outcome: Progressing Towards Goal     Problem: Nutrition Deficit  Goal: *Optimize nutritional status  Outcome: Progressing Towards Goal     Problem: Patient Education: Go to Patient Education Activity  Goal: Patient/Family Education  Outcome: Progressing Towards Goal     Problem: Patient Education: Go to Patient Education Activity  Goal: Patient/Family Education  Outcome: Progressing Towards Goal     Problem: Inpatient Rehab (Adult)  Goal: *LTG: Avoids injury/falls 100% of time related to deficits  Outcome: Progressing Towards Goal  Goal: *LTG: Avoids infection 100% of time related to deficits  Outcome: Progressing Towards Goal  Goal: *LTG: Verbalize understanding of diagnosis and risk factors for recurring stroke  Outcome: Progressing Towards Goal  Goal: *LTG: Absence of DVT during hospitalization  Outcome: Progressing Towards Goal  Goal: *LTG: Maintains Skin Integrity With No Evidence of Pressure Injury 100% of Time  Outcome: Progressing Towards Goal  Goal: Interventions  Outcome: Progressing Towards Goal     Problem: Patient Education: Go to Patient Education Activity  Goal: Patient/Family Education  Outcome: Progressing Towards Goal

## 2021-07-10 NOTE — ROUTINE PROCESS
SHIFT CHANGE NOTE FOR [facility]    Bedside and Verbal shift change report given to RN (oncoming nurse) by Mati Camarillo LPN (offgoing nurse). Report included the following information SBAR, Kardex, MAR and Recent Results. Situation:   Code Status: Full Code   Hospital Day: 11   Problem List:   Hospital Problems  Date Reviewed: 7/9/2021        Codes Class Noted POA    Loose stools ICD-10-CM: R19.5  ICD-9-CM: 787.7  7/1/2021 Yes        Mesenteric abscess ([address] ICD-10-CM: K65.1  ICD-9-CM: 567.22  6/21/2021 Yes        * (Principal) Diverticulitis of large intestine with abscess ICD-10-CM: K57.20  ICD-9-CM: 562.11, 569.5  6/21/2021 Yes        History of sinus bradycardia ICD-10-CM: Z86.79  ICD-9-CM: V12.59  6/21/2021 Yes    Overview Signed 6/29/2021 10:02 PM by Katheryn Bennett MD     Attributed to Carvedilol 25 mg PO BID with meals             Generalized weakness ICD-10-CM: R53.1  ICD-9-CM: 780.79  6/21/2021 Yes        Mixed hyperlipidemia (Chronic) ICD-10-CM: N05.2  ICD-9-CM: 272.2  Unknown Yes        Heart failure with preserved left ventricular function (HFpEF) (HCC) (Chronic) ICD-10-CM: I50.30  ICD-9-CM: 428.9  Unknown Yes    Overview Signed 6/7/2021 11:03 PM by Katheryn Bennett MD     2D echocardiogram (5/18/2021) showed EF 50%; concentric LV hypertrophy with a severely thickened septal wall and mildly thickened posterior wall; left atrial chamber is severely enlarged; right atrial chamber volume is moderately enlarged; no mass, shunts, or thrombi.               Coronary artery disease involving native coronary artery of native heart (Chronic) ICD-10-CM: I25.10  ICD-9-CM: 414.01  Unknown Yes        Diverticulosis of sigmoid colon (Chronic) ICD-10-CM: K57.30  ICD-9-CM: 562.10  5/22/2021 Yes        Anticoagulated by anticoagulation treatment ICD-10-CM: Z79.01  ICD-9-CM: V58.61  5/19/2021 Yes    Overview Signed 6/7/2021 10:48 PM by Katheryn Bennett MD     On Apixaban             On clopidogrel therapy ICD-10-CM: Z79.01  ICD-9-CM: V58.61  5/19/2021 Yes        Status post insertion of drug eluting coronary artery stent ICD-10-CM: Z95.5  ICD-9-CM: V45.82  5/18/2021 Yes    Overview Signed 6/18/2021  1:38 PM by Alfredo Nolan MD     S/P PCI to proximal SVG with 3.5 x 12 mm Juan J drug-eluting stent; PCI to mid SVG with 3.5 x 34 mm Juan J drug-eluting stent; PCI to distal SVG with 3.5 x 15 mm [place] drug-eluting stent;  Balloon angioplasty to distal SVG anastomosis (5/18/2021 - Dr. Philly Cruz)              Non-ST elevation (NSTEMI) myocardial infarction [facility]) ICD-10-CM: I21.4  ICD-9-CM: 410.70  5/17/2021 Yes        Paroxysmal atrial fibrillation ([facility] [address] ICD-10-CM: I48.0  ICD-9-CM: 427.31  5/17/2021 Yes        Impaired mobility and ADLs ICD-10-CM: Z74.09, Z78.9  ICD-9-CM: V49.89  5/17/2021 Yes        Neuropathy involving both lower extremities (Chronic) ICD-10-CM: M42.72  ICD-9-CM: 356.9  10/22/2020 Yes        Essential hypertension (Chronic) ICD-10-CM: I10  ICD-9-CM: 401.9  Unknown Yes        History of coronary artery bypass graft ICD-10-CM: Z95.1  ICD-9-CM: V45.81  2001 Yes    Overview Signed 6/7/2021 11:04 PM by Alfredo Nolan MD     [facility]                   Background:   Past Medical History:   Past Medical History:   Diagnosis Date    Acute embolic stroke ([facility] [address] 0/52/6020    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, brainstem and cerebellum) without residual deficit    Anticoagulated by anticoagulation treatment 5/19/2021    On Apixaban    Benign prostatic hyperplasia with urinary retention     Chronic obstructive pulmonary disease (COPD) ([facility] [address]     Coronary artery disease involving native coronary artery of native heart     Diverticulitis of large intestine with abscess 6/21/2021    Diverticulosis of sigmoid colon 5/22/2021    Essential hypertension     Heart failure with preserved left ventricular function (HFpEF) (HCC)     2D echocardiogram (5/18/2021) showed EF 50%; concentric LV hypertrophy with a severely thickened septal wall and mildly thickened posterior wall; left atrial chamber is severely enlarged; right atrial chamber volume is moderately enlarged; no mass, shunts, or thrombi.     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[facility] [address] 6/21/2021    Neuropathy involving both lower extremities 10/22/2020    Non-ST elevation (NSTEMI) myocardial infarction ([address] 5/17/2021    On clopidogrel therapy 5/19/2021    Paroxysmal atrial fibrillation ([facility] [address] 5/17/2021    Peripheral vascular disease of extremity with claudication ([facility] [address] 6/14/2021    Urinary retention         Assessment:   Changes in Assessment throughout shift:  none     Patient has a central line: no Reasons if yes: na  Insertion date:na Last dressing date:na   Patient has [place] Cath: no Reasons if yes: na   Insertion date:na  Shift [place] care completed: NO     Last Vitals:     Vitals:    07/09/21 0610 07/09/21 0716 07/09/21 1541 07/09/21 1957   BP: 130/83 130/86 102/64 105/66   Pulse: 81 98 74 79   Resp:  20 18 18   Temp:  97.2 °F (36.2 °C) 97.6 °F (36.4 °C) 98 °F (36.7 °C)   SpO2:   95% 99%   Weight:       Height:            PAIN    Pain Assessment    Pain Intensity 1: 0 (07/10/21 0437) Pain Intensity 1: 2 (12/29/14 1105)    Pain Location 1: Foot Pain Location 1: Abdomen    Pain Intervention(s) 1: Medication (see MAR) Pain Intervention(s) 1: Medication (see MAR)  Patient Stated Pain Goal: 0 Patient Stated Pain Goal: 0  o Intervention effective: yes  o Other actions taken for pain: Medication (see MAR)     Skin Assessment  Skin color Skin Color: Appropriate for ethnicity  Condition/Temperature Skin Condition/Temp: Dry, Warm  Integrity Skin Integrity: Intact  Turgor Turgor: Non-tenting  Weekly Pressure Ulcer Documentation  Pressure  Injury Documentation: No Pressure Injury Noted-Pressure Ulcer Prevention Initiated  Wound Prevention & Protection Methods  Orientation of wound Orientation of Wound Prevention: Posterior  Location of Prevention Location of Wound Prevention: Sacrum/Coccyx  Dressing Present Dressing Present : No  Dressing Status Dressing Status: Intact  Wound Offloading Wound Offloading (Prevention Methods): Bed, pressure redistribution/air, Bed, pressure reduction mattress, Repositioning     INTAKE/OUPUT  Date 07/09/21 0700 - 07/10/21 0659 07/10/21 0700 - 07/11/21 0659   Shift 7252-4545 2919-9569 24 Hour Total 4316-5202 4099-8500 24 Hour Total   INTAKE   P.O. 1320  1320        P. O. 1320  1320      Shift Total(mL/kg) 1320(16.3)  1320(16.3)      OUTPUT   Urine(mL/kg/hr)           Urine Occurrence(s) 5 x 1 x 6 x      Emesis/NG output           Emesis Occurrence(s) 0 x  0 x      Stool           Stool Occurrence(s) 2 x 0 x 2 x      Shift Total(mL/kg)         NET 1320  1320      Weight (kg) 80.8 80.8 80.8 80.8 80.8 80.8       Recommendations:  1. Patient needs and requests: bathroom assistance    2. Pending tests/procedures: labs as ordered     3. Functional Level/Equipment: Partial (one person) /      Fall Precautions:   Fall risk precautions were reinforced with the patient; he was instructed to call for help prior to getting up. The following fall risk precautions were continued: bed/ chair alarms, door signage, yellow bracelet and socks as well as update of the [place] tool in the patient's room. [place] Score: 3    HEALS Safety Check    A safety check occurred in the patient's room between off going nurse and oncoming nurse listed above.     The safety check included the below items  Area Items   H  High Alert Medications - Verify all high alert medication drips (heparin, PCA, etc.)   E  Equipment - Suction is set up for ALL patients (with laila)  - Red plugs utilized for all equipment (IV pumps, etc.)  - WOWs wiped down at end of shift.  - Room stocked with oxygen, suction, and other unit-specific supplies   A  Alarms - Bed alarm is set for fall risk patients  - Ensure chair alarm is in place and activated if patient is up in a chair   L  Lines - Check IV for any infiltration  - Taveras bag is empty if patient has a Taveras   - Tubing and IV bags are labeled   S  Safety   - Room is clean, patient is clean, and equipment is clean. - Hallways are clear from equipment besides carts. - Fall bracelet on for fall risk patients  - Ensure room is clear and free of clutter  - Suction is set up for ALL patients (with laila)  - Hallways are clear from equipment besides carts.    - Isolation precautions followed, supplies available outside room, sign posted     Danielle Wright LPN

## 2021-07-10 NOTE — PROGRESS NOTES
Problem: Pressure Injury - Risk of  Goal: *Prevention of pressure injury  Description: Document Nicko Scale and appropriate interventions in the flowsheet. Outcome: Progressing Towards Goal  Note: Pressure Injury Interventions:  Sensory Interventions: Assess changes in LOC    Moisture Interventions: Absorbent underpads    Activity Interventions: Increase time out of bed, Pressure redistribution bed/mattress(bed type)    Mobility Interventions: Pressure redistribution bed/mattress (bed type)    Nutrition Interventions: Document food/fluid/supplement intake    Friction and Shear Interventions: Minimize layers                Problem: Patient Education: Go to Patient Education Activity  Goal: Patient/Family Education  Outcome: Progressing Towards Goal     Problem: Falls - Risk of  Goal: *Absence of Falls  Description: Document [place] Fall Risk and appropriate interventions in the flowsheet.   Outcome: Progressing Towards Goal  Note: Fall Risk Interventions:  Mobility Interventions: Bed/chair exit alarm, Patient to call before getting OOB    Mentation Interventions: Adequate sleep, hydration, pain control    Medication Interventions: Patient to call before getting OOB    Elimination Interventions: Call light in reach, Bed/chair exit alarm, Patient to call for help with toileting needs    History of Falls Interventions: Bed/chair exit alarm         Problem: Patient Education: Go to Patient Education Activity  Goal: Patient/Family Education  Outcome: Progressing Towards Goal     Problem: Patient Education: Go to Patient Education Activity  Goal: Patient/Family Education  Outcome: Progressing Towards Goal     Problem: Nutrition Deficit  Goal: *Optimize nutritional status  Outcome: Progressing Towards Goal     Problem: Patient Education: Go to Patient Education Activity  Goal: Patient/Family Education  Outcome: Progressing Towards Goal     Problem: Patient Education: Go to Patient Education Activity  Goal: Patient/Family Education  Outcome: Progressing Towards Goal     Problem: Inpatient Rehab (Adult)  Goal: *LTG: Avoids injury/falls 100% of time related to deficits  Outcome: Progressing Towards Goal  Goal: *LTG: Avoids infection 100% of time related to deficits  Outcome: Progressing Towards Goal  Goal: *LTG: Verbalize understanding of diagnosis and risk factors for recurring stroke  Outcome: Progressing Towards Goal  Goal: *LTG: Absence of DVT during hospitalization  Outcome: Progressing Towards Goal  Goal: *LTG: Maintains Skin Integrity With No Evidence of Pressure Injury 100% of Time  Outcome: Progressing Towards Goal  Goal: Interventions  Outcome: Progressing Towards Goal     Problem: Patient Education: Go to Patient Education Activity  Goal: Patient/Family Education  Outcome: Progressing Towards Goal

## 2021-07-10 NOTE — ROUTINE PROCESS
SHIFT CHANGE NOTE FOR [facility]    Bedside and Verbal shift change report given to Domonique Parrish RN (oncoming nurse) by Luis Antonio Martinez RN (offgoing nurse). Report included the following information SBAR, Kardex, MAR and Recent Results. Situation:   Code Status: Full Code   Hospital Day: 11   Problem List:   Hospital Problems  Date Reviewed: 7/10/2021        Codes Class Noted POA    Edema of both lower extremities (Chronic) ICD-10-CM: R60.0  ICD-9-CM: 399. 3  Unknown Yes        Loose stools ICD-10-CM: R19.5  ICD-9-CM: 787.7  7/1/2021 Yes        Mesenteric abscess ([facility] [address] ICD-10-CM: K65.1  ICD-9-CM: 567.22  6/21/2021 Yes        * (Principal) Diverticulitis of large intestine with abscess ICD-10-CM: K57.20  ICD-9-CM: 562.11, 569.5  6/21/2021 Yes        History of sinus bradycardia ICD-10-CM: Z86.79  ICD-9-CM: V12.59  6/21/2021 Yes    Overview Signed 6/29/2021 10:02 PM by Jasmeet Kurtz MD     Attributed to Carvedilol 25 mg PO BID with meals             Generalized weakness ICD-10-CM: R53.1  ICD-9-CM: 780.79  6/21/2021 Yes        Peripheral vascular disease of extremity with claudication (HCC) (Chronic) ICD-10-CM: I73.9  ICD-9-CM: 443.9  6/14/2021 Yes        Mixed hyperlipidemia (Chronic) ICD-10-CM: B29.8  ICD-9-CM: 272.2  Unknown Yes        Heart failure with preserved left ventricular function (HFpEF) (HCC) (Chronic) ICD-10-CM: I50.30  ICD-9-CM: 428.9  Unknown Yes    Overview Signed 6/7/2021 11:03 PM by Jasmeet Kurtz MD     2D echocardiogram (5/18/2021) showed EF 50%; concentric LV hypertrophy with a severely thickened septal wall and mildly thickened posterior wall; left atrial chamber is severely enlarged; right atrial chamber volume is moderately enlarged; no mass, shunts, or thrombi.               Coronary artery disease involving native coronary artery of native heart (Chronic) ICD-10-CM: I25.10  ICD-9-CM: 414.01  Unknown Yes        Diverticulosis of sigmoid colon (Chronic) ICD-10-CM: K57.30  ICD-9-CM: 562.10 5/22/2021 Yes        Anticoagulated by anticoagulation treatment ICD-10-CM: Z79.01  ICD-9-CM: V58.61  5/19/2021 Yes    Overview Signed 6/7/2021 10:48 PM by Edvin Beckman MD     On Apixaban             On clopidogrel therapy ICD-10-CM: Z79.01  ICD-9-CM: V58.61  5/19/2021 Yes        Status post insertion of drug eluting coronary artery stent ICD-10-CM: Z95.5  ICD-9-CM: V45.82  5/18/2021 Yes    Overview Signed 6/18/2021  1:38 PM by Edvin Beckman MD     S/P PCI to proximal SVG with 3.5 x 12 mm Juan J drug-eluting stent; PCI to mid SVG with 3.5 x 34 mm Juan J drug-eluting stent; PCI to distal SVG with 3.5 x 15 mm [place] drug-eluting stent;  Balloon angioplasty to distal SVG anastomosis (5/18/2021 - Dr. Corey Harris)              Non-ST elevation (NSTEMI) myocardial infarction [facility]) ICD-10-CM: I21.4  ICD-9-CM: 410.70  5/17/2021 Yes        Paroxysmal atrial fibrillation ([facility] [address] ICD-10-CM: I48.0  ICD-9-CM: 427.31  5/17/2021 Yes        Impaired mobility and ADLs ICD-10-CM: Z74.09, Z78.9  ICD-9-CM: V49.89  5/17/2021 Yes        Neuropathy involving both lower extremities (Chronic) ICD-10-CM: E67.14  ICD-9-CM: 356.9  10/22/2020 Yes        Essential hypertension (Chronic) ICD-10-CM: I10  ICD-9-CM: 401.9  Unknown Yes        History of coronary artery bypass graft ICD-10-CM: Z95.1  ICD-9-CM: V45.81  2001 Yes    Overview Signed 6/7/2021 11:04 PM by Edvin Beckman MD     [facility]                   Background:   Past Medical History:   Past Medical History:   Diagnosis Date    Acute embolic stroke ([facility] [address] 7/74/3457    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, brainstem and cerebellum) without residual deficit    Anticoagulated by anticoagulation treatment 5/19/2021    On Apixaban    Benign prostatic hyperplasia with urinary retention     Chronic obstructive pulmonary disease (COPD) ([facility] [address]     Coronary artery disease involving native coronary artery of native heart     Diverticulitis of large intestine with abscess 6/21/2021    Diverticulosis of sigmoid colon 5/22/2021    Edema of both lower extremities     Essential hypertension     Heart failure with preserved left ventricular function (HFpEF) ([address]     2D echocardiogram (5/18/2021) showed EF 50%; concentric LV hypertrophy with a severely thickened septal wall and mildly thickened posterior wall; left atrial chamber is severely enlarged; right atrial chamber volume is moderately enlarged; no mass, shunts, or thrombi.     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[address] 6/21/2021    Neuropathy involving both lower extremities 10/22/2020    Non-ST elevation (NSTEMI) myocardial infarction ([address] 5/17/2021    On clopidogrel therapy 5/19/2021    Paroxysmal atrial fibrillation ([address] 5/17/2021    Peripheral vascular disease of extremity with claudication ([address] 6/14/2021    Urinary retention         Assessment:   Changes in Assessment throughout shift: No change to previous assessment     Patient has a central line: no Reasons if yes: na  Insertion date:na Last dressing date:na   Patient has [place] Cath: no Reasons if yes: na   Insertion date:na  Shift [place] care completed: NO     Last Vitals:     Vitals:    07/09/21 1957 07/10/21 0500 07/10/21 0824 07/10/21 1531   BP: 105/66 99/68 136/78 (!) 125/105   Pulse: 79  84 91   Resp: 18  18 18   Temp: 98 °F (36.7 °C)  (!) 96.6 °F (35.9 °C) 97.2 °F (36.2 °C)   SpO2: 99%  99% 99%   Weight:       Height:            PAIN    Pain Assessment    Pain Intensity 1: 0 (07/10/21 1605) Pain Intensity 1: 2 (12/29/14 1225)    Pain Location 1: Foot Pain Location 1: Abdomen    Pain Intervention(s) 1: Medication (see MAR) Pain Intervention(s) 1: Medication (see MAR)  Patient Stated Pain Goal: 0 Patient Stated Pain Goal: 0  o Intervention effective: yes  o Other actions taken for pain:       Skin Assessment  Skin color Skin Color: Appropriate for ethnicity  Condition/Temperature Skin Condition/Temp: Dry, Warm  Integrity Skin Integrity: Intact  Turgor Turgor: Non-tenting  Weekly Pressure Ulcer Documentation  Pressure  Injury Documentation: No Pressure Injury Noted-Pressure Ulcer Prevention Initiated  Wound Prevention & Protection Methods  Orientation of wound Orientation of Wound Prevention: Posterior  Location of Prevention Location of Wound Prevention: Sacrum/Coccyx  Dressing Present Dressing Present : No  Dressing Status Dressing Status: Intact  Wound Offloading Wound Offloading (Prevention Methods): Bed, pressure reduction mattress     INTAKE/OUPUT  Date 07/09/21 1900 - 07/10/21 0659 07/10/21 0700 - 07/11/21 0659   Shift 2741-8334 24 Hour Total 1289-9012 6924-9940 24 Hour Total   INTAKE   P.O.  1320        P. O.  1320      Shift Total(mL/kg)  1320(16.3)      OUTPUT   Urine(mL/kg/hr)          Urine Occurrence(s) 1 x 6 x 1 x  1 x   Emesis/NG output          Emesis Occurrence(s)  0 x      Stool          Stool Occurrence(s) 0 x 2 x 0 x  0 x   Shift Total(mL/kg)        NET  1320      Weight (kg) 80.8 80.8 80.8 80.8 80.8       Recommendations:  1. Patient needs and requests: na    2. Pending tests/procedures: na     3. Functional Level/Equipment: Partial (one person) / Wheelchair    Fall Precautions:   Fall risk precautions were reinforced with the patient; he was instructed to call for help prior to getting up. The following fall risk precautions were continued: bed/ chair alarms, door signage, yellow bracelet and socks as well as update of the [place] tool in the patient's room.    [place] Score: 3    HEALS Safety Check    A safety check occurred in the patient's room between off going nurse and oncoming nurse listed above. The safety check included the below items  Area Items   H  High Alert Medications - Verify all high alert medication drips (heparin, PCA, etc.)   E  Equipment - Suction is set up for ALL patients (with laila)  - Red plugs utilized for all equipment (IV pumps, etc.)  - WOWs wiped down at end of shift.  - Room stocked with oxygen, suction, and other unit-specific supplies   A  Alarms - Bed alarm is set for fall risk patients  - Ensure chair alarm is in place and activated if patient is up in a chair   L  Lines - Check IV for any infiltration  - Taveras bag is empty if patient has a Taveras   - Tubing and IV bags are labeled   S  Safety   - Room is clean, patient is clean, and equipment is clean. - Hallways are clear from equipment besides carts. - Fall bracelet on for fall risk patients  - Ensure room is clear and free of clutter  - Suction is set up for ALL patients (with laila)  - Hallways are clear from equipment besides carts.    - Isolation precautions followed, supplies available outside room, sign posted     Renetta Streeter, KULDIP

## 2021-07-11 NOTE — ROUTINE PROCESS
[facility]    Bedside and Verbal shift change report given to KULDIP Emmanuel (oncoming nurse) by Della Cruz RN (offgoing nurse). Report included the following information SBAR, Kardex, MAR and Recent Results. Situation:   Code Status: Full Code   Hospital Day: 12   Problem List:   Hospital Problems  Date Reviewed: 7/10/2021        Codes Class Noted POA    Edema of both lower extremities (Chronic) ICD-10-CM: R60.0  ICD-9-CM: 464. 3  Unknown Yes        Loose stools ICD-10-CM: R19.5  ICD-9-CM: 787.7  7/1/2021 Yes        Mesenteric abscess ([facility] [address] ICD-10-CM: K65.1  ICD-9-CM: 567.22  6/21/2021 Yes        * (Principal) Diverticulitis of large intestine with abscess ICD-10-CM: K57.20  ICD-9-CM: 562.11, 569.5  6/21/2021 Yes        History of sinus bradycardia ICD-10-CM: Z86.79  ICD-9-CM: V12.59  6/21/2021 Yes    Overview Signed 6/29/2021 10:02 PM by Allen Coughlin MD     Attributed to Carvedilol 25 mg PO BID with meals             Generalized weakness ICD-10-CM: R53.1  ICD-9-CM: 780.79  6/21/2021 Yes        Peripheral vascular disease of extremity with claudication (HCC) (Chronic) ICD-10-CM: I73.9  ICD-9-CM: 443.9  6/14/2021 Yes        Mixed hyperlipidemia (Chronic) ICD-10-CM: B69.9  ICD-9-CM: 272.2  Unknown Yes        Heart failure with preserved left ventricular function (HFpEF) (HCC) (Chronic) ICD-10-CM: I50.30  ICD-9-CM: 428.9  Unknown Yes    Overview Signed 6/7/2021 11:03 PM by Allen Coughlin MD     2D echocardiogram (5/18/2021) showed EF 50%; concentric LV hypertrophy with a severely thickened septal wall and mildly thickened posterior wall; left atrial chamber is severely enlarged; right atrial chamber volume is moderately enlarged; no mass, shunts, or thrombi.               Coronary artery disease involving native coronary artery of native heart (Chronic) ICD-10-CM: I25.10  ICD-9-CM: 414.01  Unknown Yes        Diverticulosis of sigmoid colon (Chronic) ICD-10-CM: K57.30  ICD-9-CM: 562.10 5/22/2021 Yes        Anticoagulated by anticoagulation treatment ICD-10-CM: Z79.01  ICD-9-CM: V58.61  5/19/2021 Yes    Overview Signed 6/7/2021 10:48 PM by Rogelio Mg MD     On Apixaban             On clopidogrel therapy ICD-10-CM: Z79.01  ICD-9-CM: V58.61  5/19/2021 Yes        Status post insertion of drug eluting coronary artery stent ICD-10-CM: Z95.5  ICD-9-CM: V45.82  5/18/2021 Yes    Overview Signed 6/18/2021  1:38 PM by Rogelio Mg MD     S/P PCI to proximal SVG with 3.5 x 12 mm Juan J drug-eluting stent; PCI to mid SVG with 3.5 x 34 mm Juan J drug-eluting stent; PCI to distal SVG with 3.5 x 15 mm [place] drug-eluting stent;  Balloon angioplasty to distal SVG anastomosis (5/18/2021 - Dr. Alina Spangler)              Non-ST elevation (NSTEMI) myocardial infarction [facility]) ICD-10-CM: I21.4  ICD-9-CM: 410.70  5/17/2021 Yes        Paroxysmal atrial fibrillation ([facility] [address] ICD-10-CM: I48.0  ICD-9-CM: 427.31  5/17/2021 Yes        Impaired mobility and ADLs ICD-10-CM: Z74.09, Z78.9  ICD-9-CM: V49.89  5/17/2021 Yes        Neuropathy involving both lower extremities (Chronic) ICD-10-CM: J88.66  ICD-9-CM: 356.9  10/22/2020 Yes        Essential hypertension (Chronic) ICD-10-CM: I10  ICD-9-CM: 401.9  Unknown Yes        History of coronary artery bypass graft ICD-10-CM: Z95.1  ICD-9-CM: V45.81  2001 Yes    Overview Signed 6/7/2021 11:04 PM by Rogelio Mg MD     [facility]                   Background:   Past Medical History:   Past Medical History:   Diagnosis Date    Acute embolic stroke ([facility] [address] 9/35/5305    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, brainstem and cerebellum) without residual deficit    Anticoagulated by anticoagulation treatment 5/19/2021    On Apixaban    Benign prostatic hyperplasia with urinary retention     Chronic obstructive pulmonary disease (COPD) ([facility] [address]     Coronary artery disease involving native coronary artery of native heart     Diverticulitis of large intestine with abscess 6/21/2021    Diverticulosis of sigmoid colon 5/22/2021    Edema of both lower extremities     Essential hypertension     Heart failure with preserved left ventricular function (HFpEF) ([address]     2D echocardiogram (5/18/2021) showed EF 50%; concentric LV hypertrophy with a severely thickened septal wall and mildly thickened posterior wall; left atrial chamber is severely enlarged; right atrial chamber volume is moderately enlarged; no mass, shunts, or thrombi.     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[address] 6/21/2021    Neuropathy involving both lower extremities 10/22/2020    Non-ST elevation (NSTEMI) myocardial infarction ([address] 5/17/2021    On clopidogrel therapy 5/19/2021    Paroxysmal atrial fibrillation ([address] 5/17/2021    Peripheral vascular disease of extremity with claudication ([address] 6/14/2021    Urinary retention         Assessment:   Changes in Assessment throughout shift: No change to previous assessment     Patient has a central line: no Reasons if yes: na  Insertion date:na Last dressing date:na   Patient has [place] Cath: no Reasons if yes: na   Insertion date:na  Shift [place] care completed: NO     Last Vitals:     Vitals:    07/10/21 0824 07/10/21 1531 07/10/21 2100 07/11/21 0635   BP: 136/78 (!) 125/105 136/81 123/75   Pulse: 84 91 100 86   Resp: 18 18 18    Temp: (!) 96.6 °F (35.9 °C) 97.2 °F (36.2 °C) 98.2 °F (36.8 °C)    SpO2: 99% 99% 98%    Weight:       Height:            PAIN    Pain Assessment    Pain Intensity 1: 0 (07/11/21 0415) Pain Intensity 1: 2 (12/29/14 5378)    Pain Location 1: Foot Pain Location 1: Abdomen    Pain Intervention(s) 1: Medication (see MAR) Pain Intervention(s) 1: Medication (see MAR)  Patient Stated Pain Goal: 0 Patient Stated Pain Goal: 0  o Intervention effective: yes  o Other actions taken for pain: Medication (see MAR)     Skin Assessment  Skin color Skin Color: Appropriate for ethnicity  Condition/Temperature Skin Condition/Temp: Dry, Warm  Integrity Skin Integrity: Intact  Turgor Turgor: Non-tenting  Weekly Pressure Ulcer Documentation  Pressure  Injury Documentation: No Pressure Injury Noted-Pressure Ulcer Prevention Initiated  Wound Prevention & Protection Methods  Orientation of wound Orientation of Wound Prevention: Posterior  Location of Prevention Location of Wound Prevention: Buttocks, Sacrum/Coccyx  Dressing Present Dressing Present : No  Dressing Status Dressing Status: Intact  Wound Offloading Wound Offloading (Prevention Methods): Bed, pressure redistribution/air     INTAKE/OUPUT  Date 07/10/21 0700 - 07/11/21 0659 07/11/21 0700 - 07/12/21 0659   Shift 9747-1210 3668-8558 24 Hour Total 9378-8901 1720-4011 24 Hour Total   INTAKE   Shift Total(mL/kg)         OUTPUT   Urine(mL/kg/hr)           Urine Occurrence(s) 1 x 5 x 6 x      Stool           Stool Occurrence(s) 0 x 1 x 1 x      Shift Total(mL/kg)         NET         Weight (kg) 80.8 80.8 80.8 80.8 80.8 80.8       Recommendations:  1. Patient needs and requests:assist to the bathroom    2. Pending tests/procedures: na     3. Functional Level/Equipment: Partial (one person) /      Fall Precautions:   Fall risk precautions were reinforced with the patient; he was instructed to call for help prior to getting up. The following fall risk precautions were continued: bed/ chair alarms, door signage, yellow bracelet and socks as well as update of the [place] tool in the patient's room.    [place] Score:      HEALS Safety Check    A safety check occurred in the patient's room between off going nurse and oncoming nurse listed above. The safety check included the below items  Area Items   H  High Alert Medications - Verify all high alert medication drips (heparin, PCA, etc.)   E  Equipment - Suction is set up for ALL patients (with laila)  - Red plugs utilized for all equipment (IV pumps, etc.)  - WOWs wiped down at end of shift.  - Room stocked with oxygen, suction, and other unit-specific supplies   A  Alarms - Bed alarm is set for fall risk patients  - Ensure chair alarm is in place and activated if patient is up in a chair   L  Lines - Check IV for any infiltration  - Taveras bag is empty if patient has a Taveras   - Tubing and IV bags are labeled   S  Safety   - Room is clean, patient is clean, and equipment is clean. - Hallways are clear from equipment besides carts. - Fall bracelet on for fall risk patients  - Ensure room is clear and free of clutter  - Suction is set up for ALL patients (with laila)  - Hallways are clear from equipment besides carts.    - Isolation precautions followed, supplies available outside room, sign posted     Rolm Baumgarten, RN

## 2021-07-11 NOTE — DISCHARGE INSTRUCTIONS
------------------------------------------------------------------------------------------------------------    DISCHARGE INSTRUCTIONS    1. Make sure that when you request refills at the pharmacy that the refill requests are sent to your PCP (NOT to the prescriber at the [facility]) to avoid any delays in getting your medication refills. The physician at the [facility] for [facility] will not able to order medications or refills after discharge -- Please understand that though we would like to help, it is simply not safe for our physician to order you a medication that we cannot monitor. 2. If any of the prescribed medications require a PRIOR AUTHORIZATION, contact your Primary Care Physician or specialist to EITHER complete prior authorizations and paperwork on your behalf OR prescribe an alternative medication. -- Please understand that though we would like to help, it is simply not safe for our physician to order you a medication that we cannot monitor. 3. If any of your prescriptions medications PRIOR TO ADMISSION TO [facility] [place] [id number] needs a refill (and either the dosage, frequency or instructions was not changed), kindly contact your Primary Care Physician for refills.     -------------------------------------------------------------------------------------------------------------------

## 2021-07-11 NOTE — PROGRESS NOTES
Problem: Pressure Injury - Risk of  Goal: *Prevention of pressure injury  Description: Document Nicko Scale and appropriate interventions in the flowsheet. Outcome: Progressing Towards Goal  Note: Pressure Injury Interventions:  Sensory Interventions: Assess changes in LOC    Moisture Interventions: Absorbent underpads    Activity Interventions: Chair cushion, Increase time out of bed, Pressure redistribution bed/mattress(bed type)    Mobility Interventions: Chair cushion, HOB 30 degrees or less, Pressure redistribution bed/mattress (bed type)    Nutrition Interventions: Document food/fluid/supplement intake    Friction and Shear Interventions: Foam dressings/transparent film/skin sealants                Problem: Patient Education: Go to Patient Education Activity  Goal: Patient/Family Education  Outcome: Progressing Towards Goal     Problem: Falls - Risk of  Goal: *Absence of Falls  Description: Document [place] Fall Risk and appropriate interventions in the flowsheet.   Outcome: Progressing Towards Goal  Note: Fall Risk Interventions:  Mobility Interventions: Bed/chair exit alarm, Patient to call before getting OOB    Mentation Interventions: Bed/chair exit alarm, Evaluate medications/consider consulting pharmacy    Medication Interventions: Bed/chair exit alarm, Evaluate medications/consider consulting pharmacy, Patient to call before getting OOB    Elimination Interventions: Call light in reach, Bed/chair exit alarm, Patient to call for help with toileting needs    History of Falls Interventions: Bed/chair exit alarm, Evaluate medications/consider consulting pharmacy         Problem: Patient Education: Go to Patient Education Activity  Goal: Patient/Family Education  Outcome: Progressing Towards Goal     Problem: Patient Education: Go to Patient Education Activity  Goal: Patient/Family Education  Outcome: Progressing Towards Goal     Problem: Nutrition Deficit  Goal: *Optimize nutritional status  Outcome: Progressing Towards Goal     Problem: Patient Education: Go to Patient Education Activity  Goal: Patient/Family Education  Outcome: Progressing Towards Goal     Problem: Patient Education: Go to Patient Education Activity  Goal: Patient/Family Education  Outcome: Progressing Towards Goal     Problem: Inpatient Rehab (Adult)  Goal: *LTG: Avoids injury/falls 100% of time related to deficits  Outcome: Progressing Towards Goal  Goal: *LTG: Avoids infection 100% of time related to deficits  Outcome: Progressing Towards Goal  Goal: *LTG: Verbalize understanding of diagnosis and risk factors for recurring stroke  Outcome: Progressing Towards Goal  Goal: *LTG: Absence of DVT during hospitalization  Outcome: Progressing Towards Goal  Goal: *LTG: Maintains Skin Integrity With No Evidence of Pressure Injury 100% of Time  Outcome: Progressing Towards Goal  Goal: Interventions  Outcome: Progressing Towards Goal     Problem: Patient Education: Go to Patient Education Activity  Goal: Patient/Family Education  Outcome: Progressing Towards Goal

## 2021-07-11 NOTE — PROGRESS NOTES
[facility] PHYSICAL REHABILITATION  [address] [place] [address], [address]     INPATIENT REHABILITATION  DAILY PROGRESS NOTE     Date: 7/11/2021    Name: Carlos Mason Age / Sex: 80 y.o. 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 Blood pressure controlled. No documented fever since admission.        Objective:     Vital Signs:  Patient Vitals for the past 24 hrs:   BP Temp Pulse Resp SpO2   07/11/21 0724 135/83 97 °F (36.1 °C) 87 16    07/11/21 0635 123/75  86     07/10/21 2100 136/81 98.2 °F (36.8 °C) 100 18 98 %   07/10/21 1531 (!) 125/105 97.2 °F (36.2 °C) 91 18 99 %        Current Medications:  Current Facility-Administered Medications   Medication Dose Route Frequency    loperamide (IMODIUM) capsule 4 mg  4 mg Oral BID    loperamide (IMODIUM) capsule 2 mg  2 mg Oral BID    furosemide (LASIX) tablet 40 mg  40 mg Oral ACB    nitroglycerin (NITROBID) 2 % ointment 2 Inch  2 Inch Topical BID    pneumococcal 23-valent (PNEUMOVAX 23) injection 0.5 mL  0.5 mL IntraMUSCular PRIOR TO DISCHARGE    acetaminophen (TYLENOL) tablet 650 mg  650 mg Oral Q4H PRN    bisacodyL (DULCOLAX) tablet 10 mg  10 mg Oral Q48H PRN    amoxicillin-clavulanate (AUGMENTIN) 500-125 mg per tablet 1 Tablet  1 Tablet Oral BID WITH MEALS    cefpodoxime (VANTIN) tablet 200 mg  200 mg Oral Q12H    carvediloL (COREG) tablet 3.125 mg  3.125 mg Oral BID WITH MEALS    clopidogreL (PLAVIX) tablet 75 mg  75 mg Oral DAILY WITH BREAKFAST    rosuvastatin (CRESTOR) tablet 10 mg  10 mg Oral DAILY    nitroglycerin (NITROSTAT) tablet 0.4 mg  0.4 mg SubLINGual PRN    arformoteroL (BROVANA) neb solution 15 mcg  15 mcg Nebulization BID RT    ipratropium (ATROVENT) 0.02 % nebulizer solution 0.5 mg  0.5 mg Nebulization TID    albuterol (PROVENTIL VENTOLIN) nebulizer solution 2.5 mg  2.5 mg Nebulization Q4H PRN    apixaban (ELIQUIS) tablet 5 mg  5 mg Oral BID    multivitamin, tx-iron-ca-min (THERA-M w/ IRON) tablet 1 Tablet  1 Tablet Oral DAILY    lisinopriL (PRINIVIL, ZESTRIL) tablet 5 mg  5 mg Oral DAILY    tamsulosin (FLOMAX) capsule 0.4 mg  0.4 mg Oral DAILY WITH DINNER    gabapentin (NEURONTIN) capsule 300 mg  300 mg Oral TID       Allergies: Allergies   Allergen Reactions    Lipitor [Atorvastatin] Rash    Norvasc [Amlodipine] Rash       Lab/Data Review:  No results found for this or any previous visit (from the past 24 hour(s)). Assessment:     Primary Rehabilitation Diagnosis  1. Generalized weakness with Impaired mobility and ADLs  2.  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    Comorbidities  Patient Active Problem List   Diagnosis Code    Urinary retention R33.9    Essential hypertension I10    Non-ST elevation (NSTEMI) myocardial infarction ([facility] [address] I21.4    Paroxysmal atrial fibrillation ([facility]) I48.0    Anticoagulated by anticoagulation treatment Z79.01    Mixed hyperlipidemia E78.2    Benign prostatic hyperplasia with urinary retention N40.1, R33.8    Malignant neoplasm of lower lobe of right lung ([facility]) C34.31    Heart failure with preserved left ventricular function (HFpEF) ([facility]) I50.30    Coronary artery disease involving native coronary artery of native heart I25.10    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    Chronic obstructive pulmonary disease (COPD) ([facility]) J44.9    History of carotid stenosis Z86.79    History of renal artery stenosis Z86.79    On clopidogrel therapy Z79.01    History of gross hematuria U82.278    Acute embolic stroke ([facility]) R68.3    Leukocytosis D72.829    Peripheral vascular disease of extremity with claudication ([facility]) I73.9    Neuropathy involving both lower extremities G57.93    Impaired mobility and ADLs Z74.09, Z78.9    Status post insertion of drug eluting coronary artery stent Z95.5    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 Justification for continued stay: Good progression towards established rehabilitation goals. 2. Medical Issues being followed closely:    [x]  Fall and safety precautions     []  Wound Care     [x]  Bowel and Bladder Function     [x]  Fluid Electrolyte and Nutrition Balance     [x]  Pain Control      3. Issues that 24 hour rehabilitation nursing is following:    [x]  Fall and safety precautions     []  Wound Care     [x]  Bowel and Bladder Function     [x]  Fluid Electrolyte and Nutrition Balance     [x]  Pain Control      [x]  Assistance with and education on in-room safety with transfers to and from the bed, wheelchair, toilet and shower. 4. Acute rehabilitation plan of care:    [x]  Continue current care and rehab. [x]  Physical Therapy           [x]  Occupational Therapy           [x]  Speech Therapy     []  Hold Rehab until further notice     5. Medications:    [x]  MAR Reviewed     [x]  Continue Present Medications     6. DVT Prophylaxis:      []  Enoxaparin     []  Unfractionated Heparin     []  Warfarin     [x]  NOAC     []  MARTHA Stockings     []  Sequential Compression Device     []  None     7. Code status    [x]  Full code     []  Partial code     []  Do not intubate     []  Do not resuscitate     8.  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 Mild infiltrate in the superior segment of the right lower lobe with small right pleural effusion. No consolidation. 2. Delayed renal clearance, chronic renal disease suspected. Additionally, areas of cortical cysts with poor perfusion.  Pyelonephritis not excluded. No hydronephrosis. Correlation with urinalysis? 3. Diverticulosis, with potential diverticulitis in the left lower quadrant, junction of descending and sigmoid colon. However, evaluation is very limited due to severe motion artifacts. 4. Focal mucosal thickening in the proximal sigmoid. Further evaluation with barium enema or colonoscopy as indicated. > WBC count (5/23/2021) = 30.2                          > . .  .                           > WBC count (6/2/2021) = 25.4                          > WBC count (6/3/2021) = 22.5                          > WBC count (6/4/2021) = 23.4                          > WBC count (6/5/2021) = 21.7                          > WBC count (6/6/2021) = 24.1                          > WBC count (6/7/2021) = 20.0                          > Work up done was negative for a source of infection              > WBC count (6/8/2021, on admission to the ARU) = 14.4   > On 6/8/2021, started Pericolace 2 tabs PO once daily after dinner   > On 6/9/2021, started Polyethylene glycol 17 grams in 8 oz water PO once daily   > On 6/19/2021:    > Patient was noted to have diarrhea    > Held:     > Polyethylene glycol 17 grams in 8 oz water PO once daily     > Pericolace 2 tabs PO once daily after dinner    > Started Bio K Plus 1 cap PO once daily   > Stool culture (collected 6/19/2021, resulted 6/20/2021): Negative   > WBC count (6/20/2021) = 26.7   > Blood culture x 2 sets (collected 6/20/2021, resulted on 6/26/2021) yielded no growth after 6 days   > On 6/20/2021, started:    > Cholestyramine 4 grams PO TID with meals    > Ceftriaxone 1000 mg IV q 24 hr    > Metronidazole 500 mg IV q 8 hr    > Vancomycin 1750 mg  IV x 1 dose   > WBC count (6/21/2021) = 26.3   > Infectious Disease consult (Dr. Jc Banuelos) was called on 6/21/2021 for evaluation and comanagement   > On 6/21/2021:    > Discontinue:     > Polyethylene glycol 17 grams in 8 oz water PO once daily     > Pericolace 2 tabs PO once daily after dinner    > Change Vancomycin IV to Vancomycin 500 mg PO q 6 hr   > CT scan of the chest, abdomen and pelvis with contrast (6/21/2021) showed:    1. No acute findings in the chest.     -Emphysema. -CABG. -Stable mildly enlarged right thyroid gland which could indicate underlying nodule, stable for many years. 2. 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 It is unclear if initial source of abscess is from small bowel inflammation or diverticulitis/pericolonic abscess. 3. Dilated small bowel. This is likely a combination of generalized small bowel ileus and low-grade partial small bowel obstruction at site of abnormal pelvic small bowel loop discussed above. 4. Diverticulosis. Stable thickened collapsed segment of sigmoid colon since 2014 suggesting a chronic stricture. This is limited for assessment by CT. 5. Bladder dome inflammation is likely secondary to the adjacent mesentery process. 6. Other chronic incidental findings.    > On 6/21/2021:    > Discontinued:     > Cholestyramine 4 grams PO TID with meals     > Ceftriaxone 1000 mg IV q 24 hr     > Metronidazole 500 mg IV q 8 hr     > Vancomycin 500 mg PO q 6 hr    > Started Piperacillin-Tazobactam 3.375 grams IV q 6 hr   > Colorectal Surgery (Dr. Pancho Montalvo) was called on 6/22/2021 for evaluation and comanagement    > No surgical intervention recommended since unable to safely stop Apixaban (for paroxysmal atrial fibrillation) and Clopidogrel (recent placement of 3 JENNIE to SVG-LCx on 5/18/2021)   > On 6/25/2021:    > Discontinued Piperacillin-Tazobactam 3.375 grams IV q 6 hr    > Started:     > Cefpodoxime 200 mg PO q 12 hr     > Metronidazole 500 mg PO q 12 hr   > On 6/28/2021:    > Discontinued Metronidazole 500 mg PO q 12 hr    > Started Amoxicillin-Clavulanate 500-125 1 tab PO q 12 hr      06/30/21  0450 06/29/21  0211 06/28/21  0230 06/27/21  0516 06/25/21  0534 06/24/21  0522 06/23/21  0538 06/22/21  0409 06/21/21  1310 06/21/21  0330 06/20/21  0550   WBC 18.2* 21.0* 24.5* 21.7* 18.4* 18.2* 22.1* 24.7* 26.0* 26.3* 26.7*      > Upon discharge from the ARU, the patient will need to follow up with Colorectal Surgery (Dr. Shital Orellana)   > CT scan of the abdomen and pelvis with contrast on 7/15/2021   > Continue:    > Cefpodoxime 200 mg PO q 12 hr (STOP DATE: 7/21/2021)    > Amoxicillin-Clavulanate 500-125 1 tab PO q 12 hr (STOP DATE: 7/21/2021)    > 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, brainstem and cerebellum) without residual deficit   > Continue:    > Clopidogrel 75 mg PO once daily with breakfast    > Rosuvastatin 10 mg PO once daily    > Benign prostatic hyperplasia with urinary retention   > Continue Tamsulosin 0.4 mg PO once daily after dinner    > Chronic heart failure with preserved ejection fraction (HFpEF)   > 2D echocardiogram (5/18/2021) showed EF 50%; concentric LV hypertrophy with a severely thickened septal wall and mildly thickened posterior wall; left atrial chamber is severely enlarged; right atrial chamber volume is moderately enlarged; no mass, shunts, or thrombi.    > Continue:    > Carvedilol 3.125 mg PO BID with meals (8AM, 5PM)    > Lisinopril 5 mg PO once daily (9AM)    > Chronic obstructive pulmonary disease (COPD)   > Continue:    > Arformoterol nebulization BID    > Ipratropium nebulization TID    > Albuterol nebulization q 4 hr PRN for shortness of breath/wheezing    > Essential hypertension   > 2D echocardiogram (5/18/2021) showed EF 50%; concentric LV hypertrophy with a severely thickened septal wall and mildly thickened posterior wall; left atrial chamber is severely enlarged; right atrial chamber volume is moderately enlarged; no mass, shunts, or thrombi.    > Continue:    > Carvedilol 3.125 mg PO BID with meals (8AM, 5PM)    > Lisinopril 5 mg PO once daily (9AM)    > Mixed hyperlipidemia   > Lipid profile (5/18/2021) showed TG 83, , HDL 50, LDL 93   > Continue Rosuvastatin 10 mg PO once daily    > Non-ST elevation (NSTEMI) myocardial infarction; Coronary artery disease; History of CABG; S/P PCI to proximal SVG with 3.5 x 12 mm [place] drug-eluting stent; PCI to mid SVG with 3.5 x 34 mm [place] drug-eluting stent; PCI to distal SVG with 3.5 x 15 mm Juan J drug-eluting stent; Balloon angioplasty to distal SVG anastomosis (5/18/2021 - Dr. Shivam Real)    > Continue:    > Carvedilol 3.125 mg PO BID with meals (8AM, 5PM)    > Clopidogrel 75 mg PO once daily with breakfast    > Lisinopril 5 mg PO once daily (9AM)    > Rosuvastatin 10 mg PO once daily    > Nitroglycerin 0.4 mg SL PRN for chest pain    > Paroxysmal atrial fibrillation, anticoagulated on Apixaban   > Continue:    > Apixaban 5 mg PO BID    > Carvedilol 3.125 mg PO BID with meals (8AM, 5PM)    > Peripheral vascular disease of extremity with claudication;  Neuropathy involving both lower extremities; Edema of both lower extremities   > (+) pain and numbness of toes of both feet   > PVL arterial doppler of both lower extremities with exercise (9/12/2019) showed:    > Moderate arterial insufficiency in the right lower extremity at the level of the trifurcation at rest.     > Severe arterial insufficiency in the left lower extremity at the level of the trifurcation at rest.   > PVL arterial doppler of both lower extremities with exercise (10/22/2020) showed:    > Mild right lower extremity arterial insufficiency of indeterminate level at rest.     > Moderate left lower extremity arterial insufficiency involving the femoral-popliteal segment at rest.     > The ankle brachial indices post exercise were unreliable as described. No evidence of inflow involvement.   > Planned to start patient on Cilostazol for the claudication symptoms but patient already on Apixaban + Clopidogrel; addition of Cilostazol may increase risk for bleeding; will hold off on adding Cilostazol for now   > As per the last Progress Note by Dr. Bhargav Samano (dated 10/22/2020):  \"Pt major symptom is not related to his PAD but sounds more like neuropathy\"   > On 6/14/2021, started a trial of Gabapentin 100 mg PO BID   > Upon discharge from the ARU, the patient will need to follow up with Vascular Surgery (Dr. Bhargav Samano)   > On 6/18/2021, increased Gabapentin from 100 mg PO BID to 100 mg PO TID   > On 6/28/2021, increased Gabapentin from 100 mg to 300 mg PO TID   > On 7/7/2021, started:    > Furosemide 40 mg PO once daily x 5 days    > Nitroglycerin 2% ointment, applied to bilateral popliteal and calf areas BID   > Continue:    > Gabapentin 300 mg PO TID    > Furosemide 40 mg PO once daily x 2 more doses    > Nitroglycerin 2% ointment, applied to bilateral popliteal and calf areas BID    > Transient bradycardia, while on Carvedilol 25 mg PO BID with meals (6/21/2021)   > On 6/21/2021, Carvedilol was held   > On 6/26/2021, Cardiology restarted the patient on Carvedilol 3.125 mg PO BID with meals   > Instructions of Cardiology:     > high dose due to risk for pacemaker and ongoing infeciton    > if heart rate stays consistently > 110 bpm, can increase to 6.25 mg BID   > Continue Carvedilol 3.125 mg PO BID with meals (8AM, 5PM)    > Loose stools, most likely side effect of oral antibiotics   > On 7/2/2021, started Loperamide 2 mg PO TID   > On 7/5/2021, increased Loperamide from 2 mg PO TID to 2 mg PO 4 times daily   > On 7/8/2021, increased Loperamide from 2 mg to 4 mg PO 4 times daily   > On 7/9/2021, changed Loperamide from 4 mg PO 4 times daily to:    > Loperamide 4 mg PO BID (6AM, 4PM)    > Loperamide 2 mg PO BID (11AM, 9PM)   > Continue:    > Loperamide 4 mg PO BID (6AM, 4PM)    > Loperamide 2 mg PO BID (11AM, 9PM)    > Analgesia   > Continue Acetaminophen 650 mg PO q 4 hr PRN for pain     > Diet:   > Specifications: Low fiber/No added salt (3-4 grams)   > Solids (consistency): Regular    > Liquids (consistency): Thin    > Fluid restriction: None    9. Discharge Planning:  Discharge date  7/13/2021 (Tuesday)   Discharge location  [x] Home     [] [facility]    [] Other:    Follow-up services  [] Outpatient      [x] Home Health       [x] Physical Therapy              [x] Occupational Therapy       [x] Speech Therapy                [] Medical Social Worker    [] Aide   [x] Skilled Nursing           [x] Medication reconciliation        [x] Disease education        [] PT/INR monitoring        [] Routine PICC line care        [] IV antibiotic administration            Antibiotic:             Stop date:        [] Tube feeding        [] Indwelling matamoros catheter care        [] Wound Care/Dressing             Instructions:       [] Other:      Follow-up appointments  1. PCP (Dr. Alex Leger)   2. Cardiology (Dr. Ange Knight)   3. Neurology (Dr. Christine Stanley)   4. Vascular Surgery (Dr. Julianna Trinh)   5.  Colorectal Surgery (Dr. Sahil Matos)        Signed:    Carl Quintana MD    July 11, 2021

## 2021-07-11 NOTE — PROGRESS NOTES
Problem: Self Care Deficits Care Plan (Adult)  Goal: *Therapy Goal (Edit Goal, Insert Text)  Description: Occupational Therapy Goals   Long Term Goals  Initiated 2021 and to be accomplished within 2 week(s) 2021   1. Pt will perform self-feeding with I.  2. Pt will perform grooming with Mod I. -Goal Met 2021  3. Pt will perform UB bathing with Mod I.-Goal Met 2021  4. Pt will perform LB bathing with Supv.  5. Pt will perform tub/shower transfer with SBA. 6. Pt will perform UB dressing with Mod I.-Goal Met 2021  7. Pt will perform LB dressing with Supv.  8. Pt will perform toileting task with Mod I.  9. Pt will perform toilet transfer with Supv. Short Term Goals   Initiated 2021 (reassessed on 2021) and to be accomplished within 7 day(s) 2021   (patient has met all of their STGs as of 2021; therapist will now address LTGs)  1. Pt will perform self-feeding with Mod I. -Goal Met 2021  2. Pt will perform grooming with set-up assistance. -Goal Met 2021  3. Pt will perform UB bathing with SBA. -Goal Met 2021  4. Pt will perform LB bathing with CGA w/ use of AE as needed. -Goal Met 2021  5. Pt will perform tub/shower transfer with CGA. -Goal Met 2021  6. Pt will perform UB dressing with Mod I.-Goal Met 2021  7. Pt will perform LB dressing with CGA w/ use of AE as needed. -Goal Met 2021  8. Pt will perform toileting task with CGA. -Goal Met 2021  9. Pt will perform toilet transfer with CGA w/ least restrictive AD. -Goal Met 2021        Outcome: Progressing Towards Goal   Occupational Therapy TREATMENT    Patient: Kunal Bonilla   80 y.o. Patient identified with name and : yes    Date: 2021    First Tx Session  Time In: 80  Time Out[de-identified] 2674    Diagnosis: Diverticulitis of large intestine with abscess [K57.20]   Precautions: Fall  Chart, occupational therapy assessment, plan of care, and goals were reviewed.      Pain:  Pt reports 0/10 pain or discomfort prior to treatment. Pt reports 0/10 pain or discomfort post treatment. Intervention Provided: n/a      SUBJECTIVE:   Patient stated i'm going to have someone install a walk in shower.     OBJECTIVE DATA SUMMARY:     THERAPEUTIC ACTIVITY Daily Assessment    Pt completing IADL simulation removing ~6 clothing items from top load washer, to dryer. Removes from dryer. Standing at table to fold clothing items. Completed with SBA using RW    Completes tub transfer while wearing non-skid socks using RW and TTB with SBA    Completes kitchen navigation, reporting OT will be bringing muffins and chocolate chips for therapy tomorrow. Pt wanting to ensure muffin tin is available. Able to reach into upper cabinets. THERAPEUTIC EXERCISE Daily Assessment    Completes UB/LB endurance/strength activity using BioFit x10min. Pt requesting increased resistance as activity progressed. Pt completed functional mobility using RW to navigate hallways. VC for obstacle navigation to include overstepping on rugs and anticipating hand placement. Decreased speed toward end of mobility. SBA-CGA      ASSESSMENT:  Pt with limited BLE strength impacting tub transfer. Pt reports he is going to have his shower remodeled to a walk in shower however has not contacted anyone yet. Would benefit from TTB to encourage safety with tub transfer. Progression toward goals:  [x]          Improving appropriately and progressing toward goals  []          Improving slowly and progressing toward goals  []          Not making progress toward goals and plan of care will be adjusted     PLAN:  Patient continues to benefit from skilled intervention to address the above impairments. Continue treatment per established plan of care.   Discharge Recommendations:  Home Health  Further Equipment Recommendations for Discharge:  transfer bench     Activity Tolerance:  Good      Estimated LOS:Anticipated d/c 7/13    Please refer to the flowsheet for vital signs taken during this treatment. After treatment:   [x]  Patient left in no apparent distress sitting up in chair   []  Patient left in no apparent distress in bed  [x]  Call bell left within reach  []  Nursing notified  []  Caregiver present  [x] Chair alarm activated    COMMUNICATION/EDUCATION:   [x] Home safety education was provided and the patient/caregiver indicated understanding. [x] Patient/family have participated as able in goal setting and plan of care. [x] Patient/family agree to work toward stated goals and plan of care. [] Patient understands intent and goals of therapy, but is neutral about his/her participation. [] Patient is unable to participate in goal setting and plan of care.       Grace Drew, OT

## 2021-07-11 NOTE — ROUTINE PROCESS
SHIFT CHANGE NOTE FOR [facility]    Bedside and Verbal shift change report given to Yajaira Hayes RN (oncoming nurse) by Rey Mariano RN (offgoing nurse). Report included the following information SBAR, Kardex, MAR and Recent Results. Situation:   Code Status: Full Code   Hospital Day: 12   Problem List:   Hospital Problems  Date Reviewed: 7/11/2021        Codes Class Noted POA    Edema of both lower extremities (Chronic) ICD-10-CM: R60.0  ICD-9-CM: 832. 3  Unknown Yes        Loose stools ICD-10-CM: R19.5  ICD-9-CM: 787.7  7/1/2021 Yes        Mesenteric abscess ([facility] [address] ICD-10-CM: K65.1  ICD-9-CM: 567.22  6/21/2021 Yes        * (Principal) Diverticulitis of large intestine with abscess ICD-10-CM: K57.20  ICD-9-CM: 562.11, 569.5  6/21/2021 Yes        History of sinus bradycardia ICD-10-CM: Z86.79  ICD-9-CM: V12.59  6/21/2021 Yes    Overview Signed 6/29/2021 10:02 PM by Kamila Peña MD     Attributed to Carvedilol 25 mg PO BID with meals             Generalized weakness ICD-10-CM: R53.1  ICD-9-CM: 780.79  6/21/2021 Yes        Peripheral vascular disease of extremity with claudication (HCC) (Chronic) ICD-10-CM: I73.9  ICD-9-CM: 443.9  6/14/2021 Yes        Mixed hyperlipidemia (Chronic) ICD-10-CM: P47.0  ICD-9-CM: 272.2  Unknown Yes        Heart failure with preserved left ventricular function (HFpEF) (HCC) (Chronic) ICD-10-CM: I50.30  ICD-9-CM: 428.9  Unknown Yes    Overview Signed 6/7/2021 11:03 PM by Kamila Peña MD     2D echocardiogram (5/18/2021) showed EF 50%; concentric LV hypertrophy with a severely thickened septal wall and mildly thickened posterior wall; left atrial chamber is severely enlarged; right atrial chamber volume is moderately enlarged; no mass, shunts, or thrombi.               Coronary artery disease involving native coronary artery of native heart (Chronic) ICD-10-CM: I25.10  ICD-9-CM: 414.01  Unknown Yes        Diverticulosis of sigmoid colon (Chronic) ICD-10-CM: K57.30  ICD-9-CM: 562.10 5/22/2021 Yes        Anticoagulated by anticoagulation treatment ICD-10-CM: Z79.01  ICD-9-CM: V58.61  5/19/2021 Yes    Overview Signed 6/7/2021 10:48 PM by Dorsey Apley, MD     On Apixaban             On clopidogrel therapy ICD-10-CM: Z79.01  ICD-9-CM: V58.61  5/19/2021 Yes        Status post insertion of drug eluting coronary artery stent ICD-10-CM: Z95.5  ICD-9-CM: V45.82  5/18/2021 Yes    Overview Signed 6/18/2021  1:38 PM by Dorsey Apley, MD     S/P PCI to proximal SVG with 3.5 x 12 mm Juan J drug-eluting stent; PCI to mid SVG with 3.5 x 34 mm Juan J drug-eluting stent; PCI to distal SVG with 3.5 x 15 mm Juan J drug-eluting stent;  Balloon angioplasty to distal SVG anastomosis (5/18/2021 - Dr. Mily Conway)              Non-ST elevation (NSTEMI) myocardial infarction [facility]) ICD-10-CM: I21.4  ICD-9-CM: 410.70  5/17/2021 Yes        Paroxysmal atrial fibrillation ([facility] [address] ICD-10-CM: I48.0  ICD-9-CM: 427.31  5/17/2021 Yes        Impaired mobility and ADLs ICD-10-CM: Z74.09, Z78.9  ICD-9-CM: V49.89  5/17/2021 Yes        Neuropathy involving both lower extremities (Chronic) ICD-10-CM: P78.59  ICD-9-CM: 356.9  10/22/2020 Yes        Essential hypertension (Chronic) ICD-10-CM: I10  ICD-9-CM: 401.9  Unknown Yes        History of coronary artery bypass graft ICD-10-CM: Z95.1  ICD-9-CM: V45.81  2001 Yes    Overview Signed 6/7/2021 11:04 PM by Dorsey Apley, MD     [facility]                   Background:   Past Medical History:   Past Medical History:   Diagnosis Date    Acute embolic stroke ([facility] [address] 5/96/1314    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, brainstem and cerebellum) without residual deficit    Anticoagulated by anticoagulation treatment 5/19/2021    On Apixaban    Benign prostatic hyperplasia with urinary retention     Chronic obstructive pulmonary disease (COPD) ([facility] [address]     Coronary artery disease involving native coronary artery of native heart     Diverticulitis of large intestine with abscess 6/21/2021    Diverticulosis of sigmoid colon 5/22/2021    Edema of both lower extremities     Essential hypertension     Heart failure with preserved left ventricular function (HFpEF) ([address]     2D echocardiogram (5/18/2021) showed EF 50%; concentric LV hypertrophy with a severely thickened septal wall and mildly thickened posterior wall; left atrial chamber is severely enlarged; right atrial chamber volume is moderately enlarged; no mass, shunts, or thrombi.     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[address] 6/21/2021    Neuropathy involving both lower extremities 10/22/2020    Non-ST elevation (NSTEMI) myocardial infarction ([address] 5/17/2021    On clopidogrel therapy 5/19/2021    Paroxysmal atrial fibrillation ([address] 5/17/2021    Peripheral vascular disease of extremity with claudication ([address] 6/14/2021    Urinary retention         Assessment:   Changes in Assessment throughout shift: No change to previous assessment     Patient has a central line: no Reasons if yes: na  Insertion date:na Last dressing date:na   Patient has [place] Cath: no Reasons if yes: na   Insertion date:na  Shift [place] care completed: NO     Last Vitals:     Vitals:    07/10/21 2100 07/11/21 0635 07/11/21 0724 07/11/21 1646   BP: 136/81 123/75 135/83 138/78   Pulse: 100 86 87 91   Resp: 18  16 17   Temp: 98.2 °F (36.8 °C)  97 °F (36.1 °C) 97.8 °F (36.6 °C)   SpO2: 98%   98%   Weight:       Height:            PAIN    Pain Assessment    Pain Intensity 1: 0 (07/11/21 1620) Pain Intensity 1: 2 (12/29/14 1105)    Pain Location 1: Foot Pain Location 1: Abdomen    Pain Intervention(s) 1: Medication (see MAR) Pain Intervention(s) 1: Medication (see MAR)  Patient Stated Pain Goal: 0 Patient Stated Pain Goal: 0  o Intervention effective: yes  o Other actions taken for pain:       Skin Assessment  Skin color Skin Color: Appropriate for ethnicity  Condition/Temperature Skin Condition/Temp: Dry, Warm  Integrity Skin Integrity: Intact  Turgor Turgor: Non-tenting  Weekly Pressure Ulcer Documentation  Pressure  Injury Documentation: No Pressure Injury Noted-Pressure Ulcer Prevention Initiated  Wound Prevention & Protection Methods  Orientation of wound Orientation of Wound Prevention: Posterior  Location of Prevention Location of Wound Prevention: Sacrum/Coccyx  Dressing Present Dressing Present : No  Dressing Status Dressing Status: Intact  Wound Offloading Wound Offloading (Prevention Methods): Bed, pressure reduction mattress     INTAKE/OUPUT  Date 07/10/21 1900 - 07/11/21 0659 07/11/21 0700 - 07/12/21 0659   Shift 5367-2839 24 Hour Total 9072-4383 8049-1698 24 Hour Total   INTAKE   Shift Total(mL/kg)        OUTPUT   Urine(mL/kg/hr)          Urine Occurrence(s) 5 x 6 x 4 x  4 x   Stool          Stool Occurrence(s) 1 x 1 x 2 x  2 x   Shift Total(mL/kg)        NET        Weight (kg) 80.8 80.8 80.8 80.8 80.8       Recommendations:  1. Patient needs and requests: na    2. Pending tests/procedures: na     3. Functional Level/Equipment: Partial (one person) / Wheelchair    Fall Precautions:   Fall risk precautions were reinforced with the patient; he was instructed to call for help prior to getting up. The following fall risk precautions were continued: bed/ chair alarms, door signage, yellow bracelet and socks as well as update of the [employer] tool in the patient's room. [place] Score: 3    HEALS Safety Check    A safety check occurred in the patient's room between off going nurse and oncoming nurse listed above.     The safety check included the below items  Area Items   H  High Alert Medications - Verify all high alert medication drips (heparin, PCA, etc.)   E  Equipment - Suction is set up for ALL patients (with laila)  - Red plugs utilized for all equipment (IV pumps, etc.)  - WOWs wiped down at end of shift.  - Room stocked with oxygen, suction, and other unit-specific supplies   A  Alarms - Bed alarm is set for fall risk patients  - Ensure chair alarm is in place and activated if patient is up in a chair   L  Lines - Check IV for any infiltration  - Taveras bag is empty if patient has a Taveras   - Tubing and IV bags are labeled   S  Safety   - Room is clean, patient is clean, and equipment is clean. - Hallways are clear from equipment besides carts. - Fall bracelet on for fall risk patients  - Ensure room is clear and free of clutter  - Suction is set up for ALL patients (with laila)  - Hallways are clear from equipment besides carts.    - Isolation precautions followed, supplies available outside room, sign posted     Bud Rodas RN

## 2021-07-12 NOTE — ROUTINE PROCESS
SHIFT CHANGE NOTE FOR [facility]    Bedside and Verbal shift change report given to Bunny Spivey RN (oncoming nurse) by Drew Riley RN (offgoing nurse). Report included the following information SBAR, Kardex, MAR and Recent Results. Situation:   Code Status: Full Code   Hospital Day: 13   Problem List:   Hospital Problems  Date Reviewed: 7/11/2021        Codes Class Noted POA    Edema of both lower extremities (Chronic) ICD-10-CM: R60.0  ICD-9-CM: 457. 3  Unknown Yes        Loose stools ICD-10-CM: R19.5  ICD-9-CM: 787.7  7/1/2021 Yes        Mesenteric abscess ([facility] [address] ICD-10-CM: K65.1  ICD-9-CM: 567.22  6/21/2021 Yes        * (Principal) Diverticulitis of large intestine with abscess ICD-10-CM: K57.20  ICD-9-CM: 562.11, 569.5  6/21/2021 Yes        History of sinus bradycardia ICD-10-CM: Z86.79  ICD-9-CM: V12.59  6/21/2021 Yes    Overview Signed 6/29/2021 10:02 PM by General Romina MD     Attributed to Carvedilol 25 mg PO BID with meals             Generalized weakness ICD-10-CM: R53.1  ICD-9-CM: 780.79  6/21/2021 Yes        Peripheral vascular disease of extremity with claudication (HCC) (Chronic) ICD-10-CM: I73.9  ICD-9-CM: 443.9  6/14/2021 Yes        Mixed hyperlipidemia (Chronic) ICD-10-CM: W62.2  ICD-9-CM: 272.2  Unknown Yes        Heart failure with preserved left ventricular function (HFpEF) (HCC) (Chronic) ICD-10-CM: I50.30  ICD-9-CM: 428.9  Unknown Yes    Overview Signed 6/7/2021 11:03 PM by General Romina MD     2D echocardiogram (5/18/2021) showed EF 50%; concentric LV hypertrophy with a severely thickened septal wall and mildly thickened posterior wall; left atrial chamber is severely enlarged; right atrial chamber volume is moderately enlarged; no mass, shunts, or thrombi.               Coronary artery disease involving native coronary artery of native heart (Chronic) ICD-10-CM: I25.10  ICD-9-CM: 414.01  Unknown Yes        Diverticulosis of sigmoid colon (Chronic) ICD-10-CM: K57.30  ICD-9-CM: 562.10 5/22/2021 Yes        Anticoagulated by anticoagulation treatment ICD-10-CM: Z79.01  ICD-9-CM: V58.61  5/19/2021 Yes    Overview Signed 6/7/2021 10:48 PM by Rocael Bear MD     On Apixaban             On clopidogrel therapy ICD-10-CM: Z79.01  ICD-9-CM: V58.61  5/19/2021 Yes        Status post insertion of drug eluting coronary artery stent ICD-10-CM: Z95.5  ICD-9-CM: V45.82  5/18/2021 Yes    Overview Signed 6/18/2021  1:38 PM by Rocael Bear MD     S/P PCI to proximal SVG with 3.5 x 12 mm Juan J drug-eluting stent; PCI to mid SVG with 3.5 x 34 mm Juan J drug-eluting stent; PCI to distal SVG with 3.5 x 15 mm Juan J drug-eluting stent;  Balloon angioplasty to distal SVG anastomosis (5/18/2021 - Dr. Michelle Barrett)              Non-ST elevation (NSTEMI) myocardial infarction [facility]) ICD-10-CM: I21.4  ICD-9-CM: 410.70  5/17/2021 Yes        Paroxysmal atrial fibrillation ([facility] [address] ICD-10-CM: I48.0  ICD-9-CM: 427.31  5/17/2021 Yes        Impaired mobility and ADLs ICD-10-CM: Z74.09, Z78.9  ICD-9-CM: V49.89  5/17/2021 Yes        Neuropathy involving both lower extremities (Chronic) ICD-10-CM: X37.68  ICD-9-CM: 356.9  10/22/2020 Yes        Essential hypertension (Chronic) ICD-10-CM: I10  ICD-9-CM: 401.9  Unknown Yes        History of coronary artery bypass graft ICD-10-CM: Z95.1  ICD-9-CM: V45.81  2001 Yes    Overview Signed 6/7/2021 11:04 PM by Rocael Bear MD     [facility]                   Background:   Past Medical History:   Past Medical History:   Diagnosis Date    Acute embolic stroke ([facility] [address] 4/02/8350    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, brainstem and cerebellum) without residual deficit    Anticoagulated by anticoagulation treatment 5/19/2021    On Apixaban    Benign prostatic hyperplasia with urinary retention     Chronic obstructive pulmonary disease (COPD) ([facility] [address]     Coronary artery disease involving native coronary artery of native heart     Diverticulitis of large intestine with abscess 6/21/2021    Diverticulosis of sigmoid colon 5/22/2021    Edema of both lower extremities     Essential hypertension     Heart failure with preserved left ventricular function (HFpEF) ([address]     2D echocardiogram (5/18/2021) showed EF 50%; concentric LV hypertrophy with a severely thickened septal wall and mildly thickened posterior wall; left atrial chamber is severely enlarged; right atrial chamber volume is moderately enlarged; no mass, shunts, or thrombi.     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[address] 6/21/2021    Neuropathy involving both lower extremities 10/22/2020    Non-ST elevation (NSTEMI) myocardial infarction ([address] 5/17/2021    On clopidogrel therapy 5/19/2021    Paroxysmal atrial fibrillation ([address] 5/17/2021    Peripheral vascular disease of extremity with claudication ([address] 6/14/2021    Urinary retention         Assessment:   Changes in Assessment throughout shift: No change to previous assessment     Patient has a central line: no Reasons if yes: na  Insertion date:na Last dressing date:na   Patient has [place] Cath: no Reasons if yes: na   Insertion date:na  Shift [place] care completed: NO     Last Vitals:     Vitals:    07/11/21 1646 07/11/21 2100 07/12/21 0712 07/12/21 1556   BP: 138/78 129/76 132/74 120/68   Pulse: 91 78 92 91   Resp: 17 18 18 17   Temp: 97.8 °F (36.6 °C) 97.3 °F (36.3 °C) 98.4 °F (36.9 °C) 98.3 °F (36.8 °C)   SpO2: 98% 99% 95% 95%   Weight:       Height:            PAIN    Pain Assessment    Pain Intensity 1: 0 (07/12/21 1556) Pain Intensity 1: 2 (12/29/14 1105)    Pain Location 1: Foot Pain Location 1: Abdomen    Pain Intervention(s) 1: Medication (see MAR) Pain Intervention(s) 1: Medication (see MAR)  Patient Stated Pain Goal: 0 Patient Stated Pain Goal: 0  o Intervention effective: yes  o Other actions taken for pain:       Skin Assessment  Skin color Skin Color: Appropriate for ethnicity  Condition/Temperature Skin Condition/Temp: Dry, Warm  Integrity Skin Integrity: Intact  Turgor Turgor: Non-tenting  Weekly Pressure Ulcer Documentation  Pressure  Injury Documentation: No Pressure Injury Noted-Pressure Ulcer Prevention Initiated  Wound Prevention & Protection Methods  Orientation of wound Orientation of Wound Prevention: Posterior  Location of Prevention Location of Wound Prevention: Buttocks, Sacrum/Coccyx  Dressing Present Dressing Present : No  Dressing Status Dressing Status: Intact  Wound Offloading Wound Offloading (Prevention Methods): Bed, pressure reduction mattress     INTAKE/OUPUT  Date 07/11/21 0700 - 07/12/21 0659 07/12/21 0700 - 07/13/21 0659   Shift 7338-1015 5067-2247 24 Hour Total 9345-8033 7585-8081 24 Hour Total   INTAKE   P.O.    490  490     P. O.    490  490   Shift Total(mL/kg)    490(6.1)  490(6.1)   OUTPUT   Urine(mL/kg/hr)           Urine Occurrence(s) 4 x 3 x 7 x 2 x  2 x   Stool           Stool Occurrence(s) 2 x 0 x 2 x 1 x  1 x   Shift Total(mL/kg)         NET    490  490   Weight (kg) 80.8 80.8 80.8 80.8 80.8 80.8       Recommendations:  1. Patient needs and requests: na    2. Pending tests/procedures: na     3. Functional Level/Equipment: Partial (one person) / Wheelchair    Fall Precautions:   Fall risk precautions were reinforced with the patient; he was instructed to call for help prior to getting up. The following fall risk precautions were continued: bed/ chair alarms, door signage, yellow bracelet and socks as well as update of the [place] tool in the patient's room.    [place] Score: 3    HEALS Safety Check    A safety check occurred in the patient's room between off going nurse and oncoming nurse listed above. The safety check included the below items  Area Items   H  High Alert Medications - Verify all high alert medication drips (heparin, PCA, etc.)   E  Equipment - Suction is set up for ALL patients (with laila)  - Red plugs utilized for all equipment (IV pumps, etc.)  - WOWs wiped down at end of shift.  - Room stocked with oxygen, suction, and other unit-specific supplies   A  Alarms - Bed alarm is set for fall risk patients  - Ensure chair alarm is in place and activated if patient is up in a chair   L  Lines - Check IV for any infiltration  - Taveras bag is empty if patient has a Taveras   - Tubing and IV bags are labeled   S  Safety   - Room is clean, patient is clean, and equipment is clean. - Hallways are clear from equipment besides carts. - Fall bracelet on for fall risk patients  - Ensure room is clear and free of clutter  - Suction is set up for ALL patients (with laila)  - Hallways are clear from equipment besides carts.    - Isolation precautions followed, supplies available outside room, sign posted     Eron Cornell RN

## 2021-07-12 NOTE — PROGRESS NOTES
[facility] PHYSICAL REHABILITATION  [address] [place] [address], [address]     INPATIENT REHABILITATION  DAILY PROGRESS NOTE     Date: 7/12/2021    Name: Heather Strickland Age / Sex: 80 y.o. 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      Subjective:     Patient is sitting in bed in no apparent distress, awake and alert and follows commands      Objective:     Vital Signs:  Patient Vitals for the past 24 hrs:   BP Temp Pulse Resp SpO2   07/12/21 0712 132/74 98.4 °F (36.9 °C) 92 18 95 %   07/11/21 2100 129/76 97.3 °F (36.3 °C) 78 18 99 %   07/11/21 1646 138/78 97.8 °F (36.6 °C) 91 17 98 %      General:  Awake, alert  Cardiovascular:  S1S2+, RRR  Pulmonary:  CTA b/l  GI:  Soft, BS+, NT, ND  Extremities:  trace edema    Current Medications:  Current Facility-Administered Medications   Medication Dose Route Frequency    loperamide (IMODIUM) capsule 4 mg  4 mg Oral BID    loperamide (IMODIUM) capsule 2 mg  2 mg Oral BID    nitroglycerin (NITROBID) 2 % ointment 2 Inch  2 Inch Topical BID    pneumococcal 23-valent (PNEUMOVAX 23) injection 0.5 mL  0.5 mL IntraMUSCular PRIOR TO DISCHARGE    acetaminophen (TYLENOL) tablet 650 mg  650 mg Oral Q4H PRN    bisacodyL (DULCOLAX) tablet 10 mg  10 mg Oral Q48H PRN    amoxicillin-clavulanate (AUGMENTIN) 500-125 mg per tablet 1 Tablet  1 Tablet Oral BID WITH MEALS    cefpodoxime (VANTIN) tablet 200 mg  200 mg Oral Q12H    carvediloL (COREG) tablet 3.125 mg  3.125 mg Oral BID WITH MEALS    clopidogreL (PLAVIX) tablet 75 mg  75 mg Oral DAILY WITH BREAKFAST    rosuvastatin (CRESTOR) tablet 10 mg  10 mg Oral DAILY    nitroglycerin (NITROSTAT) tablet 0.4 mg  0.4 mg SubLINGual PRN    arformoteroL (BROVANA) neb solution 15 mcg  15 mcg Nebulization BID RT    ipratropium (ATROVENT) 0.02 % nebulizer solution 0.5 mg  0.5 mg Nebulization TID    albuterol (PROVENTIL VENTOLIN) nebulizer solution 2.5 mg  2.5 mg Nebulization Q4H PRN    apixaban (ELIQUIS) tablet 5 mg  5 mg Oral BID    multivitamin, tx-iron-ca-min (THERA-M w/ IRON) tablet 1 Tablet  1 Tablet Oral DAILY    lisinopriL (PRINIVIL, ZESTRIL) tablet 5 mg  5 mg Oral DAILY    tamsulosin (FLOMAX) capsule 0.4 mg  0.4 mg Oral DAILY WITH DINNER    gabapentin (NEURONTIN) capsule 300 mg  300 mg Oral TID       Allergies: Allergies   Allergen Reactions    Lipitor [Atorvastatin] Rash    Norvasc [Amlodipine] Rash       Lab/Data Review:  No results found for this or any previous visit (from the past 24 hour(s)). Assessment:     Primary Rehabilitation Diagnosis  1. Generalized weakness with Impaired mobility and ADLs  2.  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    Comorbidities  Patient Active Problem List   Diagnosis Code    Urinary retention R33.9    Essential hypertension I10    Non-ST elevation (NSTEMI) myocardial infarction ([facility] [address] I21.4    Paroxysmal atrial fibrillation ([facility]) I48.0    Anticoagulated by anticoagulation treatment Z79.01    Mixed hyperlipidemia E78.2    Benign prostatic hyperplasia with urinary retention N40.1, R33.8    Malignant neoplasm of lower lobe of right lung ([facility]) C34.31    Heart failure with preserved left ventricular function (HFpEF) ([facility]) I50.30    Coronary artery disease involving native coronary artery of native heart I25.10    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    Chronic obstructive pulmonary disease (COPD) ([facility]) J44.9    History of carotid stenosis Z86.79    History of renal artery stenosis Z86.79    On clopidogrel therapy Z79.01    History of gross hematuria I22.938    Acute embolic stroke ([facility]) O48.7    Leukocytosis D72.829    Peripheral vascular disease of extremity with claudication ([facility]) I73.9    Neuropathy involving both lower extremities G57.93    Impaired mobility and ADLs Z74.09, Z78.9    Status post insertion of drug eluting coronary artery stent Z95.5    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 Justification for continued stay: Good progression towards established rehabilitation goals. 2. Medical Issues being followed closely:    [x]  Fall and safety precautions     []  Wound Care     [x]  Bowel and Bladder Function     [x]  Fluid Electrolyte and Nutrition Balance     [x]  Pain Control      3. Issues that 24 hour rehabilitation nursing is following:    [x]  Fall and safety precautions     []  Wound Care     [x]  Bowel and Bladder Function     [x]  Fluid Electrolyte and Nutrition Balance     [x]  Pain Control      [x]  Assistance with and education on in-room safety with transfers to and from the bed, wheelchair, toilet and shower. 4. Acute rehabilitation plan of care:    [x]  Continue current care and rehab. [x]  Physical Therapy           [x]  Occupational Therapy           [x]  Speech Therapy     []  Hold Rehab until further notice     5. Medications:    [x]  MAR Reviewed     [x]  Continue Present Medications     6. DVT Prophylaxis:      []  Enoxaparin     []  Unfractionated Heparin     []  Warfarin     [x]  NOAC     []  MARTHA Stockings     []  Sequential Compression Device     []  None     7. Code status    [x]  Full code     []  Partial code     []  Do not intubate     []  Do not resuscitate     8.  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    > Continue:    > Carvedilol 3.125 mg PO BID with meals (8AM, 5PM)    > Lisinopril 5 mg PO once daily (9AM)    > Chronic obstructive pulmonary disease (COPD)   > Continue:    > Arformoterol nebulization BID    > Ipratropium nebulization TID    > Albuterol nebulization q 4 hr PRN for shortness of breath/wheezing    > Essential hypertension   > On Coreg and lisinopril    > Mixed hyperlipidemia   > On Crestor    > Non-ST elevation (NSTEMI) myocardial infarction; Coronary artery disease; History of CABG; S/P PCI to proximal SVG with 3.5 x 12 mm [place] drug-eluting stent; PCI to mid SVG with 3.5 x 34 mm [place] drug-eluting stent; PCI to distal SVG with 3.5 x 15 mm [place] drug-eluting stent; Balloon angioplasty to distal SVG anastomosis (5/18/2021 - Dr. Dave Ballesteros)    > Continue:    On Coreg, Plavix, Eliquis, lisinopril, Crestor, nitroglycerin    > Paroxysmal atrial fibrillation, anticoagulated on Apixaban   > On Eliquis    > Peripheral vascular disease of extremity with claudication;  Neuropathy involving both lower extremities; Edema of both lower extremities   Continue Neurontin    > Transient bradycardia, while on Carvedilol 25 mg PO BID with meals (6/21/2021)   On low-dose carvedilol      > Analgesia   > Tylenol as needed    Discussed discharge plans with the patient.   Discussed with     Signed:    Rosalie Esteves MD      July 12, 2021

## 2021-07-12 NOTE — ROUTINE PROCESS
SHIFT CHANGE NOTE FOR [facility]    Bedside and Verbal shift change report given to Stevo Simmons RN (oncoming nurse) by Leticia Kohler RN (offgoing nurse). Report included the following information SBAR, Kardex, MAR and Recent Results. Situation:   Code Status: Full Code   Hospital Day: 13   Problem List:   Hospital Problems  Date Reviewed: 7/11/2021        Codes Class Noted POA    Edema of both lower extremities (Chronic) ICD-10-CM: R60.0  ICD-9-CM: 402. 3  Unknown Yes        Loose stools ICD-10-CM: R19.5  ICD-9-CM: 787.7  7/1/2021 Yes        Mesenteric abscess ([facility] [address] ICD-10-CM: K65.1  ICD-9-CM: 567.22  6/21/2021 Yes        * (Principal) Diverticulitis of large intestine with abscess ICD-10-CM: K57.20  ICD-9-CM: 562.11, 569.5  6/21/2021 Yes        History of sinus bradycardia ICD-10-CM: Z86.79  ICD-9-CM: V12.59  6/21/2021 Yes    Overview Signed 6/29/2021 10:02 PM by Sherice Denise MD     Attributed to Carvedilol 25 mg PO BID with meals             Generalized weakness ICD-10-CM: R53.1  ICD-9-CM: 780.79  6/21/2021 Yes        Peripheral vascular disease of extremity with claudication (HCC) (Chronic) ICD-10-CM: I73.9  ICD-9-CM: 443.9  6/14/2021 Yes        Mixed hyperlipidemia (Chronic) ICD-10-CM: Y91.3  ICD-9-CM: 272.2  Unknown Yes        Heart failure with preserved left ventricular function (HFpEF) (HCC) (Chronic) ICD-10-CM: I50.30  ICD-9-CM: 428.9  Unknown Yes    Overview Signed 6/7/2021 11:03 PM by Sherice Denise MD     2D echocardiogram (5/18/2021) showed EF 50%; concentric LV hypertrophy with a severely thickened septal wall and mildly thickened posterior wall; left atrial chamber is severely enlarged; right atrial chamber volume is moderately enlarged; no mass, shunts, or thrombi.               Coronary artery disease involving native coronary artery of native heart (Chronic) ICD-10-CM: I25.10  ICD-9-CM: 414.01  Unknown Yes        Diverticulosis of sigmoid colon (Chronic) ICD-10-CM: K57.30  ICD-9-CM: 562.10 5/22/2021 Yes        Anticoagulated by anticoagulation treatment ICD-10-CM: Z79.01  ICD-9-CM: V58.61  5/19/2021 Yes    Overview Signed 6/7/2021 10:48 PM by Marylou Dubon MD     On Apixaban             On clopidogrel therapy ICD-10-CM: Z79.01  ICD-9-CM: V58.61  5/19/2021 Yes        Status post insertion of drug eluting coronary artery stent ICD-10-CM: Z95.5  ICD-9-CM: V45.82  5/18/2021 Yes    Overview Signed 6/18/2021  1:38 PM by Marylou Dubon MD     S/P PCI to proximal SVG with 3.5 x 12 mm [place] drug-eluting stent; PCI to mid SVG with 3.5 x 34 mm [place] drug-eluting stent; PCI to distal SVG with 3.5 x 15 mm [place] drug-eluting stent;  Balloon angioplasty to distal SVG anastomosis (5/18/2021 - Dr. Dave Ballesteros)              Non-ST elevation (NSTEMI) myocardial infarction [facility]) ICD-10-CM: I21.4  ICD-9-CM: 410.70  5/17/2021 Yes        Paroxysmal atrial fibrillation ([facility] [address] ICD-10-CM: I48.0  ICD-9-CM: 427.31  5/17/2021 Yes        Impaired mobility and ADLs ICD-10-CM: Z74.09, Z78.9  ICD-9-CM: V49.89  5/17/2021 Yes        Neuropathy involving both lower extremities (Chronic) ICD-10-CM: Q45.04  ICD-9-CM: 356.9  10/22/2020 Yes        Essential hypertension (Chronic) ICD-10-CM: I10  ICD-9-CM: 401.9  Unknown Yes        History of coronary artery bypass graft ICD-10-CM: Z95.1  ICD-9-CM: V45.81  2001 Yes    Overview Signed 6/7/2021 11:04 PM by Marylou Dubon MD     [facility]                   Background:   Past Medical History:   Past Medical History:   Diagnosis Date    Acute embolic stroke ([facility] [address] 4/11/0007    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, brainstem and cerebellum) without residual deficit    Anticoagulated by anticoagulation treatment 5/19/2021    On Apixaban    Benign prostatic hyperplasia with urinary retention     Chronic obstructive pulmonary disease (COPD) ([facility] [address]     Coronary artery disease involving native coronary artery of native heart     Diverticulitis of large intestine with abscess 6/21/2021    Diverticulosis of sigmoid colon 5/22/2021    Edema of both lower extremities     Essential hypertension     Heart failure with preserved left ventricular function (HFpEF) ([address]     2D echocardiogram (5/18/2021) showed EF 50%; concentric LV hypertrophy with a severely thickened septal wall and mildly thickened posterior wall; left atrial chamber is severely enlarged; right atrial chamber volume is moderately enlarged; no mass, shunts, or thrombi.     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[address] 6/21/2021    Neuropathy involving both lower extremities 10/22/2020    Non-ST elevation (NSTEMI) myocardial infarction ([address] 5/17/2021    On clopidogrel therapy 5/19/2021    Paroxysmal atrial fibrillation ([address] 5/17/2021    Peripheral vascular disease of extremity with claudication ([address] 6/14/2021    Urinary retention         Assessment:   Changes in Assessment throughout shift: No change to previous assessment     Patient has a central line: no Reasons if yes: na  Insertion date:na Last dressing date:na   Patient has [place] Cath: no Reasons if yes: na   Insertion date:na  Shift [place] care completed: NO     Last Vitals:     Vitals:    07/11/21 0724 07/11/21 1646 07/11/21 2100 07/12/21 0712   BP: 135/83 138/78 129/76 132/74   Pulse: 87 91 78 92   Resp: 16 17 18 18   Temp: 97 °F (36.1 °C) 97.8 °F (36.6 °C) 97.3 °F (36.3 °C) 98.4 °F (36.9 °C)   SpO2:  98% 99% 95%   Weight:       Height:            PAIN    Pain Assessment    Pain Intensity 1: 0 (07/12/21 0400) Pain Intensity 1: 2 (12/29/14 1105)    Pain Location 1: Foot Pain Location 1: Abdomen    Pain Intervention(s) 1: Medication (see MAR) Pain Intervention(s) 1: Medication (see MAR)  Patient Stated Pain Goal: 0 Patient Stated Pain Goal: 0  o Intervention effective: yes  o Other actions taken for pain:       Skin Assessment  Skin color Skin Color: Appropriate for ethnicity  Condition/Temperature Skin Condition/Temp: Dry, Warm  Integrity Skin Integrity: Intact  Turgor Turgor: Non-tenting  Weekly Pressure Ulcer Documentation  Pressure  Injury Documentation: No Pressure Injury Noted-Pressure Ulcer Prevention Initiated  Wound Prevention & Protection Methods  Orientation of wound Orientation of Wound Prevention: Posterior  Location of Prevention Location of Wound Prevention: Buttocks, Sacrum/Coccyx  Dressing Present Dressing Present : No  Dressing Status Dressing Status: Intact  Wound Offloading Wound Offloading (Prevention Methods): Bed, pressure redistribution/air     INTAKE/OUPUT  Date 07/11/21 0700 - 07/12/21 0659 07/12/21 0700 - 07/13/21 0659   Shift 7339-3824 7975-6904 24 Hour Total 5690-2374 8642-8195 24 Hour Total   INTAKE   Shift Total(mL/kg)         OUTPUT   Urine(mL/kg/hr)           Urine Occurrence(s) 4 x 3 x 7 x      Stool           Stool Occurrence(s) 2 x 0 x 2 x      Shift Total(mL/kg)         NET         Weight (kg) 80.8 80.8 80.8 80.8 80.8 80.8       Recommendations:  1. Patient needs and requests: na    2. Pending tests/procedures: na     3. Functional Level/Equipment: Partial (one person) /      Fall Precautions:   Fall risk precautions were reinforced with the patient; he was instructed to call for help prior to getting up. The following fall risk precautions were continued: bed/ chair alarms, door signage, yellow bracelet and socks as well as update of the Dori Spray tool in the patient's room.    [place] Score:      HEALS Safety Check    A safety check occurred in the patient's room between off going nurse and oncoming nurse listed above. The safety check included the below items  Area Items   H  High Alert Medications - Verify all high alert medication drips (heparin, PCA, etc.)   E  Equipment - Suction is set up for ALL patients (with laila)  - Red plugs utilized for all equipment (IV pumps, etc.)  - WOWs wiped down at end of shift.  - Room stocked with oxygen, suction, and other unit-specific supplies   A  Alarms - Bed alarm is set for fall risk patients  - Ensure chair alarm is in place and activated if patient is up in a chair   L  Lines - Check IV for any infiltration  - Taveras bag is empty if patient has a Taveras   - Tubing and IV bags are labeled   S  Safety   - Room is clean, patient is clean, and equipment is clean. - Hallways are clear from equipment besides carts. - Fall bracelet on for fall risk patients  - Ensure room is clear and free of clutter  - Suction is set up for ALL patients (with laila)  - Hallways are clear from equipment besides carts.    - Isolation precautions followed, supplies available outside room, sign posted     Anjelica Adams RN

## 2021-07-12 NOTE — PROGRESS NOTES
Keller Balance Scale    Patient: Morenita Conti (78 y.o. male)  Date: 7/12/2021  Diagnosis: Diverticulitis of large intestine with abscess [K57.20] Diverticulitis of large intestine with abscess  Precautions: Fall  Evaluator: Wilber Friedman PT    Sitting to standing  INSTRUCTIONS: Please stand up. Try not to use your hand for support. [] 4 able to stand without using hands and stabilize independently  [] 3 able to stand independently using hands  [x] 2 able to stand using hands after several tries  [] 1 needs minimal aid to stand or stabilize  [] 0 needs moderate or maximal assist to stand    2. Standing unsupported  INSTRUCTIONS: Please stand for two minutes without holding on. [x] 4 able to stand safely for two minutes without holding on  [] 3 able to stand for two minutes with supervision  [] 2 able to stand 30 seconds unsupported  [] 1 needs several tries to stand 30 seconds unsupported  [] 0 unable to stand 30 seconds unsupported    If a subject is able to stand 2 minutes unsupported, score full points for sitting unsupported. Proceed to item #4.    3. Sitting with back unsupported but feet supported on floor or on a stool  INSTRUCTIONS: Please sit with arms folded for 2 minutes    [x] 4 able to sit safely and securely for 2 minutes  [] 3 able to sit 2 minutes under supervision  [] 2 able to sit 30 seconds  [] 1 able to sit 10 seconds  [] 0 unable to sit without support 10 seconds    4. Standing to sitting  INSTRUCTIONS: Please sit down  [] 4 sits safely with minimal use of hands  [x] 3 controls decent by using hands  [] 2 uses back of legs against chair to control descent  [] 1 sits independently but has uncontrolled descent  [] 0 needs assist to sit    5. Transfers  INSTRUCTIONS: Arrange chair(s) for pivot transfer. Ask subject to transfer one way toward a seat with armrests and one way toward a seat without armrests.  You may use two chairs (one with and one without armrests) or a bed and a chair.  [] 4 able to transfer safely with minor use of hands  [x] 3 able to transfer safely definite need of hands  [] 2 able to tranfers with verbal cuing and/or supervision  [] 1 needs one person to assist  [] 0 needs two people to assist or supervise to be safe    6. Standing unsupported with eyes closed  INSTRUCTIONS: Please close your eyes and stand still for 10 seconds. [] 4 able to stand for 10 seconds safely  [x] 3 able to stand for 10 seconds with supervision  [] 2 able to stand 3 seconds  [] 1 unable to keep eyes closed 3 seconds but stays safely  [] 0 needs help to keep from falling    7. Standing unsupported with feet together  INSTRUCTIONS: Place your feet together and stand without holding on  [] 4 able to place feet together independently and stand 1 minute safely  [x] 3 able to place feet together independently and stand 1 minute with supervison  [] 2 able to place feet together independently but unable to hold for 30 seconds  [] 1 needs help to attain position but able to stand 15 seconds feet together  [] 0 needs help to attain position and unable to hold for 15 seconds    8. Reaching forward with outstretched arm while standing  INSTRUCTIONS: Lift arm to 90 degrees. Stretch out your fingers and reach forward as far as you can. (Examiner places a ruler at the end of fingertips when arm is at 90 degrees. Fingers should not touch the ruler while reaching forward. The recorded measure is the distance forward that the fingers reach while the subject is in the most forward lean position. When possible, ask subject to use both arms when reaching to avoid rotation of the trunk.)  [] 4 can reach forward confidently 25cm (10 inches)  [] 3 can reach forward 12 cm (5 inches)  [x] 2 can reach forward 5 cm (2 inches)  [] 1 reaches forward but needs supervision  [] 0 loses balance while trying/requires external support    9.   object from the floor from a standing position  INSTRUCTIONS:  the shoe/slipper, which is place in front of your feet  [] 4 able to  slipper safely and easily  [x] 3 able to  slipper but needs supervision  [] 2 unable to  but reaches 2-5 cm(1-2 inches) from slipper and keeps balance independently   [] 1 unable to  and needs supervision while trying  [] 0 unable to try/needs assist to keep from losing balance or falling    10. Turning to look behind over left and right shoulders while standing  INSTRUCTIONS: Turn to look directly behind you over toward the left shoulder. Repeat to the right. Examiner may pick and object to look at directly behind the subject to encourage a better twist turn. [] 4 looks behind from both sides and weight shifts well  [x] 3 looks behind one side only other side shows less weight shift  [] 2 turns sideways only but maintains balance  [] 1 needs supervision when turning  [] 0 needs assist to keep from losing balance or falling    11. Turn 360 degrees  INSTRUCTIONS: turn complete around in a full [place]. Pause. Then turn a full [place] in the other direction  [] 4 able to turn 360 degrees safely in 4 seconds or less  [] 3 able to turn 360 degrees safely one side on 4 seconds or less  [] 2 able to turn 360 degrees safely but slowly  [x] 1 needs close supervision or verbal cuing  [] 0 needs assistance while turning    12. Place alternate foot on step or stool while standing unsupported  INSTRUCTIONS: Place each foot alternately on the step/stool. Continue until each foot has touch the step/stool four times. [] 4 able to stand independently and safely and complete 8 steps in 20 seconds  [] 3 able to stand independently and complete 8 steps >20 seconds  [] 2 able to complete 4 steps without aid with supervision  [x] 1 able to complete >2 steps needs minimal assist  [] 0 needs assist to keep from falling/unable to try    13.  Standing unsupported one foot in front  INSTRUCTIONS: (DEMONSTRATE TO SUBJECT) Place one foot directly in front of the other. If you feel that you cannot place your foot directly in front, try to stop far enough ahead that the heel of your forward foot is ahead of the toes of the other foot. (To score 3 points, the length of the step should exceed the length of the other foot and the width of the stand should approximate the subject's normal stride width). [] 4 able to place foot tandem independently and hold 30 seconds  [] 3 able to place the foot ahead independently and hold 30 seconds  [] 2 able to take small step independently and hold 30 seconds  [] 1 needs help to step but can hold 15 seconds  [x] 0 loses balance while stepping or standing    14.  Standing on one leg  INSTRUCTIONS: stand on one leg as long as you can without holding on  [] 4 able to lift leg independently and hold >10 seconds  [] 3 able to lift leg independently and hold 5-10 seconds  [] 2 able to lift leg independently and hold for >3 seconds  [x] 1 tries to lift leg unable to hold 3 seconds but remains standing independently  [] 0 unable to try/needs assist to prevent fall    __36__ TOTAL SCORE (Maximum =56)

## 2021-07-12 NOTE — PROGRESS NOTES
Problem: Self Care Deficits Care Plan (Adult)  Goal: *Therapy Goal (Edit Goal, Insert Text)  Description: Occupational Therapy Goals   Long Term Goals  Initiated 6/30/2021 and to be accomplished within 2 week(s) 7/14/2021 (Reassessed on 7/12/2021)  1. Pt will perform self-feeding with I. Goal not met 7/12/2021  2. Pt will perform grooming with Mod I. -Goal Met 7/7/2021  3. Pt will perform UB bathing with Mod I.-Goal Met 7/7/2021  4. Pt will perform LB bathing with Supv.- Goal not met 7/12/2021  5. Pt will perform tub/shower transfer with SBA. -Goal Met 7/12/2021  6. Pt will perform UB dressing with Mod I.-Goal Met 7/7/2021  7. Pt will perform LB dressing with Supv.-Goal not met 7/12/2021  8. Pt will perform toileting task with Mod I.-Goal not met 7/12/2021  9. Pt will perform toilet transfer with Supv.-Goal not met 7/12/2021      Short Term Goals   Initiated 6/30/2021 (reassessed on 7/7/2021) and to be accomplished within 7 day(s) 7/14/2021   (patient has met all of their STGs as of 7/7/2021; therapist will now address LTGs)  1. Pt will perform self-feeding with Mod I. -Goal Met 7/7/2021  2. Pt will perform grooming with set-up assistance. -Goal Met 7/7/2021  3. Pt will perform UB bathing with SBA. -Goal Met 7/7/2021  4. Pt will perform LB bathing with CGA w/ use of AE as needed. -Goal Met 7/7/2021  5. Pt will perform tub/shower transfer with CGA. -Goal Met 7/7/2021  6. Pt will perform UB dressing with Mod I.-Goal Met 7/7/2021  7. Pt will perform LB dressing with CGA w/ use of AE as needed. -Goal Met 7/7/2021  8. Pt will perform toileting task with CGA. -Goal Met 7/7/2021  9. Pt will perform toilet transfer with CGA w/ least restrictive AD. -Goal Met 7/7/2021        Outcome: Resolved/Not Met   OCCUPATIONAL THERAPY DISCHARGE    Patient: Morenita Conti (78 y.o. male)  Date: 7/12/2021    First Tx Session  Time In: 0700  Time Out[de-identified] 0800    Primary Diagnosis: Diverticulitis of large intestine with abscess [K57.20] Diverticulitis of large intestine with abscess    Precautions:  Fall Precautions    Barriers to Learning/Limitations: yes;  physical  Compensate with: visual, verbal, tactile, kinesthetic cues/model     Patient identified with name and : Yes    SUBJECTIVE:   Patient stated I would like to review my home exercise program.\"    OBJECTIVE DATA SUMMARY:     Past Medical History:   Diagnosis Date    Acute embolic stroke ([address]     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, brainstem and cerebellum) without residual deficit    Anticoagulated by anticoagulation treatment 2021    On Apixaban    Benign prostatic hyperplasia with urinary retention     Chronic obstructive pulmonary disease (COPD) ([address]     Coronary artery disease involving native coronary artery of native heart     Diverticulitis of large intestine with abscess 2021    Diverticulosis of sigmoid colon 2021    Edema of both lower extremities     Essential hypertension     Heart failure with preserved left ventricular function (HFpEF) ([address]     2D echocardiogram (2021) showed EF 50%; concentric LV hypertrophy with a severely thickened septal wall and mildly thickened posterior wall; left atrial chamber is severely enlarged; right atrial chamber volume is moderately enlarged; no mass, shunts, or thrombi.     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 Sydell Hashimoto [address] [facility]  2001    [facility] CORONARY STENT PLACEMENT  05/18/2021    S/P PCI to proximal SVG with 3.5 x 12 mm [place] drug-eluting stent; PCI to mid SVG with 3.5 x 34 mm [place] drug-eluting stent; PCI to distal SVG with 3.5 x 15 mm Juan J drug-eluting stent; Balloon angioplasty to distal SVG anastomosis (5/18/2021 - Dr. Delfin Baires)     HX LOBECTOMY Right     Middle lobe and lower lobe    HX RENAL ARTERY STENT Bilateral 2011    HX TONSILLECTOMY      IR BRONCHOSCOPY  11/25/2015    Dr. Rehan Nava     Prior Level of Function/Home Situation:   Home Situation  Home Environment: Private residence  # Steps to Enter: 1 ([address] [facility] [address]/slab to reach door)  One/Two Story Residence: Two story  # of Interior Steps: 14  Interior Rails: Both  Living Alone: No  Support Systems: Spouse/Significant Other/Partner  Patient Expects to be Discharged toF Cor[de-identified]ration  Current DME Used/Available at Home: None  Tub or Shower Type: Shower  [x]     Right hand dominant   []     Left hand dominant    Therapeutic Exercise:  Pt education and review of BUE green theraband HEP for dc to home environment w/ handout and green theraband issued to patient. Pt performed BUE green theraband exercises seated w/c level with intermittent vc's for pacing and for proper body mechanics/technique. RB between sets secondary to fatigue. Pt performed 10 reps x3 sets for bicep curls, chest pull, and diagonals for strengthening, act den, and ROM for increased (I) w/ ADLs/IADLs.  Pt verbalized and demonstrated recall of learned strategies for carryover to home environment. Pain:  Pt reports 0/10 pain or discomfort prior to treatment. Pt denies pain at onset, during, and at conclusion of skilled OT session. Pt does report intermittent discomfort bottoms of both feet but states \"not painful\". Pt reports 0/10 pain or discomfort post treatment.    Problem List:    Decreased strength B UE  [x]     Decreased strength trunk/core  []     Decreased AROM   []     Decreased PROM  []     Decreased balance sitting  []     Decreased balance standing  [x]     Decreased endurance  [x]     Pain  [x]       Functional Limitations:   Decreased independence with ADL  [x]     Decreased independence with functional transfers  [x]     Decreased independence with ambulation  [x]     Decreased independence with IADL  [x]       Outcome Measures:      MMT Initial Assessment   Right Upper Extremity  Left Upper Extremity    UE AROM WFL, MMT 4/5 grossly WFL, MMT 4/5 grossly   Shoulder flexion     Shoulder extension     Shoulder ABDuction     Shoulder ADDUction     Elbow Flexion     Elbow Extension     Wrist Extension/Flexion      4/5 4/5     MMT Discharge Assessment   Right Upper Extremity  Left Upper Extremity    UE AROM WFL, MMT 4/5 grossly WFL, MMT 4/5 grossly   Shoulder flexion     Shoulder extension     Shoulder ABDuction     Shoulder ADDUction     Elbow Flexion     Elbow Extension     Wrist Extension/Flexion      4/5 4/5       0/5 No palpable muscle contraction  1/5 Palpable muscle contraction, no joint movement  2-/5 Less than full range of motion in gravity eliminated position  2/5 Able to complete full range of motion in gravity eliminated position  2+/5 Able to initiate movement against gravity  3-/5 More than half but not full range of motion against gravity  3/5 Able to complete full range of motion against gravity  3+/5 Completes full range of motion against gravity with minimal resistance  4-/5 Completes full range of motion against gravity with minimal resistance  4/5 Completes full range of motion against gravity with moderate resistance  5/5 Completes full range of motion against gravity with maximum resistance    Coordination: appears intact  Sensation: intact    FIM SCORES Initial Assessment Discharge Assessment   Eating 6 Feeding/Eating  Feeding/Eating Assistance: 6 (Modified independent)  Comments: seated w/c level    Grooming 5 Grooming  Grooming Assistance : 6 (Modified independent)  Comments: seated w/c at sink for grooming tasks (oral care, washing face & hands), combing beard    Oral Hygiene FIM: 6 Mod I    Bathing 4 Upper Body Bathing  Bathing Assistance, Upper: 6 (Modified independent)  Upper Body : Compensatory technique training  Position Performed: Seated in chair  Comments: seated w/c level  Lower Body Bathing  Bathing Assistance, Lower : 4 (Contact guard assistance)  Adaptive Equipment: Grab bar  Position Performed: Seated in chair;Standing  Comments: SBA seated on tub transfer bench while reaching to distal BLE's; CGA in stance due to balance w/ grab bar for steadying & support   Upper Body Dressing 5 Upper Body Dressing   Dressing Assistance : 6 (Modified independent)  Comments: seated w/c level   Lower Body Dressing 4 Lower Body Dressing   Dressing Assistance : 5 (Stand-by assistance)  Position Performed: Bending forward method;Seated in chair;Standing  Adaptive Equipment Used: Walker  Comments: Supv to SBA level; SBA for aspects of LB dressing tasks performed in standing    Sock and/or Shoe Management FIM: 5 Supv   Toileting 5 Toileting  Toileting Assistance (FIM Score): 5 (Supervision)  Cues: Verbal cues provided   Tub/Shower Transfer 4 5 SBA    Toilet Transfer 5 Functional Transfers  Toilet Transfer : Elevated seat;Grab bars  Amount of Assistance Required: 5 (stand-by assistance)  Tub or Shower Type: Shower  Amount of Assistance Required: 5 (Stand-by assistance)  Adaptive Equipment: Grab bars; Tub transfer bench   Comprehension 5 Score: 5 Expression 6 Score: 6   Social Interaction 5 Score: 5   Problem Solving 3 Score: 5   Memory 4 Score: 4   Please see [facility] Interdisciplinary Eval: Coordination/Balance Section for details regarding FIM score description. Activity Tolerance:   F+/G-    ASSESSMENT:  Pt has made steady progress as a result of skilled OT intervention, having met 4/9 LTGs this reporting period. Today's skilled OT session focused on assessing safety and (I) w/ ADLs and for review/edu regarding BUE HEP for dc to home enviroment. Pt demonstrates progression w/ UB ADLs performed at Mod I level; however, continues to require supv-SBA for LB dressing tasks and SBA-CGA for LB bathing (for aspects performed in stance) secondary to balance and standing den. Functional mobility continues to improve as evidenced by pt's ability to perform toileting tasks at SBA level using RW while completing clothing management and hygiene w/ Mod I-Supv. Pt will benefit from continued skilled OT through home health in order to maximize (I) w/ ADLs/IADLs, ADL related mobility w/AD, and strengthening to facilitate return to PLOF. Pt education and instruction for BUE green theraband HEP for dc to home environment w/ handout and theraband issued to patient. Pt verbalized and demonstrated recall of learned strategies for carryover to home environment. Progression toward goals:  [x]      Improving appropriately and progressing toward goals  []      Improving slowly and progressing toward goals  []      Not making progress toward goals and plan of care will be adjusted     PLAN:  Pt would benefit from continued skilled occupational therapy in order to improve ADL function and IADL with use of least restrictive device. Interventions may include range of motion (AROM, PROM B UE/trunk), motor function (B UE/trunk strengthening/coordination), activity tolerance (vitals, oxygen saturation levels), ADL, balance activities, IADL, and functional transfer training. Discharge Recommendations: Home Health w/ supv  Further Equipment Recommendations for Discharge: bedside commode, gait belt, transfer bench, and grab bars       Please refer to the flow sheet for vital signs taken during this treatment. After treatment:   [x]  Patient left in no apparent distress sitting up in wheelchair  []  Patient left in no apparent distress in bed  [x]  Call bell left within reach  [x]  Nursing notified  []  Caregiver present  [x]  Wheelchair alarm activated    COMMUNICATION/EDUCATION:   Communication/Collaboration:  [x]      Home safety education was provided and the patient/caregiver indicated understanding. [x]      Patient/family have participated as able and agree with findings and recommendations. []      Patient is unable to participate in plan of care at this time.     Rere Mcknight  7/12/2021

## 2021-07-12 NOTE — PROGRESS NOTES
Problem: Neurolinguistics Impaired (Adult)  Goal: *Speech Goal: (INSERT TEXT)  Description: Long term goals  Patient will:  1. Be oriented x 3 and recall events of the day, supervision. 2. Recall 3 words after 5 minutes, supervision. 3. Read I-2 sentence stimuli and respond appropriately, 90% accuracy. 4. Read short paragraphs and respond to content questions, 90% accuracy. 5. Write simple sentences with 90% accuracy (to dictation and then spontaneously to describe actions/objects). Short term goals (7/14/21):  Patient will:  1. Be oriented x 3 and recall events of the day, supervision. 2. Recall 3 words after 5 minutes, supervision. 3. Read I sentence stimuli and respond appropriately, 90% accuracy. 4. Read short paragraphs and respond to content questions, 90% accuracy. 5. Write simple sentences with 90% accuracy (to dictation). Outcome: Resolved/Not Met  Note:   Speech language pathology treatment    Patient: Roldan Reyna (88 y.o. male)  Date: 7/12/2021  Diagnosis: Diverticulitis of large intestine with abscess [K57.20] Diverticulitis of large intestine with abscess    Time in:1330  Time Out: 1400    Pain:  Pre-tx: No pain reported  Post tx: No pain reported    SUBJECTIVE:   Patient stated you all have helped me a lot. OBJECTIVE:   Mental Status:  Mr. Tamika Hall was alert and oriented. Treatment & Interventions: The patient was seen for a 30 minute speech session. The following treatment tasks were presented:  Neuro-Linguistics:   Orientation:                                          Minimum assistance  Recent memory:                                  Supervision  Recall 3 words:                                    Maximum assistance   Write sentences spontaneously:         60%, patient required maximum assistance to not write letters overtop of previously written letters when writing sentences. Patient also added letters and omitted necessary letters when writing sentences. However, the content of the sentences was clear. Read single sentences:                       95%  Read paragraphs:                               95%  Answer questions from paragraphs:   70% independently, 100% given moderate assistance     Response & Tolerance to Activities:  Mr. Ananda Wilson was pleasant and cooperative. He tolerated all activities well. He commented that he is pleased with the progress he has made so far. Pain:  Pain Scale 1: Numeric (0 - 10)  No pain reported     After treatment:   [x]       Patient left in no apparent distress sitting up in chair  []       Patient left in no apparent distress in bed  [x]       Call bell left within reach  []       Nursing notified  []       Caregiver present  []       Bed alarm activated    ASSESSMENT:   Progression toward goals:  []       Improving appropriately and progressing toward goals  [x]       Improving slowly and progressing toward goals  []       Not making progress toward goals and plan of care will be adjusted    PLAN:   Mr. Ananda iWlson met 3/5 of his long term goals. He will continue to benefit from speech therapy, specifically to address reading and writing.   Discharge Recommendations:  Home Health    Estimated LOS: Through 7/13/21    Chris Wright Speech-Language Pathology Student  Time Calculation: 30 mins

## 2021-07-12 NOTE — PROGRESS NOTES
Sw spoke with pt in the room to review dc plans. Pt states understanding of DME (rolling walker, bedside commode) from [facility] and a private purchase of a tub transfer bench. Pt states understanding that [facility] is not able to accept pt's insurance. Pt consents to [facility]. Pt consents to sw calling his wife to review above. Tawny spoke with Mrs Yun Grecia who states understanding and willingness to purchase a tub transfer bench.

## 2021-07-12 NOTE — PROGRESS NOTES
Problem: Mobility Impaired (Adult and Pediatric)  Goal: *Therapy Goal (Edit Goal, Insert Text)  Description: Physical Therapy Short Term Goals  Initiated 6/30/2021 and to be accomplished within 7 day(s) on 7/7/2021:  1. Patient will move from supine to sit and sit to supine , scoot up and down, and roll side to side in bed with supervision/set-up. Goal met  2. Patient will transfer from bed to chair and chair to bed with supervision/set-up using the least restrictive device. Goal met  3. Patient will perform sit to stand with supervision/set-up. Goal met  4. Patient will ambulate with supervision/set-up for 150 feet with the least restrictive device. Goal met  5. Patient will ascend/descend 14 stairs with 1-2 handrail(s) with minimal assistance/contact guard assist. Goal met    Physical Therapy Long Term Goals  Initiated 6/30/2021 and to be accomplished within 10-14 day(s) 7/14/2021   1. Patient will move from supine to sit and sit to supine , scoot up and down, and roll side to side in bed with modified independence. Goal met 7/12/2021  2. Patient will transfer from bed to chair and chair to bed with modified independence using the least restrictive device. Goal ongoing  3. Patient will perform sit to stand with modified independence. Goal ongoing  4. Patient will ambulate with modified independence for at least 150 feet with the least restrictive device. Goal ongoing  5. Patient will ascend/descend 14 stairs with 2 handrail(s) with supervision/set-up. Goal ongoing  6. Patient will ascend/descend 1 curb step with appropriate AD with supervision/setup to safely get into and out of house.  Goal ongoing (CGA with RW)      Outcome: Resolved/Not Met   PHYSICAL THERAPY DISCHARGE NOTE    Patient: Karrie Abarca (82 y.o. male)  Date: 7/12/2021  Diagnosis: Diverticulitis of large intestine with abscess [K57.20] Diverticulitis of large intestine with abscess  Precautions: Fall  Chart, physical therapy assessment, plan of care and goals were reviewed. Time in:1030  Time out:1200    Patient seen for: Balance activities;Gait training;Patient education; Therapeutic exercise;Transfer training; Wheelchair mobility    Pain:  Patient did not indicate increased pain or discomfort during session. Patient identified with name and : yes    SUBJECTIVE:     Patient stated: \"I'm ready. \" Patient observed to be somewhat forgetful during session, needing reminders for safe mobility techniques frequently during session.      OBJECTIVE DATA SUMMARY:     GROSS ASSESSMENT Discharge Assessment 2021   AROM Within functional limits   Strength Generally decreased, functional   Coordination Within functional limits   Tone Normal   Sensation Impaired (impaired lower legs and into feet)   PROM Within functional limits       POSTURE Discharge Assessment 2021   Posture (WDL) Exceptions to [facility]   Posture Assessment Forward head;Rounded shoulders         BALANCE Discharge Assessment 2021    Sitting - Static: Good (unsupported)  Sitting - Dynamic: Good (unsupported)  Standing - Static: Good;Occasional;Fair  Standing - Dynamic : Impaired  Other (comment): Keller Balance Scale 36/56 (Increased risk for falls)       BED/CHAIR/WHEELCHAIR TRANSFERS Initial Assessment Discharge Assessment   Rolling Right 4 (Minimal assistance) 6 (Modified independent)   Rolling Left 4 (Minimal assistance) 6 (Modified independent)   Supine to Sit 4 (Minimal assistance) 6 (Modified independent)   Sit to Stand  (mod A from low bed height surface, CG/min A otherwise) Supervision (cues for hand placement)   Sit to Supine 3 (Moderate assistance) (assist with lifting each LE up into bed) 6 (Modified independent)   Transfer Assistance Level 4 (Minimal assistance) 5 (Supervision/setup) (stand step with RW, cues for staying in center of RW)   Transfer Type Other Other   Comments    Continues to rely on bilat UE to push up from standard height surfaces (18-21\"). Performing sit to stand reps from 22\" height mat table 3 x 8 reps, 10 reps, then 7 reps at CG/SBA level (tactile cue for more anterior trunk lean) before fatiguing. Emphasis on these sit to stand reps on keeping bilat UE at distal thighs to improve functional LE strength and reduce reliance on bilat UE support to come into standing. Car Transfer Minimum assistance (using RW) Supervision   Car Type car transfer simulator car transfer simulator       WHEELCHAIR MOBILITY/MANAGEMENT Initial Assessment Discharge Assessment   Able to Propel 160 feet 150 feet   Assistance Level 4 (min A for steering) Supervision (needing cues for obstacle negotiation)   Curbs/ramps assistance required 0 (Not tested) 0 (Not tested)   Wheelchair set up assistance required 4 (Minimal assistance) 6 (Modified independent)   Wheelchair management Manages left brake, Manages right brake (using brake extender) Manages left brake;Manages right brake       WALKING INDEPENDENCE Initial Assessment Discharge Assessment   Assistive device Walker, rolling, Gait belt Walker, rolling   Ambulation assistance - level surface 4 (Minimal assistance) 5 (Stand-by assistance) (cues for upright posture and gaze)   Distance 160 Feet (ft) 300 Feet (ft)   Comments    Needing cues for stepping into middle of RW for safety. Ambulation assistance - unlevel surface  (NT) Stand-by assistance (using RW over outside uneven sidewalk for 20 ft)       GAIT Discharge Assessment 7/12/2021   Gait Description (WDL) Exceptions to WDL   Gait Abnormalities Decreased step clearance (slightly externally rotated hips, forward flexed trunk)       STEPS/STAIRS Initial Assessment Discharge Assessment   Steps/Stairs ambulated 4 (Minimal assistance) 16   Rail Use Both Both   Assistance Level    CGA   Comments    CGA for safety as patient with decreased eccentric control when descending stairs, needing tactile cues and verbal cues for safe foot placement at times.    Curbs/Ramps Minimum assistance (using RW and 6 inch step) Contact guard assistance (using RW and 6\" curb step)     Neuro Re-Education:  See separate documentation for Keller Balance Scale. Therapeutic Exercises:   See repeated sit to stands above. ASSESSMENT:  Patient continues to demonstrate ongoing slow improvement with functional LE strength and endurance. He had slight gain in [place] Balance Scale score from previous score (36/56 from 30/56), but continues to be at increased falls risk. Recommend ongoing LE strengthening, functional endurance training, gait training, balance training to improve ability to perform more independent functional mobility. LTGs: Patient met 1 LTG, continues to be at SBA/supervision level for transfers and gait, CGA level for stairs. Met all STG. PLAN:  Pt would benefit from continued skilled physical therapy in order to improve independent functional mobility at [facility] level. Interventions may include range of motion (AROM, PROM B LE/trunk), motor function (B LE/trunk strengthening/coordination), activity tolerance (vitals, oxygen saturation levels), bed mobility training, balance activities, gait training (progressive ambulation program), and functional transfer training. Discharge Recommendations:  Home Health and caregiver assistance (spouse has received family education, recommend reinforcement of this education)  Further Equipment Recommendations for Discharge:  rolling walker       Activity Tolerance:   Good, needing intermittent seated rests.    After treatment:   [] Patient left in no apparent distress in bed  [x] Patient left in no apparent distress sitting up in chair  [] Patient left in no apparent distress in w/c mobilizing under own power  [] Patient left in no apparent distress dining area  [] Patient left in no apparent distress mobilizing under own power  [x] Call bell left within reach  [x] Nursing notified  [] Caregiver present  [] Bed alarm activated   [x] Chair alarm activated      Sydney Rome, PT  7/12/2021

## 2021-07-13 NOTE — ROUTINE PROCESS
SHIFT CHANGE NOTE FOR [facility]    Bedside and Verbal shift change report given to Brett Thurman RN (oncoming nurse) by John Valles RN (offgoing nurse). Report included the following information SBAR, Kardex, MAR and Recent Results. Situation:   Code Status: Full Code   Hospital Day: 14   Problem List:   Hospital Problems  Date Reviewed: 7/11/2021        Codes Class Noted POA    Edema of both lower extremities (Chronic) ICD-10-CM: R60.0  ICD-9-CM: 410. 3  Unknown Yes        Loose stools ICD-10-CM: R19.5  ICD-9-CM: 787.7  7/1/2021 Yes        Mesenteric abscess ([facility] [address] ICD-10-CM: K65.1  ICD-9-CM: 567.22  6/21/2021 Yes        * (Principal) Diverticulitis of large intestine with abscess ICD-10-CM: K57.20  ICD-9-CM: 562.11, 569.5  6/21/2021 Yes        History of sinus bradycardia ICD-10-CM: Z86.79  ICD-9-CM: V12.59  6/21/2021 Yes    Overview Signed 6/29/2021 10:02 PM by Kamila Peña MD     Attributed to Carvedilol 25 mg PO BID with meals             Generalized weakness ICD-10-CM: R53.1  ICD-9-CM: 780.79  6/21/2021 Yes        Peripheral vascular disease of extremity with claudication (HCC) (Chronic) ICD-10-CM: I73.9  ICD-9-CM: 443.9  6/14/2021 Yes        Mixed hyperlipidemia (Chronic) ICD-10-CM: K07.9  ICD-9-CM: 272.2  Unknown Yes        Heart failure with preserved left ventricular function (HFpEF) (HCC) (Chronic) ICD-10-CM: I50.30  ICD-9-CM: 428.9  Unknown Yes    Overview Signed 6/7/2021 11:03 PM by Kamila Peña MD     2D echocardiogram (5/18/2021) showed EF 50%; concentric LV hypertrophy with a severely thickened septal wall and mildly thickened posterior wall; left atrial chamber is severely enlarged; right atrial chamber volume is moderately enlarged; no mass, shunts, or thrombi.               Coronary artery disease involving native coronary artery of native heart (Chronic) ICD-10-CM: I25.10  ICD-9-CM: 414.01  Unknown Yes        Diverticulosis of sigmoid colon (Chronic) ICD-10-CM: K57.30  ICD-9-CM: 562.10 5/22/2021 Yes        Anticoagulated by anticoagulation treatment ICD-10-CM: Z79.01  ICD-9-CM: V58.61  5/19/2021 Yes    Overview Signed 6/7/2021 10:48 PM by Dorsey Apley, MD     On Apixaban             On clopidogrel therapy ICD-10-CM: Z79.01  ICD-9-CM: V58.61  5/19/2021 Yes        Status post insertion of drug eluting coronary artery stent ICD-10-CM: Z95.5  ICD-9-CM: V45.82  5/18/2021 Yes    Overview Signed 6/18/2021  1:38 PM by Dorsey Apley, MD     S/P PCI to proximal SVG with 3.5 x 12 mm Juan J drug-eluting stent; PCI to mid SVG with 3.5 x 34 mm Juan J drug-eluting stent; PCI to distal SVG with 3.5 x 15 mm Juan J drug-eluting stent;  Balloon angioplasty to distal SVG anastomosis (5/18/2021 - Dr. Mily Conway)              Non-ST elevation (NSTEMI) myocardial infarction [facility]) ICD-10-CM: I21.4  ICD-9-CM: 410.70  5/17/2021 Yes        Paroxysmal atrial fibrillation ([facility] [address] ICD-10-CM: I48.0  ICD-9-CM: 427.31  5/17/2021 Yes        Impaired mobility and ADLs ICD-10-CM: Z74.09, Z78.9  ICD-9-CM: V49.89  5/17/2021 Yes        Neuropathy involving both lower extremities (Chronic) ICD-10-CM: M31.61  ICD-9-CM: 356.9  10/22/2020 Yes        Essential hypertension (Chronic) ICD-10-CM: I10  ICD-9-CM: 401.9  Unknown Yes        History of coronary artery bypass graft ICD-10-CM: Z95.1  ICD-9-CM: V45.81  2001 Yes    Overview Signed 6/7/2021 11:04 PM by Dorsey Apley, MD     [facility]                   Background:   Past Medical History:   Past Medical History:   Diagnosis Date    Acute embolic stroke ([facility] [address] 0/63/7432    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, brainstem and cerebellum) without residual deficit    Anticoagulated by anticoagulation treatment 5/19/2021    On Apixaban    Benign prostatic hyperplasia with urinary retention     Chronic obstructive pulmonary disease (COPD) ([facility] [address]     Coronary artery disease involving native coronary artery of native heart     Diverticulitis of large intestine with abscess 6/21/2021    Diverticulosis of sigmoid colon 5/22/2021    Edema of both lower extremities     Essential hypertension     Heart failure with preserved left ventricular function (HFpEF) ([address]     2D echocardiogram (5/18/2021) showed EF 50%; concentric LV hypertrophy with a severely thickened septal wall and mildly thickened posterior wall; left atrial chamber is severely enlarged; right atrial chamber volume is moderately enlarged; no mass, shunts, or thrombi.     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[address] 6/21/2021    Neuropathy involving both lower extremities 10/22/2020    Non-ST elevation (NSTEMI) myocardial infarction ([address] 5/17/2021    On clopidogrel therapy 5/19/2021    Paroxysmal atrial fibrillation ([address] 5/17/2021    Peripheral vascular disease of extremity with claudication ([address] 6/14/2021    Urinary retention         Assessment:   Changes in Assessment throughout shift: No change to previous assessment     Patient has a central line: no Reasons if yes: na  Insertion date:na Last dressing date:na   Patient has [place] Cath: no Reasons if yes: na   Insertion date:na  Shift [place] care completed: NO     Last Vitals:     Vitals:    07/11/21 2100 07/12/21 0712 07/12/21 1556 07/12/21 2100   BP: 129/76 132/74 120/68 133/64   Pulse: 78 92 91 88   Resp: 18 18 17 18   Temp: 97.3 °F (36.3 °C) 98.4 °F (36.9 °C) 98.3 °F (36.8 °C) 97 °F (36.1 °C)   SpO2: 99% 95% 95% 100%   Weight:       Height:            PAIN    Pain Assessment    Pain Intensity 1: 0 (07/13/21 0400) Pain Intensity 1: 2 (12/29/14 1105)    Pain Location 1: Foot Pain Location 1: Abdomen    Pain Intervention(s) 1: Medication (see MAR) Pain Intervention(s) 1: Medication (see MAR)  Patient Stated Pain Goal: 0 Patient Stated Pain Goal: 0  o Intervention effective: no c/o pain  o Other actions taken for pain: na     Skin Assessment  Skin color Skin Color: Appropriate for ethnicity  Condition/Temperature Skin Condition/Temp: Dry, Warm  Integrity Skin Integrity: Intact  Turgor Turgor: Non-tenting  Weekly Pressure Ulcer Documentation  Pressure  Injury Documentation: No Pressure Injury Noted-Pressure Ulcer Prevention Initiated  Wound Prevention & Protection Methods  Orientation of wound Orientation of Wound Prevention: Posterior  Location of Prevention Location of Wound Prevention: Buttocks, Sacrum/Coccyx  Dressing Present Dressing Present : No  Dressing Status Dressing Status: Intact  Wound Offloading Wound Offloading (Prevention Methods): Bed, pressure redistribution/air     INTAKE/OUPUT  Date 07/12/21 0700 - 07/13/21 0659 07/13/21 0700 - 07/14/21 0659   Shift 2265-5150 5263-0016 24 Hour Total 8775-8148 8522-5243 24 Hour Total   INTAKE   P.O. 490  490        P. O. 490  490      Shift Total(mL/kg) 490(6.1)  490(6.1)      OUTPUT   Urine(mL/kg/hr)           Urine Occurrence(s) 3 x 2 x 5 x      Stool           Stool Occurrence(s) 1 x 0 x 1 x      Shift Total(mL/kg)           490      Weight (kg) 80.8 80.8 80.8 80.8 80.8 80.8       Recommendations:  1. Patient needs and requests: assist to and from the bathroom    2. Pending tests/procedures: na     3. Functional Level/Equipment: assist x 1 / wheelchair    Fall Precautions:   Fall risk precautions were reinforced with the patient; he was instructed to call for help prior to getting up. The following fall risk precautions were continued: bed/ chair alarms, door signage, yellow bracelet and socks as well as update of the Ekaterinaella [place] tool in the patient's room.    [place] Score: [facility] Safety Check    A safety check occurred in the patient's room between off going nurse and oncoming nurse listed above. The safety check included the below items  Area Items   H  High Alert Medications - Verify all high alert medication drips (heparin, PCA, etc.)   E  Equipment - Suction is set up for ALL patients (with laila)  - Red plugs utilized for all equipment (IV pumps, etc.)  - WOWs wiped down at end of shift.  - Room stocked with oxygen, suction, and other unit-specific supplies   A  Alarms - Bed alarm is set for fall risk patients  - Ensure chair alarm is in place and activated if patient is up in a chair   L  Lines - Check IV for any infiltration  - Taveras bag is empty if patient has a Taveras   - Tubing and IV bags are labeled   S  Safety   - Room is clean, patient is clean, and equipment is clean. - Hallways are clear from equipment besides carts. - Fall bracelet on for fall risk patients  - Ensure room is clear and free of clutter  - Suction is set up for ALL patients (with laila)  - Hallways are clear from equipment besides carts.    - Isolation precautions followed, supplies available outside room, sign posted     Breezy Lucas RN

## 2021-07-13 NOTE — HOME CARE
Received home health referral for [facility] for PT/OT Speech Therapy & SN per Dr. Agustín Powers. Spoke with patient via phone patient identifiers verified. Explained home care services and routines. Demographics verified including insurance and address confirmed: [address] [facility]; [place], [address]. Preferred phone number [phone]. Patient has the following DME: to have rollator from hospital.   Orders noted and arranged[facility] processed yesterday 7/12/21. Updated today 7/13/21.

## 2021-07-13 NOTE — ROUTINE PROCESS
Errol Baires 80 y.o. male was discharged home on 07/13/21. Patient's armband removed and shredded. Discharge instructions were reviewed with patient and spouse, and they verbalized understanding. The patient was wheeled out to transportation vehicle by a staff member along with the patient's belongings. Transportation was provided by private vehicle.     Raimundo Orellana RN

## 2021-07-13 NOTE — PROGRESS NOTES
[x] Psychology  [] Social Work [] Recreational Therapy    INTERVENTION  UNITS/TIME OF SERVICE   Assessment    Supportive Counseling  July 13, 2021   Orientation    Discharge Planning    Resource Linkage              Progress/Current Status    Patient seen for final individual support contact on ARU. He is obviously very pleased for discharge and immediately described various activity that he is looking forward to, after an extended hospitalization. Patient is not in any distress and none is observed. He is satisfied with the care that he has received on ARU. He feels good about various gains that he has made. Patient reinforced for various issues related to safety and pace and encouraged to seek assist as needed, at home, to ensure near and longer term safety for himself. He seems to be in agreement.     Sara Villareal, THE [facility] 7/13/2021 4:47 PM

## 2021-07-13 NOTE — PROGRESS NOTES
Problem: Pressure Injury - Risk of  Goal: *Prevention of pressure injury  Description: Document Nicko Scale and appropriate interventions in the flowsheet.   Outcome: Resolved/Met  Note: Pressure Injury Interventions:  Sensory Interventions: Discuss PT/OT consult with provider, Assess changes in LOC, Minimize linen layers, Keep linens dry and wrinkle-free, Monitor skin under medical devices    Moisture Interventions: Maintain skin hydration (lotion/cream)    Activity Interventions: PT/OT evaluation, Pressure redistribution bed/mattress(bed type)    Mobility Interventions: PT/OT evaluation, HOB 30 degrees or less, Float heels    Nutrition Interventions: Offer support with meals,snacks and hydration    Friction and Shear Interventions: Minimize layers, HOB 30 degrees or less

## 2021-07-13 NOTE — PROGRESS NOTES
completed a follow up visit with and Spiritual assessment of patient in room 186 of the rehab unit and offered Pastoral care support once again to the patient. Patient informed me that he was being discharged today and that he had finally completed his therapy sessions and could now go home.  Chaplains will continue to follow and will provide pastoral care on an as needed/requested basis    Chaplain Jaxon Macias   Board Certified [address] [place] [address]   [phone]

## 2021-07-14 NOTE — PROGRESS NOTES
Pt dc to home yesterday with DME and medications with his wife. Sw spoke with pt's  to advise of pt's dc to home as planned yesterday. No further needs are noted at this time.

## 2021-07-26 NOTE — PROGRESS NOTES
Subjective: Seen in follow-up from the hospital.  Denies pain. Moving his bowels without difficulty. Completed antibiotics. Past medical history and ROS were reviewed and unchanged. Abdomen: Soft, nontender nondistended    CT of the abdomen and pelvis shows essentially resolved diverticulitis but persistent abscess containing a significant amount of air    Assessment / Plan    Persistent diverticular abscess  Restart antibiotics  Follow-up in 3 weeks  Consider IR drainage if repeat CT imaging at that time shows persistent abscess  He had a drug-eluting stent placed recently and is on full anticoagulation and Plavix, making him a poor surgical candidate  Also he should have a colonoscopy in the near future, he has no prior    20 minutes was spent in patient care. The diagnoses and plan were discussed with patient. All questions answered. Plan of care agreed to by all concerned.

## 2021-07-26 NOTE — HOME HEALTH
PT routine:  S:  Pt denies falls and denies change in meds. Pt went to GI doctor Dr. Sada Up this morning and he prescribed a new medication but pharmacy has not called when it can be picked up. Pt's wife said the new med is an antibiotic but they do not know the name. O:    PREC:  fall risk  Bed mobility:  sit <> supine = ind  Transfers:  sit<> stand from multiple surfaces including car transfers  = SBA no AD   Gait:  200 ft with SBA using no AD exhibiting stooped posture, good gait pattern. Worked on trunk extension in standing and walking. Therex/HEP: COPD/CHF exercises x 30 reps:  deep breathing, LAQ, ap, low arm circles, rest, heel raises, mini squats, seated rest,  arm chicken circles, biceps curls. seated rest, back kicks, side kicks, seated rest, shoulder  horizontal abd/add, tricep extensions. Endurance trn minutes x 200 ft without AD;  O2 sats = 97%;  HR = 107 bpm;  Needed < 5 minute rest to decrease to resting rate of 67 bpm.  One flight indoor steps to second floor bedroom = SBA holding on to railing. Dyspnea upon reaching landing. Pt was instructed to sit down and do diaphragmatic breathing. Pt education:  Fall risk reduction :  ask for supervision during transfers and ambulation, use AD, use non-skid shoes. HEP:  do exercises 2-3 x a day with emphasis diaphragmatic breathing and walking endurance, gait endurance without AD wiith supervision x 4 mins. A:  Patient demonstrated a positive result to therapy this date as evidenced by one flight steps negotiation and COPD exercise progression. Progressing well toward goals for improving cardiac endurance and safety in functional mobility. Patient continues to have deficits in endurance. Patient will benefit from continued intervention with progression of gait on outdoor terrain and endurance training. P:  cont with PT 1w1, 2w3  for safe transfer tech and progressive gait training, balance trng, upgrade HEP.

## 2021-07-27 NOTE — Clinical Note
thanks  ----- Message -----  From: Dionicio Lara OTR/L  Sent: 7/28/2021  10:17 AM EDT  To: Olivia Quintana, SLP      ASSESSMENT:  Pt demonstrated the ability to safely dress UE and LE. He was independent with bed and chair transfer but was unsafe with toilet and tub transfer. He was trained in side step method for tub transfer and is now safe. He agreed to install a tall toilet. Pt agreed to buy a shower chair and will be safe showering by himself with the chair. Pt demonstrated the ability to groom by himself. Pt has full UE ROM and 5/5 bilaterally. Pt reported being able to do everything he could do before this hospitalization except for endurance. He did not think he needed both OT and PT because PT was having him do UE exercises. Pt did not have any OT goals and if he buys the recommended AE he will be safe. Pt is discharged from OT services.

## 2021-07-27 NOTE — Clinical Note
ASSESSMENT:  Pt demonstrated the ability to safely dress UE and LE. He was independent with bed and chair transfer but was unsafe with toilet and tub transfer. He was trained in side step method for tub transfer and is now safe. He agreed to install a tall toilet. Pt agreed to buy a shower chair and will be safe showering by himself with the chair. Pt demonstrated the ability to groom by himself. Pt has full UE ROM and 5/5 bilaterally. Pt reported being able to do everything he could do before this hospitalization except for endurance. He did not think he needed both OT and PT because PT was having him do UE exercises. Pt did not have any OT goals and if he buys the recommended AE he will be safe. Pt is discharged from OT services.

## 2021-07-28 NOTE — HOME HEALTH
Summary of clinical condition:  Pt was found on the toilet unresponsive and was taken to the ED. He was found to have tachycardia and stomach pain. CT scan showed possible mesenteric abscess with diverticulitis. Surgery was not able to put a drain in. He was put on IV antibiotics and improved. He was transferred to in[patient rehab and participated in OT and PT. Pt was discharged home with orders for [facility] OT. Medical History: CVA x 3, Diverticulitis, Lung Ca, HTN, Pt was in inpatient rehab after a CVA in May 2021. Medications review completed. No adverse reactions noted. Caregiver involvement: Pt's wife assists him in medication management and IADLs. Patient education provided this visit:Pt was educated about needed AE. He was given information for shower chair and tall toilet. He verbalized needed AE. Home exercise program:  None needed. ASSESSMENT:  Pt demonstrated the ability to safely dress UE and LE. He was independent with bed and chair transfer but was unsafe with toilet and tub transfer. He was trained in side step method for tub transfer and is now safe. He agreed to install a tall toilet. Pt agreed to buy a shower chair and will be safe showering by himself with the chair. Pt demonstrated the ability to groom by himself. Pt has full UE ROM and 5/5 bilaterally. Pt reported being able to do everything he could do before this hospitalization except for endurance. He did not think he needed both OT and PT because PT was having him do UE exercises. Pt did not have any OT goals and if he buys the recommended AE he will be safe. Pt is discharged from OT services.

## 2021-07-30 NOTE — HOME HEALTH
PT routine:  S:  Pt denies falls and denies change in meds. O:  lower back = 0-7/10. PREC:  fall risk; Bed mobility:  sit <> supine = ind  Transfers:  sit <> stand from chair, bed, toilet = ind    Gait:  200 ft ind using no AD indoors and  SBA no AD outdoors (street, driveway) exhibiting stooped posture and good gait pattern. Worked on upright trunk posture and intermittent diaphragmatic breathing. Instructed on refraining from talking too much when walking to avoid dyspnea. When walking indoors, his feet at times get caught on rug so he was advised to wear tennis shoes and ensuring good feet clearance during swing phase of gait. Wife instructed to apply double sided tape on rugs/carpet. Therex/HEP:   COPD/CHF exercises protocol  in seated and standing  positions #1 -12 x 30 reps using hand out . Instructed on additional exercise # 13-15 x 30 reps. Emphasized following the sequencing on HEP handout so he is alternating exercises between seated and standing positions, to avoid dyspnea. Endurance trnMWT x 200 ft without AD, VS WNL before and after walk. Pt education:  Fall risk reduction :  ask for supervision during transfers and ambulation,  use non-skid shoes. HEP:  do exercises 2-x a day with emphasis on diaphragmatic breathing, gait endurance without AD outdoor terrain with supervision x 6 mins. Pt response to education:  Pt compliant with HEP and has been doing them once a day. A:  Patient demonstrated a positive result to therapy this date as evidenced by gait on outdoor terrain without AD. Progressing well. Patient will benefit from continued intervention with progression of BLE HEP. P:  cont with PT 2w2 for BLE strengthening HEP with weights and continue endurance walk.

## 2021-08-02 NOTE — HOME HEALTH
SN reason for visit: diverticulitis of sigmoid colon   Caregiver involvement: Patient's cg is wife. she lives with patient and is available at all times for assistance with iadls, adls, meal prep, medication management, taking to md appointments. Medications reconciled and all medications are available in the home this visit. The following education was provided regarding medications, medication interactions, and look a like medications. Medications  are effective at this time. Home health supplies by type and quantity ordered/delivered this visit include: n/a  Patient education provided this visit:  patient encouraged to supplement nutrition with protein shakes, ensure, boost in order to get nutrition needed and to be sure to stay hydrated- drink plenty of water/fluids throughout the day  encouraged patient to get three nutritional meals daily and to stay hydrated, drink plenty of fluids. patient encouraged to monitor for increase in pain and to continue with current pain management and to notify staff/md if pain becomes excrutiating/intolerable. EDUCATED PATIENT/CG ON THE IMPORTANCE OF INCREASING FLUIDS WHILE ON PAIN MEDICAITON. AS ALL PAIN MEDS ARE VERY CONSTIPATING. MAY NEED TO TAKE ADDITIONAL MEDICATIONS FOR ASSISTANCE. CALL PCP FOR ORDERS TO TAKE MIRLAX, MILK OF MAGNESIUM, MAG CITRATE. PATIENT MAY ALSO DRINK PRUNE JUICE, ADD PRUNES TO DIET. patient/cg to continue to take medications as prescribed. patient aware to monitor for effectiveness and to notify staff of any adverse reactions to medications/any changes to medication regimen. reviewed side effects, purposes, dosage, frequencies  PATIENT TAKING ELIQUIS, EDUCATED ON THE BLEEDING PRECAUTIONS, BRUSING, BLEEDING GUMS, BLACK TARY STOOLS, BLOODY STOOLS. Patient is a fall risk.  Educated pateint to sit on the side of the chair/bed, take a slow deep breaths, have feet firmly planted before standing up, use cane/walker if available, or have someone to assist.  INSTRUCTED PATIENT AND CG THAT SHOULD ANY NEEDS OR CONCERNS ARISE TO FIRST CALL OUR OFFICE, OR THE DR'S OFFICE  OR GO TO AN URGENT CARE CENTER AND NOT TO THE ED FOR NON-LIFE THREATENING EVENTS. IF IT IS LIFE THREATENING THEN CALL 911 OR GO TO THE CLOSEST ER. Progress toward goals: Patient to remain free from further GI problems and infection. All SN goals met at this time. Home exercise program: activity as tolerated, trying to get physical activity 4-5 x weekly, 30 minutes daily. stopping activity if causing shortness of breath or chest pain, dizziness or weakness. Continued need for the following skills: Physical Therapy and Speech Language Pathology  The following discharge planning was discussed with the pt/caregiver: Pt.clinically discharged and documentation finalized for completion of agency discharge.

## 2021-08-05 NOTE — HOME HEALTH
S: Pt reports pain in his feet from the edema with his skin beginning to peel. Мария Bethea HEP: completed,  yes   Difficulties or concerns: no  .  Current Pain: B feet from edema 6/10   Sx: swelling tightness  Pain at worst in the past 24 hours: 7/10  Causes: edema B LE  Sx: tightness  Pain at best in the past 24 hours: 6/10  Interventions to control pain/sx: elevated them at night only. Recommended he elevate several times a day in correct elevation to decrease it. .  Caregiver involvement: The patient's wife assists with daily needs and transportation. .  Noticeable Improvements or Decline since last session:    . Falls: none  Insurance changes: none  . Medicine changes: none  Medications reconciled and all medications are available in the home this visit. The following education was provided regarding medications, medication interactions, and look a like medications: If there are any medicines/supplements (by MD order or OTC) added or DC 'd let [facility] Staff know and have bottles/packages available. It is necessary to keep an accurate record of meds to avoid any medication errors. The patient denies any prescription or OTC medicine/supplement changes since last session. Medications  are somewhat effective at this time. .  O:    Vitals: Obtained and recorded in Vitals section. Tinetti Balance Assessment:   20/28 medium fall risk         . Interventions:   Assessed patient's understanding of his current HEP. He performed the following with re education for correct techniques for   90% of them. 1. 4 deep pursed lip breaths  2. Seated AP x15 due to incorrect counting   3. Seated leg kick outs x15 due to incorrect counting   4  Seated backward low arm circles x30  5. Standing HR x30  6  Seated backward chicken arm circles x30  7. Standing mini squats  Pt became exerted and had to stop 10 minutes 3 sec.   Gait: heels nearly hitting each other, semi reciprocal stepping L not fully clearing the R due to wearing croc without backs. B LE scraping the floor 75% of the time. Pt not amb with his walker in the home. He reports he does not need it. Re educated in the need for slip on shoes that will fit comfortable and have a back to allow for increasing his heel strike, B. He amb ~ 100 feet x1, 2 turns, no AD, SBA. [employer] health supplies by type and quantity ordered/delivered this visit include: none  . Patient education provided this visit:   The need to elevate correctly several times a day throughout the day to decrease edema, not just at bedtime. Correct exercise techniques for best outcomes. The need to stop and rest when SOB, exerted. His BP has increased  too much and he needs to stop at exertion. Wait several hours until the BP decreases to safer levels. Correct gait pattern, use of an AD and safe shoes wear to decrease fall risk. .  Assessment and Progress towards goals:   Tinetti Balance Assessment: 20/28 medium fall risk, an increase by 1 point this testing session. If pt were to wear safe shoes, he may be able to increase his score further. Sit/Stand from upright chair with MOD I.    .  Home exercise program: Pt to cont with above to tolerance daily. He needs to have his BP monitored as his vitals spike with exertion. .    Continued need for the following skills: Nursing, Physical Therapy and Speech Language Pathology  Patient care discussed with rehab team involved in patient care, at patient care meeting. .  P:  Cont with gait training, timed amb with vitals assess throughout  .   The following discharge planning was discussed with the pt/caregiver:  Estimated DC 8/12

## 2021-08-06 NOTE — HOME HEALTH
S: I think the elevating is helping, but when the foot comes down, it comes back. .  HEP: completed - yes, some this a.m. He reports doing the exercises daily. Difficulties or concerns: He gets winded, but sits and rests. Per his report. .  Current Pain: L foot 6/10   Sx:  numbness with cramping  Pain at worst in the past 24 hours:  6/10   Causes: fluid in the foot   Sx: numbness with cramping  Pain at best in the past 24 hours: 6/10   Interventions to control pain/sx: elevation  . Caregiver involvement: The patient's wife assists with daily needs and transportation. Alondra Ryan Upcoming MD apts :   8/13 Dr Db Galvin ultrasound 4:00 pm   8/26/2021 4:00 PM [facility]   . Noticeable Improvements or Decline since last session: He feels a little stronger week by week. .  Falls: none  Insurance changes:  none  . Medicine changes:  none  Medications reconciled and all medications are available in the home this visit. The following education was provided regarding medications, medication interactions, and look a like medications: If there are any medicines/supplements (by MD order or OTC) added or DC 'd let [facility] Staff know and have bottles/packages available. It is necessary to keep an accurate record of meds to avoid any medication errors. The patient denies any prescription or OTC medicine/supplement changes since last session. Medications  are effective at this time. .  O:    Vitals: Obtained and recorded in Vitals section. .  Interventions:   Timed amb 4 min 54 seconds 5/10 perceived exertion. Pt amb on his street on uneven pavement and concrete, 2 turns, SBA. Pt with Fair dynamic stability demonstrated throughout, but fatigued. BP remained within same ranges  . Exercises: Assessing Ind with HEP.    4 pursed lip breaths, AP x 30, LAQ x 30, retro shoulder circles x 30, standing HR x 30, chicken arm circles x 30, 30 mini squats holding the banister, bicep curls x 30, standing side kicks x 30, triceps ext x 30, side stepping <-> x 3, seated over head reach x 30, forward/retro amb <-> x 3  Exercises without rest x 29 minutes with perceived exertion at moderate. No number given. Pt asked throughput the exercises if he needed to sit and rest. He denied any need to sit ad rest throughout. His BP increased to unsafe levels. After several minutes, it decreased to safer levels. Educated in the need to take rest periods due to BP increases. Also instructed to decrease to 15 reps vs 30. He and his wife report understanding. .  Home health supplies by type and quantity ordered/delivered this visit include:  none  . Patient education provided this visit: Re Educated in correct exercise techniques for best outcomes. Even though the edema returns post elevation and dependent positioning, cont with elevation consistently for progression of edema removal.   Decrease exercises to maintain safe vitals. .  Assessment and Progress towards goals:   Pt reports cramping from increased edema. He is attempting to control it with elevation when on the couch. Pt is able to safely perform sit/stand from upright chair without difficulty. He has demonstrated previously that he can safely transfer in/out of the car. Progressing towards achieving all safe transfers. .  Home Health PT is medically necessary to address the following:  pain, decreased ROM, decreased strength, increased edema, impaired bed mobility, decreased Ind with functional transfers, impaired gait, impaired stair negotiation, and impaired stability, to decrease sx, improve functional Ind, quality of life, reduce the fall risk. .  Home exercise program: Pt to cont to at 15 reps or to tolerance if fatigues earlier once daily, the above listed exercises. Amb hourly. If weather is good, ok to amb with wife in his [place]. Corky Jennings     Continued need for the following skills: Physical Therapy and Speech Language Pathology  Patient care discussed with rehab team involved in patient care, at patient care meeting. .  P:  Bed mobility assessment and stair negotiation  .   The following discharge planning was   discussed with the pt/caregiver: Estimated DC from PT

## 2021-08-07 NOTE — HOME HEALTH
SUBJECTIVE  Patient states \"I'm doing pretty good\". .  Caregiver involvement: caregivers provide assistance with medications, meals and ADLs. .  Medications reconciled and all medications are available in the home this visit. The following education was provided regarding medications, medication interactions, and look a like medications: no new medications, take as scheduled. Medications  are effective at this time. .  Home health supplies by type and quantity ordered/delivered this visit include: n/a  . Patient education provided this visit:  re-educated to discharge next week  . OBJECTIVE  Medication management - patient answered questions to medication chart with mod A to 50% accuracy. Manage medications - patient takes medications with reminder from caregiver to 100% accuracy  Verbal problem solving - 100%  Daily Log - supervision writing to dictation to 90% accuracy   . ASSESSMENT  Patient demonstrates positive progress in therapy as indicated by increased word finding at task level and reported increased word finding in ADLs. Decreased thought processing impairs patients ability to utilize external aids to increase medication management and recall medication schedule, poor organization of numerous strategies to increase management is also contributing to patient confusion. Speech therapy is medically necessary to increase functional cognitive linguistic skills for safe completion of ADLs  .   PLAN  Home exercise program: keep external aid chair side at all times to increase compliance with situational awareness, prospective memory tasks and medication management, supervise patient for external aid use,     Continued need for the following skills: Speech Language Pathology    The following discharge planning was discussed with the pt/caregiver: 2 more visits

## 2021-08-09 NOTE — DISCHARGE SUMMARY
Inpatient rehab  [address] [place] [address], [address]     DISCHARGE SUMMARY    Name: Merry Ponce MRN: [id number]   Age / Sex: 80 y.o. / male CSN: [id number]   YOB: 1940 Length of Stay: 14 days   Admit Date: 6/29/2021 Discharge Date:        PRIMARY CARE PHYSICIAN: Jaxon Sams MD      DISCHARGE DIAGNOSES:    Primary Rehabilitation Diagnosis  1.  Generalized weakness with Impaired mobility and ADLs  2. 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     Comorbidities       Patient Active Problem List   Diagnosis Code    Urinary retention R33.9    Essential hypertension I10    Non-ST elevation (NSTEMI) myocardial infarction ([facility] [address] I21.4    Paroxysmal atrial fibrillation ([facility]) I48.0    Anticoagulated by anticoagulation treatment Z79.01    Mixed hyperlipidemia E78.2    Benign prostatic hyperplasia with urinary retention N40.1, R33.8    Malignant neoplasm of lower lobe of right lung ([facility]) C34.31    Heart failure with preserved left ventricular function (HFpEF) ([facility]) I50.30    Coronary artery disease involving native coronary artery of native heart I25.10    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    Chronic obstructive pulmonary disease (COPD) ([facility]) J44.9    History of carotid stenosis Z86.79    History of renal artery stenosis Z86.79    On clopidogrel therapy Z79.01    History of gross hematuria R16.377    Acute embolic stroke ([facility]) V88.8    Leukocytosis D72.829    Peripheral vascular disease of extremity with claudication ([facility]) I73.9    Neuropathy involving both lower extremities G57.93    Impaired mobility and ADLs Z74.09, Z78.9    Status post insertion of drug eluting coronary artery stent Z95.5    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 Patient had a complicated prior medical history including recent diverticulitis of the sigmoid colon with mesenteric abscess for which she was continued on antibiotics. Patient also had a history of embolic strokes and was continued on Plavix and statin. Patient also had chronic diastolic congestive heart failure. Patient also had a history of for non-ST elevation MI and was continued on Coreg Plavix Eliquis lisinopril Crestor for this. In the inpatient rehab patient was started on a comprehensive rehabilitation program including PT and OT. Labs were monitored intermittently. Patient showed slow improvement.  services were involved. Discharge plans were discussed with the patient. Arrangements were made. Patient was subsequently discharged home. MEDICATIONS ON DISCHARGE:    Discharge Medication List as of 7/13/2021 11:31 AM      START taking these medications    Details   bisacodyL (DULCOLAX) 5 mg EC tablet Take 2 Tablets by mouth every forty-eight (48) hours as needed for Constipation. Indications: constipation, Print, Disp-14 Tablet, R-0      loperamide (IMODIUM) 2 mg capsule Take 2 Capsules by mouth two (2) times daily as needed for Diarrhea for up to 8 days. Indications: diarrhea, Print, Disp-32 Capsule, R-0      tamsulosin (FLOMAX) 0.4 mg capsule Take 1 Capsule by mouth daily (with dinner). Indications: enlarged prostate with urination problem, Print, Disp-30 Capsule, R-0      !! OTHER Check a CBC, CMP, magnesium in 3 daysresults to PCP immediately. Diagnosis is diverticulitis, Print, Disp-1 Each, R-0      !! OTHER Check CT scan of the abdomen pelvis with p.o. and IV contrast on July 15, 2021results to colorectal surgeon Dr. Mahamed Haynes immediately.   Diagnosisdiverticulitis, Print, Disp-1 Each, R-0      ipratropium-albuteroL (Combivent Respimat)  mcg/actuation inhaler Take 1 Puff by inhalation every six (6) hours as needed for Wheezing or Shortness of Breath., Print, Disp-1 Inhaler, R-0       !! - Potential duplicate medications found. Please discuss with provider. CONTINUE these medications which have CHANGED    Details   cefpodoxime (VANTIN) 200 mg tablet Take 1 Tablet by mouth every twelve (12) hours for 9 days. , Print, Disp-18 Tablet, R-0      clopidogreL (PLAVIX) 75 mg tab Take 1 Tablet by mouth daily (with breakfast). Indications: treatment to prevent a heart attack, Print, Disp-30 Tablet, R-0      apixaban (ELIQUIS) 5 mg tablet Take 1 Tablet by mouth two (2) times a day. Indications: treatment to prevent blood clots in chronic atrial fibrillation, Print, Disp-60 Tablet, R-0      rosuvastatin (CRESTOR) 10 mg tablet Take 1 Tablet by mouth daily. Indications: excessive fat in the blood, treatment to prevent a heart attack, Print, Disp-30 Tablet, R-0      amoxicillin-clavulanate (AUGMENTIN) 500-125 mg per tablet Take 1 Tablet by mouth every twelve (12) hours for 9 days. , Print, Disp-18 Tablet, R-0      carvediloL (COREG) 3.125 mg tablet Take 1 Tablet by mouth two (2) times daily (with meals). , Print, Disp-60 Tablet, R-0      gabapentin (NEURONTIN) 300 mg capsule Take 1 Capsule by mouth three (3) times daily. Max Daily Amount: 900 mg. Indications: neuropathic pain, Normal, Disp-45 Capsule, R-0      lisinopriL (PRINIVIL, ZESTRIL) 5 mg tablet Take 1 Tablet by mouth daily. Indications: a heart attack, high blood pressure, Print, Disp-30 Tablet, R-0      nitroglycerin (NITROSTAT) 0.4 mg SL tablet 1 Tablet by SubLINGual route every five (5) minutes as needed for Chest Pain. Up to 3 doses. Indications: acute attack of angina, Print, Disp-15 Tablet, R-0         CONTINUE these medications which have NOT CHANGED    Details   fluticasone furoate-vilanteroL (BREO ELLIPTA) 100-25 mcg/dose inhaler Take 1 Puff by inhalation daily. , Historical Med      albuterol (PROVENTIL VENTOLIN) 2.5 mg /3 mL (0.083 %) nebu 3 mL by Nebulization route every four (4) hours as needed for Wheezing or Shortness of Breath., No Print, Disp-30 Nebule, R-0      multivitamin, tx-iron-ca-min (THERA-M w/ IRON) 9 mg iron-400 mcg tab tablet Take 1 Tablet by mouth daily. Indications: treatment to prevent mineral deficiency, treatment to prevent vitamin deficiency, No Print, Disp-30 Tablet, R-0         STOP taking these medications       guaiFENesin (ROBITUSSIN) 100 mg/5 mL liquid Comments:   Reason for Stopping:         senna-docusate (PERICOLACE) 8.6-50 mg per tablet Comments:   Reason for Stopping:         isosorbide-hydrALAZINE (BiDiL) 20-37.5 mg per tablet Comments:   Reason for Stopping:         acetaminophen (TYLENOL) 325 mg tablet Comments:   Reason for Stopping:         ipratropium (ATROVENT) 0.02 % soln Comments:   Reason for Stopping:         L. acidophilus,casei,rhamnosus (BIO-K PLUS) 50 billion cell cpDR capsule Comments:   Reason for Stopping:         cholestyramine-aspartame (QUESTRAN LIGHT) 4 gram packet Comments:   Reason for Stopping:                 DISCHARGE VITAL SIGNS:  Visit Vitals  /66 (BP 1 Location: Left upper arm, BP Patient Position: At rest)   Pulse 72   Temp 97.7 °F (36.5 °C)   Resp 20   Ht 5' 8\" (1.727 m)   Wt 80.8 kg (178 lb 1.6 oz)   SpO2 97%   BMI 27.08 kg/m²           CONDITION ON DISCHARGE: Stable.       DISPOSITION: Home with home health care      FOLLOW-UP RECOMMENDATIONS:   Follow-up Information     Follow up With Specialties Details [address] Records    [phone]    [facility]. DME Services   [address]  [address] Colletta Lion, MD Family Medicine   [place]lock will call patient wife with an appointment date and time, office doesn't have any available appointment unit mid August. [address]  [address] [address] [address] [address] [facility], Rima Otoole MD Neurology Schedule an appointment as soon as possible for a visit PCP please call Dr. Felix Dallas [phone] opt. 9 to request earlier appt. since next appt is in Dec 2021; Fax: [phone] [facility] Keiko Cummings 92 1500 [facility]      Goran Watt MD Vascular Surgery Schedule an appointment as soon as possible for a visit  [address] [facility], [facility]      Roma Do MD Colon and Rectal Surgery On 7/26/2021 Patient has an appointment scheduled with Surgeon Dr. Darin Oliveira on July 26, 2021 @ 11:30am. [address] [facility]  [address]  [address] [facility]  On 7/16/2021 Patient is scheduled for CT Scan @ [facility] on July 16, 2021 @ 3:00pm.  Patient will need to pick-up contrast 24hrs. prior to test. No food within 4hrs. prior to test. [address] [facility]  [address] [facility]    Mathew Mora MD Cardiology On 9/16/2021 Patient has an appointment scheduled with Cardiollogy Dr. Madisyn Abreu on September 16, 2021 @ 10:00am. [address] SERVICES, [facility]   [place] [address]  [address] [place] [address]  [phone]          OTHER INSTRUCTIONS:        TIME SPENT ON DISCHARGE ACTIVITIES: More than 35 minutes. Dragon medical dictation software was used for portions of this report. Unintended errors may occur.       Signed:  Gadiel Briggs MD      8/9/2021

## 2021-08-10 NOTE — HOME HEALTH
S: I am ok. Akash Darden HEP: completed - Yes  Difficulties or concerns: He cut his reps to 5 each  . Current Pain: 2/10 L foot at rest  Sx: ache  Pain at worst in the past 24 hours: 6/10 L foot   Causes: ambulation   Sx: shooting pain   Pain at best in the past 24 hours: 2/10   Interventions to control pain/sx: rest and gabapentin  . Caregiver involvement: The patient's wife assists with daily needs and transportation. Akash Darden Upcoming MD apts : PCP apt in one week  . Noticeable Improvements or Decline since last session: Conts with increase in his BP  . Falls: none  Insurance changes: none  . Medicine changes: none  Medications reconciled and all medications are available in the home this visit. The following education was provided regarding medications, medication interactions, and look a like medications: If there are any medicines/supplements (by MD order or OTC) added or DC 'd let [facility] Staff know and have bottles/packages available. It is necessary to keep an accurate record of meds to avoid any medication errors. The patient denies any prescription or OTC medicine/supplement changes since last session. Medications  are effective at this time. .  O:    Vitals: Obtained and recorded in Vitals section. .  Interventions:   Gait: Pt answered the door without an AD. He has one, but does not use it in the home. He amb with decreased step length, flexed posture. Education to increase step length to decrease fall risk. Vitals assessment   Call to Dr Mendel Mary, PCP and spoke with Jose i.e. pt's high BP today and over the past week, as well as his high resting HR. Dr Mendel Mary is the doctor who orders his BP medication. Held exercise/activity until hearing back from the PCP. Akash Darden Home health supplies by type and quantity ordered/delivered this visit include: none  .   Patient education provided this visit: Acquire a BP cuff and keep a log of of his BP as well as heart rate several ties a day i.e. upon waking, after BP meds, after exercises. This will help the doctor to determine safe dosage. .  Assessment and Progress towards goals: Pt with high BP again this session with increased systolic > 90. He is currently unsafe to exercise. His resting HR continues to be elevated. He has been exercising consistently and has decreased his reps to 5, due to spikes in vitals with 30 reps. He denies any confusion with his current HEP. Sugey Sim Home exercise program:  Pt to perform the number of reps to tolerance, 5-15 reps to tolerance, 1x/day. Monitor BP once he acquires a BP cuff. 1. 4 deep pursed lip breaths                                    2.   toe taps  3.  leg kick outs  4  backward low arm circles  5. up on toes/back on heels  6  backward chicken arm circles  7.  mini squats  8.  bicep curls/touch shoulders and knees  9.  sidekicks  10. pray and opens  11.  back kicks  12. tricep extensions  13. 3X sidestep left the  right  14.  overhead reaches  15. 3X backward walking. .    Continued need for the following skills: Nursing, Physical Therapy and Speech Language Pathology  Patient care discussed with rehab team involved in patient care, at patient care meeting. .  P:  The following discharge planning was discussed with the pt/caregiver  Pt to be DC from [facility] PT next session. He may benefit from cardiac rehab in outpatient setting. Spoke with PT to DC i.e.. current condition.

## 2021-08-11 NOTE — HOME HEALTH
SUBJECTIVE  Patient states \"it's going good it really is\"  referring to change in medication management with external aid  . Caregiver involvement: caregivers provide assistance with medications, meal and ADLs. .  Medications reconciled and all medications are not available in the home this visit. The following education was provided regarding medications, medication interactions, and look a like medications: no new medications, take as scheduled, follow up with physician. Medications  are somewhat effective at this time. .  Home health supplies by type and quantity ordered/delivered this visit include: n/a  . Patient education provided this visit:  monitor Blood pressure follow up with Physician, HANK has contacted Dr Brandee Romero office this AM to report high blood pressure, benefits of having own blood pressure cuff in home. .  OBJECTIVE - see interventions  . ASSESSMENT  Patient demonstrate positive progress in therapy as indicated by increased functional use of external aid to increase situational awareness, takes medications with administration assistance of caregiver and increased word finding skills with verbal cues. Patient also demonstrates increased medication management with decreased number of external aids. Speech therapy is medically necessary to increase functional cognitive linguistic skills for safe completion of ADLs. Al DEL CASTILLO  Home exercise program: verbal problem solving tasks    Continued need for the following skills: Speech Language Pathology    The following discharge planning was discussed with the pt/caregiver: 1 more visit or when goals met/max benefits achieved

## 2021-08-13 NOTE — HOME HEALTH
Pt.clinically discharged and documentation finalized for completion of agency discharge. See d/c summary for details.

## 2021-08-14 NOTE — HOME HEALTH
SUBJECTIVE  Patient states \"I think I'm doing real good\"  . Caregiver involvement: caregiver provides assistance with medications meals and ADLs. .  Medications reconciled and all medications are available in the home this visit. The following education was provided regarding medications, medication interactions, and look a like medications: no new medications, take as scheduled. Medications  are somewhat effective at this time. .  Home health supplies by type and quantity ordered/delivered this visit include: n/a  . Patient education provided this visit:  go to ED if become symptomatic with high B/P as MD has advised there are no cancellations for earlier visit. MD notified of discharge from home care  . OBJECTIVE - see interventions  . ASSESSMENT   Patient has made steady progress in therapy as indicated by meeting all goals with exception of medication management for which patient prefers his wife assist.  Patient and spouse are independent with HEP to maintain current function and is ready for discharge today. Linsey Green   PLAN  Home exercise program: continue use of external aid to increase situational awareness, medication orientation and verbal processing skills    Continued need for the following skills: n/a    The following discharge planning was discussed with the pt/caregiver: discharge today

## 2021-08-15 NOTE — CASE COMMUNICATION
Patient was discharged from [address] [place] [address] and Home care. PT goals met.   Thank you for your referral.

## 2021-08-26 NOTE — PROGRESS NOTES
Subjective: Tolerating diet moving his bowels. Denies pain. Denies fevers. Past medical history and ROS were reviewed and unchanged. Abdomen: Soft, nontender nondistended    Assessment / Plan    Diverticulitis with abscess in setting of MI status post stent on anticoagulation in April  Currently asymptomatic, will defer on CT but if patient has any symptoms he is instructed to call and we will have a low threshold for ordering this  Follow-up in 3 months  Has never had a colonoscopy, discussed this at his next visit  We can consider doing this on anticoagulation if necessary, would be important to rule out cancer as a cause of his abscess    20 minutes was spent in patient care. The diagnoses and plan were discussed with patient. All questions answered. Plan of care agreed to by all concerned.

## 2021-08-26 NOTE — PROGRESS NOTES
Dixie Flowers is a 80 y.o. male  Chief Complaint   Patient presents with    Follow-up     diverticular / abscess     Mr. Steve Boone has been given the following recommendations today due to his elevated BP reading: referred to Alternative/PCP. Patient saw PCP for elevated blood pressure on 8/17/2021.

## 2021-11-17 NOTE — HOME HEALTH
PT roberto  Pt is an 79 y/o male who was admitted to hospital on 9/29/21 with diagnosis of persistent atrial fibrillation. Pt  is now home and now referred to home care PT due to gait difficulty and generalized weakness. PMH: Urinary retention;  Essential hypertension;  Non-ST elevation (NSTEMI) myocardial infarction [facility]); Paroxysmal atrial  fibrillation; Anticoagulated by anticoagulation treatment;  Mixed hyperlipidemia; Benign prostatic hyperplasia with urinary retention; Malignant neoplasm of lower lobe of right lung; Heart failure with preserved left ventricular function (HFpEF); Coronary artery disease involving native coronary artery of native heart; History of coronary artery bypass graft; Chronic obstructive pulmonary disease (COPD); History of carotid stenosis; History of renal artery stenosis; History of gross hematuria; Acute embolic stroke; Leukocytosis; Peripheral vascular disease of extremity with claudication; Neuropathy involving both lower extremities; Impaired mobility and ADLs; Status post insertion of drug eluting coronary artery stent; Mesenteric abscess; Diverticulitis of large intestine with abscess; Diverticulosis of sigmoid colon; History of sinus bradycardia; Generalized weakness; Edema of both lower extremities     PLOF/living environment:  ind and ambulatory without AD;    PREC: high fall risk;  monitor O2 sats level due to O2 desaturation. Use B healing shoes for standing and walking activities. S:  Pt. denies falls  and reported change in meds with SN during [facility]. Pt's goals in PT are to get stronger and walk without walker. O: Pain:  L foot  =  4-8 /10  Integumentary:  wound on lateral L heel, wound care done by SN. Pt instruction:  L heel pressure relief strategies = remove shoes at rest.    B lower legs edema. Pt instruction:  do ankle pumps and BLE elevation. ROM:  all peripheral joints = WNL  MMT:  BLE = 3+/5;  BUE = 3+/5.   Functional strength test:  Bed mobility:  sit <> supine =  Min A  ;  rolling =  min A  ;  Transfers:  sit <> stand from recliner chair  =  CGA.  TUG = 67  seconds with FWW indicating  high fall risk;  Gait:  20  ft SBA using FWW exhibiting slow kourtney, flat foot gait pattern. FWW was adjusted to pt's height. Endurance:  2MWT:  unable indicating poor walking endurance  Patient education:   Fall risk reduction strategies = use non skid shoes, use AD, ask for supervision. Maintenance of skin integrity:  change body position every 1-2 hours. Physical activity:  Ambulate once every hour  with supervision, during daytime. Pt's response to education:  Pt understood but may need reinforcement. Caregiver was instructed to remind pt to do physical activities. A:  Pt is diagnosed with atrial fibrillation, lateral L heel wound  and presents with the following functional impairments: decreased gait, decreased transfers, poor walking/standing endurance. Pt will benefit from PT to improve strength , balance and endurance to promote safe and functional mobility in pt's environment  and return to his PLOF . P:  PT frequency 2w4, 1w1 for general strengthening exercises, gait training, transfers, fall risk reduction strategies.

## 2021-11-19 NOTE — HOME HEALTH
Skilled services/Home bound verification:     Skilled Reason for admission/summary of clinical condition:  Acute on chronic combined diastolic and systolic congestive heart failure/CVA  . This patient is homebound for the following reasons Requires considerable and taxing effort to leave the home  and Only leaves the home for medical reasons or Baptism services and are infrequent and of short duration for other reasons . [address] [facility] Spouse  Caregiver assists with ASSISTING WITH ADL'S , MEALS, HOUSEWORK , MEDICATIONS , EXERCISE , HYGIENE , AND TRANSPORTATION FOR DOCTOR VISITS AND SHOPPING      Medications reconciled and all medications are available in the home this visit. The following education was provided regarding medications, medication interactions, and look alike medications (specify): REVIEW OF MEDICATIONS WITH PURPOSE AND EFFECTIVENESS , WITH REPORTING SIDE EFFECTS TO MD   RIGHT MED/RIGHT TIME / RIGHT DOSE. Medications  are too early to assess effectiveness. at this time. High risk medication teaching regarding anticoagulants, hyperglycemic agents or opiod narcotics performed (specify) Michael jones MD  notified of any discrepancies/medication interactions . Home health supplies by type and quantity ordered/delivered this visit include: ordered and car supplies left     Patient education provided this visit to include: medications reviewed and reconciled , teaching Assess for signs and symptoms of infection such as: Fever>100.4, Chills, Confusion, Lethargy, Cough, increased amount or discolored mucus, Body aches, Urine appears bloody or purulent, foul odor, urinary frequency, or pain and Wound has increased pain, redness, swelling, purulent draining, or foul odor. Report findings to physician. Instruct patient/caregiver on infection control measures including hand washing before & after using the bathroom, after removing gloves/other protective clothing, before preparing food, after eating/smoking, immediately after contact with blood/other body fluids/feces, and frequently throughout the day; to wear gloves any time they may have direct contact with blood, body fluids, non-intact skin, or surfaces soiled with blood/body fluids; to dispose of soiled dressings & supplies in a plastic trash bag, seal, and place in regular trash. patient to monitor daily wieghts with reporting wieght gains of over 2 lbs in 24 hrs and or 5 lbs in 1 wk , patient to maintain senait diet , elevate legs when edema present , report and or seek medical attention is sob that doesn't resolve with rest , any chest pain or dizziness , take diurectic as prescribed. wound lt heel dressing     Home exercise program/Homework provided: deep breathing sets of 10 every 2 hrs with use of ics , timed voiding every 2 hrs w.a     Pt/Caregiver instructed on plan of care and are agreeable to plan of care at this time. Physician Najma Calderon MD notified of patient admission to home health and plan of care including anticipated frequency of 2wk4,1wk4 2prn  and treatments/interventions/modalities of SN PT OT MSW HHA , patient and caregiver declined HHA. Discharge planning discussed with patient and caregiver. Discharge planning as follows: DISCHARGE WHEN GOALS MET  WOUND HEALING LT HEEL , DISEASE MANAGEMENT EDUCATION FOR CHF, HTN H/O STROKE . Pt/Caregiver did verbalize understanding of discharge planning. Next MD appointment tbd   Patient/caregiver encouraged/instructed to keep appointment as lack of follow through with physician appointment could result in discontinuation of home care services for non-compliance.

## 2021-11-19 NOTE — HOME HEALTH
Subjective: I don't have pain now, but sometimes it comes as the day goes on. Caregiver involvement: Pt's wife assists with care as needed. Medications reconciled and all medications are available in the home this visit. Medications are effective at this time. no new medication. Patient education provided this visit: breathing with activity. Safety with amb to decrease fall risk    Progress toward goals: Pt reports no falls. sit <-> stand from chair SBA. Pt requires vc for proximity to chair before sitting and for correct hand placement with transitions. Pt amb 30 feet x 2 with rolling walker and SBA. VC for safety with edges of area rugs and for upright posture. Pt amb with slow kourtney and decreased R foot clearance with fatigue. Pt amb 20 feet with SPC and CGA/ min A. Pt amb with decreased step length, difficulty clearing edges of area rugs and unsteady gait. Discussed only using walker at this time to decrease risk of falls. Pt amb forward and backward with rolling walker 10 feet in both directions. Pt amb with decreased step through gait pattern amb forward, but step to gait pattern amb backward. Pt unable to pass opposite foot  with initial step and required min A to maintain standing balance amb backward. Pt's O2 sats remained >90% t/o session HR between 72-84 bpm.    Continued need for the following skills: Continue HHPT to increase dynamic standing balance and safety awareness with community level mobility, decreasing risk of falls.     The following discharge planning was discussed with the pt/caregiver: d/c HHPT in 7 more visits

## 2021-11-22 NOTE — HOME HEALTH
Subjective: I want to be able to walk out to the stop sign again. It's been a while since I've been able to do that. Caregiver involvement: Pt's wife assists with household tasks, meal prep and care as needed. Medications reconciled and all medications are available in the home this visit. Medications are effective at this time. no new medication. Patient education provided this visit: breathing with activity. energy conservation. fall risks. Progress toward goals: Pt reports no falls. sit <-> stand from chair SBA. Pt requires occ vc for correct hand placement with transition. Pt amb 50 feet x 2 with rolling walker and SBA. He amb with very slow kourtney and decreased R foot clearance. VC for looking up, upright posture and clearing R foot. Pt is able to correct body mechanics with vc, but does require intermittent reminders. O2 sats remained >92% and HR between 78 (at rest) and 120 bpm (with standing activity)  t/o session today. Discussed using to walker when amb to increase balance and improve body mechanics with amb. Discussed amb every hour and resting when fatigued. Continued need for the following skills: Continue HHPT to increase dynamic standing balance decreasing risk of falls. The following discharge planning was discussed with the pt/caregiver: d/c HHPT in 6 more visits.

## 2021-11-24 NOTE — HOME HEALTH
Subjective: I just don't feel good today. I've been up on and off since 2am.    Caregiver involvement: Pt's wife assists with household tasks, meal prep, medication and care as needed. Medications reconciled and all medications are available in the home this visit. Medications are effective at this time. no new medications. Patient education provided this visit: Normal HR parameters. fall risk, signs and symptoms of infection. Progress toward goals: Pt reports no falls. Arrived at pt's home for PT visit, nursing present and addressing bandages on L foot. Pt's resting HR is between 123-160 bpm, pt has history of A fib. Nursing stated she would call MD.  Discussed going to ER if HR did not return below 100 bpm w/i the hour. Hold PT for today due to elevated HR. Discussed not performing exercises when HR >100 bpm.  Pt has pulse ox at home. Discussed checking HR before performing exercises since currently HR is 120 and greater and pt states he can not feel heart racing. Continued need for the following skills: Continue HHPT to increase safety and independence with community level mobility, decreasing risk of falls. The following discharge planning was discussed with the pt/caregiver: d/c HHPT in 5 more visits.

## 2021-11-29 NOTE — HOME HEALTH
SN reason for visit: wound care, CHF, DM     Caregiver involvement: Patient's cg is wife. she lives with patient and is available at all times for assistance with iadls, adls, meal prep, medication management, taking to md appointments. Medications reconciled and all medications are available in the home this visit. The following education was provided regarding medications, medication interactions, and look a like medications. Medications  are effective at this time. Home health supplies by type and quantity ordered/delivered this visit include: n/a    Patient education provided this visit:  wound care performed per orders- old dressing completely removed, wound cleansed with NS and applied dsd, secured with kerlix. dressing changed as ordered, healing well with no s/s of infection present. pt aware to keep dressing clean, dry and intact as ordered. pt aware to keep incision clean and dry as ordered, to report any new drainage to staff. patient made aware to monitor for s/s of infection [increased swelling, increased redness around site, increased pain, foul smelling drainage, fever] aware who to report to/when.  patient encouraged to monitor for increase in pain and to continue with current pain management and to notify staff/md if pain becomes excrutiating/intolerable. pt instructed to follow a high protein diet for healing- to try to get 90g protein daily. patient instructed to maintain clear pathways in home and to minimize clutter to prevent falls from occurring/minimize fall potential.  patient/cg to continue to take medications as prescribed. patient aware to monitor for effectiveness and to notify staff of any adverse reactions to medications/any changes to medication regimen. reviewed side effects, purposes, dosage, frequencies  patient to monitor for edema/increase in edema, to elevate extremity when edema occurs and to notify md if edema exceeds normal limits for patient.  patient reminded of heart healthy diet, need to limit sodium intake, low fat and low cholesterol. patient instructed to monitor daily weight with telehealth and to report weight gain of 2lbs overnight/5lbs in 1 week to MD. Chelsey Shipman, EDUCATED ON THE BLEEDING PRECAUTIONS, BRUSING, BLEEDING GUMS, BLACK TARY STOOLS, BLOODY STOOLS. Patient is a fall risk. Educated pateint to sit on the side of the chair/bed, take a slow deep breaths, have feet firmly planted before standing up, use cane/walker if available, or have someone to assist.  INSTRUCTED PATIENT AND CG THAT SHOULD ANY NEEDS OR CONCERNS ARISE TO FIRST CALL OUR OFFICE, OR THE DR'S OFFICE  OR GO TO AN URGENT CARE CENTER AND NOT TO THE ED FOR NON-LIFE THREATENING EVENTS. IF IT IS LIFE THREATENING THEN CALL 911 OR GO TO THE CLOSEST ER. Progress toward goals: Patient's personal goal is to have complete wound healing and remain free of infection. Home exercise program: activity as tolerated, trying to get physical activity 4-5 x weekly, 30 minutes daily. stopping activity if causing shortness of breath or chest pain, dizziness or weakness. Continued need for the following skills: Nursing and Physical Therapy    The following discharge planning was discussed with the pt/caregiver: Patient will be discharged once education has completed, patient is medically stable and pt/cg are able to independently manage wound care/ wound has healed or no longer requires skilled care.

## 2021-12-01 NOTE — HOME HEALTH
Skilled reason for visit: 80-year old male being seen due to heart failure and CVA, and also has an ulcer to left heel-dry/flaky-applied skin prep, applied dry dressing. Patient explained that he almost went to ER this morning due to severe pain, took medication and it improved, but may go and get it checked out if it happens again. Caregiver involvement: Wife involved in care-able to assist with ADL's, medications, meal preparation, transportation to MD appointments. Medications reviewed and all medications are available in the home this visit. The following education was provided regarding medications, medication interactions, and look alike medications (specify): side effects, dosages, purposes, frequencies. Medications  are somewhat effective at this time. Home health supplies by type and quantity ordered/delivered this visit include: None   Patient education provided this visit:   INSTRUCTED PATIENT AND CG THAT SHOULD ANY NEEDS OR CONCERNS ARISE TO FIRST CALL OUR OFFICE, OR THE DR'S OFFICE  OR GO TO AN URGENT CARE CENTER AND NOT TO THE ED FOR NON-LIFE THREATENING EVENTS. IF IT IS LIFE THREATENING THEN CALL 911 OR GO TO THE CLOSEST ER. Patient is a fall risk. Educated pateint to sit on the side of the chair/bed, take a slow deep breaths, have feet firmly planted before standing up, use cane/walker if available, or have someone to assist.  pt instructed to follow with diabetic diet- monitoring sugar intake, limiting foods with high sugar content. reviewed cardiac diet- monitoring sodium intake, cholesterol and fat intake. patient aware to limit sodium, no added sodium to diet. reviewed foods to avoid, how to order foods when eating out, how to read nutrition labels and measure sodium, cholesterol and fat intake. patient to monitor for edema/increase in edema, to elevate extremity when edema occurs and to notify md if edema exceeds normal limits for patient.  patient reminded of heart healthy diet, need to limit sodium intake, low fat and low cholesterol. patient instructed to monitor daily weight with telehealth and to report weight gain of 2lbs overnight/5lbs in 1 week to MD.  pt aware to keep dressing clean, dry and intact as ordered. patient made aware to monitor for s/s of infection [increased swelling, increased redness around site, increased pain, foul smelling drainage, fever] aware who to report to/when.  encouraged patient to get three nutritional meals daily and to stay hydrated, drink plenty of fluids. Skilled Care Performed this visit: Education and review, heel assessment and wound care performed. Agency Progress toward goals: Progressing with difficulty due to pain  Patient's Progress towards personal goals: Progressing with difficulty due to pain. Home exercise program: Take medications only as prescribed, weigh self daily, monitor for increase in weight, shortness of breath, any chest pain and report to home health agency/MD, follow heart healthy diet, follow fluid restriction if applicable, take regular rest periods, and encouraged to keep all MD appointments. Continued need for the following skills: Nursing  Plan for next visit: Education and review, wound care to heel. Patient and/or caregiver notified and agrees to changes in the Plan of Care YES    The following discharge planning was discussed with the pt/caregiver: Patient will be discharged once education has completed, patient is medically stable and pt/cg are able to independently manage medications and disease process. Patient will be discharged once education has completed, patient is medically stable and pt/cg are able to independently manage wound care/ wound has healed or no longer requires skilled care.     Prior to the visit, the patient was screened for the followin.) International travel in the last month:  NO  2.) Exposure Screening:  Contact with anyone with the following symptoms in the last 14 days: a.) Fever, or lower respiratory illness (shortness of breath or cough) or NO  b.) Fever with severe acute respiratory illness (pneumonia or acute respiratory distress syndrome) NO   3.) The patient has the following symptoms:  a.) Fever, cough, shortness of breath NO  If the patient has the above symptoms: The home health attending or hospice medical director will be contacted and scheduled patient visits will be placed on hold until the patient is confirmed to be positive or negative. An order will be obtained to hold visits until test results are confirmed. If negative, patient visits will resume. If positive, a delay in care order will be obtained as well as orders to restart care after the 14 day isolation period. During the 14 day isolation period the patient will be contacted by their primary  2 times a week to check on patient status and to make sure they have no questions about medications, disease process, and that they have had no changes in condition. The patient will be instructed to contact home health or hospice during the delay of care for any changes in conditions, questions, or concerns.

## 2021-12-03 NOTE — HOME HEALTH
Subjective: This foot just hurts. Caregiver involvement: Pt's wife assists with household tasks, meal prep and care as needed. Medications reconciled and all medications are available in the home this visit. Medications are effective at this time. no new medication. Patient education provided this visit: elevating and pressure relief of L LE. Progress toward goals: Pt reports no falls. Pt states L foot continues to have issues with pain. Inquired if they scheduled a doctor appointment as we discussed on Wednesday. They stated they had not. Pt's wife stated that neurology prescribed the gabapentin. Discussed calling them to discuss increased pain and frequency of pain in L foot and scheduling MD appointment to assess issues. She stated she would f/u with neurology. Pt requires increased time for all exercises and mobility today. Increased vc for body mechanics and sequencing today. Pt c/o SOB with amb today. His wife states he has not been using his inhalers the last few days. Discussed importance of amb during the day to increase endurance for household mobility and to use inhalers as prescribed to manage SOB with activity. Pt states he uses cane in his home, discussed using rolling walker to decrease pressure on L foot and to increase safety and endurance with amb. Continued need for the following skills: Continue HHPT to increase safety and independence with community level mobility, decreasing risk of falls. The following discharge planning was discussed with the pt/caregiver: d/c HHPT in 3 more visits.

## 2021-12-05 NOTE — HOME HEALTH
Subjective: I have been sleeping here in the chair. Caregiver involvement: Pt's wife assists with all care as needed. Medications reconciled and all medications are available in the home this visit. Medications are effective at this time. no new medication. Patient education provided this visit: Discussed with pt and his wife if edema continues to increase, increasing SOB, c/o chest pain, or c/o increasing pain in L foot to seek medical attention. Progress toward goals: Pt reports no falls. Pt's wife reports he has not been upstairs to sleep in bed in about a week and has been sitting and sleeping in tall back chair downstairs. Pt has been inconsistant with elevating B LE and now has  B LE 2+ edema today. Pt amb 25 feet x 2 with rolling walker with min A/ CGA. Pt amb with decreased shuffling gait pattern. VC for upright posture and clearing B feet with gait. Pt amb up 1 flight of stairs with B hands on handrail mod / max A. Pt requires vc for correct foot placement and sequencing for navigating each step. Pt required seated rest break 3/4 of the way up the stairs due to SOB. RR 28 breathes per minute upon sitting, after 5 minute rest returned to 18 breathes per minute. sit <-> stand from various chairs and bed min A. Pt requires vc for hand placement and breathing. He requires increased time and effort for transition. sit -> supine mod A with raising legs onto bed. VC for sequencing and positioning on bed. Discussed with pt and his wife to spend today in supine with B LE elevated to decrease B LE edema, sitting up for meals and getting up to use the bathroom. Pt has had a decline in mobility since last week. Discussed with Teresa Cain PT about decline in mobility and sup visit schedule for next visit. Continued need for the following skills: Continue HHPT to increase safety and independence with household mobility.     The following discharge planning was discussed with the pt/caregiver: d/c HHPT in 3 more visits.

## 2021-12-06 VITALS
TEMPERATURE: 98 F | TEMPERATURE: 97.7 F | SYSTOLIC BLOOD PRESSURE: 124 MMHG | OXYGEN SATURATION: 98 % | OXYGEN SATURATION: 93 % | DIASTOLIC BLOOD PRESSURE: 72 MMHG | HEART RATE: 144 BPM | SYSTOLIC BLOOD PRESSURE: 126 MMHG | HEART RATE: 83 BPM | DIASTOLIC BLOOD PRESSURE: 74 MMHG

## 2021-12-06 VITALS
DIASTOLIC BLOOD PRESSURE: 82 MMHG | OXYGEN SATURATION: 92 % | TEMPERATURE: 98.3 F | SYSTOLIC BLOOD PRESSURE: 134 MMHG | HEART RATE: 72 BPM

## 2021-12-06 PROCEDURE — 3331090002 HH PPS REVENUE DEBIT

## 2021-12-06 PROCEDURE — 3331090001 HH PPS REVENUE CREDIT

## 2021-12-06 NOTE — HOME HEALTH
SN reason for visit: wound care     Caregiver involvement: Patient's cg is wife. she lives with patient and is available at all times for assistance with iadls, adls, meal prep, medication management, taking to md appointments. .    Medications reconciled and all medications are available in the home this visit. The following education was provided regarding medications, medication interactions, and look a like medications. Medications  are effective at this time. Home health supplies by type and quantity ordered/delivered this visit include: n/a    Patient education provided this visit:  wound care performed per orders- old dressing completely removed, wound cleansed with NS and applied dry dressing, secured with foam and kerlix. dressing changed as ordered, healing well with no s/s of infection present. pt aware to keep dressing clean, dry and intact as ordered. pt aware to keep incision clean and dry as ordered, to report any new drainage to staff. patient made aware to monitor for s/s of infection [increased swelling, increased redness around site, increased pain, foul smelling drainage, fever] aware who to report to/when.  patient encouraged to monitor for increase in pain and to continue with current pain management and to notify staff/md if pain becomes excrutiating/intolerable. pt instructed to follow a high protein diet for healing- to try to get 90g protein daily. patient instructed to maintain clear pathways in home and to minimize clutter to prevent falls from occurring/minimize fall potential.  patient/cg to continue to take medications as prescribed. patient aware to monitor for effectiveness and to notify staff of any adverse reactions to medications/any changes to medication regimen. reviewed side effects, purposes, dosage, frequencies  PATIENT TAKING ELIQUIS, EDUCATED ON THE BLEEDING PRECAUTIONS, BRUSING, BLEEDING GUMS, BLACK TARY STOOLS, BLOODY STOOLS. Patient is a fall risk. Educated pateint to sit on the side of the chair/bed, take a slow deep breaths, have feet firmly planted before standing up, use cane/walker if available, or have someone to assist.  INSTRUCTED PATIENT AND CG THAT SHOULD ANY NEEDS OR CONCERNS ARISE TO FIRST CALL OUR OFFICE, OR THE DR'S OFFICE  OR GO TO AN URGENT CARE CENTER AND NOT TO THE ED FOR NON-LIFE THREATENING EVENTS. IF IT IS LIFE THREATENING THEN CALL 911 OR GO TO THE CLOSEST ER. Progress toward goals: Patient's personal goal is to have complete wound healing and remain free of infection. Home exercise program: activity as tolerated, trying to get physical activity 4-5 x weekly, 30 minutes daily. stopping activity if causing shortness of breath or chest pain, dizziness or weakness. Continued need for the following skills: Nursing and Physical Therapy    The following discharge planning was discussed with the pt/caregiver: Patient will be discharged once education has completed, patient is medically stable and pt/cg are able to independently manage wound care/ wound has healed or no longer requires skilled care. Patient heartrate elevated at visit today. Called Cardiology Dr Quiñonez Ar office. Spoke with Will Hendricks RN, advised of readings. Explained to her that patient had not taken beta blockers yet, advised him to check again in an hour or two, and if not within normal limits, seek medical attention. Leatha Epley, agreed with plan and states she would pass on to MD and call back if changes were to be made to plan.

## 2021-12-07 ENCOUNTER — HOME CARE VISIT (OUTPATIENT)
Dept: HOME HEALTH SERVICES | Facility: HOME HEALTH | Age: 81
End: 2021-12-07
Payer: MEDICARE

## 2021-12-07 PROCEDURE — 3331090001 HH PPS REVENUE CREDIT

## 2021-12-07 PROCEDURE — 3331090002 HH PPS REVENUE DEBIT

## 2021-12-07 NOTE — HOME HEALTH
SN reason for visit: wound care Caregiver involvement: Patient's cg is wife. she lives with patient and is available at all times for assistance with iadls, adls, meal prep, medication management, taking to md appointments. . Medications reconciled and all medications are available in the home this visit. The following education was provided regarding medications, medication interactions, and look a like medications. Medications are effective at this time. Home health supplies by type and quantity ordered/delivered this visit include: n/a Patient education provided this visit: wound care performed per orders- old dressing completely removed, wound cleansed with NS and applied dry dressing, secured with foam and kerlix. dressing changed as ordered, healing well with no s/s of infection present. pt aware to keep dressing clean, dry and intact as ordered. pt aware to keep incision clean and dry as ordered, to report any new drainage to staff. patient made aware to monitor for s/s of infection [increased swelling, increased redness around site, increased pain, foul smelling drainage, fever] aware who to report to/when. patient encouraged to monitor for increase in pain and to continue with current pain management and to notify staff/md if pain becomes excrutiating/intolerable. Stressed to patient to call PCP today about pain levels to see if they can prescribe something to give him some releif. pt instructed to follow a high protein diet for healing- to try to get 90g protein daily. patient instructed to maintain clear pathways in home and to minimize clutter to prevent falls from occurring/minimize fall potential. patient/cg to continue to take medications as prescribed. patient aware to monitor for effectiveness and to notify staff of any adverse reactions to medications/any changes to medication regimen.  reviewed side effects, purposes, dosage, frequencies PATIENT TAKING ELIQUIS, EDUCATED ON THE BLEEDING PRECAUTIONS, BRUSING, BLEEDING GUMS, BLACK TARY STOOLS, BLOODY STOOLS. Patient is a fall risk. Educated pateint to sit on the side of the chair/bed, take a slow deep breaths, have feet firmly planted before standing up, use cane/walker if available, or have someone to assist. INSTRUCTED PATIENT AND CG THAT SHOULD ANY NEEDS OR CONCERNS ARISE TO FIRST CALL OUR OFFICE, OR THE DR'S OFFICE OR GO TO AN URGENT CARE CENTER AND NOT TO THE ED FOR NON-LIFE THREATENING EVENTS. IF IT IS LIFE THREATENING THEN CALL 911 OR GO TO THE CLOSEST ER. Progress toward goals: Patient's personal goal is to have complete wound healing and remain free of infection. Home exercise program: activity as tolerated, trying to get physical activity 4-5 x weekly, 30 minutes daily. stopping activity if causing shortness of breath or chest pain, dizziness or weakness. Continued need for the following skills: Nursing and Physical Therapy The following discharge planning was discussed with the pt/caregiver: Patient will be discharged once education has completed, patient is medically stable and pt/cg are able to independently manage wound care/ wound has healed or no longer requires skilled care.

## 2021-12-08 PROCEDURE — 3331090001 HH PPS REVENUE CREDIT

## 2021-12-08 PROCEDURE — 3331090002 HH PPS REVENUE DEBIT

## 2021-12-09 PROCEDURE — 3331090001 HH PPS REVENUE CREDIT

## 2021-12-09 PROCEDURE — 3331090002 HH PPS REVENUE DEBIT

## 2021-12-10 PROCEDURE — 3331090002 HH PPS REVENUE DEBIT

## 2021-12-10 PROCEDURE — 3331090001 HH PPS REVENUE CREDIT

## 2021-12-11 ENCOUNTER — HOME CARE VISIT (OUTPATIENT)
Dept: HOME HEALTH SERVICES | Facility: HOME HEALTH | Age: 81
End: 2021-12-11
Payer: MEDICARE

## 2021-12-11 PROCEDURE — 3331090002 HH PPS REVENUE DEBIT

## 2021-12-11 PROCEDURE — 3331090001 HH PPS REVENUE CREDIT

## 2021-12-11 NOTE — HOME HEALTH
Patient's wife reports that patient has passed away in their home on 12/5/21. Patient was being seen for chronic diastolic/systolic congestive heart failure, CVA, and wound care to left heel.

## 2021-12-12 PROCEDURE — 3331090002 HH PPS REVENUE DEBIT

## 2021-12-12 PROCEDURE — 3331090001 HH PPS REVENUE CREDIT

## 2021-12-13 PROCEDURE — 3331090001 HH PPS REVENUE CREDIT

## 2021-12-13 PROCEDURE — 3331090002 HH PPS REVENUE DEBIT

## 2021-12-14 PROCEDURE — 3331090002 HH PPS REVENUE DEBIT

## 2021-12-14 PROCEDURE — 3331090001 HH PPS REVENUE CREDIT

## 2021-12-15 PROCEDURE — 3331090001 HH PPS REVENUE CREDIT

## 2021-12-15 PROCEDURE — 3331090002 HH PPS REVENUE DEBIT

## 2021-12-16 PROCEDURE — 400018 HH-NO PAY CLAIM PROCEDURE

## 2024-06-24 NOTE — PROGRESS NOTES
[facility] PHYSICAL REHABILITATION  [address] [place] [address], [address]     INPATIENT REHABILITATION  DAILY PROGRESS NOTE     Date: 7/9/2021    Name: Ardis Spurling Age / Sex: 80 y.o. 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 Blood pressure controlled. No documented fever since admission. Patient's Complaint:   No significant medical complaints    Pain Control: stable, mild-to-moderate joint symptoms intermittently, reasonably well controlled by current meds      Objective:     Vital Signs:  Patient Vitals for the past 24 hrs:   BP Temp Pulse Resp SpO2   07/09/21 0716 130/86 97.2 °F (36.2 °C) 98 20    07/09/21 0610 130/83  81     07/08/21 2143 132/71 97.7 °F (36.5 °C) 80 22 95 %   07/08/21 1547 111/65 97.9 °F (36.6 °C) 86 16 96 %        Physical Examination:  GENERAL SURVEY: Patient is awake, alert, oriented x 3, sitting comfortably on the chair, not in acute respiratory distress. HEENT: pale palpebral conjunctivae, anicteric sclerae, no nasoaural discharge, moist oral mucosa  NECK: supple, no jugular venous distention, no palpable lymph nodes  CHEST/LUNGS: symmetrical chest expansion, good air entry, clear breath sounds  HEART: adynamic precordium, good S1 S2, no S3, regular rhythm, tachycardic, no murmurs  ABDOMEN: flat, bowel sounds appreciated, soft, non-tender  EXTREMITIES: pale nailbeds, Grade 1 to 2 bipedal edema, decreased pulses on BLE, no calf tenderness   NEUROLOGICAL EXAM: The patient is awake, alert and oriented x3, able to answer questions fairly appropriately, able to follow 1 and 2 step commands. Able to tell time from the wall clock. Cranial nerves II-XII are grossly intact. No gross sensory deficit.   Motor strength is 4 to 4+/5 on all 4 extremities. Current Medications:  Current Facility-Administered Medications   Medication Dose Route Frequency    loperamide (IMODIUM) capsule 4 mg  4 mg Oral QID    furosemide (LASIX) tablet 40 mg  40 mg Oral ACB    nitroglycerin (NITROBID) 2 % ointment 2 Inch  2 Inch Topical BID    pneumococcal 23-valent (PNEUMOVAX 23) injection 0.5 mL  0.5 mL IntraMUSCular PRIOR TO DISCHARGE    acetaminophen (TYLENOL) tablet 650 mg  650 mg Oral Q4H PRN    bisacodyL (DULCOLAX) tablet 10 mg  10 mg Oral Q48H PRN    amoxicillin-clavulanate (AUGMENTIN) 500-125 mg per tablet 1 Tablet  1 Tablet Oral BID WITH MEALS    cefpodoxime (VANTIN) tablet 200 mg  200 mg Oral Q12H    carvediloL (COREG) tablet 3.125 mg  3.125 mg Oral BID WITH MEALS    clopidogreL (PLAVIX) tablet 75 mg  75 mg Oral DAILY WITH BREAKFAST    rosuvastatin (CRESTOR) tablet 10 mg  10 mg Oral DAILY    nitroglycerin (NITROSTAT) tablet 0.4 mg  0.4 mg SubLINGual PRN    arformoteroL (BROVANA) neb solution 15 mcg  15 mcg Nebulization BID RT    ipratropium (ATROVENT) 0.02 % nebulizer solution 0.5 mg  0.5 mg Nebulization TID    albuterol (PROVENTIL VENTOLIN) nebulizer solution 2.5 mg  2.5 mg Nebulization Q4H PRN    apixaban (ELIQUIS) tablet 5 mg  5 mg Oral BID    multivitamin, tx-iron-ca-min (THERA-M w/ IRON) tablet 1 Tablet  1 Tablet Oral DAILY    lisinopriL (PRINIVIL, ZESTRIL) tablet 5 mg  5 mg Oral DAILY    tamsulosin (FLOMAX) capsule 0.4 mg  0.4 mg Oral DAILY WITH DINNER    gabapentin (NEURONTIN) capsule 300 mg  300 mg Oral TID       Allergies: Allergies   Allergen Reactions    Lipitor [Atorvastatin] Rash    Norvasc [Amlodipine] Rash       Lab/Data Review:  No results found for this or any previous visit (from the past 24 hour(s)). Assessment:     Primary Rehabilitation Diagnosis  1. Generalized weakness with Impaired mobility and ADLs  2.  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    Comorbidities  Patient Active Problem List   Diagnosis Code    Urinary retention R33.9    Essential hypertension I10    Non-ST elevation (NSTEMI) myocardial infarction ([facility]) I21.4    Paroxysmal atrial fibrillation ([facility]) I48.0    Anticoagulated by anticoagulation treatment Z79.01    Mixed hyperlipidemia E78.2    Benign prostatic hyperplasia with urinary retention N40.1, R33.8    Malignant neoplasm of lower lobe of right lung ([facility]) C34.31    Heart failure with preserved left ventricular function (HFpEF) ([facility]) I50.30    Coronary artery disease involving native coronary artery of native heart I25.10    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    Chronic obstructive pulmonary disease (COPD) ([facility]) J44.9    History of carotid stenosis Z86.79    History of renal artery stenosis Z86.79    On clopidogrel therapy Z79.01    History of gross hematuria H22.005    Acute embolic stroke ([facility]) J87.9    Leukocytosis D72.829    Peripheral vascular disease of extremity with claudication ([facility]) I73.9    Neuropathy involving both lower extremities G57.93    Impaired mobility and ADLs Z74.09, Z78.9    Status post insertion of drug eluting coronary artery stent Z95.5    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 Justification for continued stay: Good progression towards established rehabilitation goals. 2. Medical Issues being followed closely:    [x]  Fall and safety precautions     []  Wound Care     [x]  Bowel and Bladder Function     [x]  Fluid Electrolyte and Nutrition Balance     [x]  Pain Control      3.  Issues that 24 hour rehabilitation nursing is following:    [x]  Fall and safety precautions     []  Wound Care     [x]  Bowel and Bladder Function     [x]  Fluid Electrolyte and Nutrition Balance     [x]  Pain Control      [x] Assistance with and education on in-room safety with transfers to and from the bed, wheelchair, toilet and shower. 4. Acute rehabilitation plan of care:    [x]  Continue current care and rehab. [x]  Physical Therapy           [x]  Occupational Therapy           [x]  Speech Therapy     []  Hold Rehab until further notice     5. Medications:    [x]  MAR Reviewed     [x]  Continue Present Medications     6. DVT Prophylaxis:      []  Enoxaparin     []  Unfractionated Heparin     []  Warfarin     [x]  NOAC     []  MARTHA Stockings     []  Sequential Compression Device     []  None     7. Code status    [x]  Full code     []  Partial code     []  Do not intubate     []  Do not resuscitate     8. 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 Mild infiltrate in the superior segment of the right lower lobe with small right pleural effusion. No consolidation. 2. Delayed renal clearance, chronic renal disease suspected. Additionally, areas of cortical cysts with poor perfusion.  Pyelonephritis not excluded. No hydronephrosis. Correlation with urinalysis? 3. Diverticulosis, with potential diverticulitis in the left lower quadrant, junction of descending and sigmoid colon. However, evaluation is very limited due to severe motion artifacts. 4. Focal mucosal thickening in the proximal sigmoid. Further evaluation with barium enema or colonoscopy as indicated. > WBC count (5/23/2021) = 30.2                          > . . Emiliana Sheppard > WBC count (6/2/2021) = 25.4                          > WBC count (6/3/2021) = 22.5                          > WBC count (6/4/2021) = 23.4                          > WBC count (6/5/2021) = 21.7                          > WBC count (6/6/2021) = 24.1                          > WBC count (6/7/2021) = 20.0                          > Work up done was negative for a source of infection              > WBC count (6/8/2021, on admission to the ARU) = 14.4   > On 6/8/2021, started Pericolace 2 tabs PO once daily after dinner   > On 6/9/2021, started Polyethylene glycol 17 grams in 8 oz water PO once daily   > On 6/19/2021:    > Patient was noted to have diarrhea    > Held:     > Polyethylene glycol 17 grams in 8 oz water PO once daily     > Pericolace 2 tabs PO once daily after dinner    > Started Bio K Plus 1 cap PO once daily   > Stool culture (collected 6/19/2021, resulted 6/20/2021): Negative   > WBC count (6/20/2021) = 26.7   > Blood culture x 2 sets (collected 6/20/2021, resulted on 6/26/2021) yielded no growth after 6 days   > On 6/20/2021, started:    > Cholestyramine 4 grams PO TID with meals    > Ceftriaxone 1000 mg IV q 24 hr    > Metronidazole 500 mg IV q 8 hr    > Vancomycin 1750 mg  IV x 1 dose   > WBC count (6/21/2021) = 26.3   > Infectious Disease consult (Dr. Rustam Stephens) was called on 6/21/2021 for evaluation and comanagement   > On 6/21/2021:    > Discontinue:     > Polyethylene glycol 17 grams in 8 oz water PO once daily     > Pericolace 2 tabs PO once daily after dinner    > Change Vancomycin IV to Vancomycin 500 mg PO q 6 hr   > CT scan of the chest, abdomen and pelvis with contrast (6/21/2021) showed:    1. No acute findings in the chest.     -Emphysema. -CABG. -Stable mildly enlarged right thyroid gland which could indicate underlying nodule, stable for many years.     2. 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 It is unclear if initial source of abscess is from small bowel inflammation or diverticulitis/pericolonic abscess. 3. Dilated small bowel. This is likely a combination of generalized small bowel ileus and low-grade partial small bowel obstruction at site of abnormal pelvic small bowel loop discussed above. 4. Diverticulosis. Stable thickened collapsed segment of sigmoid colon since 2014 suggesting a chronic stricture. This is limited for assessment by CT. 5. Bladder dome inflammation is likely secondary to the adjacent mesentery process. 6. Other chronic incidental findings.    > On 6/21/2021:    > Discontinued:     > Cholestyramine 4 grams PO TID with meals     > Ceftriaxone 1000 mg IV q 24 hr     > Metronidazole 500 mg IV q 8 hr     > Vancomycin 500 mg PO q 6 hr    > Started Piperacillin-Tazobactam 3.375 grams IV q 6 hr   > Colorectal Surgery (Dr. Debbie Cooley) was called on 6/22/2021 for evaluation and comanagement    > No surgical intervention recommended since unable to safely stop Apixaban (for paroxysmal atrial fibrillation) and Clopidogrel (recent placement of 3 JENNIE to SVG-LCx on 5/18/2021)   > On 6/25/2021:    > Discontinued Piperacillin-Tazobactam 3.375 grams IV q 6 hr    > Started:     > Cefpodoxime 200 mg PO q 12 hr     > Metronidazole 500 mg PO q 12 hr   > On 6/28/2021:    > Discontinued Metronidazole 500 mg PO q 12 hr    > Started Amoxicillin-Clavulanate 500-125 1 tab PO q 12 hr      06/30/21  0450 06/29/21  0211 06/28/21  0230 06/27/21  0516 06/25/21  0534 06/24/21  0522 06/23/21  0538 06/22/21  0409 06/21/21  1310 06/21/21  0330 06/20/21  0550   WBC 18.2* 21.0* 24.5* 21.7* 18.4* 18.2* 22.1* 24.7* 26.0* 26.3* 26.7*      > Upon discharge from the ARU, the patient will need to follow up with Colorectal Surgery (Dr. Debbie Cooley)   > CT scan of the abdomen and pelvis with contrast on 7/15/2021   > Continue:    > Cefpodoxime 200 mg PO q 12 hr (STOP DATE: 7/21/2021)    > Amoxicillin-Clavulanate 500-125 1 tab PO q 12 hr (STOP DATE: 7/21/2021)    > 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, brainstem and cerebellum) without residual deficit   > Continue:    > Clopidogrel 75 mg PO once daily with breakfast    > Rosuvastatin 10 mg PO once daily    > Benign prostatic hyperplasia with urinary retention   > Continue Tamsulosin 0.4 mg PO once daily after dinner    > Chronic heart failure with preserved ejection fraction (HFpEF)   > 2D echocardiogram (5/18/2021) showed EF 50%; concentric LV hypertrophy with a severely thickened septal wall and mildly thickened posterior wall; left atrial chamber is severely enlarged; right atrial chamber volume is moderately enlarged; no mass, shunts, or thrombi.    > Continue Carvedilol 3.125 mg PO BID with meals (8AM, 5PM)    > Chronic obstructive pulmonary disease (COPD)   > Continue:    > Arformoterol nebulization BID    > Ipratropium nebulization TID    > Albuterol nebulization q 4 hr PRN for shortness of breath/wheezing    > Essential hypertension   > 2D echocardiogram (5/18/2021) showed EF 50%; concentric LV hypertrophy with a severely thickened septal wall and mildly thickened posterior wall; left atrial chamber is severely enlarged; right atrial chamber volume is moderately enlarged; no mass, shunts, or thrombi.    > Continue Carvedilol 3.125 mg PO BID with meals (8AM, 5PM)    > Mixed hyperlipidemia   > Lipid profile (5/18/2021) showed TG 83, , HDL 50, LDL 93   > Continue Rosuvastatin 10 mg PO once daily    > Non-ST elevation (NSTEMI) myocardial infarction; Coronary artery disease; History of CABG; S/P PCI to proximal SVG with 3.5 x 12 mm [place] drug-eluting stent; PCI to mid SVG with 3.5 x 34 mm [place] drug-eluting stent; PCI to distal SVG with 3.5 x 15 mm [place] drug-eluting stent;  Balloon angioplasty to distal SVG anastomosis (5/18/2021 - Dr. Dominick Irvin)    > Continue:    > Carvedilol 3.125 mg PO BID with meals (8AM, 5PM)    > Clopidogrel 75 mg PO once daily with breakfast    > Rosuvastatin 10 mg PO once daily    > Nitroglycerin 0.4 mg SL PRN for chest pain    > Paroxysmal atrial fibrillation, anticoagulated on Apixaban   > Continue:    > Apixaban 5 mg PO BID    > Carvedilol 3.125 mg PO BID with meals (8AM, 5PM)    > Peripheral vascular disease of extremity with claudication; Neuropathy involving both lower extremities   > (+) pain and numbness of toes of both feet   > PVL arterial doppler of both lower extremities with exercise (9/12/2019) showed:    > Moderate arterial insufficiency in the right lower extremity at the level of the trifurcation at rest.     > Severe arterial insufficiency in the left lower extremity at the level of the trifurcation at rest.   > PVL arterial doppler of both lower extremities with exercise (10/22/2020) showed:    > Mild right lower extremity arterial insufficiency of indeterminate level at rest.     > Moderate left lower extremity arterial insufficiency involving the femoral-popliteal segment at rest.     > The ankle brachial indices post exercise were unreliable as described. No evidence of inflow involvement.   > Planned to start patient on Cilostazol for the claudication symptoms but patient already on Apixaban + Clopidogrel; addition of Cilostazol may increase risk for bleeding; will hold off on adding Cilostazol for now   > As per the last Progress Note by Dr. Bhargav Samano (dated 10/22/2020):  \"Pt major symptom is not related to his PAD but sounds more like neuropathy\"   > On 6/14/2021, started a trial of Gabapentin 100 mg PO BID   > Upon discharge from the ARU, the patient will need to follow up with Vascular Surgery (Dr. Bhargav Samano)   > On 6/18/2021, increased Gabapentin from 100 mg PO BID to 100 mg PO TID   > On 6/28/2021, increased Gabapentin from 100 mg to 300 mg PO TID   > On 7/7/2021, started:    > Furosemide 40 mg PO once daily x 5 days    > Nitroglycerin 2% ointment, applied to bilateral popliteal and calf areas BID   > Continue:    > Gabapentin 300 mg PO TID    > Furosemide 40 mg PO once daily x 3 more doses    > Nitroglycerin 2% ointment, applied to bilateral popliteal and calf areas BID    > Transient bradycardia, while on Carvedilol 25 mg PO BID with meals (6/21/2021)   > On 6/21/2021, Carvedilol was held   > On 6/26/2021, Cardiology restarted the patient on Carvedilol 3.125 mg PO BID with meals   > Instructions of Cardiology:     > high dose due to risk for pacemaker and ongoing infeciton    > if heart rate stays consistently > 110 bpm, can increase to 6.25 mg BID   > Continue Carvedilol 3.125 mg PO BID with meals (8AM, 5PM)    > Loose stools, most likely side effect of oral antibiotics   > On 7/2/2021, started Loperamide 2 mg PO TID   > On 7/5/2021, increased Loperamide from 2 mg PO TID to 2 mg PO 4 times daily   > On 7/8/2021, increased Loperamide from 2 mg to 4 mg PO 4 times daily   > Change Loperamide from 4 mg PO 4 times daily to:    > Loperamide 4 mg PO BID (6AM, 4PM)    > Loperamide 2 mg PO BID (11AM, 9PM)    > Analgesia   > Continue Acetaminophen 650 mg PO q 4 hr PRN for pain     > Diet:   > Specifications: Low fiber/No added salt (3-4 grams)   > Solids (consistency): Regular    > Liquids (consistency): Thin    > Fluid restriction: None      9. Personal Protective Equipment (DV14 face mask) was used while interacting with the patient. Patient was using a surgical mask. 10. Patient's progress in rehabilitation and medical issues discussed with the patient. All questions answered to the best of my ability. Care plan discussed with patient and nurse. 11. Total clinical care time is 30 minutes, including review of chart including all labs, radiology, past medical history, and discussion with patient.  Greater than 50% of my time was spent in coordination of care and counseling.       Signed:    Anton Lao MD    July 9, 2021 [place]

## 2024-07-29 NOTE — REHAB NOTE
SHIFT CHANGE NOTE FOR [place]    Bedside and Verbal shift change report given to Patricia Bose (oncoming nurse) by Bridget Fallon (offgoing nurse). Report included the following information SBAR, Kardex, MAR and Recent Results. Situation:   Code Status: Full Code   Reason for Admission: [address] Day: 0   Problem List:   Hospital Problems  Date Reviewed: 2/24/2020        Codes Class Noted POA    Mixed hyperlipidemia (Chronic) ICD-10-CM: V98.8  ICD-9-CM: 272.2  Unknown Yes        Anticoagulated by anticoagulation treatment ICD-10-CM: Z79.01  ICD-9-CM: V58.61  5/19/2021 Yes    Overview Signed 6/7/2021 10:48 PM by Tab Blake MD     On Apixaban             On clopidogrel therapy ICD-10-CM: Z79.01  ICD-9-CM: V58.61  5/19/2021 Yes        * (Principal) Non-ST elevation (NSTEMI) myocardial infarction [facility]) ICD-10-CM: I21.4  ICD-9-CM: 410.70  5/17/2021 Yes        Acute on chronic heart failure with preserved ejection fraction (HFpEF) ([address] ICD-10-CM: I50.33  ICD-9-CM: 428.23  5/17/2021 Yes        Paroxysmal atrial fibrillation ([address] ICD-10-CM: I48.0  ICD-9-CM: 427.31  5/17/2021 Yes        Essential hypertension (Chronic) ICD-10-CM: I10  ICD-9-CM: 401.9  Unknown Yes              Background:   Past Medical History:   Past Medical History:   Diagnosis Date    Anticoagulated by anticoagulation treatment 5/19/2021    On Apixaban    Benign prostatic hyperplasia with urinary retention     Chronic obstructive pulmonary disease (COPD) ([address]     Coronary artery disease involving native coronary artery of native heart     Essential hypertension     Heart failure with preserved left ventricular function (HFpEF) ([address]     2D echocardiogram (5/18/2021) showed EF 50%; concentric LV hypertrophy with a severely thickened septal wall and mildly thickened posterior wall; left atrial chamber is severely enlarged; right atrial chamber volume is moderately enlarged; no mass, shunts, or thrombi.      History of carotid stenosis     History of gross hematuria 5/26/2021    Attributed to Taveras trauma while patient was altered    History of renal artery stenosis     Malignant neoplasm of lower lobe of right lung (HCC)     Non-ST elevation (NSTEMI) myocardial infarction ([facility] 5/17/2021    On clopidogrel therapy 5/19/2021    Paroxysmal atrial fibrillation ([facility] 5/17/2021    Urinary retention       Patient taking anticoagulants Eliquis   Patient has a defibrillator: no    Assessment:   Changes in Assessment throughout shift: none     Patient has central line: no Reasons if yes: na  Insertion date:na Last dressing date:na   Patient has Taveras Cath: no Reasons if yes: na  Insertion date:na     Last Vitals:     Vitals:    06/07/21 2130 06/07/21 2146   Pulse:  81   Resp:  18   Temp:  97.9 °F (36.6 °C)   SpO2:  99%   Weight: 73.9 kg (163 lb)    Height: 5' 8\" (1.727 m)         PAIN    Pain Assessment    Pain Intensity 1: 0 (06/07/21 2130) Pain Intensity 1: 2 (12/29/14 1105)      Pain Location 1: Abdomen      Pain Intervention(s) 1: Medication (see MAR)  Patient Stated Pain Goal: 0 Patient Stated Pain Goal: 0  o Intervention effective: no c/o pain  o Other actions taken for pain: na     Skin Assessment  Skin color    Condition/Temperature    Integrity    Turgor    Weekly Pressure Ulcer Documentation  Pressure  Injury Documentation: No Pressure Injury Noted-Pressure Ulcer Prevention Initiated  Wound Prevention & Protection Methods  Orientation of wound Orientation of Wound Prevention: Posterior  Location of Prevention Location of Wound Prevention: Buttocks, Sacrum/Coccyx  Dressing Present Dressing Present : No  Dressing Status    Wound Offloading Wound Offloading (Prevention Methods): Bed, pressure redistribution/air     INTAKE/OUPUT  Date 06/06/21 1900 - 06/07/21 0659(Not Admitted) 06/07/21 0700 - 06/08/21 0659   Shift 1705-9051 24 Hour Total 3804-4317 6733-5498 24 Hour Total   INTAKE   Shift Total(mL/kg)        OUTPUT Urine    225 225     Urine Voided    225 225     Urine Occurrence(s)    1 x 1 x   Stool          Stool Occurrence(s)    0 x 0 x   Shift Total(mL/kg)    225(3) 225(3)   NET    -225 -225   Weight (kg)    73.9 73.9       Recommendations:  1. Patient needs and requests:emptying the urinal ,blanket    2. Diet: Cardiac Regular    3. Pending tests/procedures: labs    4. Functional Level/Equipment: assist x 2 / wheelchair    5. Estimated Discharge Date: TBD Posted on Whiteboard in Patients Room: yes    HEALS Safety Check    A safety check occurred in the patient's room between off going nurse and oncoming nurse listed above. The safety check included the below items  Area Items   H  High Alert Medications - Verify all high alert medication drips (heparin, PCA, etc.)   E  Equipment - Suction is set up for ALL patients (with laila)  - Red plugs utilized for all equipment (IV pumps, etc.)  - WOWs wiped down at end of shift.  - Room stocked with oxygen, suction, and other unit-specific supplies   A  Alarms - Bed alarm is set for fall risk patients  - Ensure chair alarm is in place and activated if patient is up in a chair   L  Lines - Check IV for any infiltration  - Taveras bag is empty if patient has a Taveras   - Tubing and IV bags are labeled   S  Safety   - Room is clean, patient is clean, and equipment is clean. - Hallways are clear from equipment besides carts. - Fall bracelet on for fall risk patients  - Ensure room is clear and free of clutter  - Suction is set up for ALL patients (with laila)  - Hallways are clear from equipment besides carts.    - Isolation precautions followed, supplies available outside room, sign posted Pt. reports dental pain x1 month.